# Patient Record
Sex: FEMALE | Race: WHITE | Employment: OTHER | URBAN - METROPOLITAN AREA
[De-identification: names, ages, dates, MRNs, and addresses within clinical notes are randomized per-mention and may not be internally consistent; named-entity substitution may affect disease eponyms.]

---
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
## 2018-02-02 ENCOUNTER — APPOINTMENT (OUTPATIENT)
Dept: GENERAL RADIOLOGY | Age: 73
End: 2018-02-02
Attending: EMERGENCY MEDICINE
Payer: MEDICARE

## 2018-02-02 ENCOUNTER — HOSPITAL ENCOUNTER (EMERGENCY)
Age: 73
Discharge: HOME | End: 2018-02-02
Attending: EMERGENCY MEDICINE
Payer: MEDICARE

## 2018-02-02 VITALS
DIASTOLIC BLOOD PRESSURE: 74 MMHG | HEART RATE: 82 BPM | OXYGEN SATURATION: 98 % | RESPIRATION RATE: 18 BRPM | TEMPERATURE: 97.9 F | WEIGHT: 158.07 LBS | SYSTOLIC BLOOD PRESSURE: 136 MMHG | HEIGHT: 64 IN | BODY MASS INDEX: 26.99 KG/M2

## 2018-02-02 DIAGNOSIS — R53.83 FATIGUE, UNSPECIFIED TYPE: Primary | ICD-10-CM

## 2018-02-02 DIAGNOSIS — R00.2 PALPITATIONS: ICD-10-CM

## 2018-02-02 LAB
ALBUMIN SERPL-MCNC: 4 G/DL (ref 3.5–5)
ALBUMIN/GLOB SERPL: 1.1 {RATIO} (ref 1.1–2.2)
ALP SERPL-CCNC: 98 U/L (ref 45–117)
ALT SERPL-CCNC: 23 U/L (ref 12–78)
ANION GAP SERPL CALC-SCNC: 4 MMOL/L (ref 5–15)
APPEARANCE UR: CLEAR
AST SERPL-CCNC: 19 U/L (ref 15–37)
ATRIAL RATE: 69 BPM
BACTERIA URNS QL MICRO: NEGATIVE /HPF
BASOPHILS # BLD: 0 K/UL (ref 0–0.1)
BASOPHILS NFR BLD: 0 % (ref 0–1)
BILIRUB SERPL-MCNC: 0.4 MG/DL (ref 0.2–1)
BILIRUB UR QL: NEGATIVE
BUN SERPL-MCNC: 15 MG/DL (ref 6–20)
BUN/CREAT SERPL: 16 (ref 12–20)
CALCIUM SERPL-MCNC: 10.3 MG/DL (ref 8.5–10.1)
CALCULATED P AXIS, ECG09: 57 DEGREES
CALCULATED R AXIS, ECG10: -44 DEGREES
CALCULATED T AXIS, ECG11: 40 DEGREES
CHLORIDE SERPL-SCNC: 101 MMOL/L (ref 97–108)
CK MB CFR SERPL CALC: NORMAL % (ref 0–2.5)
CK MB SERPL-MCNC: <1 NG/ML (ref 5–25)
CK SERPL-CCNC: 64 U/L (ref 26–192)
CO2 SERPL-SCNC: 31 MMOL/L (ref 21–32)
COLOR UR: NORMAL
CREAT SERPL-MCNC: 0.96 MG/DL (ref 0.55–1.02)
DIAGNOSIS, 93000: NORMAL
DIFFERENTIAL METHOD BLD: ABNORMAL
EOSINOPHIL # BLD: 0 K/UL (ref 0–0.4)
EOSINOPHIL NFR BLD: 0 % (ref 0–7)
EPITH CASTS URNS QL MICRO: NORMAL /LPF
ERYTHROCYTE [DISTWIDTH] IN BLOOD BY AUTOMATED COUNT: 13.9 % (ref 11.5–14.5)
GLOBULIN SER CALC-MCNC: 3.5 G/DL (ref 2–4)
GLUCOSE SERPL-MCNC: 108 MG/DL (ref 65–100)
GLUCOSE UR STRIP.AUTO-MCNC: NEGATIVE MG/DL
HCT VFR BLD AUTO: 38.1 % (ref 35–47)
HGB BLD-MCNC: 12.7 G/DL (ref 11.5–16)
HGB UR QL STRIP: NEGATIVE
HYALINE CASTS URNS QL MICRO: NORMAL /LPF (ref 0–5)
IMM GRANULOCYTES # BLD: 0 K/UL (ref 0–0.04)
IMM GRANULOCYTES NFR BLD AUTO: 0 % (ref 0–0.5)
KETONES UR QL STRIP.AUTO: NEGATIVE MG/DL
LEUKOCYTE ESTERASE UR QL STRIP.AUTO: NEGATIVE
LYMPHOCYTES # BLD: 3.6 K/UL (ref 0.8–3.5)
LYMPHOCYTES NFR BLD: 38 % (ref 12–49)
MCH RBC QN AUTO: 32.1 PG (ref 26–34)
MCHC RBC AUTO-ENTMCNC: 33.3 G/DL (ref 30–36.5)
MCV RBC AUTO: 96.2 FL (ref 80–99)
MONOCYTES # BLD: 0.9 K/UL (ref 0–1)
MONOCYTES NFR BLD: 9 % (ref 5–13)
NEUTS SEG # BLD: 4.9 K/UL (ref 1.8–8)
NEUTS SEG NFR BLD: 52 % (ref 32–75)
NITRITE UR QL STRIP.AUTO: NEGATIVE
NRBC # BLD: 0 K/UL (ref 0–0.01)
NRBC BLD-RTO: 0 PER 100 WBC
P-R INTERVAL, ECG05: 126 MS
PH UR STRIP: 6.5 [PH] (ref 5–8)
PLATELET # BLD AUTO: 426 K/UL (ref 150–400)
PMV BLD AUTO: 8.8 FL (ref 8.9–12.9)
POTASSIUM SERPL-SCNC: 4.1 MMOL/L (ref 3.5–5.1)
PROT SERPL-MCNC: 7.5 G/DL (ref 6.4–8.2)
PROT UR STRIP-MCNC: NEGATIVE MG/DL
Q-T INTERVAL, ECG07: 372 MS
QRS DURATION, ECG06: 80 MS
QTC CALCULATION (BEZET), ECG08: 398 MS
RBC # BLD AUTO: 3.96 M/UL (ref 3.8–5.2)
RBC #/AREA URNS HPF: NORMAL /HPF (ref 0–5)
SODIUM SERPL-SCNC: 136 MMOL/L (ref 136–145)
SP GR UR REFRACTOMETRY: 1.01 (ref 1–1.03)
TROPONIN I SERPL-MCNC: <0.04 NG/ML
TSH SERPL DL<=0.05 MIU/L-ACNC: 0.72 UIU/ML (ref 0.36–3.74)
UA: UC IF INDICATED,UAUC: NORMAL
UROBILINOGEN UR QL STRIP.AUTO: 0.2 EU/DL (ref 0.2–1)
VENTRICULAR RATE, ECG03: 69 BPM
WBC # BLD AUTO: 9.5 K/UL (ref 3.6–11)
WBC URNS QL MICRO: NORMAL /HPF (ref 0–4)

## 2018-02-02 PROCEDURE — 71046 X-RAY EXAM CHEST 2 VIEWS: CPT

## 2018-02-02 PROCEDURE — 80053 COMPREHEN METABOLIC PANEL: CPT | Performed by: EMERGENCY MEDICINE

## 2018-02-02 PROCEDURE — 85025 COMPLETE CBC W/AUTO DIFF WBC: CPT | Performed by: EMERGENCY MEDICINE

## 2018-02-02 PROCEDURE — 36415 COLL VENOUS BLD VENIPUNCTURE: CPT | Performed by: EMERGENCY MEDICINE

## 2018-02-02 PROCEDURE — 99284 EMERGENCY DEPT VISIT MOD MDM: CPT

## 2018-02-02 PROCEDURE — 84484 ASSAY OF TROPONIN QUANT: CPT | Performed by: EMERGENCY MEDICINE

## 2018-02-02 PROCEDURE — 82550 ASSAY OF CK (CPK): CPT | Performed by: EMERGENCY MEDICINE

## 2018-02-02 PROCEDURE — 81001 URINALYSIS AUTO W/SCOPE: CPT | Performed by: EMERGENCY MEDICINE

## 2018-02-02 PROCEDURE — 93005 ELECTROCARDIOGRAM TRACING: CPT

## 2018-02-02 PROCEDURE — 84443 ASSAY THYROID STIM HORMONE: CPT | Performed by: EMERGENCY MEDICINE

## 2018-02-02 NOTE — DISCHARGE INSTRUCTIONS
Palpitations: Care Instructions  Your Care Instructions    Heart palpitations are the uncomfortable sensation that your heart is beating fast or irregularly. You might feel pounding or fluttering in your chest. It might feel like your heart is skipping a beat. Although palpitations may be caused by a heart problem, they also occur because of stress, fatigue, or use of alcohol, caffeine, or nicotine. Many medicines, including diet pills, antihistamines, decongestants, and some herbal products, can cause heart palpitations. Nearly everyone has palpitations from time to time. Depending on your symptoms, your doctor may need to do more tests to try to find the cause of your palpitations. Follow-up care is a key part of your treatment and safety. Be sure to make and go to all appointments, and call your doctor if you are having problems. It's also a good idea to know your test results and keep a list of the medicines you take. How can you care for yourself at home? · Avoid caffeine, nicotine, and excess alcohol. · Do not take illegal drugs, such as methamphetamines and cocaine. · Do not take weight loss or diet medicines unless you talk with your doctor first.  · Get plenty of sleep. · Do not overeat. · If you have palpitations again, take deep breaths and try to relax. This may slow a racing heart. · If you start to feel lightheaded, lie down to avoid injuries that might result if you pass out and fall down. · Keep a record of your palpitations and bring it to your next doctor's appointment. Write down:  ¨ The date and time. ¨ Your pulse. (If your heart is beating fast, it may be hard to count your pulse.)  ¨ What you were doing when the palpitations started. ¨ How long the palpitations lasted. ¨ Any other symptoms. · If an activity causes palpitations, slow down or stop. Talk to your doctor before you do that activity again. · Take your medicines exactly as prescribed.  Call your doctor if you think you are having a problem with your medicine. When should you call for help? Call 911 anytime you think you may need emergency care. For example, call if:  ? · You passed out (lost consciousness). ? · You have symptoms of a heart attack. These may include:  ¨ Chest pain or pressure, or a strange feeling in the chest.  ¨ Sweating. ¨ Shortness of breath. ¨ Pain, pressure, or a strange feeling in the back, neck, jaw, or upper belly or in one or both shoulders or arms. ¨ Lightheadedness or sudden weakness. ¨ A fast or irregular heartbeat. After you call 911, the  may tell you to chew 1 adult-strength or 2 to 4 low-dose aspirin. Wait for an ambulance. Do not try to drive yourself. ? · You have symptoms of a stroke. These may include:  ¨ Sudden numbness, tingling, weakness, or loss of movement in your face, arm, or leg, especially on only one side of your body. ¨ Sudden vision changes. ¨ Sudden trouble speaking. ¨ Sudden confusion or trouble understanding simple statements. ¨ Sudden problems with walking or balance. ¨ A sudden, severe headache that is different from past headaches. ?Call your doctor now or seek immediate medical care if:  ? · You have heart palpitations and:  ¨ Are dizzy or lightheaded, or you feel like you may faint. ¨ Have new or increased shortness of breath. ? Watch closely for changes in your health, and be sure to contact your doctor if:  ? · You continue to have heart palpitations. Where can you learn more? Go to http://lindsay-lamonte.info/. Enter R508 in the search box to learn more about \"Palpitations: Care Instructions. \"  Current as of: September 21, 2016  Content Version: 11.4  © 7617-2111 [employer]. Care instructions adapted under license by [employer] (which disclaims liability or warranty for this information).  If you have questions about a medical condition or this instruction, always ask your healthcare professional. [address] any warranty or liability for your use of this information. Fatigue: Care Instructions  Your Care Instructions    Fatigue is a feeling of tiredness, exhaustion, or lack of energy. You may feel fatigue because of too much or not enough activity. It can also come from stress, lack of sleep, boredom, and poor diet. Many medical problems, such as viral infections, can cause fatigue. Emotional problems, especially depression, are often the cause of fatigue. Fatigue is most often a symptom of another problem. Treatment for fatigue depends on the cause. For example, if you have fatigue because you have a certain health problem, treating this problem also treats your fatigue. If depression or anxiety is the cause, treatment may help. Follow-up care is a key part of your treatment and safety. Be sure to make and go to all appointments, and call your doctor if you are having problems. It's also a good idea to know your test results and keep a list of the medicines you take. How can you care for yourself at home? · Get regular exercise. But don't overdo it. Go back and forth between rest and exercise. · Get plenty of rest.  · Eat a healthy diet. Do not skip meals, especially breakfast.  · Reduce your use of caffeine, tobacco, and alcohol. Caffeine is most often found in coffee, tea, cola drinks, and chocolate. · Limit medicines that can cause fatigue. This includes tranquilizers and cold and allergy medicines. When should you call for help? Watch closely for changes in your health, and be sure to contact your doctor if:  ? · You have new symptoms such as fever or a rash. ? · Your fatigue gets worse. ? · You have been feeling down, depressed, or hopeless. Or you may have lost interest in things that you usually enjoy. ? · You are not getting better as expected. Where can you learn more? Go to http://lindsay-lamonte.info/.   Enter A706 in the search box to learn more about \"Fatigue: Care Instructions. \"  Current as of: March 20, 2017  Content Version: 11.4  © 0610-6372 [employer]. Care instructions adapted under license by [employer] (which disclaims liability or warranty for this information). If you have questions about a medical condition or this instruction, always ask your healthcare professional. [address] any warranty or liability for your use of this information.

## 2018-02-02 NOTE — ED PROVIDER NOTES
EMERGENCY DEPARTMENT HISTORY AND PHYSICAL EXAM      Date: 2/2/2018  Patient Name: Bree Azul    History of Presenting Illness     Chief Complaint   Patient presents with    Chest Pain     with MID back pain x a few days    Palpitations    Fatigue       History Provided By: Patient    HPI: Bree Azul, 74 y.o. female with PMHx significant for CAD, reflux, A fib, presents ambulatory to the ED with cc of intermittent episodes of palpitations present x 2 days. Pt describes her palpitations as \"flutters\" but denies any pain. She notes that they typically occur in the morning and dissipate upon \"shaking myself really hard\". She reports associated sx of generalized fatigue and dizziness, both sx present x 2 weeks, as well as intermittent episodes of back pain. She states she has been \"feeling lousy\" for the past 2 weeks. She has a hx of A fib but denies ever being prescribed medications. She does have a cardiology appointment next week with Dr Linda Hicks. At baseline she reports irregular bowel movements and abdominal \"aches\" due to a spasmatic colon. She denies any change in her BM's from baseline. She denies sore throat, cough, blood in stool, dysuria, NVD, SOB, CP, leg swelling. Pt denies smoking or alcohol use. PCP: None    There are no other complaints, changes, or physical findings at this time. Current Outpatient Prescriptions   Medication Sig Dispense Refill    SYNTHROID 75 mcg tablet Take 75 mcg by mouth daily.  omeprazole (PRILOSEC) 40 mg capsule Take 40 mg by mouth as needed.  oxaprozin (DAYPRO) 600 mg tablet Take  by mouth daily.  acyclovir (ZOVIRAX) 400 mg tablet Take 400 mg by mouth every four (4) hours (while awake).  fexofenadine-pseudoephedrine (ALLEGRA-D)  mg per tablet Take 1 Tab by mouth as needed.          Past History     Past Medical History:  Past Medical History:   Diagnosis Date    CAD (coronary artery disease)     Hyperlipidemia    Endocrine disease \"Thyroid\"    Gastrointestinal disorder     Acid Reflux       Past Surgical History:  History reviewed. No pertinent surgical history. Family History:  History reviewed. No pertinent family history. Social History:  Social History   Substance Use Topics    Smoking status: Never Smoker    Smokeless tobacco: Never Used    Alcohol use No       Allergies: Allergies   Allergen Reactions    Codeine Rash    Pcn [Penicillins] Rash         Review of Systems   Review of Systems   Constitutional: Positive for fatigue. Negative for fever. HENT: Negative. Eyes: Negative. Respiratory: Negative for shortness of breath and wheezing. Cardiovascular: Positive for palpitations. Negative for chest pain and leg swelling. Gastrointestinal: Negative for blood in stool, constipation, diarrhea, nausea and vomiting. Endocrine: Negative. Genitourinary: Negative for difficulty urinating and dysuria. Musculoskeletal: Positive for back pain. Skin: Negative for rash. Allergic/Immunologic: Negative. Neurological: Positive for dizziness. Negative for weakness and numbness. Hematological: Negative. Psychiatric/Behavioral: Negative. Physical Exam   Physical Exam   Constitutional: She is oriented to person, place, and time. She appears well-developed and well-nourished. No distress. HENT:   Head: Normocephalic and atraumatic. Mouth/Throat: Oropharynx is clear and moist.   Eyes: Conjunctivae and EOM are normal.   Neck: Neck supple. No JVD present. No tracheal deviation present. Cardiovascular: Normal rate, regular rhythm and intact distal pulses. Exam reveals no gallop and no friction rub. No murmur heard. Pulmonary/Chest: Effort normal and breath sounds normal. No stridor. No respiratory distress. She has no wheezes. + Mastectomy scar over right breast   Abdominal: Soft. Bowel sounds are normal. She exhibits no distension and no mass. There is no tenderness. There is no guarding. Musculoskeletal: Normal range of motion. She exhibits no edema or tenderness. No deformity   Neurological: She is alert and oriented to person, place, and time. She has normal strength. No focal deficits   Skin: Skin is warm, dry and intact. No rash noted. Psychiatric: She has a normal mood and affect. Her behavior is normal. Judgment and thought content normal.   Nursing note and vitals reviewed. Diagnostic Study Results     Labs -     Recent Results (from the past 12 hour(s))   EKG, 12 LEAD, INITIAL    Collection Time: 02/02/18  1:28 PM   Result Value Ref Range    Ventricular Rate 69 BPM    Atrial Rate 69 BPM    P-R Interval 126 ms    QRS Duration 80 ms    Q-T Interval 372 ms    QTC Calculation (Bezet) 398 ms    Calculated P Axis 57 degrees    Calculated R Axis -44 degrees    Calculated T Axis 40 degrees    Diagnosis       Normal sinus rhythm  Left anterior fascicular block  When compared with ECG of 03-OCT-2002 15:25,  No significant change was found    Confirmed by Aramis Conley ([id number]) on 2/2/2018 7:66:36 PM     METABOLIC PANEL, COMPREHENSIVE    Collection Time: 02/02/18  2:00 PM   Result Value Ref Range    Sodium 136 136 - 145 mmol/L    Potassium 4.1 3.5 - 5.1 mmol/L    Chloride 101 97 - 108 mmol/L    CO2 31 21 - 32 mmol/L    Anion gap 4 (L) 5 - 15 mmol/L    Glucose 108 (H) 65 - 100 mg/dL    BUN 15 6 - 20 MG/DL    Creatinine 0.96 0.55 - 1.02 MG/DL    BUN/Creatinine ratio 16 12 - 20      GFR est AA >60 >60 ml/min/1.73m2    GFR est non-AA 57 (L) >60 ml/min/1.73m2    Calcium 10.3 (H) 8.5 - 10.1 MG/DL    Bilirubin, total 0.4 0.2 - 1.0 MG/DL    ALT (SGPT) 23 12 - 78 U/L    AST (SGOT) 19 15 - 37 U/L    Alk.  phosphatase 98 45 - 117 U/L    Protein, total 7.5 6.4 - 8.2 g/dL    Albumin 4.0 3.5 - 5.0 g/dL    Globulin 3.5 2.0 - 4.0 g/dL    A-G Ratio 1.1 1.1 - 2.2     CK W/ CKMB & INDEX    Collection Time: 02/02/18  2:00 PM   Result Value Ref Range    CK 64 26 - 192 U/L    CK - MB <1.0 <3.6 NG/ML    CK-MB Index Cannot be calculated 0 - 2.5     CBC WITH AUTOMATED DIFF    Collection Time: 02/02/18  2:00 PM   Result Value Ref Range    WBC 9.5 3.6 - 11.0 K/uL    RBC 3.96 3.80 - 5.20 M/uL    HGB 12.7 11.5 - 16.0 g/dL    HCT 38.1 35.0 - 47.0 %    MCV 96.2 80.0 - 99.0 FL    MCH 32.1 26.0 - 34.0 PG    MCHC 33.3 30.0 - 36.5 g/dL    RDW 13.9 11.5 - 14.5 %    PLATELET 613 (H) 991 - 400 K/uL    MPV 8.8 (L) 8.9 - 12.9 FL    NRBC 0.0 0  WBC    ABSOLUTE NRBC 0.00 0.00 - 0.01 K/uL    NEUTROPHILS 52 32 - 75 %    LYMPHOCYTES 38 12 - 49 %    MONOCYTES 9 5 - 13 %    EOSINOPHILS 0 0 - 7 %    BASOPHILS 0 0 - 1 %    IMMATURE GRANULOCYTES 0 0.0 - 0.5 %    ABS. NEUTROPHILS 4.9 1.8 - 8.0 K/UL    ABS. LYMPHOCYTES 3.6 (H) 0.8 - 3.5 K/UL    ABS. MONOCYTES 0.9 0.0 - 1.0 K/UL    ABS. EOSINOPHILS 0.0 0.0 - 0.4 K/UL    ABS. BASOPHILS 0.0 0.0 - 0.1 K/UL    ABS. IMM.  GRANS. 0.0 0.00 - 0.04 K/UL    DF AUTOMATED     TROPONIN I    Collection Time: 02/02/18  2:00 PM   Result Value Ref Range    Troponin-I, Qt. <0.04 <0.05 ng/mL   TSH 3RD GENERATION    Collection Time: 02/02/18  3:23 PM   Result Value Ref Range    TSH 0.72 0.36 - 3.74 uIU/mL   URINALYSIS W/ REFLEX CULTURE    Collection Time: 02/02/18  3:26 PM   Result Value Ref Range    Color YELLOW/STRAW      Appearance CLEAR CLEAR      Specific gravity 1.011 1.003 - 1.030      pH (UA) 6.5 5.0 - 8.0      Protein NEGATIVE  NEG mg/dL    Glucose NEGATIVE  NEG mg/dL    Ketone NEGATIVE  NEG mg/dL    Bilirubin NEGATIVE  NEG      Blood NEGATIVE  NEG      Urobilinogen 0.2 0.2 - 1.0 EU/dL    Nitrites NEGATIVE  NEG      Leukocyte Esterase NEGATIVE  NEG      WBC 0-4 0 - 4 /hpf    RBC 0-5 0 - 5 /hpf    Epithelial cells FEW FEW /lpf    Bacteria NEGATIVE  NEG /hpf    UA:UC IF INDICATED CULTURE NOT INDICATED BY UA RESULT CNI      Hyaline cast 2-5 0 - 5 /lpf       Radiologic Studies -   XR CHEST PA LAT   Final Result        CT Results  (Last 48 hours)    None        CXR Results  (Last 48 hours) 02/02/18 1419  XR CHEST PA LAT Final result    Impression:  IMPRESSION: No acute abnormality. Narrative:  EXAM:  XR CHEST PA LAT. INDICATION: Palpitations. COMPARISON: 7/19/2011. FINDINGS:    PA and lateral radiographs of the chest were obtained. The patient is on a   cardiac monitor. Lungs: The lungs are clear of mass, nodule, airspace disease or edema. Pleura: There is no pleural effusion or pneumothorax. Mediastinum: The cardiac and mediastinal contours and pulmonary vascularity are   normal.  The aorta is atherosclerotic. Bones and soft tissues: The bones and soft tissues are within normal limits. Medical Decision Making   I am the first provider for this patient. I reviewed the vital signs, available nursing notes, past medical history, past surgical history, family history and social history. Vital Signs-Reviewed the patient's vital signs. Patient Vitals for the past 12 hrs:   Temp Pulse Resp BP SpO2   02/02/18 1336 - 82 18 136/74 98 %   02/02/18 1310 97.9 °F (36.6 °C) 92 12 (!) 181/104 98 %       Pulse Oximetry Analysis - 98% on room air    Cardiac Monitor:   Rate: 84 bpm  Rhythm: Normal Sinus Rhythm      EKG interpretation: (Preliminary) 13:28  Rhythm: normal sinus rhythm; and regular . Rate (approx.): 69; Axis: left axis deviation; NV interval: normal; QRS interval: normal ; ST/T wave: normal; Other findings: normal.  Written by Daiva Gilford, ED Scribe, as dictated by Edna Smith DO. Records Reviewed: Old Medical Records    Provider Notes (Medical Decision Making):   DDx: arrhythmia, acs, anemia, electrolyte abnormality, viral syndrome, pneumonia, dehydration, uti. Will check labs, cardiac enzymes, ekg, CXR, ua, tsh.     ED Course:   Initial assessment performed. The patients presenting problems have been discussed, and they are in agreement with the care plan formulated and outlined with them.   I have encouraged them to ask questions as they arise throughout their visit. Medications - No data to display    Progress Note:  4:04 PM  Updated patient on plan of care. Disposition:  Discharge Note:  4:57  The pt is ready for discharge. The pt's signs, symptoms, diagnosis, and discharge instructions have been discussed and pt has conveyed their understanding. The pt is to follow up as recommended or return to ER should their symptoms worsen. Plan has been discussed and pt is in agreement. This note is prepared by Afshan Briones, acting as a Scribe for Sabine Frazier DO: The scribe's documentation has been prepared under my direction and personally reviewed by me in its entirety. I confirm that the notes above accurately reflects all work, treatment, procedures, and medical decision making performed by me. PLAN:  1. Follow-up Information     Follow up With Details Comments Contact Info    Your PCP Schedule an appointment as soon as possible for a visit      Kamila Reynolds MD Schedule an appointment as soon as possible for a visit  [address] [place] [address]  [address] [place] [address],[address]  [phone]      [facility] EMERGENCY DEPT  As needed, If symptoms worsen [address] [place] [address]  [phone]        Return to ED if worse     Diagnosis     Clinical Impression:   1. Fatigue, unspecified type    2. Palpitations        Attestations: This note is prepared by Afshan Briones, acting as a Scribe for Sabine Frazier DO: The scribe's documentation has been prepared under my direction and personally reviewed by me in its entirety. I confirm that the notes above accurately reflects all work, treatment, procedures, and medical decision making performed by me.

## 2018-02-02 NOTE — ED NOTES
Patient reports to ED with complaints of chest pain that have been ongoing for a few days. Patient states she had the pains before, which were accompanied with palpitations and was dx with A. Fib. Patient states she was never treated with medications for the diagnosis. She sates she began to have the same chest pains and palpitations, as well as, accompanied fatigue, which made it hard for her to get out of bed yesterday. MD Norman Cantu met patient at bedside for evaluation. Patient resting comfortably in stretcher with call bell within reach. Side rails x2. Monitor x3. No further complaints noted at this time.

## 2018-03-22 ENCOUNTER — HOSPITAL ENCOUNTER (OUTPATIENT)
Dept: MRI IMAGING | Age: 73
Discharge: HOME | End: 2018-03-22
Attending: SPECIALIST
Payer: MEDICARE

## 2018-03-22 DIAGNOSIS — M54.16 LUMBAR RADICULITIS: ICD-10-CM

## 2018-03-22 PROCEDURE — 72148 MRI LUMBAR SPINE W/O DYE: CPT

## 2018-05-16 PROBLEM — S82.92XD CLOSED FRACTURE OF LEFT LOWER EXTREMITY WITH ROUTINE HEALING: Status: ACTIVE | Noted: 2018-05-16

## 2018-05-16 PROBLEM — N80.9 ENDOMETRIOSIS: Status: ACTIVE | Noted: 2018-05-16

## 2018-05-16 PROBLEM — E03.8 OTHER SPECIFIED HYPOTHYROIDISM: Status: ACTIVE | Noted: 2018-05-16

## 2018-05-16 PROBLEM — Z86.73 HISTORY OF CVA (CEREBROVASCULAR ACCIDENT): Status: ACTIVE | Noted: 2018-05-16

## 2018-05-16 PROBLEM — M85.89 OSTEOPENIA OF MULTIPLE SITES: Status: ACTIVE | Noted: 2018-05-16

## 2018-05-16 PROBLEM — K21.9 GASTROESOPHAGEAL REFLUX DISEASE WITHOUT ESOPHAGITIS: Status: ACTIVE | Noted: 2018-05-16

## 2018-05-16 PROBLEM — Z85.3 HX: BREAST CANCER: Status: ACTIVE | Noted: 2018-05-16

## 2018-05-17 ENCOUNTER — OFFICE VISIT (OUTPATIENT)
Dept: INTERNAL MEDICINE CLINIC | Age: 73
End: 2018-05-17

## 2018-05-17 VITALS
TEMPERATURE: 97.9 F | DIASTOLIC BLOOD PRESSURE: 68 MMHG | SYSTOLIC BLOOD PRESSURE: 111 MMHG | OXYGEN SATURATION: 99 % | BODY MASS INDEX: 27.83 KG/M2 | HEART RATE: 70 BPM | HEIGHT: 64 IN | WEIGHT: 163 LBS

## 2018-05-17 DIAGNOSIS — M85.80 OSTEOPENIA, UNSPECIFIED LOCATION: ICD-10-CM

## 2018-05-17 DIAGNOSIS — Z85.3 HX: BREAST CANCER: ICD-10-CM

## 2018-05-17 DIAGNOSIS — E78.5 HYPERLIPIDEMIA, UNSPECIFIED HYPERLIPIDEMIA TYPE: Primary | ICD-10-CM

## 2018-05-17 DIAGNOSIS — Z86.73 HISTORY OF CVA (CEREBROVASCULAR ACCIDENT): ICD-10-CM

## 2018-05-17 DIAGNOSIS — M17.10 ARTHRITIS OF KNEE: ICD-10-CM

## 2018-05-17 DIAGNOSIS — E03.9 HYPOTHYROIDISM, UNSPECIFIED TYPE: ICD-10-CM

## 2018-05-17 DIAGNOSIS — M85.89 OSTEOPENIA OF MULTIPLE SITES: ICD-10-CM

## 2018-05-17 RX ORDER — SIMVASTATIN 20 MG/1
20 TABLET, FILM COATED ORAL
Qty: 90 TAB | Refills: 3 | Status: SHIPPED | OUTPATIENT
Start: 2018-05-17 | End: 2018-12-05 | Stop reason: SDUPTHER

## 2018-05-17 RX ORDER — ACYCLOVIR 400 MG/1
400 TABLET ORAL 2 TIMES DAILY
Qty: 180 TAB | Refills: 3 | Status: SHIPPED | OUTPATIENT
Start: 2018-05-17 | End: 2018-08-07 | Stop reason: SDUPTHER

## 2018-05-17 RX ORDER — LEVOTHYROXINE SODIUM 75 UG/1
75 TABLET ORAL DAILY
Qty: 90 TAB | Refills: 3 | Status: SHIPPED | OUTPATIENT
Start: 2018-05-17 | End: 2018-10-23 | Stop reason: SDUPTHER

## 2018-05-17 RX ORDER — SIMVASTATIN 20 MG/1
TABLET, FILM COATED ORAL
COMMUNITY
End: 2018-05-17 | Stop reason: SDUPTHER

## 2018-05-17 NOTE — PROGRESS NOTES
Chief Complaint   Patient presents with   [facility] Establish Care     New patient    Memory Loss     Short term     Back Pain

## 2018-05-17 NOTE — MR AVS SNAPSHOT
[address] [facility] 
[phone] Patient: Fernanda Rodarte MRN:  [id number] Visit Information Date & Time Provider Department Dept. Phone Encounter #  
 5/17/2018 10:00 AM Karmen Velásquez, [address] [place] [address],[address] [phone] [id number] Follow-up Instructions Return in about 6 months (around 11/17/2018) for hld thyroid oa. Upcoming Health Maintenance Date Due DTaP/Tdap/Td series (1 - Tdap) 7/20/1964 BREAST CANCER SCRN MAMMOGRAM 7/20/1993 FOBT Q 1 YEAR AGE 50-75 7/20/1993 ZOSTER VACCINE AGE 60> 5/20/2003 Pneumococcal 65+ Low/Medium Risk (1 of 2 - PCV13) 7/20/2008 GLAUCOMA SCREENING Q2Y 6/27/2014 MEDICARE YEARLY EXAM 3/14/2018 Influenza Age 5 to Adult 8/1/2018 Allergies as of 5/17/2018  Review Complete On: 2/2/2018 By: Tavia Hartman Severity Noted Reaction Type Reactions Latex  05/17/2018    Rash Codeine  11/10/2009    Rash Pcn [Penicillins]  11/10/2009    Rash Current Immunizations  Never Reviewed No immunizations on file. Not reviewed this visit You Were Diagnosed With   
  
 Codes Comments Hyperlipidemia, unspecified hyperlipidemia type    -  Primary ICD-10-CM: E78.5 ICD-9-CM: 272.4 Hypothyroidism, unspecified type     ICD-10-CM: E03.9 ICD-9-CM: 244.9 Osteopenia, unspecified location     ICD-10-CM: M85.80 ICD-9-CM: 733.90 History of CVA (cerebrovascular accident)     ICD-10-CM: Z86.73 
ICD-9-CM: V12.54 Osteopenia of multiple sites     ICD-10-CM: M85.89 ICD-9-CM: 733.90 HX: breast cancer     ICD-10-CM: Z85.3 ICD-9-CM: V10.3 Arthritis of knee     ICD-10-CM: M17.10 ICD-9-CM: 716.96 Vitals BP Pulse Temp Height(growth percentile) Weight(growth percentile) SpO2  
 111/68 (BP 1 Location: Left arm, BP Patient Position: Sitting) 70 97.9 °F (36.6 °C) (Oral) 5' 3.5\" (1.613 m) 163 lb (73.9 kg) 99% BMI OB Status Smoking Status 28.42 kg/m2 Menopause Never Smoker BMI and BSA Data Body Mass Index Body Surface Area  
 28.42 kg/m 2 1.82 m 2 Preferred Pharmacy Pharmacy Name Phone [id number] [address]ise [address] [phone] Your Updated Medication List  
  
   
This list is accurate as of 5/17/18 11:42 AM.  Always use your most recent med list.  
  
  
  
  
 acyclovir 400 mg tablet Commonly known as:  ZOVIRAX Take 1 Tab by mouth two (2) times a day. fexofenadine-pseudoephedrine  mg Tb12 Commonly known as:  ALLEGRA-D Take 1 Tab by mouth as needed. levothyroxine 75 mcg tablet Commonly known as:  SYNTHROID Take 1 Tab by mouth daily. omeprazole 40 mg capsule Commonly known as:  PRILOSEC Take 40 mg by mouth as needed. oxaprozin 600 mg tablet Commonly known as:  [place]ales [place] Take  by mouth daily. simvastatin 20 mg tablet Commonly known as:  ZOCOR Take 1 Tab by mouth nightly. Prescriptions Printed Refills  
 simvastatin (ZOCOR) 20 mg tablet 3 Sig: Take 1 Tab by mouth nightly. Class: Print Route: Oral  
 levothyroxine (SYNTHROID) 75 mcg tablet 3 Sig: Take 1 Tab by mouth daily. Class: Print Route: Oral  
 acyclovir (ZOVIRAX) 400 mg tablet 3 Sig: Take 1 Tab by mouth two (2) times a day. Class: Print Route: Oral  
  
We Performed the Following CBC W/O DIFF [04156 CPT(R)] METABOLIC PANEL, COMPREHENSIVE [82856 CPT(R)] TSH 3RD GENERATION [91876 CPT(R)] Follow-up Instructions Return in about 6 months (around 11/17/2018) for hld thyroid oa. Introducing [facility]! [employer] introduces [employer] patient portal. Now you can access parts of your medical record, email your doctor's office, and request medication refills online.    
 
1. In your internet browser, go to [email or web address] 2. Click on the First Time User? Click Here link in the Sign In box. You will see the New Member Sign Up page. 3. Enter your Jebbit Access Code exactly as it appears below. You will not need to use this code after youve completed the sign-up process. If you do not sign up before the expiration date, you must request a new code. · Jebbit Access Code: [email or web address] Expires: 6/19/2018  2:44 PM 
 
4. Enter the last four digits of your Social Security Number (xxxx) and Date of Birth (mm/dd/yyyy) as indicated and click Submit. You will be taken to the next sign-up page. 5. Create a [employer] ID. This will be your Jebbit login ID and cannot be changed, so think of one that is secure and easy to remember. 6. Create a Jebbit password. You can change your password at any time. 7. Enter your Password Reset Question and Answer. This can be used at a later time if you forget your password. 8. Enter your e-mail address. You will receive e-mail notification when new information is available in [address] 9. Click Sign Up. You can now view and download portions of your medical record. 10. Click the Download Summary menu link to download a portable copy of your medical information. If you have questions, please visit the Frequently Asked Questions section of the Jebbit website. Remember, Jebbit is NOT to be used for urgent needs. For medical emergencies, dial 911. Now available from your iPhone and Android! Please provide this summary of care documentation to your next provider. Your primary care clinician is listed as Shadi ALVARADO. If you have any questions after today's visit, please call [phone].

## 2018-05-17 NOTE — PROGRESS NOTES
HISTORY OF PRESENT ILLNESS  Adama Martinez is a 74 y.o. female. HPI     Here to establish care  Former PCP Dr Sherley Marie in [place]  Moved to [place]  Hx palpitations, dyslipidemia ( Dr Mary Ware., osteopenia( Dr. Dino Fabry  sees gyn MD yearly  Not afib per pt  Sees Dr Pina-cardiologist  In ED a few mos ago for palpitations      Has T 10 compression fx  Getting PT for tilted pelvis  Has severe left knee OA--Dr Debi Gagnon Nose for skin  There are no active problems to display for this patient. Current Outpatient Prescriptions   Medication Sig Dispense Refill    SYNTHROID 75 mcg tablet Take 75 mcg by mouth daily.  fexofenadine-pseudoephedrine (ALLEGRA-D)  mg per tablet Take 1 Tab by mouth as needed.  omeprazole (PRILOSEC) 40 mg capsule Take 40 mg by mouth as needed.  oxaprozin (DAYPRO) 600 mg tablet Take  by mouth daily.  acyclovir (ZOVIRAX) 400 mg tablet Take 400 mg by mouth every four (4) hours (while awake). Allergies   Allergen Reactions    Codeine Rash    Pcn [Penicillins] Rash     Past Medical History:   Diagnosis Date    CAD (coronary artery disease)     Hyperlipidemia    Endocrine disease     \"Thyroid\"    Gastrointestinal disorder     Acid Reflux     No past surgical history on file. No family history on file.   Social History   Substance Use Topics    Smoking status: Never Smoker    Smokeless tobacco: Never Used    Alcohol use No      Lab Results  Component Value Date/Time   WBC 9.5 02/02/2018 02:00 PM   HGB 12.7 02/02/2018 02:00 PM   HCT 38.1 02/02/2018 02:00 PM   PLATELET 284 (H) 62/38/4218 02:00 PM   MCV 96.2 02/02/2018 02:00 PM     Lab Results  Component Value Date/Time   Glucose 108 (H) 02/02/2018 02:00 PM   LDL, calculated 121.4 (H) 07/10/2009 08:37 AM   Creatinine 0.96 02/02/2018 02:00 PM      Lab Results  Component Value Date/Time   Cholesterol, total 209 (H) 07/10/2009 08:37 AM   HDL Cholesterol 70 (H) 07/10/2009 08:37 AM   LDL, calculated 121.4 (H) 07/10/2009 08:37 AM   Triglyceride 88 07/10/2009 08:37 AM   CHOL/HDL Ratio 3.0 07/10/2009 08:37 AM     Lab Results  Component Value Date/Time   GFR est non-AA 57 (L) 02/02/2018 02:00 PM   GFR est AA >60 02/02/2018 02:00 PM   Creatinine 0.96 02/02/2018 02:00 PM   BUN 15 02/02/2018 02:00 PM   Sodium 136 02/02/2018 02:00 PM   Potassium 4.1 02/02/2018 02:00 PM   Chloride 101 02/02/2018 02:00 PM   CO2 31 02/02/2018 02:00 PM     Lab Results  Component Value Date/Time   TSH 0.72 02/02/2018 03:23 PM         Review of Systems   Constitutional: Negative for chills, fever, malaise/fatigue and weight loss. Weight gain several lbs in past 1 year   Eyes: Negative for blurred vision and double vision. Respiratory: Negative for cough and shortness of breath. Cardiovascular: Negative for chest pain and palpitations. Gastrointestinal: Negative for abdominal pain, blood in stool, constipation, diarrhea, melena, nausea and vomiting. Genitourinary: Negative for dysuria, frequency, hematuria and urgency. Musculoskeletal: Positive for joint pain. Negative for back pain, falls and myalgias. Neurological: Negative for dizziness, tremors and headaches. Memory loss-mild       Physical Exam   Constitutional: She appears well-developed and well-nourished. Appears stated age   HENT:   Mouth/Throat: Oropharynx is clear and moist.   Eyes: Pupils are equal, round, and reactive to light. Neck: Normal range of motion. Cardiovascular: Normal rate, regular rhythm and normal heart sounds. Exam reveals no gallop and no friction rub. No murmur heard. Pulmonary/Chest: Effort normal and breath sounds normal. No respiratory distress. She has no wheezes. Abdominal: Soft. Bowel sounds are normal.   Musculoskeletal: She exhibits no edema. Neurological: She is alert. Skin: Skin is warm. Psychiatric: She has a normal mood and affect. Nursing note and vitals reviewed.       ASSESSMENT and PLAN  Diagnoses and all orders for this visit:    1. Hyperlipidemia, unspecified hyperlipidemia type  -     METABOLIC PANEL, COMPREHENSIVE   Managing with diet and exericse  2. Hypothyroidism, unspecified type  -     TSH 3RD GENERATION   euthyroid clinically  3. Osteopenia, unspecified location  -     CBC W/O DIFF   Hx thoracic compression fx--prolia  4. History of CVA (cerebrovascular accident)     5. Osteopenia of multiple sites   prolia per rheum MD  6. HX: breast cancer s/p right mastectomy   Mammogram per gyn MD  7. Arthritis of knee  -     CBC W/O DIFF   Advised lowering use ot nsaid Daypro to one every day prn, try ES tylenol   F/u ortho MD for injection or TKR  Other orders  -     simvastatin (ZOCOR) 20 mg tablet; Take 1 Tab by mouth nightly. -     levothyroxine (SYNTHROID) 75 mcg tablet; Take 1 Tab by mouth daily. -     acyclovir (ZOVIRAX) 400 mg tablet; Take 1 Tab by mouth two (2) times a day. Follow-up Disposition:  Return in about 6 months (around 11/17/2018) for hld thyroid oa.

## 2018-05-21 ENCOUNTER — APPOINTMENT (OUTPATIENT)
Dept: GENERAL RADIOLOGY | Age: 73
End: 2018-05-21
Attending: EMERGENCY MEDICINE
Payer: MEDICARE

## 2018-05-21 ENCOUNTER — APPOINTMENT (OUTPATIENT)
Dept: CT IMAGING | Age: 73
End: 2018-05-21
Attending: EMERGENCY MEDICINE
Payer: MEDICARE

## 2018-05-21 ENCOUNTER — HOSPITAL ENCOUNTER (EMERGENCY)
Age: 73
Discharge: HOME | End: 2018-05-21
Attending: EMERGENCY MEDICINE
Payer: MEDICARE

## 2018-05-21 VITALS
DIASTOLIC BLOOD PRESSURE: 67 MMHG | SYSTOLIC BLOOD PRESSURE: 143 MMHG | BODY MASS INDEX: 28.35 KG/M2 | OXYGEN SATURATION: 97 % | TEMPERATURE: 97.9 F | HEART RATE: 67 BPM | RESPIRATION RATE: 17 BRPM | HEIGHT: 63 IN | WEIGHT: 160 LBS

## 2018-05-21 DIAGNOSIS — R53.83 FATIGUE, UNSPECIFIED TYPE: ICD-10-CM

## 2018-05-21 DIAGNOSIS — R00.2 PALPITATIONS: Primary | ICD-10-CM

## 2018-05-21 LAB
ALBUMIN SERPL-MCNC: 4 G/DL (ref 3.5–5)
ALBUMIN/GLOB SERPL: 1.2 {RATIO} (ref 1.1–2.2)
ALP SERPL-CCNC: 96 U/L (ref 45–117)
ALT SERPL-CCNC: 29 U/L (ref 12–78)
ANION GAP SERPL CALC-SCNC: 7 MMOL/L (ref 5–15)
APPEARANCE UR: CLEAR
AST SERPL-CCNC: 20 U/L (ref 15–37)
BASOPHILS # BLD: 0 K/UL (ref 0–0.1)
BASOPHILS NFR BLD: 1 % (ref 0–1)
BILIRUB SERPL-MCNC: 0.2 MG/DL (ref 0.2–1)
BILIRUB UR QL: NEGATIVE
BUN SERPL-MCNC: 13 MG/DL (ref 6–20)
BUN/CREAT SERPL: 17 (ref 12–20)
CALCIUM SERPL-MCNC: 9.2 MG/DL (ref 8.5–10.1)
CHLORIDE SERPL-SCNC: 100 MMOL/L (ref 97–108)
CK SERPL-CCNC: 91 U/L (ref 26–192)
CO2 SERPL-SCNC: 27 MMOL/L (ref 21–32)
COLOR UR: NORMAL
CREAT SERPL-MCNC: 0.76 MG/DL (ref 0.55–1.02)
DIFFERENTIAL METHOD BLD: ABNORMAL
EOSINOPHIL # BLD: 0.1 K/UL (ref 0–0.4)
EOSINOPHIL NFR BLD: 1 % (ref 0–7)
ERYTHROCYTE [DISTWIDTH] IN BLOOD BY AUTOMATED COUNT: 13.3 % (ref 11.5–14.5)
GLOBULIN SER CALC-MCNC: 3.3 G/DL (ref 2–4)
GLUCOSE SERPL-MCNC: 107 MG/DL (ref 65–100)
GLUCOSE UR STRIP.AUTO-MCNC: NEGATIVE MG/DL
HCT VFR BLD AUTO: 35.4 % (ref 35–47)
HGB BLD-MCNC: 11.7 G/DL (ref 11.5–16)
HGB UR QL STRIP: NEGATIVE
IMM GRANULOCYTES # BLD: 0 K/UL (ref 0–0.04)
IMM GRANULOCYTES NFR BLD AUTO: 0 % (ref 0–0.5)
KETONES UR QL STRIP.AUTO: NEGATIVE MG/DL
LEUKOCYTE ESTERASE UR QL STRIP.AUTO: NEGATIVE
LYMPHOCYTES # BLD: 4.4 K/UL (ref 0.8–3.5)
LYMPHOCYTES NFR BLD: 50 % (ref 12–49)
MAGNESIUM SERPL-MCNC: 1.9 MG/DL (ref 1.6–2.4)
MCH RBC QN AUTO: 32.3 PG (ref 26–34)
MCHC RBC AUTO-ENTMCNC: 33.1 G/DL (ref 30–36.5)
MCV RBC AUTO: 97.8 FL (ref 80–99)
MONOCYTES # BLD: 0.9 K/UL (ref 0–1)
MONOCYTES NFR BLD: 10 % (ref 5–13)
NEUTS SEG # BLD: 3.3 K/UL (ref 1.8–8)
NEUTS SEG NFR BLD: 38 % (ref 32–75)
NITRITE UR QL STRIP.AUTO: NEGATIVE
NRBC # BLD: 0 K/UL (ref 0–0.01)
NRBC BLD-RTO: 0 PER 100 WBC
PH UR STRIP: 7 [PH] (ref 5–8)
PLATELET # BLD AUTO: 309 K/UL (ref 150–400)
PMV BLD AUTO: 8.3 FL (ref 8.9–12.9)
POTASSIUM SERPL-SCNC: 4.4 MMOL/L (ref 3.5–5.1)
PROT SERPL-MCNC: 7.3 G/DL (ref 6.4–8.2)
PROT UR STRIP-MCNC: NEGATIVE MG/DL
RBC # BLD AUTO: 3.62 M/UL (ref 3.8–5.2)
SODIUM SERPL-SCNC: 134 MMOL/L (ref 136–145)
SP GR UR REFRACTOMETRY: 1.01 (ref 1–1.03)
TROPONIN I SERPL-MCNC: <0.04 NG/ML
TSH SERPL DL<=0.05 MIU/L-ACNC: 0.1 UIU/ML (ref 0.36–3.74)
UROBILINOGEN UR QL STRIP.AUTO: 0.2 EU/DL (ref 0.2–1)
WBC # BLD AUTO: 8.7 K/UL (ref 3.6–11)

## 2018-05-21 PROCEDURE — 70450 CT HEAD/BRAIN W/O DYE: CPT

## 2018-05-21 PROCEDURE — 81003 URINALYSIS AUTO W/O SCOPE: CPT

## 2018-05-21 PROCEDURE — 84443 ASSAY THYROID STIM HORMONE: CPT

## 2018-05-21 PROCEDURE — 99285 EMERGENCY DEPT VISIT HI MDM: CPT

## 2018-05-21 PROCEDURE — 82550 ASSAY OF CK (CPK): CPT | Performed by: EMERGENCY MEDICINE

## 2018-05-21 PROCEDURE — 84484 ASSAY OF TROPONIN QUANT: CPT | Performed by: EMERGENCY MEDICINE

## 2018-05-21 PROCEDURE — 93005 ELECTROCARDIOGRAM TRACING: CPT

## 2018-05-21 PROCEDURE — 80053 COMPREHEN METABOLIC PANEL: CPT | Performed by: EMERGENCY MEDICINE

## 2018-05-21 PROCEDURE — 36415 COLL VENOUS BLD VENIPUNCTURE: CPT | Performed by: EMERGENCY MEDICINE

## 2018-05-21 PROCEDURE — 71045 X-RAY EXAM CHEST 1 VIEW: CPT

## 2018-05-21 PROCEDURE — 85025 COMPLETE CBC W/AUTO DIFF WBC: CPT | Performed by: EMERGENCY MEDICINE

## 2018-05-21 PROCEDURE — 83735 ASSAY OF MAGNESIUM: CPT | Performed by: EMERGENCY MEDICINE

## 2018-05-21 NOTE — ED NOTES
Discharge instructions reviewed with patient, copy given by Dr. Bailey Yin. Volunteer requested to assist pt back to her vehicle at [address] [place] [address].

## 2018-05-21 NOTE — ED PROVIDER NOTES
EMERGENCY DEPARTMENT HISTORY AND PHYSICAL EXAM      Date: 5/21/2018  Patient Name: Elvia Erickson    History of Presenting Illness     No chief complaint on file. History Provided By: Patient    HPI: Elvia Erickson, 74 y.o. female with PMHx significant for hypothyroidism, GERD, and sciatica presents via EMS to the ED with cc of palpitations and fatigue. She reports that she woke up this morning with her heart racing which lasted for a few minutes. She says this happens sometimes when she drinks caffeine the night before. Then she felt normal until she went to her physical therapy appointment at [facility]. Before her session started she felt profound fatigue and \"maybe some chest tightness\". She denies abdominal pain, fever, cough, N?V?D, dark stools, changes in bowel habits, burning with urination, chest pain, shortness of breath, vaginal bleeding. Chief Complaint: fatigue  Duration: 1 Hours  Timing:  Progressive  Modifying Factors: none  Associated Symptoms: palpitations, chest tightness    There are no other complaints, changes, or physical findings at this time. PCP: Mario Stockton MD    Current Outpatient Prescriptions   Medication Sig Dispense Refill    simvastatin (ZOCOR) 20 mg tablet Take 1 Tab by mouth nightly. 90 Tab 3    levothyroxine (SYNTHROID) 75 mcg tablet Take 1 Tab by mouth daily. 90 Tab 3    acyclovir (ZOVIRAX) 400 mg tablet Take 1 Tab by mouth two (2) times a day. 180 Tab 3    fexofenadine-pseudoephedrine (ALLEGRA-D)  mg per tablet Take 1 Tab by mouth as needed.  omeprazole (PRILOSEC) 40 mg capsule Take 40 mg by mouth as needed.  oxaprozin (DAYPRO) 600 mg tablet Take  by mouth daily.          Past History     Past Medical History:  Past Medical History:   Diagnosis Date    CAD (coronary artery disease)     Hyperlipidemia    Endocrine disease     \"Thyroid\"    Gastrointestinal disorder     Acid Reflux       Past Surgical History:  No past surgical history on file.    Family History:  Family History   Problem Relation Age of Onset    Cancer Mother      breast cancer    Heart Disease Father     Cancer Brother      colon cancer    Diabetes Brother     Diabetes Brother     Heart Disease Brother        Social History:  Social History   Substance Use Topics    Smoking status: Never Smoker    Smokeless tobacco: Never Used    Alcohol use No      Comment: once  month wine       Allergies: Allergies   Allergen Reactions    Latex Rash    Codeine Rash    Pcn [Penicillins] Rash         Review of Systems   Review of Systems   Constitutional: Positive for fatigue. Negative for appetite change, chills and fever. HENT: Negative for congestion, ear pain, rhinorrhea and sore throat. Eyes: Negative for visual disturbance. Respiratory: Positive for chest tightness. Negative for cough and shortness of breath. Cardiovascular: Positive for palpitations. Negative for chest pain and leg swelling. Gastrointestinal: Negative for abdominal pain, constipation, diarrhea, nausea and vomiting. No melena  No hematochezia   Endocrine: Negative for polyuria. Genitourinary: Negative for dysuria, frequency and hematuria. Musculoskeletal: Positive for back pain. Skin: Negative for rash. Neurological: Negative for dizziness, weakness, light-headedness, numbness and headaches. No tingling   All other systems reviewed and are negative. Physical Exam   Physical Exam   Constitutional: She is oriented to person, place, and time. She appears well-developed and well-nourished. No distress. HENT:   Head: Normocephalic and atraumatic. Mouth/Throat: Oropharynx is clear and moist.   Eyes: Conjunctivae and EOM are normal. Pupils are equal, round, and reactive to light. Right eye exhibits no discharge. Left eye exhibits no discharge. No scleral icterus. Neck: Normal range of motion. Neck supple. No JVD present.    Cardiovascular: Normal rate, regular rhythm, normal heart sounds and intact distal pulses. Exam reveals no gallop and no friction rub. No murmur heard. Pulmonary/Chest: Effort normal and breath sounds normal. No stridor. No respiratory distress. She has no wheezes. She has no rales. Abdominal: Soft. Bowel sounds are normal. She exhibits no distension. There is no tenderness. There is no rebound and no guarding. Musculoskeletal: She exhibits no edema or tenderness. Neurological: She is alert and oriented to person, place, and time. She has normal strength. She displays no tremor. No cranial nerve deficit or sensory deficit. She exhibits normal muscle tone. Coordination and gait normal.   Reflex Scores:       Brachioradialis reflexes are 2+ on the right side and 2+ on the left side. Patellar reflexes are 2+ on the right side and 2+ on the left side. Skin: Skin is warm and dry. No rash noted. She is not diaphoretic. Psychiatric:   Flat affect   Nursing note and vitals reviewed. Diagnostic Study Results     Labs -   No results found for this or any previous visit (from the past 12 hour(s)). Radiologic Studies -   XR CHEST PORT    (Results Pending)     CT Results  (Last 48 hours)    None        CXR Results  (Last 48 hours)    None            Medical Decision Making   I am the first provider for this patient. I reviewed the vital signs, available nursing notes, past medical history, past surgical history, family history and social history. Vital Signs-Reviewed the patient's vital signs. Patient Vitals for the past 12 hrs:   Temp Pulse Resp BP SpO2   05/21/18 1150 97.9 °F (36.6 °C) 75 12 (!) 153/92 100 %       Pulse Oximetry Analysis - 100% on RA    Cardiac Monitor:   Rate: 75 bpm  Rhythm: Normal Sinus Rhythm      EKG interpretation: (Preliminary) 12:00  Rhythm: normal sinus rhythm; and regular . Rate (approx.): 65; Axis: LAD; ST/T wave: no ST elevation, no ST depression;   MO interval 132 ms, QRS 84 ms,  ms, QTc 413 ms.   Written by Shaista Tse ED Scribe, as dictated by Rayray Law MD.    Records Reviewed: Nursing Notes    Provider Notes (Medical Decision Making):   Ddx: anemia, hypothyroidism, dysrhythmia, ACS, heart failure    Patient presents with palopitations which have resolved and then profound fatigue. No known signs or history of bleeding. Patient is on synthroid, will check TSH. Appears non toxic, vitals wnl, physical exam unremarkable. Normal rhythm on cardiac monitor. Will get CBC, BMP, mag, EKG, troponin, chest XR. ED Course:   Initial assessment performed. The patients presenting problems have been discussed, and they are in agreement with the care plan formulated and outlined with them. I have encouraged them to ask questions as they arise throughout their visit. 2:45 PM  Explained test results to patient which were mostly within normal limits. Head CT unremarkable. Patient says she feels better. Patient instructed to follow up with primary care doctor and take medications as prescribed. Also given strict return precautions including chest pain, palpitations, sob, syncope, bloody stools. She verbalized understanding. Disposition:  discharge    PLAN:  1. Discharge Medication List as of 5/21/2018  3:24 PM      CONTINUE these medications which have NOT CHANGED    Details   simvastatin (ZOCOR) 20 mg tablet Take 1 Tab by mouth nightly. , Print, Disp-90 Tab, R-3      levothyroxine (SYNTHROID) 75 mcg tablet Take 1 Tab by mouth daily. , Print, Disp-90 Tab, R-3      acyclovir (ZOVIRAX) 400 mg tablet Take 1 Tab by mouth two (2) times a day., Print, Disp-180 Tab, R-3      fexofenadine-pseudoephedrine (ALLEGRA-D)  mg per tablet Take 1 Tab by mouth as needed., Historical Med      omeprazole (PRILOSEC) 40 mg capsule Take 40 mg by mouth as needed., Historical Med      oxaprozin (DAYPRO) 600 mg tablet Oral, DAILY, Until Discontinued, Historical Med           2.    Follow-up Information     Follow up With Details Comments Contact Info    Kristin Summers MD In 1 week  [address] [facility]  [phone]      [facility] EMERGENCY DEPT In 1 day If symptoms worsen [address] [facility] [address]  [phone]        Return to ED if worse     Diagnosis     Clinical Impression:   1. Palpitations    2. Fatigue, unspecified type        Attestations:  I personally performed a history and physical examination of the patient and discussed the management with the resident. I have reviewed the resident's note and agree with the resident's findings,  including all diagnostic interpretations, treatment and plan of care, except as documented below. I was present during the key portions of separately billed procedures. Kika Merlos. Billie Zapata MD    Alert, well appearing, no distress. CN II-XII intact, strength and light touch intact all extremities. Exam unremarkable. Work up essentially normal. Pt had noted some \"drawing\" sensation to R face intermittently, none present today, normal exam. CT head negative. D/w resident and advised close outpatient f/u, careful return precautions.

## 2018-05-21 NOTE — ED NOTES
Pt presents to ED via ems from outpatient [facility] for feeling like her heart is racing and chest tightness since this am. Pt states she was here about 1 month ago for the same.

## 2018-05-21 NOTE — DISCHARGE INSTRUCTIONS
Fatigue: Care Instructions  Your Care Instructions    Fatigue is a feeling of tiredness, exhaustion, or lack of energy. You may feel fatigue because of too much or not enough activity. It can also come from stress, lack of sleep, boredom, and poor diet. Many medical problems, such as viral infections, can cause fatigue. Emotional problems, especially depression, are often the cause of fatigue. Fatigue is most often a symptom of another problem. Treatment for fatigue depends on the cause. For example, if you have fatigue because you have a certain health problem, treating this problem also treats your fatigue. If depression or anxiety is the cause, treatment may help. Follow-up care is a key part of your treatment and safety. Be sure to make and go to all appointments, and call your doctor if you are having problems. It's also a good idea to know your test results and keep a list of the medicines you take. How can you care for yourself at home? · Get regular exercise. But don't overdo it. Go back and forth between rest and exercise. · Get plenty of rest.  · Eat a healthy diet. Do not skip meals, especially breakfast.  · Reduce your use of caffeine, tobacco, and alcohol. Caffeine is most often found in coffee, tea, cola drinks, and chocolate. · Limit medicines that can cause fatigue. This includes tranquilizers and cold and allergy medicines. When should you call for help? Watch closely for changes in your health, and be sure to contact your doctor if:  ? · You have new symptoms such as fever or a rash. ? · Your fatigue gets worse. ? · You have been feeling down, depressed, or hopeless. Or you may have lost interest in things that you usually enjoy. ? · You are not getting better as expected. Where can you learn more? Go to http://lindsay-lamonte.info/. Enter L290 in the search box to learn more about \"Fatigue: Care Instructions. \"  Current as of: March 20, 2017  Content Version: 11.4  © 4290-8386 [employer]. Care instructions adapted under license by [employer] (which disclaims liability or warranty for this information). If you have questions about a medical condition or this instruction, always ask your healthcare professional. [address] any warranty or liability for your use of this information. Cardiac Arrhythmia: Care Instructions  Your Care Instructions    A cardiac arrhythmia is a change in the normal rhythm of the heart. Your heart may beat too fast or too slow or beat with an irregular or skipping rhythm. A change in the heart's rhythm may feel like a really strong heartbeat or a fluttering in your chest. A severe heart rhythm problem can keep the body from getting the blood it needs. This can result in shortness of breath, lightheadedness, and fainting. You may take medicine to treat your condition. Your doctor may recommend a pacemaker or recommend catheter ablation to destroy small parts of the heart that are causing a rhythm problem. Another possible treatment is an implantable cardioverter-defibrillator (ICD). An ICD is a device that gives the heart a shock to return the heart to a normal rhythm. Follow-up care is a key part of your treatment and safety. Be sure to make and go to all appointments, and call your doctor if you are having problems. It's also a good idea to know your test results and keep a list of the medicines you take. How can you care for yourself at home? ?General care  ? · Be safe with medicines. Take your medicines exactly as prescribed. Call your doctor if you think you are having a problem with your medicine. You will get more details on the specific medicines your doctor prescribes. ? · If you received a pacemaker or an ICD, you will get a fact sheet about it. ? · Wear medical alert jewelry that says you have an abnormal heart rhythm. You can buy this at most drugstores. ? Lifestyle changes  ? · Eat a heart-healthy diet. ? · Stay at a healthy weight. Lose weight if you need to. ? · Avoid nicotine, too much alcohol, and illegal drugs (meth, speed, and cocaine). Also, get enough sleep and do not overeat. ? · Ask your doctor whether you can take over-the-counter medicines (such as decongestants). These can make your heart beat fast.   ? · Talk to your doctor about any limits to activities, such as driving, or tasks where you use power tools or ladders. Activity  ? · Start light exercise if your doctor says you can. Even a small amount will help you get stronger, have more energy, and manage your stress. ? · Get regular exercise. Try for 30 minutes on most days of the week. Ask your doctor what level of exercise is safe for you. If activity is not likely to cause health problems, you probably do not have limits on the type or level of activity that you can do. You may want to walk, swim, bike, or do other activities. ? · When you exercise, watch for signs that your heart is working too hard. You are pushing too hard if you cannot talk while you exercise. If you become short of breath or dizzy or have chest pain, sit down and rest.   ? · Check your pulse daily. Place two fingers on the artery at the palm side of your wrist, in line with your thumb. If your heartbeat seems uneven, talk to your doctor. When should you call for help? Call 911 anytime you think you may need emergency care. For example, call if:  ? · You have symptoms of sudden heart failure. These may include:  ¨ Severe trouble breathing. ¨ A fast or irregular heartbeat. ¨ Coughing up pink, foamy mucus. ¨ You passed out. ? · You have signs of a stroke. These include:  ¨ Sudden numbness, paralysis, or weakness in your face, arm, or leg, especially on only one side of your body. ¨ New problems with walking or balance. ¨ Sudden vision changes. ¨ Drooling or slurred speech.   ¨ New problems speaking or understanding simple statements, or feeling confused. ¨ A sudden, severe headache that is different from past headaches. ?Call your doctor now or seek immediate medical care if:  ? · You have new or changed symptoms of heart failure, such as:  ¨ New or increased shortness of breath. ¨ New or worse swelling in your legs, ankles, or feet. ¨ Sudden weight gain, such as more than 2 to 3 pounds in a day or 5 pounds in a week. (Your doctor may suggest a different range of weight gain.)  ¨ Feeling dizzy or lightheaded or like you may faint. ¨ Feeling so tired or weak that you cannot do your usual activities. ¨ Not sleeping well. Shortness of breath wakes you at night. You need extra pillows to prop yourself up to breathe easier. ? Watch closely for changes in your health, and be sure to contact your doctor if:  ? · You do not get better as expected. Where can you learn more? Go to http://lindsay-lamonte.info/. Enter N767 in the search box to learn more about \"Cardiac Arrhythmia: Care Instructions. \"  Current as of: September 21, 2016  Content Version: 11.4  © 3450-0498 [employer]. Care instructions adapted under license by [employer]now [employer] (which disclaims liability or warranty for this information). If you have questions about a medical condition or this instruction, always ask your healthcare professional. [place] [address] any warranty or liability for your use of this information.

## 2018-05-22 LAB
ATRIAL RATE: 65 BPM
CALCULATED P AXIS, ECG09: 29 DEGREES
CALCULATED R AXIS, ECG10: -31 DEGREES
CALCULATED T AXIS, ECG11: 22 DEGREES
DIAGNOSIS, 93000: NORMAL
P-R INTERVAL, ECG05: 132 MS
Q-T INTERVAL, ECG07: 398 MS
QRS DURATION, ECG06: 84 MS
QTC CALCULATION (BEZET), ECG08: 413 MS
VENTRICULAR RATE, ECG03: 65 BPM

## 2018-06-13 ENCOUNTER — HOSPITAL ENCOUNTER (OUTPATIENT)
Dept: GENERAL RADIOLOGY | Age: 73
Discharge: HOME | End: 2018-06-13
Payer: MEDICARE

## 2018-06-13 DIAGNOSIS — M46.1 SACROILIITIS, NOT ELSEWHERE CLASSIFIED (HCC): ICD-10-CM

## 2018-06-13 DIAGNOSIS — M46.1 SACROILIAC INFLAMMATION (HCC): ICD-10-CM

## 2018-06-13 PROCEDURE — 72072 X-RAY EXAM THORAC SPINE 3VWS: CPT

## 2018-06-13 PROCEDURE — 72100 X-RAY EXAM L-S SPINE 2/3 VWS: CPT

## 2018-08-07 ENCOUNTER — HOSPITAL ENCOUNTER (OUTPATIENT)
Dept: LAB | Age: 73
Discharge: HOME | End: 2018-08-07
Payer: MEDICARE

## 2018-08-07 ENCOUNTER — OFFICE VISIT (OUTPATIENT)
Dept: INTERNAL MEDICINE CLINIC | Age: 73
End: 2018-08-07

## 2018-08-07 VITALS
DIASTOLIC BLOOD PRESSURE: 78 MMHG | HEIGHT: 63 IN | RESPIRATION RATE: 16 BRPM | OXYGEN SATURATION: 100 % | BODY MASS INDEX: 29.59 KG/M2 | HEART RATE: 71 BPM | WEIGHT: 167 LBS | SYSTOLIC BLOOD PRESSURE: 133 MMHG | TEMPERATURE: 97.2 F

## 2018-08-07 DIAGNOSIS — R19.7 DIARRHEA, UNSPECIFIED TYPE: ICD-10-CM

## 2018-08-07 DIAGNOSIS — K21.9 GASTROESOPHAGEAL REFLUX DISEASE WITHOUT ESOPHAGITIS: ICD-10-CM

## 2018-08-07 DIAGNOSIS — R10.11 RUQ ABDOMINAL PAIN: Primary | ICD-10-CM

## 2018-08-07 DIAGNOSIS — E03.8 OTHER SPECIFIED HYPOTHYROIDISM: ICD-10-CM

## 2018-08-07 PROCEDURE — 36415 COLL VENOUS BLD VENIPUNCTURE: CPT

## 2018-08-07 PROCEDURE — 83690 ASSAY OF LIPASE: CPT

## 2018-08-07 PROCEDURE — 85025 COMPLETE CBC W/AUTO DIFF WBC: CPT

## 2018-08-07 PROCEDURE — 80053 COMPREHEN METABOLIC PANEL: CPT

## 2018-08-07 RX ORDER — DIPHENHYDRAMINE HCL 25 MG
25 CAPSULE ORAL 2 TIMES DAILY
COMMUNITY
End: 2019-05-09

## 2018-08-07 RX ORDER — ACYCLOVIR 400 MG/1
400 TABLET ORAL 2 TIMES DAILY
Qty: 180 TAB | Refills: 3 | Status: SHIPPED | OUTPATIENT
Start: 2018-08-07 | End: 2018-12-05 | Stop reason: SDUPTHER

## 2018-08-07 NOTE — PROGRESS NOTES
James Escobar is a 74 y.o. female  Chief Complaint   Patient presents with    Diarrhea     a little dizzy, lots of burping,swollwen in [address] Maintenance Due   Topic Date Due    DTaP/Tdap/Td series (1 - Tdap) 07/20/1964    BREAST CANCER SCRN MAMMOGRAM  07/20/1993    FOBT Q 1 YEAR AGE 50-75  07/20/1993    ZOSTER VACCINE AGE 60>  05/20/2003    Pneumococcal 65+ Low/Medium Risk (1 of 2 - PCV13) 07/20/2008    GLAUCOMA SCREENING Q2Y  06/27/2014    MEDICARE YEARLY EXAM  03/14/2018    Influenza Age 5 to Adult  08/01/2018     Visit Vitals    /78    Pulse 71    Temp 97.2 °F (36.2 °C)    Resp 16    Ht 5' 3\" (1.6 m)    Wt 167 lb (75.8 kg)    SpO2 100%    BMI 29.58 kg/m2     1. Have you been to the ER, urgent care clinic since your last visit? Hospitalized since your last visit?no    2. Have you seen or consulted any other health care providers outside of the [address] [place] [address] since your last visit? Include any pap smears or colon screening.  No    Tejas Garcia LPN

## 2018-08-07 NOTE — PATIENT INSTRUCTIONS
Abdominal Pain: Care Instructions  Your Care Instructions    Abdominal pain has many possible causes. Some aren't serious and get better on their own in a few days. Others need more testing and treatment. If your pain continues or gets worse, you need to be rechecked and may need more tests to find out what is wrong. You may need surgery to correct the problem. Don't ignore new symptoms, such as fever, nausea and vomiting, urination problems, pain that gets worse, and dizziness. These may be signs of a more serious problem. Your doctor may have recommended a follow-up visit in the next 8 to 12 hours. If you are not getting better, you may need more tests or treatment. The doctor has checked you carefully, but problems can develop later. If you notice any problems or new symptoms, get medical treatment right away. Follow-up care is a key part of your treatment and safety. Be sure to make and go to all appointments, and call your doctor if you are having problems. It's also a good idea to know your test results and keep a list of the medicines you take. How can you care for yourself at home? · Rest until you feel better. · To prevent dehydration, drink plenty of fluids, enough so that your urine is light yellow or clear like water. Choose water and other caffeine-free clear liquids until you feel better. If you have kidney, heart, or liver disease and have to limit fluids, talk with your doctor before you increase the amount of fluids you drink. · If your stomach is upset, eat mild foods, such as rice, dry toast or crackers, bananas, and applesauce. Try eating several small meals instead of two or three large ones. · Wait until 48 hours after all symptoms have gone away before you have spicy foods, alcohol, and drinks that contain caffeine. · Do not eat foods that are high in fat. · Avoid anti-inflammatory medicines such as aspirin, ibuprofen (Advil, Motrin), and naproxen (Aleve).  These can cause stomach upset. Talk to your doctor if you take daily aspirin for another health problem. When should you call for help? Call 911 anytime you think you may need emergency care. For example, call if:    · You passed out (lost consciousness).     · You pass maroon or very bloody stools.     · You vomit blood or what looks like coffee grounds.     · You have new, severe belly pain.    Call your doctor now or seek immediate medical care if:    · Your pain gets worse, especially if it becomes focused in one area of your belly.     · You have a new or higher fever.     · Your stools are black and look like tar, or they have streaks of blood.     · You have unexpected vaginal bleeding.     · You have symptoms of a urinary tract infection. These may include:  ¨ Pain when you urinate. ¨ Urinating more often than usual.  ¨ Blood in your urine.     · You are dizzy or lightheaded, or you feel like you may faint.    Watch closely for changes in your health, and be sure to contact your doctor if:    · You are not getting better after 1 day (24 hours). Where can you learn more? Go to http://lindsaySmithfield Caselamonte.info/. Enter I322 in the search box to learn more about \"Abdominal Pain: Care Instructions. \"  Current as of: November 20, 2017  Content Version: 11.7  © 5064-6685 MDSave. Care instructions adapted under license by [employer] (which disclaims liability or warranty for this information). If you have questions about a medical condition or this instruction, always ask your healthcare professional. [place] [address] any warranty or liability for your use of this information. Gastroesophageal Reflux Disease (GERD): Care Instructions  Your Care Instructions    Gastroesophageal reflux disease (GERD) is the backward flow of stomach acid into the esophagus. The esophagus is the tube that leads from your throat to your stomach.  A one-way valve prevents the stomach acid from moving up into this tube. When you have GERD, this valve does not close tightly enough. If you have mild GERD symptoms including heartburn, you may be able to control the problem with antacids or over-the-counter medicine. Changing your diet, losing weight, and making other lifestyle changes can also help reduce symptoms. Follow-up care is a key part of your treatment and safety. Be sure to make and go to all appointments, and call your doctor if you are having problems. It's also a good idea to know your test results and keep a list of the medicines you take. How can you care for yourself at home? · Take your medicines exactly as prescribed. Call your doctor if you think you are having a problem with your medicine. · Your doctor may recommend over-the-counter medicine. For mild or occasional indigestion, antacids, such as Tums, Gaviscon, Mylanta, or Maalox, may help. Your doctor also may recommend over-the-counter acid reducers, such as Pepcid AC, Tagamet HB, Zantac 75, or Prilosec. Read and follow all instructions on the label. If you use these medicines often, talk with your doctor. · Change your eating habits. ¨ It's best to eat several small meals instead of two or three large meals. ¨ After you eat, wait 2 to 3 hours before you lie down. ¨ Chocolate, mint, and alcohol can make GERD worse. ¨ Spicy foods, foods that have a lot of acid (like tomatoes and oranges), and coffee can make GERD symptoms worse in some people. If your symptoms are worse after you eat a certain food, you may want to stop eating that food to see if your symptoms get better. · Do not smoke or chew tobacco. Smoking can make GERD worse. If you need help quitting, talk to your doctor about stop-smoking programs and medicines. These can increase your chances of quitting for good.   · If you have GERD symptoms at night, raise the head of your bed 6 to 8 inches by putting the frame on blocks or placing a foam wedge under the head of your mattress. (Adding extra pillows does not work.)  · Do not wear tight clothing around your middle. · Lose weight if you need to. Losing just 5 to 10 pounds can help. When should you call for help? Call your doctor now or seek immediate medical care if:    · You have new or different belly pain.     · Your stools are black and tarlike or have streaks of blood.    Watch closely for changes in your health, and be sure to contact your doctor if:    · Your symptoms have not improved after 2 days.     · Food seems to catch in your throat or chest.   Where can you learn more? Go to http://lindsay-lamonte.info/. Enter B885 in the search box to learn more about \"Gastroesophageal Reflux Disease (GERD): Care Instructions. \"  Current as of: May 12, 2017  Content Version: 11.7  © 3567-2882 [employer]ou. Care instructions adapted under license by [employer] (which disclaims liability or warranty for this information). If you have questions about a medical condition or this instruction, always ask your healthcare professional. [address] any warranty or liability for your use of this information.

## 2018-08-07 NOTE — PROGRESS NOTES
Jannie Bush is a 74 y.o. female who presents for evaluation of abd pain and explosive diarrhea for past week. No melena or mucus in stools. No vomiting. States bowels start out normal, but then turn explosive. Happened twice yesterday, but none today. Also having increased gerd symptoms, and diffuse abd tenderness, though worse in upper abd. Typically follows with dr Reji Joel, last saw him may 17, 2018 in new pt visit. ROS:  Constitutional: negative for fevers, chills, anorexia and weight loss  Eyes:   negative for visual disturbance and irritation  ENT:   negative for tinnitus,sore throat,nasal congestion,ear pain,hoarseness  Respiratory:  negative for cough, hemoptysis, dyspnea,wheezing  CV:   negative for chest pain, palpitations, lower extremity edema  GI:   negative for nausea, vomiting,melena.  ++diarrhea and abd tenderness  Genitourinary: negative for frequency, dysuria and hematuria  Musculoskel: negative for myalgias, arthralgias, back pain, muscle weakness, joint pain  Neurological:  negative for headaches, dizziness, focal weakness, numbness  Psychiatric:     Negative for depression or anxiety      Past Medical History:   Diagnosis Date    CAD (coronary artery disease)     Hyperlipidemia    Endocrine disease     \"Thyroid\"    Gastrointestinal disorder     Acid Reflux       History reviewed. No pertinent surgical history. Family History   Problem Relation Age of Onset    Cancer Mother      breast cancer    Heart Disease Father     Cancer Brother      colon cancer    Diabetes Brother     Diabetes Brother     Heart Disease Brother        Social History     Social History    Marital status:      Spouse name: N/A    Number of children: N/A    Years of education: N/A     Occupational History    Not on file. Social History Main Topics    Smoking status: Never Smoker    Smokeless tobacco: Never Used    Alcohol use No      Comment: once  month wine    Drug use:  Yes Special: OTC, Prescription    Sexual activity: Yes     Partners: Male     Other Topics Concern    Not on file     Social History Narrative            Visit Vitals    /78    Pulse 71    Temp 97.2 °F (36.2 °C)    Resp 16    Ht 5' 3\" (1.6 m)    Wt 167 lb (75.8 kg)    SpO2 100%    BMI 29.58 kg/m2       Physical Examination:   General - Well appearing female  HEENT - PERRL, TM no erythema/opacification, normal nasal turbinates, no oropharyngeal erythema or exudate, MMM  Neck - supple, no bruits, no thyroidomegaly, no lymphadenopathy  Pulm - clear to auscultation bilaterally  Cardio - RRR, normal S1 S2, no murmur  Abd - soft,  no masses, no HSM.  ++diffusely tender, but no guard or rebound. Tenderness bit worse in upper quads. Extrem - no edema, +2 distal pulses  Neuro-  No focal deficits, CN intact     Assessment/Plan:    1. Upper abd pain--check cbc, cmp, lipase, and abd ultrasound. Don't think she needs to see gi or gen sx at this time. Continue with tylenol or motrin prn. 2.  gerd--continue with prilosec  3. Hx T10 comp fx--has appt with orthodr Marlyn tomorrow  4.   Hypothyroid--on synthroid    rtc prn, follows with dr Cecily Mendoza,

## 2018-08-07 NOTE — MR AVS SNAPSHOT
[address] [facility] 
[phone] Patient: Jade Hood MRN:  [id number] Visit Information Date & Time Provider Department Dept. Phone Encounter #  
 8/7/2018 11:00 AM Tommie Murcia, [address] [id number] Follow-up Instructions Return for regular visit. Your Appointments 11/15/2018  9:45 AM  
ROUTINE CARE with Nitish García MD  
[facility] Appt Note: 6 month follow up for hld thyroid oa [facility] [phone]  
[address] [facility] [address] [facility] Upcoming Health Maintenance Date Due DTaP/Tdap/Td series (1 - Tdap) 7/20/1964 BREAST CANCER SCRN MAMMOGRAM 7/20/1993 FOBT Q 1 YEAR AGE 50-75 7/20/1993 ZOSTER VACCINE AGE 60> 5/20/2003 Pneumococcal 65+ Low/Medium Risk (1 of 2 - PCV13) 7/20/2008 GLAUCOMA SCREENING Q2Y 6/27/2014 MEDICARE YEARLY EXAM 3/14/2018 Influenza Age 5 to Adult 8/1/2018 Allergies as of 8/7/2018  Review Complete On: 8/7/2018 By: Lalit Coppola III, DO Severity Noted Reaction Type Reactions Latex  05/17/2018    Rash Codeine  11/10/2009    Rash Pcn [Penicillins]  11/10/2009    Rash Current Immunizations  Reviewed on 8/7/2018 No immunizations on file. Reviewed by Florian Cole LPN on 5/1/6303 at 57:14 AM  
You Were Diagnosed With   
  
 Codes Comments RUQ abdominal pain    -  Primary ICD-10-CM: R10.11 ICD-9-CM: 789.01 Gastroesophageal reflux disease without esophagitis     ICD-10-CM: K21.9 ICD-9-CM: 530.81 Diarrhea, unspecified type     ICD-10-CM: R19.7 ICD-9-CM: 787.91 Other specified hypothyroidism     ICD-10-CM: E03.8 ICD-9-CM: 244.8 Vitals BP Pulse Temp Resp Height(growth percentile) Weight(growth percentile) 133/78 71 97.2 °F (36.2 °C) 16 5' 3\" (1.6 m) 167 lb (75.8 kg) SpO2 BMI OB Status Smoking Status 100% 29.58 kg/m2 Menopause Never Smoker BMI and BSA Data Body Mass Index Body Surface Area  
 29.58 kg/m 2 1.84 m 2 Preferred Pharmacy Pharmacy Name Phone [facility] [phone] Your Updated Medication List  
  
   
This list is accurate as of 8/7/18 12:25 PM.  Always use your most recent med list.  
  
  
  
  
 acyclovir 400 mg tablet Commonly known as:  ZOVIRAX Take 1 Tab by mouth two (2) times a day. BENADRYL 25 mg capsule Generic drug:  diphenhydrAMINE Take 25 mg by mouth two (2) times a day. fexofenadine-pseudoephedrine  mg Tb12 Commonly known as:  ALLEGRA-D Take 1 Tab by mouth as needed. levothyroxine 75 mcg tablet Commonly known as:  SYNTHROID Take 1 Tab by mouth daily. omeprazole 40 mg capsule Commonly known as:  PRILOSEC Take 40 mg by mouth as needed. oxaprozin 600 mg tablet Commonly known as:  [place] Journey Take  by mouth two (2) times a day. simvastatin 20 mg tablet Commonly known as:  ZOCOR Take 1 Tab by mouth nightly. Prescriptions Sent to Pharmacy Refills  
 acyclovir (ZOVIRAX) 400 mg tablet 3 Sig: Take 1 Tab by mouth two (2) times a day. Class: Normal  
 Pharmacy: [facility] [address] Ph #: [phone] Route: Oral  
  
We Performed the Following CBC WITH AUTOMATED DIFF [73017 CPT(R)] LIPASE [id number] CPT(R)] METABOLIC PANEL, COMPREHENSIVE [94785 CPT(R)] Follow-up Instructions Return for regular visit. To-Do List   
 08/07/2018 Imaging:  US ABD COMP Patient Instructions Abdominal Pain: Care Instructions Your Care Instructions Abdominal pain has many possible causes.  Some aren't serious and get better on their own in a few days. Others need more testing and treatment. If your pain continues or gets worse, you need to be rechecked and may need more tests to find out what is wrong. You may need surgery to correct the problem. Don't ignore new symptoms, such as fever, nausea and vomiting, urination problems, pain that gets worse, and dizziness. These may be signs of a more serious problem. Your doctor may have recommended a follow-up visit in the next 8 to 12 hours. If you are not getting better, you may need more tests or treatment. The doctor has checked you carefully, but problems can develop later. If you notice any problems or new symptoms, get medical treatment right away. Follow-up care is a key part of your treatment and safety. Be sure to make and go to all appointments, and call your doctor if you are having problems. It's also a good idea to know your test results and keep a list of the medicines you take. How can you care for yourself at home? · Rest until you feel better. · To prevent dehydration, drink plenty of fluids, enough so that your urine is light yellow or clear like water. Choose water and other caffeine-free clear liquids until you feel better. If you have kidney, heart, or liver disease and have to limit fluids, talk with your doctor before you increase the amount of fluids you drink. · If your stomach is upset, eat mild foods, such as rice, dry toast or crackers, bananas, and applesauce. Try eating several small meals instead of two or three large ones. · Wait until 48 hours after all symptoms have gone away before you have spicy foods, alcohol, and drinks that contain caffeine. · Do not eat foods that are high in fat. · Avoid anti-inflammatory medicines such as aspirin, ibuprofen (Advil, Motrin), and naproxen (Aleve). These can cause stomach upset. Talk to your doctor if you take daily aspirin for another health problem. When should you call for help? Call 911 anytime you think you may need emergency care. For example, call if: 
  · You passed out (lost consciousness).  
  · You pass maroon or very bloody stools.  
  · You vomit blood or what looks like coffee grounds.  
  · You have new, severe belly pain.  
 Call your doctor now or seek immediate medical care if: 
  · Your pain gets worse, especially if it becomes focused in one area of your belly.  
  · You have a new or higher fever.  
  · Your stools are black and look like tar, or they have streaks of blood.  
  · You have unexpected vaginal bleeding.  
  · You have symptoms of a urinary tract infection. These may include: 
¨ Pain when you urinate. ¨ Urinating more often than usual. 
¨ Blood in your urine.  
  · You are dizzy or lightheaded, or you feel like you may faint.  
 Watch closely for changes in your health, and be sure to contact your doctor if: 
  · You are not getting better after 1 day (24 hours). Where can you learn more? Go to http://lindsay-lamonte.info/. Enter S188 in the search box to learn more about \"Abdominal Pain: Care Instructions. \" Current as of: November 20, 2017 Content Version: 11.7 © 0879-2807 EnGeneIC. Care instructions adapted under license by Regenes[employer] (which disclaims liability or warranty for this information). If you have questions about a medical condition or this instruction, always ask your healthcare professional. [place] [address] any warranty or liability for your use of this information. Gastroesophageal Reflux Disease (GERD): Care Instructions Your Care Instructions Gastroesophageal reflux disease (GERD) is the backward flow of stomach acid into the esophagus. The esophagus is the tube that leads from your throat to your stomach. A one-way valve prevents the stomach acid from moving up into this tube. When you have GERD, this valve does not close tightly enough. If you have mild GERD symptoms including heartburn, you may be able to control the problem with antacids or over-the-counter medicine. Changing your diet, losing weight, and making other lifestyle changes can also help reduce symptoms. Follow-up care is a key part of your treatment and safety. Be sure to make and go to all appointments, and call your doctor if you are having problems. It's also a good idea to know your test results and keep a list of the medicines you take. How can you care for yourself at home? · Take your medicines exactly as prescribed. Call your doctor if you think you are having a problem with your medicine. · Your doctor may recommend over-the-counter medicine. For mild or occasional indigestion, antacids, such as Tums, Gaviscon, Mylanta, or Maalox, may help. Your doctor also may recommend over-the-counter acid reducers, such as Pepcid AC, Tagamet HB, Zantac 75, or Prilosec. Read and follow all instructions on the label. If you use these medicines often, talk with your doctor. · Change your eating habits. ¨ It's best to eat several small meals instead of two or three large meals. ¨ After you eat, wait 2 to 3 hours before you lie down. ¨ Chocolate, mint, and alcohol can make GERD worse. ¨ Spicy foods, foods that have a lot of acid (like tomatoes and oranges), and coffee can make GERD symptoms worse in some people. If your symptoms are worse after you eat a certain food, you may want to stop eating that food to see if your symptoms get better. · Do not smoke or chew tobacco. Smoking can make GERD worse. If you need help quitting, talk to your doctor about stop-smoking programs and medicines. These can increase your chances of quitting for good. · If you have GERD symptoms at night, raise the head of your bed 6 to 8 inches by putting the frame on blocks or placing a foam wedge under the head of your mattress. (Adding extra pillows does not work.) · Do not wear tight clothing around your middle. · Lose weight if you need to. Losing just 5 to 10 pounds can help. When should you call for help? Call your doctor now or seek immediate medical care if: 
  · You have new or different belly pain.  
  · Your stools are black and tarlike or have streaks of blood.  
 Watch closely for changes in your health, and be sure to contact your doctor if: 
  · Your symptoms have not improved after 2 days.  
  · Food seems to catch in your throat or chest.  
Where can you learn more? Go to http://lindsay-lamonte.info/. Enter H978 in the search box to learn more about \"Gastroesophageal Reflux Disease (GERD): Care Instructions. \" Current as of: May 12, 2017 Content Version: 11.7 © 0817-0133 [employer] Wave Technologies. Care instructions adapted under license by [employer] (which disclaims liability or warranty for this information). If you have questions about a medical condition or this instruction, always ask your healthcare professional. [place] [address] any warranty or liability for your use of this information. Introducing [facility]! [facility] introduces [employer] patient portal. Now you can access parts of your medical record, email your doctor's office, and request medication refills online. 1. In your internet browser, go to [email or web address] 2. Click on the First Time User? Click Here link in the Sign In box. You will see the New Member Sign Up page. 3. Enter your [employer] Access Code exactly as it appears below. You will not need to use this code after youve completed the sign-up process. If you do not sign up before the expiration date, you must request a new code. · [employer] Access Code: [email or web address] Expires: 11/5/2018 12:25 PM 
 
4. Enter the last four digits of your Social Security Number (xxxx) and Date of Birth (mm/dd/yyyy) as indicated and click Submit.  You will be taken to the next sign-up page. 5. Create a [employer] ID. This will be your [employer] login ID and cannot be changed, so think of one that is secure and easy to remember. 6. Create a [employer] password. You can change your password at any time. 7. Enter your Password Reset Question and Answer. This can be used at a later time if you forget your password. 8. Enter your e-mail address. You will receive e-mail notification when new information is available in [address]. 9. Click Sign Up. You can now view and download portions of your medical record. 10. Click the Download Summary menu link to download a portable copy of your medical information. If you have questions, please visit the Frequently Asked Questions section of the [employer] website. Remember, [employer] is NOT to be used for urgent needs. For medical emergencies, dial 911. Now available from your iPhone and Android! Please provide this summary of care documentation to your next provider. Your primary care clinician is listed as Gonzalez ALVARADO. If you have any questions after today's visit, please call [phone].

## 2018-08-08 ENCOUNTER — TELEPHONE (OUTPATIENT)
Dept: INTERNAL MEDICINE CLINIC | Age: 73
End: 2018-08-08

## 2018-08-08 LAB
ALBUMIN SERPL-MCNC: 4.6 G/DL (ref 3.5–4.8)
ALBUMIN/GLOB SERPL: 2 {RATIO} (ref 1.2–2.2)
ALP SERPL-CCNC: 62 IU/L (ref 39–117)
ALT SERPL-CCNC: 17 IU/L (ref 0–32)
AST SERPL-CCNC: 26 IU/L (ref 0–40)
BASOPHILS # BLD AUTO: 0 X10E3/UL (ref 0–0.2)
BASOPHILS NFR BLD AUTO: 0 %
BILIRUB SERPL-MCNC: 0.3 MG/DL (ref 0–1.2)
BUN SERPL-MCNC: 13 MG/DL (ref 8–27)
BUN/CREAT SERPL: 14 (ref 12–28)
CALCIUM SERPL-MCNC: 9.7 MG/DL (ref 8.7–10.3)
CHLORIDE SERPL-SCNC: 100 MMOL/L (ref 96–106)
CO2 SERPL-SCNC: 20 MMOL/L (ref 20–29)
CREAT SERPL-MCNC: 0.91 MG/DL (ref 0.57–1)
EOSINOPHIL # BLD AUTO: 0.1 X10E3/UL (ref 0–0.4)
EOSINOPHIL NFR BLD AUTO: 1 %
ERYTHROCYTE [DISTWIDTH] IN BLOOD BY AUTOMATED COUNT: 13.8 % (ref 12.3–15.4)
GLOBULIN SER CALC-MCNC: 2.3 G/DL (ref 1.5–4.5)
GLUCOSE SERPL-MCNC: 90 MG/DL (ref 65–99)
HCT VFR BLD AUTO: 37.1 % (ref 34–46.6)
HGB BLD-MCNC: 12.2 G/DL (ref 11.1–15.9)
IMM GRANULOCYTES # BLD: 0 X10E3/UL (ref 0–0.1)
IMM GRANULOCYTES NFR BLD: 0 %
LIPASE SERPL-CCNC: 30 U/L (ref 14–85)
LYMPHOCYTES # BLD AUTO: 3.9 X10E3/UL (ref 0.7–3.1)
LYMPHOCYTES NFR BLD AUTO: 47 %
MCH RBC QN AUTO: 31.6 PG (ref 26.6–33)
MCHC RBC AUTO-ENTMCNC: 32.9 G/DL (ref 31.5–35.7)
MCV RBC AUTO: 96 FL (ref 79–97)
MONOCYTES # BLD AUTO: 0.7 X10E3/UL (ref 0.1–0.9)
MONOCYTES NFR BLD AUTO: 9 %
NEUTROPHILS # BLD AUTO: 3.5 X10E3/UL (ref 1.4–7)
NEUTROPHILS NFR BLD AUTO: 43 %
PLATELET # BLD AUTO: 346 X10E3/UL (ref 150–379)
POTASSIUM SERPL-SCNC: 4.9 MMOL/L (ref 3.5–5.2)
PROT SERPL-MCNC: 6.9 G/DL (ref 6–8.5)
RBC # BLD AUTO: 3.86 X10E6/UL (ref 3.77–5.28)
SODIUM SERPL-SCNC: 138 MMOL/L (ref 134–144)
WBC # BLD AUTO: 8.3 X10E3/UL (ref 3.4–10.8)

## 2018-08-08 NOTE — PROGRESS NOTES
Labs all look good. Liver tests, kidney tests, and infection numbers all normal, as is lipase (for pancreas). Seems most likely your symptoms due to gi bug. Would still get abd ultrasound. If symptoms worsen, let us know, and would have her see GI, dr Henna Carroll.

## 2018-08-09 ENCOUNTER — HOSPITAL ENCOUNTER (OUTPATIENT)
Dept: ULTRASOUND IMAGING | Age: 73
Discharge: HOME | End: 2018-08-09
Attending: INTERNAL MEDICINE
Payer: MEDICARE

## 2018-08-09 DIAGNOSIS — R10.11 RUQ ABDOMINAL PAIN: ICD-10-CM

## 2018-08-09 PROCEDURE — 76700 US EXAM ABDOM COMPLETE: CPT

## 2018-08-09 NOTE — PROGRESS NOTES
Lab results reviewed with patient. Patient verbalized understanding and does not have any questions at this time. Per patient, had ultrasound done this morning. Informed patient that the results should be available for the doctor to review with 24-48 hours in which at that time patient will be called with the results.

## 2018-08-09 NOTE — TELEPHONE ENCOUNTER
Spoke with patient after 2 patient identifiers being note and advised per Dr. Juve Thorpe all look good.  Liver tests, kidney tests, and infection numbers all normal, as is lipase (for pancreas).  Seems most likely your symptoms due to gi bug.  Would still get abd ultrasound.  If symptoms worsen, let us know, and would have her see GI, dr Mita Hines. Patient expressed understanding and has no further questions at this time.

## 2018-08-14 ENCOUNTER — TELEPHONE (OUTPATIENT)
Dept: INTERNAL MEDICINE CLINIC | Age: 73
End: 2018-08-14

## 2018-08-14 NOTE — TELEPHONE ENCOUNTER
Spoke with patient after 2 patient identifiers being note and advised per Dr. Melinda Hagen abd ultrasound looked fine.  Gallbladder normal. Patient expressed understanding and has no further questions at this time.

## 2018-08-14 NOTE — TELEPHONE ENCOUNTER
Dr. Beryle Levee patient last seen by Dr. Nishant Anne on 8/7/18 states she needs a call back to get results on [employer] patient states was done on Thursday, 8/9/18. Please call to discuss.  Thank you

## 2018-09-06 ENCOUNTER — HOSPITAL ENCOUNTER (OUTPATIENT)
Dept: MRI IMAGING | Age: 73
Discharge: HOME | End: 2018-09-06
Attending: ORTHOPAEDIC SURGERY
Payer: MEDICARE

## 2018-09-06 DIAGNOSIS — S22.000A CLOSED COMPRESSION FRACTURE OF THORACIC VERTEBRA, INITIAL ENCOUNTER (HCC): ICD-10-CM

## 2018-09-06 DIAGNOSIS — M54.6 BILATERAL THORACIC BACK PAIN: ICD-10-CM

## 2018-09-06 PROCEDURE — 72146 MRI CHEST SPINE W/O DYE: CPT

## 2018-09-11 ENCOUNTER — TELEPHONE (OUTPATIENT)
Dept: INTERNAL MEDICINE CLINIC | Age: 73
End: 2018-09-11

## 2018-09-11 NOTE — TELEPHONE ENCOUNTER
Pt requesting a call from Dr. Brooks Bennett or his nurse regarding her Thyroid results coming from Dr. Sheth [place] office if possible?  Best contact [phone]       Message received & copied from [facility]

## 2018-09-12 NOTE — TELEPHONE ENCOUNTER
Spoke with patient after 2 patient identifiers being note and advised that we have not yet received any labs from Dr. Nasima Beatty office. Patient expressed understanding and has no further questions at this time.

## 2018-09-28 ENCOUNTER — TELEPHONE (OUTPATIENT)
Dept: INTERNAL MEDICINE CLINIC | Age: 73
End: 2018-09-28

## 2018-09-28 NOTE — TELEPHONE ENCOUNTER
Pt stated a fax was sent over from Dr. Max Grewal (Cardiologist- [phone]) about a week ago regarding pt's Thyroid. Pt would like to know if the practice has received it and if so, pt would like to know the next steps she needs to take.    Pt's best contact: [phone]              Copy/paste Terrance Ta

## 2018-10-03 ENCOUNTER — TELEPHONE (OUTPATIENT)
Dept: INTERNAL MEDICINE CLINIC | Age: 73
End: 2018-10-03

## 2018-10-03 NOTE — TELEPHONE ENCOUNTER
Spoke with patient after 2 patient identifiers being note and advised per Dr. Darell Lopez that we still had not received any records from Dr. Andres Carnes office, patient reports she will give them a call now. Patient expressed understanding and has no further questions at this time.

## 2018-10-03 NOTE — TELEPHONE ENCOUNTER
Spoke with patient after 2 patient identifiers being note and advised per Dr. Roma Holstein that her tsh level was too high, and decrease her synthroid to 50mg daily. Phoned in levothyroxine 50mcg to [facility] pharmacy on file pharmacy per Dr. Miranda Schmitt orders . Patient expressed understanding and has no further questions at this time.

## 2018-10-03 NOTE — TELEPHONE ENCOUNTER
Pt would like a call back with thyroid results that she has tried to get for the past 3 weeks and has not heard anything back. Pt would like to have the nurse contact her. Callback: [phone]              Copy/paste [facility]

## 2018-10-04 ENCOUNTER — TELEPHONE (OUTPATIENT)
Dept: INTERNAL MEDICINE CLINIC | Age: 73
End: 2018-10-04

## 2018-10-04 NOTE — TELEPHONE ENCOUNTER
Spoke with patient after 2 patient identifiers being note and advised of the side effects of having an overactive. Patient expressed understanding and has no further questions at this time.

## 2018-10-04 NOTE — TELEPHONE ENCOUNTER
Pt is requesting a call back regarding her PSA being extremely high.      Best [phone]         Message received & copied from [facility]

## 2018-10-23 RX ORDER — LEVOTHYROXINE SODIUM 50 UG/1
50 TABLET ORAL DAILY
Qty: 90 TAB | Refills: 3 | Status: SHIPPED | OUTPATIENT
Start: 2018-10-23 | End: 2018-10-29 | Stop reason: SDUPTHER

## 2018-10-23 NOTE — TELEPHONE ENCOUNTER
PCP: Víctor Sweet MD    Last appt: 8/7/2018  Future Appointments   Date Time Provider Tedyd Carrillo   11/15/2018  9:45 AM Víctor Sweet MD [facility]       Requested Prescriptions     Pending Prescriptions Disp Refills    levothyroxine (SYNTHROID) 50 mcg tablet 90 Tab 3     Sig: Take 1 Tab by mouth daily.

## 2018-10-29 RX ORDER — LEVOTHYROXINE SODIUM 50 UG/1
TABLET ORAL
Qty: 30 TAB | Refills: 0 | Status: SHIPPED | OUTPATIENT
Start: 2018-10-29 | End: 2018-11-16

## 2018-11-14 PROBLEM — E66.3 OVERWEIGHT (BMI 25.0-29.9): Status: ACTIVE | Noted: 2018-11-14

## 2018-11-15 ENCOUNTER — HOSPITAL ENCOUNTER (OUTPATIENT)
Dept: LAB | Age: 73
Discharge: HOME | End: 2018-11-15
Payer: MEDICARE

## 2018-11-15 ENCOUNTER — OFFICE VISIT (OUTPATIENT)
Dept: INTERNAL MEDICINE CLINIC | Age: 73
End: 2018-11-15

## 2018-11-15 VITALS
HEIGHT: 63 IN | DIASTOLIC BLOOD PRESSURE: 69 MMHG | OXYGEN SATURATION: 98 % | HEART RATE: 70 BPM | SYSTOLIC BLOOD PRESSURE: 150 MMHG | WEIGHT: 171 LBS | TEMPERATURE: 97.9 F | BODY MASS INDEX: 30.3 KG/M2

## 2018-11-15 DIAGNOSIS — Z00.00 MEDICARE ANNUAL WELLNESS VISIT, SUBSEQUENT: ICD-10-CM

## 2018-11-15 DIAGNOSIS — M25.531 WRIST PAIN, ACUTE, RIGHT: ICD-10-CM

## 2018-11-15 DIAGNOSIS — B35.1 ONYCHOMYCOSIS: ICD-10-CM

## 2018-11-15 DIAGNOSIS — E78.5 DYSLIPIDEMIA: Primary | ICD-10-CM

## 2018-11-15 DIAGNOSIS — E03.9 HYPOTHYROIDISM, UNSPECIFIED TYPE: ICD-10-CM

## 2018-11-15 PROCEDURE — 36415 COLL VENOUS BLD VENIPUNCTURE: CPT

## 2018-11-15 PROCEDURE — 84443 ASSAY THYROID STIM HORMONE: CPT

## 2018-11-15 RX ORDER — UREA 10 %
100 LOTION (ML) TOPICAL DAILY
COMMUNITY

## 2018-11-15 RX ORDER — CETIRIZINE HCL 10 MG
10 TABLET ORAL DAILY
COMMUNITY

## 2018-11-15 RX ORDER — TRAMADOL HYDROCHLORIDE 50 MG/1
50 TABLET ORAL
Qty: 28 TAB | Refills: 0 | Status: SHIPPED | OUTPATIENT
Start: 2018-11-15 | End: 2019-04-02 | Stop reason: SDUPTHER

## 2018-11-15 NOTE — PATIENT INSTRUCTIONS
Medicare Wellness Visit, Female The best way to live healthy is to have a lifestyle where you eat a well-balanced diet, exercise regularly, limit alcohol use, and quit all forms of tobacco/nicotine, if applicable. Regular preventive services are another way to keep healthy. Preventive services (vaccines, screening tests, monitoring & exams) can help personalize your care plan, which helps you manage your own care. Screening tests can find health problems at the earliest stages, when they are easiest to treat. Jan Olivia follows the current, evidence-based guidelines published by the [facility] ([facility]) when recommending preventive services for our patients. Because we follow these guidelines, sometimes recommendations change over time as research supports it. (For example, mammograms used to be recommended annually. Even though Medicare will still pay for an annual mammogram, the newer guidelines recommend a mammogram every two years for women of average risk.) Of course, you and your doctor may decide to screen more often for some diseases, based on your risk and your health status. Preventive services for you include: - Medicare offers their members a free annual wellness visit, which is time for you and your primary care provider to discuss and plan for your preventive service needs. Take advantage of this benefit every year! 
-All adults over the age of 72 should receive the recommended pneumonia vaccines. Current USPSTF guidelines recommend a series of two vaccines for the best pneumonia protection.  
-All adults should have a flu vaccine yearly and a tetanus vaccine every 10 years. All adults age 61 and older should receive a shingles vaccine once in their lifetime.   
-A bone mass density test is recommended when a woman turns 65 to screen for osteoporosis. This test is only recommended one time, as a screening. Some providers will use this same test as a disease monitoring tool if you already have osteoporosis. -All adults age 38-68 who are overweight should have a diabetes screening test once every three years.  
-Other screening tests and preventive services for persons with diabetes include: an eye exam to screen for diabetic retinopathy, a kidney function test, a foot exam, and stricter control over your cholesterol.  
-Cardiovascular screening for adults with routine risk involves an electrocardiogram (ECG) at intervals determined by your doctor.  
-Colorectal cancer screenings should be done for adults age 54-65 with no increased risk factors for colorectal cancer. There are a number of acceptable methods of screening for this type of cancer. Each test has its own benefits and drawbacks. Discuss with your doctor what is most appropriate for you during your annual wellness visit. The different tests include: colonoscopy (considered the best screening method), a fecal occult blood test, a fecal DNA test, and sigmoidoscopy. -Breast cancer screenings are recommended every other year for women of normal risk, age 54-69. 
-Cervical cancer screenings for women over age 72 are only recommended with certain risk factors.  
-All adults born between [facility] should be screened once for Hepatitis C. Here is a list of your current Health Maintenance items (your personalized list of preventive services) with a due date: 
Health Maintenance Due Topic Date Due  
 DTaP/Tdap/Td  (1 - Tdap) 07/20/1964  Shingles Vaccine (1 of 2) 07/20/1993  Pneumococcal Vaccine (1 of 2 - PCV13) 07/20/2008  Glaucoma Screening   06/27/2014 [address] [place] [address] Annual Well Visit  03/14/2018

## 2018-11-15 NOTE — PROGRESS NOTES
Chief Complaint Patient presents with  Annual Wellness Visit  
  yearly  Medication Refill  
  send RX to [facility]  Cholesterol Problem 6 month follow up  Thyroid Problem 6 month follow up  Toe Pain Sore right Great x 2 month  Wrist Pain  
  right wrist, swollen and painful x 2 month

## 2018-11-15 NOTE — LETTER
11/16/2018 7:54 AM 
 
Ms. Andres Anne 
[address]  
[address] [id number] Dear Andres Anne: 
 
Please find your most recent results below. Resulted Orders TSH 3RD GENERATION Result Value Ref Range TSH 3.120 0.450 - 4.500 uIU/mL Narrative Performed at:  [address]  [id number] : Roberto Park MD, Phone:  [phone] RECOMMENDATIONS: 
None. Keep up the good work! Continue with current  diet and medications. Your thyroid level is within normal limits. Continue your current dose to levothyroxine. Please call me if you have any questions: [phone] Sincerely, 
 
 
Laura Fontanez MD

## 2018-11-15 NOTE — PROGRESS NOTES
HISTORY OF PRESENT ILLNESS 
Aspen Barron is a 74 y.o. female. HPI  
6 mos f/u hypothyroid, HLD,  OA and medicare wellness--------------- Recent tsh was low 0.36--levothyroxine lowered from 75 mcg every day to 50 mcg every day No further palpitaions No recent lipid panel Due for pneumovax? ? On prolia for osteopenia--Dr Wilcox Sees Dr Pamela Cisneros for hx palpitations-neg holter and echo Mammogram? 
 
C/o pain in  Left toenail and right wrist pain and swelling Last OV Here to establish care Former PCP Dr Aneudy Ferguson in [place] Moved to [place] Hx palpitations, dyslipidemia ( Dr Pamela Cisneros., osteopenia( Dr. Janie Wu 
sees gyn MD yearly Not afib per pt Sees Dr Pina-cardiologist 
In ED a few mos ago for palpitations 
  
  
Has T 10 compression fx Getting PT for tilted pelvis Has severe left knee OA--Dr Caroline Shea Sees Dr Coral Gann for skin There are no active problems to display for this patient. Patient Active Problem List  
 Diagnosis Date Noted  Other specified hypothyroidism 05/16/2018  Gastroesophageal reflux disease without esophagitis 05/16/2018  History of CVA (cerebrovascular accident) 05/16/2018  Osteopenia of multiple sites 05/16/2018  
 HX: breast cancer 05/16/2018  Closed fracture of left lower extremity with routine healing 05/16/2018  Endometriosis 05/16/2018 Current Outpatient Medications Medication Sig Dispense Refill  levothyroxine (SYNTHROID) 50 mcg tablet TAKE 1 TABLET BY MOUTH EVERY MORNING 30 MINS BEFORE BREAKFAST 30 Tab 0  
 diphenhydrAMINE (BENADRYL) 25 mg capsule Take 25 mg by mouth two (2) times a day.  acyclovir (ZOVIRAX) 400 mg tablet Take 1 Tab by mouth two (2) times a day. 180 Tab 3  
 simvastatin (ZOCOR) 20 mg tablet Take 1 Tab by mouth nightly. 90 Tab 3  
 fexofenadine-pseudoephedrine (ALLEGRA-D)  mg per tablet Take 1 Tab by mouth as needed.  omeprazole (PRILOSEC) 40 mg capsule Take 40 mg by mouth as needed.  oxaprozin (DAYPRO) 600 mg tablet Take  by mouth two (2) times a day. Allergies Allergen Reactions  Latex Rash  Codeine Rash  Pcn [Penicillins] Rash Social History Tobacco Use  Smoking status: Never Smoker  Smokeless tobacco: Never Used Substance Use Topics  Alcohol use: No  
  Comment: once  month wine Lab Results Component Value Date/Time WBC 8.3 08/07/2018 12:42 PM  
 HGB 12.2 08/07/2018 12:42 PM  
 HCT 37.1 08/07/2018 12:42 PM  
 PLATELET 387 62/38/9595 12:42 PM  
 MCV 96 08/07/2018 12:42 PM  
 
Lab Results Component Value Date/Time Glucose 90 08/07/2018 12:42 PM  
 LDL, calculated 121.4 (H) 07/10/2009 08:37 AM  
 Creatinine 0.91 08/07/2018 12:42 PM  
  
Lab Results Component Value Date/Time GFR est non-AA 62 08/07/2018 12:42 PM  
 GFR est AA 71 08/07/2018 12:42 PM  
 Creatinine 0.91 08/07/2018 12:42 PM  
 BUN 13 08/07/2018 12:42 PM  
 Sodium 138 08/07/2018 12:42 PM  
 Potassium 4.9 08/07/2018 12:42 PM  
 Chloride 100 08/07/2018 12:42 PM  
 CO2 20 08/07/2018 12:42 PM  
 Magnesium 1.9 05/21/2018 11:55 AM  
  
ROS Physical Exam  
Constitutional: She appears well-developed and well-nourished. Appears stated age Cardiovascular: Normal rate, regular rhythm and normal heart sounds. Exam reveals no gallop and no friction rub. No murmur heard. Pulmonary/Chest: Effort normal and breath sounds normal. No respiratory distress. She has no wheezes. Abdominal: Soft. Bowel sounds are normal.  
Musculoskeletal: She exhibits no edema. Slight eema and TTP right lateral wrist  
Neurological: She is alert. Skin:  
Onychomycosis left first toenail Psychiatric: She has a normal mood and affect. Nursing note and vitals reviewed. ASSESSMENT and PLAN Diagnoses and all orders for this visit: 1. Essential hypertension 2. Dyslipidemia 3. Hypothyroidism, unspecified type 
-     TSH 3RD GENERATION 4. Onychomycosis -     REFERRAL TO PODIATRY 5. Wrist pain, acute, right 
-     traMADol (ULTRAM) 50 mg tablet; Take 1 Tab by mouth every six (6) hours as needed for Pain. Max Daily Amount: 200 mg. Follow-up Disposition: Not on File This is the Subsequent Medicare Annual Wellness Exam, performed 12 months or more after the Initial AWV or the last Subsequent AWV I have reviewed the patient's medical history in detail and updated the computerized patient record. History Past Medical History:  
Diagnosis Date  CAD (coronary artery disease) Hyperlipidemia  Endocrine disease \"Thyroid\"  Gastrointestinal disorder Acid Reflux No past surgical history on file. Current Outpatient Medications Medication Sig Dispense Refill  cetirizine (ZYRTEC) 10 mg tablet Take  by mouth.  cyanocobalamin (VITAMIN B12) 100 mcg tablet Take 100 mcg by mouth daily.  traMADol (ULTRAM) 50 mg tablet Take 1 Tab by mouth every six (6) hours as needed for Pain. Max Daily Amount: 200 mg. 28 Tab 0  
 levothyroxine (SYNTHROID) 50 mcg tablet TAKE 1 TABLET BY MOUTH EVERY MORNING 30 MINS BEFORE BREAKFAST 30 Tab 0  
 acyclovir (ZOVIRAX) 400 mg tablet Take 1 Tab by mouth two (2) times a day. 180 Tab 3  
 simvastatin (ZOCOR) 20 mg tablet Take 1 Tab by mouth nightly. (Patient taking differently: Take 40 mg by mouth nightly.) 90 Tab 3  
 omeprazole (PRILOSEC) 40 mg capsule Take 40 mg by mouth as needed.  oxaprozin (DAYPRO) 600 mg tablet Take  by mouth two (2) times a day.  diphenhydrAMINE (BENADRYL) 25 mg capsule Take 25 mg by mouth two (2) times a day.  fexofenadine-pseudoephedrine (ALLEGRA-D)  mg per tablet Take 1 Tab by mouth as needed. Allergies Allergen Reactions  Latex Rash  Codeine Rash  Pcn [Penicillins] Rash Family History Problem Relation Age of Onset  Cancer Mother   
     breast cancer  Heart Disease Father  Cancer Brother   
     colon cancer  Diabetes Brother  Diabetes Brother  Heart Disease Brother Social History Tobacco Use  Smoking status: Never Smoker  Smokeless tobacco: Never Used Substance Use Topics  Alcohol use: No  
  Comment: once  month wine Patient Active Problem List  
Diagnosis Code  Other specified hypothyroidism E03.8  Gastroesophageal reflux disease without esophagitis K21.9  History of CVA (cerebrovascular accident) Z80.78  
 Osteopenia of multiple sites M85.89  
 HX: breast cancer Z85.3  Closed fracture of left lower extremity with routine healing S[address]. [address]  Endometriosis N80.9  Overweight (BMI 25.0-29. 9) E66.3 Depression Risk Factor Screening: PHQ over the last two weeks 11/15/2018 Little interest or pleasure in doing things Not at all Feeling down, depressed, irritable, or hopeless Not at all Total Score PHQ 2 0 Alcohol Risk Factor Screening: You do not drink alcohol or very rarely. Functional Ability and Level of Safety:  
Hearing Loss Hearing is good. Activities of Daily Living The home contains: handrails and grab bars Patient does total self care Fall Risk Fall Risk Assessment, last 12 mths 11/15/2018 Able to walk? Yes Fall in past 12 months? No  
Fall with injury? -  
Number of falls in past 12 months - Fall Risk Score -  
 
 
Abuse Screen Patient is not abused Cognitive Screening Evaluation of Cognitive Function: 
Has your family/caregiver stated any concerns about your memory: no 
Normal 
 
Patient Care Team  
Patient Care Team: 
Fanta Faria MD as PCP - General (Internal Medicine) Assessment/Plan Education and counseling provided: 
Are appropriate based on today's review and evaluation End-of-Life planning (with patient's consent)--see acp note Pneumococcal Vaccine--pt will check with pharmacy-had either pcv 13 or pneumovax a few yrs ago and will get the other one 
shingrix-recommended Diagnoses and all orders for this visit: 
 
 1. Elevated BP 
 monitor 2. Dyslipidemia Check fasting lipids next OV 3. Hypothyroidism, unspecified type 
-     TSH 3RD GENERATION On lower dose of levothyroxine 4. Onychomycosis -     REFERRAL TO PODIATRY 5. Wrist pain, acute, right 
-     traMADol (ULTRAM) 50 mg tablet; Take 1 Tab by mouth every six (6) hours as needed for Pain. Max Daily Amount: 200 mg. Ice cruzito , splint prn Health Maintenance Due Topic Date Due  
 DTaP/Tdap/Td series (1 - Tdap) 07/20/1964  Shingrix Vaccine Age 50> (1 of 2) 07/20/1993  Pneumococcal 65+ Low/Medium Risk (1 of 2 - PCV13) 07/20/2008  GLAUCOMA SCREENING Q2Y  06/27/2014  MEDICARE YEARLY EXAM  03/14/2018

## 2018-11-16 LAB — TSH SERPL DL<=0.005 MIU/L-ACNC: 3.12 UIU/ML (ref 0.45–4.5)

## 2018-11-16 RX ORDER — LEVOTHYROXINE SODIUM 50 UG/1
50 TABLET ORAL DAILY
Qty: 90 TAB | Refills: 3 | Status: SHIPPED | OUTPATIENT
Start: 2018-11-16 | End: 2018-11-19 | Stop reason: SDUPTHER

## 2018-11-16 NOTE — TELEPHONE ENCOUNTER
Requested Prescriptions     Pending Prescriptions Disp Refills    levothyroxine (SYNTHROID) 50 mcg tablet 90 Tab 3     Sig: Take 1 Tab by mouth daily. PCP: Emre Nicholson MD    Last appt: 11/15/2018  No future appointments. Requested Prescriptions     Pending Prescriptions Disp Refills    levothyroxine (SYNTHROID) 50 mcg tablet 90 Tab 3     Sig: Take 1 Tab by mouth daily.

## 2018-11-19 RX ORDER — LEVOTHYROXINE SODIUM 50 UG/1
50 TABLET ORAL DAILY
Qty: 90 TAB | Refills: 3 | Status: SHIPPED | OUTPATIENT
Start: 2018-11-19 | End: 2019-05-09

## 2018-11-21 ENCOUNTER — TELEPHONE (OUTPATIENT)
Dept: INTERNAL MEDICINE CLINIC | Age: 73
End: 2018-11-21

## 2018-11-21 NOTE — TELEPHONE ENCOUNTER
Called, spoke to pt. Two pt identifiers confirmed. Pt informed that TSH is WNL and to continue same dose thyroid for now. Pt verbalized understanding of information discussed w/ no further questions at this time.

## 2018-12-05 RX ORDER — MISOPROSTOL 200 UG/1
200 TABLET ORAL 4 TIMES DAILY
Qty: 360 TAB | Refills: 3 | Status: SHIPPED | OUTPATIENT
Start: 2018-12-05 | End: 2019-05-09

## 2018-12-05 RX ORDER — ACYCLOVIR 400 MG/1
400 TABLET ORAL 2 TIMES DAILY
Qty: 180 TAB | Refills: 3 | Status: SHIPPED | OUTPATIENT
Start: 2018-12-05 | End: 2019-05-09

## 2018-12-05 RX ORDER — OXAPROZIN 600 MG/1
600 TABLET, FILM COATED ORAL 2 TIMES DAILY
Qty: 180 TAB | Refills: 3 | Status: SHIPPED | OUTPATIENT
Start: 2018-12-05 | End: 2019-05-14

## 2018-12-05 RX ORDER — SIMVASTATIN 40 MG/1
40 TABLET, FILM COATED ORAL
Qty: 90 TAB | Refills: 3 | Status: SHIPPED | OUTPATIENT
Start: 2018-12-05 | End: 2019-09-23 | Stop reason: SDUPTHER

## 2018-12-05 NOTE — TELEPHONE ENCOUNTER
Patient states she needs a call back in reference to a small amount of all of her medications were to be called in to the local pharmacy & they have never received. Please call to discuss.  Thank you

## 2018-12-05 NOTE — TELEPHONE ENCOUNTER
Called, spoke to pt. Two identifiers confirmed. Pt stated that her pharmacy has not received her requested medication refills yet. Notified pt I would send request to Dr. Lorenzo Carroll. Pt verbalized understanding of information discussed w/ no further questions at this time.

## 2019-01-07 ENCOUNTER — TELEPHONE (OUTPATIENT)
Dept: INTERNAL MEDICINE CLINIC | Age: 74
End: 2019-01-07

## 2019-01-08 ENCOUNTER — OFFICE VISIT (OUTPATIENT)
Dept: INTERNAL MEDICINE CLINIC | Age: 74
End: 2019-01-08

## 2019-01-08 VITALS
HEART RATE: 89 BPM | TEMPERATURE: 97.4 F | WEIGHT: 172 LBS | BODY MASS INDEX: 30.48 KG/M2 | HEIGHT: 63 IN | SYSTOLIC BLOOD PRESSURE: 118 MMHG | DIASTOLIC BLOOD PRESSURE: 71 MMHG | OXYGEN SATURATION: 95 %

## 2019-01-08 DIAGNOSIS — R05.9 COUGH: ICD-10-CM

## 2019-01-08 DIAGNOSIS — J40 BRONCHITIS: Primary | ICD-10-CM

## 2019-01-08 DIAGNOSIS — J40 BRONCHITIS, NOT SPECIFIED AS ACUTE OR CHRONIC: Primary | ICD-10-CM

## 2019-01-08 RX ORDER — GUAIFENESIN AND DEXTROMETHORPHAN HBR 20; 400 MG/1; MG/1
TABLET ORAL
COMMUNITY
End: 2019-05-09

## 2019-01-08 RX ORDER — AZITHROMYCIN 250 MG/1
250 TABLET, FILM COATED ORAL SEE ADMIN INSTRUCTIONS
Qty: 6 TAB | Refills: 0 | Status: SHIPPED | OUTPATIENT
Start: 2019-01-08 | End: 2019-01-10

## 2019-01-08 NOTE — PROGRESS NOTES
HISTORY OF PRESENT ILLNESS 
Dirk Laswon is a 74 y.o. female. [facility] OV Has been sick since 12-31-18 with allergy sxs, runny nose and now cough and weakness No f/c Some loose stools More fatigeued after taking mucinex today Patient Active Problem List  
 Diagnosis Date Noted  Overweight (BMI 25.0-29.9) 11/14/2018  Other specified hypothyroidism 05/16/2018  Gastroesophageal reflux disease without esophagitis 05/16/2018  History of CVA (cerebrovascular accident) 05/16/2018  Osteopenia of multiple sites 05/16/2018  
 HX: breast cancer 05/16/2018  Closed fracture of left lower extremity with routine healing 05/16/2018  Endometriosis 05/16/2018 Current Outpatient Medications Medication Sig Dispense Refill  guaiFENesin-dextromethorphan (MUCUS RELIEF DM)  mg tab tablet Take  by mouth.  simvastatin (ZOCOR) 40 mg tablet Take 1 Tab by mouth nightly. 90 Tab 3  
 oxaprozin (DAYPRO) 600 mg tablet Take 1 Tab by mouth two (2) times a day. 180 Tab 3  
 miSOPROStol (CYTOTEC) 200 mcg tablet Take 1 Tab by mouth four (4) times daily. 360 Tab 3  
 acyclovir (ZOVIRAX) 400 mg tablet Take 1 Tab by mouth two (2) times a day. 180 Tab 3  
 levothyroxine (SYNTHROID) 50 mcg tablet Take 1 Tab by mouth daily. 90 Tab 3  cetirizine (ZYRTEC) 10 mg tablet Take  by mouth.  cyanocobalamin (VITAMIN B12) 100 mcg tablet Take 100 mcg by mouth daily.  traMADol (ULTRAM) 50 mg tablet Take 1 Tab by mouth every six (6) hours as needed for Pain. Max Daily Amount: 200 mg. 28 Tab 0  
 omeprazole (PRILOSEC) 40 mg capsule Take 40 mg by mouth as needed.  diphenhydrAMINE (BENADRYL) 25 mg capsule Take 25 mg by mouth two (2) times a day.  fexofenadine-pseudoephedrine (ALLEGRA-D)  mg per tablet Take 1 Tab by mouth as needed. Allergies Allergen Reactions  Latex Rash  Codeine Rash  Pcn [Penicillins] Rash Lab Results Component Value Date/Time GFR est non-AA 62 08/07/2018 12:42 PM  
 GFR est AA 71 08/07/2018 12:42 PM  
 Creatinine 0.91 08/07/2018 12:42 PM  
 BUN 13 08/07/2018 12:42 PM  
 Sodium 138 08/07/2018 12:42 PM  
 Potassium 4.9 08/07/2018 12:42 PM  
 Chloride 100 08/07/2018 12:42 PM  
 CO2 20 08/07/2018 12:42 PM  
 Magnesium 1.9 05/21/2018 11:55 AM  
  
ROS Physical Exam  
Constitutional: She appears well-developed and well-nourished. Appears stated age Cardiovascular: Normal rate, regular rhythm and normal heart sounds. Exam reveals no gallop and no friction rub. No murmur heard. Pulmonary/Chest: Effort normal and breath sounds normal. No respiratory distress. She has no wheezes. Abdominal: Soft. Bowel sounds are normal.  
Musculoskeletal: She exhibits no edema. Neurological: She is alert. Psychiatric: She has a normal mood and affect. Nursing note and vitals reviewed. ASSESSMENT and PLAN Diagnoses and all orders for this visit: 1. Bronchitis -     XR CHEST PA LAT; Future 
 zpak  
 mucinex To call if not improving 2. Cough -     XR CHEST PA LAT; [place] Other orders 
-     azithromycin (ZITHROMAX) 250 mg tablet; Take 1 Tab by mouth See Admin Instructions for 5 days. Follow-up Disposition: 
Return if symptoms worsen or fail to improve.

## 2019-01-08 NOTE — TELEPHONE ENCOUNTER
Patient of Dr. Reed Boo. Patient called with report of URI symptoms for the past few days, now a deep cough. Reports that she called and the call \"went into oblivion\". No message left. She states she has a dry cough and chest tightness. No fever. No discolored mucous. Advised patient to take the cough medication that she has at home- mucinex nighttime cough. Advised patient that she needs to be seen. Routing message to nurse to call patient on cell # to schedule.

## 2019-01-08 NOTE — PROGRESS NOTES
Chief Complaint Patient presents with  Cough  
  productive (yellow in color) 1/1/19 chest congestion  Hoarse  
  sore throat  Rib Pain  
  sore mostly right side of ribs  Fatigue  
  onset 1/1/19

## 2019-01-10 ENCOUNTER — TELEPHONE (OUTPATIENT)
Dept: INTERNAL MEDICINE CLINIC | Age: 74
End: 2019-01-10

## 2019-01-10 RX ORDER — LEVOFLOXACIN 500 MG/1
500 TABLET, FILM COATED ORAL DAILY
Qty: 10 TAB | Refills: 0 | Status: SHIPPED | OUTPATIENT
Start: 2019-01-10 | End: 2019-05-09

## 2019-01-10 NOTE — TELEPHONE ENCOUNTER
Spoke with patient after 2 patient identifiers being note and advised per Dr. France Castleman pt I escribed a levaquin . Patient expressed understanding and has no further questions at this time.

## 2019-01-10 NOTE — TELEPHONE ENCOUNTER
Pt calling stating that she was in the office 2 days ago and was given a rx but the medication is not working and she is getting worse. She would like something else called in that is stronger. She also stated she does not want to come in the office again she would just like a new medication. She added that she was having bad cough fits last night and attempted to contact the on call doctor but never got a call back. Best contact [phone].        Message received & copied from [facility]

## 2019-02-15 ENCOUNTER — TELEPHONE (OUTPATIENT)
Dept: INTERNAL MEDICINE CLINIC | Age: 74
End: 2019-02-15

## 2019-02-15 DIAGNOSIS — Z86.73 HISTORY OF CVA (CEREBROVASCULAR ACCIDENT): Primary | ICD-10-CM

## 2019-02-15 DIAGNOSIS — E66.3 OVERWEIGHT (BMI 25.0-29.9): ICD-10-CM

## 2019-02-18 ENCOUNTER — APPOINTMENT (OUTPATIENT)
Dept: CT IMAGING | Age: 74
End: 2019-02-18
Attending: EMERGENCY MEDICINE
Payer: MEDICARE

## 2019-02-18 ENCOUNTER — HOSPITAL ENCOUNTER (EMERGENCY)
Age: 74
Discharge: HOME | End: 2019-02-18
Attending: EMERGENCY MEDICINE
Payer: MEDICARE

## 2019-02-18 VITALS
BODY MASS INDEX: 30.38 KG/M2 | DIASTOLIC BLOOD PRESSURE: 67 MMHG | SYSTOLIC BLOOD PRESSURE: 136 MMHG | OXYGEN SATURATION: 100 % | RESPIRATION RATE: 14 BRPM | WEIGHT: 171.52 LBS | TEMPERATURE: 97.8 F | HEART RATE: 82 BPM

## 2019-02-18 DIAGNOSIS — N13.30 HYDRONEPHROSIS, UNSPECIFIED HYDRONEPHROSIS TYPE: ICD-10-CM

## 2019-02-18 DIAGNOSIS — E03.9 HYPOTHYROIDISM, UNSPECIFIED TYPE: Primary | ICD-10-CM

## 2019-02-18 LAB
ALBUMIN SERPL-MCNC: 4 G/DL (ref 3.5–5)
ALBUMIN/GLOB SERPL: 1.1 {RATIO} (ref 1.1–2.2)
ALP SERPL-CCNC: 94 U/L (ref 45–117)
ALT SERPL-CCNC: 25 U/L (ref 12–78)
ANION GAP SERPL CALC-SCNC: 4 MMOL/L (ref 5–15)
APPEARANCE UR: CLEAR
AST SERPL-CCNC: 27 U/L (ref 15–37)
ATRIAL RATE: 69 BPM
BACTERIA URNS QL MICRO: NEGATIVE /HPF
BASOPHILS # BLD: 0 K/UL (ref 0–0.1)
BASOPHILS NFR BLD: 0 % (ref 0–1)
BILIRUB SERPL-MCNC: 0.2 MG/DL (ref 0.2–1)
BILIRUB UR QL: NEGATIVE
BUN SERPL-MCNC: 10 MG/DL (ref 6–20)
BUN/CREAT SERPL: 12 (ref 12–20)
CALCIUM SERPL-MCNC: 8.5 MG/DL (ref 8.5–10.1)
CALCULATED P AXIS, ECG09: 97 DEGREES
CALCULATED R AXIS, ECG10: -43 DEGREES
CALCULATED T AXIS, ECG11: 21 DEGREES
CHLORIDE SERPL-SCNC: 104 MMOL/L (ref 97–108)
CK MB CFR SERPL CALC: NORMAL % (ref 0–2.5)
CK MB SERPL-MCNC: <1 NG/ML (ref 5–25)
CK SERPL-CCNC: 107 U/L (ref 26–192)
CO2 SERPL-SCNC: 28 MMOL/L (ref 21–32)
COLOR UR: NORMAL
CREAT SERPL-MCNC: 0.86 MG/DL (ref 0.55–1.02)
DIAGNOSIS, 93000: NORMAL
DIFFERENTIAL METHOD BLD: ABNORMAL
EOSINOPHIL # BLD: 0.1 K/UL (ref 0–0.4)
EOSINOPHIL NFR BLD: 1 % (ref 0–7)
EPITH CASTS URNS QL MICRO: NORMAL /LPF
ERYTHROCYTE [DISTWIDTH] IN BLOOD BY AUTOMATED COUNT: 15.3 % (ref 11.5–14.5)
GLOBULIN SER CALC-MCNC: 3.6 G/DL (ref 2–4)
GLUCOSE SERPL-MCNC: 95 MG/DL (ref 65–100)
GLUCOSE UR STRIP.AUTO-MCNC: NEGATIVE MG/DL
HCT VFR BLD AUTO: 38 % (ref 35–47)
HGB BLD-MCNC: 12.4 G/DL (ref 11.5–16)
HGB UR QL STRIP: NEGATIVE
HYALINE CASTS URNS QL MICRO: NORMAL /LPF (ref 0–5)
IMM GRANULOCYTES # BLD AUTO: 0 K/UL (ref 0–0.04)
IMM GRANULOCYTES NFR BLD AUTO: 0 % (ref 0–0.5)
KETONES UR QL STRIP.AUTO: NEGATIVE MG/DL
LEUKOCYTE ESTERASE UR QL STRIP.AUTO: NEGATIVE
LYMPHOCYTES # BLD: 3.7 K/UL (ref 0.8–3.5)
LYMPHOCYTES NFR BLD: 37 % (ref 12–49)
MCH RBC QN AUTO: 32.1 PG (ref 26–34)
MCHC RBC AUTO-ENTMCNC: 32.6 G/DL (ref 30–36.5)
MCV RBC AUTO: 98.4 FL (ref 80–99)
MONOCYTES # BLD: 0.7 K/UL (ref 0–1)
MONOCYTES NFR BLD: 7 % (ref 5–13)
NEUTS SEG # BLD: 5.3 K/UL (ref 1.8–8)
NEUTS SEG NFR BLD: 54 % (ref 32–75)
NITRITE UR QL STRIP.AUTO: NEGATIVE
NRBC # BLD: 0 K/UL (ref 0–0.01)
NRBC BLD-RTO: 0 PER 100 WBC
P-R INTERVAL, ECG05: 134 MS
PH UR STRIP: 6.5 [PH] (ref 5–8)
PLATELET # BLD AUTO: 364 K/UL (ref 150–400)
PMV BLD AUTO: 8.6 FL (ref 8.9–12.9)
POTASSIUM SERPL-SCNC: 4.5 MMOL/L (ref 3.5–5.1)
PROT SERPL-MCNC: 7.6 G/DL (ref 6.4–8.2)
PROT UR STRIP-MCNC: NEGATIVE MG/DL
Q-T INTERVAL, ECG07: 382 MS
QRS DURATION, ECG06: 76 MS
QTC CALCULATION (BEZET), ECG08: 409 MS
RBC # BLD AUTO: 3.86 M/UL (ref 3.8–5.2)
RBC #/AREA URNS HPF: NORMAL /HPF (ref 0–5)
SODIUM SERPL-SCNC: 136 MMOL/L (ref 136–145)
SP GR UR REFRACTOMETRY: 1.01 (ref 1–1.03)
TROPONIN I SERPL-MCNC: <0.05 NG/ML
TSH SERPL DL<=0.05 MIU/L-ACNC: 6.75 UIU/ML (ref 0.36–3.74)
UR CULT HOLD, URHOLD: NORMAL
UROBILINOGEN UR QL STRIP.AUTO: 0.2 EU/DL (ref 0.2–1)
VENTRICULAR RATE, ECG03: 69 BPM
WBC # BLD AUTO: 9.8 K/UL (ref 3.6–11)
WBC URNS QL MICRO: NORMAL /HPF (ref 0–4)

## 2019-02-18 PROCEDURE — 99285 EMERGENCY DEPT VISIT HI MDM: CPT

## 2019-02-18 PROCEDURE — 84443 ASSAY THYROID STIM HORMONE: CPT

## 2019-02-18 PROCEDURE — 70450 CT HEAD/BRAIN W/O DYE: CPT

## 2019-02-18 PROCEDURE — 84484 ASSAY OF TROPONIN QUANT: CPT

## 2019-02-18 PROCEDURE — 82550 ASSAY OF CK (CPK): CPT

## 2019-02-18 PROCEDURE — 85025 COMPLETE CBC W/AUTO DIFF WBC: CPT

## 2019-02-18 PROCEDURE — 81001 URINALYSIS AUTO W/SCOPE: CPT

## 2019-02-18 PROCEDURE — 93005 ELECTROCARDIOGRAM TRACING: CPT

## 2019-02-18 PROCEDURE — 74176 CT ABD & PELVIS W/O CONTRAST: CPT

## 2019-02-18 PROCEDURE — 36415 COLL VENOUS BLD VENIPUNCTURE: CPT

## 2019-02-18 PROCEDURE — 80053 COMPREHEN METABOLIC PANEL: CPT

## 2019-02-18 NOTE — DISCHARGE INSTRUCTIONS
Patient Education        Hypothyroidism: Care Instructions  Your Care Instructions    You have hypothyroidism, which means that your body is not making enough thyroid hormone. This hormone helps your body use energy. If your thyroid level is low, you may feel tired, be constipated, have an increase in your blood pressure, or have dry skin or memory problems. You may also get cold easily, even when it is warm. Women with low thyroid levels may have heavy menstrual periods. A blood test to find your thyroid-stimulating hormone (TSH) level is used to check for hypothyroidism. A high TSH level may mean that you have low thyroid. When your body is not making enough thyroid hormone, TSH levels rise in an effort to make the body produce more. The treatment for hypothyroidism is to take thyroid hormone pills. You should start to feel better in 1 to 2 weeks. But it can take several months to see changes in the TSH level. You will need regular visits with your doctor to make sure you have the right dose of medicine. Most people need treatment for the rest of their lives. You will need to see your doctor regularly to have blood tests and to make sure you are doing well. Follow-up care is a key part of your treatment and safety. Be sure to make and go to all appointments, and call your doctor if you are having problems. It's also a good idea to know your test results and keep a list of the medicines you take. How can you care for yourself at home? · Take your thyroid hormone medicine exactly as prescribed. Call your doctor if you think you are having a problem with your medicine. Most people do not have side effects if they take the right amount of medicine regularly. ? Take the medicine 30 minutes before breakfast, and do not take it with calcium, vitamins, or iron. ? Do not take extra doses of your thyroid medicine. It will not help you get better any faster, and it may cause side effects.   ? If you forget to take a dose, do NOT take a double dose of medicine. Take your usual dose the next day. · Tell your doctor about all prescription, herbal, or over-the-counter products you take. · Take care of yourself. Eat a healthy diet, get enough sleep, and get regular exercise. When should you call for help? Call 911 anytime you think you may need emergency care. For example, call if:    · You passed out (lost consciousness).     · You have severe trouble breathing.     · You have a very slow heartbeat (less than 60 beats a minute).     · You have a low body temperature (95°F or below).    Call your doctor now or seek immediate medical care if:    · You feel tired, sluggish, or weak.     · You have trouble remembering things or concentrating.     · You do not begin to feel better 2 weeks after starting your medicine.    Watch closely for changes in your health, and be sure to contact your doctor if you have any problems. Where can you learn more? Go to http://lindsay-lamonte.info/. Enter E148 in the search box to learn more about \"Hypothyroidism: Care Instructions. \"  Current as of: March 14, 2018  Content Version: 11.9  © 1531-4337 [employer]. Care instructions adapted under license by [employer] (which disclaims liability or warranty for this information). If you have questions about a medical condition or this instruction, always ask your healthcare professional. [place] [address] any warranty or liability for your use of this information. Patient Education        Learning About Hydronephrosis  What is hydronephrosis? Hydronephrosis is swelling of the kidneys. It is caused by a buildup of urine. This condition can happen if a tube that drains urine from your kidneys is blocked. The blockage can come from within the urinary tract or from pressure outside of the tract. Pregnancy is an example of an outside (external) cause.   This condition is often caused by a blockage such as a kidney stone, tumor, or blood clot. It also can be caused by a problem in your urinary system that you were born with (congenital problem). What are the symptoms? Some of the common symptoms are:  · Pain in one or both sides. · Stomach pain. · Blood in your urine. Some people have no symptoms. How is it diagnosed? Your doctor will do an ultrasound to look for a blockage in your urinary system. An ultrasound allows your doctor to see a picture of the organs and other structures in your belly (abdomen). You also may need blood and urine tests. How is it treated? Your treatment depends on the cause of the swelling. If it is caused by a blockage, your treatment will depend on the type of blockage you have. If the blockage is caused by a kidney stone, you may wait for the stone to pass. If hydronephrosis happens during pregnancy, it usually clears up on its own. You may need to have urine drained from your bladder or kidneys. A urinary catheter is a small, flexible tube that can be inserted through the urethra and into the bladder, allowing urine to drain. A nephrostomy catheter is a thin tube placed into your kidney to drain urine. Sometimes surgery is needed to clear the blockage. If you have a blockage, you should begin to feel better after the blockage is gone. Many people recover and have no long-term problems. But some may have kidney damage. If hydronephrosis was left untreated for a long time, the damage can be severe. Severe damage will require further treatment. Follow-up care is a key part of your treatment and safety. Be sure to make and go to all appointments, and call your doctor if you are having problems. It's also a good idea to know your test results and keep a list of the medicines you take. Where can you learn more? Go to http://lindsay-lamonte.info/. Enter S386 in the search box to learn more about \"Learning About Hydronephrosis. \"  Current as of: March 14, 2018  Content Version: 11.9  © 6075-0198 [employer]. Care instructions adapted under license by [employer] (which disclaims liability or warranty for this information). If you have questions about a medical condition or this instruction, always ask your healthcare professional. [place] [address] any warranty or liability for your use of this information.

## 2019-02-18 NOTE — ED NOTES
Pt reports she was looking in a cabinet this morning and felt weak \"all over\" and had burning sensation on right side of face, reports she is feeling better, continues with weakness and burning however not as bad, pt states \"I think I might have a urinary tract infection\", reports pain to right flank and back

## 2019-02-18 NOTE — ED PROVIDER NOTES
EMERGENCY DEPARTMENT HISTORY AND PHYSICAL EXAM 
 
 
Date: 2/18/2019 Patient Name: Alicia Castro History of Presenting Illness Chief Complaint Patient presents with  Fatigue Ambulatory via EMS c/o generalized weakness x this am with R side facial burning Pt states she has been \"sleeping alot lately\" History Provided By: Patient HPI: Alicia Castro, 74 y.o. female with PMHx significant for GERD, CAD, presents via EMS to the ED with cc of generalized weakness x today. Pt reports associated back pain, dysuria, and increased urinary frequency. Pt states she was looking in the cabinet today when she felt a real weak feeling sensation all over. She rested in her chair and started to feel better, but called the EMS because she was afraid it would come back. Pt states she believes she may have a UTI. Pt believes she has been sleeping a lot more then usual recently. She endorses compliance with her medications. Pt otherwise denies CP, SOB, or n/v/d. There are no other complaints, changes, or physical findings at this time. PCP: Shelly Mike MD 
 
No current facility-administered medications on file prior to encounter. Current Outpatient Medications on File Prior to Encounter Medication Sig Dispense Refill  levoFLOXacin (LEVAQUIN) 500 mg tablet Take 1 Tab by mouth daily. 10 Tab 0  
 guaiFENesin-dextromethorphan (MUCUS RELIEF DM)  mg tab tablet Take  by mouth.  simvastatin (ZOCOR) 40 mg tablet Take 1 Tab by mouth nightly. 90 Tab 3  
 oxaprozin (DAYPRO) 600 mg tablet Take 1 Tab by mouth two (2) times a day. 180 Tab 3  
 miSOPROStol (CYTOTEC) 200 mcg tablet Take 1 Tab by mouth four (4) times daily. 360 Tab 3  
 acyclovir (ZOVIRAX) 400 mg tablet Take 1 Tab by mouth two (2) times a day. 180 Tab 3  
 levothyroxine (SYNTHROID) 50 mcg tablet Take 1 Tab by mouth daily. 90 Tab 3  cetirizine (ZYRTEC) 10 mg tablet Take  by mouth.  cyanocobalamin (VITAMIN B12) 100 mcg tablet Take 100 mcg by mouth daily.  traMADol (ULTRAM) 50 mg tablet Take 1 Tab by mouth every six (6) hours as needed for Pain. Max Daily Amount: 200 mg. 28 Tab 0  
 diphenhydrAMINE (BENADRYL) 25 mg capsule Take 25 mg by mouth two (2) times a day.  fexofenadine-pseudoephedrine (ALLEGRA-D)  mg per tablet Take 1 Tab by mouth as needed.  omeprazole (PRILOSEC) 40 mg capsule Take 40 mg by mouth as needed. Past History Past Medical History: 
Past Medical History:  
Diagnosis Date  CAD (coronary artery disease) Hyperlipidemia  Endocrine disease \"Thyroid\"  Gastrointestinal disorder Acid Reflux Past Surgical History: 
History reviewed. No pertinent surgical history. Family History: 
Family History Problem Relation Age of Onset  Cancer Mother   
     breast cancer  Heart Disease Father  Cancer Brother   
     colon cancer  Diabetes Brother  Diabetes Brother  Heart Disease Brother Social History: 
Social History Tobacco Use  Smoking status: Never Smoker  Smokeless tobacco: Never Used Substance Use Topics  Alcohol use: No  
  Comment: once  month wine  Drug use: Yes Types: OTC, Prescription Allergies: Allergies Allergen Reactions  Latex Rash  Codeine Rash  Pcn [Penicillins] Rash Review of Systems Review of Systems Constitutional: Negative for activity change, chills and fever. HENT: Negative for congestion and sore throat. Eyes: Negative for pain and redness. Respiratory: Negative for cough, chest tightness and shortness of breath. Cardiovascular: Negative for chest pain and palpitations. Gastrointestinal: Negative for abdominal pain, diarrhea, nausea and vomiting. Genitourinary: Positive for dysuria and frequency. Negative for urgency. Musculoskeletal: Positive for back pain. Negative for neck pain. Skin: Negative for rash. Neurological: Positive for weakness. Negative for syncope, light-headedness and headaches. Psychiatric/Behavioral: Negative for confusion. All other systems reviewed and are negative. Physical Exam  
Physical Exam  
Constitutional: She is oriented to person, place, and time. She appears well-developed and well-nourished. No distress. HENT:  
Head: Normocephalic and atraumatic. Nose: Nose normal.  
Mouth/Throat: Oropharynx is clear and moist. No oropharyngeal exudate. Eyes: Conjunctivae and EOM are normal. Pupils are equal, round, and reactive to light. No scleral icterus. Neck: Normal range of motion. Neck supple. No JVD present. No tracheal deviation present. No thyromegaly present. Cardiovascular: Normal rate, regular rhythm, normal heart sounds and intact distal pulses. Exam reveals no gallop and no friction rub. No murmur heard. Pulmonary/Chest: Effort normal and breath sounds normal. No stridor. No respiratory distress. She has no wheezes. She has no rales. Abdominal: Soft. Bowel sounds are normal. She exhibits no distension. There is no tenderness. There is no rebound and no guarding. Musculoskeletal: Normal range of motion. She exhibits no edema or deformity. Lymphadenopathy:  
  She has no cervical adenopathy. Neurological: She is alert and oriented to person, place, and time. No cranial nerve deficit. She exhibits normal muscle tone. Coordination normal. GCS eye subscore is 4. GCS verbal subscore is 5. GCS motor subscore is 6. EOMI intact, no facial droop or asymmetry, normal/equal sensation in face. Uvula elevates at midline, no tongue deviation. Normal strength with head rotation and shoulder shrug. 5/5 Strength in the bilateral upper and lower extremities, no pronotor drift, normal finger to nose. No truncal ataxia. Normal speech: no dysarthria or aphasia. Skin: Skin is warm and dry. No rash noted. She is not diaphoretic. No erythema. Psychiatric: She has a normal mood and affect. Her speech is normal and behavior is normal.  
Nursing note and vitals reviewed. Diagnostic Study Results Labs - Recent Results (from the past 12 hour(s)) EKG, 12 LEAD, INITIAL Collection Time: 02/18/19  1:24 PM  
Result Value Ref Range Ventricular Rate 69 BPM  
 Atrial Rate 69 BPM  
 P-R Interval 134 ms QRS Duration 76 ms  
 Q-T Interval 382 ms QTC Calculation (Bezet) 409 ms Calculated P Axis 97 degrees Calculated R Axis -43 degrees Calculated T Axis 21 degrees Diagnosis Normal sinus rhythm Left axis deviation Pulmonary disease pattern When compared with ECG of 21-MAY-2018 12:00, No significant change was found Confirmed by Jaida Tyler ([id number]) on 2/18/2019 3:46:44 PM 
  
CBC WITH AUTOMATED DIFF Collection Time: 02/18/19  2:10 PM  
Result Value Ref Range WBC 9.8 3.6 - 11.0 K/uL  
 RBC 3.86 3.80 - 5.20 M/uL  
 HGB 12.4 11.5 - 16.0 g/dL HCT 38.0 35.0 - 47.0 % MCV 98.4 80.0 - 99.0 FL  
 MCH 32.1 26.0 - 34.0 PG  
 MCHC 32.6 30.0 - 36.5 g/dL  
 RDW 15.3 (H) 11.5 - 14.5 % PLATELET 040 235 - 885 K/uL MPV 8.6 (L) 8.9 - 12.9 FL  
 NRBC 0.0 0  WBC ABSOLUTE NRBC 0.00 0.00 - 0.01 K/uL NEUTROPHILS 54 32 - 75 % LYMPHOCYTES 37 12 - 49 % MONOCYTES 7 5 - 13 % EOSINOPHILS 1 0 - 7 % BASOPHILS 0 0 - 1 % IMMATURE GRANULOCYTES 0 0.0 - 0.5 % ABS. NEUTROPHILS 5.3 1.8 - 8.0 K/UL  
 ABS. LYMPHOCYTES 3.7 (H) 0.8 - 3.5 K/UL  
 ABS. MONOCYTES 0.7 0.0 - 1.0 K/UL  
 ABS. EOSINOPHILS 0.1 0.0 - 0.4 K/UL  
 ABS. BASOPHILS 0.0 0.0 - 0.1 K/UL  
 ABS. IMM. GRANS. 0.0 0.00 - 0.04 K/UL  
 DF AUTOMATED METABOLIC PANEL, COMPREHENSIVE Collection Time: 02/18/19  2:10 PM  
Result Value Ref Range Sodium 136 136 - 145 mmol/L Potassium 4.5 3.5 - 5.1 mmol/L Chloride 104 97 - 108 mmol/L  
 CO2 28 21 - 32 mmol/L Anion gap 4 (L) 5 - 15 mmol/L Glucose 95 65 - 100 mg/dL  BUN 10 6 - 20 MG/DL  
 Creatinine 0.86 0.55 - 1.02 MG/DL  
 BUN/Creatinine ratio 12 12 - 20 GFR est AA >60 >60 ml/min/1.73m2 GFR est non-AA >60 >60 ml/min/1.73m2 Calcium 8.5 8.5 - 10.1 MG/DL Bilirubin, total 0.2 0.2 - 1.0 MG/DL  
 ALT (SGPT) 25 12 - 78 U/L  
 AST (SGOT) 27 15 - 37 U/L Alk. phosphatase 94 45 - 117 U/L Protein, total 7.6 6.4 - 8.2 g/dL Albumin 4.0 3.5 - 5.0 g/dL Globulin 3.6 2.0 - 4.0 g/dL A-G Ratio 1.1 1.1 - 2.2 CK W/ CKMB & INDEX Collection Time: 02/18/19  2:10 PM  
Result Value Ref Range  26 - 192 U/L  
 CK - MB <1.0 <3.6 NG/ML  
 CK-MB Index Cannot be calculated 0.0 - 2.5    
TROPONIN I Collection Time: 02/18/19  2:10 PM  
Result Value Ref Range Troponin-I, Qt. <0.05 <0.05 ng/mL TSH 3RD GENERATION Collection Time: 02/18/19  2:10 PM  
Result Value Ref Range TSH 6.75 (H) 0.36 - 3.74 uIU/mL URINALYSIS W/MICROSCOPIC Collection Time: 02/18/19  2:50 PM  
Result Value Ref Range Color YELLOW/STRAW Appearance CLEAR CLEAR Specific gravity 1.008 1.003 - 1.030    
 pH (UA) 6.5 5.0 - 8.0 Protein NEGATIVE  NEG mg/dL Glucose NEGATIVE  NEG mg/dL Ketone NEGATIVE  NEG mg/dL Bilirubin NEGATIVE  NEG Blood NEGATIVE  NEG Urobilinogen 0.2 0.2 - 1.0 EU/dL Nitrites NEGATIVE  NEG Leukocyte Esterase NEGATIVE  NEG    
 WBC 0-4 0 - 4 /hpf  
 RBC 0-5 0 - 5 /hpf Epithelial cells FEW FEW /lpf Bacteria NEGATIVE  NEG /hpf Hyaline cast 0-2 0 - 5 /lpf URINE CULTURE HOLD SAMPLE Collection Time: 02/18/19  2:50 PM  
Result Value Ref Range Urine culture hold URINE ON HOLD IN MICROBIOLOGY DEPT FOR 3 DAYS. IF UNPRESERVED URINE IS SUBMITTED, IT CANNOT BE USED FOR ADDITIONAL TESTING AFTER 24 HRS, RECOLLECTION WILL BE REQUIRED. Radiologic Studies -  
CT ABD PELV WO CONT Final Result IMPRESSION:  
Right hydronephrosis and hydroureter. Stone not identified. Recommend follow-up to resolution to exclude a stricture or malignancy. Recently passed stone may  
also result in this appearance. CT HEAD WO CONT Final Result IMPRESSION:  No significant abnormalities. CT Results  (Last 48 hours) 02/18/19 1650  CT ABD PELV WO CONT Final result Impression:  IMPRESSION:  
Right hydronephrosis and hydroureter. Stone not identified. Recommend follow-up  
to resolution to exclude a stricture or malignancy. Recently passed stone may  
also result in this appearance. Narrative:  EXAM: CT ABD PELV WO CONT INDICATION: Flank pain, stone disease suspected; right flank pain COMPARISON: 8/21/2007 CONTRAST:  None. TECHNIQUE:   
Thin axial images were obtained through the abdomen and pelvis. Coronal and  
sagittal reconstructions were generated. Oral contrast was not administered. CT  
dose reduction was achieved through use of a standardized protocol tailored for  
this examination and automatic exposure control for dose modulation. The absence of intravenous contrast material reduces the sensitivity for  
evaluation of the solid parenchymal organs of the abdomen. FINDINGS:   
LUNG BASES: Clear. INCIDENTALLY IMAGED HEART AND MEDIASTINUM: Unremarkable. LIVER: No mass or biliary dilatation. GALLBLADDER: Unremarkable. SPLEEN: No mass. PANCREAS: No mass or ductal dilatation. ADRENALS: Unremarkable. KIDNEYS/URETERS: Right hydronephrosis and hydroureter to the level of the  
pelvis. STOMACH: Unremarkable. SMALL BOWEL: No dilatation or wall thickening. COLON: No dilatation or wall thickening. APPENDIX: Unremarkable. PERITONEUM: No ascites or pneumoperitoneum. RETROPERITONEUM: No lymphadenopathy or aortic aneurysm. REPRODUCTIVE ORGANS: Uterus and ovaries are surgically absent. URINARY BLADDER: No mass or calculus. BONES: No destructive bone lesion. Disc space narrowing most pronounced at L2-3. ADDITIONAL COMMENTS: Right breast implant 02/18/19 1344  CT HEAD WO CONT Final result Impression:  IMPRESSION:  No significant abnormalities. Narrative:  EXAMINATION:  CT HEAD WO CONT  
   
CLINICAL INFORMATION:  Weakness, generalized swelling, right facial burning,  
lethargy recently COMPARISON:  5/21/2018 TECHNIQUE: Routine axial head CT was performed. IV contrast was not  
administered. Sagittal and coronal reconstructions were generated. CT dose reduction was achieved through use of a standardized protocol tailored  
for this examination and automatic exposure control for dose modulation. FINDINGS:  
No acute infarct, hemorrhage or mass. VENTRICULAR SYSTEM:  Normal for age. BASAL CISTERNS:  Patent. BRAIN PARENCHYMA:  No significant abnormalities. MIDLINE SHIFT:  None. CALVARIUM/ SKULL BASE: Intact. PARANASAL SINUSES AND MASTOID AIR CELLS: Clear. VISUALIZED ORBITS: No significant abnormalities. SELLA: No enlargement. CXR Results  (Last 48 hours) None Medical Decision Making I am the first provider for this patient. I reviewed the vital signs, available nursing notes, past medical history, past surgical history, family history and social history. Vital Signs-Reviewed the patient's vital signs. Patient Vitals for the past 12 hrs: 
 Temp Pulse Resp BP SpO2  
02/18/19 1712    136/67 100 % 02/18/19 1542    149/73 99 % 02/18/19 1319 97.8 °F (36.6 °C) 82 14 128/67 100 % Pulse Oximetry Analysis - 100% on RA Cardiac Monitor:  
Rate: 82 bpm 
Rhythm: Normal Sinus Rhythm ED EKG interpretation: 13:24 Rhythm: normal sinus rhythm; and regular . Rate (approx.): 69; Axis: normal; [place] Interval: normal; QRS interval: normal ; ST/T wave: non-specific changes; This EKG was interpreted by Eather Cranker MD,ED Provider.  
 
Records Reviewed: Nursing Notes, Old Medical Records, Previous electrocardiograms, Ambulance Run Sheet, Previous Radiology Studies and Previous Laboratory Studies Provider Notes (Medical Decision Making): DDx: Hypothyroidism, UTI, CVA, TIA 
 
ED Course:  
Initial assessment performed. The patients presenting problems have been discussed, and they are in agreement with the care plan formulated and outlined with them. I have encouraged them to ask questions as they arise throughout their visit. CONSULT NOTE:  
5:58 PM 
Dede Gallardo MD spoke with Dr. Mellie Bloch, Specialty: Urology Discussed pt's hx, disposition, and available diagnostic and imaging results. Reviewed care plans. Reviewed CT results with right sided hydronephrosis/hydroureter but without stone. Consultant agrees with plans as outlined. He recommends f/u in office. Written by Elmo Santiago, ED Scribe, as dictated by Dede Gallardo MD. Critical Care Time:  
0 Disposition: 
DISCHARGE NOTE: 
5:59 PM 
The patient is ready for discharge. The patients signs, symptoms, diagnosis, and instructions for discharge have been discussed and the pt has conveyed their understanding. The patient is to follow up as recommended with PCP or return to the ER should their symptoms worsen. Plan has been discussed and patient has conveyed their agreement. Suspect fatigue related to hypothyroid; tsh elevated at 6. CT abd pelvis with right sided hydronephrosis and hydroureter but without stone. Pt denies known hx of kidney stones. UA negative; cr normal; d/w urology; dc home with urology and pcp follow up. Dede Gallardo MD 
 
 
 
PLAN: 
1. Discharge Medication List as of 2/18/2019  5:58 PM  
  
 
2. Follow-up Information Follow up With Specialties Details Why Contact Info Yulia Brower MD Urology Call in 1 day  [facility] [address] [place] [address] 
[phone] Return to ED if worse Diagnosis Clinical Impression: 1. Hypothyroidism, unspecified type 2. Hydronephrosis, unspecified hydronephrosis type Attestations: This note is prepared by Florinda Ugarte, acting as Scribe for Marjorie June MD. Marjorie June MD: The scribe's documentation has been prepared under my direction and personally reviewed by me in its entirety. I confirm that the note above accurately reflects all work, treatment, procedures, and medical decision making performed by me.

## 2019-02-19 ENCOUNTER — TELEPHONE (OUTPATIENT)
Dept: INTERNAL MEDICINE CLINIC | Age: 74
End: 2019-02-19

## 2019-02-19 DIAGNOSIS — E03.8 OTHER SPECIFIED HYPOTHYROIDISM: Primary | ICD-10-CM

## 2019-02-19 NOTE — TELEPHONE ENCOUNTER
Patient states she needs a call back to discuss her ED [facility] ER visit yesterday on 2/18/19 that patient states she thought she was having a Mini Stroke & patient states that \"labs were done & she was advised that her Thyroid was all out of Whack. \" Patient states she needs a call back to discuss medication adjustments or is appt required. Please call.  Thank you

## 2019-02-22 NOTE — TELEPHONE ENCOUNTER
#[phone] pt states she has been trying to talk with you since Tuesday and this needs to be resolved today. Please call pt this morning at number given.

## 2019-02-22 NOTE — TELEPHONE ENCOUNTER
Spoke with patient after 2 patient identifiers being note and advised per Dr. Marianne Jeter Please tell pt to adjust the levothyrroxine to 50 mcg every day excpet 75 mcg q M and F and repeat tsh in 6-8 weeks. Patient expressed understanding and has no further questions at this time.

## 2019-02-26 ENCOUNTER — HOSPITAL ENCOUNTER (OUTPATIENT)
Dept: CT IMAGING | Age: 74
Discharge: HOME | End: 2019-02-26
Attending: UROLOGY
Payer: MEDICARE

## 2019-02-26 DIAGNOSIS — N13.30 HYDRONEPHROSIS: ICD-10-CM

## 2019-02-26 PROCEDURE — 74011636320 HC RX REV CODE- 636/320: Performed by: UROLOGY

## 2019-02-26 PROCEDURE — 74178 CT ABD&PLV WO CNTR FLWD CNTR: CPT

## 2019-02-26 RX ORDER — SODIUM CHLORIDE 0.9 % (FLUSH) 0.9 %
10 SYRINGE (ML) INJECTION
Status: COMPLETED | OUTPATIENT
Start: 2019-02-26 | End: 2019-02-26

## 2019-02-26 RX ADMIN — Medication 10 ML: at 14:00

## 2019-02-26 RX ADMIN — IOPAMIDOL 100 ML: 755 INJECTION, SOLUTION INTRAVENOUS at 14:00

## 2019-03-14 ENCOUNTER — HOSPITAL ENCOUNTER (OUTPATIENT)
Dept: MRI IMAGING | Age: 74
Discharge: HOME | End: 2019-03-14
Attending: NEUROLOGICAL SURGERY
Payer: MEDICARE

## 2019-03-14 DIAGNOSIS — M48.062 PSEUDOCLAUDICATION SYNDROME: ICD-10-CM

## 2019-03-14 PROCEDURE — 72148 MRI LUMBAR SPINE W/O DYE: CPT

## 2019-04-02 DIAGNOSIS — M25.531 WRIST PAIN, ACUTE, RIGHT: ICD-10-CM

## 2019-04-02 NOTE — TELEPHONE ENCOUNTER
Pt returning a miss call from the office in regards to refill of medication \"Tramdol\" 50 MG.    Best contact number [phone]       Message received & copied from [facility]

## 2019-04-03 RX ORDER — TRAMADOL HYDROCHLORIDE 50 MG/1
50 TABLET ORAL
Qty: 28 TAB | Refills: 0 | Status: SHIPPED | OUTPATIENT
Start: 2019-04-03 | End: 2019-05-03

## 2019-04-03 NOTE — TELEPHONE ENCOUNTER
Pt calling stating she missed a call yesterday in regards to a rx that she is now completely out of. She would like a call back to the following number [phone].        Message received & copied from [facility]

## 2019-04-04 ENCOUNTER — TELEPHONE (OUTPATIENT)
Dept: INTERNAL MEDICINE CLINIC | Age: 74
End: 2019-04-04

## 2019-04-04 NOTE — TELEPHONE ENCOUNTER
Spoke to Elba at [facility]. Elba just needed to verify the dx for the tramadol prescription-given.

## 2019-04-04 NOTE — TELEPHONE ENCOUNTER
#[phone] Sharynalona Sethi needs more information of pt's pain in order to fill script.   Please call

## 2019-05-09 ENCOUNTER — HOSPITAL ENCOUNTER (OUTPATIENT)
Dept: PREADMISSION TESTING | Age: 74
Discharge: HOME | End: 2019-05-09
Payer: MEDICARE

## 2019-05-09 VITALS
HEART RATE: 64 BPM | HEIGHT: 64 IN | SYSTOLIC BLOOD PRESSURE: 157 MMHG | RESPIRATION RATE: 16 BRPM | BODY MASS INDEX: 29.81 KG/M2 | DIASTOLIC BLOOD PRESSURE: 70 MMHG | TEMPERATURE: 97.4 F | OXYGEN SATURATION: 99 % | WEIGHT: 174.6 LBS

## 2019-05-09 LAB
ABO + RH BLD: NORMAL
ALBUMIN SERPL-MCNC: 4.3 G/DL (ref 3.5–5)
ALBUMIN/GLOB SERPL: 1.5 {RATIO} (ref 1.1–2.2)
ALP SERPL-CCNC: 72 U/L (ref 45–117)
ALT SERPL-CCNC: 29 U/L (ref 12–78)
ANION GAP SERPL CALC-SCNC: 5 MMOL/L (ref 5–15)
APPEARANCE UR: CLEAR
APTT PPP: 26.8 SEC (ref 22.1–32)
AST SERPL-CCNC: 21 U/L (ref 15–37)
BACTERIA URNS QL MICRO: NEGATIVE /HPF
BASOPHILS # BLD: 0 K/UL (ref 0–0.1)
BASOPHILS NFR BLD: 0 % (ref 0–1)
BILIRUB SERPL-MCNC: 0.3 MG/DL (ref 0.2–1)
BILIRUB UR QL: NEGATIVE
BLOOD GROUP ANTIBODIES SERPL: NORMAL
BUN SERPL-MCNC: 10 MG/DL (ref 6–20)
BUN/CREAT SERPL: 15 (ref 12–20)
CALCIUM SERPL-MCNC: 9.4 MG/DL (ref 8.5–10.1)
CHLORIDE SERPL-SCNC: 104 MMOL/L (ref 97–108)
CO2 SERPL-SCNC: 27 MMOL/L (ref 21–32)
COLOR UR: NORMAL
CREAT SERPL-MCNC: 0.67 MG/DL (ref 0.55–1.02)
DIFFERENTIAL METHOD BLD: ABNORMAL
EOSINOPHIL # BLD: 0 K/UL (ref 0–0.4)
EOSINOPHIL NFR BLD: 1 % (ref 0–7)
EPITH CASTS URNS QL MICRO: NORMAL /LPF
ERYTHROCYTE [DISTWIDTH] IN BLOOD BY AUTOMATED COUNT: 14.1 % (ref 11.5–14.5)
GLOBULIN SER CALC-MCNC: 2.9 G/DL (ref 2–4)
GLUCOSE SERPL-MCNC: 88 MG/DL (ref 65–100)
GLUCOSE UR STRIP.AUTO-MCNC: NEGATIVE MG/DL
HCT VFR BLD AUTO: 37.7 % (ref 35–47)
HGB BLD-MCNC: 12.1 G/DL (ref 11.5–16)
HGB UR QL STRIP: NEGATIVE
HYALINE CASTS URNS QL MICRO: NORMAL /LPF (ref 0–5)
IMM GRANULOCYTES # BLD AUTO: 0 K/UL (ref 0–0.04)
IMM GRANULOCYTES NFR BLD AUTO: 0 % (ref 0–0.5)
INR PPP: 1 (ref 0.9–1.1)
KETONES UR QL STRIP.AUTO: NEGATIVE MG/DL
LEUKOCYTE ESTERASE UR QL STRIP.AUTO: NEGATIVE
LYMPHOCYTES # BLD: 3.8 K/UL (ref 0.8–3.5)
LYMPHOCYTES NFR BLD: 46 % (ref 12–49)
MCH RBC QN AUTO: 31.8 PG (ref 26–34)
MCHC RBC AUTO-ENTMCNC: 32.1 G/DL (ref 30–36.5)
MCV RBC AUTO: 99 FL (ref 80–99)
MONOCYTES # BLD: 0.6 K/UL (ref 0–1)
MONOCYTES NFR BLD: 8 % (ref 5–13)
NEUTS SEG # BLD: 3.8 K/UL (ref 1.8–8)
NEUTS SEG NFR BLD: 45 % (ref 32–75)
NITRITE UR QL STRIP.AUTO: NEGATIVE
NRBC # BLD: 0 K/UL (ref 0–0.01)
NRBC BLD-RTO: 0 PER 100 WBC
PH UR STRIP: 6.5 [PH] (ref 5–8)
PLATELET # BLD AUTO: 366 K/UL (ref 150–400)
PMV BLD AUTO: 8.6 FL (ref 8.9–12.9)
POTASSIUM SERPL-SCNC: 4.8 MMOL/L (ref 3.5–5.1)
PROT SERPL-MCNC: 7.2 G/DL (ref 6.4–8.2)
PROT UR STRIP-MCNC: NEGATIVE MG/DL
PROTHROMBIN TIME: 10.1 SEC (ref 9–11.1)
RBC # BLD AUTO: 3.81 M/UL (ref 3.8–5.2)
RBC #/AREA URNS HPF: NORMAL /HPF (ref 0–5)
SODIUM SERPL-SCNC: 136 MMOL/L (ref 136–145)
SP GR UR REFRACTOMETRY: 1.01 (ref 1–1.03)
SPECIMEN EXP DATE BLD: NORMAL
THERAPEUTIC RANGE,PTTT: NORMAL SECS (ref 58–77)
UA: UC IF INDICATED,UAUC: NORMAL
UROBILINOGEN UR QL STRIP.AUTO: 0.2 EU/DL (ref 0.2–1)
WBC # BLD AUTO: 8.3 K/UL (ref 3.6–11)
WBC URNS QL MICRO: NORMAL /HPF (ref 0–4)

## 2019-05-09 PROCEDURE — 80053 COMPREHEN METABOLIC PANEL: CPT

## 2019-05-09 PROCEDURE — 36415 COLL VENOUS BLD VENIPUNCTURE: CPT

## 2019-05-09 PROCEDURE — 85610 PROTHROMBIN TIME: CPT

## 2019-05-09 PROCEDURE — 81001 URINALYSIS AUTO W/SCOPE: CPT

## 2019-05-09 PROCEDURE — 85730 THROMBOPLASTIN TIME PARTIAL: CPT

## 2019-05-09 PROCEDURE — 86900 BLOOD TYPING SEROLOGIC ABO: CPT

## 2019-05-09 PROCEDURE — 83036 HEMOGLOBIN GLYCOSYLATED A1C: CPT

## 2019-05-09 PROCEDURE — 85025 COMPLETE CBC W/AUTO DIFF WBC: CPT

## 2019-05-09 RX ORDER — LEVOTHYROXINE SODIUM 50 UG/1
50 TABLET ORAL
COMMUNITY
End: 2019-09-23 | Stop reason: SDUPTHER

## 2019-05-09 RX ORDER — ACYCLOVIR 400 MG/1
400 TABLET ORAL
COMMUNITY
End: 2019-07-12

## 2019-05-09 RX ORDER — LEVOTHYROXINE SODIUM 75 UG/1
75 TABLET ORAL
COMMUNITY
End: 2021-02-03 | Stop reason: SDUPTHER

## 2019-05-09 RX ORDER — TRAMADOL HYDROCHLORIDE 50 MG/1
50 TABLET ORAL DAILY
COMMUNITY
End: 2019-06-04

## 2019-05-09 RX ORDER — MISOPROSTOL 200 UG/1
200 TABLET ORAL DAILY
COMMUNITY
End: 2021-01-14 | Stop reason: ALTCHOICE

## 2019-05-09 RX ORDER — METFORMIN HYDROCHLORIDE 500 MG/1
500 TABLET ORAL EVERY OTHER DAY
COMMUNITY

## 2019-05-09 NOTE — PERIOP NOTES
[address], [address] [facility]                            MAIN OR                                  [phone]   MAIN PRE OP                          [phone]                                                                                AMBULATORY PRE OP          [phone]  PRE-ADMISSION TESTING    [phone]     Surgery Date:   Thursday 5/16/19         Is surgery arrival time given by surgeon? NO  If NO, [facility] staff will call you between 3 and 7pm the day before your surgery with your arrival time. (If your surgery is on a Monday, we will call you the Friday before.)    Call [phone] after 7pm Monday-Friday if you did not receive your arrival time.     INSTRUCTIONS BEFORE YOUR SURGERY   When You  Arrive   Arrive at the [address] [facility] on the day of your surgery  Have your insurance card, photo ID, and any copayment (if needed)     Food   and   Drink   NO food or drink after midnight the night before surgery    This means NO water, gum, mints, coffee, juice, etc.  No alcohol (beer, wine, liquor) 24 hours before and after surgery     Medications to   TAKE   Morning of Surgery   MEDICATIONS TO TAKE THE MORNING OF SURGERY WITH A SIP OF WATER:    Tramadol if needed, synthroid     Medications  To  STOP      7 days before surgery    Non-Steroidal anti-inflammatory Drugs (NSAID's): for example, Ibuprofen (Advil, Motrin), Naproxen (Aleve), oxaprozin   Aspirin, if taking for pain    Herbal supplements, vitamins, and fish oil   Other:  (Pain medications not listed above, including Tylenol may be taken)   Blood  Thinners         Bathing Clothing  Jewelry  Valuables       If you shower the morning of surgery, please do not apply anything to your skin (lotions, powders, deodorant, or makeup, especially mascara)   Follow all special bath instructions (for total joint replacement, spine and bowel surgeries)   Do not shave or trim anywhere 24 hours before surgery   Wear your hair loose or down; no pony-tails, buns, or metal hair clips   Wear loose, comfortable, clean clothes   Wear glasses instead of contacts   Leave money, valuables, and jewelry, including body piercings, at home     Going Home       or Spending the Night    SAME-DAY SURGERY: You must have a responsible adult drive you home and stay with you 24 hours after surgery   ADMITS: If your doctor is keeping you into the hospital after surgery, leave personal belongings/luggage in your car until you have a hospital room number. Hospital discharge time is 12 noon  Drivers must be here before 12 noon unless you are told differently   Special Instructions 16. Special Instructions:  · Use Chlorhexidine Care Fusion wash and sponges 3 days prior to surgery as instructed. · Incentive spirometer given with instructions to practice at home and bring back to the hospital on the day of surgery. · [facility] will contact you if your Hemoglobin A1C is greater than 7.5. · Ensure/Glucerna  sample, nutritional information, and Ensure/Glucerna coupon given. · Pain pamphlet and Call Don't Fall reminder reviewed with patient. ·  parking is complimentary Monday - Friday 7 am - 5 pm  · Bring PTA Medication list day of surgery with the last doses taken documented   · Do not bring medication bottles the day of surgery         Follow all instructions so your surgery wont be cancelled. Please, be on time. If a situation occurs and you are delayed the day of surgery, call [phone] or          [phone]. If your physical condition changes (like a fever, cold, flu, etc.) call your surgeon. The patient was contacted  in person. Home medication reviewed and verified during PAT appointment. The patient verbalizes understanding of all instructions and does not  need reinforcement.

## 2019-05-10 LAB
BACTERIA SPEC CULT: NORMAL
BACTERIA SPEC CULT: NORMAL
SERVICE CMNT-IMP: NORMAL

## 2019-05-11 LAB
EST. AVERAGE GLUCOSE BLD GHB EST-MCNC: 123 MG/DL
HBA1C MFR BLD: 5.9 % (ref 4.2–6.3)

## 2019-05-13 NOTE — H&P
PAT Pre-Op History & Physical    Patient: Tyrone Diego                  MRN: [id number]          SSN: [id number]            YOB: 1945                  Chief Complaint:   Pre-Op Exam - MINIMALLY INVASIVE LEFT L1-2/2-3 HEMILAMINECTOMY AND FORAMINOTOMY, POSSIBLE MICRODISCECTOMY          HPI:   Patient is a 74 y.o.  female  who presents with history of lower back pain for the last 7 years. The pain started on the right side and went down from her buttocks down her leg. That pain stopped and recently moved over to the left side. She has been told that she needs surgery in 5 different places but she is fixing one now. She notes pain in her groin and her ovaries. Pain ranges from 6-8/10 and is sharp with movement. On a daily basis it is a dull ache. She notes the pain is constant. She denies any numbness or tingling in her legs. If she stands, walks or tries to do housework the pian is worse. She has failed pain management, injections and PT. The patient was evaluated in the surgeon's office and it was determined that the most appropriate plan of care is to proceed with surgical intervention. Patient's PCP Aida Henriquez MD      Past Medical History:   Diagnosis Date    Breast cancer [facility]) 1999    Right    CAD (coronary artery disease)     Hyperlipidemia    Fibromyalgia     Gastrointestinal disorder     Acid Reflux    Hypothyroid     Pre-diabetes     PVC (premature ventricular contraction)     Too much caffeine    Stroke [facility]) 2003    Took vision from right eye      Past Surgical History:   Procedure Laterality Date    HX COLONOSCOPY N/A     HX FRACTURE TX Left 60years old    Hardware implanted    HX MASTECTOMY Right 1999    HX PARTIAL HYSTERECTOMY N/A       Prior to Admission medications    Medication Sig Start Date End Date Taking? Authorizing Provider   levothyroxine (SYNTHROID) 75 mcg tablet Take 75 mcg by mouth every Monday and Friday.    Yes Provider, Historical   denosumab (PROLIA) 60 mg/mL injection 60 mg by SubCUTAneous route every 6 months. Yes Provider, Historical   miSOPROStol (CYTOTEC) 200 mcg tablet Take 200 mcg by mouth two (2) times a day. Yes Provider, Historical   levothyroxine (SYNTHROID) 50 mcg tablet Take 50 mcg by mouth five (5) days a week. Tuesday, Wednesday, Thursday, Saturday, Sunday   Yes Provider, Historical   acyclovir (ZOVIRAX) 400 mg tablet Take 400 mg by mouth two (2) times daily as needed. Yes Provider, Historical   metFORMIN (GLUCOPHAGE) 500 mg tablet Take 500 mg by mouth nightly. Yes Provider, Historical   MAGNESIUM PO Take 1 Tab by mouth daily. Yes Provider, Historical   folic acid/multivit-min/lutein (CENTRUM SILVER PO) Take 1 Tab by mouth daily. Yes Provider, Historical   traMADol (ULTRAM) 50 mg tablet Take 50 mg by mouth daily. Yes Provider, Historical   acetaminophen/diphenhydramine (TYLENOL PM PO) Take 1 Tab by mouth nightly. Yes Provider, Historical   simvastatin (ZOCOR) 40 mg tablet Take 1 Tab by mouth nightly. 12/5/18  Yes Velma Hinds MD   oxaprozin (DAYPRO) 600 mg tablet Take 1 Tab by mouth two (2) times a day. 12/5/18  Yes Velma Hinds MD   cetirizine (ZYRTEC) 10 mg tablet Take 10 mg by mouth daily. Yes Provider, Historical   cyanocobalamin (VITAMIN B12) 100 mcg tablet Take 100 mcg by mouth daily.    Yes Provider, Historical       Allergies   Allergen Reactions    Other Food Anaphylaxis     Steak, cheese, grass, smoke    Codeine Rash and Swelling     Mouth    Other Plant, Animal, Environmental Hives     Rubber    Pcn [Penicillins] Rash and Swelling     Mouth        Social History     Tobacco Use    Smoking status: Never Smoker    Smokeless tobacco: Never Used   Substance Use Topics    Alcohol use: No     Comment: once  month wine      Social History     Substance and Sexual Activity   Drug Use Never     Family History   Problem Relation Age of Onset    Cancer Mother         breast cancer    Heart Disease Father    [facility] Diabetes Brother     Colon Cancer Brother     Diabetes Brother     Heart Disease Brother     Other Sister 55        Gastric bypass into staph infection    No Known Problems Sister        Review of Systems:    Constitutional: Negative for chills and fever  HENT: Negative for congestion and sore throat  Eyes: negative for blurred vision and double vision  Respiratory: Negative for cough, shortness of breath and wheezing  Cardiovascular: Negative for chest pain and palpitations  Gastrointestinal: Negative for abdominal pain, (+) constipation, (+) diarrhea and nausea  Genitourinary: Negative for dysuria and hematuria. Positive for urinary frequency. Musculoskeletal: Positive for lower back pain  Skin: Negative for rash, open wounds  Neurological: Negative for dizziness, tremors and headaches  Psychiatric: Negative for depression. The patient is not nervous/anxious. DVT /PE Risk Assessment      Bedridden for more than 3 days or major surgery within the last 4 weeks NO    Active cancer NO    Calf swelling >3 cm compared to other leg NO    Collateral superficial veins present NO    Entire leg swollen NO    Tenderness along the deep venous system & or pitting edema confined to symptomatic leg NO    Cast applied to lower limb, paralysis NO    Previous DVT NO    Objective:     Visit Vitals  /70 (BP 1 Location: Left arm, BP Patient Position: Sitting)   Pulse 64   Temp 97.4 °F (36.3 °C)   Resp 16   Ht 5' 4\" (1.626 m)   Wt 79.2 kg (174 lb 9.6 oz)   SpO2 99%   BMI 29.97 kg/m²         Body mass index is 29.97 kg/m². Wt Readings from Last 1 Encounters:   05/09/19 79.2 kg (174 lb 9.6 oz)        Physical Exam:     General: Pleasant,  cooperative, no apparent distress, appears stated age. Eyes: Conjunctivae/corneas clear. EOMs intact. Nose: Nares normal.   Mouth/Throat: Lips, mucosa, and tongue normal. Teeth and gums normal.   Lungs: Clear to auscultation bilaterally.    Heart: Regular rate and rhythm, S1, S2 normal. No murmur, click, rub or gallop. Abdomen: Soft, non-tender. Bowel sounds normal. No distention. Musculoskeletal:  Gait antalgic. Extremities:  Extremities normal, atraumatic, no cyanosis or edema. Calves                                 supple, non tender to palpation. Pulses: 2+ and symmetric bilateral upper extremities. Cap. refill <2 seconds   Skin: Skin color, texture, turgor normal. No visible open areas, examined fully clothed   Neurologic: CN II-XII grossly intact. Alert and oriented x3. Labs: See CC. Assessment and Plan     1. LEFT L1-L2/L2-3 LATERAL RECESS STENOSIS, CLAUDICATION, RADICULOPATHY  2. Pre-op general physical exam    Scheduled for MINIMALLY INVASIVE LEFT L1-2/2-3 HEMILAMINECTOMY AND FORAMINOTOMY, POSSIBLE MICRODISCECTOMY    Labs reviewed. MRSA negative. EKG reviewed from previous reading.       Roshan Colmenares NP

## 2019-05-13 NOTE — H&P (VIEW-ONLY)
PAT Pre-Op History & Physical 
 
Patient: Addie Rodriguez                  MRN: [id number]          SSN: [id number]            YOB: 1945 Chief Complaint: 
 Pre-Op Exam - MINIMALLY INVASIVE LEFT L1-2/2-3 HEMILAMINECTOMY AND FORAMINOTOMY, POSSIBLE MICRODISCECTOMY 
 
    HPI:  
Patient is a 74 y.o.  female  who presents with history of lower back pain for the last 7 years. The pain started on the right side and went down from her buttocks down her leg. That pain stopped and recently moved over to the left side. She has been told that she needs surgery in 5 different places but she is fixing one now. She notes pain in her groin and her ovaries. Pain ranges from 6-8/10 and is sharp with movement. On a daily basis it is a dull ache. She notes the pain is constant. She denies any numbness or tingling in her legs. If she stands, walks or tries to do housework the pian is worse. She has failed pain management, injections and PT. The patient was evaluated in the surgeon's office and it was determined that the most appropriate plan of care is to proceed with surgical intervention. Patient's PCP Juvenal Encinas MD 
 
 
Past Medical History:  
Diagnosis Date  Breast cancer ([facility] [address] 1999 Right  CAD (coronary artery disease) Hyperlipidemia  Fibromyalgia  Gastrointestinal disorder Acid Reflux  Hypothyroid  Pre-diabetes  PVC (premature ventricular contraction) Too much caffeine  Stroke [facility]) 2003 Took vision from right eye Past Surgical History:  
Procedure Laterality Date  HX COLONOSCOPY N/A   
 HX FRACTURE TX Left 60years old Hardware implanted Kareem Wilcox 11  HX PARTIAL HYSTERECTOMY N/A Prior to Admission medications Medication Sig Start Date End Date Taking? Authorizing Provider  
levothyroxine (SYNTHROID) 75 mcg tablet Take 75 mcg by mouth every Monday and Friday.    Yes Provider, Historical  
 denosumab (PROLIA) 60 mg/mL injection 60 mg by SubCUTAneous route every 6 months. Yes Provider, Historical  
miSOPROStol (CYTOTEC) 200 mcg tablet Take 200 mcg by mouth two (2) times a day. Yes Provider, Historical  
levothyroxine (SYNTHROID) 50 mcg tablet Take 50 mcg by mouth five (5) days a week. Tuesday, Wednesday, Thursday, Saturday, Sunday   Yes Provider, Historical  
acyclovir (ZOVIRAX) 400 mg tablet Take 400 mg by mouth two (2) times daily as needed. Yes Provider, Historical  
metFORMIN (GLUCOPHAGE) 500 mg tablet Take 500 mg by mouth nightly. Yes Provider, Historical  
MAGNESIUM PO Take 1 Tab by mouth daily. Yes Provider, Historical  
folic acid/multivit-min/lutein (CENTRUM SILVER PO) Take 1 Tab by mouth daily. Yes Provider, Historical  
traMADol (ULTRAM) 50 mg tablet Take 50 mg by mouth daily. Yes Provider, Historical  
acetaminophen/diphenhydramine (TYLENOL PM PO) Take 1 Tab by mouth nightly. Yes Provider, Historical  
simvastatin (ZOCOR) 40 mg tablet Take 1 Tab by mouth nightly. 12/5/18  Yes Kong Shi MD  
oxaprozin (DAYPRO) 600 mg tablet Take 1 Tab by mouth two (2) times a day. 12/5/18  Yes Kong Shi MD  
cetirizine (ZYRTEC) 10 mg tablet Take 10 mg by mouth daily. Yes Provider, Historical  
cyanocobalamin (VITAMIN B12) 100 mcg tablet Take 100 mcg by mouth daily. Yes Provider, Historical  
 
 
Allergies Allergen Reactions  Other Food Anaphylaxis Steak, cheese, grass, smoke  Codeine Rash and Swelling Mouth  Other Plant, Animal, Environmental Hives Rubber  Pcn [Penicillins] Rash and Swelling Mouth Social History Tobacco Use  Smoking status: Never Smoker  Smokeless tobacco: Never Used Substance Use Topics  Alcohol use: No  
  Comment: once  month wine Social History Substance and Sexual Activity Drug Use Never Family History Problem Relation Age of Onset  Cancer Mother   
     breast cancer  Heart Disease Father  Diabetes Brother  Colon Cancer Brother  Diabetes Brother  Heart Disease Brother  Other Sister 55 Gastric bypass into staph infection  No Known Problems Sister Review of Systems: 
 
Constitutional: Negative for chills and fever HENT: Negative for congestion and sore throat Eyes: negative for blurred vision and double vision Respiratory: Negative for cough, shortness of breath and wheezing Cardiovascular: Negative for chest pain and palpitations Gastrointestinal: Negative for abdominal pain, (+) constipation, (+) diarrhea and nausea Genitourinary: Negative for dysuria and hematuria. Positive for urinary frequency. Musculoskeletal: Positive for lower back pain Skin: Negative for rash, open wounds Neurological: Negative for dizziness, tremors and headaches Psychiatric: Negative for depression. The patient is not nervous/anxious. DVT /PE Risk Assessment Bedridden for more than 3 days or major surgery within the last 4 weeks NO Active cancer NO Calf swelling >3 cm compared to other leg NO Collateral superficial veins present NO Entire leg swollen NO Tenderness along the deep venous system & or pitting edema confined to symptomatic leg NO Cast applied to lower limb, paralysis NO Previous DVT NO 
 
Objective:  
 
Visit Vitals /70 (BP 1 Location: Left arm, BP Patient Position: Sitting) Pulse 64 Temp 97.4 °F (36.3 °C) Resp 16 Ht 5' 4\" (1.626 m) Wt 79.2 kg (174 lb 9.6 oz) SpO2 99% BMI 29.97 kg/m² Body mass index is 29.97 kg/m². Wt Readings from Last 1 Encounters:  
05/09/19 79.2 kg (174 lb 9.6 oz) Physical Exam: 
 
 General: Pleasant,  cooperative, no apparent distress, appears stated age. Eyes: Conjunctivae/corneas clear. EOMs intact. Nose: Nares normal. 
 Mouth/Throat: Lips, mucosa, and tongue normal. Teeth and gums normal. 
 Lungs: Clear to auscultation bilaterally. Heart: Regular rate and rhythm, S1, S2 normal. No murmur, click, rub or gallop. Abdomen: Soft, non-tender. Bowel sounds normal. No distention. Musculoskeletal:  Gait antalgic. Extremities:  Extremities normal, atraumatic, no cyanosis or edema. Calves 
                               supple, non tender to palpation. Pulses: 2+ and symmetric bilateral upper extremities. Cap. refill <2 seconds Skin: Skin color, texture, turgor normal. No visible open areas, examined fully clothed Neurologic: CN II-XII grossly intact. Alert and oriented x3. Labs: See CC. Assessment and Plan 1. LEFT L1-L2/L2-3 LATERAL RECESS STENOSIS, CLAUDICATION, RADICULOPATHY 2. Pre-op general physical exam 
 
Scheduled for MINIMALLY INVASIVE LEFT L1-2/2-3 HEMILAMINECTOMY AND FORAMINOTOMY, POSSIBLE MICRODISCECTOMY Labs reviewed. MRSA negative. EKG reviewed from previous reading.  
 
 
Jeanne Harris, TODD

## 2019-05-14 RX ORDER — OXAPROZIN 600 MG/1
600 TABLET, FILM COATED ORAL 2 TIMES DAILY
Qty: 180 TAB | Refills: 3 | Status: SHIPPED | OUTPATIENT
Start: 2019-05-14 | End: 2020-03-19 | Stop reason: SDUPTHER

## 2019-05-14 NOTE — TELEPHONE ENCOUNTER
Requested Prescriptions     Pending Prescriptions Disp Refills    oxaprozin (DAYPRO) 600 mg tablet 180 Tab 3     Sig: Take 1 Tab by mouth two (2) times a day. Indications: Joint Damage causing Pain and Loss of Function     PCP: Lori Boles MD    Last appt: 1/8/2019  No future appointments. Requested Prescriptions     Pending Prescriptions Disp Refills    oxaprozin (DAYPRO) 600 mg tablet 180 Tab 3     Sig: Take 1 Tab by mouth two (2) times a day.  Indications: Joint Damage causing Pain and Loss of Function

## 2019-05-15 ENCOUNTER — ANESTHESIA EVENT (OUTPATIENT)
Dept: SURGERY | Age: 74
End: 2019-05-15
Payer: MEDICARE

## 2019-05-16 ENCOUNTER — APPOINTMENT (OUTPATIENT)
Dept: GENERAL RADIOLOGY | Age: 74
End: 2019-05-16
Attending: NEUROLOGICAL SURGERY
Payer: MEDICARE

## 2019-05-16 ENCOUNTER — HOSPITAL ENCOUNTER (OUTPATIENT)
Age: 74
Setting detail: OUTPATIENT SURGERY
Discharge: HOME | End: 2019-05-16
Attending: NEUROLOGICAL SURGERY | Admitting: NEUROLOGICAL SURGERY
Payer: MEDICARE

## 2019-05-16 ENCOUNTER — ANESTHESIA (OUTPATIENT)
Dept: SURGERY | Age: 74
End: 2019-05-16
Payer: MEDICARE

## 2019-05-16 VITALS
HEART RATE: 75 BPM | WEIGHT: 174 LBS | TEMPERATURE: 97.5 F | SYSTOLIC BLOOD PRESSURE: 113 MMHG | BODY MASS INDEX: 29.71 KG/M2 | RESPIRATION RATE: 12 BRPM | DIASTOLIC BLOOD PRESSURE: 53 MMHG | HEIGHT: 64 IN | OXYGEN SATURATION: 100 %

## 2019-05-16 DIAGNOSIS — M54.16 LUMBAR RADICULOPATHY: Primary | ICD-10-CM

## 2019-05-16 LAB
GLUCOSE BLD STRIP.AUTO-MCNC: 103 MG/DL (ref 65–100)
SERVICE CMNT-IMP: ABNORMAL

## 2019-05-16 PROCEDURE — 77030008684 HC TU ET CUF COVD -B: Performed by: ANESTHESIOLOGY

## 2019-05-16 PROCEDURE — 74011250637 HC RX REV CODE- 250/637: Performed by: NEUROLOGICAL SURGERY

## 2019-05-16 PROCEDURE — 77030013474 HC CRD BPLR DISP ADLR -A: Performed by: NEUROLOGICAL SURGERY

## 2019-05-16 PROCEDURE — 77030039266 HC ADH SKN EXOFIN S2SG -A: Performed by: NEUROLOGICAL SURGERY

## 2019-05-16 PROCEDURE — 77030002933 HC SUT MCRYL J&J -A: Performed by: NEUROLOGICAL SURGERY

## 2019-05-16 PROCEDURE — 77030013079 HC BLNKT BAIR HGGR 3M -A: Performed by: ANESTHESIOLOGY

## 2019-05-16 PROCEDURE — 76210000006 HC OR PH I REC 0.5 TO 1 HR: Performed by: NEUROLOGICAL SURGERY

## 2019-05-16 PROCEDURE — 74011250636 HC RX REV CODE- 250/636: Performed by: ANESTHESIOLOGY

## 2019-05-16 PROCEDURE — 77030003029 HC SUT VCRL J&J -B: Performed by: NEUROLOGICAL SURGERY

## 2019-05-16 PROCEDURE — 74011250636 HC RX REV CODE- 250/636: Performed by: NEUROLOGICAL SURGERY

## 2019-05-16 PROCEDURE — 74011250636 HC RX REV CODE- 250/636

## 2019-05-16 PROCEDURE — 74011000250 HC RX REV CODE- 250

## 2019-05-16 PROCEDURE — 77030029099 HC BN WAX SSPC -A: Performed by: NEUROLOGICAL SURGERY

## 2019-05-16 PROCEDURE — 77030018836 HC SOL IRR NACL ICUM -A: Performed by: NEUROLOGICAL SURGERY

## 2019-05-16 PROCEDURE — 77030026438 HC STYL ET INTUB CARD -A: Performed by: ANESTHESIOLOGY

## 2019-05-16 PROCEDURE — 77030020256 HC SOL INJ NACL 0.9%  500ML: Performed by: NEUROLOGICAL SURGERY

## 2019-05-16 PROCEDURE — 74011000250 HC RX REV CODE- 250: Performed by: NEUROLOGICAL SURGERY

## 2019-05-16 PROCEDURE — 76210000021 HC REC RM PH II 0.5 TO 1 HR: Performed by: NEUROLOGICAL SURGERY

## 2019-05-16 PROCEDURE — 77030037134 HC WRAP COMPR BACK THER SOLM -B

## 2019-05-16 PROCEDURE — 77030014647 HC SEAL FBRN TISSL BAXT -D: Performed by: NEUROLOGICAL SURGERY

## 2019-05-16 PROCEDURE — 76010000162 HC OR TIME 1.5 TO 2 HR INTENSV-TIER 1: Performed by: NEUROLOGICAL SURGERY

## 2019-05-16 PROCEDURE — 77030032490 HC SLV COMPR SCD KNE COVD -B

## 2019-05-16 PROCEDURE — 76060000034 HC ANESTHESIA 1.5 TO 2 HR: Performed by: NEUROLOGICAL SURGERY

## 2019-05-16 PROCEDURE — 74011000272 HC RX REV CODE- 272: Performed by: NEUROLOGICAL SURGERY

## 2019-05-16 PROCEDURE — 82962 GLUCOSE BLOOD TEST: CPT

## 2019-05-16 PROCEDURE — 77030020782 HC GWN BAIR PAWS FLX 3M -B

## 2019-05-16 RX ORDER — TRAMADOL HYDROCHLORIDE 50 MG/1
50 TABLET ORAL
Status: DISCONTINUED | OUTPATIENT
Start: 2019-05-16 | End: 2019-05-16 | Stop reason: HOSPADM

## 2019-05-16 RX ORDER — ALBUTEROL SULFATE 0.83 MG/ML
2.5 SOLUTION RESPIRATORY (INHALATION) AS NEEDED
Status: DISCONTINUED | OUTPATIENT
Start: 2019-05-16 | End: 2019-05-16 | Stop reason: HOSPADM

## 2019-05-16 RX ORDER — METHYLPREDNISOLONE ACETATE 40 MG/ML
INJECTION, SUSPENSION INTRA-ARTICULAR; INTRALESIONAL; INTRAMUSCULAR; SOFT TISSUE AS NEEDED
Status: DISCONTINUED | OUTPATIENT
Start: 2019-05-16 | End: 2019-05-16 | Stop reason: HOSPADM

## 2019-05-16 RX ORDER — FENTANYL CITRATE 50 UG/ML
INJECTION, SOLUTION INTRAMUSCULAR; INTRAVENOUS AS NEEDED
Status: DISCONTINUED | OUTPATIENT
Start: 2019-05-16 | End: 2019-05-16

## 2019-05-16 RX ORDER — ONDANSETRON 2 MG/ML
INJECTION INTRAMUSCULAR; INTRAVENOUS AS NEEDED
Status: DISCONTINUED | OUTPATIENT
Start: 2019-05-16 | End: 2019-05-16 | Stop reason: HOSPADM

## 2019-05-16 RX ORDER — BUPIVACAINE HYDROCHLORIDE AND EPINEPHRINE 5; 5 MG/ML; UG/ML
INJECTION, SOLUTION EPIDURAL; INTRACAUDAL; PERINEURAL AS NEEDED
Status: DISCONTINUED | OUTPATIENT
Start: 2019-05-16 | End: 2019-05-16 | Stop reason: HOSPADM

## 2019-05-16 RX ORDER — SODIUM CHLORIDE, SODIUM LACTATE, POTASSIUM CHLORIDE, CALCIUM CHLORIDE 600; 310; 30; 20 MG/100ML; MG/100ML; MG/100ML; MG/100ML
50 INJECTION, SOLUTION INTRAVENOUS CONTINUOUS
Status: DISCONTINUED | OUTPATIENT
Start: 2019-05-16 | End: 2019-05-16 | Stop reason: HOSPADM

## 2019-05-16 RX ORDER — FAMOTIDINE 10 MG/ML
20 INJECTION INTRAVENOUS ONCE
Status: COMPLETED | OUTPATIENT
Start: 2019-05-16 | End: 2019-05-16

## 2019-05-16 RX ORDER — BUPIVACAINE HYDROCHLORIDE 5 MG/ML
INJECTION, SOLUTION EPIDURAL; INTRACAUDAL AS NEEDED
Status: DISCONTINUED | OUTPATIENT
Start: 2019-05-16 | End: 2019-05-16 | Stop reason: HOSPADM

## 2019-05-16 RX ORDER — SODIUM CHLORIDE, SODIUM LACTATE, POTASSIUM CHLORIDE, CALCIUM CHLORIDE 600; 310; 30; 20 MG/100ML; MG/100ML; MG/100ML; MG/100ML
125 INJECTION, SOLUTION INTRAVENOUS CONTINUOUS
Status: DISCONTINUED | OUTPATIENT
Start: 2019-05-16 | End: 2019-05-16 | Stop reason: HOSPADM

## 2019-05-16 RX ORDER — RANITIDINE 150 MG/1
150 TABLET, FILM COATED ORAL DAILY
COMMUNITY
End: 2020-01-15 | Stop reason: ALTCHOICE

## 2019-05-16 RX ORDER — MIDAZOLAM HYDROCHLORIDE 1 MG/ML
INJECTION, SOLUTION INTRAMUSCULAR; INTRAVENOUS AS NEEDED
Status: DISCONTINUED | OUTPATIENT
Start: 2019-05-16 | End: 2019-05-16 | Stop reason: HOSPADM

## 2019-05-16 RX ORDER — SUCCINYLCHOLINE CHLORIDE 20 MG/ML
INJECTION INTRAMUSCULAR; INTRAVENOUS AS NEEDED
Status: DISCONTINUED | OUTPATIENT
Start: 2019-05-16 | End: 2019-05-16 | Stop reason: HOSPADM

## 2019-05-16 RX ORDER — MORPHINE SULFATE 0.5 MG/ML
INJECTION, SOLUTION EPIDURAL; INTRATHECAL; INTRAVENOUS AS NEEDED
Status: DISCONTINUED | OUTPATIENT
Start: 2019-05-16 | End: 2019-05-16 | Stop reason: HOSPADM

## 2019-05-16 RX ORDER — LIDOCAINE HYDROCHLORIDE 10 MG/ML
0.1 INJECTION, SOLUTION EPIDURAL; INFILTRATION; INTRACAUDAL; PERINEURAL AS NEEDED
Status: DISCONTINUED | OUTPATIENT
Start: 2019-05-16 | End: 2019-05-16 | Stop reason: HOSPADM

## 2019-05-16 RX ORDER — DIPHENHYDRAMINE HYDROCHLORIDE 50 MG/ML
12.5 INJECTION, SOLUTION INTRAMUSCULAR; INTRAVENOUS AS NEEDED
Status: DISCONTINUED | OUTPATIENT
Start: 2019-05-16 | End: 2019-05-16 | Stop reason: HOSPADM

## 2019-05-16 RX ORDER — TRAMADOL HYDROCHLORIDE 50 MG/1
50-100 TABLET ORAL
Qty: 45 TAB | Refills: 0 | Status: SHIPPED | OUTPATIENT
Start: 2019-05-16 | End: 2019-06-04

## 2019-05-16 RX ORDER — TRAMADOL HYDROCHLORIDE 50 MG/1
50-100 TABLET ORAL
Qty: 45 TAB | Refills: 0 | OUTPATIENT
Start: 2019-05-16 | End: 2019-05-16 | Stop reason: SDUPTHER

## 2019-05-16 RX ORDER — ONDANSETRON 2 MG/ML
4 INJECTION INTRAMUSCULAR; INTRAVENOUS AS NEEDED
Status: DISCONTINUED | OUTPATIENT
Start: 2019-05-16 | End: 2019-05-16 | Stop reason: HOSPADM

## 2019-05-16 RX ORDER — DEXAMETHASONE SODIUM PHOSPHATE 4 MG/ML
INJECTION, SOLUTION INTRA-ARTICULAR; INTRALESIONAL; INTRAMUSCULAR; INTRAVENOUS; SOFT TISSUE AS NEEDED
Status: DISCONTINUED | OUTPATIENT
Start: 2019-05-16 | End: 2019-05-16 | Stop reason: HOSPADM

## 2019-05-16 RX ORDER — SODIUM CHLORIDE, SODIUM LACTATE, POTASSIUM CHLORIDE, CALCIUM CHLORIDE 600; 310; 30; 20 MG/100ML; MG/100ML; MG/100ML; MG/100ML
150 INJECTION, SOLUTION INTRAVENOUS CONTINUOUS
Status: DISCONTINUED | OUTPATIENT
Start: 2019-05-16 | End: 2019-05-16 | Stop reason: HOSPADM

## 2019-05-16 RX ORDER — FENTANYL CITRATE 50 UG/ML
INJECTION, SOLUTION INTRAMUSCULAR; INTRAVENOUS AS NEEDED
Status: DISCONTINUED | OUTPATIENT
Start: 2019-05-16 | End: 2019-05-16 | Stop reason: HOSPADM

## 2019-05-16 RX ORDER — HYDROMORPHONE HYDROCHLORIDE 1 MG/ML
0.5 INJECTION, SOLUTION INTRAMUSCULAR; INTRAVENOUS; SUBCUTANEOUS
Status: DISCONTINUED | OUTPATIENT
Start: 2019-05-16 | End: 2019-05-16 | Stop reason: HOSPADM

## 2019-05-16 RX ORDER — PROPOFOL 10 MG/ML
INJECTION, EMULSION INTRAVENOUS AS NEEDED
Status: DISCONTINUED | OUTPATIENT
Start: 2019-05-16 | End: 2019-05-16 | Stop reason: HOSPADM

## 2019-05-16 RX ORDER — LIDOCAINE HYDROCHLORIDE 20 MG/ML
INJECTION, SOLUTION EPIDURAL; INFILTRATION; INTRACAUDAL; PERINEURAL AS NEEDED
Status: DISCONTINUED | OUTPATIENT
Start: 2019-05-16 | End: 2019-05-16 | Stop reason: HOSPADM

## 2019-05-16 RX ORDER — EPHEDRINE SULFATE/0.9% NACL/PF 50 MG/5 ML
SYRINGE (ML) INTRAVENOUS AS NEEDED
Status: DISCONTINUED | OUTPATIENT
Start: 2019-05-16 | End: 2019-05-16 | Stop reason: HOSPADM

## 2019-05-16 RX ORDER — ROCURONIUM BROMIDE 10 MG/ML
INJECTION, SOLUTION INTRAVENOUS AS NEEDED
Status: DISCONTINUED | OUTPATIENT
Start: 2019-05-16 | End: 2019-05-16 | Stop reason: HOSPADM

## 2019-05-16 RX ADMIN — MIDAZOLAM HYDROCHLORIDE 2 MG: 1 INJECTION, SOLUTION INTRAMUSCULAR; INTRAVENOUS at 09:35

## 2019-05-16 RX ADMIN — HYDROMORPHONE HYDROCHLORIDE 0.5 MG: 1 INJECTION, SOLUTION INTRAMUSCULAR; INTRAVENOUS; SUBCUTANEOUS at 11:48

## 2019-05-16 RX ADMIN — FAMOTIDINE 20 MG: 10 INJECTION, SOLUTION INTRAVENOUS at 09:17

## 2019-05-16 RX ADMIN — SODIUM CHLORIDE, POTASSIUM CHLORIDE, SODIUM LACTATE AND CALCIUM CHLORIDE: 600; 310; 30; 20 INJECTION, SOLUTION INTRAVENOUS at 11:28

## 2019-05-16 RX ADMIN — PROPOFOL 20 MG: 10 INJECTION, EMULSION INTRAVENOUS at 10:49

## 2019-05-16 RX ADMIN — ONDANSETRON 4 MG: 2 INJECTION INTRAMUSCULAR; INTRAVENOUS at 11:12

## 2019-05-16 RX ADMIN — PROPOFOL 150 MG: 10 INJECTION, EMULSION INTRAVENOUS at 09:43

## 2019-05-16 RX ADMIN — PROPOFOL 20 MG: 10 INJECTION, EMULSION INTRAVENOUS at 09:45

## 2019-05-16 RX ADMIN — FENTANYL CITRATE 100 MCG: 50 INJECTION, SOLUTION INTRAMUSCULAR; INTRAVENOUS at 10:11

## 2019-05-16 RX ADMIN — SUCCINYLCHOLINE CHLORIDE 100 MG: 20 INJECTION INTRAMUSCULAR; INTRAVENOUS at 09:43

## 2019-05-16 RX ADMIN — VANCOMYCIN HYDROCHLORIDE 1000 MG: 1 INJECTION, POWDER, LYOPHILIZED, FOR SOLUTION INTRAVENOUS at 09:13

## 2019-05-16 RX ADMIN — LIDOCAINE HYDROCHLORIDE 80 MG: 20 INJECTION, SOLUTION EPIDURAL; INFILTRATION; INTRACAUDAL; PERINEURAL at 09:41

## 2019-05-16 RX ADMIN — DEXAMETHASONE SODIUM PHOSPHATE 4 MG: 4 INJECTION, SOLUTION INTRA-ARTICULAR; INTRALESIONAL; INTRAMUSCULAR; INTRAVENOUS; SOFT TISSUE at 09:35

## 2019-05-16 RX ADMIN — Medication 10 MG: at 10:31

## 2019-05-16 RX ADMIN — ROCURONIUM BROMIDE 25 MG: 10 INJECTION, SOLUTION INTRAVENOUS at 09:53

## 2019-05-16 RX ADMIN — Medication 10 MG: at 10:00

## 2019-05-16 RX ADMIN — SODIUM CHLORIDE, POTASSIUM CHLORIDE, SODIUM LACTATE AND CALCIUM CHLORIDE 50 ML/HR: 600; 310; 30; 20 INJECTION, SOLUTION INTRAVENOUS at 09:02

## 2019-05-16 RX ADMIN — Medication 10 MG: at 10:22

## 2019-05-16 RX ADMIN — FENTANYL CITRATE 150 MCG: 50 INJECTION, SOLUTION INTRAMUSCULAR; INTRAVENOUS at 09:42

## 2019-05-16 RX ADMIN — TRAMADOL HYDROCHLORIDE 50 MG: 50 TABLET, FILM COATED ORAL at 12:36

## 2019-05-16 NOTE — INTERVAL H&P NOTE
H&P Update: 
Seamus Han was seen and examined. History and physical has been reviewed. The patient has been examined. There have been no significant clinical changes since the completion of the originally dated History and Physical. 
 
Mild indigestion

## 2019-05-16 NOTE — BRIEF OP NOTE
BRIEF OPERATIVE NOTE Date of Procedure: 5/16/2019 Preoperative Diagnosis: LEFT L1-L2/L2-3 LATERAL RECESS STENOSIS, CLAUDICATION, RADICULOPATHY Postoperative Diagnosis: LEFT L1-L2/L2-3 LATERAL RECESS STENOSIS, CLAUDICATION, RADICULOPATHY Procedure(s): MINIMALLY INVASIVE LEFT L1-2/2-3 HEMILAMINECTOMY AND FORAMINOTOMY, MICRODISCECTOMY Surgeon(s) and Role: David Gilbert MD - Primary Surgical Assistant: none Surgical Staff: 
Circ-1: Darren Zapata RN 
Circ-Intern: Daneil Osler, RN Scrub Tech-1: Mary Jane Alcides Float Staff: [facility] Event Time In Time Out Incision Start [phone] Incision Close 1116 Anesthesia: General  
Estimated Blood Loss: 25 
Specimens: L1-2 disc Findings: severe LRS, disc @ L1-2 Complications: none Implants: * No implants in log *

## 2019-05-16 NOTE — ANESTHESIA POSTPROCEDURE EVALUATION
Procedure(s): MINIMALLY INVASIVE LEFT L1-2/2-3 HEMILAMINECTOMY AND FORAMINOTOMY, MICRODISCECTOMY. general 
 
Anesthesia Post Evaluation Multimodal analgesia: multimodal analgesia used between 6 hours prior to anesthesia start to PACU discharge Patient location during evaluation: PACU Patient participation: complete - patient participated Level of consciousness: awake and alert Airway patency: patent Anesthetic complications: no 
Cardiovascular status: acceptable Respiratory status: acceptable Hydration status: acceptable Vitals Value Taken Time /53 5/16/2019 12:20 PM  
Temp 36.4 °C (97.5 °F) 5/16/2019 12:09 PM  
Pulse 75 5/16/2019 12:23 PM  
Resp 8 5/16/2019 12:23 PM  
SpO2 100 % 5/16/2019 12:23 PM  
Vitals shown include unvalidated device data.

## 2019-05-16 NOTE — ANESTHESIA PREPROCEDURE EVALUATION
Relevant Problems No relevant active problems Anesthetic History No history of anesthetic complications Review of Systems / Medical History Patient summary reviewed, nursing notes reviewed and pertinent labs reviewed Pulmonary Within defined limits Neuro/Psych  
 
 
CVA (80 percent vision loss right eye ) Pertinent negatives: TIA: vision right eye. Cardiovascular Dysrhythmias (pvcs by caffeine) Hyperlipidemia Comments: Not on Beta Blocker GI/Hepatic/Renal 
  
GERD: poorly controlled Endo/Other Hypothyroidism Cancer (breast) Other Findings Comments: Fibromyalgia 
osteopenia Physical Exam 
 
Airway Mallampati: II 
TM Distance: 4 - 6 cm Neck ROM: normal range of motion Mouth opening: Normal 
 
 Cardiovascular Rhythm: regular Rate: normal 
 
 
 
 Dental 
 
Dentition: Full lower dentures, Full upper dentures and Implants Pulmonary Breath sounds clear to auscultation Abdominal 
GI exam deferred Other Findings Anesthetic Plan ASA: 3 Anesthesia type: general 
 
 
 
 
Induction: Intravenous Anesthetic plan and risks discussed with: Patient

## 2019-05-16 NOTE — DISCHARGE INSTRUCTIONS
DISCHARGE SUMMARY from your Nurse    The following personal items collected during your admission are returned to you:   Dental Appliance:    Vision:    Hearing Aid:    Jewelry:    Clothing:    Other Valuables:    Valuables sent to safe:      PATIENT INSTRUCTIONS:    After general anesthesia or intravenous sedation, for 24 hours or while taking prescription Narcotics:  · Limit your activities  · Do not drive and operate hazardous machinery  · Do not make important personal or business decisions  · Do  not drink alcoholic beverages  · If you have not urinated within 8 hours after discharge, please contact your surgeon on call. Report the following to your surgeon:  · Excessive pain, swelling, redness or odor of or around the surgical area  · Temperature over 100.5  · Nausea and vomiting lasting longer than 4 hours or if unable to take medications  · Any signs of decreased circulation or nerve impairment to extremity: change in color, persistent  numbness, tingling, coldness or increase pain  · Any questions    COUGH AND DEEP BREATHE    Breathing deep and coughing are very important exercises to do after surgery. Deep breathing and coughing open the little air tubes and air sacks in your lungs. You take deep breaths every day. You may not even notice - it is just something you do when you sigh or yawn. It is a natural exercise you do to keep these air passages open. After surgery, take deep breaths and cough, on purpose. Coughing and deep breathing help prevent bronchitis and pneumonia after surgery. If you had chest or belly surgery, use a pillow as a \"hug buddy\" and hold it tightly to your chest or belly when you cough. DIRECTIONS:  6. Take 10 to 15 slow deep breaths every hour while awake. 7. Breathe in deeply, and hold it for 2 seconds. 8. Exhale slowly through puckered lips, like blowing up a balloon.   9. After every 4th or 5th deep breath, hug your pillow to your chest or belly and give a hard, deep cough. Yes, it will probably hurt. But doing this exercise is very important part of healing after surgery. Take your pain medicine to help you do this exercise without too much pain. IF YOU HAVE BEEN DIAGNOSED WITH SLEEP APNEA, PLEASE USE YOUR SLEEP APNEA DEVICE OR CPAP MACHINE WHEN YOU INTEND TO NAP AFTER TAKING PAIN MEDICATION. Ankle Pumps    Ankle pumps increase the circulation of oxygenated blood to your lower extremities and decrease your risk for circulation problems such as blood clots. They also stretch the muscles, tendons and ligaments in your foot and ankle, and prevent joint contracture in the ankle and foot, especially after surgeries on the legs. It is important to do ankle pump exercises regularly after surgery because immobility increases your risk for developing a blood clot. Your doctor may also have you take an Aspirin for the next few days as well. If your doctor did not ask you to take an Aspirin, consult with him before starting Aspirin therapy on your own. Slowly point your foot forward, feeling the muscles on the top of your lower leg stretch, and hold this position for 5 seconds. Next, pull your foot back toward you as far as possible, stretching the calf muscles, and hold that position for 5 seconds. Repeat with the other foot. Perform 10 repetitions every hour while awake for both ankles if possible (down and then up with the foot once is one repetition). You should feel gentle stretching of the muscles in your lower leg when doing this exercise. If you feel pain, or your range of motion is limited, don't  Push too hard. Only go the limit your joint and muscles will let you go. If you have increasing pain, progressively worsening leg warmth or swelling, STOP the exercise and call your doctor.      Below is information about the medications your doctor is prescribing after your visit:    Other information in your discharge envelope:  []     PRESCRIPTIONS  []     PHYSICAL THERAPY PRESCRIPTION  []     APPOINTMENT CARDS  []     Regional Anesthesia Pamphlet for block or block with On-Q Catheter from Anesthesia Service  []     Medical device information sheets/pamphlets from their    []     School/work excuse note. []     /parent work excuse note. These are general instructions for a healthy lifestyle:    *  Please give a list of your current medications to your Primary Care Provider. *  Please update this list whenever your medications are discontinued, doses are      changed, or new medications (including over-the-counter products) are added. *  Please carry medication information at all times in case of emergency situations. About Smoking  No smoking / No tobacco products / Avoid exposure to second hand smoke    Surgeon General's Warning:  Quitting smoking now greatly reduces serious risk to your health. Obesity, smoking, and sedentary lifestyle greatly increases your risk for illness and disease. A healthy diet, regular physical exercise & weight monitoring are important for maintaining a healthy lifestyle. Congestive Heart Failure  You may be retaining fluid if you have a history of heart failure or if you experience any of the following symptoms:  Weight gain of 3 pounds or more overnight or 5 pounds in a week, increased swelling in our hands or feet or shortness of breath while lying flat in bed. Please call your doctor as soon as you notice any of these symptoms; do not wait until your next office visit. Recognize signs and symptoms of STROKE:  F - face looks uneven  A - arms unable to move or move even  S - speech slurred or non-existent  T - time-call 911 as soon as signs and symptoms begin-DO NOT go         Back to bed or wait to see if you get better-TIME IS BRAIN. Warning signs of HEART ATTACK  Call 911 if you have these symptoms    · Chest discomfort.   Most heart attacks involve discomfort in the center of the chest that lasts more than a few minutes, or that goes away and comes back. It can feel like uncomfortable pressure, squeezing, fullness, or pain. · Discomfort in other areas of the upper body. Symptoms can include pain or discomfort in one or both        Arms, the back, neck, jaw, or stomach. ·  Shortness of breath with or without chest discomfort. · Other signs may include breaking out in a cold sweat, nausea, or lightheadedness    Don't wait more than five minutes to call 911 - MINUTES MATTER! Fast action can save your life. Calling 911 is almost always the fastest way to get lifesaving treatment. Emergency Medical Services staff can begin treatment when they arrive - up to an hour sooner than if someone gets to the hospital by car. [facility] MEDICATION AND SIDE EFFECT GUIDE    The [employer] MEDICATION AND SIDE EFFECT GUIDE was provided to the PATIENT AND CARE PROVIDER.   Information provided includes instruction about drug purpose and common side effects for the following medications:    · Tramadol

## 2019-05-17 NOTE — OP NOTES
[facility] 77  OPERATIVE REPORT    Name:  Des Mancilla  MR#:  [id number]  :  1945  ACCOUNT #:  [de-identified]  DATE OF SERVICE:  2019      PREOPERATIVE DIAGNOSES:  L1-L2 and L2-L3 lateral recess stenosis, claudication radiculopathy, questionable L1-L2 herniated disk. POSTOPERATIVE DIAGNOSES:  L1-L2 and L2-L3 lateral recess stenosis and L1-L2 herniated disk with claudication radiculopathy. PROCEDURES PERFORMED:  1. Minimally invasive left L1-L2 and L2-L3 hemilaminectomy and foraminotomy with the use of the operative microscope, left L1-L2 microdiskectomy. 2.  Application of epidural narcotic analgesic. OPERATING SURGEON:  Marti Ugarte MD    ASSISTANT:  none    ANESTHESIA:  General.    COMPLICATIONS:  None. SPECIMENS REMOVED:  L1-L2 disk. IMPLANTS:  none    ESTIMATED BLOOD LOSS:  25 mL. FINDINGS:  Severe stenosis and left L1-L2 chronic-appearing disk herniation. INDICATIONS:  The patient is a 57-year-old female with significant issues with left upper thigh and groin pain, that had been going on for greater than three months without improvement. The conservative treatments she started physical therapy, medications, and steroids, all without significant relief. Imaging study shows severe lateral recess stenosis at the left L1-L2 level. This appears to be in combination with an acute on chronic disk herniation within the area. There is more in the way of degenerative lateral recess narrowing at the left L2-L3 level. We have talked with her about decompressive treatment to the area in a minimally invasive fashion to minimize the risk for instability and avoid effusion. She consented after she understood the risks, benefits, and alternatives.   The risks were included but were not limited to bleeding, infection, nerve or spinal injury, need for further surgery including CSF leak, apparent instability requiring fusion, recurrent stenosis, disk herniation or symptoms and lack of or incomplete benefit. Prior to procedure, the patient received prophylactic antibiotics and bilateral lower extremity SCDs placed. Antibiotics will be discontinued within 24 hours, and the SCDs to be continued while she is nonambulatory. DESCRIPTION OF PROCEDURE:  The patient was positively identified in the preoperative holding area and brought to the operating theater. After successful induction of anesthesia, she was placed prone on the operating room table, and all pressure points were adequately padded. Lumbar area was prepped and draped in the usual sterile fashion. Fluoroscopy was brought in to identify the levels, and midline was marked out. We were able to visualize the sacrum encountered up on the lateral view and marked out the L2-L3 interspace. Incision was made just above this off to the left side and measured approximately 2 cm. This was infiltrated with local anesthetic with epinephrine prior to opening, and after opening with a scalpel and carried down to the soft tissue with the electrocautery, the fascia was opened. The dilator was passed down and docked on the lower hemilamina of L2 and dilated up to 18 mm size. An 18 mm x 6 cm port was placed and locked into position. Final fluoroscopic shots confirmed we were on L2-L3, and we were on the left side. Operative microscope was brought in. Soft tissues overlying the lamina were cleared. Laminectomy was generated with the drill on the left side. The ligament was opened with a Kerrison rongeur and a nerve hook. Lateral recess was slightly decompressed and both the traversing and exiting nerve roots were cleared. After this was completed, we moved the port up to the L1-L2 level. The left lamina was drilled and ligament was opened with a nerve hook and Kerrison rongeur.   Then working in the lateral recess, it was clear there was more narrowing in this area, which was carefully cleared, and we were able to get down to the L2 pedicle. After decompressing that area, we worked upward into the foramen. The foramen was quite tight and using multiple instruments including the ball probe and reverse angle curette, we were able to get the foramen reasonably well decompressed. There were some chronic disk fragments underneath the nerve that were carefully decompressed. We incised the annulus and worked underneath and removed multiple degenerative-appearing fragments of disk underneath the nerve. After the diskectomy was completed, the nerve was much more mobile and under less pressure. The ball probe could be passed up the foramen of L1, L2 and L3 on the left side without obvious residual pressure. Hemostasis was obtained. The wounds were irrigated with antibiotic irrigation. A 40 mg Depo-Medrol and 2 mL each of plain Marcaine and Duramorph were placed in the epidural space and divided between two levels. Port was slowly withdrawn, and the bleeders were bipolared on the way out. Fascia was closed with 2-0 Vicryl, subcutaneous tissue with 2-0 Vicryl, and the skin was closed with Dermabond and covered with sterile dressing. The patient was then flipped back on the gurney and extubated and taken to recovery room in stable condition. At the end of procedure, all counts were correct.       Yessy Gutierrez MD PhD      [id number]  D:  05/16/2019 12:04  T:  05/16/2019 21:20  JOB #:  [id number]

## 2019-05-22 ENCOUNTER — APPOINTMENT (OUTPATIENT)
Dept: CT IMAGING | Age: 74
DRG: 640 | End: 2019-05-22
Attending: EMERGENCY MEDICINE
Payer: MEDICARE

## 2019-05-22 ENCOUNTER — HOSPITAL ENCOUNTER (EMERGENCY)
Age: 74
Discharge: HOME | DRG: 640 | End: 2019-05-23
Attending: EMERGENCY MEDICINE
Payer: MEDICARE

## 2019-05-22 DIAGNOSIS — M54.6 ACUTE RIGHT-SIDED THORACIC BACK PAIN: Primary | ICD-10-CM

## 2019-05-22 LAB
ALBUMIN SERPL-MCNC: 3.6 G/DL (ref 3.5–5)
ALBUMIN/GLOB SERPL: 0.8 {RATIO} (ref 1.1–2.2)
ALP SERPL-CCNC: 112 U/L (ref 45–117)
ALT SERPL-CCNC: 54 U/L (ref 12–78)
AMORPH CRY URNS QL MICRO: ABNORMAL
ANION GAP SERPL CALC-SCNC: 8 MMOL/L (ref 5–15)
APPEARANCE UR: CLEAR
AST SERPL-CCNC: 34 U/L (ref 15–37)
BACTERIA URNS QL MICRO: NEGATIVE /HPF
BASOPHILS # BLD: 0 K/UL (ref 0–0.1)
BASOPHILS NFR BLD: 0 % (ref 0–1)
BILIRUB SERPL-MCNC: 0.3 MG/DL (ref 0.2–1)
BILIRUB UR QL: NEGATIVE
BUN SERPL-MCNC: 13 MG/DL (ref 6–20)
BUN/CREAT SERPL: 20 (ref 12–20)
CALCIUM SERPL-MCNC: 9 MG/DL (ref 8.5–10.1)
CHLORIDE SERPL-SCNC: 91 MMOL/L (ref 97–108)
CO2 SERPL-SCNC: 28 MMOL/L (ref 21–32)
COLOR UR: ABNORMAL
CREAT SERPL-MCNC: 0.64 MG/DL (ref 0.55–1.02)
DIFFERENTIAL METHOD BLD: ABNORMAL
EOSINOPHIL # BLD: 0 K/UL (ref 0–0.4)
EOSINOPHIL NFR BLD: 0 % (ref 0–7)
EPITH CASTS URNS QL MICRO: ABNORMAL /LPF
ERYTHROCYTE [DISTWIDTH] IN BLOOD BY AUTOMATED COUNT: 13.5 % (ref 11.5–14.5)
GLOBULIN SER CALC-MCNC: 4.3 G/DL (ref 2–4)
GLUCOSE SERPL-MCNC: 146 MG/DL (ref 65–100)
GLUCOSE UR STRIP.AUTO-MCNC: 100 MG/DL
HCT VFR BLD AUTO: 40.4 % (ref 35–47)
HGB BLD-MCNC: 13.5 G/DL (ref 11.5–16)
HGB UR QL STRIP: NEGATIVE
IMM GRANULOCYTES # BLD AUTO: 0 K/UL (ref 0–0.04)
IMM GRANULOCYTES NFR BLD AUTO: 1 % (ref 0–0.5)
KETONES UR QL STRIP.AUTO: 40 MG/DL
LEUKOCYTE ESTERASE UR QL STRIP.AUTO: NEGATIVE
LYMPHOCYTES # BLD: 1.1 K/UL (ref 0.8–3.5)
LYMPHOCYTES NFR BLD: 13 % (ref 12–49)
MCH RBC QN AUTO: 31.8 PG (ref 26–34)
MCHC RBC AUTO-ENTMCNC: 33.4 G/DL (ref 30–36.5)
MCV RBC AUTO: 95.1 FL (ref 80–99)
MONOCYTES # BLD: 0.6 K/UL (ref 0–1)
MONOCYTES NFR BLD: 7 % (ref 5–13)
NEUTS SEG # BLD: 6.2 K/UL (ref 1.8–8)
NEUTS SEG NFR BLD: 79 % (ref 32–75)
NITRITE UR QL STRIP.AUTO: NEGATIVE
NRBC # BLD: 0 K/UL (ref 0–0.01)
NRBC BLD-RTO: 0 PER 100 WBC
PH UR STRIP: 7.5 [PH] (ref 5–8)
PLATELET # BLD AUTO: 315 K/UL (ref 150–400)
PMV BLD AUTO: 8.4 FL (ref 8.9–12.9)
POTASSIUM SERPL-SCNC: 4 MMOL/L (ref 3.5–5.1)
PROT SERPL-MCNC: 7.9 G/DL (ref 6.4–8.2)
PROT UR STRIP-MCNC: 100 MG/DL
RBC # BLD AUTO: 4.25 M/UL (ref 3.8–5.2)
RBC #/AREA URNS HPF: ABNORMAL /HPF (ref 0–5)
SODIUM SERPL-SCNC: 127 MMOL/L (ref 136–145)
SP GR UR REFRACTOMETRY: 1.01 (ref 1–1.03)
UROBILINOGEN UR QL STRIP.AUTO: 0.2 EU/DL (ref 0.2–1)
WBC # BLD AUTO: 7.9 K/UL (ref 3.6–11)
WBC URNS QL MICRO: ABNORMAL /HPF (ref 0–4)

## 2019-05-22 PROCEDURE — 80053 COMPREHEN METABOLIC PANEL: CPT

## 2019-05-22 PROCEDURE — 96374 THER/PROPH/DIAG INJ IV PUSH: CPT

## 2019-05-22 PROCEDURE — 96375 TX/PRO/DX INJ NEW DRUG ADDON: CPT

## 2019-05-22 PROCEDURE — 74176 CT ABD & PELVIS W/O CONTRAST: CPT

## 2019-05-22 PROCEDURE — 36415 COLL VENOUS BLD VENIPUNCTURE: CPT

## 2019-05-22 PROCEDURE — 85025 COMPLETE CBC W/AUTO DIFF WBC: CPT

## 2019-05-22 PROCEDURE — 74011250636 HC RX REV CODE- 250/636: Performed by: EMERGENCY MEDICINE

## 2019-05-22 PROCEDURE — 81001 URINALYSIS AUTO W/SCOPE: CPT

## 2019-05-22 PROCEDURE — 99284 EMERGENCY DEPT VISIT MOD MDM: CPT

## 2019-05-22 PROCEDURE — 96376 TX/PRO/DX INJ SAME DRUG ADON: CPT

## 2019-05-22 RX ORDER — KETOROLAC TROMETHAMINE 30 MG/ML
15 INJECTION, SOLUTION INTRAMUSCULAR; INTRAVENOUS ONCE
Status: COMPLETED | OUTPATIENT
Start: 2019-05-22 | End: 2019-05-22

## 2019-05-22 RX ORDER — MORPHINE SULFATE 2 MG/ML
4 INJECTION, SOLUTION INTRAMUSCULAR; INTRAVENOUS ONCE
Status: COMPLETED | OUTPATIENT
Start: 2019-05-22 | End: 2019-05-22

## 2019-05-22 RX ORDER — ONDANSETRON 2 MG/ML
8 INJECTION INTRAMUSCULAR; INTRAVENOUS
Status: COMPLETED | OUTPATIENT
Start: 2019-05-22 | End: 2019-05-22

## 2019-05-22 RX ADMIN — MORPHINE SULFATE 4 MG: 2 INJECTION, SOLUTION INTRAMUSCULAR; INTRAVENOUS at 23:13

## 2019-05-22 RX ADMIN — KETOROLAC TROMETHAMINE 15 MG: 30 INJECTION, SOLUTION INTRAMUSCULAR at 23:11

## 2019-05-22 RX ADMIN — MORPHINE SULFATE 4 MG: 2 INJECTION, SOLUTION INTRAMUSCULAR; INTRAVENOUS at 21:52

## 2019-05-22 RX ADMIN — ONDANSETRON 8 MG: 2 INJECTION INTRAMUSCULAR; INTRAVENOUS at 21:53

## 2019-05-23 ENCOUNTER — APPOINTMENT (OUTPATIENT)
Dept: GENERAL RADIOLOGY | Age: 74
DRG: 640 | End: 2019-05-23
Attending: EMERGENCY MEDICINE
Payer: MEDICARE

## 2019-05-23 ENCOUNTER — HOSPITAL ENCOUNTER (INPATIENT)
Age: 74
LOS: 12 days | Discharge: NURSING, REHAB OR LONG-TERM CARE | DRG: 640 | End: 2019-06-04
Attending: EMERGENCY MEDICINE | Admitting: INTERNAL MEDICINE
Payer: MEDICARE

## 2019-05-23 VITALS
HEIGHT: 64 IN | RESPIRATION RATE: 18 BRPM | WEIGHT: 164 LBS | HEART RATE: 85 BPM | SYSTOLIC BLOOD PRESSURE: 148 MMHG | BODY MASS INDEX: 28 KG/M2 | OXYGEN SATURATION: 94 % | TEMPERATURE: 98.2 F | DIASTOLIC BLOOD PRESSURE: 71 MMHG

## 2019-05-23 DIAGNOSIS — E87.1 HYPONATREMIA: Primary | ICD-10-CM

## 2019-05-23 DIAGNOSIS — S82.92XD CLOSED FRACTURE OF LEFT LOWER EXTREMITY WITH ROUTINE HEALING: ICD-10-CM

## 2019-05-23 DIAGNOSIS — R40.0 SOMNOLENCE: ICD-10-CM

## 2019-05-23 DIAGNOSIS — M54.6 ACUTE RIGHT-SIDED THORACIC BACK PAIN: ICD-10-CM

## 2019-05-23 LAB
ALBUMIN SERPL-MCNC: 3 G/DL (ref 3.5–5)
ALBUMIN SERPL-MCNC: 3.3 G/DL (ref 3.5–5)
ALBUMIN/GLOB SERPL: 0.8 {RATIO} (ref 1.1–2.2)
ALBUMIN/GLOB SERPL: 0.8 {RATIO} (ref 1.1–2.2)
ALP SERPL-CCNC: 105 U/L (ref 45–117)
ALP SERPL-CCNC: 95 U/L (ref 45–117)
ALT SERPL-CCNC: 46 U/L (ref 12–78)
ALT SERPL-CCNC: 48 U/L (ref 12–78)
ANION GAP SERPL CALC-SCNC: 7 MMOL/L (ref 5–15)
ANION GAP SERPL CALC-SCNC: 8 MMOL/L (ref 5–15)
APPEARANCE UR: ABNORMAL
AST SERPL-CCNC: 33 U/L (ref 15–37)
AST SERPL-CCNC: 35 U/L (ref 15–37)
ATRIAL RATE: 241 BPM
BACTERIA URNS QL MICRO: ABNORMAL /HPF
BASOPHILS # BLD: 0 K/UL (ref 0–0.1)
BASOPHILS NFR BLD: 0 % (ref 0–1)
BILIRUB SERPL-MCNC: 0.4 MG/DL (ref 0.2–1)
BILIRUB SERPL-MCNC: 0.4 MG/DL (ref 0.2–1)
BILIRUB UR QL: NEGATIVE
BUN SERPL-MCNC: 11 MG/DL (ref 6–20)
BUN SERPL-MCNC: 9 MG/DL (ref 6–20)
BUN/CREAT SERPL: 18 (ref 12–20)
BUN/CREAT SERPL: 22 (ref 12–20)
CALCIUM SERPL-MCNC: 7.8 MG/DL (ref 8.5–10.1)
CALCIUM SERPL-MCNC: 8.4 MG/DL (ref 8.5–10.1)
CALCULATED R AXIS, ECG10: -40 DEGREES
CALCULATED T AXIS, ECG11: 35 DEGREES
CHLORIDE SERPL-SCNC: 79 MMOL/L (ref 97–108)
CHLORIDE SERPL-SCNC: 84 MMOL/L (ref 97–108)
CK SERPL-CCNC: 40 U/L (ref 26–192)
CO2 SERPL-SCNC: 25 MMOL/L (ref 21–32)
CO2 SERPL-SCNC: 27 MMOL/L (ref 21–32)
COLOR UR: ABNORMAL
COMMENT, HOLDF: NORMAL
CREAT SERPL-MCNC: 0.49 MG/DL (ref 0.55–1.02)
CREAT SERPL-MCNC: 0.49 MG/DL (ref 0.55–1.02)
DIAGNOSIS, 93000: NORMAL
DIFFERENTIAL METHOD BLD: ABNORMAL
EOSINOPHIL # BLD: 0.1 K/UL (ref 0–0.4)
EOSINOPHIL NFR BLD: 1 % (ref 0–7)
EPITH CASTS URNS QL MICRO: ABNORMAL /LPF
ERYTHROCYTE [DISTWIDTH] IN BLOOD BY AUTOMATED COUNT: 12.8 % (ref 11.5–14.5)
GLOBULIN SER CALC-MCNC: 3.8 G/DL (ref 2–4)
GLOBULIN SER CALC-MCNC: 4.1 G/DL (ref 2–4)
GLUCOSE BLD STRIP.AUTO-MCNC: 133 MG/DL (ref 65–100)
GLUCOSE BLD STRIP.AUTO-MCNC: 134 MG/DL (ref 65–100)
GLUCOSE SERPL-MCNC: 146 MG/DL (ref 65–100)
GLUCOSE SERPL-MCNC: 146 MG/DL (ref 65–100)
GLUCOSE UR STRIP.AUTO-MCNC: 250 MG/DL
HCT VFR BLD AUTO: 37.7 % (ref 35–47)
HGB BLD-MCNC: 13 G/DL (ref 11.5–16)
HGB UR QL STRIP: ABNORMAL
HYALINE CASTS URNS QL MICRO: ABNORMAL /LPF (ref 0–5)
IMM GRANULOCYTES # BLD AUTO: 0 K/UL (ref 0–0.04)
IMM GRANULOCYTES NFR BLD AUTO: 0 % (ref 0–0.5)
KETONES UR QL STRIP.AUTO: 15 MG/DL
LEUKOCYTE ESTERASE UR QL STRIP.AUTO: NEGATIVE
LYMPHOCYTES # BLD: 1.3 K/UL (ref 0.8–3.5)
LYMPHOCYTES NFR BLD: 13 % (ref 12–49)
MCH RBC QN AUTO: 31.6 PG (ref 26–34)
MCHC RBC AUTO-ENTMCNC: 34.5 G/DL (ref 30–36.5)
MCV RBC AUTO: 91.5 FL (ref 80–99)
MONOCYTES # BLD: 1.1 K/UL (ref 0–1)
MONOCYTES NFR BLD: 11 % (ref 5–13)
NEUTS SEG # BLD: 7.3 K/UL (ref 1.8–8)
NEUTS SEG NFR BLD: 75 % (ref 32–75)
NITRITE UR QL STRIP.AUTO: NEGATIVE
NRBC # BLD: 0 K/UL (ref 0–0.01)
NRBC BLD-RTO: 0 PER 100 WBC
OSMOLALITY UR: 338 MOSM/KG H2O
PH UR STRIP: 7.5 [PH] (ref 5–8)
PLATELET # BLD AUTO: 288 K/UL (ref 150–400)
PMV BLD AUTO: 8.5 FL (ref 8.9–12.9)
POTASSIUM SERPL-SCNC: 3.5 MMOL/L (ref 3.5–5.1)
POTASSIUM SERPL-SCNC: 3.8 MMOL/L (ref 3.5–5.1)
PROT SERPL-MCNC: 6.8 G/DL (ref 6.4–8.2)
PROT SERPL-MCNC: 7.4 G/DL (ref 6.4–8.2)
PROT UR STRIP-MCNC: 30 MG/DL
Q-T INTERVAL, ECG07: 386 MS
QRS DURATION, ECG06: 80 MS
QTC CALCULATION (BEZET), ECG08: 428 MS
RBC # BLD AUTO: 4.12 M/UL (ref 3.8–5.2)
RBC #/AREA URNS HPF: ABNORMAL /HPF (ref 0–5)
SAMPLES BEING HELD,HOLD: NORMAL
SERVICE CMNT-IMP: ABNORMAL
SERVICE CMNT-IMP: ABNORMAL
SODIUM SERPL-SCNC: 114 MMOL/L (ref 136–145)
SODIUM SERPL-SCNC: 116 MMOL/L (ref 136–145)
SODIUM SERPL-SCNC: 117 MMOL/L (ref 136–145)
SODIUM UR-SCNC: 91 MMOL/L
SP GR UR REFRACTOMETRY: 1.01 (ref 1–1.03)
TROPONIN I SERPL-MCNC: <0.05 NG/ML
UA: UC IF INDICATED,UAUC: ABNORMAL
UROBILINOGEN UR QL STRIP.AUTO: 0.2 EU/DL (ref 0.2–1)
VENTRICULAR RATE, ECG03: 74 BPM
WBC # BLD AUTO: 9.8 K/UL (ref 3.6–11)
WBC URNS QL MICRO: ABNORMAL /HPF (ref 0–4)

## 2019-05-23 PROCEDURE — 74011250637 HC RX REV CODE- 250/637: Performed by: INTERNAL MEDICINE

## 2019-05-23 PROCEDURE — 84300 ASSAY OF URINE SODIUM: CPT

## 2019-05-23 PROCEDURE — 80053 COMPREHEN METABOLIC PANEL: CPT

## 2019-05-23 PROCEDURE — 71045 X-RAY EXAM CHEST 1 VIEW: CPT

## 2019-05-23 PROCEDURE — 85025 COMPLETE CBC W/AUTO DIFF WBC: CPT

## 2019-05-23 PROCEDURE — 36415 COLL VENOUS BLD VENIPUNCTURE: CPT

## 2019-05-23 PROCEDURE — 74011250636 HC RX REV CODE- 250/636: Performed by: EMERGENCY MEDICINE

## 2019-05-23 PROCEDURE — 65610000006 HC RM INTENSIVE CARE

## 2019-05-23 PROCEDURE — 84484 ASSAY OF TROPONIN QUANT: CPT

## 2019-05-23 PROCEDURE — 93005 ELECTROCARDIOGRAM TRACING: CPT

## 2019-05-23 PROCEDURE — 77030011943

## 2019-05-23 PROCEDURE — 82550 ASSAY OF CK (CPK): CPT

## 2019-05-23 PROCEDURE — 74011250636 HC RX REV CODE- 250/636: Performed by: INTERNAL MEDICINE

## 2019-05-23 PROCEDURE — 94762 N-INVAS EAR/PLS OXIMTRY CONT: CPT

## 2019-05-23 PROCEDURE — 96361 HYDRATE IV INFUSION ADD-ON: CPT

## 2019-05-23 PROCEDURE — 81001 URINALYSIS AUTO W/SCOPE: CPT

## 2019-05-23 PROCEDURE — 82962 GLUCOSE BLOOD TEST: CPT

## 2019-05-23 PROCEDURE — 83935 ASSAY OF URINE OSMOLALITY: CPT

## 2019-05-23 PROCEDURE — 87077 CULTURE AEROBIC IDENTIFY: CPT

## 2019-05-23 PROCEDURE — 87186 SC STD MICRODIL/AGAR DIL: CPT

## 2019-05-23 PROCEDURE — 84295 ASSAY OF SERUM SODIUM: CPT

## 2019-05-23 PROCEDURE — 77030038269 HC DRN EXT URIN PURWCK BARD -A

## 2019-05-23 PROCEDURE — 96374 THER/PROPH/DIAG INJ IV PUSH: CPT

## 2019-05-23 PROCEDURE — 99284 EMERGENCY DEPT VISIT MOD MDM: CPT

## 2019-05-23 PROCEDURE — 87086 URINE CULTURE/COLONY COUNT: CPT

## 2019-05-23 RX ORDER — GUAIFENESIN 100 MG/5ML
81 LIQUID (ML) ORAL DAILY
Status: DISCONTINUED | OUTPATIENT
Start: 2019-05-24 | End: 2019-06-04 | Stop reason: HOSPADM

## 2019-05-23 RX ORDER — ONDANSETRON 2 MG/ML
4 INJECTION INTRAMUSCULAR; INTRAVENOUS
Status: COMPLETED | OUTPATIENT
Start: 2019-05-23 | End: 2019-05-23

## 2019-05-23 RX ORDER — ACETAMINOPHEN 325 MG/1
650 TABLET ORAL
Status: DISCONTINUED | OUTPATIENT
Start: 2019-05-23 | End: 2019-06-04 | Stop reason: HOSPADM

## 2019-05-23 RX ORDER — 3% SODIUM CHLORIDE 3 G/100ML
400 INJECTION, SOLUTION INTRAVENOUS CONTINUOUS
Status: DISCONTINUED | OUTPATIENT
Start: 2019-05-23 | End: 2019-05-23

## 2019-05-23 RX ORDER — LANOLIN ALCOHOL/MO/W.PET/CERES
250 CREAM (GRAM) TOPICAL DAILY
Status: DISCONTINUED | OUTPATIENT
Start: 2019-05-24 | End: 2019-06-04 | Stop reason: HOSPADM

## 2019-05-23 RX ORDER — NALOXONE HYDROCHLORIDE 0.4 MG/ML
0.4 INJECTION, SOLUTION INTRAMUSCULAR; INTRAVENOUS; SUBCUTANEOUS AS NEEDED
Status: DISCONTINUED | OUTPATIENT
Start: 2019-05-23 | End: 2019-06-04 | Stop reason: HOSPADM

## 2019-05-23 RX ORDER — ENOXAPARIN SODIUM 100 MG/ML
40 INJECTION SUBCUTANEOUS EVERY 24 HOURS
Status: DISCONTINUED | OUTPATIENT
Start: 2019-05-24 | End: 2019-06-04 | Stop reason: HOSPADM

## 2019-05-23 RX ORDER — POTASSIUM CHLORIDE 7.45 MG/ML
10 INJECTION INTRAVENOUS
Status: DISCONTINUED | OUTPATIENT
Start: 2019-05-23 | End: 2019-05-23

## 2019-05-23 RX ORDER — FACIAL-BODY WIPES
10 EACH TOPICAL DAILY PRN
Status: DISCONTINUED | OUTPATIENT
Start: 2019-05-23 | End: 2019-06-04 | Stop reason: HOSPADM

## 2019-05-23 RX ORDER — ONDANSETRON 2 MG/ML
4 INJECTION INTRAMUSCULAR; INTRAVENOUS
Status: DISCONTINUED | OUTPATIENT
Start: 2019-05-23 | End: 2019-06-01

## 2019-05-23 RX ORDER — SODIUM CHLORIDE 9 MG/ML
25 INJECTION, SOLUTION INTRAVENOUS CONTINUOUS
Status: DISCONTINUED | OUTPATIENT
Start: 2019-05-23 | End: 2019-05-23

## 2019-05-23 RX ORDER — ATORVASTATIN CALCIUM 40 MG/1
40 TABLET, FILM COATED ORAL
Status: DISCONTINUED | OUTPATIENT
Start: 2019-05-23 | End: 2019-06-04 | Stop reason: HOSPADM

## 2019-05-23 RX ORDER — MUPIROCIN 20 MG/G
OINTMENT TOPICAL EVERY 12 HOURS
Status: DISPENSED | OUTPATIENT
Start: 2019-05-23 | End: 2019-05-28

## 2019-05-23 RX ORDER — FUROSEMIDE 10 MG/ML
40 INJECTION INTRAMUSCULAR; INTRAVENOUS ONCE
Status: COMPLETED | OUTPATIENT
Start: 2019-05-23 | End: 2019-05-23

## 2019-05-23 RX ORDER — LEVOTHYROXINE SODIUM 75 UG/1
75 TABLET ORAL
Status: DISCONTINUED | OUTPATIENT
Start: 2019-05-24 | End: 2019-06-04 | Stop reason: HOSPADM

## 2019-05-23 RX ORDER — SODIUM CHLORIDE 0.9 % (FLUSH) 0.9 %
5-40 SYRINGE (ML) INJECTION EVERY 8 HOURS
Status: DISCONTINUED | OUTPATIENT
Start: 2019-05-23 | End: 2019-06-04 | Stop reason: HOSPADM

## 2019-05-23 RX ORDER — POTASSIUM CHLORIDE AND SODIUM CHLORIDE 900; 300 MG/100ML; MG/100ML
INJECTION, SOLUTION INTRAVENOUS CONTINUOUS
Status: DISCONTINUED | OUTPATIENT
Start: 2019-05-23 | End: 2019-05-23

## 2019-05-23 RX ORDER — SODIUM CHLORIDE 0.9 % (FLUSH) 0.9 %
5-40 SYRINGE (ML) INJECTION AS NEEDED
Status: DISCONTINUED | OUTPATIENT
Start: 2019-05-23 | End: 2019-06-04 | Stop reason: HOSPADM

## 2019-05-23 RX ORDER — DIAZEPAM 5 MG/1
5 TABLET ORAL
Qty: 15 TAB | Refills: 0 | Status: ON HOLD | OUTPATIENT
Start: 2019-05-23 | End: 2019-06-04 | Stop reason: SDUPTHER

## 2019-05-23 RX ORDER — LEVOTHYROXINE SODIUM 50 UG/1
50 TABLET ORAL
Status: DISCONTINUED | OUTPATIENT
Start: 2019-05-25 | End: 2019-06-04 | Stop reason: HOSPADM

## 2019-05-23 RX ADMIN — SODIUM CHLORIDE 400 ML/HR: 3 INJECTION, SOLUTION INTRAVENOUS at 20:17

## 2019-05-23 RX ADMIN — Medication 10 ML: at 21:52

## 2019-05-23 RX ADMIN — SODIUM CHLORIDE 1000 ML: 900 INJECTION, SOLUTION INTRAVENOUS at 12:55

## 2019-05-23 RX ADMIN — ONDANSETRON 4 MG: 2 INJECTION INTRAMUSCULAR; INTRAVENOUS at 13:41

## 2019-05-23 RX ADMIN — FUROSEMIDE 40 MG: 10 INJECTION, SOLUTION INTRAMUSCULAR; INTRAVENOUS at 23:00

## 2019-05-23 RX ADMIN — Medication 20 ML: at 18:00

## 2019-05-23 RX ADMIN — POTASSIUM CHLORIDE AND SODIUM CHLORIDE: 900; 300 INJECTION, SOLUTION INTRAVENOUS at 19:15

## 2019-05-23 RX ADMIN — SODIUM CHLORIDE 400 ML/HR: 3 INJECTION, SOLUTION INTRAVENOUS at 18:52

## 2019-05-23 RX ADMIN — ATORVASTATIN CALCIUM 40 MG: 40 TABLET, FILM COATED ORAL at 21:52

## 2019-05-23 RX ADMIN — MUPIROCIN: 20 OINTMENT TOPICAL at 21:52

## 2019-05-23 NOTE — PROGRESS NOTES
1845: Received This patient as a Rapid Response. Pt. Opens eyes to voice. VSS. Pt. Bathed. Will give 3%Sodium Chloride. Spoke to Pharmacist, Lissa Jim. Can give this medication in a PIV. Also Can give this bolus dose over 10 mins. Primary Nurse Jhoan Jones and Kayleen Pruitt RN performed a dual skin assessment on this patient. Pt. Has an old incision to her lower back. Bruising to her arms and hands. 1900: Report given to Esteban Gonzalez RN.

## 2019-05-23 NOTE — CONSULTS
Pt seen. Consult dictated- [id number]    A:  Acute on chronic severe hyponatremia: etiology appears to be combination from back pain causing excessive ADH effects + presumed poor solute intake (reflected by low BUN) + recent NSAIDs use (got a dose of Toradol on 5/22)    Hypothyroidism- last TSH was elevated in Feb 2019  DM-2  AMS  Recent back surgery    Pt has acute severe drop in Na. It was lower at 127 yesterday when she visited ER. She is symptomatic with confusion. No seizures. Unable to provide details. Urine Na/Osm not send from ER. Rpt Na is up by 2 points. But she is still quite symptomatic. Need to correct it slowly but raise couple points to improve her symptoms. Needs serial Na    Plan:  She is on IV NS at 100 ml/hr- might have to d/c it if Na is trending down or add Lasix along with saline; will decide based on f/u Na  Will give IV 3% saline, 100 ml, over 10 mins. Could not order in [facility]. Spoke to [employer]. Will order it. Can repeat another dose if necessary  Serial Na every 4 hrs x 2 at present starting 6 PM. Further freq of Na based on rate of correction of Na  Urine Na/Osm- stat ordered. D/W RN. Use straight cath if necessary to get sample.  Most important test in part of evaluation of hyponatremia  TSH with next labs  AM cortisol- was recently on Prednisone  PRN Anti emetics  Pain control as necessary  Avoid thiazides, SSRI, SNRI, NSAIDs etc    D/W pt, Dr. Denita Jefferson, Dr. Armando Ochoa in ED and intpt pharmacy    Arlene Mitchell MD  [address] [employer]

## 2019-05-23 NOTE — ED TRIAGE NOTES
Patient arrives with complaints of lower back pain. Patient had Surgery last week for a herniated disk. Patient has tramadol at home and is not feeling relief.

## 2019-05-23 NOTE — ROUTINE PROCESS
RAPID RESPONSE TEAM 
Responded to overhead page. Dr. Erika Ortizoked already present upon arrival.  
She appears postictal, her airway is patent, respirations RRR, pulses present. Suction and BVM available. Immediately transferred to CCU.  
3% Saline arrived just prior to transit. Transported with patient. Julio Zuñiga called report.

## 2019-05-23 NOTE — PROGRESS NOTES
Hospitalist    Alert, no further seizures, talking  Still only oriented x 1  Completed 100 cc bolus of 3% saline  D/W Dr Cindy Rich, bolus another 100 cc and recheck Na  D/W pharmacy    David Sotomayor MD

## 2019-05-23 NOTE — ED NOTES
TRANSFER - OUT REPORT:    Verbal report given to Jennifer(name) on Yong Rogers  being transferred to Step Down(unit) for routine progression of care       Report consisted of patients Situation, Background, Assessment and   Recommendations(SBAR). Information from the following report(s) SBAR, Kardex, ED Summary and MAR was reviewed with the receiving nurse. Lines:   Peripheral IV 05/23/19 Left Antecubital (Active)       Peripheral IV 05/23/19 Left Wrist (Active)        Opportunity for questions and clarification was provided.       Patient transported with:   Monitor  Registered Nurse

## 2019-05-23 NOTE — DISCHARGE INSTRUCTIONS
Patient Education        Learning About Relief for Back Pain  What is back tension and strain? Back strain happens when you overstretch, or pull, a muscle in your back. You may hurt your back in an accident or when you exercise or lift something. Most back pain will get better with rest and time. You can take care of yourself at home to help your back heal.  What can you do first to relieve back pain? When you first feel back pain, try these steps:  · Walk. Take a short walk (10 to 20 minutes) on a level surface (no slopes, hills, or stairs) every 2 to 3 hours. Walk only distances you can manage without pain, especially leg pain. · Relax. Find a comfortable position for rest. Some people are comfortable on the floor or a medium-firm bed with a small pillow under their head and another under their knees. Some people prefer to lie on their side with a pillow between their knees. Don't stay in one position for too long. · Try heat or ice. Try using a heating pad on a low or medium setting, or take a warm shower, for 15 to 20 minutes every 2 to 3 hours. Or you can buy single-use heat wraps that last up to 8 hours. You can also try an ice pack for 10 to 15 minutes every 2 to 3 hours. You can use an ice pack or a bag of frozen vegetables wrapped in a thin towel. There is not strong evidence that either heat or ice will help, but you can try them to see if they help. You may also want to try switching between heat and cold. · Take pain medicine exactly as directed. ¨ If the doctor gave you a prescription medicine for pain, take it as prescribed. ¨ If you are not taking a prescription pain medicine, ask your doctor if you can take an over-the-counter medicine. What else can you do? · Stretch and exercise. Exercises that increase flexibility may relieve your pain and make it easier for your muscles to keep your spine in a good, neutral position. And don't forget to keep walking. · Do self-massage.  You can use self-massage to unwind after work or school or to energize yourself in the morning. You can easily massage your feet, hands, or neck. Self-massage works best if you are in comfortable clothes and are sitting or lying in a comfortable position. Use oil or lotion to massage bare skin. · Reduce stress. Back pain can lead to a vicious [place]: Distress about the pain tenses the muscles in your back, which in turn causes more pain. Learn how to relax your mind and your muscles to lower your stress. Where can you learn more? Go to http://lindsayTravel [place].info/. Enter E277 in the search box to learn more about \"Learning About Relief for Back Pain. \"  Current as of: March 21, 2017  Content Version: 11.5  © 3317-4077 [employer]. Care instructions adapted under license by [employer] (which disclaims liability or warranty for this information). If you have questions about a medical condition or this instruction, always ask your healthcare professional. [place] [address] any warranty or liability for your use of this information. Patient Education        Oral Rehydration: Care Instructions  Your Care Instructions    Dehydration occurs when your body loses too much water. This can happen if you do not drink enough fluids or lose a lot of fluid due to diarrhea, vomiting, or sweating. Being dehydrated can cause health problems and can even be life-threatening. To replace lost fluids, you need to drink liquid that contains special chemicals called electrolytes. Electrolytes keep your body working well. Plain water does not have electrolytes. You also need to rest to prevent more fluid loss. Replacing water and electrolytes (oral rehydration) completely takes about 36 hours. But you should feel better within a few hours. Follow-up care is a key part of your treatment and safety.  Be sure to make and go to all appointments, and call your doctor if you are having problems. It's also a good idea to know your test results and keep a list of the medicines you take. How can you care for yourself at home? · Take frequent sips of a drink such as Gatorade, Powerade, or other rehydration drinks that your doctor suggests. These replace both fluid and important chemicals (electrolytes) you need for balance in your blood. · Drink 2 quarts of cool liquid over 2 to 4 hours. You should have at least 10 glasses of liquid a day to replace lost fluid. If you have kidney, heart, or liver disease and have to limit fluids, talk with your doctor before you increase the amount of fluids you drink. · Make your own drink. Measure everything carefully. The drink may not work well or may even be harmful if the amounts are off. ? 1 quart water  ? ½ teaspoon salt  ? 6 teaspoons sugar  · Do not drink liquid with caffeine, such as coffee and damián. · Do not drink any alcohol. It can make you dehydrated. · Drink plenty of fluids, enough so that your urine is light yellow or clear like water. If you have kidney, heart, or liver disease and have to limit fluids, talk with your doctor before you increase the amount of fluids you drink. When should you call for help? Call 911 anytime you think you may need emergency care. For example, call if:    · You have signs of severe dehydration, such as:  ? You are confused or unable to stay awake.  ? You passed out (lost consciousness).    Call your doctor now or seek immediate medical care if:    · You still have signs of dehydration. You have sunken eyes and a dry mouth, and you pass only a little dark urine.     · You are dizzy or lightheaded, or you feel like you may faint.     · You are not able to keep down fluids.    Watch closely for changes in your health, and be sure to contact your doctor if:    · You do not get better as expected. Where can you learn more? Go to http://lindsay-lamonte.info/.   Enter I040 in the search box to learn more about \"Oral Rehydration: Care Instructions. \"  Current as of: September 23, 2018  Content Version: 11.9  © 5499-2712 [employer]. Care instructions adapted under license by [employer] (which disclaims liability or warranty for this information). If you have questions about a medical condition or this instruction, always ask your healthcare professional. [address] any warranty or liability for your use of this information.

## 2019-05-23 NOTE — PROGRESS NOTES
Hospitalist    Pt with tonic clonic seizure I walked by room  Now terminated  Awaiting 3% NS, spoke with pharmacy  D/w Dr Elke Lopez, starting 3% saline now  D/W son Denis Zia about seizure and ICU transfer    Ryan Wright MD

## 2019-05-23 NOTE — PROGRESS NOTES
Pharmacy Clarification of Prior to Admission Medication Regimen-Follow Up Needed    The patient was unable to participate in interview regarding clarification of the prior to admission medication regimen due to AMS. Pharmacy attempted to clarify the prior to admission medication regimen for the patient, but was unsuccessful after multiple attempts. The following resources were used to facilitate this attempt:  MHT attempted to interview patient but the patient con not recall what pharmacy she uses, the medications she takes other than her thyroid pill or the last time she took them. Patient's sister and son were present but did not know anything. Per RX Query she has had some prescriptions filled at [facility]. MHT called [facility] [phone] and spoke with [facility], Intern who was able to verify the last filled medications. Per patient's outpatient pharmacy she has filled Levothyroxine 50 mcg, Metformin 500 mg, Medrol 4 mg, Simvastatin 40 mg, and Tramadol 50 mg this year at their pharmacy. The medication history will need to be re-evaluated at a later time during admission when patient is willing/able to participate or if more information is provided.     Thank you,  Bushra Franco  Medication History Pharmacy Technician

## 2019-05-23 NOTE — H&P
Hospitalist Admission Note    NAME:  Manju Murguia   :   1945   MRN:   [id number]     Date of admit: 2019    PCP: Clive Hernandez MD    Assessment/Plan:     Hyponatremia Na 046 POA  Metabolic encephalopathy POA due to hyponatremia  Na dropped 136() to 127() to 114 today  Baseline MS normal, ED yesterday noted no MS changes, son says baseline with it  Now alert and following commands, oriented x 1  1 L NS in ED  Suspect increased ADH with recent surgery, ? 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 Given the patient's current clinical presentation, I have a high level of concern for decompensation if discharged from the emergency department. My assessment of this patient's clinical condition and my plan of care is as noted above. DVT prophylaxis with lovenox    Code status: full code  NOK: Son    History     CHIEF COMPLAINT: brought in by sister confused this morning    HISTORY OF PRESENT ILLNESS:    47-year-old white female    Known coronary disease, stroke with vision loss in the right eye, diabetes on metformin    Recent lumbar laminectomy 2019 at [address] taking Daypro and tramadol    No prior history of hyponatremia, sodium is normally high 130s to 140s    Last sodium level on 2019 was 136    Seen in the emergency room [facility] 2019 with right mid to low back pain.   Work-up included a CT scan which showed no evidence of intra-abdominal pathology or nephrolithiasis  Urinalysis was negative for infection  BUN and creatinine were 13 and 0.64  Sodium was 127  Patient apparently had normal mental status    This morning patient's sister found the patient   she had a clear change in her mental status   was confused and having some trouble speaking   awake however  No reported seizure activity  She was brought back into the emergency room  Son in room says she is clearly not at her baseline  Normally she is very \"with it\"    Sodium came back in 114  Patient noted some nausea vomiting overnight  Also reported some vertigo  Received normal saline 1 L, sodium came up to 116  We were called to admit the patient  No reported diarrhea    Past Medical History:   Diagnosis Date    Breast cancer ([facility] [address] 1999    Right    CAD (coronary artery disease)     Hyperlipidemia    Fibromyalgia     Gastrointestinal disorder     Acid Reflux    Hypothyroid     Pre-diabetes     PVC (premature ventricular contraction)     Too much caffeine    [facility]    Took vision from right eye        Past Surgical History:   Procedure Laterality Date    HX COLONOSCOPY N/A     HX FRACTURE TX Left 60years old    Hardware implanted    HX MASTECTOMY Right 1999    HX ORTHOPAEDIC      L knee has pins and plates after a fall    HX PARTIAL HYSTERECTOMY N/A        Social History     Tobacco Use    Smoking status: Never Smoker    Smokeless tobacco: Never Used   Substance Use Topics    Alcohol use: No     Comment: once  month wine        Family History   Problem Relation Age of Onset    Cancer Mother         breast cancer    Heart Disease Father     Diabetes Brother     Colon Cancer Brother     Diabetes Brother     Heart Disease Brother     Other Sister 55        Gastric bypass into staph infection    No Known Problems Sister         Allergies   Allergen Reactions    Other Food Anaphylaxis     Steak, cheese, grass, smoke    Codeine Rash and Swelling     Mouth    Other Plant, Animal, Environmental Hives     Rubber    Pcn [Penicillins] Rash and Swelling     Mouth        Prior to Admission medications    Medication Sig Start Date End Date Taking? Authorizing Provider   diazePAM (VALIUM) 5 mg tablet Take 1 Tab by mouth every eight (8) hours as needed (spasm). Max Daily Amount: 15 mg. 5/23/19   Tram Malone MD   raNITIdine (ZANTAC) 150 mg tablet Take 150 mg by mouth daily. Provider, Historical   traMADol (ULTRAM) 50 mg tablet Take 1-2 Tabs by mouth every six (6) hours as needed for Pain for up to 7 days. Max Daily Amount: 400 mg. 5/16/19 5/23/19  Laurel Cook MD   oxaprozin (DAYPRO) 600 mg tablet Take 1 Tab by mouth two (2) times a day. Indications: Joint Damage causing Pain and Loss of Function 5/14/19   Marta Mayers MD   levothyroxine (SYNTHROID) 75 mcg tablet Take 75 mcg by mouth every Monday and Friday. Provider, Historical   denosumab (PROLIA) 60 mg/mL injection 60 mg by SubCUTAneous route every 6 months. Provider, Historical   miSOPROStol (CYTOTEC) 200 mcg tablet Take 200 mcg by mouth two (2) times a day. Provider, Historical   levothyroxine (SYNTHROID) 50 mcg tablet Take 50 mcg by mouth five (5) days a week. Tuesday, Wednesday, Thursday, Saturday, Sunday     Provider, Historical   acyclovir (ZOVIRAX) 400 mg tablet Take 400 mg by mouth two (2) times daily as needed. Provider, Historical   metFORMIN (GLUCOPHAGE) 500 mg tablet Take 500 mg by mouth nightly. Provider, Historical   MAGNESIUM PO Take 1 Tab by mouth daily. Provider, Historical   folic acid/multivit-min/lutein (CENTRUM SILVER PO) Take 1 Tab by mouth daily. Provider, Historical   traMADol (ULTRAM) 50 mg tablet Take 50 mg by mouth daily. Provider, Historical   acetaminophen/diphenhydramine (TYLENOL PM PO) Take 1 Tab by mouth nightly. Provider, Historical   simvastatin (ZOCOR) 40 mg tablet Take 1 Tab by mouth nightly.  12/5/18   Marta Mayers MD   cetirizine (ZYRTEC) 10 mg tablet Take 10 mg by mouth daily. Provider, Historical   cyanocobalamin (VITAMIN B12) 100 mcg tablet Take 100 mcg by mouth daily.     Provider, Historical       Review of symptoms:     POSITIVE= Bold  Negative = not bold  General:  fever, chills, sweats, generalized weakness  Eyes:    blurred vision, eye pain, double vision  ENT:    Coryza, sore throat, trouble swallowing  Respiratory:   cough, sputum, SOB  Cardiology:   chest pain, orthopnea, PND, edema  Gastrointestinal:  abdominal pain , N/V, diarrhea, constipation, melena or BRBPR  Genitourinary:  Urgency, dysuria, hematuria  Muskuloskeletal :  Joint redness, swelling or acute joint pain, myalgias  Hematology:  easy bruising, nose or gum bleeding  Dermatological: rash, ulceration  Endocrine:   Polyuria or polydipsia, heat or hold intolerance  Neurological:  Headache, focal motor or sensory changes     AMS     Speech difficulties, memory loss  Psychological: depression, agitation      Objective:   VITALS:    Patient Vitals for the past 24 hrs:   Temp Pulse Resp BP SpO2   19 1500  76 17 160/74 94 %   19 1400  90  156/72 95 %   19 1205 98 °F (36.7 °C) 74 20 156/72 93 %     Temp (24hrs), Av °F (36.7 °C), Min:97.7 °F (36.5 °C), Max:98.2 °F (36.8 °C)      O2 Device: Room air    Wt Readings from Last 12 Encounters:   19 74.3 kg (163 lb 12.8 oz)   19 74.4 kg (164 lb)   19 78.9 kg (174 lb)   19 79.2 kg (174 lb 9.6 oz)   19 77.8 kg (171 lb 8.3 oz)   19 78 kg (172 lb)   11/15/18 77.6 kg (171 lb)   18 75.8 kg (167 lb)   18 72.6 kg (160 lb)   18 73.9 kg (163 lb)   18 71.7 kg (158 lb 1.1 oz)   11 69.9 kg (154 lb)         PHYSICAL EXAM:   General:    Slow but talking, cooperative in no distress     HEENT: Normocephalic, atraumatic    PERRL, Sclera no icterus    Nasal mucosa without masses or discharge  Hearing intact to voice    Oropharynx without erythema or exudate Pink MM  Neck:  No meningismus, trachea midline, no carotid bruits     Thyroid not enlarged, no nodules or tenderness  Lungs:   Clear to auscultation bilaterally. No wheezing or rales    No accessory muscle use or retractions. Heart:   Regular rate and rhythm,  no murmur or gallop. No LE edema  Abdomen:   Soft, non-tender. Not distended. Bowel sounds normal.     No masses, No Hepatosplenomegaly, No Rebound or guarding  Lymph nodes: No cervical or inguinal BEBE  Musculoskeletal:  No Joint swelling, erythema, warmth.  No Cyanosis or clubbing  Skin:      No rashes     Not Jaundiced   No nodules or thickening  Neurologic: Alert, slow, knew birthday, not where she was or the current year    Could not recognize son in room     follows commands     Speech very halting, some trouble finding the words    Cranial nerves 2 to 12 intact    Symmetric motor strength bilaterally, no pronator drift       LAB DATA REVIEWED:    Recent Results (from the past 12 hour(s))   EKG, 12 LEAD, INITIAL    Collection Time: 05/23/19 12:05 PM   Result Value Ref Range    Ventricular Rate 74 BPM    Atrial Rate 241 BPM    QRS Duration 80 ms    Q-T Interval 386 ms    QTC Calculation (Bezet) 428 ms    Calculated R Axis -40 degrees    Calculated T Axis 35 degrees    Diagnosis       Sinus rhythm  Left axis deviation  When compared with ECG of 18-FEB-2019 13:24,  No significant change was found  Confirmed by Daija Rosalind, P.V. ([id number]) on 5/23/2019 1:19:51 PM     CBC WITH AUTOMATED DIFF    Collection Time: 05/23/19 12:56 PM   Result Value Ref Range    WBC 9.8 3.6 - 11.0 K/uL    RBC 4.12 3.80 - 5.20 M/uL    HGB 13.0 11.5 - 16.0 g/dL    HCT 37.7 35.0 - 47.0 %    MCV 91.5 80.0 - 99.0 FL    MCH 31.6 26.0 - 34.0 PG    MCHC 34.5 30.0 - 36.5 g/dL    RDW 12.8 11.5 - 14.5 %    PLATELET 976 218 - 015 K/uL    MPV 8.5 (L) 8.9 - 12.9 FL    NRBC 0.0 0  WBC    ABSOLUTE NRBC 0.00 0.00 - 0.01 K/uL    NEUTROPHILS 75 32 - 75 %    LYMPHOCYTES 13 12 - 49 %    MONOCYTES 11 5 - 13 % EOSINOPHILS 1 0 - 7 %    BASOPHILS 0 0 - 1 %    IMMATURE GRANULOCYTES 0 0.0 - 0.5 %    ABS. NEUTROPHILS 7.3 1.8 - 8.0 K/UL    ABS. LYMPHOCYTES 1.3 0.8 - 3.5 K/UL    ABS. MONOCYTES 1.1 (H) 0.0 - 1.0 K/UL    ABS. EOSINOPHILS 0.1 0.0 - 0.4 K/UL    ABS. BASOPHILS 0.0 0.0 - 0.1 K/UL    ABS. IMM. GRANS. 0.0 0.00 - 0.04 K/UL    DF AUTOMATED     METABOLIC PANEL, COMPREHENSIVE    Collection Time: 05/23/19 12:56 PM   Result Value Ref Range    Sodium 114 (LL) 136 - 145 mmol/L    Potassium 3.8 3.5 - 5.1 mmol/L    Chloride 79 (L) 97 - 108 mmol/L    CO2 27 21 - 32 mmol/L    Anion gap 8 5 - 15 mmol/L    Glucose 146 (H) 65 - 100 mg/dL    BUN 11 6 - 20 MG/DL    Creatinine 0.49 (L) 0.55 - 1.02 MG/DL    BUN/Creatinine ratio 22 (H) 12 - 20      GFR est AA >60 >60 ml/min/1.73m2    GFR est non-AA >60 >60 ml/min/1.73m2    Calcium 8.4 (L) 8.5 - 10.1 MG/DL    Bilirubin, total 0.4 0.2 - 1.0 MG/DL    ALT (SGPT) 48 12 - 78 U/L    AST (SGOT) 35 15 - 37 U/L    Alk. phosphatase 105 45 - 117 U/L    Protein, total 7.4 6.4 - 8.2 g/dL    Albumin 3.3 (L) 3.5 - 5.0 g/dL    Globulin 4.1 (H) 2.0 - 4.0 g/dL    A-G Ratio 0.8 (L) 1.1 - 2.2     CK W/ REFLX CKMB    Collection Time: 05/23/19 12:56 PM   Result Value Ref Range    CK 40 26 - 192 U/L   TROPONIN I    Collection Time: 05/23/19 12:56 PM   Result Value Ref Range    Troponin-I, Qt. <0.05 <0.05 ng/mL   SAMPLES BEING HELD    Collection Time: 05/23/19 12:56 PM   Result Value Ref Range    SAMPLES BEING HELD 1BLUE,1RED     COMMENT        Add-on orders for these samples will be processed based on acceptable specimen integrity and analyte stability, which may vary by analyte.    URINALYSIS W/ REFLEX CULTURE    Collection Time: 05/23/19  2:28 PM   Result Value Ref Range    Color YELLOW/STRAW      Appearance CLOUDY (A) CLEAR      Specific gravity 1.010 1.003 - 1.030      pH (UA) 7.5 5.0 - 8.0      Protein 30 (A) NEG mg/dL    Glucose 250 (A) NEG mg/dL    Ketone 15 (A) NEG mg/dL    Bilirubin NEGATIVE  NEG Blood TRACE (A) NEG      Urobilinogen 0.2 0.2 - 1.0 EU/dL    Nitrites NEGATIVE  NEG      Leukocyte Esterase NEGATIVE  NEG      WBC 0-4 0 - 4 /hpf    RBC 5-10 0 - 5 /hpf    Epithelial cells FEW FEW /lpf    Bacteria 2+ (A) NEG /hpf    UA:UC IF INDICATED URINE CULTURE ORDERED (A) CNI      Hyaline cast 0-2 0 - 5 /lpf   METABOLIC PANEL, COMPREHENSIVE    Collection Time: 05/23/19  2:41 PM   Result Value Ref Range    Sodium 116 (LL) 136 - 145 mmol/L    Potassium 3.5 3.5 - 5.1 mmol/L    Chloride 84 (L) 97 - 108 mmol/L    CO2 25 21 - 32 mmol/L    Anion gap 7 5 - 15 mmol/L    Glucose 146 (H) 65 - 100 mg/dL    BUN 9 6 - 20 MG/DL    Creatinine 0.49 (L) 0.55 - 1.02 MG/DL    BUN/Creatinine ratio 18 12 - 20      GFR est AA >60 >60 ml/min/1.73m2    GFR est non-AA >60 >60 ml/min/1.73m2    Calcium 7.8 (L) 8.5 - 10.1 MG/DL    Bilirubin, total 0.4 0.2 - 1.0 MG/DL    ALT (SGPT) 46 12 - 78 U/L    AST (SGOT) 33 15 - 37 U/L    Alk. phosphatase 95 45 - 117 U/L    Protein, total 6.8 6.4 - 8.2 g/dL    Albumin 3.0 (L) 3.5 - 5.0 g/dL    Globulin 3.8 2.0 - 4.0 g/dL    A-G Ratio 0.8 (L) 1.1 - 2.2         EKG as read by me shows NSR rate 74, LAD, no LVH  normal intervals    CXR read by radiology and reviewed by myself results:  No ASD, right lower lobe atelectasis    CT scan abdomen/pelvis read by by radiology results  No urinary tract stones are seen. There is no hydroureteronephrosis. The kidneys  are normal in size. There is no perirenal fluid or ascites. Liver shows no apparent significant finding without contrast. Pancreas, adrenal  glands, spleen and aorta show no significant enlargement. No inflammation is  seen. There is no pneumoperitoneum or significant adenopathy. The bladder is not distended. The distal ureters are not dilated. There is no  apparent pelvic mass. Uterus is absent. The appendix is normal. Bowels are not  dilated. There is a small fat-containing umbilical hernia. IMPRESSION  No Acute Disease.       I saw the patient personally, took a history and did a complete physical exam at the bedside. I performed complex decision making in coming up with a diagnostic and treatment plan for the patient. I reviewed the patient's past medical records, current laboratory and radiology results, and actual Xray films/EKG. I have also discussed this case with the involved ED physician.     Care Plan discussed with:    Patient, Son, Dr Juan Chung, ED Doc    Risk of deterioration:  High    Total Time Coordinating Admission:  65   minutes    Total Critical Care Time:         Osbaldo Jaramillo MD

## 2019-05-23 NOTE — ED PROVIDER NOTES
EMERGENCY DEPARTMENT HISTORY AND PHYSICAL EXAM      Date: 5/23/2019  Patient Name: Berhane Yeung    History of Presenting Illness     Chief Complaint   Patient presents with    Vomiting     arrived via ems with nausea and vomiting seen last night for same, denies shortness of breath , chest pain. relates vomiting to pain meds       History Provided By: Patient    HPI: Berhane Yeung, 74 y.o. female with PMHx significant for GERD, stroke, presents to the ED with cc of moderate vertigo, weakness, and lethargy over the last 24 to 48 hours. Patient was seen last night with significant back pain similar complaints and had lab work and CAT scan of abdomen pelvis that was unremarkable. Patient was given medications and she felt much better at time of discharge although she was still weak. Throughout the night patient has become more dizzy and vertiginous and had worsening nausea and vomiting. Her back pain and chest pain is actually improved significantly. Given the degree of her vomiting and dizziness she is presented to the ER for reevaluation. She denies any blurry vision or double vision, speech abnormalities, focal neurologic complaints. No ringing in the ears or any hearing deficits. No new medications and no recent illnesses. No other associated symptoms and no other exacerbating or ameliorating factors. There are no other complaints, changes, or physical findings at this time.     PCP: Lawyer Sen MD    Current Facility-Administered Medications on File Prior to Encounter   Medication Dose Route Frequency Provider Last Rate Last Dose    [DISCONTINUED] sodium chloride 0.9 % bolus infusion 1,000 mL  1,000 mL IntraVENous ONCE Ludwig Malone MD        [COMPLETED] morphine injection 4 mg  4 mg IntraVENous ONCE Ludwig Malone MD   4 mg at 05/22/19 2152    [COMPLETED] ondansetron (ZOFRAN) injection 8 mg  8 mg IntraVENous NOW Ludwig Malone MD   8 mg at 05/22/19 2153    [COMPLETED] morphine injection 4 mg  4 mg IntraVENous ONCE Neel Malone MD   4 mg at 05/22/19 2313    [COMPLETED] ketorolac (TORADOL) injection 15 mg  15 mg IntraVENous ONCE Neel Malone MD   15 mg at 05/22/19 2311     Current Outpatient Medications on File Prior to Encounter   Medication Sig Dispense Refill    diazePAM (VALIUM) 5 mg tablet Take 1 Tab by mouth every eight (8) hours as needed (spasm). Max Daily Amount: 15 mg. 15 Tab 0    raNITIdine (ZANTAC) 150 mg tablet Take 150 mg by mouth daily.  traMADol (ULTRAM) 50 mg tablet Take 1-2 Tabs by mouth every six (6) hours as needed for Pain for up to 7 days. Max Daily Amount: 400 mg. 45 Tab 0    oxaprozin (DAYPRO) 600 mg tablet Take 1 Tab by mouth two (2) times a day. Indications: Joint Damage causing Pain and Loss of Function 180 Tab 3    levothyroxine (SYNTHROID) 75 mcg tablet Take 75 mcg by mouth every Monday and Friday.  denosumab (PROLIA) 60 mg/mL injection 60 mg by SubCUTAneous route every 6 months.  miSOPROStol (CYTOTEC) 200 mcg tablet Take 200 mcg by mouth two (2) times a day.  levothyroxine (SYNTHROID) 50 mcg tablet Take 50 mcg by mouth five (5) days a week. Tuesday, Wednesday, Thursday, Saturday, Sunday       acyclovir (ZOVIRAX) 400 mg tablet Take 400 mg by mouth two (2) times daily as needed.  metFORMIN (GLUCOPHAGE) 500 mg tablet Take 500 mg by mouth nightly.  MAGNESIUM PO Take 1 Tab by mouth daily.  folic acid/multivit-min/lutein (CENTRUM SILVER PO) Take 1 Tab by mouth daily.  traMADol (ULTRAM) 50 mg tablet Take 50 mg by mouth daily.  acetaminophen/diphenhydramine (TYLENOL PM PO) Take 1 Tab by mouth nightly.  simvastatin (ZOCOR) 40 mg tablet Take 1 Tab by mouth nightly. 90 Tab 3    cetirizine (ZYRTEC) 10 mg tablet Take 10 mg by mouth daily.  cyanocobalamin (VITAMIN B12) 100 mcg tablet Take 100 mcg by mouth daily.          Past History     Past Medical History:  Past Medical History: Diagnosis Date    Breast cancer [facility]) 1999    Right    CAD (coronary artery disease)     Hyperlipidemia    Fibromyalgia     Gastrointestinal disorder     Acid Reflux    Hypothyroid     Pre-diabetes     PVC (premature ventricular contraction)     Too much caffeine    Stroke [facility]) 2003    Took vision from right eye       Past Surgical History:  Past Surgical History:   Procedure Laterality Date    HX COLONOSCOPY N/A     HX FRACTURE TX Left 60years old    Hardware implanted    HX MASTECTOMY Right 1999    HX ORTHOPAEDIC      L knee has pins and plates after a fall    HX PARTIAL HYSTERECTOMY N/A        Family History:  Family History   Problem Relation Age of Onset    Cancer Mother         breast cancer    Heart Disease Father     Diabetes Brother     Colon Cancer Brother     Diabetes Brother     Heart Disease Brother     Other Sister 55        Gastric bypass into staph infection    No Known Problems Sister        Social History:  Social History     Tobacco Use    Smoking status: Never Smoker    Smokeless tobacco: Never Used   Substance Use Topics    Alcohol use: No     Comment: once  month wine    Drug use: Never       Allergies: Allergies   Allergen Reactions    Other Food Anaphylaxis     Steak, cheese, grass, smoke    Codeine Rash and Swelling     Mouth    Other Plant, Animal, Environmental Hives     Rubber    Pcn [Penicillins] Rash and Swelling     Mouth         Review of Systems   Review of Systems   Constitutional: Positive for fatigue. Negative for chills, diaphoresis and fever. HENT: Negative for ear pain and sore throat. Eyes: Negative for pain and redness. Respiratory: Negative for cough and shortness of breath. Cardiovascular: Negative for chest pain and leg swelling. Gastrointestinal: Positive for nausea. Negative for abdominal pain, diarrhea and vomiting. Endocrine: Negative for cold intolerance and heat intolerance.    Genitourinary: Negative for flank pain and hematuria. Musculoskeletal: Negative for back pain and neck stiffness. Skin: Negative for rash and wound. Neurological: Positive for dizziness and weakness. Negative for syncope and headaches. All other systems reviewed and are negative. Physical Exam   Physical Exam   Constitutional: She is oriented to person, place, and time. She appears well-developed and well-nourished. Patient is an elderly female appears in moderate distress. She is sitting in the bed with her eyes closed and appears slightly lethargic. Patient is alert and oriented x3. HENT:   Head: Normocephalic and atraumatic. Mouth/Throat: Oropharynx is clear and moist. No oropharyngeal exudate. Eyes: Pupils are equal, round, and reactive to light. Conjunctivae and EOM are normal.   Neck: Normal range of motion. Cardiovascular: Normal rate and regular rhythm. No murmur heard. Pulmonary/Chest: Effort normal and breath sounds normal. No respiratory distress. She has no wheezes. Abdominal: Soft. Bowel sounds are normal. She exhibits no distension. There is no tenderness. Musculoskeletal: Normal range of motion. She exhibits no edema or deformity. Neurological: She is alert and oriented to person, place, and time. Coordination normal.   Patient alert and oriented x3. Cranial 2 cranial nerves II through XII are intact. Patient has no facial droop. Patient has fatigable bilateral horizontal nystagmus in both directions. No vertical nystagmus symptoms of vertigo or sniffily worse with any movement of the head to the left or right. No pronator drift and no lower extremity weakness. Patient has symmetric sensory function. Skin: Skin is warm and dry. No rash noted. Psychiatric: She has a normal mood and affect. Her behavior is normal.   Nursing note and vitals reviewed.       Diagnostic Study Results     Labs -     Recent Results (from the past 24 hour(s))   CBC WITH AUTOMATED DIFF    Collection Time: 05/22/19  9:54 PM Result Value Ref Range    WBC 7.9 3.6 - 11.0 K/uL    RBC 4.25 3.80 - 5.20 M/uL    HGB 13.5 11.5 - 16.0 g/dL    HCT 40.4 35.0 - 47.0 %    MCV 95.1 80.0 - 99.0 FL    MCH 31.8 26.0 - 34.0 PG    MCHC 33.4 30.0 - 36.5 g/dL    RDW 13.5 11.5 - 14.5 %    PLATELET 428 401 - 332 K/uL    MPV 8.4 (L) 8.9 - 12.9 FL    NRBC 0.0 0  WBC    ABSOLUTE NRBC 0.00 0.00 - 0.01 K/uL    NEUTROPHILS 79 (H) 32 - 75 %    LYMPHOCYTES 13 12 - 49 %    MONOCYTES 7 5 - 13 %    EOSINOPHILS 0 0 - 7 %    BASOPHILS 0 0 - 1 %    IMMATURE GRANULOCYTES 1 (H) 0.0 - 0.5 %    ABS. NEUTROPHILS 6.2 1.8 - 8.0 K/UL    ABS. LYMPHOCYTES 1.1 0.8 - 3.5 K/UL    ABS. MONOCYTES 0.6 0.0 - 1.0 K/UL    ABS. EOSINOPHILS 0.0 0.0 - 0.4 K/UL    ABS. BASOPHILS 0.0 0.0 - 0.1 K/UL    ABS. IMM. GRANS. 0.0 0.00 - 0.04 K/UL    DF AUTOMATED     METABOLIC PANEL, COMPREHENSIVE    Collection Time: 05/22/19  9:54 PM   Result Value Ref Range    Sodium 127 (L) 136 - 145 mmol/L    Potassium 4.0 3.5 - 5.1 mmol/L    Chloride 91 (L) 97 - 108 mmol/L    CO2 28 21 - 32 mmol/L    Anion gap 8 5 - 15 mmol/L    Glucose 146 (H) 65 - 100 mg/dL    BUN 13 6 - 20 MG/DL    Creatinine 0.64 0.55 - 1.02 MG/DL    BUN/Creatinine ratio 20 12 - 20      GFR est AA >60 >60 ml/min/1.73m2    GFR est non-AA >60 >60 ml/min/1.73m2    Calcium 9.0 8.5 - 10.1 MG/DL    Bilirubin, total 0.3 0.2 - 1.0 MG/DL    ALT (SGPT) 54 12 - 78 U/L    AST (SGOT) 34 15 - 37 U/L    Alk.  phosphatase 112 45 - 117 U/L    Protein, total 7.9 6.4 - 8.2 g/dL    Albumin 3.6 3.5 - 5.0 g/dL    Globulin 4.3 (H) 2.0 - 4.0 g/dL    A-G Ratio 0.8 (L) 1.1 - 2.2     URINALYSIS W/ RFLX MICROSCOPIC    Collection Time: 05/22/19 11:04 PM   Result Value Ref Range    Color YELLOW/STRAW      Appearance CLEAR CLEAR      Specific gravity 1.015 1.003 - 1.030      pH (UA) 7.5 5.0 - 8.0      Protein 100 (A) NEG mg/dL    Glucose 100 (A) NEG mg/dL    Ketone 40 (A) NEG mg/dL    Bilirubin NEGATIVE  NEG      Blood NEGATIVE  NEG      Urobilinogen 0.2 0.2 - 1.0 EU/dL    Nitrites NEGATIVE  NEG      Leukocyte Esterase NEGATIVE  NEG      WBC 0-4 0 - 4 /hpf    RBC 10-20 0 - 5 /hpf    Epithelial cells FEW FEW /lpf    Bacteria NEGATIVE  NEG /hpf    Amorphous Crystals 2+ (A) NEG   EKG, 12 LEAD, INITIAL    Collection Time: 05/23/19 12:05 PM   Result Value Ref Range    Ventricular Rate 74 BPM    Atrial Rate 241 BPM    QRS Duration 80 ms    Q-T Interval 386 ms    QTC Calculation (Bezet) 428 ms    Calculated R Axis -40 degrees    Calculated T Axis 35 degrees    Diagnosis       Sinus rhythm  Left axis deviation  When compared with ECG of 18-FEB-2019 13:24,  No significant change was found  Confirmed by Tate Lemos PMANAV ([id number]) on 5/23/2019 1:19:51 PM     CBC WITH AUTOMATED DIFF    Collection Time: 05/23/19 12:56 PM   Result Value Ref Range    WBC 9.8 3.6 - 11.0 K/uL    RBC 4.12 3.80 - 5.20 M/uL    HGB 13.0 11.5 - 16.0 g/dL    HCT 37.7 35.0 - 47.0 %    MCV 91.5 80.0 - 99.0 FL    MCH 31.6 26.0 - 34.0 PG    MCHC 34.5 30.0 - 36.5 g/dL    RDW 12.8 11.5 - 14.5 %    PLATELET 294 633 - 775 K/uL    MPV 8.5 (L) 8.9 - 12.9 FL    NRBC 0.0 0  WBC    ABSOLUTE NRBC 0.00 0.00 - 0.01 K/uL    NEUTROPHILS 75 32 - 75 %    LYMPHOCYTES 13 12 - 49 %    MONOCYTES 11 5 - 13 %    EOSINOPHILS 1 0 - 7 %    BASOPHILS 0 0 - 1 %    IMMATURE GRANULOCYTES 0 0.0 - 0.5 %    ABS. NEUTROPHILS 7.3 1.8 - 8.0 K/UL    ABS. LYMPHOCYTES 1.3 0.8 - 3.5 K/UL    ABS. MONOCYTES 1.1 (H) 0.0 - 1.0 K/UL    ABS. EOSINOPHILS 0.1 0.0 - 0.4 K/UL    ABS. BASOPHILS 0.0 0.0 - 0.1 K/UL    ABS. IMM.  GRANS. 0.0 0.00 - 0.04 K/UL    DF AUTOMATED     METABOLIC PANEL, COMPREHENSIVE    Collection Time: 05/23/19 12:56 PM   Result Value Ref Range    Sodium 114 (LL) 136 - 145 mmol/L    Potassium 3.8 3.5 - 5.1 mmol/L    Chloride 79 (L) 97 - 108 mmol/L    CO2 27 21 - 32 mmol/L    Anion gap 8 5 - 15 mmol/L    Glucose 146 (H) 65 - 100 mg/dL    BUN 11 6 - 20 MG/DL    Creatinine 0.49 (L) 0.55 - 1.02 MG/DL    BUN/Creatinine ratio 22 (H) 12 - 20      GFR est AA >60 >60 ml/min/1.73m2    GFR est non-AA >60 >60 ml/min/1.73m2    Calcium 8.4 (L) 8.5 - 10.1 MG/DL    Bilirubin, total 0.4 0.2 - 1.0 MG/DL    ALT (SGPT) 48 12 - 78 U/L    AST (SGOT) 35 15 - 37 U/L    Alk. phosphatase 105 45 - 117 U/L    Protein, total 7.4 6.4 - 8.2 g/dL    Albumin 3.3 (L) 3.5 - 5.0 g/dL    Globulin 4.1 (H) 2.0 - 4.0 g/dL    A-G Ratio 0.8 (L) 1.1 - 2.2     CK W/ REFLX CKMB    Collection Time: 05/23/19 12:56 PM   Result Value Ref Range    CK 40 26 - 192 U/L   TROPONIN I    Collection Time: 05/23/19 12:56 PM   Result Value Ref Range    Troponin-I, Qt. <0.05 <0.05 ng/mL   SAMPLES BEING HELD    Collection Time: 05/23/19 12:56 PM   Result Value Ref Range    SAMPLES BEING HELD 1BLUE,1RED     COMMENT        Add-on orders for these samples will be processed based on acceptable specimen integrity and analyte stability, which may vary by analyte.    URINALYSIS W/ REFLEX CULTURE    Collection Time: 05/23/19  2:28 PM   Result Value Ref Range    Color YELLOW/STRAW      Appearance CLOUDY (A) CLEAR      Specific gravity 1.010 1.003 - 1.030      pH (UA) 7.5 5.0 - 8.0      Protein 30 (A) NEG mg/dL    Glucose 250 (A) NEG mg/dL    Ketone 15 (A) NEG mg/dL    Bilirubin NEGATIVE  NEG      Blood TRACE (A) NEG      Urobilinogen 0.2 0.2 - 1.0 EU/dL    Nitrites NEGATIVE  NEG      Leukocyte Esterase NEGATIVE  NEG      WBC 0-4 0 - 4 /hpf    RBC 5-10 0 - 5 /hpf    Epithelial cells FEW FEW /lpf    Bacteria 2+ (A) NEG /hpf    UA:UC IF INDICATED URINE CULTURE ORDERED (A) CNI      Hyaline cast 0-2 0 - 5 /lpf   METABOLIC PANEL, COMPREHENSIVE    Collection Time: 05/23/19  2:41 PM   Result Value Ref Range    Sodium 116 (LL) 136 - 145 mmol/L    Potassium 3.5 3.5 - 5.1 mmol/L    Chloride 84 (L) 97 - 108 mmol/L    CO2 25 21 - 32 mmol/L    Anion gap 7 5 - 15 mmol/L    Glucose 146 (H) 65 - 100 mg/dL    BUN 9 6 - 20 MG/DL    Creatinine 0.49 (L) 0.55 - 1.02 MG/DL    BUN/Creatinine ratio 18 12 - 20      GFR est AA >60 >60 ml/min/1.73m2 GFR est non-AA >60 >60 ml/min/1.73m2    Calcium 7.8 (L) 8.5 - 10.1 MG/DL    Bilirubin, total 0.4 0.2 - 1.0 MG/DL    ALT (SGPT) 46 12 - 78 U/L    AST (SGOT) 33 15 - 37 U/L    Alk. phosphatase 95 45 - 117 U/L    Protein, total 6.8 6.4 - 8.2 g/dL    Albumin 3.0 (L) 3.5 - 5.0 g/dL    Globulin 3.8 2.0 - 4.0 g/dL    A-G Ratio 0.8 (L) 1.1 - 2.2         Radiologic Studies -   XR CHEST PORT   Final Result   Minimal right basilar subsegmental atelectasis        CT Results  (Last 48 hours)               05/22/19 2159  CT ABD PELV WO CONT Final result    Impression:  No Acute Disease. Narrative:  INDICATION: rt flank pain x20 hours        EXAM: CT Abdomen and Pelvis without IV contrast. No oral contrast.   CT dose reduction was achieved through use of a standardized protocol tailored   for this examination and automatic exposure control for dose modulation. FINDINGS:    No urinary tract stones are seen. There is no hydroureteronephrosis. The kidneys   are normal in size. There is no perirenal fluid or ascites. Liver shows no apparent significant finding without contrast. Pancreas, adrenal   glands, spleen and aorta show no significant enlargement. No inflammation is   seen. There is no pneumoperitoneum or significant adenopathy. The bladder is not distended. The distal ureters are not dilated. There is no   apparent pelvic mass. Uterus is absent. The appendix is normal. Bowels are not   dilated. There is a small fat-containing umbilical hernia. CXR Results  (Last 48 hours)               05/23/19 1340  XR CHEST PORT Final result    Impression:  Minimal right basilar subsegmental atelectasis       Narrative:  EXAM: XR CHEST PORT       INDICATION: Acute mental status change       COMPARISON: 1/8/2019       FINDINGS: A portable AP radiograph of the chest was obtained at 1321 hours. The   patient is on a cardiac monitor.  The lungs are hyperinflated but clear with   exception of a linear opacity at the right base. The cardiac and mediastinal   contours and pulmonary vascularity are remarkable for mild tortuosity of the   descending aorta. The bones and soft tissues are grossly within normal limits. Medical Decision Making   I am the first provider for this patient. I reviewed the vital signs, available nursing notes, past medical history, past surgical history, family history and social history. Vital Signs-Reviewed the patient's vital signs. Patient Vitals for the past 24 hrs:   Temp Pulse Resp BP SpO2   05/23/19 1500  76 17 160/74 94 %   05/23/19 1400  90  156/72 95 %   05/23/19 1205 98 °F (36.7 °C) 74 20 156/72 93 %       Pulse Oximetry Analysis -97 % on room air    Cardiac Monitor:   Rate: 74 bpm  Rhythm: Normal Sinus Rhythm        Records Reviewed: Nursing Notes and Old Medical Records    Differential Diagnosis:    Patient presents with isolated vertigo. Most likely peripheral vertigo rather than Central vertigo. DDx: BPPV, acute labyrinthitis, vestibular neuritis, Meniere's dx, otitis media, acoustic neuroma, medication toxicity (aminoglycosides, loop diuretics, aspirin). Unlikely central cause of vertigo such as posterior mass or stroke as there are no associated red flag symptoms including diplopia, dysmetria, dysarthria, ataxia, unilateral numbness or weakness. Provider Notes (Medical Decision Making):   Patient found to have profound hyponatremia as a cause of her altered mental status. This time started on saline supplementation. Discussed with Dr. Dom Reilly from nephrology who feels that hypertonic saline is not indicated at this time we should continue to monitor closely. Patient is improved after 1 L normal saline. This time I will admit to the hospitalist for further management and work-up. ED Course:     Initial assessment performed.  The patients presenting problems have been discussed, and they are in agreement with the care plan formulated and outlined with them. I have encouraged them to ask questions as they arise throughout their visit. Critical Care Time:     None    Disposition:  Admit Note:  4:07 PM  Pt is being admitted by hospitalist. The results of their tests and reason(s) for their admission have been discussed with pt and/or available family. They convey agreement and understanding for the need to be admitted and for admission diagnosis. PLAN:  1. Current Discharge Medication List        2. Follow-up Information    None       Return to ED if worse     Diagnosis     Clinical Impression:   1. Hyponatremia    2.  Somnolence

## 2019-05-23 NOTE — ED PROVIDER NOTES
EMERGENCY DEPARTMENT HISTORY AND PHYSICAL EXAM          Date: 5/22/2019  Patient Name: Ted Kamara    History of Presenting Illness     Chief Complaint   Patient presents with    Back Pain       History Provided By: Patient    HPI: Ted Kamara is a 74 y.o. female, pmhx High cholesterol with recent L1-2 left hemilaminectomy, who presents via ems to the ED c/o rt back pain. Patient states she has had some mild-moderate elft sided low back pain from her surgery on the 17th of May. Last night she was awakened from sleep with severe pain in the right back. She states it is a constant severe pain which was not relieved with tramadol and prednisone. She notes she feels a little nauseated but denies any fevers, chills, vomiting, abdominal pain, hematuria, dysuria; she does admit to decreased urination. Patient specifically denies any recent fevers, chills, nausea, vomiting, diarrhea, abd pain, CP, SOB, urinary sxs, changes in BM, or headache. PCP: Yrn French MD    Allergies: pcn and codeine  Social Hx: -tobacco, -EtOH, -Illicit Drugs    There are no other complaints, changes, or physical findings at this time. Current Outpatient Medications   Medication Sig Dispense Refill    diazePAM (VALIUM) 5 mg tablet Take 1 Tab by mouth every eight (8) hours as needed (spasm). Max Daily Amount: 15 mg. 15 Tab 0    raNITIdine (ZANTAC) 150 mg tablet Take 150 mg by mouth daily.  traMADol (ULTRAM) 50 mg tablet Take 1-2 Tabs by mouth every six (6) hours as needed for Pain for up to 7 days. Max Daily Amount: 400 mg. 45 Tab 0    oxaprozin (DAYPRO) 600 mg tablet Take 1 Tab by mouth two (2) times a day. Indications: Joint Damage causing Pain and Loss of Function 180 Tab 3    levothyroxine (SYNTHROID) 75 mcg tablet Take 75 mcg by mouth every Monday and Friday.  denosumab (PROLIA) 60 mg/mL injection 60 mg by SubCUTAneous route every 6 months.       miSOPROStol (CYTOTEC) 200 mcg tablet Take 200 mcg by mouth two (2) times a day.  levothyroxine (SYNTHROID) 50 mcg tablet Take 50 mcg by mouth five (5) days a week. Tuesday, Wednesday, Thursday, Saturday, Sunday       acyclovir (ZOVIRAX) 400 mg tablet Take 400 mg by mouth two (2) times daily as needed.  metFORMIN (GLUCOPHAGE) 500 mg tablet Take 500 mg by mouth nightly.  MAGNESIUM PO Take 1 Tab by mouth daily.  folic acid/multivit-min/lutein (CENTRUM SILVER PO) Take 1 Tab by mouth daily.  traMADol (ULTRAM) 50 mg tablet Take 50 mg by mouth daily.  acetaminophen/diphenhydramine (TYLENOL PM PO) Take 1 Tab by mouth nightly.  simvastatin (ZOCOR) 40 mg tablet Take 1 Tab by mouth nightly. 90 Tab 3    cetirizine (ZYRTEC) 10 mg tablet Take 10 mg by mouth daily.  cyanocobalamin (VITAMIN B12) 100 mcg tablet Take 100 mcg by mouth daily.          Past History     Past Medical History:  Past Medical History:   Diagnosis Date    Breast cancer ([facility] [address] 1999    Right    CAD (coronary artery disease)     Hyperlipidemia    Fibromyalgia     Gastrointestinal disorder     Acid Reflux    Hypothyroid     Pre-diabetes     PVC (premature ventricular contraction)     Too much caffeine    Stroke [facility]) 2003    Took vision from right eye       Past Surgical History:  Past Surgical History:   Procedure Laterality Date    HX COLONOSCOPY N/A     HX FRACTURE TX Left 60years old    Hardware implanted    HX MASTECTOMY Right 1999    HX ORTHOPAEDIC      L knee has pins and plates after a fall    HX PARTIAL HYSTERECTOMY N/A        Family History:  Family History   Problem Relation Age of Onset    Cancer Mother         breast cancer    Heart Disease Father     Diabetes Brother     Colon Cancer Brother     Diabetes Brother     Heart Disease Brother     Other Sister 55        Gastric bypass into staph infection    No Known Problems Sister        Social History:  Social History     Tobacco Use    Smoking status: Never Smoker    Smokeless tobacco: Never Used Substance Use Topics    Alcohol use: No     Comment: once  month wine    Drug use: Never       Allergies: Allergies   Allergen Reactions    Other Food Anaphylaxis     Steak, cheese, grass, smoke    Codeine Rash and Swelling     Mouth    Other Plant, Animal, Environmental Hives     Rubber    Pcn [Penicillins] Rash and Swelling     Mouth         Review of Systems   Review of Systems   Constitutional: Negative for activity change, appetite change, chills, fever and unexpected weight change. HENT: Negative for congestion. Eyes: Negative for pain and visual disturbance. Respiratory: Negative for cough and shortness of breath. Cardiovascular: Negative for chest pain. Gastrointestinal: Positive for diarrhea. Negative for abdominal pain, nausea and vomiting. Genitourinary: Positive for decreased urine volume. Negative for difficulty urinating, dysuria, frequency and hematuria. Musculoskeletal: Positive for back pain. Skin: Negative for rash. Neurological: Negative for headaches. Physical Exam   Physical Exam   Constitutional: She is oriented to person, place, and time. She appears well-developed and well-nourished. Elderly female in moderate distress due to pain   HENT:   Head: Normocephalic and atraumatic. Mouth/Throat: Oropharynx is clear and moist.   Eyes: Pupils are equal, round, and reactive to light. Conjunctivae and EOM are normal. Right eye exhibits no discharge. Left eye exhibits no discharge. Neck: Normal range of motion. Neck supple. Cardiovascular: Normal rate, regular rhythm and normal heart sounds. No murmur heard. Pulmonary/Chest: Effort normal and breath sounds normal. No respiratory distress. She has no wheezes. She has no rales. Abdominal: Soft. Bowel sounds are normal. She exhibits no distension and no mass. There is no tenderness. There is no rebound and no guarding. Musculoskeletal: Normal range of motion. She exhibits no edema, tenderness or deformity. Pt holding her right flank without obvious tenderness on exam; there is no evidence of central spinal tenderness. Neurological: She is alert and oriented to person, place, and time. No cranial nerve deficit. She exhibits normal muscle tone. Skin: Skin is warm and dry. No rash noted. She is not diaphoretic. Nursing note and vitals reviewed. Diagnostic Study Results     Labs -     Recent Results (from the past 12 hour(s))   CBC WITH AUTOMATED DIFF    Collection Time: 05/22/19  9:54 PM   Result Value Ref Range    WBC 7.9 3.6 - 11.0 K/uL    RBC 4.25 3.80 - 5.20 M/uL    HGB 13.5 11.5 - 16.0 g/dL    HCT 40.4 35.0 - 47.0 %    MCV 95.1 80.0 - 99.0 FL    MCH 31.8 26.0 - 34.0 PG    MCHC 33.4 30.0 - 36.5 g/dL    RDW 13.5 11.5 - 14.5 %    PLATELET 040 205 - 005 K/uL    MPV 8.4 (L) 8.9 - 12.9 FL    NRBC 0.0 0  WBC    ABSOLUTE NRBC 0.00 0.00 - 0.01 K/uL    NEUTROPHILS 79 (H) 32 - 75 %    LYMPHOCYTES 13 12 - 49 %    MONOCYTES 7 5 - 13 %    EOSINOPHILS 0 0 - 7 %    BASOPHILS 0 0 - 1 %    IMMATURE GRANULOCYTES 1 (H) 0.0 - 0.5 %    ABS. NEUTROPHILS 6.2 1.8 - 8.0 K/UL    ABS. LYMPHOCYTES 1.1 0.8 - 3.5 K/UL    ABS. MONOCYTES 0.6 0.0 - 1.0 K/UL    ABS. EOSINOPHILS 0.0 0.0 - 0.4 K/UL    ABS. BASOPHILS 0.0 0.0 - 0.1 K/UL    ABS. IMM. GRANS. 0.0 0.00 - 0.04 K/UL    DF AUTOMATED     METABOLIC PANEL, COMPREHENSIVE    Collection Time: 05/22/19  9:54 PM   Result Value Ref Range    Sodium 127 (L) 136 - 145 mmol/L    Potassium 4.0 3.5 - 5.1 mmol/L    Chloride 91 (L) 97 - 108 mmol/L    CO2 28 21 - 32 mmol/L    Anion gap 8 5 - 15 mmol/L    Glucose 146 (H) 65 - 100 mg/dL    BUN 13 6 - 20 MG/DL    Creatinine 0.64 0.55 - 1.02 MG/DL    BUN/Creatinine ratio 20 12 - 20      GFR est AA >60 >60 ml/min/1.73m2    GFR est non-AA >60 >60 ml/min/1.73m2    Calcium 9.0 8.5 - 10.1 MG/DL    Bilirubin, total 0.3 0.2 - 1.0 MG/DL    ALT (SGPT) 54 12 - 78 U/L    AST (SGOT) 34 15 - 37 U/L    Alk.  phosphatase 112 45 - 117 U/L    Protein, total 7.9 6.4 - 8.2 g/dL    Albumin 3.6 3.5 - 5.0 g/dL    Globulin 4.3 (H) 2.0 - 4.0 g/dL    A-G Ratio 0.8 (L) 1.1 - 2.2     URINALYSIS W/ RFLX MICROSCOPIC    Collection Time: 05/22/19 11:04 PM   Result Value Ref Range    Color YELLOW/STRAW      Appearance CLEAR CLEAR      Specific gravity 1.015 1.003 - 1.030      pH (UA) 7.5 5.0 - 8.0      Protein 100 (A) NEG mg/dL    Glucose 100 (A) NEG mg/dL    Ketone 40 (A) NEG mg/dL    Bilirubin NEGATIVE  NEG      Blood NEGATIVE  NEG      Urobilinogen 0.2 0.2 - 1.0 EU/dL    Nitrites NEGATIVE  NEG      Leukocyte Esterase NEGATIVE  NEG      WBC 0-4 0 - 4 /hpf    RBC 10-20 0 - 5 /hpf    Epithelial cells FEW FEW /lpf    Bacteria NEGATIVE  NEG /hpf    Amorphous Crystals 2+ (A) NEG       Radiologic Studies -   CT ABD PELV WO CONT   Final Result   No Acute Disease. CT Results  (Last 48 hours)               05/22/19 2159  CT ABD PELV WO CONT Final result    Impression:  No Acute Disease. Narrative:  INDICATION: rt flank pain x20 hours        EXAM: CT Abdomen and Pelvis without IV contrast. No oral contrast.   CT dose reduction was achieved through use of a standardized protocol tailored   for this examination and automatic exposure control for dose modulation. FINDINGS:    No urinary tract stones are seen. There is no hydroureteronephrosis. The kidneys   are normal in size. There is no perirenal fluid or ascites. Liver shows no apparent significant finding without contrast. Pancreas, adrenal   glands, spleen and aorta show no significant enlargement. No inflammation is   seen. There is no pneumoperitoneum or significant adenopathy. The bladder is not distended. The distal ureters are not dilated. There is no   apparent pelvic mass. Uterus is absent. The appendix is normal. Bowels are not   dilated. There is a small fat-containing umbilical hernia.                CXR Results  (Last 48 hours)    None            Medical Decision Making   I am the first provider for this patient. I reviewed the vital signs, available nursing notes, past medical history, past surgical history, family history and social history. Vital Signs-Reviewed the patient's vital signs. Patient Vitals for the past 12 hrs:   Temp Pulse Resp BP SpO2   05/23/19 0001 98.2 °F (36.8 °C) 85 18 148/71 94 %   05/22/19 2114 97.7 °F (36.5 °C) 88 18 161/64 98 %       Pulse Oximetry Analysis - 98% on RA    Cardiac Monitor:   Rate: 88bpm  Rhythm: Normal Sinus Rhythm      Records Reviewed: Nursing Notes, Old Medical Records, Ambulance Run Sheet, Previous Radiology Studies and Previous Laboratory Studies    Provider Notes (Medical Decision Making):   MDM: Elderly female post-surgical without evidence of central spinal.surgical site complications. Site of pain appears jossue-nephric, but pt also still has her GB. Sx management initiated with monitoring of SBP and evaluation r/o renal colic, biliary colic. ED Course:   Initial assessment performed. The patients presenting problems have been discussed, and they are in agreement with the care plan formulated and outlined with them. I have encouraged them to ask questions as they arise throughout their visit. PROGRESS NOTE:  2200 p.m. Pt states she feels better and is relaxing comfortably. She notes her pain is probably a 4 out of 10 currently. Labs have resulted awaiting CT scan results. 2300 pm  Patient now complaining of increased pain. Repeat doses to be given prior to discharge. CT without evidence of renal colic or biliary colic as well as any colon inflammation. Will repeat medications with anti-amatory and recommend outpatient muscle relaxer with continued home medications. Of note patient's BUN and creatinine appears increased from baseline we will initiate IV fluid infusion prior to discharge.     Discharge note:  12:15 AM  Pt re-evaluated and noted to be feeling better; patient continues to wait for IV fluids to be initiated by nursing but does not want to stay any longer, ready for discharge. We discussed home oral hydration with increased oral fluids over the next few days. Updated pt and family on all final lab and CT findings. Will follow up as instructed with her primary care physician. All questions have been answered, pt voiced understanding and agreement with plan. Specific return precautions provided as well as instructions to return to the ED should sx worsen at any time. Vital signs stable for discharge. Critical Care Time:   0      Diagnosis     Clinical Impression:   1. Acute right-sided thoracic back pain        PLAN:  1. Discharge Medication List as of 5/23/2019 12:02 AM      START taking these medications    Details   diazePAM (VALIUM) 5 mg tablet Take 1 Tab by mouth every eight (8) hours as needed (spasm). Max Daily Amount: 15 mg., Print, Disp-15 Tab, R-0         CONTINUE these medications which have NOT CHANGED    Details   raNITIdine (ZANTAC) 150 mg tablet Take 150 mg by mouth daily. , Historical Med      !! traMADol (ULTRAM) 50 mg tablet Take 1-2 Tabs by mouth every six (6) hours as needed for Pain for up to 7 days. Max Daily Amount: 400 mg., Print, Disp-45 Tab, R-0      oxaprozin (DAYPRO) 600 mg tablet Take 1 Tab by mouth two (2) times a day. Indications: Joint Damage causing Pain and Loss of Function, Normal, Disp-180 Tab, R-3      !! levothyroxine (SYNTHROID) 75 mcg tablet Take 75 mcg by mouth every Monday and Friday., Historical Med      denosumab (PROLIA) 60 mg/mL injection 60 mg by SubCUTAneous route every 6 months., Historical Med      miSOPROStol (CYTOTEC) 200 mcg tablet Take 200 mcg by mouth two (2) times a day., Historical Med      !! levothyroxine (SYNTHROID) 50 mcg tablet Take 50 mcg by mouth five (5) days a week.  Tuesday, Wednesday, Thursday, Saturday, Sunday , Historical Med      acyclovir (ZOVIRAX) 400 mg tablet Take 400 mg by mouth two (2) times daily as needed., Historical Med      metFORMIN (GLUCOPHAGE) 500 mg tablet Take 500 mg by mouth nightly., Historical Med      MAGNESIUM PO Take 1 Tab by mouth daily. , Historical Med      folic acid/multivit-min/lutein (CENTRUM SILVER PO) Take 1 Tab by mouth daily. , Historical Med      !! traMADol (ULTRAM) 50 mg tablet Take 50 mg by mouth daily. , Historical Med      acetaminophen/diphenhydramine (TYLENOL PM PO) Take 1 Tab by mouth nightly., Historical Med      simvastatin (ZOCOR) 40 mg tablet Take 1 Tab by mouth nightly., Normal, Disp-90 Tab, R-3      cetirizine (ZYRTEC) 10 mg tablet Take 10 mg by mouth daily. , Historical Med      cyanocobalamin (VITAMIN B12) 100 mcg tablet Take 100 mcg by mouth daily. , Historical Med       !! - Potential duplicate medications found. Please discuss with provider. 2.   Follow-up Information     Follow up With Specialties Details Why Contact Info    Nara Gordillo MD Internal Medicine Schedule an appointment as soon as possible for a visit for recheck this week [address] [place] [address] 83.  [phone]      [facility] EMERGENCY DEPT Emergency Medicine  If symptoms worsen [address] [place], [place] [id number] [facility]  [phone]        Return to ED if worse     Disposition:  Home      Please note, this dictation was completed with Navdy, the [employer] voice recognition software. Quite often unanticipated grammatical, syntax, homophones, and other interpretive errors are inadvertently transcribed by the computer software. Please disregard these errors. Please excuse any errors that have escaped final proof reading.

## 2019-05-23 NOTE — PROGRESS NOTES
TRANSFER - IN REPORT:    Verbal report received from Sophia Jean RN(name) on Wyatt Jacob  being received from ED(unit) for routine progression of care      Report consisted of patients Situation, Background, Assessment and   Recommendations(SBAR). Information from the following report(s) SBAR, ED Summary, Intake/Output, MAR and Recent Results was reviewed with the receiving nurse. Opportunity for questions and clarification was provided. Assessment will be completed upon patients arrival to unit and care assumed. [facility] and requested him to evaluate pt since there are no orders to treat pt. NM made aware. Nursing supervisor Erica San RN made aware. 5 - Pt arrived to PCU, alert & answering questions with delayed responses. Pt only able to state her name, but states she cannot remember where she is, situation, or year. Provided incontinence care & applied purewick. Primary Nurse Krysten Garland RN and Sary De Santiago RN performed a dual skin assessment on this patient No impairment noted  Henrik score is 14.   1640 - Dr. Franki Shen at bedside, discussing plan of care. Waiting for orders. 1742 - Pt voided 20 ml urine; unable to collect STAT urine specimen and pt unable to void any more. Received order to straight cath for urine specimen. 1750 - Verified with [facility] Pharmacist that pt may have 3% saline infusion administered via PIV. Pharmacist stated that they are working on the medication. Will administer as soon as received from pharmacy. Dr. Harvinder Zuniga orders to monitor sodium every 4 hours once infusion starts and to obtain labs with the first sodium check. Bed alarm intact & call bell within reach. Pt requests for lights to be off. Door remains open. 1806 - Dr. Franki Shen called a Rapid Response; pt actively seizing and unable to respond. MD transferring pt to CCU and placing orders. Called pharmacy regarding saline. 1815 - Calling report to CCU regarding transfer.    1820 - TRANSFER - OUT REPORT:    Verbal report given to Estee Oliva RN(name) on Zander Tinoco  being transferred to CCU(unit) for urgent transfer       Report consisted of patients Situation, Background, Assessment and   Recommendations(SBAR). Information from the following report(s) SBAR, Kardex, Intake/Output, MAR, Recent Results and Cardiac Rhythm NSR was reviewed with the receiving nurse. Lines:   Peripheral IV 05/23/19 Left Antecubital (Active)   Site Assessment Clean, dry, & intact 5/23/2019  4:40 PM   Phlebitis Assessment 0 5/23/2019  4:40 PM   Infiltration Assessment 0 5/23/2019  4:40 PM   Dressing Status Clean, dry, & intact 5/23/2019  4:40 PM   Dressing Type Tape;Transparent 5/23/2019  4:40 PM   Hub Color/Line Status Green 5/23/2019  4:40 PM       Peripheral IV 05/23/19 Left Wrist (Active)   Site Assessment Clean, dry, & intact 5/23/2019  4:40 PM   Phlebitis Assessment 0 5/23/2019  4:40 PM   Infiltration Assessment 0 5/23/2019  4:40 PM   Dressing Status Clean, dry, & intact 5/23/2019  4:40 PM   Dressing Type Transparent;Tape 5/23/2019  4:40 PM   Hub Color/Line Status Green 5/23/2019  4:40 PM        Opportunity for questions and clarification was provided.       Patient transported with:   Monitor  Registered Nurse  Tech

## 2019-05-24 ENCOUNTER — APPOINTMENT (OUTPATIENT)
Dept: CT IMAGING | Age: 74
DRG: 640 | End: 2019-05-24
Attending: HOSPITALIST
Payer: MEDICARE

## 2019-05-24 LAB
ANION GAP SERPL CALC-SCNC: 8 MMOL/L (ref 5–15)
ANION GAP SERPL CALC-SCNC: 9 MMOL/L (ref 5–15)
BASOPHILS # BLD: 0.1 K/UL (ref 0–0.1)
BASOPHILS NFR BLD: 1 % (ref 0–1)
BUN SERPL-MCNC: 8 MG/DL (ref 6–20)
BUN SERPL-MCNC: 8 MG/DL (ref 6–20)
BUN/CREAT SERPL: 13 (ref 12–20)
BUN/CREAT SERPL: 17 (ref 12–20)
CALCIUM SERPL-MCNC: 8 MG/DL (ref 8.5–10.1)
CALCIUM SERPL-MCNC: 8.1 MG/DL (ref 8.5–10.1)
CHLORIDE SERPL-SCNC: 84 MMOL/L (ref 97–108)
CHLORIDE SERPL-SCNC: 87 MMOL/L (ref 97–108)
CO2 SERPL-SCNC: 25 MMOL/L (ref 21–32)
CO2 SERPL-SCNC: 26 MMOL/L (ref 21–32)
CORTIS AM PEAK SERPL-MCNC: 33.4 UG/DL (ref 4.3–22.45)
CREAT SERPL-MCNC: 0.47 MG/DL (ref 0.55–1.02)
CREAT SERPL-MCNC: 0.61 MG/DL (ref 0.55–1.02)
DIFFERENTIAL METHOD BLD: ABNORMAL
EOSINOPHIL # BLD: 0.1 K/UL (ref 0–0.4)
EOSINOPHIL NFR BLD: 1 % (ref 0–7)
ERYTHROCYTE [DISTWIDTH] IN BLOOD BY AUTOMATED COUNT: 12.8 % (ref 11.5–14.5)
GLUCOSE SERPL-MCNC: 118 MG/DL (ref 65–100)
GLUCOSE SERPL-MCNC: 120 MG/DL (ref 65–100)
HCT VFR BLD AUTO: 39.4 % (ref 35–47)
HGB BLD-MCNC: 13.6 G/DL (ref 11.5–16)
IMM GRANULOCYTES # BLD AUTO: 0 K/UL (ref 0–0.04)
IMM GRANULOCYTES NFR BLD AUTO: 0 % (ref 0–0.5)
LYMPHOCYTES # BLD: 1.9 K/UL (ref 0.8–3.5)
LYMPHOCYTES NFR BLD: 14 % (ref 12–49)
MCH RBC QN AUTO: 31.6 PG (ref 26–34)
MCHC RBC AUTO-ENTMCNC: 34.5 G/DL (ref 30–36.5)
MCV RBC AUTO: 91.4 FL (ref 80–99)
MONOCYTES # BLD: 1.1 K/UL (ref 0–1)
MONOCYTES NFR BLD: 8 % (ref 5–13)
NEUTS BAND NFR BLD MANUAL: 1 %
NEUTS SEG # BLD: 10.4 K/UL (ref 1.8–8)
NEUTS SEG NFR BLD: 75 % (ref 32–75)
NRBC # BLD: 0 K/UL (ref 0–0.01)
NRBC BLD-RTO: 0 PER 100 WBC
PLATELET # BLD AUTO: 272 K/UL (ref 150–400)
PMV BLD AUTO: 8.4 FL (ref 8.9–12.9)
POTASSIUM SERPL-SCNC: 3.6 MMOL/L (ref 3.5–5.1)
POTASSIUM SERPL-SCNC: 4 MMOL/L (ref 3.5–5.1)
RBC # BLD AUTO: 4.31 M/UL (ref 3.8–5.2)
RBC MORPH BLD: ABNORMAL
SODIUM SERPL-SCNC: 118 MMOL/L (ref 136–145)
SODIUM SERPL-SCNC: 121 MMOL/L (ref 136–145)
SODIUM SERPL-SCNC: 121 MMOL/L (ref 136–145)
T4 FREE SERPL-MCNC: 1.3 NG/DL (ref 0.8–1.5)
TSH SERPL DL<=0.05 MIU/L-ACNC: 0.18 UIU/ML (ref 0.36–3.74)
URATE SERPL-MCNC: 3.6 MG/DL (ref 2.6–6)
WBC # BLD AUTO: 13.6 K/UL (ref 3.6–11)

## 2019-05-24 PROCEDURE — 80048 BASIC METABOLIC PNL TOTAL CA: CPT

## 2019-05-24 PROCEDURE — 84295 ASSAY OF SERUM SODIUM: CPT

## 2019-05-24 PROCEDURE — 74011250636 HC RX REV CODE- 250/636: Performed by: INTERNAL MEDICINE

## 2019-05-24 PROCEDURE — 65610000006 HC RM INTENSIVE CARE

## 2019-05-24 PROCEDURE — 77010033678 HC OXYGEN DAILY

## 2019-05-24 PROCEDURE — 97165 OT EVAL LOW COMPLEX 30 MIN: CPT

## 2019-05-24 PROCEDURE — 82533 TOTAL CORTISOL: CPT

## 2019-05-24 PROCEDURE — 74011250637 HC RX REV CODE- 250/637: Performed by: INTERNAL MEDICINE

## 2019-05-24 PROCEDURE — 84480 ASSAY TRIIODOTHYRONINE (T3): CPT

## 2019-05-24 PROCEDURE — 84439 ASSAY OF FREE THYROXINE: CPT

## 2019-05-24 PROCEDURE — 84443 ASSAY THYROID STIM HORMONE: CPT

## 2019-05-24 PROCEDURE — 97530 THERAPEUTIC ACTIVITIES: CPT

## 2019-05-24 PROCEDURE — 36415 COLL VENOUS BLD VENIPUNCTURE: CPT

## 2019-05-24 PROCEDURE — 85025 COMPLETE CBC W/AUTO DIFF WBC: CPT

## 2019-05-24 PROCEDURE — 74011250636 HC RX REV CODE- 250/636: Performed by: HOSPITALIST

## 2019-05-24 PROCEDURE — 70450 CT HEAD/BRAIN W/O DYE: CPT

## 2019-05-24 PROCEDURE — 84550 ASSAY OF BLOOD/URIC ACID: CPT

## 2019-05-24 RX ORDER — SODIUM CHLORIDE 9 MG/ML
250 INJECTION, SOLUTION INTRAVENOUS AS NEEDED
Status: DISCONTINUED | OUTPATIENT
Start: 2019-05-24 | End: 2019-06-04 | Stop reason: HOSPADM

## 2019-05-24 RX ORDER — TOLVAPTAN 15 MG/1
15 TABLET ORAL ONCE
Status: COMPLETED | OUTPATIENT
Start: 2019-05-24 | End: 2019-05-24

## 2019-05-24 RX ORDER — HEPARIN 100 UNIT/ML
300 SYRINGE INTRAVENOUS AS NEEDED
Status: CANCELLED | OUTPATIENT
Start: 2019-05-24

## 2019-05-24 RX ORDER — LEVOFLOXACIN 5 MG/ML
750 INJECTION, SOLUTION INTRAVENOUS EVERY 24 HOURS
Status: DISCONTINUED | OUTPATIENT
Start: 2019-05-24 | End: 2019-05-27

## 2019-05-24 RX ORDER — POTASSIUM CHLORIDE AND SODIUM CHLORIDE 900; 300 MG/100ML; MG/100ML
INJECTION, SOLUTION INTRAVENOUS CONTINUOUS
Status: DISCONTINUED | OUTPATIENT
Start: 2019-05-24 | End: 2019-05-24

## 2019-05-24 RX ORDER — BACITRACIN 500 UNIT/G
1 PACKET (EA) TOPICAL AS NEEDED
Status: CANCELLED | OUTPATIENT
Start: 2019-05-24

## 2019-05-24 RX ADMIN — Medication 10 ML: at 06:02

## 2019-05-24 RX ADMIN — ACETAMINOPHEN 650 MG: 325 TABLET ORAL at 21:18

## 2019-05-24 RX ADMIN — MUPIROCIN: 20 OINTMENT TOPICAL at 09:53

## 2019-05-24 RX ADMIN — ASPIRIN 81 MG 81 MG: 81 TABLET ORAL at 09:52

## 2019-05-24 RX ADMIN — ENOXAPARIN SODIUM 40 MG: 40 INJECTION SUBCUTANEOUS at 09:53

## 2019-05-24 RX ADMIN — Medication 10 ML: at 15:30

## 2019-05-24 RX ADMIN — CYANOCOBALAMIN TAB 500 MCG 250 MCG: 500 TAB at 09:52

## 2019-05-24 RX ADMIN — Medication 10 ML: at 21:19

## 2019-05-24 RX ADMIN — TOLVAPTAN 15 MG: 15 TABLET ORAL at 15:29

## 2019-05-24 RX ADMIN — POTASSIUM CHLORIDE AND SODIUM CHLORIDE: 900; 300 INJECTION, SOLUTION INTRAVENOUS at 09:54

## 2019-05-24 RX ADMIN — LEVOTHYROXINE SODIUM 75 MCG: 75 TABLET ORAL at 05:56

## 2019-05-24 RX ADMIN — LEVOFLOXACIN 750 MG: 5 INJECTION, SOLUTION INTRAVENOUS at 18:21

## 2019-05-24 RX ADMIN — MUPIROCIN: 20 OINTMENT TOPICAL at 21:19

## 2019-05-24 RX ADMIN — ATORVASTATIN CALCIUM 40 MG: 40 TABLET, FILM COATED ORAL at 21:18

## 2019-05-24 NOTE — PROGRESS NOTES
Problem: Self Care Deficits Care Plan (Adult)  Goal: *Acute Goals and Plan of Care (Insert Text)  Description  Occupational Therapy Goals  Initiated 5/24/2019  1. Patient will perform self-feeding with Setup/Supervision within 7 day(s). 2.  Patient will perform grooming tasks seated EOB with Setup Assist within 7 day(s). 3.  Patient will perform upper body dressing seated EOB with modified independence within 7 day(s). 3.  Patient will perform lower body dressing seated EOB/standing PRN with Supervision within 7 day(s). 4.  Patient will perform toilet transfers with Supervision using least restrictive device within 7 day(s). 5.  Patient will perform all aspects of toileting with Supervision within 7 day(s). Outcome: Progressing Towards Goal  OCCUPATIONAL THERAPY EVALUATION  Patient: Fahad Medel (68 y.o. female)  Date: 5/24/2019  Primary Diagnosis: Hyponatremia [E87.1]        Precautions:     ASSESSMENT :  Based on the objective data described below, the patient presents with hypotension, generalized weakness, confusion/disorientation, decreased functional activity tolerance, and decreased functional mobility s/p episode of acute severe hyponatremia this admission. Pt recently underwent lumbar spine laminectomy on 5/16/19 d/t herniated disc. Pt's sister called EMS d/t Pts c/o R lower back p!, AMS and vertigo PTA. 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 Pt reports independence with ADL/IADL tasks, says she is still driving, enjoys gardening and has a son who lives close by in [place] whom she is close to. Pt was A&Ox3 on arrival (person, place, and time). She verbalized, \"I really don't know\" when asked why she is here. She wears glasses yet did not have them present this session. UE ROM/strength are generally decreased yet functional. Demonstrated L lateral lean x2 seated EOB, as well as delayed processing and decreased attention and command following.  Pt is currently performing ADLs at a Setup to Moderate Assist level, below her reported independent baseline and would benefit from short term rehab to maximize her return to her high PLOF. Anticipate she will progress in acute skilled therapies at this facility to address above listed deficits. Vitals:   Temp Pulse Resp BP SpO2   05/24/19 1054  Supine, post activity -- 82 23 127/56 95 %   05/24/19 1051  Standing -- 100 25 (!) 85/51 96 %   05/24/19 1050 -- (!) 110 20 -- 95 %   05/24/19 1049  Standing -- (!) 111 21 116/53 96 %   05/24/19 1045 -- (!) 106 20 -- 96 %   05/24/19 1044  Sitting -- -- -- 130/77 95 %   05/24/19 1036  Supine -- -- -- 139/71 94 %        Patient will benefit from skilled intervention to address the above impairments. Patient?s rehabilitation potential is considered to be Good  Factors which may influence rehabilitation potential include:   ? None noted  ? Mental ability/status  ? Medical condition  ? Home/family situation and support systems  ? Safety awareness  ? Pain tolerance/management  ? Other:      PLAN :  Recommendations and Planned Interventions:  ?               Self Care Training                  ? Therapeutic Activities  ? Functional Mobility Training    ? Cognitive Retraining  ? Therapeutic Exercises           ? Endurance Activities  ? Balance Training                   ? Neuromuscular Re-Education  ? Visual/Perceptual Training     ? Home Safety Training  ? Patient Education                 ? Family Training/Education  ? Other (comment):    Frequency/Duration: Patient will be followed by occupational therapy 4 times a week to address goals. Discharge Recommendations:  To Be Determined: Rehab, pending progress in acute skilled OT  Further Equipment Recommendations for Discharge: Defer to rehab SUBJECTIVE:   Patient stated ? I'm usually out in my garden\"    OBJECTIVE DATA SUMMARY:   HISTORY:   Past Medical History:   Diagnosis Date    Breast cancer ([facility] [address] 1999    Right    CAD (coronary artery disease)     Hyperlipidemia    Fibromyalgia     Gastrointestinal disorder     Acid Reflux    Hypothyroid     Pre-diabetes     PVC (premature ventricular contraction)     Too much caffeine    Stroke [facility]) 2003    Took vision from right eye     Past Surgical History:   Procedure Laterality Date    HX COLONOSCOPY N/A     HX FRACTURE TX Left 60years old    Hardware implanted    HX MASTECTOMY Right 1999    HX ORTHOPAEDIC      L knee has pins and plates after a fall    HX PARTIAL HYSTERECTOMY N/A        Prior Level of Function/Environment/Context: Lives alone in 2 story home with 3 DHARMESH through the garage, which is typically how Pt enters. Reports [place] with ADL/IADL tasks at baseline w/o use of AD. Stands to shower yet does have grab bars if needed. Cooks and cleans. Enjoys gardening. Drives. Son lives in [address] [facility] and she indicated they have a close relationship. Medical chart indicates Hx of falls, yet Pt denied this session. Denies home O2 use. Home Situation  Home Environment: Private residence  # Steps to Enter: 3  Rails to Enter: Yes  Hand Rails : Bilateral  One/Two Story Residence: Two story  # of Interior Steps: 12  Interior Rails: Left  Living Alone: Yes  Support Systems: Friends \ neighbors  Patient Expects to be Discharged to[de-identified] Private residence  Current DME Used/Available at Home: Grab bars  Tub or Shower Type: Shower    Hand dominance: Right    EXAMINATION OF PERFORMANCE DEFICITS:  Cognitive/Behavioral Status:  Neurologic State: Alert  Orientation Level: Oriented to person;Oriented to place; Disoriented to time;Disoriented to situation(said it was 11:20 at 10:40, suspect d/t R eye blindness)  Cognition: Follows commands;Decreased attention/concentration  Perception: Cues to maintain midline in sitting(L lateral lean x2 sitting EOB)  Perseveration: No perseveration noted  Safety/Judgement: Decreased awareness of environment; Fall prevention    Skin: Mild bruising observed on RUE near elbow    Edema: Moderate edema observed dorsal aspect of L hand    Hearing: Auditory  Auditory Impairment: None    Vision/Perceptual:    Acuity: Able to read high contrast poster on wall without difficulty  Corrective Lenses: Reading glasses    Range of Motion:  AROM: Generally decreased, functional          Strength:  Strength: Generally decreased, functional                Coordination:  Coordination: Generally decreased, functional  Fine Motor Skills-Upper: Left Intact; Right Intact    Gross Motor Skills-Upper: Left Intact; Right Intact    Tone & Sensation:  Tone: Normal  Sensation: Intact        Balance:  Sitting: Intact; Without support(in prop sitting)  Standing: Impaired  Standing - Static: Fair;Constant support(hand held assist x2)  Standing - Dynamic : Not tested(d/t hypotension)    Functional Mobility and Transfers for ADLs:  Bed Mobility:  Rolling: Stand-by assistance(HOB elevated at 30 degrees)  Supine to Sit: Minimum assistance;Assist x1  Sit to Supine: Minimum assistance;Assist x2  Scooting: Contact guard assistance    Transfers:  Sit to Stand: Minimum assistance;Assist x2  Stand to Sit: Minimum assistance;Assist x2  Bed to Chair: Minimum assistance; Moderate assistance;Assist x2(inferred, not tested)  Bathroom Mobility: Minimum assistance; Moderate assistance(x2; inferred, not tested)  Toilet Transfer : Minimum assistance; Moderate assistance;Assist x2(x2; inferred, not tested)  Shower Transfer: Minimum assistance; Moderate assistance;Assist x2(x2; inferred, not tested)    ADL Assessment:  Feeding: Setup    Oral Facial Hygiene/Grooming: Contact guard assistance(with Setup, sitting EOB)    Bathing: Minimum assistance; Moderate assistance    Upper Body Dressing: Minimum assistance    Lower Body Dressing:  Moderate assistance    ADL Intervention and task modifications:  Lower Body Dressing Assistance  Socks: Maximum assistance    Cognitive Retraining  Safety/Judgement: Decreased awareness of environment; Fall prevention    Functional Measure:  Barthel Index:    Bathin  Bladder: 0(olmedo)  Bowels: 5  Groomin(can perform supine in bed; CGA at EOB)  Dressin  Feedin(NPO at this time)  Mobility: 0  Stairs: 0  Toilet Use: 5  Transfer (Bed to Chair and Back): 5  Total: 25/100        Percentage of impairment   0%   1-19%   20-39%   40-59%   60-79%   80-99%   100%   Barthel Score 0-100 100 99-80 79-60 59-40 20-39 1-19   0     The Barthel ADL Index: Guidelines  1. The index should be used as a record of what a patient does, not as a record of what a patient could do. 2. The main aim is to establish degree of independence from any help, physical or verbal, however minor and for whatever reason. 3. The need for supervision renders the patient not independent. 4. A patient's performance should be established using the best available evidence. Asking the patient, friends/relatives and nurses are the usual sources, but direct observation and common sense are also important. However direct testing is not needed. 5. Usually the patient's performance over the preceding 24-48 hours is important, but occasionally longer periods will be relevant. 6. Middle categories imply that the patient supplies over 50 per cent of the effort. 7. Use of aids to be independent is allowed. Travis Gotti., Barthel, D.W. (0113). Functional evaluation: the Barthel Index. [address] [facility] (14)2. [facility] manuel MARGOTH Velásquez, Randy Amaya.Psychiatric., [place], [address] [place] [address] Ave (). Measuring the change indisability after inpatient rehabilitation; comparison of the responsiveness of the Barthel Index and Functional [place] Measure. Journal of Neurology, Neurosurgery, and Psychiatry, 66(4), 331-966.   SHEY Landon, JANNETTE Serrano, & Tonya Alston M.A. (2004.) Assessment of post-stroke quality of life in cost-effectiveness studies: The usefulness of the Barthel Index and the EuroQoL-5D. Quality of Life Research, 15, 215-24        Occupational Therapy Evaluation Charge Determination   History Examination Decision-Making   LOW Complexity : Brief history review  LOW Complexity : 1-3 performance deficits relating to physical, cognitive , or psychosocial skils that result in activity limitations and / or participation restrictions  LOW Complexity : No comorbidities that affect functional and no verbal or physical assistance needed to complete eval tasks       Based on the above components, the patient evaluation is determined to be of the following complexity level: LOW      Activity Tolerance:   Limited d/t hypotension. See chart above. Please refer to the flowsheet for vital signs taken during this treatment. After treatment:   ? Patient left in no apparent distress sitting up in chair  ? Patient left in no apparent distress in bed  ? Call bell left within reach  ? Nursing notified  ? Caregiver present  ? Bed alarm activated    COMMUNICATION/EDUCATION:   The patient?s plan of care was discussed with: Registered Nurse and Patient . ? Home safety education was provided and the patient/caregiver indicated understanding. ? Patient/family have participated as able in goal setting and plan of care. ? Patient/family agree to work toward stated goals and plan of care. ? Patient understands intent and goals of therapy, but is neutral about his/her participation. ? Patient is unable to participate in goal setting and plan of care.     Thank you for this referral.  [facility], OTR/L  Time Calculation: 24 mins

## 2019-05-24 NOTE — INTERDISCIPLINARY ROUNDS
Critical care interdisciplinary rounds held on 05/24/2019. Following members present, Pharmacy, Diabetes Treatment, Case Management, Respiratory Therapy, Clinical Lead and Nutrition. Led by DEQUAN Palomares RN and Dr. Judy Vo. Plan of care discussed. See clinical pathway for plan of care and interventions and desired outcomes.

## 2019-05-24 NOTE — PROGRESS NOTES
havent had f/u Na  Pt had 2 rounds of 3% saline bolus  Off IV NS  Had seziure episode earlier  Last Na at 2:41 pm    Will order stat BMP    Devora Dial MD  [facility]

## 2019-05-24 NOTE — PROGRESS NOTES
Hospitalist    Na 117 after 2 boluses 3% NS  Results d/w Dr Cassidy Li  Recommended IV lasix  We discussed further 3% saline, he recommended a bolus with any seizure activity, otherwise hold it  NS will call Dr Fatmata Gregory with the [facility] MD

## 2019-05-24 NOTE — PROGRESS NOTES
2250. Patient oob at door, naked. .was returned to bed and bed alarm was placed  2330 pt inc for large amt of urine post IVP lasix (purewick in place) order obtained for olmedo  0200 labs sent, patient still altered

## 2019-05-24 NOTE — PROGRESS NOTES
F/u Na is 121>>121. Unchanged. K is nl  Uric acid is low    Plan:   Will give a dose of Tolvaptan 15 mg PO x 1  D/C IV NS  No fluid restriction for 24 hrs while on Tolvaptan  BMP every 6 hrs x 3 starting 5 PM    Kj Shin MD  [facility]

## 2019-05-24 NOTE — PROGRESS NOTES
Patient was on tramadol prior to admission after surgery on 5/16. Recommend using an alternative analgesic if needed. Tramadol can exacerbate SIADH causing hyponatremia.     Thanks,    Javad Craig, PHARMD

## 2019-05-24 NOTE — PROGRESS NOTES
1900 - 2000 Bedside and Verbal shift change report given to Amanda Salazar (oncoming nurse) by Nurse (offgoing nurse). Report included the following information SBAR, Kardex, Procedure Summary, Intake/Output, MAR, Recent Results and Cardiac Rhythm NS.     2000 - 2200 Shift assessment complete, alert and oriented X 4 . Follows commands, track and focus with eyes. VSS, will continue to monitor VS and Na level. 2200 - 0000 C/O of right side ache, prn tylenol given. 0000 - 0200 Reassessment complete, no changes noted from previous assessment. See flow sheet for details. Bathe with gown and linen etc .... Changed. Sodium level monitoring continue, lab specimen sent. 0200 - 0400 Repositioned for comfort, no complaints. 0400 - 0600 Reassessment complete, no changes noted from previous assessment. See summary for details. PRN tylenol given as requested, see MAR/ flow sheet for details. 0715 Bedside and Verbal shift change report given to Vicki (oncoming nurse) by Amanda Salazar (offgoing nurse). Report included the following information SBAR, Kardex, ED Summary, Procedure Summary, Intake/Output, MAR, Recent Results and Cardiac Rhythm NS.

## 2019-05-24 NOTE — PROGRESS NOTES
Bedside shift change report given to Jeanna Webster RN (oncoming nurse) by Consuelo Bowser RN (offgoing nurse). Report included the following information SBAR, Intake/Output, MAR, Accordion, Recent Results and Med Rec Status. 0830: Patient's O2 dropping to 87-88 while sleeping. Instructed patient to put NC back on at 2L. 3552: PICC team called for PICC line placement for hypertonic solution. 0930: Spoke with Dr. Amira Miles who instructed to hold 3% NaCl and do not place PICC line unless the Na drops again. Instructed to restart NS with 40 mEqK until noon labs. 1050: Patient working with OT and became orthostatic. OT returned patient back to bed.    1200: Patient assessment completed and documented in flowsheets. 1320: Dr. Amira Miles contacted regarding Na result of 121.    1445: Patient complaining of nausea and vomitting and began to dry heave. Patient given Zofran and will recheck her status shortly. 1500: Patient reports \"feeling better\" and wants to rest.    1525: Spoke with Dr. Patsy Ferraro regarding patient's diet. Will put in clear liquid diet and see how patient will tolerate. 1600: Patient assessed and charted in flowsheets. 1730: Patient being taken downstairs for CT. 1800: Patient returned from CT, wanted to go back to bed. Patient currently in bed and tolerating clear liquid dinner. 1900: Bedside shift change report given to Michelle Hand (oncoming nurse) by Jeanna Webster RN (offgoing nurse). Report included the following information SBAR, Intake/Output, MAR, Accordion, Recent Results and Med Rec Status.

## 2019-05-24 NOTE — PROGRESS NOTES
Critical Result Notification    Received and verbally read back critical Na 117. Primary nurse, Warden Solano, notified. Flowsheet documented.

## 2019-05-24 NOTE — PROGRESS NOTES
Orders received, chart reviewed. Attempted to see pt for PT evaluation however upon arrival, pt w/ c/o increased nausea, actively dry heaving/vomiting. RN notified. Therapy evaluation aborted however will continue to follow.      Linda Xiong, PT, DPT

## 2019-05-24 NOTE — PROGRESS NOTES
Orders received, chart reviewed and patient evaluated by Occupational Therapy. Patient is functioning below her reported independent baseline and would benefit from rehab at discharge, pending progress in acute skilled OT. Pt became orthostatic with standing attempt at bedside, see vitals below.  Full evaluation to follow.     Vitals:   Temp Pulse Resp BP SpO2   05/24/19 1054  Supine, post activity  82 23 127/56 95 %   05/24/19 1051  Standing  100 25 (!) 85/51 96 %   05/24/19 1050  (!) 110 20  95 %   05/24/19 1049  Standing  (!) 111 21 116/53 96 %   05/24/19 1045  (!) 106 20  96 %   05/24/19 1044  Sitting    130/77 95 %   05/24/19 1036  Supine    139/71 94 %     Thank you,    [facility], OTR/L

## 2019-05-24 NOTE — CONSULTS
[address] [facility]    Name:  Rosio Arreaga  MR#:  [id number]  :  1945  ACCOUNT #:  [de-identified]  DATE OF SERVICE:  2019      RENAL CONSULT    REASON FOR CONSULTATION:  Requested by Dr. Rachna Ryan for evaluation and management of severe hyponatremia. The patient was seen and examined. History obtained mainly from chart review. The patient unable to provide any meaningful history. HISTORY OF PRESENT ILLNESS:  The patient is a 66-year-old  female with past medical history significant for CAD, history of stroke with loss of vision in the right eye, type 2 diabetes, who recently had a lumbar laminectomy on 2019 at [facility]. Postop, she was taking Daypro and tramadol. She was seen in the ED at [address] [facility] yesterday when she came with right mid to low back pain. Workup included a CT scan, which did not show any acute intraabdominal pathology or nephrolithiasis. UA was negative for infection. Sodium was 127. It looks like she received a dose of Toradol. She was subsequently discharged. She apparently had normal mental status yesterday. This morning, the patient's family found that the patient was confused and having some trouble speaking. No reported seizure activity. She was brought to emergency room. Her sodium was found to be 114. She has not had any sodium problems before. It has been usually normal.  Per review of ER notes, she had some nausea and vomiting overnight. Reported some vertigo. She received a liter of saline and her sodium was up to 116. She is not on any thiazide diuretics or SSRI. Her vitals do not show any hypotension. In fact, her blood pressure is slightly elevated. The patient is currently resting in the bed comfortably. She is alert, awake, but confused. She is disoriented. Unable to provide any details. No myoclonus or seizure activity noted.     REVIEW OF SYSTEMS:  As noted above, remainder is deferred. Past Medical History:   Diagnosis Date    Breast cancer [facility]) 1999    Right    CAD (coronary artery disease)     Hyperlipidemia    Fibromyalgia     Gastrointestinal disorder     Acid Reflux    Hypothyroid     Pre-diabetes     PVC (premature ventricular contraction)     Too much caffeine    Stroke [facility]) 2003    Took vision from right eye     Past Surgical History:   Procedure Laterality Date    HX COLONOSCOPY N/A     HX FRACTURE TX Left 60years old    Hardware implanted    HX MASTECTOMY Right 1999    HX ORTHOPAEDIC      L knee has pins and plates after a fall    HX PARTIAL HYSTERECTOMY N/A      Allergies   Allergen Reactions    Other Food Anaphylaxis     Steak, cheese, grass, smoke    Tramadol Other (comments)     Hyponatremia     Codeine Rash and Swelling     Mouth    Other Plant, Animal, Environmental Hives     Rubber    Pcn [Penicillins] Rash and Swelling     Mouth     Medication Documentation Review Audit     Reviewed by Darcy Salinas (Registered Nurse) on 05/24/19 at 1048    Medication Sig Documenting Provider Last Dose Status Taking?   acetaminophen/diphenhydramine (TYLENOL PM PO) Take 1 Tab by mouth nightly. Provider, Historical Unknown Unknown time Active No   acyclovir (ZOVIRAX) 400 mg tablet Take 400 mg by mouth two (2) times daily as needed. Provider, Historical Unknown Unknown time Active No   cetirizine (ZYRTEC) 10 mg tablet Take 10 mg by mouth daily. Provider, Historical Unknown Unknown time Active No   cyanocobalamin (VITAMIN B12) 100 mcg tablet Take 100 mcg by mouth daily. Provider, Historical Unknown Unknown time Active No   denosumab (PROLIA) 60 mg/mL injection 60 mg by SubCUTAneous route every 6 months. Provider, Historical Unknown Unknown time Active No   diazePAM (VALIUM) 5 mg tablet Take 1 Tab by mouth every eight (8) hours as needed (spasm). Max Daily Amount: 15 mg.  Akua Youngblood MD Unknown Unknown time Active No   folic acid/multivit-min/lutein (CENTRUM SILVER PO) Take 1 Tab by mouth daily. Provider, Historical Unknown Unknown time Active No   levothyroxine (SYNTHROID) 50 mcg tablet Take 50 mcg by mouth five (5) days a week. Tuesday, Wednesday, Thursday, Saturday, Sunday  Provider, Historical Unknown Unknown time Active No   levothyroxine (SYNTHROID) 75 mcg tablet Take 75 mcg by mouth every Monday and Friday. Provider, Historical Unknown Unknown time Active No   MAGNESIUM PO Take 1 Tab by mouth daily. Provider, Historical Unknown Unknown time Active No   metFORMIN (GLUCOPHAGE) 500 mg tablet Take 500 mg by mouth nightly. Provider, Historical Unknown Unknown time Active No   miSOPROStol (CYTOTEC) 200 mcg tablet Take 200 mcg by mouth two (2) times a day. Provider, Historical Unknown Unknown time Active No   oxaprozin (DAYPRO) 600 mg tablet Take 1 Tab by mouth two (2) times a day. Indications: Joint Damage causing Pain and Loss of Function Ed Andrea MD Unknown Unknown time Active No   raNITIdine (ZANTAC) 150 mg tablet Take 150 mg by mouth daily. Provider, Historical Unknown Unknown time Active No   simvastatin (ZOCOR) 40 mg tablet Take 1 Tab by mouth nightly. Ed Andrea MD Unknown Unknown time Active No   traMADol (ULTRAM) 50 mg tablet Take 50 mg by mouth daily. Provider, Historical Unknown Unknown time Active No              Social History     Tobacco Use    Smoking status: Never Smoker    Smokeless tobacco: Never Used   Substance Use Topics    Alcohol use: No     Comment: once  month wine    Drug use: Never     Family History   Problem Relation Age of Onset    Cancer Mother         breast cancer    Heart Disease Father     Diabetes Brother     Colon Cancer Brother     Diabetes Brother     Heart Disease Brother     Other Sister 55        Gastric bypass into staph infection    No Known Problems Sister          PHYSICAL EXAMINATION:  GENERAL:  Elderly female, appears stated age, in no acute distress.   VITAL SIGNS:  She is afebrile, pulse 94, /80, respiration of 16, saturating 95% on room air. Weight is 74.3 kg. HEENT:  Head is atraumatic, normocephalic. Pupils are equal, round, reactive to light. No scleral icterus. Mucous membrane appears moist.  NECK:  No JVD. No cervical lymphadenopathy. LUNGS:  Clear to auscultation. HEART:  Regular rate and rhythm. Normal S1 and S2. No murmur. No rub. ABDOMEN:  Soft, nontender, active bowel sounds. EXTREMITIES:  No clubbing, cyanosis, or edema. SKIN:  Warm and dry. No rash. CNS:  She is alert, awake. She is confused. She is disoriented. Unable to tell me day, date, time, or place. She keeps repeating \"I am upset. \"  No myoclonic jerks or twitching noted. LABORATORY DATA:  Most recent sodium is 116, this was drawn around 2:41 p.m. Admitting sodium was 114. Previous sodiums have been normal other than 127 from yesterday. All other electrolytes are stable. LFTs are pretty much normal except low albumin of 3.0. Troponin I is normal.  CBC is essentially normal.  Urinalysis shows mild hematuria, wbc of only 0-4, glucose of 250, protein of 30, specific gravity of 1.010. Her TSH has been borderline high at 6.75 back in 02/2019. Repeat one has been ordered. IMAGING STUDIES:  CT of the abdomen and pelvis from yesterday was essentially negative. CT of the head was done in 02/2019, which was negative. Chest x-ray from today showed minimal right basilar subsegmental atelectasis. ASSESSMENT:  1. Hyponatremia. 2.  Altered mental status. 3.  Hypothyroidism. 4.  Type 2 diabetes. 5.  History of stroke. 6.  Recent lumbar laminectomy. The patient seems to have acute severe hyponatremia. She is symptomatic, mostly with confusion and disorientation. Etiology appears to be a combination of excessive ADH effects from recent surgery causing pain, recent NSAIDs use and presumed poor solute intake compounded by an episode of nausea/vomiting.   She has low normal BUN, which is reflective of poor solute intake. Urine sodium and osmolality have not been sent, which is very important in the evaluation and management of hyponatremia. Her sodium has slightly improved, but she will need serial sodium levels to monitor the rate of correction. We will need to avoid rapid rate of correction. Would benefit from bolus of 3% saline, 100 mL, which can also be repeated one to two more times if necessary. RECOMMENDATIONS:  1. Continue IV normal saline for now, which is at 100 mL/hour, but we may have to reduce the rate and/or add Lasix to the saline based on followup sodium levels and urine studies. 2.  Check urine sodium and osmolality. I have ordered STAT and asked the nurse to even do straight cath to obtain urine sample if necessary. 3.  We will give  mL, 3% saline push over 10 minutes. Repeat one to two more times if necessary. I could not place the order because of high alert in [facility]. Spoke to inpatient pharmacy, who will order it for me. 4.  We will do serial sodium levels every four hours starting at 6 p.m. x2.  Further frequency of sodium based on the rate of correction. 5.  Also check TSH with next lab draw. 6.  Check a.m. cortisol as the patient was recently on prednisone. 8.  Avoid thiazide diuretics, SSRI, SNRI and NSAIDs. 9.  Pain control as needed. 10.  When she is able to take p.o., encourage increased solute intake. 11.  We will also place her on formal fluid restriction, when she is able to take adequate oral intake. 12.  P.r.n. antiemetics for control of nausea/vomiting. Thanks for renal consult. We will follow the patient with you.       MD MEY [id number]  D:  05/23/2019 17:51  T:  05/23/2019 19:24  JOB #:  [id number]

## 2019-05-24 NOTE — PROGRESS NOTES
Hospitalist Progress Note              Fritz New MD.                                                             Cell: [phone]                               NAME:  Zander Tinoco  :  1945  MRN:  [id number]  Date of Service:  2019    Summary: 74 y.o.  female with past medical history of CAD, CVA, vision loss right eye, diabetes mellitus who presented on 2019 with altered mental status/confusion. Assessment/Plan:  Severe hyponatremia ( Euvolemic): POA, requiring 3% saline. suspect due to SIADH  F/u on Serum osmolality. Urine osmolality of 338, serum sodium 91  Serial sodium improving. Do not increase more than 8-12 meq / 24 hrs period in order to prevent CPM. Avoid tramadol, SSRI, fluid restriction  Nephrology on board. Metabolic encephalopathy; Secondary to above. Continue supportive care    GNR UTI: Start Levaquin. Allergic to PCN  F/u with speciation and sen report    Abnormal TSH: Check free t4 and T3. DM type 2: Diabetic diet, ISS as per protocol    Seizure: may be related to hyponatremia  Check head CT scan  Seizure precuations       Code status: full  DVT prophylaxsis: Lovenox  Dispo:tbd         Interval History/Subjective:  F/u for altered mental status    She is sleeping. As per RN: patient still confused. Review of Systems:  Pertinent items are noted in HPI. Objective:     VITALS:   Last 24hrs VS reviewed since prior progress note. Most recent are:  Visit Vitals  /66   Pulse 97   Temp 98.5 °F (36.9 °C)   Resp 19   Ht 5' 4\" (1.626 m)   Wt 74.3 kg (163 lb 12.8 oz)   SpO2 96%   BMI 28.12 kg/m²       Intake/Output Summary (Last 24 hours) at 2019 1658  Last data filed at 2019 1400  Gross per 24 hour   Intake 391.67 ml   Output 3245 ml   Net -2853.33 ml        PHYSICAL EXAM:  General: No acute distress, cooperative, pleasant   EENT: EOMI. Anicteric sclerae.  Oral mucous moist, oropharynx benign  Resp: CTA bilaterally. No wheezing/rhonchi/rales. No accessory muscle use  CV: Regular rhythm, normal rate, no murmurs, gallops, rubs  GI: Soft, non distended, non tender. normoactive bowel sounds, no hepatosplenomegaly Extremities: No edema, warm, 2+ pulses throughout  Neurologic: Moves all extremities. Sleeping. Psych: Good insight. Not anxious nor agitated. Skin: Good Turgor, no rashes or ulcers    Lab Data Personally Reviewed: (see below)     Medications list Personally Reviewed:  x YES  NO     _______________________________________________________________________  Care Plan discussed with:  Patient/Family and Nurse    Total NON critical care TIME:  30 minutes    Rad Kearns MD     Procedures: see electronic medical records for all procedures/Xrays and details which were not copied into this note but were reviewed prior to creation of Plan. LABS:  Recent Labs     05/24/19  0150 05/23/19  1256   WBC 13.6* 9.8   HGB 13.6 13.0   HCT 39.4 37.7    288     Recent Labs     05/24/19  1228 05/24/19  0811 05/24/19  0150  05/23/19  1441   * 121* 118*   < > 116*   K 4.0  --  3.6  --  3.5   CL 87*  --  84*  --  84*   CO2 25  --  26  --  25   BUN 8  --  8  --  9   CREA 0.47*  --  0.61  --  0.49*   *  --  120*  --  146*   CA 8.0*  --  8.1*  --  7.8*   URICA  --   --  3.6  --   --     < > = values in this interval not displayed. Recent Labs     05/23/19  1441 05/23/19  1256 05/22/19  2154   SGOT 33 35 34   ALT 46 48 54   AP 95 105 112   TBILI 0.4 0.4 0.3   TP 6.8 7.4 7.9   ALB 3.0* 3.3* 3.6   GLOB 3.8 4.1* 4.3*     No results for input(s): INR, PTP, APTT in the last 72 hours. No lab exists for component: INREXT   No results for input(s): FE, TIBC, PSAT, FERR in the last 72 hours. No results found for: FOL, RBCF   No results for input(s): PH, PCO2, PO2 in the last 72 hours.   Recent Labs     05/23/19  1256   TROIQ <0.05     Lab Results   Component Value Date/Time Cholesterol, total 209 (H) 07/10/2009 08:37 AM    HDL Cholesterol 70 (H) 07/10/2009 08:37 AM    LDL, calculated 121.4 (H) 07/10/2009 08:37 AM    Triglyceride 88 07/10/2009 08:37 AM    CHOL/HDL Ratio 3.0 07/10/2009 08:37 AM     Lab Results   Component Value Date/Time    Glucose (POC) 133 (H) 05/23/2019 06:36 PM    Glucose (POC) 134 (H) 05/23/2019 06:06 PM    Glucose (POC) 103 (H) 05/16/2019 08:47 AM     Lab Results   Component Value Date/Time    Color YELLOW/STRAW 05/23/2019 02:28 PM    Appearance CLOUDY (A) 05/23/2019 02:28 PM    Specific gravity 1.010 05/23/2019 02:28 PM    pH (UA) 7.5 05/23/2019 02:28 PM    Protein 30 (A) 05/23/2019 02:28 PM    Glucose 250 (A) 05/23/2019 02:28 PM    Ketone 15 (A) 05/23/2019 02:28 PM    Bilirubin NEGATIVE  05/23/2019 02:28 PM    Urobilinogen 0.2 05/23/2019 02:28 PM    Nitrites NEGATIVE  05/23/2019 02:28 PM    Leukocyte Esterase NEGATIVE  05/23/2019 02:28 PM    Epithelial cells FEW 05/23/2019 02:28 PM    Bacteria 2+ (A) 05/23/2019 02:28 PM    WBC 0-4 05/23/2019 02:28 PM    RBC 5-10 05/23/2019 02:28 PM

## 2019-05-24 NOTE — PROGRESS NOTES
[facility]  Pulmonary, Critical Care, and Sleep Medicine    Name: Marlee Castelan MRN: [id number]   : 1945 Hospital: [facility]   Date: 2019        Critical Care Initial Patient Consult    IMPRESSION:   · Seizures  · Severe Hyponatremia  · Recent lumbar laminectomy on 19 (at [facility])  was taking Daypro and Ultram.   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 l  · CAD  · Critically ill with severe hyponatremia and seizures. Needs treatment for this. Serial lab draws. High risk of decompensation. 28 minCC, EOP. · Discussed with pts son and nurse at bedside. RECOMMENDATIONS:   · Na per Renal  · Follow for additional Seizures  · Will ask picc team to assess. Needs frequent blood draws and 3% NS.  · Pain control  · Will need PT and OT when appropriate. Subjective/History: This patient has been seen and evaluated at the request of Dr. Jos eRamon Gonsalez  For above. Patient is a 74 y.o. female who presented for above. Had recent back surgery. Presented to ER for back pain was treated and then came back in for AMS. Was noted to have severe hyponatremia and seizures. Noted to have vertigo. Had some nausea and vomiting as well. Na initially was 114. Also had increased BPs.      ROS was otherwise negative this am.     Past Medical History:   Diagnosis Date    Breast cancer ([facility] [address]     Right    CAD (coronary artery disease)     Hyperlipidemia    Fibromyalgia     Gastrointestinal disorder     Acid Reflux    Hypothyroid     Pre-diabetes     PVC (premature ventricular contraction)     Too much caffeine    Stroke [facility])     Took vision from right eye      Past Surgical History:   Procedure Laterality Date    HX COLONOSCOPY N/A     HX FRACTURE TX Left 60years old    Hardware implanted    HX MASTECTOMY Right     HX ORTHOPAEDIC      L knee has pins and plates after a fall    HX PARTIAL HYSTERECTOMY N/A       Prior to Admission medications    Medication Sig Start Date End Date Taking? Authorizing Provider   diazePAM (VALIUM) 5 mg tablet Take 1 Tab by mouth every eight (8) hours as needed (spasm). Max Daily Amount: 15 mg. 5/23/19   Giuliana Malone MD   raNITIdine (ZANTAC) 150 mg tablet Take 150 mg by mouth daily. Provider, Historical   traMADol (ULTRAM) 50 mg tablet Take 1-2 Tabs by mouth every six (6) hours as needed for Pain for up to 7 days. Max Daily Amount: 400 mg. 5/16/19 5/23/19  Percy Ricardo MD   oxaprozin (DAYPRO) 600 mg tablet Take 1 Tab by mouth two (2) times a day. Indications: Joint Damage causing Pain and Loss of Function 5/14/19   Meir Benavides MD   levothyroxine (SYNTHROID) 75 mcg tablet Take 75 mcg by mouth every Monday and Friday. Provider, Historical   denosumab (PROLIA) 60 mg/mL injection 60 mg by SubCUTAneous route every 6 months. Provider, Historical   miSOPROStol (CYTOTEC) 200 mcg tablet Take 200 mcg by mouth two (2) times a day. Provider, Historical   levothyroxine (SYNTHROID) 50 mcg tablet Take 50 mcg by mouth five (5) days a week. Tuesday, Wednesday, Thursday, Saturday, Sunday     Provider, Historical   acyclovir (ZOVIRAX) 400 mg tablet Take 400 mg by mouth two (2) times daily as needed. Provider, Historical   metFORMIN (GLUCOPHAGE) 500 mg tablet Take 500 mg by mouth nightly. Provider, Historical   MAGNESIUM PO Take 1 Tab by mouth daily. Provider, Historical   folic acid/multivit-min/lutein (CENTRUM SILVER PO) Take 1 Tab by mouth daily. Provider, Historical   traMADol (ULTRAM) 50 mg tablet Take 50 mg by mouth daily. Provider, Historical   acetaminophen/diphenhydramine (TYLENOL PM PO) Take 1 Tab by mouth nightly. Provider, Historical   simvastatin (ZOCOR) 40 mg tablet Take 1 Tab by mouth nightly. 12/5/18   Meir Benavides MD   cetirizine (ZYRTEC) 10 mg tablet Take 10 mg by mouth daily. Provider, Historical   cyanocobalamin (VITAMIN B12) 100 mcg tablet Take 100 mcg by mouth daily.     Provider, Historical     Current Facility-Administered Medications   Medication Dose Route Frequency    sodium chloride (NS) flush 5-40 mL  5-40 mL IntraVENous Q8H    enoxaparin (LOVENOX) injection 40 mg  40 mg SubCUTAneous Q24H    cyanocobalamin (VITAMIN B12) tablet 250 mcg  250 mcg Oral DAILY    [START ON 2019] levothyroxine (SYNTHROID) tablet 50 mcg  50 mcg Oral Once per day on Sun Tue Wed Thu Sat    levothyroxine (SYNTHROID) tablet 75 mcg  75 mcg Oral Once per day on     atorvastatin (LIPITOR) tablet 40 mg  40 mg Oral QHS    aspirin chewable tablet 81 mg  81 mg Oral DAILY    3% NaCl- call physician if serum sodium increases by more than 8-12 mmol/L in 24 hours  1 Each Other DAILY    mupirocin (BACTROBAN) 2 % ointment   Both Nostrils Q12H     Allergies   Allergen Reactions    Other Food Anaphylaxis     Steak, cheese, grass, smoke    Codeine Rash and Swelling     Mouth    Other Plant, Animal, Environmental Hives     Rubber    Pcn [Penicillins] Rash and Swelling     Mouth      Social History     Tobacco Use    Smoking status: Never Smoker    Smokeless tobacco: Never Used   Substance Use Topics    Alcohol use: No     Comment: once  month wine      Family History   Problem Relation Age of Onset    Cancer Mother         breast cancer    Heart Disease Father     Diabetes Brother     Colon Cancer Brother     Diabetes Brother     Heart Disease Brother     Other Sister 55        Gastric bypass into staph infection    No Known Problems Sister         Review of Systems:  A comprehensive review of systems was negative.     Objective:   Vital Signs:    Visit Vitals  /73 (BP 1 Location: Right arm, BP Patient Position: At rest)   Pulse 97   Temp 97.9 °F (36.6 °C)   Resp 21   Ht 5' 4\" (1.626 m)   Wt 74.3 kg (163 lb 12.8 oz)   SpO2 94%   BMI 28.12 kg/m²       O2 Device: Room air   O2 Flow Rate (L/min): 2 l/min   Temp (24hrs), Av.4 °F (36.9 °C), Min:97.9 °F (36.6 °C), Max:99.3 °F (37.4 °C) Intake/Output:   Last shift:      05/24 0701 - 05/24 1900  In: -   Out: 250 [Urine:250]  Last 3 shifts: 05/22 1901 - 05/24 0700  In: 1286.7 [I.V.:1286.7]  Out: 2725 [Urine:2725]    Intake/Output Summary (Last 24 hours) at 5/24/2019 0501  Last data filed at 5/24/2019 0800  Gross per 24 hour   Intake 1286.67 ml   Output 2975 ml   Net -1688.33 ml     Hemodynamics:   PAP:   CO:     Wedge:   CI:     CVP:    SVR:       PVR:       Ventilator Settings:  Mode Rate Tidal Volume Pressure FiO2 PEEP                    Peak airway pressure:      Minute ventilation:        Physical Exam:    General:  Alert, cooperative, no distress, appears stated age. Head:  Normocephalic, without obvious abnormality, atraumatic. Eyes:  Conjunctivae/corneas clear. PERRL, EOMs intact. Nose: Nares normal. Septum midline. Mucosa normal. No drainage or sinus tenderness. Throat: Lips, mucosa, and tongue normal. Teeth and gums normal.   Neck: Supple, symmetrical, trachea midline, no adenopathy, thyroid: no enlargment/tenderness/nodules, no carotid bruit and no JVD. Back:   Symmetric, no curvature. ROM normal.   Lungs:   Clear to auscultation bilaterally. Chest wall:  No tenderness or deformity. Heart:  Regular rate and rhythm, S1, S2 normal, no murmur, click, rub or gallop. Abdomen:   Soft, non-tender. Bowel sounds normal. No masses,  No organomegaly. Extremities: Extremities normal, atraumatic, no cyanosis or edema. Pulses: 2+ and symmetric all extremities.    Skin: Skin color, texture, turgor normal. No rashes or lesions   Lymph nodes: Cervical, supraclavicular, and axillary nodes normal.   Neurologic: Grossly nonfocal       Data:     Recent Results (from the past 24 hour(s))   EKG, 12 LEAD, INITIAL    Collection Time: 05/23/19 12:05 PM   Result Value Ref Range    Ventricular Rate 74 BPM    Atrial Rate 241 BPM    QRS Duration 80 ms    Q-T Interval 386 ms    QTC Calculation (Bezet) 428 ms    Calculated R Axis -40 degrees Calculated T Axis 35 degrees    Diagnosis       Sinus rhythm  Left axis deviation  When compared with ECG of 18-FEB-2019 13:24,  No significant change was found  Confirmed by Beba Cole, P.VDennis ([id number]) on 5/23/2019 1:19:51 PM     CBC WITH AUTOMATED DIFF    Collection Time: 05/23/19 12:56 PM   Result Value Ref Range    WBC 9.8 3.6 - 11.0 K/uL    RBC 4.12 3.80 - 5.20 M/uL    HGB 13.0 11.5 - 16.0 g/dL    HCT 37.7 35.0 - 47.0 %    MCV 91.5 80.0 - 99.0 FL    MCH 31.6 26.0 - 34.0 PG    MCHC 34.5 30.0 - 36.5 g/dL    RDW 12.8 11.5 - 14.5 %    PLATELET 690 104 - 622 K/uL    MPV 8.5 (L) 8.9 - 12.9 FL    NRBC 0.0 0  WBC    ABSOLUTE NRBC 0.00 0.00 - 0.01 K/uL    NEUTROPHILS 75 32 - 75 %    LYMPHOCYTES 13 12 - 49 %    MONOCYTES 11 5 - 13 %    EOSINOPHILS 1 0 - 7 %    BASOPHILS 0 0 - 1 %    IMMATURE GRANULOCYTES 0 0.0 - 0.5 %    ABS. NEUTROPHILS 7.3 1.8 - 8.0 K/UL    ABS. LYMPHOCYTES 1.3 0.8 - 3.5 K/UL    ABS. MONOCYTES 1.1 (H) 0.0 - 1.0 K/UL    ABS. EOSINOPHILS 0.1 0.0 - 0.4 K/UL    ABS. BASOPHILS 0.0 0.0 - 0.1 K/UL    ABS. IMM. GRANS. 0.0 0.00 - 0.04 K/UL    DF AUTOMATED     METABOLIC PANEL, COMPREHENSIVE    Collection Time: 05/23/19 12:56 PM   Result Value Ref Range    Sodium 114 (LL) 136 - 145 mmol/L    Potassium 3.8 3.5 - 5.1 mmol/L    Chloride 79 (L) 97 - 108 mmol/L    CO2 27 21 - 32 mmol/L    Anion gap 8 5 - 15 mmol/L    Glucose 146 (H) 65 - 100 mg/dL    BUN 11 6 - 20 MG/DL    Creatinine 0.49 (L) 0.55 - 1.02 MG/DL    BUN/Creatinine ratio 22 (H) 12 - 20      GFR est AA >60 >60 ml/min/1.73m2    GFR est non-AA >60 >60 ml/min/1.73m2    Calcium 8.4 (L) 8.5 - 10.1 MG/DL    Bilirubin, total 0.4 0.2 - 1.0 MG/DL    ALT (SGPT) 48 12 - 78 U/L    AST (SGOT) 35 15 - 37 U/L    Alk.  phosphatase 105 45 - 117 U/L    Protein, total 7.4 6.4 - 8.2 g/dL    Albumin 3.3 (L) 3.5 - 5.0 g/dL    Globulin 4.1 (H) 2.0 - 4.0 g/dL    A-G Ratio 0.8 (L) 1.1 - 2.2     CK W/ REFLX CKMB    Collection Time: 05/23/19 12:56 PM   Result Value Ref Range    CK 40 26 - 192 U/L   TROPONIN I    Collection Time: 05/23/19 12:56 PM   Result Value Ref Range    Troponin-I, Qt. <0.05 <0.05 ng/mL   SAMPLES BEING HELD    Collection Time: 05/23/19 12:56 PM   Result Value Ref Range    SAMPLES BEING HELD 1BLUE,1RED     COMMENT        Add-on orders for these samples will be processed based on acceptable specimen integrity and analyte stability, which may vary by analyte. URINALYSIS W/ REFLEX CULTURE    Collection Time: 05/23/19  2:28 PM   Result Value Ref Range    Color YELLOW/STRAW      Appearance CLOUDY (A) CLEAR      Specific gravity 1.010 1.003 - 1.030      pH (UA) 7.5 5.0 - 8.0      Protein 30 (A) NEG mg/dL    Glucose 250 (A) NEG mg/dL    Ketone 15 (A) NEG mg/dL    Bilirubin NEGATIVE  NEG      Blood TRACE (A) NEG      Urobilinogen 0.2 0.2 - 1.0 EU/dL    Nitrites NEGATIVE  NEG      Leukocyte Esterase NEGATIVE  NEG      WBC 0-4 0 - 4 /hpf    RBC 5-10 0 - 5 /hpf    Epithelial cells FEW FEW /lpf    Bacteria 2+ (A) NEG /hpf    UA:UC IF INDICATED URINE CULTURE ORDERED (A) CNI      Hyaline cast 0-2 0 - 5 /lpf   METABOLIC PANEL, COMPREHENSIVE    Collection Time: 05/23/19  2:41 PM   Result Value Ref Range    Sodium 116 (LL) 136 - 145 mmol/L    Potassium 3.5 3.5 - 5.1 mmol/L    Chloride 84 (L) 97 - 108 mmol/L    CO2 25 21 - 32 mmol/L    Anion gap 7 5 - 15 mmol/L    Glucose 146 (H) 65 - 100 mg/dL    BUN 9 6 - 20 MG/DL    Creatinine 0.49 (L) 0.55 - 1.02 MG/DL    BUN/Creatinine ratio 18 12 - 20      GFR est AA >60 >60 ml/min/1.73m2    GFR est non-AA >60 >60 ml/min/1.73m2    Calcium 7.8 (L) 8.5 - 10.1 MG/DL    Bilirubin, total 0.4 0.2 - 1.0 MG/DL    ALT (SGPT) 46 12 - 78 U/L    AST (SGOT) 33 15 - 37 U/L    Alk.  phosphatase 95 45 - 117 U/L    Protein, total 6.8 6.4 - 8.2 g/dL    Albumin 3.0 (L) 3.5 - 5.0 g/dL    Globulin 3.8 2.0 - 4.0 g/dL    A-G Ratio 0.8 (L) 1.1 - 2.2     SODIUM, UR, RANDOM    Collection Time: 05/23/19  5:37 PM   Result Value Ref Range    Sodium,urine random 91 MMOL/L OSMOLALITY, UR    Collection Time: 05/23/19  5:37 PM   Result Value Ref Range    Osmolality,urine 338 MOSM/kg H2O   GLUCOSE, POC    Collection Time: 05/23/19  6:06 PM   Result Value Ref Range    Glucose (POC) 134 (H) 65 - 100 mg/dL    Performed by NICOLE BAUER (PCT) CON    GLUCOSE, POC    Collection Time: 05/23/19  6:36 PM   Result Value Ref Range    Glucose (POC) 133 (H) 65 - 100 mg/dL    Performed by [facility]    SODIUM    Collection Time: 05/23/19  9:49 PM   Result Value Ref Range    Sodium 117 (LL) 136 - 145 mmol/L   CBC WITH AUTOMATED DIFF    Collection Time: 05/24/19  1:50 AM   Result Value Ref Range    WBC 13.6 (H) 3.6 - 11.0 K/uL    RBC 4.31 3.80 - 5.20 M/uL    HGB 13.6 11.5 - 16.0 g/dL    HCT 39.4 35.0 - 47.0 %    MCV 91.4 80.0 - 99.0 FL    MCH 31.6 26.0 - 34.0 PG    MCHC 34.5 30.0 - 36.5 g/dL    RDW 12.8 11.5 - 14.5 %    PLATELET 089 826 - 457 K/uL    MPV 8.4 (L) 8.9 - 12.9 FL    NRBC 0.0 0  WBC    ABSOLUTE NRBC 0.00 0.00 - 0.01 K/uL    NEUTROPHILS 75 32 - 75 %    BAND NEUTROPHILS 1 %    LYMPHOCYTES 14 12 - 49 %    MONOCYTES 8 5 - 13 %    EOSINOPHILS 1 0 - 7 %    BASOPHILS 1 0 - 1 %    IMMATURE GRANULOCYTES 0 0.0 - 0.5 %    ABS. NEUTROPHILS 10.4 (H) 1.8 - 8.0 K/UL    ABS. LYMPHOCYTES 1.9 0.8 - 3.5 K/UL    ABS. MONOCYTES 1.1 (H) 0.0 - 1.0 K/UL    ABS. EOSINOPHILS 0.1 0.0 - 0.4 K/UL    ABS. BASOPHILS 0.1 0.0 - 0.1 K/UL    ABS. IMM.  GRANS. 0.0 0.00 - 0.04 K/UL    DF SMEAR SCANNED      RBC COMMENTS NORMOCYTIC, NORMOCHROMIC     METABOLIC PANEL, BASIC    Collection Time: 05/24/19  1:50 AM   Result Value Ref Range    Sodium 118 (LL) 136 - 145 mmol/L    Potassium 3.6 3.5 - 5.1 mmol/L    Chloride 84 (L) 97 - 108 mmol/L    CO2 26 21 - 32 mmol/L    Anion gap 8 5 - 15 mmol/L    Glucose 120 (H) 65 - 100 mg/dL    BUN 8 6 - 20 MG/DL    Creatinine 0.61 0.55 - 1.02 MG/DL    BUN/Creatinine ratio 13 12 - 20      GFR est AA >60 >60 ml/min/1.73m2    GFR est non-AA >60 >60 ml/min/1.73m2    Calcium 8.1 (L) 8.5 - 10.1 MG/DL   CORTISOL, AM    Collection Time: 05/24/19  1:50 AM   Result Value Ref Range    Cortisol, a.m. 33.4 (H) 4.30 - 22.45 ug/dL   TSH 3RD GENERATION    Collection Time: 05/24/19  1:50 AM   Result Value Ref Range    TSH 0.18 (L) 0.36 - 3.74 uIU/mL   URIC ACID    Collection Time: 05/24/19  1:50 AM   Result Value Ref Range    Uric acid 3.6 2.6 - 6.0 MG/DL             Telemetry:normal sinus rhythm    Imaging:  I have personally reviewed the patients radiographs and have reviewed the reports:  5-23-19: CXR minimal right basilar atelectasis.          Total critical care time exclusive of procedures: 35 minutes  Yesenia Workman MD

## 2019-05-24 NOTE — PROGRESS NOTES
Pharmacy Automatic Renal Dosing Protocol - Antimicrobials    Indication for Antimicrobials: UTI     Current Regimen of Each Antimicrobial:  Levaquin 750mg IV q24h (Start Date ; Day # 1)    Previous Antimicrobial Therapy:    Significant Cultures:   : ucx - > 100K gram neg rods (pending)    Radiology / Imaging results: (X-ray, CT scan or MRI):     Paralysis, amputations, malnutrition: none noted    Labs:  Recent Labs     19  1228 19  0150 19  1441 19  1256 19  2154   CREA 0.47* 0.61 0.49* 0.49* 0.64   BUN 8 8 9 11 13   WBC  --  13.6*  --  9.8 7.9     Temp (24hrs), Av.5 °F (36.9 °C), Min:97.9 °F (36.6 °C), Max:99.3 °F (37.4 °C)    Creatinine Clearance (mL/min) or Dialysis: 60    Impression/Plan:   Unclear if this is complicated UTI. Patient is in the ICU with AMS but also has hyponatremia  Will adjust per protocol to Levaquin 750mg IV q24h due to CrCl  Antimicrobial stop date 5 days     Pharmacy will follow daily and adjust medications as appropriate for renal function and/or serum levels. Thank you,  Anya Gonzalez, PHARMD    Recommended duration of therapy  http://[facility]/Pharmacy/Clinical%20Companion/Duration%20of%20ABX%20therapy. docx    Renal Dosing  http://[facility]/[place]/[facility]/Pharmacy/Clinical%20Companion/Renal%[id number]. pdf

## 2019-05-25 LAB
ANION GAP SERPL CALC-SCNC: 10 MMOL/L (ref 5–15)
ANION GAP SERPL CALC-SCNC: 7 MMOL/L (ref 5–15)
ANION GAP SERPL CALC-SCNC: 9 MMOL/L (ref 5–15)
BACTERIA SPEC CULT: ABNORMAL
BACTERIA SPEC CULT: ABNORMAL
BASOPHILS # BLD: 0 K/UL (ref 0–0.1)
BASOPHILS NFR BLD: 0 % (ref 0–1)
BUN SERPL-MCNC: 10 MG/DL (ref 6–20)
BUN SERPL-MCNC: 10 MG/DL (ref 6–20)
BUN SERPL-MCNC: 9 MG/DL (ref 6–20)
BUN/CREAT SERPL: 12 (ref 12–20)
CALCIUM SERPL-MCNC: 8.5 MG/DL (ref 8.5–10.1)
CALCIUM SERPL-MCNC: 8.6 MG/DL (ref 8.5–10.1)
CALCIUM SERPL-MCNC: 8.8 MG/DL (ref 8.5–10.1)
CC UR VC: ABNORMAL
CHLORIDE SERPL-SCNC: 96 MMOL/L (ref 97–108)
CHLORIDE SERPL-SCNC: 97 MMOL/L (ref 97–108)
CHLORIDE SERPL-SCNC: 99 MMOL/L (ref 97–108)
CO2 SERPL-SCNC: 23 MMOL/L (ref 21–32)
CO2 SERPL-SCNC: 27 MMOL/L (ref 21–32)
CO2 SERPL-SCNC: 28 MMOL/L (ref 21–32)
CREAT SERPL-MCNC: 0.74 MG/DL (ref 0.55–1.02)
CREAT SERPL-MCNC: 0.82 MG/DL (ref 0.55–1.02)
CREAT SERPL-MCNC: 0.85 MG/DL (ref 0.55–1.02)
DIFFERENTIAL METHOD BLD: ABNORMAL
EOSINOPHIL # BLD: 0 K/UL (ref 0–0.4)
EOSINOPHIL NFR BLD: 0 % (ref 0–7)
ERYTHROCYTE [DISTWIDTH] IN BLOOD BY AUTOMATED COUNT: 13 % (ref 11.5–14.5)
GLUCOSE BLD STRIP.AUTO-MCNC: 100 MG/DL (ref 65–100)
GLUCOSE BLD STRIP.AUTO-MCNC: 111 MG/DL (ref 65–100)
GLUCOSE BLD STRIP.AUTO-MCNC: 132 MG/DL (ref 65–100)
GLUCOSE SERPL-MCNC: 106 MG/DL (ref 65–100)
GLUCOSE SERPL-MCNC: 112 MG/DL (ref 65–100)
GLUCOSE SERPL-MCNC: 141 MG/DL (ref 65–100)
HCT VFR BLD AUTO: 40.7 % (ref 35–47)
HGB BLD-MCNC: 14.4 G/DL (ref 11.5–16)
IMM GRANULOCYTES # BLD AUTO: 0 K/UL (ref 0–0.04)
IMM GRANULOCYTES NFR BLD AUTO: 0 % (ref 0–0.5)
LYMPHOCYTES # BLD: 3.9 K/UL (ref 0.8–3.5)
LYMPHOCYTES NFR BLD: 33 % (ref 12–49)
MAGNESIUM SERPL-MCNC: 2.5 MG/DL (ref 1.6–2.4)
MCH RBC QN AUTO: 32.1 PG (ref 26–34)
MCHC RBC AUTO-ENTMCNC: 35.4 G/DL (ref 30–36.5)
MCV RBC AUTO: 90.6 FL (ref 80–99)
MONOCYTES # BLD: 0.9 K/UL (ref 0–1)
MONOCYTES NFR BLD: 8 % (ref 5–13)
NEUTS BAND NFR BLD MANUAL: 4 %
NEUTS SEG # BLD: 7 K/UL (ref 1.8–8)
NEUTS SEG NFR BLD: 55 % (ref 32–75)
NRBC # BLD: 0 K/UL (ref 0–0.01)
NRBC BLD-RTO: 0 PER 100 WBC
PHOSPHATE SERPL-MCNC: 2.5 MG/DL (ref 2.6–4.7)
PLATELET # BLD AUTO: 307 K/UL (ref 150–400)
PMV BLD AUTO: 8.2 FL (ref 8.9–12.9)
POTASSIUM SERPL-SCNC: 3.7 MMOL/L (ref 3.5–5.1)
POTASSIUM SERPL-SCNC: 3.8 MMOL/L (ref 3.5–5.1)
POTASSIUM SERPL-SCNC: 3.9 MMOL/L (ref 3.5–5.1)
RBC # BLD AUTO: 4.49 M/UL (ref 3.8–5.2)
RBC MORPH BLD: ABNORMAL
SERVICE CMNT-IMP: ABNORMAL
SERVICE CMNT-IMP: NORMAL
SODIUM SERPL-SCNC: 130 MMOL/L (ref 136–145)
SODIUM SERPL-SCNC: 133 MMOL/L (ref 136–145)
SODIUM SERPL-SCNC: 133 MMOL/L (ref 136–145)
WBC # BLD AUTO: 11.8 K/UL (ref 3.6–11)
WBC MORPH BLD: ABNORMAL

## 2019-05-25 PROCEDURE — 65660000000 HC RM CCU STEPDOWN

## 2019-05-25 PROCEDURE — 82962 GLUCOSE BLOOD TEST: CPT

## 2019-05-25 PROCEDURE — 85025 COMPLETE CBC W/AUTO DIFF WBC: CPT

## 2019-05-25 PROCEDURE — 80048 BASIC METABOLIC PNL TOTAL CA: CPT

## 2019-05-25 PROCEDURE — 36415 COLL VENOUS BLD VENIPUNCTURE: CPT

## 2019-05-25 PROCEDURE — 77010033678 HC OXYGEN DAILY

## 2019-05-25 PROCEDURE — 97116 GAIT TRAINING THERAPY: CPT

## 2019-05-25 PROCEDURE — 74011250636 HC RX REV CODE- 250/636: Performed by: HOSPITALIST

## 2019-05-25 PROCEDURE — 74011250636 HC RX REV CODE- 250/636: Performed by: INTERNAL MEDICINE

## 2019-05-25 PROCEDURE — 74011250637 HC RX REV CODE- 250/637: Performed by: INTERNAL MEDICINE

## 2019-05-25 PROCEDURE — 97161 PT EVAL LOW COMPLEX 20 MIN: CPT

## 2019-05-25 PROCEDURE — 84100 ASSAY OF PHOSPHORUS: CPT

## 2019-05-25 PROCEDURE — 83735 ASSAY OF MAGNESIUM: CPT

## 2019-05-25 RX ORDER — INSULIN LISPRO 100 [IU]/ML
INJECTION, SOLUTION INTRAVENOUS; SUBCUTANEOUS
Status: DISCONTINUED | OUTPATIENT
Start: 2019-05-25 | End: 2019-06-04 | Stop reason: HOSPADM

## 2019-05-25 RX ORDER — DEXTROSE MONOHYDRATE 50 MG/ML
50 INJECTION, SOLUTION INTRAVENOUS CONTINUOUS
Status: DISCONTINUED | OUTPATIENT
Start: 2019-05-25 | End: 2019-05-26

## 2019-05-25 RX ORDER — MAGNESIUM SULFATE 100 %
4 CRYSTALS MISCELLANEOUS AS NEEDED
Status: DISCONTINUED | OUTPATIENT
Start: 2019-05-25 | End: 2019-06-04 | Stop reason: HOSPADM

## 2019-05-25 RX ADMIN — MUPIROCIN: 20 OINTMENT TOPICAL at 08:52

## 2019-05-25 RX ADMIN — LEVOFLOXACIN 750 MG: 5 INJECTION, SOLUTION INTRAVENOUS at 18:01

## 2019-05-25 RX ADMIN — Medication 10 ML: at 14:00

## 2019-05-25 RX ADMIN — Medication 10 ML: at 21:06

## 2019-05-25 RX ADMIN — MUPIROCIN: 20 OINTMENT TOPICAL at 20:49

## 2019-05-25 RX ADMIN — ACETAMINOPHEN 650 MG: 325 TABLET ORAL at 09:08

## 2019-05-25 RX ADMIN — ASPIRIN 81 MG 81 MG: 81 TABLET ORAL at 08:51

## 2019-05-25 RX ADMIN — CYANOCOBALAMIN TAB 500 MCG 250 MCG: 500 TAB at 08:51

## 2019-05-25 RX ADMIN — ENOXAPARIN SODIUM 40 MG: 40 INJECTION SUBCUTANEOUS at 08:52

## 2019-05-25 RX ADMIN — LEVOTHYROXINE SODIUM 50 MCG: 50 TABLET ORAL at 05:46

## 2019-05-25 RX ADMIN — ACETAMINOPHEN 650 MG: 325 TABLET ORAL at 04:35

## 2019-05-25 RX ADMIN — ACETAMINOPHEN 650 MG: 325 TABLET ORAL at 14:56

## 2019-05-25 RX ADMIN — Medication 10 ML: at 05:47

## 2019-05-25 RX ADMIN — ATORVASTATIN CALCIUM 40 MG: 40 TABLET, FILM COATED ORAL at 21:06

## 2019-05-25 RX ADMIN — DEXTROSE MONOHYDRATE 50 ML/HR: 50 INJECTION, SOLUTION INTRAVENOUS at 11:20

## 2019-05-25 RX ADMIN — ACETAMINOPHEN 650 MG: 325 TABLET ORAL at 20:50

## 2019-05-25 NOTE — PROGRESS NOTES
0715 Bedside shift change report given to Alta Fair (oncoming nurse) by Mark Murray (offgoing nurse). Report included the following information SBAR, Kardex, ED Summary, Procedure Summary, Intake/Output, MAR, Recent Results and Cardiac Rhythm NSR.     0740 PT at bedside to evaluate. Orthostatic BPs taken (see flowsheet and PT note), pt did not tolerate sitting without dizziness and pt remained in bed     0830 Informed by pharmacy of 9 point Na increase in 12 hours. Per protocol BMP ordered STAT to check if Na still increasing. Results pending. Transfer orders to Tele placed at this time per Dr Christine Ortega. 1000 Dr Josiane Montgomery at bedside to evaluate. Informed her of transfer orders. She gave verbal order to transfer to [facility]. Order changed.

## 2019-05-25 NOTE — PROGRESS NOTES
Problem: Mobility Impaired (Adult and Pediatric)  Goal: *Acute Goals and Plan of Care (Insert Text)  Description  Physical Therapy Goals  Initiated 5/25/2019  1. Patient will move from supine to sit and sit to supine , scoot up and down and roll side to side in bed with independence within 7 day(s). 2.  Patient will transfer from bed to chair and chair to bed with independence using the least restrictive device within 7 day(s). 3.  Patient will perform sit to stand with independence within 7 day(s). 4.  Patient will ambulate with independence for 300 feet with the least restrictive device within 7 day(s). 5.  Patient will ascend/descend 3 stairs with 1 handrail(s) with independence within 7 day(s). Outcome: Progressing Towards Goal   PHYSICAL THERAPY EVALUATION  Patient: Yong Rogers (6 y.o. female)  Date: 5/25/2019  Primary Diagnosis: Hyponatremia [E87.1]        Precautions:    blind in R eye    ASSESSMENT :  Based on the objective data described below, the patient presents with orthostatic hypotension, intermittent confusion, increased weakness, impaired standing balance, decreased endurance/activity tolerance, and overall impaired functional mobility. Pt received supine in bed, A&O x2, agreeable to participation with therapy. Upon assuming seated position EOB, BP decreased to 80s/50s however BP stabilized following seated exercise/activity. Pt sit>>stand w/ minAx2, exhibiting posterior LOB/posterior lean, requiring Laura to facilitate forward weight shift. Pt c/o worsening dizziness in static stance, unable to tolerate standing position long enough to assess standing BP. Pt sidestepped along EOB w/ HHAx2 and minAx2 in order to reach higher position in bed. Again, strong posterior lean noted with increased trunk sway and mild buckling of BLEs. Gait unsteady overall over brief ambulation trial. BP stabilized once pt returned to supine position in bed.  Pt is a falls risk at this time and should continue to have x1 person assist during all OOB mobility/ambulation. Pending hospital course and further progress with therapy, discharge recommendations TBD however pt likely to be appropriate to return home. Patient will benefit from skilled intervention to address the above impairments. Patient?s rehabilitation potential is considered to be Good  Factors which may influence rehabilitation potential include:   ? None noted  ? Mental ability/status  ? Medical condition  ? Home/family situation and support systems  ? Safety awareness  ? Pain tolerance/management  ? Other:      PLAN :  Recommendations and Planned Interventions:  ?           Bed Mobility Training             ? Neuromuscular Re-Education  ? Transfer Training                   ? Orthotic/Prosthetic Training  ? Gait Training                         ? Modalities  ? Therapeutic Exercises           ? Edema Management/Control  ? Therapeutic Activities            ? Patient and Family Training/Education  ? Other (comment): stair climbing    Frequency/Duration: Patient will be followed by physical therapy  4 times a week to address goals. Discharge Recommendations: TBD, anticipate none pending hospital course/progress with therapy   Further Equipment Recommendations for Discharge: None      SUBJECTIVE:   Patient stated ? Today is the 15th?  No? How many days have I lost??    OBJECTIVE DATA SUMMARY:   HISTORY:    Past Medical History:   Diagnosis Date    Breast cancer ([facility] [address] 1999    Right    CAD (coronary artery disease)     Hyperlipidemia    Fibromyalgia     Gastrointestinal disorder     Acid Reflux    Hypothyroid     Pre-diabetes     PVC (premature ventricular contraction)     Too much caffeine    Stroke [facility]) 2003    Took vision from right eye     Past Surgical History:   Procedure Laterality Date    HX COLONOSCOPY N/A     HX FRACTURE TX Left 55 years old    Hardware implanted    HX MASTECTOMY Right 1999    HX ORTHOPAEDIC      L knee has pins and plates after a fall    HX PARTIAL HYSTERECTOMY N/A      Prior Level of Function/Home Situation: Pt lives at home alone. Reports independence w/ ambulation and ADLs. Denies history of falls. Recent back surgery (L1-3 hemilaminectomy, foraminotomy, microdiscectomy) at [facility] (5/16/19). Active working in her yard and doing all cooking, cleaning, etc.   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: Private residence  # Steps to Enter: 3  Rails to Enter: Yes  Hand Rails : Bilateral  One/Two Story Residence: Two story, live on 1st floor  # of Interior Steps: 12  Interior Rails: Left  Living Alone: Yes  Support Systems: Family member(s)  Patient Expects to be Discharged to[de-identified] Private residence  Current DME Used/Available at Home: Grab bars  Tub or Shower Type: Shower    EXAMINATION/PRESENTATION/DECISION MAKING:   Critical Behavior:  Neurologic State: Alert, Eyes open spontaneously  Orientation Level: Oriented X4  Cognition: Follows commands  Safety/Judgement: Decreased awareness of environment, Fall prevention  Hearing:   Auditory  Auditory Impairment: None  Skin:  intact  Edema: none noted  Range Of Motion:  AROM: Within functional limits                       Strength:    Strength: Generally decreased, functional                    Tone & Sensation:   Tone: Normal              Sensation: Intact               Coordination:  Coordination: Generally decreased, functional  Vision:      Functional Mobility:  Bed Mobility:  Rolling: Supervision  Supine to Sit: Supervision  Sit to Supine: Supervision  Scooting: Supervision  Transfers:  Sit to Stand: Minimum assistance;Assist x2  Stand to Sit: Minimum assistance;Assist x2                       Balance:   Sitting: Intact  Standing: Impaired  Standing - Static: Fair  Standing - Dynamic : Fair  Ambulation/Gait Training:                                                        Functional Measure:  Barthel Index:    Bathin  Bladder: 0  Bowels: 5  Groomin  Dressin  Feeding: 10  Mobility: 0  Stairs: 0  Toilet Use: 5  Transfer (Bed to Chair and Back): 5  Total: 35/100       Percentage of impairment   0%   1-19%   20-39%   40-59%   60-79%   80-99%   100%   Barthel Score 0-100 100 99-80 79-60 59-40 20-39 1-19   0     The Barthel ADL Index: Guidelines  1. The index should be used as a record of what a patient does, not as a record of what a patient could do. 2. The main aim is to establish degree of independence from any help, physical or verbal, however minor and for whatever reason. 3. The need for supervision renders the patient not independent. 4. A patient's performance should be established using the best available evidence. Asking the patient, friends/relatives and nurses are the usual sources, but direct observation and common sense are also important. However direct testing is not needed. 5. Usually the patient's performance over the preceding 24-48 hours is important, but occasionally longer periods will be relevant. 6. Middle categories imply that the patient supplies over 50 per cent of the effort. 7. Use of aids to be independent is allowed. Silvina Ramos., Barthel, D.W. (9090). Functional evaluation: the Barthel Index. [address] [facility] (14)2. [facility] MARGOTH Velásquez, [facility]., [place].[address] (). Measuring the change indisability after inpatient rehabilitation; comparison of the responsiveness of the Barthel Index and Functional Jackson Measure. Journal of Neurology, Neurosurgery, and Psychiatry, 66(4), 624-308. Rashad Dukes, N.J.A, Eliezer Salazar  WDennisJ.M, & Rd Koenig MDennisA. (2004.) Assessment of post-stroke quality of life in cost-effectiveness studies: The usefulness of the Barthel Index and the EuroQoL-5D.  [facility], 13, 008-37 Physical Therapy Evaluation Charge Determination   History Examination Presentation Decision-Making   MEDIUM  Complexity : 1-2 comorbidities / personal factors will impact the outcome/ POC  MEDIUM Complexity : 3 Standardized tests and measures addressing body structure, function, activity limitation and / or participation in recreation  MEDIUM Complexity : Evolving with changing characteristics  MEDIUM Complexity : FOTO score of 26-74      Based on the above components, the patient evaluation is determined to be of the following complexity level: MEDIUM    Pain:  Pain Scale 1: Numeric (0 - 10)  Pain Intensity 1: 5  Pain Location 1: Flank  Pain Orientation 1: Right  Pain Description 1: Aching;Cramping  Pain Intervention(s) 1: Medication (see MAR)  Activity Tolerance:   Orthostatic hypotension with associated dizziness/lightheadedness  Please refer to the flowsheet for vital signs taken during this treatment. After treatment:   ?         Patient left in no apparent distress sitting up in chair  ? Patient left in no apparent distress in bed  ? Call bell left within reach  ? Nursing notified  ? Caregiver present  ? Bed alarm activated    COMMUNICATION/EDUCATION:   The patient?s plan of care was discussed with: Registered Nurse. ?         Fall prevention education was provided and the patient/caregiver indicated understanding. ? Patient/family have participated as able in goal setting and plan of care. ?         Patient/family agree to work toward stated goals and plan of care. ?         Patient understands intent and goals of therapy, but is neutral about his/her participation. ? Patient is unable to participate in goal setting and plan of care.     Thank you for this referral.  Valerie Jarrell, PT, DPT   Time Calculation: 15 mins

## 2019-05-25 NOTE — PROGRESS NOTES
1900 - 2000 Bedside and Verbal shift change report given to Hiram Rene (oncoming nurse) by Noelle Peterson (offgoing nurse). Report included the following information SBAR, Kardex, ED Summary, Procedure Summary, Intake/Output, MAR, Recent Results and Cardiac Rhythm NS.     2000 - 2200 Shift assessment complete, alert and oriented X 4, visitor at bedside. Questions answered, reassurance given. C/O R side pain, prn tylenol given. VSS, will continue to monitor. 2200 - 0000 Encouraged to turn self in bed. Provider at bedside updated on last sodium level. 0000 - 0200 Reassessment complete, no changes noted from previous assessment. See flow sheet for details. 0200 - 0400 Bathed and linen with gown changed. Cifuentes catheter pulled @ 0230 replaced by external female catheter ( purewick ).    0400 - 0600 Resting in bed, blood specimen withdrawn . Ice pack was given to help with r flank pain. 0600 - 0700 Resting quietly in bed.    0705 Bedside and Verbal shift change report given to Vicki (oncoming nurse) by Hiram Rene (offgoing nurse). Report included the following information SBAR, Procedure Summary, Intake/Output, MAR, Recent Results and Cardiac Rhythm NS.

## 2019-05-25 NOTE — PROGRESS NOTES
[facility]  Pulmonary, Critical Care, and Sleep Medicine    Name: Aman Li MRN: [id number]   : 1945 Hospital: [address]   Date: 2019        Critical Care    IMPRESSION:   · Seizures  · Severe Hyponatremia  · Recent lumbar laminectomy on 19 (at [facility])  was taking Daypro and Ultram.   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 l  · CAD  · Critically ill with severe hyponatremia and seizures. Needs treatment for this. Serial lab draws. High risk of decompensation. 28 minCC, EOP. · Discussed with pts son and nurse at bedside. RECOMMENDATIONS:   · Na per Renal, increased from 121 to 130 in 12hrs  · Neuro status stable  · Off 3% NS.  · Pain control  · Will need PT and OT? ?  · Advance diet  · No need for ccu monitoring anymore  · Will transfer to tele      Subjective/History:     Awake, alert, no distress    ROS was otherwise negative this am.       Current Facility-Administered Medications   Medication Dose Route Frequency    insulin lispro (HUMALOG) injection   SubCUTAneous AC&HS    Tolvaptan - notify provider if Sodium increases more than 8-12 in 24 hours  1 Each Other Q6H    levoFLOXacin (LEVAQUIN) 750 mg in D5W IVPB  750 mg IntraVENous Q24H    sodium chloride (NS) flush 5-40 mL  5-40 mL IntraVENous Q8H    enoxaparin (LOVENOX) injection 40 mg  40 mg SubCUTAneous Q24H    cyanocobalamin (VITAMIN B12) tablet 250 mcg  250 mcg Oral DAILY    levothyroxine (SYNTHROID) tablet 50 mcg  50 mcg Oral Once per day on Sun Tue Wed Thu Sat    levothyroxine (SYNTHROID) tablet 75 mcg  75 mcg Oral Once per day on     atorvastatin (LIPITOR) tablet 40 mg  40 mg Oral QHS    aspirin chewable tablet 81 mg  81 mg Oral DAILY    mupirocin (BACTROBAN) 2 % ointment   Both Nostrils Q12H          Review of Systems:  A comprehensive review of systems was negative.     Objective:   Vital Signs:    Visit Vitals  /60 (BP 1 Location: Left arm, BP Patient Position: Post activity;Supine)   Pulse 97   Temp 97.6 °F (36.4 °C)   Resp 18   Ht 5' 4\" (1.626 m)   Wt 74.3 kg (163 lb 12.8 oz)   SpO2 95%   BMI 28.12 kg/m²       O2 Device: Nasal cannula   O2 Flow Rate (L/min): 2 l/min   Temp (24hrs), Av.2 °F (36.8 °C), Min:97.6 °F (36.4 °C), Max:98.6 °F (37 °C)       Intake/Output:   Last shift:      No intake/output data recorded. Last 3 shifts:  1901 -  0700  In: 1495.7 [P.O.:354; I.V.:1141.7]  Out: 5620 [Urine:5620]    Intake/Output Summary (Last 24 hours) at 2019 0916  Last data filed at 2019 0530  Gross per 24 hour   Intake 1209 ml   Output 3020 ml   Net -1811 ml     Hemodynamics:   PAP:   CO:     Wedge:   CI:     CVP:    SVR:       PVR:       Ventilator Settings:  Mode Rate Tidal Volume Pressure FiO2 PEEP                    Peak airway pressure:      Minute ventilation:        Physical Exam:    General:  Alert, cooperative, no distress, appears stated age. Head:  Normocephalic, without obvious abnormality, atraumatic. Eyes:  Conjunctivae/corneas clear. PERRL, EOMs intact. Nose: Nares normal. Septum midline. Mucosa normal. No drainage or sinus tenderness. Throat: Lips, mucosa, and tongue normal. Teeth and gums normal.   Neck: Supple, symmetrical, trachea midline, no adenopathy, thyroid: no enlargment/tenderness/nodules, no carotid bruit and no JVD. Back:   Symmetric, no curvature. ROM normal.   Lungs:   Clear to auscultation bilaterally. Chest wall:  No tenderness or deformity. Heart:  Regular rate and rhythm, S1, S2 normal, no murmur, click, rub or gallop. Abdomen:   Soft, non-tender. Bowel sounds normal. No masses,  No organomegaly. Extremities: Extremities normal, atraumatic, no cyanosis or edema. Pulses: 2+ and symmetric all extremities.    Skin: Skin color, texture, turgor normal. No rashes or lesions   Lymph nodes: Cervical, supraclavicular, and axillary nodes normal.   Neurologic: Grossly nonfocal       Data:     Recent Results (from the past 24 hour(s))   METABOLIC PANEL, BASIC    Collection Time: 05/24/19 12:28 PM   Result Value Ref Range    Sodium 121 (L) 136 - 145 mmol/L    Potassium 4.0 3.5 - 5.1 mmol/L    Chloride 87 (L) 97 - 108 mmol/L    CO2 25 21 - 32 mmol/L    Anion gap 9 5 - 15 mmol/L    Glucose 118 (H) 65 - 100 mg/dL    BUN 8 6 - 20 MG/DL    Creatinine 0.47 (L) 0.55 - 1.02 MG/DL    BUN/Creatinine ratio 17 12 - 20      GFR est AA >60 >60 ml/min/1.73m2    GFR est non-AA >60 >60 ml/min/1.73m2    Calcium 8.0 (L) 8.5 - 10.1 MG/DL   T4, FREE    Collection Time: 05/24/19  6:37 PM   Result Value Ref Range    T4, Free 1.3 0.8 - 1.5 NG/DL   MAGNESIUM    Collection Time: 05/25/19  1:10 AM   Result Value Ref Range    Magnesium 2.5 (H) 1.6 - 2.4 mg/dL   PHOSPHORUS    Collection Time: 05/25/19  1:10 AM   Result Value Ref Range    Phosphorus 2.5 (L) 2.6 - 4.7 MG/DL   CBC WITH AUTOMATED DIFF    Collection Time: 05/25/19  1:10 AM   Result Value Ref Range    WBC 11.8 (H) 3.6 - 11.0 K/uL    RBC 4.49 3.80 - 5.20 M/uL    HGB 14.4 11.5 - 16.0 g/dL    HCT 40.7 35.0 - 47.0 %    MCV 90.6 80.0 - 99.0 FL    MCH 32.1 26.0 - 34.0 PG    MCHC 35.4 30.0 - 36.5 g/dL    RDW 13.0 11.5 - 14.5 %    PLATELET 047 826 - 910 K/uL    MPV 8.2 (L) 8.9 - 12.9 FL    NRBC 0.0 0  WBC    ABSOLUTE NRBC 0.00 0.00 - 0.01 K/uL    NEUTROPHILS 55 32 - 75 %    BAND NEUTROPHILS 4 %    LYMPHOCYTES 33 12 - 49 %    MONOCYTES 8 5 - 13 %    EOSINOPHILS 0 0 - 7 %    BASOPHILS 0 0 - 1 %    IMMATURE GRANULOCYTES 0 0.0 - 0.5 %    ABS. NEUTROPHILS 7.0 1.8 - 8.0 K/UL    ABS. LYMPHOCYTES 3.9 (H) 0.8 - 3.5 K/UL    ABS. MONOCYTES 0.9 0.0 - 1.0 K/UL    ABS. EOSINOPHILS 0.0 0.0 - 0.4 K/UL    ABS. BASOPHILS 0.0 0.0 - 0.1 K/UL    ABS. IMM.  GRANS. 0.0 0.00 - 0.04 K/UL    DF MANUAL      RBC COMMENTS NORMOCYTIC, NORMOCHROMIC      WBC COMMENTS ATYPICAL LYMPHOCYTES PRESENT     METABOLIC PANEL, BASIC    Collection Time: 05/25/19  1:10 AM   Result Value Ref Range    Sodium 130 (L) 136 - 145 mmol/L    Potassium 3.9 3.5 - 5.1 mmol/L    Chloride 97 97 - 108 mmol/L    CO2 23 21 - 32 mmol/L    Anion gap 10 5 - 15 mmol/L    Glucose 106 (H) 65 - 100 mg/dL    BUN 9 6 - 20 MG/DL    Creatinine 0.74 0.55 - 1.02 MG/DL    BUN/Creatinine ratio 12 12 - 20      GFR est AA >60 >60 ml/min/1.73m2    GFR est non-AA >60 >60 ml/min/1.73m2    Calcium 8.8 8.5 - 10.1 MG/DL             Telemetry:normal sinus rhythm    Imaging:  I have personally reviewed the patients radiographs and have reviewed the reports:            Total critical care time exclusive of procedures:  minutes  Chris Styles MD

## 2019-05-25 NOTE — PROGRESS NOTES
Name: Sy Rojo   MRN: [id number]  : 1945      Assessment:  Acute on chronic severe hyponatremia: etiology appears to be combination from back pain causing excessive ADH effects + presumed poor solute intake (reflected by low BUN) + recent NSAIDs use (got a dose of Toradol on ) and was also on Daypro     Hypothyroidism- last TSH was elevated in 2019  DM-2  AMS  Recent back surgery/Lumbar laminectomy         Plan/Recommendations:  Sodium up from 118 to 133 in 24 hours with tolvaptan  Holding tolvaptan today  D5W at 50 cc/hr  q8 labs      Subjective:  More alert awake. Mentation better. Still confused though  No seizures. One episode    ROS:   As above. Rest deferred    Exam:  Visit Vitals  /58   Pulse 82   Temp 97.8 °F (36.6 °C)   Resp 17   Ht 5' 4\" (1.626 m)   Wt 74.3 kg (163 lb 12.8 oz)   SpO2 97%   BMI 28.12 kg/m²       Elderly thin built in NAD  No icterus, mmm  No JVD  Clear  RRR, no murmur  No edema  Alert awake. Improved mentation.  Not fully oriented  No twitching or myoclonus    Current Facility-Administered Medications   Medication Dose Route Frequency Last Dose    insulin lispro (HUMALOG) injection   SubCUTAneous AC&HS      glucose chewable tablet 16 g  4 Tab Oral PRN      glucagon (GLUCAGEN) injection 1 mg  1 mg IntraMUSCular PRN      dextrose 5% infusion  50 mL/hr IntraVENous CONTINUOUS      0.9% sodium chloride infusion 250 mL  250 mL IntraVENous PRN      Tolvaptan - notify provider if Sodium increases more than 8-12 in 24 hours  1 Each Other Q6H 1 Each at 19 0000    levoFLOXacin (LEVAQUIN) 750 mg in D5W IVPB  750 mg IntraVENous Q24H Stopped at 19 0700    sodium chloride (NS) flush 5-40 mL  5-40 mL IntraVENous Q8H 10 mL at 19 0547    sodium chloride (NS) flush 5-40 mL  5-40 mL IntraVENous PRN      acetaminophen (TYLENOL) tablet 650 mg  650 mg Oral Q6H  mg at 05/25/19 0908    naloxone (NARCAN) injection 0.4 mg  0.4 mg IntraVENous PRN      ondansetron (ZOFRAN) injection 4 mg  4 mg IntraVENous Q4H PRN      bisacodyl (DULCOLAX) suppository 10 mg  10 mg Rectal DAILY PRN      enoxaparin (LOVENOX) injection 40 mg  40 mg SubCUTAneous Q24H 40 mg at 05/25/19 0852    cyanocobalamin (VITAMIN B12) tablet 250 mcg  250 mcg Oral DAILY 250 mcg at 05/25/19 0851    levothyroxine (SYNTHROID) tablet 50 mcg  50 mcg Oral Once per day on Sun Tue Wed Thu Sat 50 mcg at 05/25/19 0546    levothyroxine (SYNTHROID) tablet 75 mcg  75 mcg Oral Once per day on Mon Fri 75 mcg at 05/24/19 0556    atorvastatin (LIPITOR) tablet 40 mg  40 mg Oral QHS 40 mg at 05/24/19 2118    aspirin chewable tablet 81 mg  81 mg Oral DAILY 81 mg at 05/25/19 0851    mupirocin (BACTROBAN) 2 % ointment   Both Nostrils Q12H         Labs/Data:    Lab Results   Component Value Date/Time    WBC 11.8 (H) 05/25/2019 01:10 AM    HGB 14.4 05/25/2019 01:10 AM    HCT 40.7 05/25/2019 01:10 AM    PLATELET 211 71/82/0696 01:10 AM    MCV 90.6 05/25/2019 01:10 AM       Lab Results   Component Value Date/Time    Sodium 133 (L) 05/25/2019 08:46 AM    Potassium PENDING 05/25/2019 08:46 AM    Chloride 99 05/25/2019 08:46 AM    CO2 27 05/25/2019 08:46 AM    Anion gap 7 05/25/2019 08:46 AM    Glucose 141 (H) 05/25/2019 08:46 AM    BUN 10 05/25/2019 08:46 AM    Creatinine 0.82 05/25/2019 08:46 AM    BUN/Creatinine ratio 12 05/25/2019 08:46 AM    GFR est AA >60 05/25/2019 08:46 AM    GFR est non-AA >60 05/25/2019 08:46 AM    Calcium 8.6 05/25/2019 08:46 AM       Wt Readings from Last 3 Encounters:   05/23/19 74.3 kg (163 lb 12.8 oz)   05/22/19 74.4 kg (164 lb)   05/16/19 78.9 kg (174 lb)         Intake/Output Summary (Last 24 hours) at 5/25/2019 1055  Last data filed at 5/25/2019 0530  Gross per 24 hour   Intake 1204 ml   Output 2945 ml   Net -1741 ml       Patient seen and examined. Chart reviewed. Labs, data and other pertinent notes reviewed in last 24 hrs.     PMH/SH/FH reviewed and unchanged compared to H&P    Discussed with pt/RN/ . Ted Paz MD

## 2019-05-25 NOTE — PROGRESS NOTES
Hospitalist Progress Note              Justin Puente MD.                                                             Cell: [phone]                               NAME:  Holly Castle  :  1945  MRN:  [id number]  Date of Service:  2019    Summary: 74 y.o.  female with past medical history of CAD, CVA, vision loss right eye, diabetes mellitus who presented on 2019 with altered mental status/confusion. Assessment/Plan:  Severe hyponatremia with Acute Encephalopathy and Seizure POA  Manage in ICU  S/p 3% upon admission  Urine osmolality 338, urine sodium 91 consistent with SIADH in settings of recent surgery  Sodium improving but hard to interpret trend as checked 1228 yesterday and 110 earlier today  Repeat STAT and Every 6 hours for now  Nephrology is on the case  Advance diet  CT head negative  Cortisol not low   TFTs as below    GNR UTI: Start Levaquin. 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 NAD, AAAx3 today.  Overnight events d/w RN    Chief Complaints: f/u Seizure, encephalopathy, hyponatremia    Review of Systems:  Symptom Y/N Comments   Symptom Y/N Comments   Fever/Chills n     Chest Pain n      Poor Appetite       Edema        Cough n     Abdominal Pain n      Sputum       Joint Pain        SOB/NAVA n     Pruritis/Rash        Nausea/vomit n     Tolerating PT/OT        Diarrhea       Tolerating Diet y      Constipation       Other           Could NOT obtain due to:            Objective: VITALS:   Last 24hrs VS reviewed since prior progress note. Most recent are:  Visit Vitals  /60 (BP 1 Location: Left arm, BP Patient Position: Post activity;Supine)   Pulse 97   Temp 97.6 °F (36.4 °C)   Resp 18   Ht 5' 4\" (1.626 m)   Wt 74.3 kg (163 lb 12.8 oz)   SpO2 95%   BMI 28.12 kg/m²       Intake/Output Summary (Last 24 hours) at 5/25/2019 0854  Last data filed at 5/25/2019 0530  Gross per 24 hour   Intake 1209 ml   Output 3045 ml   Net -1836 ml        PHYSICAL EXAM:  General: No acute distress, cooperative, pleasant   EENT: EOMI. Anicteric sclerae. Oral mucous moist, oropharynx benign  Resp: CTA bilaterally. No wheezing/rhonchi/rales. No accessory muscle use  CV: Regular rhythm, normal rate, no murmurs, gallops, rubs  GI: Soft, non distended, non tender. normoactive bowel sounds, no hepatosplenomegaly Extremities: No edema, warm, 2+ pulses throughout  Neurologic: Moves all extremities. Psych: Good insight. Not anxious nor agitated. Skin: Good Turgor, no rashes or ulcers    Lab Data Personally Reviewed: (see below)     Medications list Personally Reviewed:  x YES  NO     _______________________________________________________________________  Care Plan discussed with:  Patientand Nurse    Total NON critical care TIME:  30 minutes    Kim Curtis MD     Procedures: see electronic medical records for all procedures/Xrays and details which were not copied into this note but were reviewed prior to creation of Plan.       LABS:  Recent Labs     05/25/19  0110 05/24/19  0150   WBC 11.8* 13.6*   HGB 14.4 13.6   HCT 40.7 39.4    272     Recent Labs     05/25/19  0110 05/24/19  1228 05/24/19  0811 05/24/19  0150   * 121* 121* 118*   K 3.9 4.0  --  3.6   CL 97 87*  --  84*   CO2 23 25  --  26   BUN 9 8  --  8   CREA 0.74 0.47*  --  0.61   * 118*  --  120*   CA 8.8 8.0*  --  8.1*   MG 2.5*  --   --   --    PHOS 2.5*  --   --   --    URICA  --   --   --  3.6     Recent Labs 05/23/19  1441 05/23/19  1256 05/22/19  2154   SGOT 33 35 34   ALT 46 48 54   AP 95 105 112   TBILI 0.4 0.4 0.3   TP 6.8 7.4 7.9   ALB 3.0* 3.3* 3.6   GLOB 3.8 4.1* 4.3*     No results for input(s): INR, PTP, APTT in the last 72 hours. No lab exists for component: INREXT, INREXT   No results for input(s): FE, TIBC, PSAT, FERR in the last 72 hours. No results found for: FOL, RBCF   No results for input(s): PH, PCO2, PO2 in the last 72 hours.   Recent Labs     05/23/19  1256   TROIQ <0.05     Lab Results   Component Value Date/Time    Cholesterol, total 209 (H) 07/10/2009 08:37 AM    HDL Cholesterol 70 (H) 07/10/2009 08:37 AM    LDL, calculated 121.4 (H) 07/10/2009 08:37 AM    Triglyceride 88 07/10/2009 08:37 AM    CHOL/HDL Ratio 3.0 07/10/2009 08:37 AM     Lab Results   Component Value Date/Time    Glucose (POC) 133 (H) 05/23/2019 06:36 PM    Glucose (POC) 134 (H) 05/23/2019 06:06 PM    Glucose (POC) 103 (H) 05/16/2019 08:47 AM     Lab Results   Component Value Date/Time    Color YELLOW/STRAW 05/23/2019 02:28 PM    Appearance CLOUDY (A) 05/23/2019 02:28 PM    Specific gravity 1.010 05/23/2019 02:28 PM    pH (UA) 7.5 05/23/2019 02:28 PM    Protein 30 (A) 05/23/2019 02:28 PM    Glucose 250 (A) 05/23/2019 02:28 PM    Ketone 15 (A) 05/23/2019 02:28 PM    Bilirubin NEGATIVE  05/23/2019 02:28 PM    Urobilinogen 0.2 05/23/2019 02:28 PM    Nitrites NEGATIVE  05/23/2019 02:28 PM    Leukocyte Esterase NEGATIVE  05/23/2019 02:28 PM    Epithelial cells FEW 05/23/2019 02:28 PM    Bacteria 2+ (A) 05/23/2019 02:28 PM    WBC 0-4 05/23/2019 02:28 PM    RBC 5-10 05/23/2019 02:28 PM

## 2019-05-26 LAB
ANION GAP SERPL CALC-SCNC: 5 MMOL/L (ref 5–15)
BUN SERPL-MCNC: 7 MG/DL (ref 6–20)
BUN/CREAT SERPL: 9 (ref 12–20)
CALCIUM SERPL-MCNC: 8.3 MG/DL (ref 8.5–10.1)
CHLORIDE SERPL-SCNC: 99 MMOL/L (ref 97–108)
CO2 SERPL-SCNC: 24 MMOL/L (ref 21–32)
CREAT SERPL-MCNC: 0.76 MG/DL (ref 0.55–1.02)
GLUCOSE BLD STRIP.AUTO-MCNC: 104 MG/DL (ref 65–100)
GLUCOSE BLD STRIP.AUTO-MCNC: 114 MG/DL (ref 65–100)
GLUCOSE BLD STRIP.AUTO-MCNC: 96 MG/DL (ref 65–100)
GLUCOSE BLD STRIP.AUTO-MCNC: 98 MG/DL (ref 65–100)
GLUCOSE SERPL-MCNC: 130 MG/DL (ref 65–100)
PHOSPHATE SERPL-MCNC: 2.4 MG/DL (ref 2.6–4.7)
POTASSIUM SERPL-SCNC: 4.1 MMOL/L (ref 3.5–5.1)
SERVICE CMNT-IMP: ABNORMAL
SERVICE CMNT-IMP: ABNORMAL
SERVICE CMNT-IMP: NORMAL
SERVICE CMNT-IMP: NORMAL
SODIUM SERPL-SCNC: 128 MMOL/L (ref 136–145)
T3 SERPL-MCNC: 54 NG/DL (ref 71–180)

## 2019-05-26 PROCEDURE — 74011250636 HC RX REV CODE- 250/636: Performed by: HOSPITALIST

## 2019-05-26 PROCEDURE — 77010033678 HC OXYGEN DAILY

## 2019-05-26 PROCEDURE — 74011250637 HC RX REV CODE- 250/637: Performed by: INTERNAL MEDICINE

## 2019-05-26 PROCEDURE — 36415 COLL VENOUS BLD VENIPUNCTURE: CPT

## 2019-05-26 PROCEDURE — 74011250636 HC RX REV CODE- 250/636: Performed by: INTERNAL MEDICINE

## 2019-05-26 PROCEDURE — 80048 BASIC METABOLIC PNL TOTAL CA: CPT

## 2019-05-26 PROCEDURE — 84100 ASSAY OF PHOSPHORUS: CPT

## 2019-05-26 PROCEDURE — 65270000029 HC RM PRIVATE

## 2019-05-26 PROCEDURE — 82962 GLUCOSE BLOOD TEST: CPT

## 2019-05-26 RX ORDER — CETIRIZINE HCL 10 MG
10 TABLET ORAL
Status: DISCONTINUED | OUTPATIENT
Start: 2019-05-26 | End: 2019-06-04 | Stop reason: HOSPADM

## 2019-05-26 RX ORDER — IBUPROFEN 400 MG/1
800 TABLET ORAL
Status: DISCONTINUED | OUTPATIENT
Start: 2019-05-26 | End: 2019-05-27

## 2019-05-26 RX ADMIN — MUPIROCIN: 20 OINTMENT TOPICAL at 22:06

## 2019-05-26 RX ADMIN — ACETAMINOPHEN 650 MG: 325 TABLET ORAL at 06:17

## 2019-05-26 RX ADMIN — Medication 10 ML: at 06:18

## 2019-05-26 RX ADMIN — Medication 10 ML: at 13:03

## 2019-05-26 RX ADMIN — IBUPROFEN 800 MG: 400 TABLET ORAL at 09:30

## 2019-05-26 RX ADMIN — LEVOTHYROXINE SODIUM 50 MCG: 50 TABLET ORAL at 06:20

## 2019-05-26 RX ADMIN — CYANOCOBALAMIN TAB 500 MCG 250 MCG: 500 TAB at 09:30

## 2019-05-26 RX ADMIN — ASPIRIN 81 MG 81 MG: 81 TABLET ORAL at 09:30

## 2019-05-26 RX ADMIN — IBUPROFEN 800 MG: 400 TABLET ORAL at 17:05

## 2019-05-26 RX ADMIN — ACETAMINOPHEN 650 MG: 325 TABLET ORAL at 11:39

## 2019-05-26 RX ADMIN — ATORVASTATIN CALCIUM 40 MG: 40 TABLET, FILM COATED ORAL at 22:05

## 2019-05-26 RX ADMIN — ENOXAPARIN SODIUM 40 MG: 40 INJECTION SUBCUTANEOUS at 09:30

## 2019-05-26 RX ADMIN — Medication 10 ML: at 22:05

## 2019-05-26 RX ADMIN — LEVOFLOXACIN 750 MG: 5 INJECTION, SOLUTION INTRAVENOUS at 17:05

## 2019-05-26 RX ADMIN — ACETAMINOPHEN 650 MG: 325 TABLET ORAL at 19:49

## 2019-05-26 NOTE — PROGRESS NOTES
Hospitalist Progress Note              Ricky Givens MD.                                                             Cell: [phone]                               NAME:  Taylor Duff  :  1945  MRN:  [id number]  Date of Service:  2019    Summary: 74 y.o.  female with past medical history of CAD, CVA, vision loss right eye, diabetes mellitus who presented on 2019 with altered mental status/confusion. Assessment/Plan:  Severe hyponatremia with Acute Encephalopathy and Seizure POA  Manage in ICU  S/p 3% upon admission  Urine osmolality 338, urine sodium 91 consistent with SIADH in settings of recent surgery  Sodium was improving, went upto 133, now 128  Repeat STAT and Every 6 hours for now  Appreciate nephrology assistance  CT head negative  Cortisol not low   TFTs as below    GNR UTI: Start Levaquin. 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 NAD, AAAx3 today.  Overnight events d/w RN    Chief Complaints: f/u Seizure, encephalopathy, hyponatremia    Review of Systems:  Symptom Y/N Comments   Symptom Y/N Comments   Fever/Chills n     Chest Pain n      Poor Appetite       Edema        Cough n     Abdominal Pain n      Sputum       Joint Pain        SOB/NAVA n     Pruritis/Rash        Nausea/vomit n     Tolerating PT/OT        Diarrhea       Tolerating Diet y      Constipation       Other           Could NOT obtain due to:            Objective:     VITALS:   Last 24hrs VS reviewed since prior progress note. Most recent are:  Visit Vitals  /84 (BP 1 Location: Left arm, BP Patient Position: At rest)   Pulse 73   Temp 98.7 °F (37.1 °C)   Resp 18   Ht 5' 4\" (1.626 m)   Wt 74.3 kg (163 lb 12.8 oz)   SpO2 97%   BMI 28.12 kg/m²       Intake/Output Summary (Last 24 hours) at 5/26/2019 1031  Last data filed at 5/26/2019 9862  Gross per 24 hour   Intake 1217.5 ml   Output 1625 ml   Net -407.5 ml        PHYSICAL EXAM:  General: No acute distress, cooperative, pleasant   EENT: EOMI. Anicteric sclerae. Oral mucous moist, oropharynx benign  Resp: CTA bilaterally. No wheezing/rhonchi/rales. No accessory muscle use  CV: Regular rhythm, normal rate, no murmurs, gallops, rubs  GI: Soft, non distended, non tender. normoactive bowel sounds, no hepatosplenomegaly Extremities: No edema, warm, 2+ pulses throughout  Neurologic: Moves all extremities. Psych: Good insight. Not anxious nor agitated. Skin: Good Turgor, no rashes or ulcers    Lab Data Personally Reviewed: (see below)     Medications list Personally Reviewed:  x YES  NO     _______________________________________________________________________  Care Plan discussed with:  Patientand Nurse    Total NON critical care TIME:  30 minutes    Magui Wilkinson MD     Procedures: see electronic medical records for all procedures/Xrays and details which were not copied into this note but were reviewed prior to creation of Plan.       LABS:  Recent Labs     05/25/19  0110 05/24/19  0150   WBC 11.8* 13.6*   HGB 14.4 13.6   HCT 40.7 39.4    272     Recent Labs     05/26/19  0415 05/25/19  1655 05/25/19  0846 05/25/19  0110  05/24/19  0150   * 133* 133* 130*   < > 118*   K 4.1 3.7 3.8 3.9   < > 3.6   CL 99 96* 99 97   < > 84*   CO2 24 28 27 23   < > 26   BUN 7 10 10 9   < > 8   CREA 0.76 0.85 0.82 0.74   < > 0.61   * 112* 141* 106*   < > 120*   CA 8.3* 8.5 8.6 8.8   < > 8.1*   MG  --   --   --  2.5*  --   --    PHOS 2.4*  --   --  2.5*  --   --    URICA  --   -- --   --   --  3.6    < > = values in this interval not displayed. Recent Labs     05/23/19  1441 05/23/19  1256   SGOT 33 35   ALT 46 48   AP 95 105   TBILI 0.4 0.4   TP 6.8 7.4   ALB 3.0* 3.3*   GLOB 3.8 4.1*     No results for input(s): INR, PTP, APTT in the last 72 hours. No lab exists for component: INREXT, INREXT   No results for input(s): FE, TIBC, PSAT, FERR in the last 72 hours. No results found for: FOL, RBCF   No results for input(s): PH, PCO2, PO2 in the last 72 hours.   Recent Labs     05/23/19  1256   TROIQ <0.05     Lab Results   Component Value Date/Time    Cholesterol, total 209 (H) 07/10/2009 08:37 AM    HDL Cholesterol 70 (H) 07/10/2009 08:37 AM    LDL, calculated 121.4 (H) 07/10/2009 08:37 AM    Triglyceride 88 07/10/2009 08:37 AM    CHOL/HDL Ratio 3.0 07/10/2009 08:37 AM     Lab Results   Component Value Date/Time    Glucose (POC) 114 (H) 05/26/2019 07:54 AM    Glucose (POC) 100 05/25/2019 09:05 PM    Glucose (POC) 111 (H) 05/25/2019 04:54 PM    Glucose (POC) 132 (H) 05/25/2019 12:41 PM    Glucose (POC) 133 (H) 05/23/2019 06:36 PM     Lab Results   Component Value Date/Time    Color YELLOW/STRAW 05/23/2019 02:28 PM    Appearance CLOUDY (A) 05/23/2019 02:28 PM    Specific gravity 1.010 05/23/2019 02:28 PM    pH (UA) 7.5 05/23/2019 02:28 PM    Protein 30 (A) 05/23/2019 02:28 PM    Glucose 250 (A) 05/23/2019 02:28 PM    Ketone 15 (A) 05/23/2019 02:28 PM    Bilirubin NEGATIVE  05/23/2019 02:28 PM    Urobilinogen 0.2 05/23/2019 02:28 PM    Nitrites NEGATIVE  05/23/2019 02:28 PM    Leukocyte Esterase NEGATIVE  05/23/2019 02:28 PM    Epithelial cells FEW 05/23/2019 02:28 PM    Bacteria 2+ (A) 05/23/2019 02:28 PM    WBC 0-4 05/23/2019 02:28 PM    RBC 5-10 05/23/2019 02:28 PM

## 2019-05-26 NOTE — PROGRESS NOTES
[facility]  Pulmonary, Critical Care, and Sleep Medicine    Name: Kevon Boyd MRN: [id number]   : 1945 Hospital: [facility]   Date: 2019        Critical Care    IMPRESSION:   · Seizures  · Severe Hyponatremia  · Recent lumbar laminectomy on 19 (at [facility])  was taking Daypro and Ultram.   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 l  · CAD  · Critically ill with severe hyponatremia and seizures. Needs treatment for this. Serial lab draws. High risk of decompensation. 28 minCC, EOP. · Discussed with pts son and nurse at bedside. RECOMMENDATIONS:   · Na per Renal  · Neuro status stable  · Off 3% NS.  · Pain control  · Will need PT and OT? ?  · Advance diet  · No need for ccu monitoring anymore  · Will transfer to medical bed      Subjective/History:     Awake, alert, no distress    ROS was otherwise negative this am.       Current Facility-Administered Medications   Medication Dose Route Frequency    insulin lispro (HUMALOG) injection   SubCUTAneous AC&HS    levoFLOXacin (LEVAQUIN) 750 mg in D5W IVPB  750 mg IntraVENous Q24H    sodium chloride (NS) flush 5-40 mL  5-40 mL IntraVENous Q8H    enoxaparin (LOVENOX) injection 40 mg  40 mg SubCUTAneous Q24H    cyanocobalamin (VITAMIN B12) tablet 250 mcg  250 mcg Oral DAILY    levothyroxine (SYNTHROID) tablet 50 mcg  50 mcg Oral Once per day on Sun Tue Wed Thu Sat    levothyroxine (SYNTHROID) tablet 75 mcg  75 mcg Oral Once per day on     atorvastatin (LIPITOR) tablet 40 mg  40 mg Oral QHS    aspirin chewable tablet 81 mg  81 mg Oral DAILY    mupirocin (BACTROBAN) 2 % ointment   Both Nostrils Q12H          Review of Systems:  A comprehensive review of systems was negative.     Objective:   Vital Signs:    Visit Vitals  /84   Pulse 73   Temp 98.5 °F (36.9 °C)   Resp 18   Ht 5' 4\" (1.626 m)   Wt 74.3 kg (163 lb 12.8 oz)   SpO2 97%   BMI 28.12 kg/m²       O2 Device: Room air   O2 Flow Rate (L/min): 2 l/min   Temp (24hrs), Av.2 °F (36.8 °C), Min:97.8 °F (36.6 °C), Max:98.7 °F (37.1 °C)       Intake/Output:   Last shift:      No intake/output data recorded. Last 3 shifts: 1901 -  0700  In: 1571.5 [P.O.:594; I.V.:977.5]  Out: 3595 [Urine:3595]    Intake/Output Summary (Last 24 hours) at 2019 0855  Last data filed at 2019 8650  Gross per 24 hour   Intake 1217.5 ml   Output 1625 ml   Net -407.5 ml     Hemodynamics:   PAP:   CO:     Wedge:   CI:     CVP:    SVR:       PVR:       Ventilator Settings:  Mode Rate Tidal Volume Pressure FiO2 PEEP                    Peak airway pressure:      Minute ventilation:        Physical Exam:    General:  Alert, cooperative, no distress, appears stated age. Head:  Normocephalic, without obvious abnormality, atraumatic. Eyes:  Conjunctivae/corneas clear. PERRL, EOMs intact. Nose: Nares normal. Septum midline. Mucosa normal. No drainage or sinus tenderness. Throat: Lips, mucosa, and tongue normal. Teeth and gums normal.   Neck: Supple, symmetrical, trachea midline, no adenopathy, thyroid: no enlargment/tenderness/nodules, no carotid bruit and no JVD. Back:   Symmetric, no curvature. ROM normal.   Lungs:   Clear to auscultation bilaterally. Chest wall:  No tenderness or deformity. Heart:  Regular rate and rhythm, S1, S2 normal, no murmur, click, rub or gallop. Abdomen:   Soft, non-tender. Bowel sounds normal. No masses,  No organomegaly. Extremities: Extremities normal, atraumatic, no cyanosis or edema. Pulses: 2+ and symmetric all extremities.    Skin: Skin color, texture, turgor normal. No rashes or lesions   Lymph nodes: Cervical, supraclavicular, and axillary nodes normal.   Neurologic: Grossly nonfocal       Data:     Recent Results (from the past 24 hour(s))   GLUCOSE, POC    Collection Time: 19 12:41 PM   Result Value Ref Range    Glucose (POC) 132 (H) 65 - 100 mg/dL    Performed by Mamadou Matthew (traveler) GLUCOSE, POC    Collection Time: 05/25/19  4:54 PM   Result Value Ref Range    Glucose (POC) 111 (H) 65 - 100 mg/dL    Performed by Kathie Cintron (traveler)    METABOLIC PANEL, BASIC    Collection Time: 05/25/19  4:55 PM   Result Value Ref Range    Sodium 133 (L) 136 - 145 mmol/L    Potassium 3.7 3.5 - 5.1 mmol/L    Chloride 96 (L) 97 - 108 mmol/L    CO2 28 21 - 32 mmol/L    Anion gap 9 5 - 15 mmol/L    Glucose 112 (H) 65 - 100 mg/dL    BUN 10 6 - 20 MG/DL    Creatinine 0.85 0.55 - 1.02 MG/DL    BUN/Creatinine ratio 12 12 - 20      GFR est AA >60 >60 ml/min/1.73m2    GFR est non-AA >60 >60 ml/min/1.73m2    Calcium 8.5 8.5 - 10.1 MG/DL   GLUCOSE, POC    Collection Time: 05/25/19  9:05 PM   Result Value Ref Range    Glucose (POC) 100 65 - 100 mg/dL    Performed by  [address], BASIC    Collection Time: 05/26/19  4:15 AM   Result Value Ref Range    Sodium 128 (L) 136 - 145 mmol/L    Potassium 4.1 3.5 - 5.1 mmol/L    Chloride 99 97 - 108 mmol/L    CO2 24 21 - 32 mmol/L    Anion gap 5 5 - 15 mmol/L    Glucose 130 (H) 65 - 100 mg/dL    BUN 7 6 - 20 MG/DL    Creatinine 0.76 0.55 - 1.02 MG/DL    BUN/Creatinine ratio 9 (L) 12 - 20      GFR est AA >60 >60 ml/min/1.73m2    GFR est non-AA >60 >60 ml/min/1.73m2    Calcium 8.3 (L) 8.5 - 10.1 MG/DL   PHOSPHORUS    Collection Time: 05/26/19  4:15 AM   Result Value Ref Range    Phosphorus 2.4 (L) 2.6 - 4.7 MG/DL   GLUCOSE, POC    Collection Time: 05/26/19  7:54 AM   Result Value Ref Range    Glucose (POC) 114 (H) 65 - 100 mg/dL    Performed by Kathie Cintron (traveler)              Telemetry:normal sinus rhythm    Imaging:  I have personally reviewed the patients radiographs and have reviewed the reports:            Total critical care time exclusive of procedures:  minutes  Jayson Almazan MD

## 2019-05-26 NOTE — PROGRESS NOTES
0700 Bedside shift change report given to Teddy Azevedo (oncoming nurse) by Kwabena Boggs (offgoing nurse). Report included the following information SBAR, Kardex, Procedure Summary, Intake/Output, MAR, Recent Results and Cardiac Rhythm NSR.    0730 Shift assessment completed, see flowsheet for details. Pt resting quietly in bed, no c/o pain at this time. Pt is Stepdown level of care, flowsheet will reflect accordingly.

## 2019-05-26 NOTE — PROGRESS NOTES
Name: Gregg Anne   MRN: [id number]  : 1945      Assessment:  Acute on chronic severe hyponatremia: etiology appears to be combination from back pain causing excessive ADH effects + presumed poor solute intake (reflected by low BUN) + recent NSAIDs use (got a dose of Toradol on ) and was also on Daypro     Hypothyroidism- last TSH was elevated in 2019  DM-2  AMS  Recent back surgery/Lumbar laminectomy         Plan/Recommendations:    Sodium 118 to 133 to 128; s/p tolvaptan on  with a bit too brisk a correction on   Stop D5W  Restart fluid restriction  q8 labs      Subjective:    Alert. No seizures. C/o right lateral rib pain. I spoke with her ICU nurse. ROS:   No sob. No n/v.     Exam:  Visit Vitals  /58   Pulse 63   Temp 98.5 °F (36.9 °C)   Resp 17   Ht 5' 4\" (1.626 m)   Wt 74.3 kg (163 lb 12.8 oz)   SpO2 98%   BMI 28.12 kg/m²       Elderly thin built in NAD  No icterus, mmm  No JVD  Clear  RRR, no murmur  No edema  Alert awake. Improved mentation.  Not fully oriented  No twitching or myoclonus    Current Facility-Administered Medications   Medication Dose Route Frequency Last Dose    insulin lispro (HUMALOG) injection   SubCUTAneous AC&HS Stopped at 19 1130    glucose chewable tablet 16 g  4 Tab Oral PRN      glucagon (GLUCAGEN) injection 1 mg  1 mg IntraMUSCular PRN      dextrose 5% infusion  50 mL/hr IntraVENous CONTINUOUS 50 mL/hr at 19 1120    0.9% sodium chloride infusion 250 mL  250 mL IntraVENous PRN      levoFLOXacin (LEVAQUIN) 750 mg in D5W IVPB  750 mg IntraVENous Q24H 750 mg at 19 1801    sodium chloride (NS) flush 5-40 mL  5-40 mL IntraVENous Q8H 10 mL at 19 0618    sodium chloride (NS) flush 5-40 mL  5-40 mL IntraVENous PRN      acetaminophen (TYLENOL) tablet 650 mg  650 mg Oral Q6H  mg at 05/26/19 0617    naloxone [facility]) injection 0.4 mg  0.4 mg IntraVENous PRN      ondansetron (ZOFRAN) injection 4 mg  4 mg IntraVENous Q4H PRN      bisacodyl (DULCOLAX) suppository 10 mg  10 mg Rectal DAILY PRN      enoxaparin (LOVENOX) injection 40 mg  40 mg SubCUTAneous Q24H 40 mg at 05/25/19 0852    cyanocobalamin (VITAMIN B12) tablet 250 mcg  250 mcg Oral DAILY 250 mcg at 05/25/19 0851    levothyroxine (SYNTHROID) tablet 50 mcg  50 mcg Oral Once per day on Sun Tue Wed Thu Sat 50 mcg at 05/26/19 0620    levothyroxine (SYNTHROID) tablet 75 mcg  75 mcg Oral Once per day on Mon Fri 75 mcg at 05/24/19 0556    atorvastatin (LIPITOR) tablet 40 mg  40 mg Oral QHS 40 mg at 05/25/19 2106    aspirin chewable tablet 81 mg  81 mg Oral DAILY 81 mg at 05/25/19 0851    mupirocin (BACTROBAN) 2 % ointment   Both Nostrils Q12H         Labs/Data:    Lab Results   Component Value Date/Time    WBC 11.8 (H) 05/25/2019 01:10 AM    HGB 14.4 05/25/2019 01:10 AM    HCT 40.7 05/25/2019 01:10 AM    PLATELET 956 36/04/0336 01:10 AM    MCV 90.6 05/25/2019 01:10 AM       Lab Results   Component Value Date/Time    Sodium 128 (L) 05/26/2019 04:15 AM    Potassium 4.1 05/26/2019 04:15 AM    Chloride 99 05/26/2019 04:15 AM    CO2 24 05/26/2019 04:15 AM    Anion gap 5 05/26/2019 04:15 AM    Glucose 130 (H) 05/26/2019 04:15 AM    BUN 7 05/26/2019 04:15 AM    Creatinine 0.76 05/26/2019 04:15 AM    BUN/Creatinine ratio 9 (L) 05/26/2019 04:15 AM    GFR est AA >60 05/26/2019 04:15 AM    GFR est non-AA >60 05/26/2019 04:15 AM    Calcium 8.3 (L) 05/26/2019 04:15 AM       Wt Readings from Last 3 Encounters:   05/23/19 74.3 kg (163 lb 12.8 oz)   05/22/19 74.4 kg (164 lb)   05/16/19 78.9 kg (174 lb)         Intake/Output Summary (Last 24 hours) at 5/26/2019 0634  Last data filed at 5/26/2019 0400  Gross per 24 hour   Intake 1073.33 ml   Output 1820 ml   Net -746.67 ml       Patient seen and examined. Chart reviewed.  Labs, data and other pertinent notes reviewed in last 24 hrs.     PMH/SH/FH reviewed and unchanged compared to H&P    Discussed with pt/RN/Dr Dennis Rincon MD

## 2019-05-27 ENCOUNTER — APPOINTMENT (OUTPATIENT)
Dept: ULTRASOUND IMAGING | Age: 74
DRG: 640 | End: 2019-05-27
Attending: INTERNAL MEDICINE
Payer: MEDICARE

## 2019-05-27 ENCOUNTER — APPOINTMENT (OUTPATIENT)
Dept: CT IMAGING | Age: 74
DRG: 640 | End: 2019-05-27
Attending: INTERNAL MEDICINE
Payer: MEDICARE

## 2019-05-27 LAB
ANION GAP SERPL CALC-SCNC: 7 MMOL/L (ref 5–15)
BUN SERPL-MCNC: 8 MG/DL (ref 6–20)
BUN/CREAT SERPL: 10 (ref 12–20)
CALCIUM SERPL-MCNC: 8.9 MG/DL (ref 8.5–10.1)
CHLORIDE SERPL-SCNC: 96 MMOL/L (ref 97–108)
CO2 SERPL-SCNC: 28 MMOL/L (ref 21–32)
CREAT SERPL-MCNC: 0.8 MG/DL (ref 0.55–1.02)
GLUCOSE BLD STRIP.AUTO-MCNC: 113 MG/DL (ref 65–100)
GLUCOSE BLD STRIP.AUTO-MCNC: 122 MG/DL (ref 65–100)
GLUCOSE BLD STRIP.AUTO-MCNC: 132 MG/DL (ref 65–100)
GLUCOSE BLD STRIP.AUTO-MCNC: 88 MG/DL (ref 65–100)
GLUCOSE SERPL-MCNC: 111 MG/DL (ref 65–100)
POTASSIUM SERPL-SCNC: 3.9 MMOL/L (ref 3.5–5.1)
SERVICE CMNT-IMP: ABNORMAL
SERVICE CMNT-IMP: NORMAL
SODIUM SERPL-SCNC: 131 MMOL/L (ref 136–145)

## 2019-05-27 PROCEDURE — 74011636320 HC RX REV CODE- 636/320: Performed by: INTERNAL MEDICINE

## 2019-05-27 PROCEDURE — 82962 GLUCOSE BLOOD TEST: CPT

## 2019-05-27 PROCEDURE — 80048 BASIC METABOLIC PNL TOTAL CA: CPT

## 2019-05-27 PROCEDURE — 74011250637 HC RX REV CODE- 250/637: Performed by: INTERNAL MEDICINE

## 2019-05-27 PROCEDURE — 36415 COLL VENOUS BLD VENIPUNCTURE: CPT

## 2019-05-27 PROCEDURE — 74177 CT ABD & PELVIS W/CONTRAST: CPT

## 2019-05-27 PROCEDURE — 74011250636 HC RX REV CODE- 250/636: Performed by: INTERNAL MEDICINE

## 2019-05-27 PROCEDURE — 65270000015 HC RM PRIVATE ONCOLOGY

## 2019-05-27 PROCEDURE — 76705 ECHO EXAM OF ABDOMEN: CPT

## 2019-05-27 RX ORDER — MORPHINE SULFATE 2 MG/ML
1-2 INJECTION, SOLUTION INTRAMUSCULAR; INTRAVENOUS
Status: DISCONTINUED | OUTPATIENT
Start: 2019-05-27 | End: 2019-05-28

## 2019-05-27 RX ORDER — SODIUM CHLORIDE 0.9 % (FLUSH) 0.9 %
10 SYRINGE (ML) INJECTION
Status: COMPLETED | OUTPATIENT
Start: 2019-05-27 | End: 2019-05-27

## 2019-05-27 RX ORDER — FENTANYL CITRATE 50 UG/ML
12.5 INJECTION, SOLUTION INTRAMUSCULAR; INTRAVENOUS ONCE
Status: COMPLETED | OUTPATIENT
Start: 2019-05-27 | End: 2019-05-27

## 2019-05-27 RX ORDER — LANOLIN ALCOHOL/MO/W.PET/CERES
6 CREAM (GRAM) TOPICAL
Status: DISCONTINUED | OUTPATIENT
Start: 2019-05-27 | End: 2019-06-04 | Stop reason: HOSPADM

## 2019-05-27 RX ORDER — RANITIDINE 15 MG/ML
150 SYRUP ORAL
Status: DISCONTINUED | OUTPATIENT
Start: 2019-05-27 | End: 2019-06-04 | Stop reason: HOSPADM

## 2019-05-27 RX ORDER — LEVOFLOXACIN 750 MG/1
750 TABLET ORAL EVERY 24 HOURS
Status: DISCONTINUED | OUTPATIENT
Start: 2019-05-27 | End: 2019-05-31

## 2019-05-27 RX ADMIN — ACETAMINOPHEN 650 MG: 325 TABLET ORAL at 05:01

## 2019-05-27 RX ADMIN — Medication 10 ML: at 15:21

## 2019-05-27 RX ADMIN — IBUPROFEN 800 MG: 400 TABLET ORAL at 00:53

## 2019-05-27 RX ADMIN — Medication 10 ML: at 05:02

## 2019-05-27 RX ADMIN — ACETAMINOPHEN 650 MG: 325 TABLET ORAL at 10:55

## 2019-05-27 RX ADMIN — CETIRIZINE HYDROCHLORIDE 10 MG: 10 TABLET, FILM COATED ORAL at 08:39

## 2019-05-27 RX ADMIN — FENTANYL CITRATE 12.5 MCG: 50 INJECTION, SOLUTION INTRAMUSCULAR; INTRAVENOUS at 13:25

## 2019-05-27 RX ADMIN — CYANOCOBALAMIN TAB 500 MCG 250 MCG: 500 TAB at 08:37

## 2019-05-27 RX ADMIN — MORPHINE SULFATE 2 MG: 2 INJECTION, SOLUTION INTRAMUSCULAR; INTRAVENOUS at 16:56

## 2019-05-27 RX ADMIN — LEVOTHYROXINE SODIUM 75 MCG: 75 TABLET ORAL at 05:01

## 2019-05-27 RX ADMIN — MELATONIN TAB 3 MG 6 MG: 3 TAB at 23:44

## 2019-05-27 RX ADMIN — ATORVASTATIN CALCIUM 40 MG: 40 TABLET, FILM COATED ORAL at 22:34

## 2019-05-27 RX ADMIN — ENOXAPARIN SODIUM 40 MG: 40 INJECTION SUBCUTANEOUS at 08:37

## 2019-05-27 RX ADMIN — Medication 10 ML: at 13:08

## 2019-05-27 RX ADMIN — RANITIDINE 150 MG: 15 SYRUP ORAL at 02:53

## 2019-05-27 RX ADMIN — Medication 10 ML: at 22:34

## 2019-05-27 RX ADMIN — IOPAMIDOL 100 ML: 755 INJECTION, SOLUTION INTRAVENOUS at 15:21

## 2019-05-27 RX ADMIN — ACETAMINOPHEN 650 MG: 325 TABLET ORAL at 20:21

## 2019-05-27 RX ADMIN — ASPIRIN 81 MG 81 MG: 81 TABLET ORAL at 08:37

## 2019-05-27 RX ADMIN — LEVOFLOXACIN 750 MG: 750 TABLET, FILM COATED ORAL at 18:34

## 2019-05-27 NOTE — PROGRESS NOTES
21:30 - Bedside and Verbal shift change report given to RADHA Serrano (oncoming nurse) by Lu Cartwright RN (offgoing nurse). Report included the following information SBAR, Kardex, MAR and Recent Results. 22:00 - Patient assisted up to BR x 1. VSS. Awaiting transfer to medical bed, patient is not on telemetry. Declines prn Zyrtec at this time. Alert and oriented x4.      00:00 - TRANSFER - OUT REPORT:    Verbal report given to Cristal Cheung RN(name) on [facility]  being transferred to Oncology(unit) for routine progression of care       Report consisted of patients Situation, Background, Assessment and   Recommendations(SBAR). Information from the following report(s) SBAR, Kardex, [facility] and Recent Results was reviewed with the receiving nurse. Lines:   Peripheral IV 05/23/19 Left Antecubital (Active)   Site Assessment Clean, dry, & intact 5/26/2019  7:10 PM   Phlebitis Assessment 0 5/26/2019  7:10 PM   Infiltration Assessment 0 5/26/2019  7:10 PM   Dressing Status Clean, dry, & intact 5/26/2019  7:10 PM   Dressing Type Tape;Transparent 5/26/2019  7:10 PM   Hub Color/Line Status Pink;Patent 5/26/2019  7:10 PM   Action Taken Open ports on tubing capped 5/26/2019  7:10 PM   Alcohol Cap Used Yes 5/26/2019  7:10 PM       Peripheral IV 05/24/19 Left; Outer Forearm (Active)   Site Assessment Clean, dry, & intact 5/26/2019  7:10 PM   Phlebitis Assessment 0 5/26/2019  7:10 PM   Infiltration Assessment 0 5/26/2019  7:10 PM   Dressing Status Clean, dry, & intact 5/26/2019  7:10 PM   Dressing Type Tape;Transparent 5/26/2019  7:10 PM   Hub Color/Line Status Blue;Patent 5/26/2019  7:10 PM   Action Taken Open ports on tubing capped 5/26/2019  7:10 PM   Alcohol Cap Used Yes 5/26/2019  7:10 PM        Opportunity for questions and clarification was provided.       Patient transported with:   Patient-specific medications from Pharmacy  Registered Nurse

## 2019-05-27 NOTE — ROUTINE PROCESS
Bedside and Verbal shift change report given to Barrett Dorsey RN (oncoming nurse) by Margot Weber RN (offgoing nurse). Report included the following information SBAR.

## 2019-05-27 NOTE — PROGRESS NOTES
Bedside shift change report given to Debo Sethi RN (oncoming nurse) by Jose Beltre RN (offgoing nurse). Report included the following information SBAR, Kardex, Procedure Summary, Intake/Output, MAR, Accordion, Recent Results, Med Rec Status, Cardiac Rhythm NSR, Alarm Parameters , Pre Procedure Checklist, Procedure Verification and Quality Measures.

## 2019-05-27 NOTE — PROGRESS NOTES
Name: Manju Murguia   MRN: [id number]  : 1945      Assessment:  Acute on chronic severe hyponatremia: etiology appears to be combination from back pain causing excessive ADH effects + presumed poor solute intake (reflected by low BUN) + recent NSAIDs use (got a dose of Toradol on ) and was also on Daypro     Hypothyroidism- last TSH was elevated in 2019  DM-2  AMS  Recent back surgery/Lumbar laminectomy         Plan/Recommendations:    Sodium nini 118; now 128 to 131; s/p tolvaptan x one on    Continue fluid restriction  Daily labs  I will sign off, but please call if questions or changes. Subjective:    Alert. No seizures. Tired  Mild nausea. C/o right lateral rib pain. Visitors in the room. ROS:   No sob. No n/v.     Exam:  Visit Vitals  /84   Pulse 91   Temp 98 °F (36.7 °C)   Resp 17   Ht 5' 4\" (1.626 m)   Wt 74.3 kg (163 lb 12.8 oz)   SpO2 97%   BMI 28.12 kg/m²       Elderly thin built in NAD  No icterus, mmm  No JVD  Clear  RRR, no murmur  No edema  Alert awake. Improved mentation.  Not fully oriented  No twitching or myoclonus    Current Facility-Administered Medications   Medication Dose Route Frequency Last Dose    raNITIdine (ZANTAC) 15 mg/mL syrup 150 mg  150 mg Oral BID  mg at 19 0253    ibuprofen (MOTRIN) tablet 800 mg  800 mg Oral Q6H  mg at 19 0053    cetirizine (ZYRTEC) tablet 10 mg  10 mg Oral DAILY PRN      insulin lispro (HUMALOG) injection   SubCUTAneous AC&HS Stopped at 19 1130    glucose chewable tablet 16 g  4 Tab Oral PRN      glucagon (GLUCAGEN) injection 1 mg  1 mg IntraMUSCular PRN      0.9% sodium chloride infusion 250 mL  250 mL IntraVENous PRN      levoFLOXacin (LEVAQUIN) 750 mg in D5W IVPB  750 mg IntraVENous Q24H 750 mg at 19 1705    sodium chloride (NS) flush 5-40 mL  5-40 mL IntraVENous Q8H 10 mL at 05/27/19 0502    sodium chloride (NS) flush 5-40 mL  5-40 mL IntraVENous PRN      acetaminophen (TYLENOL) tablet 650 mg  650 mg Oral Q6H  mg at 05/27/19 0501    naloxone (NARCAN) injection 0.4 mg  0.4 mg IntraVENous PRN      ondansetron (ZOFRAN) injection 4 mg  4 mg IntraVENous Q4H PRN      bisacodyl (DULCOLAX) suppository 10 mg  10 mg Rectal DAILY PRN      enoxaparin (LOVENOX) injection 40 mg  40 mg SubCUTAneous Q24H 40 mg at 05/26/19 0930    cyanocobalamin (VITAMIN B12) tablet 250 mcg  250 mcg Oral DAILY 250 mcg at 05/26/19 0930    levothyroxine (SYNTHROID) tablet 50 mcg  50 mcg Oral Once per day on Sun Tue Wed Thu Sat 50 mcg at 05/26/19 0620    levothyroxine (SYNTHROID) tablet 75 mcg  75 mcg Oral Once per day on Mon Fri 75 mcg at 05/27/19 0501    atorvastatin (LIPITOR) tablet 40 mg  40 mg Oral QHS 40 mg at 05/26/19 2205    aspirin chewable tablet 81 mg  81 mg Oral DAILY 81 mg at 05/26/19 0930    mupirocin (BACTROBAN) 2 % ointment   Both Nostrils Q12H         Labs/Data:    Lab Results   Component Value Date/Time    WBC 11.8 (H) 05/25/2019 01:10 AM    HGB 14.4 05/25/2019 01:10 AM    HCT 40.7 05/25/2019 01:10 AM    PLATELET 333 73/07/9723 01:10 AM    MCV 90.6 05/25/2019 01:10 AM       Lab Results   Component Value Date/Time    Sodium 131 (L) 05/27/2019 02:45 AM    Potassium 3.9 05/27/2019 02:45 AM    Chloride 96 (L) 05/27/2019 02:45 AM    CO2 28 05/27/2019 02:45 AM    Anion gap 7 05/27/2019 02:45 AM    Glucose 111 (H) 05/27/2019 02:45 AM    BUN 8 05/27/2019 02:45 AM    Creatinine 0.80 05/27/2019 02:45 AM    BUN/Creatinine ratio 10 (L) 05/27/2019 02:45 AM    GFR est AA >60 05/27/2019 02:45 AM    GFR est non-AA >60 05/27/2019 02:45 AM    Calcium 8.9 05/27/2019 02:45 AM       Wt Readings from Last 3 Encounters:   05/23/19 74.3 kg (163 lb 12.8 oz)   05/22/19 74.4 kg (164 lb)   05/16/19 78.9 kg (174 lb)         Intake/Output Summary (Last 24 hours) at 5/27/2019 2914  Last data filed at 5/26/2019 2021  Gross per 24 hour   Intake 644.17 ml   Output 600 ml   Net 44.17 ml       Patient seen and examined. Chart reviewed. Labs, data and other pertinent notes reviewed in last 24 hrs.     PMH/SH/FH reviewed and unchanged compared to H&P    Discussed with pt/RN/Dr Dennis Calle MD

## 2019-05-27 NOTE — PROGRESS NOTES
Oncology End of Shift Note      Bedside shift change report given to RADHA Gandara (incoming nurse) by Anais Pinedo (outgoing nurse) on [facility]. Report included the following information SBAR, Kardex, Intake/Output, MAR and Recent Results. Shift Summary: CCU transfer. Up to RR x1 assist. Paged MD for zantac for heartburn, gave tylenol x1 for abdominal/back pain. Wants melatonin to sleep tonight. Sodium 131 today.       Issues for Physician to Address:  Melatonin to sleep tonight              Shift Events  See above      Anais Pinedo

## 2019-05-27 NOTE — CONSULTS
Urology Consult    Subjective:     Date of Consultation:  May 27, 2019    Referring Physician: Rox Tristan    Reason for Consultation:  Right flank pain    History of Present Illness:   Patient is a 74 y.o.  female who is being seen for right flank pain. She was admitted to the hospital for Hyponatremia [E87.1]. Pain has been progressive. Started in her back now radiates to her RUQ. Const for several days. Denies fever. Spinal surgery. CT scan showing mild hydronephrosis and air in the bladder. Renal US showing now hydro.       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 vision from right eye      Past Surgical History:   Procedure Laterality Date    HX COLONOSCOPY N/A     HX FRACTURE TX Left 60years old    Hardware implanted    HX MASTECTOMY Right 1999    HX ORTHOPAEDIC      L knee has pins and plates after a fall    HX PARTIAL HYSTERECTOMY N/A       Family History   Problem Relation Age of Onset    Cancer Mother         breast cancer    Heart Disease Father     Diabetes Brother     Colon Cancer Brother     Diabetes Brother     Heart Disease Brother     Other Sister 55        Gastric bypass into staph infection    No Known Problems Sister       Social History     Tobacco Use    Smoking status: Never Smoker    Smokeless tobacco: Never Used   Substance Use Topics    Alcohol use: No     Comment: once  month wine     Allergies   Allergen Reactions    Other Food Anaphylaxis     Steak, cheese, grass, smoke    Tramadol Other (comments)     Hyponatremia     Codeine Rash and Swelling     Mouth    Other Plant, Animal, Environmental Hives     Rubber    Pcn [Penicillins] Rash and Swelling     Mouth      Prior to Admission medications    Medication Sig Start Date End Date Taking? Authorizing Provider   diazePAM (VALIUM) 5 mg tablet Take 1 Tab by mouth every eight (8) hours as needed (spasm). Max Daily Amount: 15 mg. 5/23/19   Tianna Malone MD   raNITIdine (ZANTAC) 150 mg tablet Take 150 mg by mouth daily. Provider, Historical   oxaprozin (DAYPRO) 600 mg tablet Take 1 Tab by mouth two (2) times a day. Indications: Joint Damage causing Pain and Loss of Function 5/14/19   Charmaine Winter MD   levothyroxine (SYNTHROID) 75 mcg tablet Take 75 mcg by mouth every Monday and Friday. Provider, Historical   denosumab (PROLIA) 60 mg/mL injection 60 mg by SubCUTAneous route every 6 months. Provider, Historical   miSOPROStol (CYTOTEC) 200 mcg tablet Take 200 mcg by mouth two (2) times a day. Provider, Historical   levothyroxine (SYNTHROID) 50 mcg tablet Take 50 mcg by mouth five (5) days a week. Tuesday, Wednesday, Thursday, Saturday, Sunday     Provider, Historical   acyclovir (ZOVIRAX) 400 mg tablet Take 400 mg by mouth two (2) times daily as needed. Provider, Historical   metFORMIN (GLUCOPHAGE) 500 mg tablet Take 500 mg by mouth nightly. Provider, Historical   MAGNESIUM PO Take 1 Tab by mouth daily. Provider, Historical   folic acid/multivit-min/lutein (CENTRUM SILVER PO) Take 1 Tab by mouth daily. Provider, Historical   traMADol (ULTRAM) 50 mg tablet Take 50 mg by mouth daily. Provider, Historical   acetaminophen/diphenhydramine (TYLENOL PM PO) Take 1 Tab by mouth nightly. Provider, Historical   simvastatin (ZOCOR) 40 mg tablet Take 1 Tab by mouth nightly. 12/5/18   Charmaine Winter MD   cetirizine (ZYRTEC) 10 mg tablet Take 10 mg by mouth daily. Provider, Historical   cyanocobalamin (VITAMIN B12) 100 mcg tablet Take 100 mcg by mouth daily. Provider, Historical         Review of Systems:  A comprehensive review of systems was negative except for that written in the HPI.     Objective:     Patient Vitals for the past 8 hrs:   BP Temp Pulse Resp SpO2   19 1559 185/72 98.1 °F (36.7 °C) 71 16 100 %     Temp (24hrs), Av °F (36.7 °C), Min:97.6 °F (36.4 °C), Max:98.4 °F (36.9 °C)      Intake and Output:    0701 -  1900  In: 1000 [P.O.:550; I.V.:450]  Out: 600 [Urine:600]    Physical Exam:            General:    alert, cooperative, no distress, appears stated age                     Skin:  Normal.                HEENT:  Normal        Throat/Neck:  normal   Lymph nodes:                   Lungs:  nml effort      Cardiovascular:  no edema             Abdomen[de-identified]  soft, mild tender             Genitalia:  defer exam          Extremities:  negative       Assessment:     Active Problems:    Hyponatremia (2019)          Patient is a 74 y.o.  female who is being seen for right flank pain. She was admitted to the hospital for Hyponatremia [E87.1]. Pain has been progressive. Started in her back now radiates to her RUQ. Const for several days. Denies fever. Spinal surgery. CT scan showing mild hydronephrosis and air in the bladder. Renal US showing now hydro. Plan:     - After discussing with radiology the hydronephrosis appears largely unchanged. - Air in the bladder most likely from recent catheterization as the patient does not endorse pneumaturia. - Outpatient follow in 1-2 weeks at [facility]

## 2019-05-27 NOTE — PROGRESS NOTES
Medicare pt has received, reviewed, and signed 2nd IM letter informing them of their right to appeal the discharge. Signed copy has been placed on pt bedside chart.                       Shavonne Sanz  [phone]

## 2019-05-27 NOTE — PROGRESS NOTES
Hospitalist Progress Note              Marissa Medeiros MD.                                                             Cell: [phone]                               NAME:  Ted Kamara  :  1945  MRN:  [id number]  Date of Service:  2019    Summary: 74 y.o.  female with past medical history of CAD, CVA, vision loss right eye, diabetes mellitus who presented on 2019 with altered mental status/confusion. Assessment/Plan:  Right Side Back Pain  RUQ abdominal pain  Check CT A/P to rule out hydro/pyelo/nephorolithiasis  Treating UTI as below  Check Hepatobiliary system US to rule out gall bladder etiology    Severe hyponatremia with Acute Encephalopathy and Seizure POA  Manage in ICU  S/p 3% upon admission  Urine osmolality 338, urine sodium 91 consistent with SIADH in settings of recent surgery  Sodium was improving, went upto 133, now 128  Repeat STAT and Every 6 hours for now  Appreciate nephrology assistance  CT head negative  Cortisol not low   TFTs as below    GNR UTI: Start Levaquin. 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 Complaining of Right side back and RUQ abdominal pain.  Overnight events d/w RN    Chief Complaints: f/u Seizure, encephalopathy, hyponatremia    Review of Systems:  Symptom Y/N Comments   Symptom Y/N Comments   Fever/Chills n     Chest Pain n      Poor Appetite       Edema        Cough n     Abdominal Pain n      Sputum       Joint Pain        SOB/NAVA n     Pruritis/Rash     Nausea/vomit n     Tolerating PT/OT        Diarrhea       Tolerating Diet y      Constipation       Other           Could NOT obtain due to:            Objective:     VITALS:   Last 24hrs VS reviewed since prior progress note. Most recent are:  Visit Vitals  /63 (BP 1 Location: Left arm, BP Patient Position: At rest)   Pulse 71   Temp 97.8 °F (36.6 °C)   Resp 16   Ht 5' 4\" (1.626 m)   Wt 74.3 kg (163 lb 12.8 oz)   SpO2 99%   BMI 28.12 kg/m²       Intake/Output Summary (Last 24 hours) at 5/27/2019 1151  Last data filed at 5/26/2019 2021  Gross per 24 hour   Intake 600 ml   Output 200 ml   Net 400 ml        PHYSICAL EXAM:  General: No acute distress, cooperative, pleasant   EENT: EOMI. Anicteric sclerae. Oral mucous moist, oropharynx benign  Resp: CTA bilaterally. No wheezing/rhonchi/rales. No accessory muscle use  CV: Regular rhythm, normal rate, no murmurs, gallops, rubs  GI: Soft, non distended, non tender. normoactive bowel sounds, no hepatosplenomegaly Extremities: No edema, warm, 2+ pulses throughout  Neurologic: Moves all extremities. Psych: Good insight. Not anxious nor agitated. Skin: Good Turgor, no rashes or ulcers    Lab Data Personally Reviewed: (see below)     Medications list Personally Reviewed:  x YES  NO     _______________________________________________________________________  Care Plan discussed with:  Patientand Nurse    Total NON critical care TIME:  30 minutes    Kim Curtis MD     Procedures: see electronic medical records for all procedures/Xrays and details which were not copied into this note but were reviewed prior to creation of Plan.       LABS:  Recent Labs     05/25/19  0110   WBC 11.8*   HGB 14.4   HCT 40.7        Recent Labs     05/27/19  0245 05/26/19  0415 05/25/19  1655  05/25/19  0110   * 128* 133*   < > 130*   K 3.9 4.1 3.7   < > 3.9   CL 96* 99 96*   < > 97   CO2 28 24 28   < > 23   BUN 8 7 10   < > 9   CREA 0.80 0.76 0.85   < > 0.74   * 130* 112*   < > 106*   CA 8.9 8.3* 8.5   < > 8.8   MG  --   --   --   --  2.5*   PHOS  --  2.4*  --   --  2.5*    < > = values in this interval not displayed. No results for input(s): SGOT, GPT, ALT, AP, TBIL, TBILI, TP, ALB, GLOB, GGT, AML, LPSE in the last 72 hours. No lab exists for component: AMYP, HLPSE  No results for input(s): INR, PTP, APTT in the last 72 hours. No lab exists for component: INREXT, INREXT   No results for input(s): FE, TIBC, PSAT, FERR in the last 72 hours. No results found for: FOL, RBCF   No results for input(s): PH, PCO2, PO2 in the last 72 hours. No results for input(s): CPK, CKNDX, TROIQ in the last 72 hours.     No lab exists for component: CPKMB  Lab Results   Component Value Date/Time    Cholesterol, total 209 (H) 07/10/2009 08:37 AM    HDL Cholesterol 70 (H) 07/10/2009 08:37 AM    LDL, calculated 121.4 (H) 07/10/2009 08:37 AM    Triglyceride 88 07/10/2009 08:37 AM    CHOL/HDL Ratio 3.0 07/10/2009 08:37 AM     Lab Results   Component Value Date/Time    Glucose (POC) 122 (H) 05/27/2019 11:18 AM    Glucose (POC) 113 (H) 05/27/2019 07:21 AM    Glucose (POC) 104 (H) 05/26/2019 10:01 PM    Glucose (POC) 98 05/26/2019 05:04 PM    Glucose (POC) 96 05/26/2019 01:01 PM     Lab Results   Component Value Date/Time    Color YELLOW/STRAW 05/23/2019 02:28 PM    Appearance CLOUDY (A) 05/23/2019 02:28 PM    Specific gravity 1.010 05/23/2019 02:28 PM    pH (UA) 7.5 05/23/2019 02:28 PM    Protein 30 (A) 05/23/2019 02:28 PM    Glucose 250 (A) 05/23/2019 02:28 PM    Ketone 15 (A) 05/23/2019 02:28 PM    Bilirubin NEGATIVE  05/23/2019 02:28 PM    Urobilinogen 0.2 05/23/2019 02:28 PM    Nitrites NEGATIVE  05/23/2019 02:28 PM    Leukocyte Esterase NEGATIVE  05/23/2019 02:28 PM    Epithelial cells FEW 05/23/2019 02:28 PM    Bacteria 2+ (A) 05/23/2019 02:28 PM    WBC 0-4 05/23/2019 02:28 PM    RBC 5-10 05/23/2019 02:28 PM

## 2019-05-27 NOTE — PROGRESS NOTES
Received shift change report at bedside from [address], RN, pt. A&O x 4 w/ periods of confusion and short term memory loss.

## 2019-05-27 NOTE — PROGRESS NOTES
Received shift change report from CCU off going nurse. Pt. A&O x4 with period of confusion. Stable vital signs.

## 2019-05-27 NOTE — PROGRESS NOTES
3:30 PM Report received from [facility].    4:25 PM Notified Dr. Jaimie Carmen of patient's c/o R flank pain 9/10 and request for urology consult based on ABD CT and ultrasound findings. Orders received to consult urology and MD to place orders for IV morphine. See MAR. MD also aware of patient's /72. No new orders received for BP.    4:30 PM Urology consult called. Awaiting return callback from Dr. Joey Benedict. 4:50 PM PRN 2 mg IV morphine given for patient's c/o pain. See MAR. Will f/u and continue to monitor. 5:45 PM Spoke with Dr. Joey Benedict regarding order for consult. MD to follow up with radiologist and will see patient later this evening or in AM. MD states patient does not need to be NPO.    6:25 PM Notified Dr. Jaimie Carmen patient states morphine decreased pain level from 9/10 - 3/10 but med does not seem to last long. MD also notified of patient's c/o burning and irritation with IV Levaquin. Orders received to change Levaquin to PO. See MAR.

## 2019-05-27 NOTE — PROGRESS NOTES
Problem: Falls - Risk of  Goal: *Absence of Falls  Description  Document Jt Oliva Fall Risk and appropriate interventions in the flowsheet. Outcome: Progressing Towards Goal     Problem: Patient Education: Go to Patient Education Activity  Goal: Patient/Family Education  Outcome: Progressing Towards Goal     Problem: Pressure Injury - Risk of  Goal: *Prevention of pressure injury  Description  Document Henrik Scale and appropriate interventions in the flowsheet.   Outcome: Progressing Towards Goal     Problem: Patient Education: Go to Patient Education Activity  Goal: Patient/Family Education  Outcome: Progressing Towards Goal     Problem: Patient Education: Go to Patient Education Activity  Goal: Patient/Family Education  Outcome: Progressing Towards Goal     Problem: Patient Education: Go to Patient Education Activity  Goal: Patient/Family Education  Outcome: Progressing Towards Goal

## 2019-05-27 NOTE — PROGRESS NOTES
Medicare pt has received, reviewed, and signed 2nd IM letter informing them of their right to appeal the discharge. Signed copy has been placed on pt bedside chart.                 Riya Sanz  [phone]

## 2019-05-28 ENCOUNTER — TELEPHONE (OUTPATIENT)
Dept: INTERNAL MEDICINE CLINIC | Age: 74
End: 2019-05-28

## 2019-05-28 LAB
ANION GAP SERPL CALC-SCNC: 8 MMOL/L (ref 5–15)
BUN SERPL-MCNC: 8 MG/DL (ref 6–20)
BUN/CREAT SERPL: 13 (ref 12–20)
CALCIUM SERPL-MCNC: 8.5 MG/DL (ref 8.5–10.1)
CHLORIDE SERPL-SCNC: 96 MMOL/L (ref 97–108)
CO2 SERPL-SCNC: 25 MMOL/L (ref 21–32)
CREAT SERPL-MCNC: 0.62 MG/DL (ref 0.55–1.02)
GLUCOSE BLD STRIP.AUTO-MCNC: 113 MG/DL (ref 65–100)
GLUCOSE BLD STRIP.AUTO-MCNC: 129 MG/DL (ref 65–100)
GLUCOSE BLD STRIP.AUTO-MCNC: 257 MG/DL (ref 65–100)
GLUCOSE BLD STRIP.AUTO-MCNC: 81 MG/DL (ref 65–100)
GLUCOSE SERPL-MCNC: 125 MG/DL (ref 65–100)
LIPASE SERPL-CCNC: 85 U/L (ref 73–393)
POTASSIUM SERPL-SCNC: 3.7 MMOL/L (ref 3.5–5.1)
SERVICE CMNT-IMP: ABNORMAL
SERVICE CMNT-IMP: NORMAL
SODIUM SERPL-SCNC: 129 MMOL/L (ref 136–145)

## 2019-05-28 PROCEDURE — 97535 SELF CARE MNGMENT TRAINING: CPT | Performed by: OCCUPATIONAL THERAPIST

## 2019-05-28 PROCEDURE — 94760 N-INVAS EAR/PLS OXIMETRY 1: CPT

## 2019-05-28 PROCEDURE — 74011250637 HC RX REV CODE- 250/637: Performed by: INTERNAL MEDICINE

## 2019-05-28 PROCEDURE — 74011250636 HC RX REV CODE- 250/636: Performed by: INTERNAL MEDICINE

## 2019-05-28 PROCEDURE — 74011636637 HC RX REV CODE- 636/637: Performed by: INTERNAL MEDICINE

## 2019-05-28 PROCEDURE — 74011250637 HC RX REV CODE- 250/637: Performed by: HOSPITALIST

## 2019-05-28 PROCEDURE — 36415 COLL VENOUS BLD VENIPUNCTURE: CPT

## 2019-05-28 PROCEDURE — 80048 BASIC METABOLIC PNL TOTAL CA: CPT

## 2019-05-28 PROCEDURE — 65270000015 HC RM PRIVATE ONCOLOGY

## 2019-05-28 PROCEDURE — 97116 GAIT TRAINING THERAPY: CPT

## 2019-05-28 PROCEDURE — 83690 ASSAY OF LIPASE: CPT

## 2019-05-28 PROCEDURE — 82962 GLUCOSE BLOOD TEST: CPT

## 2019-05-28 PROCEDURE — 97530 THERAPEUTIC ACTIVITIES: CPT

## 2019-05-28 RX ORDER — POLYETHYLENE GLYCOL 3350 17 G/17G
17 POWDER, FOR SOLUTION ORAL DAILY
Status: DISCONTINUED | OUTPATIENT
Start: 2019-05-28 | End: 2019-06-04 | Stop reason: HOSPADM

## 2019-05-28 RX ORDER — CYCLOBENZAPRINE HCL 10 MG
5 TABLET ORAL
Status: DISCONTINUED | OUTPATIENT
Start: 2019-05-28 | End: 2019-05-30

## 2019-05-28 RX ORDER — HYDROCODONE BITARTRATE AND ACETAMINOPHEN 5; 325 MG/1; MG/1
1 TABLET ORAL
Status: DISCONTINUED | OUTPATIENT
Start: 2019-05-28 | End: 2019-06-04 | Stop reason: HOSPADM

## 2019-05-28 RX ORDER — DIAZEPAM 5 MG/1
5 TABLET ORAL
Status: DISCONTINUED | OUTPATIENT
Start: 2019-05-28 | End: 2019-06-04 | Stop reason: HOSPADM

## 2019-05-28 RX ORDER — AMOXICILLIN 250 MG
2 CAPSULE ORAL DAILY
Status: DISCONTINUED | OUTPATIENT
Start: 2019-05-28 | End: 2019-06-01

## 2019-05-28 RX ADMIN — Medication 10 ML: at 16:28

## 2019-05-28 RX ADMIN — HYDROCODONE BITARTRATE AND ACETAMINOPHEN 1 TABLET: 5; 325 TABLET ORAL at 23:51

## 2019-05-28 RX ADMIN — CYANOCOBALAMIN TAB 500 MCG 250 MCG: 500 TAB at 09:20

## 2019-05-28 RX ADMIN — MORPHINE SULFATE 1 MG: 2 INJECTION, SOLUTION INTRAMUSCULAR; INTRAVENOUS at 01:02

## 2019-05-28 RX ADMIN — ENOXAPARIN SODIUM 40 MG: 40 INJECTION SUBCUTANEOUS at 09:21

## 2019-05-28 RX ADMIN — MENTHOL, METHYL SALICYLATE: 10; 15 CREAM TOPICAL at 10:00

## 2019-05-28 RX ADMIN — Medication 10 ML: at 21:59

## 2019-05-28 RX ADMIN — SENNOSIDES AND DOCUSATE SODIUM 2 TABLET: 8.6; 5 TABLET ORAL at 09:20

## 2019-05-28 RX ADMIN — LEVOTHYROXINE SODIUM 50 MCG: 50 TABLET ORAL at 12:02

## 2019-05-28 RX ADMIN — DIAZEPAM 5 MG: 5 TABLET ORAL at 21:59

## 2019-05-28 RX ADMIN — ASPIRIN 81 MG 81 MG: 81 TABLET ORAL at 09:20

## 2019-05-28 RX ADMIN — LEVOFLOXACIN 750 MG: 750 TABLET, FILM COATED ORAL at 19:48

## 2019-05-28 RX ADMIN — MORPHINE SULFATE 1 MG: 2 INJECTION, SOLUTION INTRAMUSCULAR; INTRAVENOUS at 04:16

## 2019-05-28 RX ADMIN — MELATONIN TAB 3 MG 6 MG: 3 TAB at 21:59

## 2019-05-28 RX ADMIN — HYDROCODONE BITARTRATE AND ACETAMINOPHEN 1 TABLET: 5; 325 TABLET ORAL at 16:28

## 2019-05-28 RX ADMIN — HYDROCODONE BITARTRATE AND ACETAMINOPHEN 1 TABLET: 5; 325 TABLET ORAL at 19:53

## 2019-05-28 RX ADMIN — POLYETHYLENE GLYCOL 3350 17 G: 17 POWDER, FOR SOLUTION ORAL at 09:18

## 2019-05-28 RX ADMIN — INSULIN LISPRO 5 UNITS: 100 INJECTION, SOLUTION INTRAVENOUS; SUBCUTANEOUS at 09:21

## 2019-05-28 RX ADMIN — HYDROCODONE BITARTRATE AND ACETAMINOPHEN 1 TABLET: 5; 325 TABLET ORAL at 09:21

## 2019-05-28 RX ADMIN — MENTHOL, METHYL SALICYLATE: 10; 15 CREAM TOPICAL at 16:00

## 2019-05-28 RX ADMIN — ATORVASTATIN CALCIUM 40 MG: 40 TABLET, FILM COATED ORAL at 21:59

## 2019-05-28 NOTE — PROGRESS NOTES
Hospitalist Progress Note              Alexa Berger MD.                                                             Cell: [phone]                               NAME:  Manju Murguia  :  1945  MRN:  [id number]  Date of Service:  2019    Summary: 74 y.o.  female with past medical history of CAD, CVA, vision loss right eye, diabetes mellitus who presented on 2019 with altered mental status/confusion. Assessment/Plan:  Right Side Back Pain  RUQ abdominal pain  Suspect neuro-muscular  CT A/P with mild hyro but urology saw the patient on consult but doesn't believe to be the cause  US A/P negative for gall bladder etiology  Switch IV morphine to lortab (can't use ultram as presented with seizure)  PRN Flexeril   Bengay    Severe hyponatremia with Acute Encephalopathy and Seizure POA  Na trending down again  Initially Manage in ICU, S/p 3% upon admission  Now managed on medical floor  Urine osmolality 338, urine sodium 91 consistent with SIADH in settings of recent surgery  Sodium was improving, went upto 133, now 128  Repeat STAT and Every 6 hours for now  Appreciate nephrology assistance  CT head negative  Cortisol not low   TFTs as below    GNR UTI: Start Levaquin. 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 Continue to have Right side back and RUQ abdominal pain.  Overnight events d/w RN    Chief Complaints: f/u Seizure, encephalopathy, hyponatremia    Review of Systems:  Symptom Y/N Comments   Symptom Y/N Comments   Fever/Chills n     Chest Pain n      Poor Appetite       Edema        Cough n     Abdominal Pain n      Sputum       Joint Pain        SOB/NAVA n     Pruritis/Rash        Nausea/vomit n     Tolerating PT/OT        Diarrhea       Tolerating Diet y      Constipation       Other           Could NOT obtain due to:            Objective:     VITALS:   Last 24hrs VS reviewed since prior progress note. Most recent are:  Visit Vitals  /58 (BP 1 Location: Left arm, BP Patient Position: Supine)   Pulse 85   Temp 97.9 °F (36.6 °C)   Resp 16   Ht 5' 4\" (1.626 m)   Wt 74.3 kg (163 lb 12.8 oz)   SpO2 100%   BMI 28.12 kg/m²       Intake/Output Summary (Last 24 hours) at 5/28/2019 0853  Last data filed at 5/28/2019 0803  Gross per 24 hour   Intake 739 ml   Output    Net 739 ml        PHYSICAL EXAM:  General: No acute distress, cooperative, pleasant   EENT: EOMI. Anicteric sclerae. Oral mucous moist, oropharynx benign  Resp: CTA bilaterally. No wheezing/rhonchi/rales. No accessory muscle use  CV: Regular rhythm, normal rate, no murmurs, gallops, rubs  GI: Soft, non distended, non tender. normoactive bowel sounds, no hepatosplenomegaly Extremities: No edema, warm, 2+ pulses throughout  Neurologic: Moves all extremities. Psych: Good insight. Not anxious nor agitated. Skin: Good Turgor, no rashes or ulcers    Lab Data Personally Reviewed: (see below)     Medications list Personally Reviewed:  x YES  NO     _______________________________________________________________________  Care Plan discussed with:  Patientand Nurse    Total NON critical care TIME:  20 minutes    Donald Temple MD     Procedures: see electronic medical records for all procedures/Xrays and details which were not copied into this note but were reviewed prior to creation of Plan. LABS:  No results for input(s): WBC, HGB, HCT, PLT, HGBEXT, HCTEXT, PLTEXT, HGBEXT, HCTEXT, PLTEXT in the last 72 hours.   Recent Labs     05/28/19  0112 05/27/19  0245 05/26/19  0415   * 131* 128*   K 3.7 3.9 4.1   CL 96* 96* 99   CO2 25 28 24   BUN 8 8 7   CREA 0.62 0.80 0.76   * 111* 130*   CA 8.5 8.9 8.3*   PHOS  --   --  2.4*     Recent Labs     05/28/19  0111   LPSE 85     No results for input(s): INR, PTP, APTT in the last 72 hours. No lab exists for component: INREXT, INREXT   No results for input(s): FE, TIBC, PSAT, FERR in the last 72 hours. No results found for: FOL, RBCF   No results for input(s): PH, PCO2, PO2 in the last 72 hours. No results for input(s): CPK, CKNDX, TROIQ in the last 72 hours.     No lab exists for component: CPKMB  Lab Results   Component Value Date/Time    Cholesterol, total 209 (H) 07/10/2009 08:37 AM    HDL Cholesterol 70 (H) 07/10/2009 08:37 AM    LDL, calculated 121.4 (H) 07/10/2009 08:37 AM    Triglyceride 88 07/10/2009 08:37 AM    CHOL/HDL Ratio 3.0 07/10/2009 08:37 AM     Lab Results   Component Value Date/Time    Glucose (POC) 257 (H) 05/28/2019 07:59 AM    Glucose (POC) 132 (H) 05/27/2019 08:44 PM    Glucose (POC) 88 05/27/2019 04:03 PM    Glucose (POC) 122 (H) 05/27/2019 11:18 AM    Glucose (POC) 113 (H) 05/27/2019 07:21 AM     Lab Results   Component Value Date/Time    Color YELLOW/STRAW 05/23/2019 02:28 PM    Appearance CLOUDY (A) 05/23/2019 02:28 PM    Specific gravity 1.010 05/23/2019 02:28 PM    pH (UA) 7.5 05/23/2019 02:28 PM    Protein 30 (A) 05/23/2019 02:28 PM    Glucose 250 (A) 05/23/2019 02:28 PM    Ketone 15 (A) 05/23/2019 02:28 PM    Bilirubin NEGATIVE  05/23/2019 02:28 PM    Urobilinogen 0.2 05/23/2019 02:28 PM    Nitrites NEGATIVE  05/23/2019 02:28 PM    Leukocyte Esterase NEGATIVE  05/23/2019 02:28 PM    Epithelial cells FEW 05/23/2019 02:28 PM    Bacteria 2+ (A) 05/23/2019 02:28 PM    WBC 0-4 05/23/2019 02:28 PM    RBC 5-10 05/23/2019 02:28 PM

## 2019-05-28 NOTE — PROGRESS NOTES
Problem: Mobility Impaired (Adult and Pediatric)  Goal: *Acute Goals and Plan of Care (Insert Text)  Description  Physical Therapy Goals  Initiated 5/25/2019  1. Patient will move from supine to sit and sit to supine , scoot up and down and roll side to side in bed with independence within 7 day(s). 2.  Patient will transfer from bed to chair and chair to bed with independence using the least restrictive device within 7 day(s). 3.  Patient will perform sit to stand with independence within 7 day(s). 4.  Patient will ambulate with independence for 300 feet with the least restrictive device within 7 day(s). 5.  Patient will ascend/descend 3 stairs with 1 handrail(s) with independence within 7 day(s). Outcome: Progressing Towards Goal   PHYSICAL THERAPY TREATMENT  Patient: Cleveland Hernodn (83 y.o. female)  Date: 5/28/2019  Diagnosis: Hyponatremia [E87.1] <principal problem not specified>      Precautions: Fall  Chart, physical therapy assessment, plan of care and goals were reviewed. ASSESSMENT: Patient was lying in bed when PT arrived. Agreed to therapy and was cleared by nursing. Currently demonstrates continued generalized weakness, decreased standing balance with increased fall risk, decreased activity tolerance and impaired mobility skills. Vitals were monitored in all positions and dropped in standing for BP, but patient denied lightheadedness. Supine to sit transfers was near independent. Sit to stand and bed to chair transfers needed cg assistance. Gait needed cg assistance with RW for 80 feet with distance limited by rapid fatigue. Gait was slow, wide with decreased step lengths and intermittent path deviations that were worse with turning. She was left in bedside chair with all needs met when the session ended.     Vitals:    05/28/19 1004 05/28/19 1007 05/28/19 1011 05/28/19 1020   BP: 148/66 148/83 127/63 130/51   BP 1 Location: Left arm Left arm Left arm Left arm   BP Patient Position: Supine Sitting Standing Sitting;Post activity  Comment: post gait trng   Pulse: 71 (!) 103 (!) 111 78   Resp:       Temp:       SpO2: 100% 100% 100% 99%   Weight:       Height:         Recommend Rehab upon discharge to regain independence prior to returning home at this time. Progression toward goals:  ?    Improving appropriately and progressing toward goals  X    Improving slowly and progressing toward goals  ? Not making progress toward goals and plan of care will be adjusted     PLAN:  Patient continues to benefit from skilled intervention to address the above impairments. Continue treatment per established plan of care. Discharge Recommendations:  Rehab  Further Equipment Recommendations for Discharge:  TBD by rehab     SUBJECTIVE:   Patient stated I don't feel strong enough to go home yet.     OBJECTIVE DATA SUMMARY:   Critical Behavior:  Neurologic State: Alert  Orientation Level: Oriented X4  Cognition: Appropriate decision making, Appropriate for age attention/concentration, Appropriate safety awareness, Follows commands  Safety/Judgement: Awareness of environment, Good awareness of safety precautions, Fall prevention  Functional Mobility Training:  Bed Mobility:  Rolling: Supervision  Supine to Sit: Supervision     Scooting: Independent        Transfers:  Sit to Stand: Contact guard assistance  Stand to Sit: Stand-by assistance        Bed to Chair: Contact guard assistance; Adaptive equipment(RW)                    Balance:  Sitting: Intact  Standing: Impaired  Standing - Static: Fair;Occasional  Standing - Dynamic : Poor; Unsupported  Ambulation/Gait Training:  Distance (ft): 80 Feet (ft)  Assistive Device: Walker, rolling;Gait belt  Ambulation - Level of Assistance: Contact guard assistance     Gait Description (WDL): Exceptions to WDL  Gait Abnormalities: Decreased step clearance; Path deviations; Step to gait        Base of Support: Widened     Speed/Paola: Slow  Step Length: Right shortened;Left shortened                 Pain:  Pain Scale 1: Numeric (0 - 10)  Pain Intensity 1: 0  Pain Location 1: Abdomen  Pain Orientation 1: Right  Pain Description 1: Aching  Pain Intervention(s) 1: Medication (see MAR)  Activity Tolerance:   Limited. Please refer to the flowsheet for vital signs taken during this treatment. After treatment:   X    Patient left in no apparent distress sitting up in chair  ? Patient left in no apparent distress in bed  X    Call bell left within reach  X    Nursing notified  ? Caregiver present  ?     Bed alarm activated    COMMUNICATION/COLLABORATION:   The patients plan of care was discussed with: Occupational Therapist, Registered Nurse and     Patricia Will, PT   Time Calculation: 27 mins

## 2019-05-28 NOTE — PROGRESS NOTES
Oncology End of Shift Note      Bedside shift change report given to Jasson Kyle RN (incoming nurse) by Na Willson (outgoing nurse) on [facility]. Report included the following information SBAR, Kardex and MAR. Shift Summary: Morphine was discontinued and norco is given q3 prn. MD has ordered strict I & o with an intake of 1200 ml/day. Jarbidge Cid is ordered for patient as well as miralax, colace and flexeril. Patient had visitors today. Issues for Physician to Address:  Psych consult     Patient on Cardiac Monitoring?     [] Yes  [x] No    Rhythm:                Na Willson

## 2019-05-28 NOTE — PROGRESS NOTES
Problem: Self Care Deficits Care Plan (Adult)  Goal: *Acute Goals and Plan of Care (Insert Text)  Description  Occupational Therapy Goals  Initiated 5/24/2019  1. Patient will perform self-feeding with Setup/Supervision within 7 day(s). 2.  Patient will perform grooming tasks seated EOB with Setup Assist within 7 day(s). 3.  Patient will perform upper body dressing seated EOB with modified independence within 7 day(s). 3.  Patient will perform lower body dressing seated EOB/standing PRN with Supervision within 7 day(s). 4.  Patient will perform toilet transfers with Supervision using least restrictive device within 7 day(s). 5.  Patient will perform all aspects of toileting with Supervision within 7 day(s). Outcome: Progressing Towards Goal   OCCUPATIONAL THERAPY TREATMENT  Patient: Taylor Duff (82 y.o. female)  Date: 5/28/2019  Diagnosis: Hyponatremia [E87.1] <principal problem not specified>       Precautions: Fall  Chart, occupational therapy assessment, plan of care, and goals were reviewed. ASSESSMENT:  Pt is progressing towards goals. Pt's BP was variable and pt was mildly symptomatic as BP did drop approx 20 systolically but was still into the 943A systollically. Pt complained of 6 to 7/10 back pain, and was medicated prior to treatment session reporting that the mobility helped her back pain. Pt tolerated ambulation to the bathroom, and ambulation in the santoyo, and seated exercises. She is functioning at approx moderate assistance to supervision levels for self care and is generally CGA for using the RW during adls. Pt is functioning well below her independent in adls and IADLs baseline and would greatly benefit from an intensive rehab program at discharge. Recommend inpatient rehab at discharge. Progression toward goals:  ?       Improving appropriately and progressing toward goals  ? Improving slowly and progressing toward goals  ?        Not making progress toward goals and plan of care will be adjusted     PLAN:  Patient continues to benefit from skilled intervention to address the above impairments. Continue treatment per established plan of care. Discharge Recommendations:  Inpatient Rehab  Further Equipment Recommendations for Discharge:  tbd--(pt uses none at home)      SUBJECTIVE:   Patient stated ? I have'nt been up for days. .? OBJECTIVE DATA SUMMARY:   Cognitive/Behavioral Status:  Neurologic State: Alert  Orientation Level: Oriented X4  Cognition: Appropriate decision making; Appropriate for age attention/concentration; Appropriate safety awareness; Follows commands  Perception: Appears intact  Perseveration: No perseveration noted  Safety/Judgement: Awareness of environment;Good awareness of safety precautions; Fall prevention    Functional Mobility and Transfers for ADLs:  Bed Mobility:  Rolling: Supervision  Supine to Sit: Supervision  Scooting: Independent    Transfers:  Sit to Stand: Contact guard assistance  Functional Transfers  Bathroom Mobility: Supervision/set up  Toilet Transfer : Supervision  Adaptive Equipment: Grab bars; Walker (comment)  Bed to Chair: Contact guard assistance; Adaptive equipment(RW)    Balance:  Sitting: Intact  Standing: Impaired  Standing - Static: Fair;Occasional  Standing - Dynamic : Poor; Unsupported    ADL Intervention:  Feeding  Feeding Assistance: Independent    Grooming  Washing Hands: Supervision  Brushing/Combing Hair: Set-up (seated)   Cues: Verbal cues provided     ADL mobility is performed at a slow and cautious pace using the RW. Lower Body Dressing Assistance  Socks: Independent  Leg Crossed Method Used: Yes  Position Performed: Seated edge of bed  Cues: Verbal cues provided    Toileting  Toileting Assistance: Independent  Bladder Hygiene: Independent  Clothing Management: Independent(gown)  Adaptive Equipment: Grab bars; Walker    Cognitive Retraining  Safety/Judgement: Awareness of environment;Good awareness of safety precautions; Fall prevention             Therapeutic Exercises:   Encouraged participating in self care activities, sitting up for meals and calling nursing for assist to the bathroom for toileting and grooming. Pt verbalized understanding. Educated in benefits of performing seated exercises while in the chair. Educated on shoulder rolls, scap. Adduction, B shoulder flexion, using water pitcher for resistance performing biceps curls. BLE exercises encouraged as well  Pain:  Pain Scale 1: Numeric (0 - 10)  Pain Intensity 1: 6 to 7  Pain Location 1: back  Educated in log roll technique for supine to sit EOB    Pain Intervention(s) 1: Medication (see MAR) pt was medicated by nursing prior to tx session. Pt reports that she wants icy hot for her pain. Activity Tolerance:   Fluctuations in BP systolically, however, all generally within normal limits, staying above 581 systolically. Pt mildly symptomatic at times. Please refer to the flowsheet for vital signs taken during this treatment. After treatment:   ? Patient left in no apparent distress sitting up in chair  ? Patient left in no apparent distress in bed  ? Call bell left within reach  ? Nursing notified  ? Caregiver present  ?  Bed alarm activated    COMMUNICATION/COLLABORATION:   The patient?s plan of care was discussed with: Physical Therapist and Registered Nurse    Clarita Patel OTR/L  Time Calculation: 36 mins

## 2019-05-28 NOTE — TELEPHONE ENCOUNTER
Called and spoke with Lennie Vázquez. Notified Lennie Mitchell does not round in the hospital.   Lennie Vázquez verbalized understanding of information discussed w/ no further questions at this time.

## 2019-05-28 NOTE — PROGRESS NOTES
Problem: Falls - Risk of  Goal: *Absence of Falls  Description  Document Gayle Shrestha Fall Risk and appropriate interventions in the flowsheet.   Outcome: Progressing Towards Goal

## 2019-05-28 NOTE — PROGRESS NOTES
Reason for Admission:   Hyponatremia                   RRAT Score:    11                 Plan for utilizing home health:      No prior HH or SNF in the past.  Pt is independent w/ADL's. No DME or O2. Current Advanced Directive/Advance Care Plan: FULL code. Jeri Corral (son) is the patient's POA [phone]. Likelihood of Readmission:  Low                         Transition of Care Plan:  Pt last seen by PCP 1/8/2019. Will need a post hospital follow up discharge. Pt is agreeable to SNF if this is the recommendation, at time of discharge. FOC to be offered to patient. Family to transport at time of discharge. Pt lives alone in a ranch style home and enters her home through the garage. There are three steps to enter the home. SW to continue to assist.    Care Management Interventions  PCP Verified by CM: Yes(1/8/2019)  Mode of Transport at Discharge:  Other (see comment)(Family)  Transition of Care Consult (CM Consult): Discharge Planning  Discharge Durable Medical Equipment: (No DME or O2.  )  Current Support Network: Own Home, Lives Alone  Confirm Follow Up Transport: Family  Plan discussed with Pt/Family/Caregiver: Yes  Discharge Location  Discharge Placement: (TBD)      JARROD Calderon  [phone] WDL

## 2019-05-28 NOTE — TELEPHONE ENCOUNTER
Tahir Castillo, neighbor states pt has been in [facility] since 5-23-19 and states Dr. Ewelina Sauceda hasn't been in to see her. Does Dr. Ewelina Sauceda go into see her while she is in there? No one can tell her what is going on Tahir Castillo states unless they are while she is there alone and she can't remember. Pt is on pain medication she states. Can Dr. Ewelina Sauceda please look into this? Thanks.   Room #9787 (was in CCU until 5-27-19)

## 2019-05-28 NOTE — PROGRESS NOTES
Oncology End of Shift Note      Bedside shift change report given to [facility], RN (incoming nurse) by Deyanira Patrick (outgoing nurse) on [facility]. Report included the following information SBAR, Kardex, Intake/Output and MAR. Shift Summary: Pt remained stable during shift. Pt orthostatic at beginning of shift. Likely due to morphine and poor intake for day. PRN tylenol x1.  1mg morphine PRN x2. Order for melatonin obtained and given to pt. Labs drawn. Na now 129. May need to be on fluid restriction? Issues for Physician to Address:  Na 129 today. Fluid restriction? Patient on Cardiac Monitoring?     [] Yes  [x] No    Rhythm:          Deyanira Patrick

## 2019-05-29 LAB
ANION GAP SERPL CALC-SCNC: 8 MMOL/L (ref 5–15)
BASOPHILS # BLD: 0.1 K/UL (ref 0–0.1)
BASOPHILS NFR BLD: 0 % (ref 0–1)
BUN SERPL-MCNC: 9 MG/DL (ref 6–20)
BUN/CREAT SERPL: 14 (ref 12–20)
CALCIUM SERPL-MCNC: 8.6 MG/DL (ref 8.5–10.1)
CHLORIDE SERPL-SCNC: 95 MMOL/L (ref 97–108)
CO2 SERPL-SCNC: 25 MMOL/L (ref 21–32)
CREAT SERPL-MCNC: 0.64 MG/DL (ref 0.55–1.02)
DIFFERENTIAL METHOD BLD: ABNORMAL
EOSINOPHIL # BLD: 0.3 K/UL (ref 0–0.4)
EOSINOPHIL NFR BLD: 2 % (ref 0–7)
ERYTHROCYTE [DISTWIDTH] IN BLOOD BY AUTOMATED COUNT: 13.6 % (ref 11.5–14.5)
GLUCOSE BLD STRIP.AUTO-MCNC: 101 MG/DL (ref 65–100)
GLUCOSE BLD STRIP.AUTO-MCNC: 107 MG/DL (ref 65–100)
GLUCOSE BLD STRIP.AUTO-MCNC: 108 MG/DL (ref 65–100)
GLUCOSE BLD STRIP.AUTO-MCNC: 110 MG/DL (ref 65–100)
GLUCOSE SERPL-MCNC: 122 MG/DL (ref 65–100)
HCT VFR BLD AUTO: 37.5 % (ref 35–47)
HGB BLD-MCNC: 12.7 G/DL (ref 11.5–16)
IMM GRANULOCYTES # BLD AUTO: 0.2 K/UL (ref 0–0.04)
IMM GRANULOCYTES NFR BLD AUTO: 1 % (ref 0–0.5)
LYMPHOCYTES # BLD: 5.9 K/UL (ref 0.8–3.5)
LYMPHOCYTES NFR BLD: 43 % (ref 12–49)
MCH RBC QN AUTO: 31.9 PG (ref 26–34)
MCHC RBC AUTO-ENTMCNC: 33.9 G/DL (ref 30–36.5)
MCV RBC AUTO: 94.2 FL (ref 80–99)
MONOCYTES # BLD: 1 K/UL (ref 0–1)
MONOCYTES NFR BLD: 8 % (ref 5–13)
NEUTS SEG # BLD: 6.2 K/UL (ref 1.8–8)
NEUTS SEG NFR BLD: 46 % (ref 32–75)
NRBC # BLD: 0 K/UL (ref 0–0.01)
NRBC BLD-RTO: 0 PER 100 WBC
PLATELET # BLD AUTO: 404 K/UL (ref 150–400)
PMV BLD AUTO: 7.8 FL (ref 8.9–12.9)
POTASSIUM SERPL-SCNC: 3.9 MMOL/L (ref 3.5–5.1)
RBC # BLD AUTO: 3.98 M/UL (ref 3.8–5.2)
SERVICE CMNT-IMP: ABNORMAL
SODIUM SERPL-SCNC: 128 MMOL/L (ref 136–145)
WBC # BLD AUTO: 13.5 K/UL (ref 3.6–11)

## 2019-05-29 PROCEDURE — 97535 SELF CARE MNGMENT TRAINING: CPT | Performed by: OCCUPATIONAL THERAPIST

## 2019-05-29 PROCEDURE — 74011250637 HC RX REV CODE- 250/637: Performed by: INTERNAL MEDICINE

## 2019-05-29 PROCEDURE — 94760 N-INVAS EAR/PLS OXIMETRY 1: CPT

## 2019-05-29 PROCEDURE — 65270000015 HC RM PRIVATE ONCOLOGY

## 2019-05-29 PROCEDURE — 82962 GLUCOSE BLOOD TEST: CPT

## 2019-05-29 PROCEDURE — 97116 GAIT TRAINING THERAPY: CPT

## 2019-05-29 PROCEDURE — 74011250636 HC RX REV CODE- 250/636: Performed by: INTERNAL MEDICINE

## 2019-05-29 PROCEDURE — 36415 COLL VENOUS BLD VENIPUNCTURE: CPT

## 2019-05-29 PROCEDURE — 80048 BASIC METABOLIC PNL TOTAL CA: CPT

## 2019-05-29 PROCEDURE — 97110 THERAPEUTIC EXERCISES: CPT | Performed by: OCCUPATIONAL THERAPIST

## 2019-05-29 PROCEDURE — 74011250637 HC RX REV CODE- 250/637: Performed by: HOSPITALIST

## 2019-05-29 PROCEDURE — 85025 COMPLETE CBC W/AUTO DIFF WBC: CPT

## 2019-05-29 RX ORDER — FUROSEMIDE 20 MG/1
20 TABLET ORAL DAILY
Status: DISCONTINUED | OUTPATIENT
Start: 2019-05-29 | End: 2019-06-04 | Stop reason: HOSPADM

## 2019-05-29 RX ADMIN — LEVOFLOXACIN 750 MG: 750 TABLET, FILM COATED ORAL at 19:29

## 2019-05-29 RX ADMIN — Medication 10 ML: at 13:56

## 2019-05-29 RX ADMIN — POLYETHYLENE GLYCOL 3350 17 G: 17 POWDER, FOR SOLUTION ORAL at 08:54

## 2019-05-29 RX ADMIN — FUROSEMIDE 20 MG: 20 TABLET ORAL at 13:56

## 2019-05-29 RX ADMIN — ENOXAPARIN SODIUM 40 MG: 40 INJECTION SUBCUTANEOUS at 08:56

## 2019-05-29 RX ADMIN — ASPIRIN 81 MG 81 MG: 81 TABLET ORAL at 08:55

## 2019-05-29 RX ADMIN — HYDROCODONE BITARTRATE AND ACETAMINOPHEN 1 TABLET: 5; 325 TABLET ORAL at 15:03

## 2019-05-29 RX ADMIN — ATORVASTATIN CALCIUM 40 MG: 40 TABLET, FILM COATED ORAL at 21:38

## 2019-05-29 RX ADMIN — MELATONIN TAB 3 MG 6 MG: 3 TAB at 21:38

## 2019-05-29 RX ADMIN — HYDROCODONE BITARTRATE AND ACETAMINOPHEN 1 TABLET: 5; 325 TABLET ORAL at 03:32

## 2019-05-29 RX ADMIN — DIAZEPAM 5 MG: 5 TABLET ORAL at 23:37

## 2019-05-29 RX ADMIN — SENNOSIDES AND DOCUSATE SODIUM 2 TABLET: 8.6; 5 TABLET ORAL at 08:55

## 2019-05-29 RX ADMIN — CYANOCOBALAMIN TAB 500 MCG 250 MCG: 500 TAB at 08:54

## 2019-05-29 RX ADMIN — LEVOTHYROXINE SODIUM 50 MCG: 50 TABLET ORAL at 05:31

## 2019-05-29 RX ADMIN — HYDROCODONE BITARTRATE AND ACETAMINOPHEN 1 TABLET: 5; 325 TABLET ORAL at 23:31

## 2019-05-29 RX ADMIN — HYDROCODONE BITARTRATE AND ACETAMINOPHEN 1 TABLET: 5; 325 TABLET ORAL at 08:54

## 2019-05-29 RX ADMIN — Medication 10 ML: at 05:31

## 2019-05-29 RX ADMIN — HYDROCODONE BITARTRATE AND ACETAMINOPHEN 1 TABLET: 5; 325 TABLET ORAL at 19:29

## 2019-05-29 RX ADMIN — Medication 10 ML: at 21:38

## 2019-05-29 RX ADMIN — MENTHOL, METHYL SALICYLATE: 10; 15 CREAM TOPICAL at 09:00

## 2019-05-29 NOTE — PROGRESS NOTES
Problem: Mobility Impaired (Adult and Pediatric)  Goal: *Acute Goals and Plan of Care (Insert Text)  Description  Physical Therapy Goals  Initiated 5/25/2019  1. Patient will move from supine to sit and sit to supine , scoot up and down and roll side to side in bed with independence within 7 day(s). 2.  Patient will transfer from bed to chair and chair to bed with independence using the least restrictive device within 7 day(s). 3.  Patient will perform sit to stand with independence within 7 day(s). 4.  Patient will ambulate with independence for 300 feet with the least restrictive device within 7 day(s). 5.  Patient will ascend/descend 3 stairs with 1 handrail(s) with independence within 7 day(s). Outcome: Progressing Towards Goal   PHYSICAL THERAPY TREATMENT  Patient: Ted Kamara (48 y.o. female)  Date: 5/29/2019  Diagnosis: Hyponatremia [E87.1] <principal problem not specified>       Precautions: Fall  Chart, physical therapy assessment, plan of care and goals were reviewed. ASSESSMENT:  Patient was lying in bed when PT arrived. Agreed to therapy and cleared by her nurse. Currently demonstrates improved bed mobility with independent skill rolling and modified independent supine to sit and sit to supine using rails. Sit to stand improved to sba. Gait progressed to 120 feet with RW and cg assistance. Gait pattern improved with speed, step lengths and posture. Stair training provided with cg assistance to up up/down bottom step with L rail 3 times to simulate steps into her own home. She returned to bed to rest with all needs met when the session ended. Recommend SNF level rehab upon discharge as patient lives alone with no support. Progression toward goals:  ?    Improving appropriately and progressing toward goals  ? Improving slowly and progressing toward goals  ?     Not making progress toward goals and plan of care will be adjusted     PLAN:  Patient continues to benefit from skilled intervention to address the above impairments. Continue treatment per established plan of care. Discharge Recommendations:  Miguel Espinoza  Further Equipment Recommendations for Discharge:  TBD by rehab          OBJECTIVE DATA SUMMARY:   Critical Behavior:  Neurologic State: Alert  Orientation Level: Oriented X4  Cognition: Appropriate decision making, Appropriate for age attention/concentration, Appropriate safety awareness, Follows commands  Safety/Judgement: Awareness of environment, Fall prevention, Good awareness of safety precautions  Functional Mobility Training:  Bed Mobility:  Rolling: Independent  Supine to Sit: Modified independent  Sit to Supine: Modified independent  Scooting: Independent        Transfers:  Sit to Stand: Stand-by assistance  Stand to Sit: Modified independent        Bed to Chair: Stand-by assistance                    Balance:  Sitting: Intact  Standing: Impaired  Standing - Static: Good;Occasional  Standing - Dynamic : Fair;Constant support  Ambulation/Gait Training:  Distance (ft): 125 Feet (ft)  Assistive Device: Walker, rolling;Gait belt  Ambulation - Level of Assistance: Contact guard assistance        Gait Abnormalities: Path deviations        Base of Support: Widened     Speed/Paola: Pace decreased (<100 feet/min)  Step Length: Right shortened;Left shortened                    Stairs:  Number of Stairs Trained: 3  Stairs - Level of Assistance: Contact guard assistance   Rail Use: Left     Activity Tolerance:   Fair. Please refer to the flowsheet for vital signs taken during this treatment. After treatment:   ?    Patient left in no apparent distress sitting up in chair  ? Patient left in no apparent distress in bed  ? Call bell left within reach  ? Nursing notified  ? Caregiver present  ?     Bed alarm activated    COMMUNICATION/COLLABORATION:   The patient?s plan of care was discussed with: Registered Nurse and [address] [place] [address] Lex Mitchell, PT   Time Calculation: 14 mins

## 2019-05-29 NOTE — PROGRESS NOTES
Name: Taylor Duff   MRN: [id number]  : 1945      Assessment:  Acute on chronic hyponatremia: etiology appears to be combination from back pain causing excessive ADH effects + presumed poor solute intake (reflected by low BUN)    Hypothyroidism- last TSH was elevated in 2019  DM-2  AMS  Recent back surgery/Lumbar laminectomy         Plan/Recommendations:    Sodium with gentle trend down since Monday (131 to 129 to 128); s/p tolvaptan x one on  (worked, but is not a good option for outpatient management). Continue fluid restriction; given high BP will avoid salt tabs; will try low dose lasix  Daily labs         Subjective:    Alert. Tired  No nausea. Still C/o right lateral rib pain. Visitors in the room. ROS:   No sob. No n/v.     Exam:  Visit Vitals  /77 (BP 1 Location: Left arm, BP Patient Position: At rest)   Pulse 73   Temp 97.8 °F (36.6 °C)   Resp 16   Ht 5' 4\" (1.626 m)   Wt 75.2 kg (165 lb 12.8 oz)   SpO2 98%   BMI 28.46 kg/m²       Elderly thin built in NAD  No icterus, mmm  No JVD  Clear  RRR, no murmur  No edema  Alert awake. Improved mentation.  Not fully oriented  No twitching or myoclonus    Current Facility-Administered Medications   Medication Dose Route Frequency Last Dose    HYDROcodone-acetaminophen (NORCO) 5-325 mg per tablet 1 Tab  1 Tab Oral Q4H PRN 1 Tab at 19 0854    polyethylene glycol (MIRALAX) packet 17 g  17 g Oral DAILY 17 g at 19 0854    senna-docusate (PERICOLACE) 8.6-50 mg per tablet 2 Tab  2 Tab Oral DAILY 2 Tab at 19 0855    cyclobenzaprine (FLEXERIL) tablet 5 mg  5 mg Oral TID PRN      methyl salicylate-menthol (BENGAY) 15-10 % cream   Topical TID      diazePAM (VALIUM) tablet 5 mg  5 mg Oral Q8H PRN 5 mg at 19 7800    raNITIdine (ZANTAC) 15 mg/mL syrup 150 mg  150 mg Oral BID PRN 150 mg at 05/27/19 0253    levoFLOXacin (LEVAQUIN) tablet 750 mg  750 mg Oral Q24H 750 mg at 05/28/19 1948    melatonin tablet 6 mg  6 mg Oral QHS PRN 6 mg at 05/28/19 2159    cetirizine (ZYRTEC) tablet 10 mg  10 mg Oral DAILY PRN 10 mg at 05/27/19 0839    insulin lispro (HUMALOG) injection   SubCUTAneous AC&HS Stopped at 05/28/19 1130    glucose chewable tablet 16 g  4 Tab Oral PRN      glucagon (GLUCAGEN) injection 1 mg  1 mg IntraMUSCular PRN      0.9% sodium chloride infusion 250 mL  250 mL IntraVENous PRN      sodium chloride (NS) flush 5-40 mL  5-40 mL IntraVENous Q8H 10 mL at 05/29/19 0531    sodium chloride (NS) flush 5-40 mL  5-40 mL IntraVENous PRN      acetaminophen (TYLENOL) tablet 650 mg  650 mg Oral Q6H  mg at 05/27/19 2021    naloxone (NARCAN) injection 0.4 mg  0.4 mg IntraVENous PRN      ondansetron (ZOFRAN) injection 4 mg  4 mg IntraVENous Q4H PRN      bisacodyl (DULCOLAX) suppository 10 mg  10 mg Rectal DAILY PRN      enoxaparin (LOVENOX) injection 40 mg  40 mg SubCUTAneous Q24H 40 mg at 05/29/19 0856    cyanocobalamin (VITAMIN B12) tablet 250 mcg  250 mcg Oral DAILY 250 mcg at 05/29/19 0854    levothyroxine (SYNTHROID) tablet 50 mcg  50 mcg Oral Once per day on Sun Tue Wed Thu Sat 50 mcg at 05/29/19 0531    levothyroxine (SYNTHROID) tablet 75 mcg  75 mcg Oral Once per day on Mon Fri 75 mcg at 05/27/19 0501    atorvastatin (LIPITOR) tablet 40 mg  40 mg Oral QHS 40 mg at 05/28/19 2159    aspirin chewable tablet 81 mg  81 mg Oral DAILY 81 mg at 05/29/19 0855       Labs/Data:    Lab Results   Component Value Date/Time    WBC 13.5 (H) 05/29/2019 03:20 AM    HGB 12.7 05/29/2019 03:20 AM    HCT 37.5 05/29/2019 03:20 AM    PLATELET 388 (H) 59/02/3024 03:20 AM    MCV 94.2 05/29/2019 03:20 AM       Lab Results   Component Value Date/Time    Sodium 128 (L) 05/29/2019 03:20 AM    Potassium 3.9 05/29/2019 03:20 AM    Chloride 95 (L) 05/29/2019 03:20 AM    CO2 25 05/29/2019 03:20 AM Anion gap 8 05/29/2019 03:20 AM    Glucose 122 (H) 05/29/2019 03:20 AM    BUN 9 05/29/2019 03:20 AM    Creatinine 0.64 05/29/2019 03:20 AM    BUN/Creatinine ratio 14 05/29/2019 03:20 AM    GFR est AA >60 05/29/2019 03:20 AM    GFR est non-AA >60 05/29/2019 03:20 AM    Calcium 8.6 05/29/2019 03:20 AM       Wt Readings from Last 3 Encounters:   05/28/19 75.2 kg (165 lb 12.8 oz)   05/22/19 74.4 kg (164 lb)   05/16/19 78.9 kg (174 lb)         Intake/Output Summary (Last 24 hours) at 5/29/2019 1231  Last data filed at 5/29/2019 0830  Gross per 24 hour   Intake 818 ml   Output 1125 ml   Net -307 ml       Patient seen and examined. Chart reviewed. Labs, data and other pertinent notes reviewed in last 24 hrs.     PMH/SH/FH reviewed and unchanged compared to H&P    Discussed with pt/RN/Dr Dennis Santos MD

## 2019-05-29 NOTE — PROGRESS NOTES
Oncology End of Shift Note      Bedside shift change report given to Pauline Apgar, RN (incoming nurse) by [facility]cdonough (outgoing [facility] Report included the following information SBAR, Kardex and MAR. Shift Summary: Patient received pain medication twice during shift. Patient has a nephrology consult. . Lasix was started. No acute changes with patient. Patient seems to have confusion and memory loss as she asks RN same questions that were asked to MD.      Issues for Physician to Address:  None. Patient on Cardiac Monitoring?     [] Yes  [x] No    Rhythm:        Simran Mcdonough

## 2019-05-29 NOTE — PROGRESS NOTES
Problem: Self Care Deficits Care Plan (Adult)  Goal: *Acute Goals and Plan of Care (Insert Text)  Description  Occupational Therapy Goals  Initiated 5/24/2019  1. Patient will perform self-feeding with Setup/Supervision within 7 day(s). 2.  Patient will perform grooming tasks seated EOB with Setup Assist within 7 day(s). 3.  Patient will perform upper body dressing seated EOB with modified independence within 7 day(s). 3.  Patient will perform lower body dressing seated EOB/standing PRN with Supervision within 7 day(s). 4.  Patient will perform toilet transfers with Supervision using least restrictive device within 7 day(s). 5.  Patient will perform all aspects of toileting with Supervision within 7 day(s). Outcome: Progressing Towards Goal   OCCUPATIONAL THERAPY TREATMENT  Patient: Seamus Han (84 y.o. female)  Date: 5/29/2019  Diagnosis: Hyponatremia [E87.1] <principal problem not specified>       Precautions: Fall  Chart, occupational therapy assessment, plan of care, and goals were reviewed. ASSESSMENT:    Pt was agreeable to treatment. Performed standing adls using the RW with generally Supervision simulating IADLs; no LOB with reaching at various height and placing objects from high to low shelves. Pt performed standing exercises to increase strength and endurance for adls. Pt reported moderate to maximal fatigue post exercises. Continue to recommend rehab at discharge. *Would a dietary/nutrition consult be appropriate. ?  Pt has no appetite  and has difficulty eating and understands her need for nutrition. Progression toward goals:  ?       Improving appropriately and progressing toward goals  ? Improving slowly and progressing toward goals  ? Not making progress toward goals and plan of care will be adjusted     PLAN:  Patient continues to benefit from skilled intervention to address the above impairments. Continue treatment per established plan of care.   Discharge Recommendations:  Rehab  Further Equipment Recommendations for Discharge:  tbd      SUBJECTIVE:   Patient stated Aaron Beltran you give me something to stimulate my appetite . ... I just don't feel like eating, but I know I have to. .. ...     OBJECTIVE DATA SUMMARY:   Cognitive/Behavioral Status:  Neurologic State: Alert  Orientation Level: Oriented to person;Oriented to place  Cognition: (nurse reports memory loss)  Perception: Appears intact  Perseveration: No perseveration noted  Safety/Judgement: (complains of moderate to maximal fatigue post tx session)    Functional Mobility and Transfers for ADLs:  Bed Mobility:  Rolling: Independent  Supine to Sit: Modified independent  Sit to Supine: Modified independent  Scooting: Independent    Transfers:  Sit to Stand: Stand-by assistance     Bed to Chair: Stand-by assistance    Balance:  Sitting: Intact  Standing: Impaired  Standing - Static: Good;Occasional  Standing - Dynamic : Fair(performed standing exercises to increase balance and strengt)    ADL Intervention:  Feeding  Feeding Assistance: Independent(reports poor appetite  wants an appetite stimulant)     Nursing reports pt with impaired memory as she asks the same questions over and over again (RE: appetite stimulation). Can address memory next session  Standing dressing:  don long shirt with supervision-RW was not used for support. Pt performed standing adl--moving objects on closet shelving, reaching off base of support and bending to various height to simulate home activities. Toileting  Toileting Assistance: (declined ambulation to bathroom)    Cognitive Retraining  Safety/Judgement: (complains of moderate to maximal fatigue post tx session)               Therapeutic Exercises: Pt reported fatigue - mod/max. At end of tx session. EXERCISE   Sets   Reps   Active Active Assist   Passive   Comments   Standing at chair:    ?           ?           ?              Small knee bends 1 10 ? ?           ?           Jacquie Real avail for support, but encour. Not to use   BUEs full AROM in deep breathing  1 3 ?           ?           ? Active LE-\"squeeze knees\" while performing UE/breathing   punches 2 15 seconds ? ?           ?           jabs   Standing marching  1 15 seconds ? ?           ?                 ?           ?           ?                 ?           ?           ?                 ?           ?           ?                 ?           ?           ?                 ?           ?           ?                 ?           ?           ?                 Pain:   No complaints of pain. Activity Tolerance:   Fair--reported , mod/max. Fatigue post tx session. After treatment:   ? Patient left in no apparent distress sitting up in chair  ? Patient left in no apparent distress in bed  ? Call bell left within reach  ? Nursing notified  ? Caregiver present  ?  Bed alarm activated    COMMUNICATION/COLLABORATION:   The patients plan of care was discussed with: Registered Nurse    Ayah Garcia OTR/L  Time Calculation: 23 mins

## 2019-05-29 NOTE — INTERDISCIPLINARY ROUNDS
Oncology Interdisciplinary rounds were held today to discuss patient plan of care and outcomes. The following members were present: Nursing, Physician, Case Management, Pharmacy, and PT/OT Actual Length of Stay: 6 DRG GLOS: 5 Expected Length of Stay: 5d 0h 
 
 
 
               Plan            Discharge PT/OT Discharge home

## 2019-05-29 NOTE — PROGRESS NOTES
Oncology End of Shift Note      Bedside shift change report given to [facility], RN (incoming nurse) by Claudetta Hart (outgoing nurse) on Ricco Albarran. Report included the following information SBAR, Kardex, Intake/Output and MAR. Shift Summary: Pt remained stable during shift. PRN Norco x3. Got order for PRN Diazepam.  Na 128 this am.  Continued fluid restriction. Count restarts at 0700. Pt having lots of questions regarding her condition and cause and outcomes. Issues for Physician to Address:  Na 128 this am.       Patient on Cardiac Monitoring?     [] Yes  [x] No    Rhythm:          Claudetta Hart

## 2019-05-29 NOTE — PROGRESS NOTES
Pt requesting anxiety medication. Pt states she asked day shift nurse. Check MAR and no anxiety medication ordered. This nurse asked pt if she takes aniexty medication at home. Per pt she has a PRN medication but does not remember the name or the dose. Per Home med list in chart, pt has Diazepam q8h PRN max daily 15mg for home. Messaged on-call physician and asked for order. 2100: Order received for Diazepam 5mg q8h PRN. 2200: Administer dose to pt with bedtime meds. Educated pt on order written and side effects.

## 2019-05-29 NOTE — PROGRESS NOTES
Problem: Falls - Risk of  Goal: *Absence of Falls  Description  Document Mel Latin Fall Risk and appropriate interventions in the flowsheet.   Outcome: Progressing Towards Goal  Note:   Fall Risk Interventions:  Mobility Interventions: Communicate number of staff needed for ambulation/transfer, Patient to call before getting OOB    Mentation Interventions: Adequate sleep, hydration, pain control, Door open when patient unattended, Room close to nurse's station    Medication Interventions: Patient to call before getting OOB, Teach patient to arise slowly    Elimination Interventions: Patient to call for help with toileting needs, Call light in reach, Toileting schedule/hourly rounds

## 2019-05-29 NOTE — PROGRESS NOTES
Hospitalist Progress Note              Alexa Berger MD.                                                             Cell: [phone]                               NAME:  Manju Murguia  :  1945  MRN:  [id number]  Date of Service:  2019    Summary: 74 y.o.  female with past medical history of CAD, CVA, vision loss right eye, diabetes mellitus who presented on 2019 with altered mental status/confusion. 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 Continue to have Right side back and RUQ abdominal pain.  Overnight events d/w RN    Chief Complaints: f/u Seizure, encephalopathy, hyponatremia    Review of Systems:  Symptom Y/N Comments   Symptom Y/N Comments   Fever/Chills n     Chest Pain n      Poor Appetite       Edema        Cough n     Abdominal Pain n      Sputum       Joint Pain        SOB/NAVA n     Pruritis/Rash        Nausea/vomit n     Tolerating PT/OT        Diarrhea       Tolerating Diet y      Constipation       Other           Could NOT obtain due to:            Objective:     VITALS:   Last 24hrs VS reviewed since prior progress note. Most recent are:  Visit Vitals  /77 (BP 1 Location: Left arm, BP Patient Position: At rest)   Pulse 73   Temp 97.8 °F (36.6 °C)   Resp 16   Ht 5' 4\" (1.626 m)   Wt 75.2 kg (165 lb 12.8 oz)   SpO2 98%   BMI 28.46 kg/m²       Intake/Output Summary (Last 24 hours) at 5/29/2019 1236  Last data filed at 5/29/2019 0830  Gross per 24 hour   Intake 818 ml   Output 1125 ml   Net -307 ml        PHYSICAL EXAM:  General: No acute distress, cooperative, pleasant   EENT: EOMI. Anicteric sclerae. Oral mucous moist, oropharynx benign  Resp: CTA bilaterally. No wheezing/rhonchi/rales. No accessory muscle use  CV: Regular rhythm, normal rate, no murmurs, gallops, rubs  GI: Soft, non distended, non tender. normoactive bowel sounds, no hepatosplenomegaly Extremities: No edema, warm, 2+ pulses throughout  Neurologic: Moves all extremities. Psych: Good insight. Not anxious nor agitated. Skin: Good Turgor, no rashes or ulcers    Lab Data Personally Reviewed: (see below)     Medications list Personally Reviewed:  x YES  NO     _______________________________________________________________________  Care Plan discussed with:  Patientand Nurse    Total NON critical care TIME:  20 minutes    Efren Contreras MD     Procedures: see electronic medical records for all procedures/Xrays and details which were not copied into this note but were reviewed prior to creation of Plan.       LABS:  Recent Labs     05/29/19  0320   WBC 13.5*   HGB 12.7   HCT 37.5   * Recent Labs     05/29/19  0320 05/28/19  0111 05/27/19  0245   * 129* 131*   K 3.9 3.7 3.9   CL 95* 96* 96*   CO2 25 25 28   BUN 9 8 8   CREA 0.64 0.62 0.80   * 125* 111*   CA 8.6 8.5 8.9     Recent Labs     05/28/19  0111   LPSE 85     No results for input(s): INR, PTP, APTT in the last 72 hours. No lab exists for component: INREXT, INREXT   No results for input(s): FE, TIBC, PSAT, FERR in the last 72 hours. No results found for: FOL, RBCF   No results for input(s): PH, PCO2, PO2 in the last 72 hours. No results for input(s): CPK, CKNDX, TROIQ in the last 72 hours.     No lab exists for component: CPKMB  Lab Results   Component Value Date/Time    Cholesterol, total 209 (H) 07/10/2009 08:37 AM    HDL Cholesterol 70 (H) 07/10/2009 08:37 AM    LDL, calculated 121.4 (H) 07/10/2009 08:37 AM    Triglyceride 88 07/10/2009 08:37 AM    CHOL/HDL Ratio 3.0 07/10/2009 08:37 AM     Lab Results   Component Value Date/Time    Glucose (POC) 110 (H) 05/29/2019 11:32 AM    Glucose (POC) 101 (H) 05/29/2019 07:45 AM    Glucose (POC) 129 (H) 05/28/2019 08:13 PM    Glucose (POC) 113 (H) 05/28/2019 04:04 PM    Glucose (POC) 81 05/28/2019 11:57 AM     Lab Results   Component Value Date/Time    Color YELLOW/STRAW 05/23/2019 02:28 PM    Appearance CLOUDY (A) 05/23/2019 02:28 PM    Specific gravity 1.010 05/23/2019 02:28 PM    pH (UA) 7.5 05/23/2019 02:28 PM    Protein 30 (A) 05/23/2019 02:28 PM    Glucose 250 (A) 05/23/2019 02:28 PM    Ketone 15 (A) 05/23/2019 02:28 PM    Bilirubin NEGATIVE  05/23/2019 02:28 PM    Urobilinogen 0.2 05/23/2019 02:28 PM    Nitrites NEGATIVE  05/23/2019 02:28 PM    Leukocyte Esterase NEGATIVE  05/23/2019 02:28 PM    Epithelial cells FEW 05/23/2019 02:28 PM    Bacteria 2+ (A) 05/23/2019 02:28 PM    WBC 0-4 05/23/2019 02:28 PM    RBC 5-10 05/23/2019 02:28 PM

## 2019-05-30 ENCOUNTER — APPOINTMENT (OUTPATIENT)
Dept: MRI IMAGING | Age: 74
DRG: 640 | End: 2019-05-30
Attending: INTERNAL MEDICINE
Payer: MEDICARE

## 2019-05-30 LAB
ANION GAP SERPL CALC-SCNC: 8 MMOL/L (ref 5–15)
BUN SERPL-MCNC: 12 MG/DL (ref 6–20)
BUN/CREAT SERPL: 17 (ref 12–20)
CALCIUM SERPL-MCNC: 8.5 MG/DL (ref 8.5–10.1)
CHLORIDE SERPL-SCNC: 94 MMOL/L (ref 97–108)
CO2 SERPL-SCNC: 26 MMOL/L (ref 21–32)
CREAT SERPL-MCNC: 0.69 MG/DL (ref 0.55–1.02)
GLUCOSE BLD STRIP.AUTO-MCNC: 100 MG/DL (ref 65–100)
GLUCOSE BLD STRIP.AUTO-MCNC: 101 MG/DL (ref 65–100)
GLUCOSE BLD STRIP.AUTO-MCNC: 97 MG/DL (ref 65–100)
GLUCOSE BLD STRIP.AUTO-MCNC: 98 MG/DL (ref 65–100)
GLUCOSE SERPL-MCNC: 118 MG/DL (ref 65–100)
POTASSIUM SERPL-SCNC: 4.1 MMOL/L (ref 3.5–5.1)
SERVICE CMNT-IMP: ABNORMAL
SERVICE CMNT-IMP: NORMAL
SODIUM SERPL-SCNC: 128 MMOL/L (ref 136–145)

## 2019-05-30 PROCEDURE — 74011250636 HC RX REV CODE- 250/636: Performed by: INTERNAL MEDICINE

## 2019-05-30 PROCEDURE — 80048 BASIC METABOLIC PNL TOTAL CA: CPT

## 2019-05-30 PROCEDURE — 65270000015 HC RM PRIVATE ONCOLOGY

## 2019-05-30 PROCEDURE — 36415 COLL VENOUS BLD VENIPUNCTURE: CPT

## 2019-05-30 PROCEDURE — 72157 MRI CHEST SPINE W/O & W/DYE: CPT

## 2019-05-30 PROCEDURE — 74011250636 HC RX REV CODE- 250/636: Performed by: HOSPITALIST

## 2019-05-30 PROCEDURE — 82962 GLUCOSE BLOOD TEST: CPT

## 2019-05-30 PROCEDURE — A9575 INJ GADOTERATE MEGLUMI 0.1ML: HCPCS | Performed by: INTERNAL MEDICINE

## 2019-05-30 PROCEDURE — 74011250637 HC RX REV CODE- 250/637: Performed by: INTERNAL MEDICINE

## 2019-05-30 PROCEDURE — 74011250637 HC RX REV CODE- 250/637: Performed by: HOSPITALIST

## 2019-05-30 PROCEDURE — 72158 MRI LUMBAR SPINE W/O & W/DYE: CPT

## 2019-05-30 PROCEDURE — 94760 N-INVAS EAR/PLS OXIMETRY 1: CPT

## 2019-05-30 RX ORDER — GADOTERATE MEGLUMINE 376.9 MG/ML
15 INJECTION INTRAVENOUS
Status: COMPLETED | OUTPATIENT
Start: 2019-05-30 | End: 2019-05-30

## 2019-05-30 RX ORDER — MORPHINE SULFATE 2 MG/ML
2 INJECTION, SOLUTION INTRAMUSCULAR; INTRAVENOUS ONCE
Status: COMPLETED | OUTPATIENT
Start: 2019-05-30 | End: 2019-05-30

## 2019-05-30 RX ORDER — CYCLOBENZAPRINE HCL 10 MG
5 TABLET ORAL 2 TIMES DAILY
Status: DISCONTINUED | OUTPATIENT
Start: 2019-05-30 | End: 2019-06-04 | Stop reason: HOSPADM

## 2019-05-30 RX ADMIN — Medication 10 ML: at 04:58

## 2019-05-30 RX ADMIN — MENTHOL, METHYL SALICYLATE: 10; 15 CREAM TOPICAL at 22:08

## 2019-05-30 RX ADMIN — Medication 10 ML: at 13:52

## 2019-05-30 RX ADMIN — GADOTERATE MEGLUMINE 15 ML: 376.9 INJECTION INTRAVENOUS at 21:22

## 2019-05-30 RX ADMIN — Medication 10 ML: at 22:08

## 2019-05-30 RX ADMIN — POLYETHYLENE GLYCOL 3350 17 G: 17 POWDER, FOR SOLUTION ORAL at 09:16

## 2019-05-30 RX ADMIN — ASPIRIN 81 MG 81 MG: 81 TABLET ORAL at 09:13

## 2019-05-30 RX ADMIN — ENOXAPARIN SODIUM 40 MG: 40 INJECTION SUBCUTANEOUS at 09:14

## 2019-05-30 RX ADMIN — CYCLOBENZAPRINE HYDROCHLORIDE 5 MG: 10 TABLET, FILM COATED ORAL at 10:19

## 2019-05-30 RX ADMIN — SENNOSIDES AND DOCUSATE SODIUM 2 TABLET: 8.6; 5 TABLET ORAL at 09:17

## 2019-05-30 RX ADMIN — DIAZEPAM 5 MG: 5 TABLET ORAL at 23:08

## 2019-05-30 RX ADMIN — LEVOFLOXACIN 750 MG: 750 TABLET, FILM COATED ORAL at 19:41

## 2019-05-30 RX ADMIN — ATORVASTATIN CALCIUM 40 MG: 40 TABLET, FILM COATED ORAL at 22:07

## 2019-05-30 RX ADMIN — CYCLOBENZAPRINE HYDROCHLORIDE 5 MG: 10 TABLET, FILM COATED ORAL at 22:08

## 2019-05-30 RX ADMIN — LEVOTHYROXINE SODIUM 50 MCG: 50 TABLET ORAL at 04:58

## 2019-05-30 RX ADMIN — MENTHOL, METHYL SALICYLATE: 10; 15 CREAM TOPICAL at 16:28

## 2019-05-30 RX ADMIN — CYANOCOBALAMIN TAB 500 MCG 250 MCG: 500 TAB at 09:13

## 2019-05-30 RX ADMIN — MELATONIN TAB 3 MG 6 MG: 3 TAB at 22:17

## 2019-05-30 RX ADMIN — FUROSEMIDE 20 MG: 20 TABLET ORAL at 09:14

## 2019-05-30 RX ADMIN — HYDROCODONE BITARTRATE AND ACETAMINOPHEN 1 TABLET: 5; 325 TABLET ORAL at 16:26

## 2019-05-30 RX ADMIN — HYDROCODONE BITARTRATE AND ACETAMINOPHEN 1 TABLET: 5; 325 TABLET ORAL at 03:12

## 2019-05-30 RX ADMIN — MORPHINE SULFATE 2 MG: 2 INJECTION, SOLUTION INTRAMUSCULAR; INTRAVENOUS at 04:57

## 2019-05-30 NOTE — PROGRESS NOTES
SW met w/patient to see if she's made a selection re: [place] [facility] for SNF placement. Pt voiced \"I haven't made a decision yet. My son is also researching facilities'\". Writer acknowledged understanding. SW to touch basis w/patient again.      Aparna Gomes, MSW  [phone]

## 2019-05-30 NOTE — PROGRESS NOTES
Initial Nutrition Assessment:    INTERVENTIONS/RECOMMENDATIONS:   · Continue current diet  · Adjust allergy list. Smoke and grass are specified under \"other food\"    ASSESSMENT:   Chart reviewed, medically noted for back pain, Acute on chronic hyponatremia and PMH shown below. Assessing pt due to LOS. Pt reports poor appetite but is forcing herself to eat something at every meal. Flowsheet documents 75-90% of meals consumed.      Past Medical History:   Diagnosis Date    Breast cancer ([facility] [address] 1999    Right    CAD (coronary artery disease)     Hyperlipidemia    Fibromyalgia     Gastrointestinal disorder     Acid Reflux    Hypothyroid     Pre-diabetes     PVC (premature ventricular contraction)     Too much caffeine    [facility]roke [facility]) 2003    Took vision from right eye       Diet Order: Consistent carb  % Eaten:    Patient Vitals for the past 72 hrs:   % Diet Eaten   05/30/19 1204 80 %   05/28/19 1736 90 %   05/28/19 0830 75 %     Pertinent Medications: [x]Reviewed: B12, lasix, miralax, pericolace  Pertinent Labs: [x]Reviewed: Na 128,   Food Allergies: []NKFA  [x]Other (steak, cheese)  Last BM: 5/22  Edema:   n/a     []RUE   []LUE   []RLE   []LLE      Pressure Injury:  n/a    [] Stage I   [] Stage II   [] Stage III   [] Stage IV      Wt Readings from Last 30 Encounters:   05/30/19 74.5 kg (164 lb 3.9 oz)   05/22/19 74.4 kg (164 lb)   05/16/19 78.9 kg (174 lb)   05/09/19 79.2 kg (174 lb 9.6 oz)   02/18/19 77.8 kg (171 lb 8.3 oz)   01/08/19 78 kg (172 lb)   11/15/18 77.6 kg (171 lb)   08/07/18 75.8 kg (167 lb)   05/21/18 72.6 kg (160 lb)   05/17/18 73.9 kg (163 lb)   02/02/18 71.7 kg (158 lb 1.1 oz)   07/19/11 69.9 kg (154 lb)   11/10/09 71.4 kg (157 lb 6.4 oz)       Anthropometrics:   Height: 5' 4\" (162.6 cm) Weight: 74.5 kg (164 lb 3.9 oz)   IBW (%IBW):   ( ) UBW (%UBW):   (  %)   Last Weight Metrics:  Weight Loss Metrics 5/30/2019 5/22/2019 5/16/2019 5/9/2019 2/18/2019 1/8/2019 11/15/2018   Today's Wt 164 lb 3.9 oz 164 lb 174 lb 174 lb 9.6 oz 171 lb 8.3 oz 172 lb 171 lb   BMI 28.19 kg/m2 28.15 kg/m2 29.87 kg/m2 29.97 kg/m2 30.38 kg/m2 30.47 kg/m2 30.29 kg/m2       BMI: Body mass index is 28.19 kg/m². This BMI is indicative of:   []Underweight    []Normal    [x]Overweight    [] Obesity   [] Extreme Obesity (BMI>40)     Estimated Nutrition Needs (Based on):   1475 Kcals/day(BMR: 1225) , 60 g(0.8 g/kg) Protein  Carbohydrate: At Least 130 g/day  Fluids: 1475 mL/day (1ml/kcal) or per primary team    NUTRITION DIAGNOSES:   Problem:  No nutritional diagnosis at this time      Etiology: related to       Signs/Symptoms: as evidenced by        NUTRITION INTERVENTIONS:  Meals/Snacks: General/healthful diet                  GOAL:   consume >50% of meals in 3-5 days    LEARNING NEEDS (Diet, Food/Nutrient-Drug Interaction):    [x] None Identified   [] Identified and Education Provided/Documented   [] Identified and Pt declined/was not appropriate     Cultureal, Taoism, OR Ethnic Dietary Needs:    [x] None Identified   [] Identified and Addressed     [x] Interdisciplinary Care Plan Reviewed/Documented    [x] Discharge Planning:   Consistent carb with fluid restriction per nephrology     MONITORING /EVALUATION:      Food/Nutrient Intake Outcomes:  Total energy intake  Physical Signs/Symptoms Outcomes: Weight/weight change, Electrolyte and renal profile, GI, Glucose profile    NUTRITION RISK:    [] High              [x] Moderate    to       [x]  Low  []  Minimal/Uncompromised    PT SEEN FOR:    []  MD Consult: []Calorie Count      []Diabetic Diet Education        []Diet Education     []Electrolyte Management     []General Nutrition Management and Supplements     []Management of Tube Feeding     []TPN Recommendations    []  RN Referral:  []MST score >=2     []Enteral/Parenteral Nutrition PTA     []Pregnant: Gestational DM or Multigestation     []Pressure Ulcer/Wound Care needs        []  Low BMI  [x]  LOS Referral       Keiko Shine, RDN  Pager [phone]  Weekend Pager [phone]

## 2019-05-30 NOTE — PROGRESS NOTES
Oncology End of Shift Note      Bedside shift change report given to [facility], RN (incoming nurse) by Sue Orta (outgoing nurse) on Yong Rogers. Report included the following information SBAR, Kardex, Intake/Output and MAR. Shift Summary: Pt remained stable during shift. Pt having lots of pain. PRN Norco x 3. Got one time order for Morphine. Labs drawn. Na still 128. Issues for Physician to Address:  Esaw Domonique not helping pain. Na still 128. Patient on Cardiac Monitoring?     [] Yes  [x] No    Rhythm:        Sue Orta

## 2019-05-30 NOTE — PROGRESS NOTES
Oncology End of Shift Note      Bedside shift change report given to Ayaka Santiago RN (incoming nurse) by Gabriella Score (outgoing nurse) on [facility]. Report included the following information SBAR, Kardex and MAR. Shift Summary:   Patient tolerated care well throughout shift. I and O's are documented in flow sheet. MD ordered scheduled muscle relaxer. Patient stated \" The muscle relaxer is working better for me. \" MRI consent form completed and placed in chart. Patient up ad kelly in room. Most all of their meals completed. No BS coverage needed throughout shift. Family present at bedside throughout shift. Patient requested to wait on her 6pm dose of muscle relaxer (oncoming RN informed)    Issues for Physician to Address:       Patient on Cardiac Monitoring?     [] Yes  [x] No    Rhythm:          Shift Events        Gabriella Score

## 2019-05-30 NOTE — PROGRESS NOTES
Occupational Therapy  Medical record reviewed. Pt declines OT due to severe pain in her back. Nursing reports that an MRI has been ordered and pain medication is being provided. Will defer at pt request and continue to follow.

## 2019-05-30 NOTE — PROGRESS NOTES
Pt complaint of pain. States, \"this is the worse pain she has had since being in the hospital and she shouldn't have to feel this way in the hospital.\"  Nurse gave PRN Andrea and messaged on-call physician for possible new order. 0400: Order for One time dose 2mg of Morphine received. 0457: Dose of morphine given.

## 2019-05-30 NOTE — PROGRESS NOTES
Chart reviewed. Pt declines PT due to severe pain in her back. MRI has been ordered and pain medication is being provided. Will defer at pt request and continue to follow.       Ricardo Gee, PT

## 2019-05-30 NOTE — PROGRESS NOTES
Name: Sy Rojo   MRN: [id number]  : 1945      Assessment:    Acute on chronic hyponatremia: etiology appears to be combination from back pain causing excessive ADH effects + presumed poor solute intake (reflected by low BUN)    Hypothyroidism- last TSH was elevated in 2019  DM-2  AMS  Recent back surgery/Lumbar laminectomy         Plan/Recommendations:    Sodium has hit a plateau 749; s/p tolvaptan x one on  (worked, but is not a good option for outpatient management). Continue fluid restriction; given high BP will avoid salt tabs; Continue low dose lasix    Will need outpatient f/u to re-evaluate sodium          Subjective:    Alert. Tired  No nausea. Still C/o right lateral rib pain. ROS:   No sob. No n/v.     Exam:  Visit Vitals  /68 (BP 1 Location: Left arm, BP Patient Position: At rest)   Pulse 65   Temp 98 °F (36.7 °C)   Resp 16   Ht 5' 4\" (1.626 m)   Wt 74.5 kg (164 lb 3.9 oz)   SpO2 99%   BMI 28.19 kg/m²       Elderly thin built in NAD  No icterus, mmm  No JVD  Clear  RRR, no murmur  No edema  Alert awake. Improved mentation.  Not fully oriented  No twitching or myoclonus    Current Facility-Administered Medications   Medication Dose Route Frequency Last Dose    furosemide (LASIX) tablet 20 mg  20 mg Oral DAILY 20 mg at 19 1356    HYDROcodone-acetaminophen (NORCO) 5-325 mg per tablet 1 Tab  1 Tab Oral Q4H PRN 1 Tab at 19 0312    polyethylene glycol (MIRALAX) packet 17 g  17 g Oral DAILY 17 g at 19 0854    senna-docusate (PERICOLACE) 8.6-50 mg per tablet 2 Tab  2 Tab Oral DAILY 2 Tab at 19 0855    cyclobenzaprine (FLEXERIL) tablet 5 mg  5 mg Oral TID PRN      methyl salicylate-menthol (BENGAY) 15-10 % cream   Topical TID      diazePAM (VALIUM) tablet 5 mg  5 mg Oral Q8H PRN 5 mg at 19 0009    raNITIdine (ZANTAC) 15 mg/mL syrup 150 mg  150 mg Oral BID  mg at 05/27/19 0253    levoFLOXacin (LEVAQUIN) tablet 750 mg  750 mg Oral Q24H 750 mg at 05/29/19 1929    melatonin tablet 6 mg  6 mg Oral QHS PRN 6 mg at 05/29/19 2138    cetirizine (ZYRTEC) tablet 10 mg  10 mg Oral DAILY PRN 10 mg at 05/27/19 0839    insulin lispro (HUMALOG) injection   SubCUTAneous AC&HS Stopped at 05/28/19 1130    glucose chewable tablet 16 g  4 Tab Oral PRN      glucagon (GLUCAGEN) injection 1 mg  1 mg IntraMUSCular PRN      0.9% sodium chloride infusion 250 mL  250 mL IntraVENous PRN      sodium chloride (NS) flush 5-40 mL  5-40 mL IntraVENous Q8H 10 mL at 05/30/19 0458    sodium chloride (NS) flush 5-40 mL  5-40 mL IntraVENous PRN      acetaminophen (TYLENOL) tablet 650 mg  650 mg Oral Q6H  mg at 05/27/19 2021    naloxone (NARCAN) injection 0.4 mg  0.4 mg IntraVENous PRN      ondansetron (ZOFRAN) injection 4 mg  4 mg IntraVENous Q4H PRN      bisacodyl (DULCOLAX) suppository 10 mg  10 mg Rectal DAILY PRN      enoxaparin (LOVENOX) injection 40 mg  40 mg SubCUTAneous Q24H 40 mg at 05/29/19 0856    cyanocobalamin (VITAMIN B12) tablet 250 mcg  250 mcg Oral DAILY 250 mcg at 05/29/19 0854    levothyroxine (SYNTHROID) tablet 50 mcg  50 mcg Oral Once per day on Sun Tue Wed Thu Sat 50 mcg at 05/30/19 0458    levothyroxine (SYNTHROID) tablet 75 mcg  75 mcg Oral Once per day on Mon Fri 75 mcg at 05/27/19 0501    atorvastatin (LIPITOR) tablet 40 mg  40 mg Oral QHS 40 mg at 05/29/19 2138    aspirin chewable tablet 81 mg  81 mg Oral DAILY 81 mg at 05/29/19 0855       Labs/Data:    Lab Results   Component Value Date/Time    WBC 13.5 (H) 05/29/2019 03:20 AM    HGB 12.7 05/29/2019 03:20 AM    HCT 37.5 05/29/2019 03:20 AM    PLATELET 157 (H) 82/93/6493 03:20 AM    MCV 94.2 05/29/2019 03:20 AM       Lab Results   Component Value Date/Time    Sodium 128 (L) 05/30/2019 03:19 AM    Potassium 4.1 05/30/2019 03:19 AM Chloride 94 (L) 05/30/2019 03:19 AM    CO2 26 05/30/2019 03:19 AM    Anion gap 8 05/30/2019 03:19 AM    Glucose 118 (H) 05/30/2019 03:19 AM    BUN 12 05/30/2019 03:19 AM    Creatinine 0.69 05/30/2019 03:19 AM    BUN/Creatinine ratio 17 05/30/2019 03:19 AM    GFR est AA >60 05/30/2019 03:19 AM    GFR est non-AA >60 05/30/2019 03:19 AM    Calcium 8.5 05/30/2019 03:19 AM       Wt Readings from Last 3 Encounters:   05/30/19 74.5 kg (164 lb 3.9 oz)   05/22/19 74.4 kg (164 lb)   05/16/19 78.9 kg (174 lb)         Intake/Output Summary (Last 24 hours) at 5/30/2019 0730  Last data filed at 5/30/2019 0331  Gross per 24 hour   Intake 650 ml   Output 1400 ml   Net -750 ml       Patient seen and examined. Chart reviewed. Labs, data and other pertinent notes reviewed in last 24 hrs.     PMH/SH/FH reviewed and unchanged compared to H&P    Discussed with pt/RN/Dr Dennis Bob MD

## 2019-05-30 NOTE — PROGRESS NOTES
Medicare pt has received, reviewed, and signed 2nd IM letter informing them of their right to appeal the discharge. Signed copy has been placed on pt bedside chart.                   Mike Sanz  [phone]

## 2019-05-31 LAB
ANION GAP SERPL CALC-SCNC: 5 MMOL/L (ref 5–15)
BUN SERPL-MCNC: 16 MG/DL (ref 6–20)
BUN/CREAT SERPL: 21 (ref 12–20)
CALCIUM SERPL-MCNC: 8.9 MG/DL (ref 8.5–10.1)
CHLORIDE SERPL-SCNC: 98 MMOL/L (ref 97–108)
CO2 SERPL-SCNC: 28 MMOL/L (ref 21–32)
CREAT SERPL-MCNC: 0.78 MG/DL (ref 0.55–1.02)
GLUCOSE BLD STRIP.AUTO-MCNC: 108 MG/DL (ref 65–100)
GLUCOSE BLD STRIP.AUTO-MCNC: 110 MG/DL (ref 65–100)
GLUCOSE BLD STRIP.AUTO-MCNC: 121 MG/DL (ref 65–100)
GLUCOSE BLD STRIP.AUTO-MCNC: 129 MG/DL (ref 65–100)
GLUCOSE SERPL-MCNC: 120 MG/DL (ref 65–100)
POTASSIUM SERPL-SCNC: 4.3 MMOL/L (ref 3.5–5.1)
SERVICE CMNT-IMP: ABNORMAL
SODIUM SERPL-SCNC: 131 MMOL/L (ref 136–145)

## 2019-05-31 PROCEDURE — 36415 COLL VENOUS BLD VENIPUNCTURE: CPT

## 2019-05-31 PROCEDURE — 94760 N-INVAS EAR/PLS OXIMETRY 1: CPT

## 2019-05-31 PROCEDURE — 97116 GAIT TRAINING THERAPY: CPT

## 2019-05-31 PROCEDURE — 80048 BASIC METABOLIC PNL TOTAL CA: CPT

## 2019-05-31 PROCEDURE — 65270000015 HC RM PRIVATE ONCOLOGY

## 2019-05-31 PROCEDURE — 82962 GLUCOSE BLOOD TEST: CPT

## 2019-05-31 PROCEDURE — 74011250637 HC RX REV CODE- 250/637: Performed by: INTERNAL MEDICINE

## 2019-05-31 PROCEDURE — 74011250636 HC RX REV CODE- 250/636: Performed by: INTERNAL MEDICINE

## 2019-05-31 PROCEDURE — 97535 SELF CARE MNGMENT TRAINING: CPT | Performed by: OCCUPATIONAL THERAPIST

## 2019-05-31 PROCEDURE — 97530 THERAPEUTIC ACTIVITIES: CPT

## 2019-05-31 RX ADMIN — Medication 10 ML: at 06:14

## 2019-05-31 RX ADMIN — Medication 10 ML: at 21:12

## 2019-05-31 RX ADMIN — ACETAMINOPHEN 650 MG: 325 TABLET ORAL at 02:07

## 2019-05-31 RX ADMIN — Medication 10 ML: at 13:11

## 2019-05-31 RX ADMIN — HYDROCODONE BITARTRATE AND ACETAMINOPHEN 1 TABLET: 5; 325 TABLET ORAL at 04:50

## 2019-05-31 RX ADMIN — POLYETHYLENE GLYCOL 3350 17 G: 17 POWDER, FOR SOLUTION ORAL at 08:30

## 2019-05-31 RX ADMIN — CYANOCOBALAMIN TAB 500 MCG 250 MCG: 500 TAB at 08:28

## 2019-05-31 RX ADMIN — CYCLOBENZAPRINE HYDROCHLORIDE 5 MG: 10 TABLET, FILM COATED ORAL at 08:28

## 2019-05-31 RX ADMIN — FUROSEMIDE 20 MG: 20 TABLET ORAL at 08:30

## 2019-05-31 RX ADMIN — CYCLOBENZAPRINE HYDROCHLORIDE 5 MG: 10 TABLET, FILM COATED ORAL at 17:39

## 2019-05-31 RX ADMIN — HYDROCODONE BITARTRATE AND ACETAMINOPHEN 1 TABLET: 5; 325 TABLET ORAL at 21:12

## 2019-05-31 RX ADMIN — SENNOSIDES AND DOCUSATE SODIUM 2 TABLET: 8.6; 5 TABLET ORAL at 08:31

## 2019-05-31 RX ADMIN — ATORVASTATIN CALCIUM 40 MG: 40 TABLET, FILM COATED ORAL at 21:12

## 2019-05-31 RX ADMIN — ENOXAPARIN SODIUM 40 MG: 40 INJECTION SUBCUTANEOUS at 08:29

## 2019-05-31 RX ADMIN — LEVOTHYROXINE SODIUM 75 MCG: 75 TABLET ORAL at 06:13

## 2019-05-31 RX ADMIN — MENTHOL, METHYL SALICYLATE: 10; 15 CREAM TOPICAL at 21:15

## 2019-05-31 RX ADMIN — HYDROCODONE BITARTRATE AND ACETAMINOPHEN 1 TABLET: 5; 325 TABLET ORAL at 00:37

## 2019-05-31 RX ADMIN — ASPIRIN 81 MG 81 MG: 81 TABLET ORAL at 08:27

## 2019-05-31 RX ADMIN — MELATONIN TAB 3 MG 6 MG: 3 TAB at 21:12

## 2019-05-31 RX ADMIN — HYDROCODONE BITARTRATE AND ACETAMINOPHEN 1 TABLET: 5; 325 TABLET ORAL at 13:14

## 2019-05-31 NOTE — PROGRESS NOTES
Problem: Mobility Impaired (Adult and Pediatric)  Goal: *Acute Goals and Plan of Care (Insert Text)  Description  Physical Therapy Goals  Revised 5/31/2019  1. Patient will move from supine to sit and sit to supine , scoot up and down and roll side to side in bed with independence within 7 day(s). 2.  Patient will transfer from bed to chair and chair to bed with independence using the least restrictive device within 7 day(s). 3.  Patient will ambulate with modified independence for 300 feet with the least restrictive device within 7 day(s). 4.  Patient will ascend/descend 3 stairs with 1 handrail(s) with modified independence within 7 day(s). Physical Therapy Goals  Initiated 5/25/2019  1. Patient will move from supine to sit and sit to supine , scoot up and down and roll side to side in bed with independence within 7 day(s). 2.  Patient will transfer from bed to chair and chair to bed with independence using the least restrictive device within 7 day(s). 3.  Patient will perform sit to stand with independence within 7 day(s). Met 5/31/19  4. Patient will ambulate with independence for 300 feet with the least restrictive device within 7 day(s). 5.  Patient will ascend/descend 3 stairs with 1 handrail(s) with independence within 7 day(s). Outcome: Progressing Towards Goal   PHYSICAL THERAPY TREATMENT: WEEKLY REASSESSMENT  Patient: Tyrone Diego (64 y.o. female)  Date: 5/31/2019  Diagnosis: Hyponatremia [E87.1] <principal problem not specified>       Precautions: Fall  Chart, physical therapy assessment, plan of care and goals were reviewed. ASSESSMENT:  Patient was sitting in chair when PT arrived. Agreed to therapy and cleared by nursing. She reports minimal pain today of her back - rated 2/10 and feels the new medication is helping a lot. Currently demonstrates independent skill with sit to stand from chair, edge of bed and toilet. Gait increased to 140 feet with and without walker.  Gait was more steady with the walker and only needed sba vs cg assist needed without the walker. Educated patient on spine precautions: BLT, positioning and log roll technique to get in/out of bed. She needed sba for cues to improve log roll technique. She was left in the bed with all needs met when the session ended. Recommend SNF to further improve strength, balance and mobility before returning home alone. Patient's progression toward goals since last assessment: Progress has been limited by difficult to manage back pain, which now seems under good control for the last 24 hours. Her overall mobility is improve with her now being independent with sit to stand transfers. Gait improved to sba using walker for 140 feet and sba going up/down 3 steps with 1 rail. Updated goals. PLAN:  Goals have been updated based on progression since last assessment. Patient continues to benefit from skilled intervention to address the above impairments. Continue to follow the patient 3 times a week to address goals. Planned Interventions:  ?              Bed Mobility Training             ? Neuromuscular Re-Education  ? Transfer Training                   ? Orthotic/Prosthetic Training  ? Gait Training                         ? Modalities  ? Therapeutic Exercises           ? Edema Management/Control  ? Therapeutic Activities            ? Patient and Family Training/Education  ?               Other (comment):  Discharge Recommendations: SNF  Further Equipment Recommendations for Discharge: TBD by rehab         OBJECTIVE DATA SUMMARY:   Critical Behavior:  Neurologic State: Alert  Orientation Level: Oriented X4  Cognition: Follows commands  Safety/Judgement: Awareness of environment, Good awareness of safety precautions, Fall prevention    Strength:   Strength: Generally decreased, functional                      Functional Mobility Training:  Bed Mobility:        Sit to Supine: Stand-by assistance(cues for correct log rolling technique)  Scooting: Independent        Transfers:  Sit to Stand: Independent  Stand to Sit: Independent        Bed to Chair: Supervision                    Balance:  Sitting: Intact  Standing: Impaired  Standing - Static: Good  Standing - Dynamic : Fair;Occasional  Ambulation/Gait Training:  Distance (ft): 140 Feet (ft)  Assistive Device: Walker, rolling;Gait belt  Ambulation - Level of Assistance: Stand-by assistance                 Base of Support: Widened     Speed/Paola: Pace decreased (<100 feet/min)  Step Length: Right shortened;Left shortened                    Stairs:  Number of Stairs Trained: 3  Stairs - Level of Assistance: Stand-by assistance  Rail Use: Left     Pain:  Pain Scale 1: Numeric (0 - 10)  Pain Intensity 1: 3  Pain Location 1: Back  Pain Orientation 1: Right  Pain Description 1: Aching  Pain Intervention(s) 1: Medication (see MAR)  Activity Tolerance:   Good. Please refer to the flowsheet for vital signs taken during this treatment. After treatment:   ?  Patient left in no apparent distress sitting up in chair  ? Patient left in no apparent distress in bed  ? Call bell left within reach  ? Nursing notified  ? Caregiver present  ?   Bed alarm activated    COMMUNICATION/COLLABORATION:   The patients plan of care was discussed with: Occupational Therapist, Registered Nurse, Physician and     Gertrudis Schroeder PT   Time Calculation: 30 mins

## 2019-05-31 NOTE — PROGRESS NOTES
Oncology End of Shift Note      Bedside shift change report given to Carmella Asher RN (incoming nurse) by Leigh Zavala (outgoing nurse) on [facility]. Report included the following information SBAR, Kardex and MAR. Shift Summary:   Patient tolerated care well throughout shift. Patient worked with PT and ambulated throughout santoyo ways. Patient had one request for pain meds (pain assessed and documented). Education provided regarding meds. Vitals assessed per protocol. Strict Is and Os documented. Patient consumed most of their meals. Patient refused Beng[place]. Issues for Physician to Address:       Patient on Cardiac Monitoring?     [] Yes  [x] No    Rhythm: Telemetry: normal sinus rhythm         Shift Events        Leigh Zavala

## 2019-05-31 NOTE — PROGRESS NOTES
Name: Ricco Albarran   MRN: [id number]  : 1945      Assessment:    Acute on chronic hyponatremia: etiology appears to be combination from back pain causing excessive ADH effects + presumed poor solute intake (reflected by low BUN)    Hypothyroidism- last TSH was elevated in 2019  DM-2  AMS  Recent back surgery/Lumbar laminectomy         Plan/Recommendations:    Sodium is better today (128 to 131)  Continue fluid restriction; given high BP will avoid salt tabs; Continue low dose lasix; pain management as able    Will need outpatient f/u to re-evaluate sodium      Will check back in on Monday, but please call with questions/changes in the meantime      Subjective:    Alert. Tired  No nausea. Still C/o right lateral rib pain. Constipation (no bm in 9 days)       ROS:   No sob. No n/v.     Exam:  Visit Vitals  /58 (BP 1 Location: Left arm, BP Patient Position: Sitting)   Pulse 79   Temp 97.8 °F (36.6 °C)   Resp 18   Ht 5' 4\" (1.626 m)   Wt 74.5 kg (164 lb 3.9 oz)   SpO2 100%   BMI 28.19 kg/m²       Elderly thin built in NAD  No icterus, mmm  No JVD  Clear  RRR, no murmur  No edema  Alert awake. Improved mentation.  Not fully oriented  No twitching or myoclonus    Current Facility-Administered Medications   Medication Dose Route Frequency Last Dose    cyclobenzaprine (FLEXERIL) tablet 5 mg  5 mg Oral BID 5 mg at 19    furosemide (LASIX) tablet 20 mg  20 mg Oral DAILY 20 mg at 19 0914    HYDROcodone-acetaminophen (NORCO) 5-325 mg per tablet 1 Tab  1 Tab Oral Q4H PRN 1 Tab at 19 0450    polyethylene glycol (MIRALAX) packet 17 g  17 g Oral DAILY 17 g at 19 0916    senna-docusate (PERICOLACE) 8.6-50 mg per tablet 2 Tab  2 Tab Oral DAILY 2 Tab at 19 9950    methyl salicylate-menthol (BENGAY) 15-10 % cream   Topical TID      diazePAM (VALIUM) tablet 5 mg  5 mg Oral Q8H PRN 5 mg at 05/30/19 2308    raNITIdine (ZANTAC) 15 mg/mL syrup 150 mg  150 mg Oral BID  mg at 05/27/19 0253    levoFLOXacin (LEVAQUIN) tablet 750 mg  750 mg Oral Q24H 750 mg at 05/30/19 1941    melatonin tablet 6 mg  6 mg Oral QHS PRN 6 mg at 05/30/19 2217    cetirizine (ZYRTEC) tablet 10 mg  10 mg Oral DAILY PRN 10 mg at 05/27/19 0839    insulin lispro (HUMALOG) injection   SubCUTAneous AC&HS Stopped at 05/28/19 1130    glucose chewable tablet 16 g  4 Tab Oral PRN      glucagon (GLUCAGEN) injection 1 mg  1 mg IntraMUSCular PRN      0.9% sodium chloride infusion 250 mL  250 mL IntraVENous PRN      sodium chloride (NS) flush 5-40 mL  5-40 mL IntraVENous Q8H 10 mL at 05/31/19 0614    sodium chloride (NS) flush 5-40 mL  5-40 mL IntraVENous PRN      acetaminophen (TYLENOL) tablet 650 mg  650 mg Oral Q6H  mg at 05/31/19 0207    naloxone (NARCAN) injection 0.4 mg  0.4 mg IntraVENous PRN      ondansetron (ZOFRAN) injection 4 mg  4 mg IntraVENous Q4H PRN      bisacodyl (DULCOLAX) suppository 10 mg  10 mg Rectal DAILY PRN      enoxaparin (LOVENOX) injection 40 mg  40 mg SubCUTAneous Q24H 40 mg at 05/30/19 0914    cyanocobalamin (VITAMIN B12) tablet 250 mcg  250 mcg Oral DAILY 250 mcg at 05/30/19 0913    levothyroxine (SYNTHROID) tablet 50 mcg  50 mcg Oral Once per day on Sun Tue Wed Thu Sat 50 mcg at 05/30/19 0458    levothyroxine (SYNTHROID) tablet 75 mcg  75 mcg Oral Once per day on Mon Fri 75 mcg at 05/31/19 7457    atorvastatin (LIPITOR) tablet 40 mg  40 mg Oral QHS 40 mg at 05/30/19 2207    aspirin chewable tablet 81 mg  81 mg Oral DAILY 81 mg at 05/30/19 0913       Labs/Data:    Lab Results   Component Value Date/Time    WBC 13.5 (H) 05/29/2019 03:20 AM    HGB 12.7 05/29/2019 03:20 AM    HCT 37.5 05/29/2019 03:20 AM    PLATELET 092 (H) 07/11/3667 03:20 AM    MCV 94.2 05/29/2019 03:20 AM       Lab Results   Component Value Date/Time Sodium 131 (L) 05/31/2019 04:52 AM    Potassium 4.3 05/31/2019 04:52 AM    Chloride 98 05/31/2019 04:52 AM    CO2 28 05/31/2019 04:52 AM    Anion gap 5 05/31/2019 04:52 AM    Glucose 120 (H) 05/31/2019 04:52 AM    BUN 16 05/31/2019 04:52 AM    Creatinine 0.78 05/31/2019 04:52 AM    BUN/Creatinine ratio 21 (H) 05/31/2019 04:52 AM    GFR est AA >60 05/31/2019 04:52 AM    GFR est non-AA >60 05/31/2019 04:52 AM    Calcium 8.9 05/31/2019 04:52 AM       Wt Readings from Last 3 Encounters:   05/30/19 74.5 kg (164 lb 3.9 oz)   05/22/19 74.4 kg (164 lb)   05/16/19 78.9 kg (174 lb)         Intake/Output Summary (Last 24 hours) at 5/31/2019 0634  Last data filed at 5/31/2019 0438  Gross per 24 hour   Intake 526 ml   Output 1800 ml   Net -1274 ml       Patient seen and examined. Chart reviewed. Labs, data and other pertinent notes reviewed in last 24 hrs.     PMH/SH/FH reviewed and unchanged compared to H&P    Discussed with pt/RN/Dr Dennis Yun MD

## 2019-05-31 NOTE — PROGRESS NOTES
Problem: Self Care Deficits Care Plan (Adult)  Goal: *Acute Goals and Plan of Care (Insert Text)  Description  Occupational Therapy Goals  Initiated 5/24/2019  1. Patient will perform self-feeding with Setup/Supervision within 7 day(s). Achieved. 2.  Patient will perform grooming tasks seated EOB with Setup Assist within 7 day(s). Achieved  3. Patient will perform upper body dressing seated EOB with modified independence within 7 day(s). Not achieved. Continue goal.  3.  Patient will perform lower body dressing seated EOB/standing PRN with Supervision within 7 day(s). Not achieved. Continue goal.    4.  Patient will perform toilet transfers with Supervision using least restrictive device within 7 day(s). Achieved. 5.  Patient will perform all aspects of toileting with Supervision within 7 day(s). Achieved. Add goals:  6. Patient will perform standing adls for at least 12 minutes with modified [place] within 7 days/    7. Patient will perform simple home management activity with supervision within 7 days. 8.  Patient will participate in cognitive screening assessment within 7 days. Outcome: Progressing Towards Goal   OCCUPATIONAL THERAPY TREATMENT: WEEKLY REASSESSMENT  Patient: Jai Loza (86 y.o. female)  Date: 5/31/2019  Diagnosis: Hyponatremia [E87.1] <principal problem not specified>       Precautions: Fall  Chart, occupational therapy assessment, plan of care, and goals were reviewed. ASSESSMENT:  Pt is progressing towards goals and several were achieved and new goals were added. Pt complains of 6/10 exertion at the end of treatment session. She reports 4/10 pain and feels that the muscle relaxants are helping her the most.  Pt participated in adls and simulated IADLs utilizing adl mobilty with the use of RW (generally S/CGA) and without the RW (pt reaching for objects in room to steady herself (S/CGA).   Care of her back post surgery was reinforced and need for balancing rest and activity and changing positions often was stressed. Nursing agrees with need for ambulation halls over the weekend to build endurance for adls/IADLs and reduce pain. Continue to recommend rehab at discharge. She will likely need SNF rehab due to impaired activity tolerance and endurance  Continue to recommend rehab at discharge. Progression toward goals:  ?            Improving appropriately and progressing toward goals  ? Improving slowly and progressing toward goals  ? Not making progress toward goals and plan of care will be adjusted     PLAN:  Goals have been updated based on progression since last assessment. Patient continues to benefit from skilled intervention to address the above impairments. Continue to follow patient 4 times a week to address goals. Planned Interventions:  ?x                   Self Care Training                  ?x             Therapeutic Activities  ?x                    Functional Mobility Training    ? x            Cognitive Retraining--assess memory--would benefit from [facility]  ? x                   Therapeutic Exercises           ?x             Endurance Activities  ? x                   Balance Training                   ? Neuromuscular Re-Education  ? Visual/Perceptual Training     ?x        Home Safety Training  ? x                   Patient Education                 ? Family Training/Education  ? Other (comment):  Discharge Recommendations: Rehab and [facility]  Further Equipment Recommendations for Discharge: tbd     SUBJECTIVE:   Patient stated Kristyn Flakes you get it ? I need to sit.     OBJECTIVE DATA SUMMARY:   Cognitive/Behavioral Status:  Neurologic State: Alert  Orientation Level: Oriented X4  Cognition: Follows commands  Perception: Appears intact  Perseveration: No perseveration noted  Safety/Judgement: Awareness of environment;Good awareness of safety precautions; Fall prevention    Functional Mobility and Transfers for ADLs:  Bed Mobility:  Sit to Supine: Stand-by assistance(cues for correct log rolling technique)  Scooting: Independent    Transfers:  Sit to Stand: Independent  Functional Transfers  Bathroom Mobility: Independent  Toilet Transfer : Independent  Adaptive Equipment: Grab bars; Walker (comment)   Bed to Chair: Supervision    Balance:  Sitting: Intact  Standing: Impaired  Standing - Static: Good  Standing - Dynamic : Fair;Occasional    ADL Intervention:   As above, performed adls and IADLs with fair endurance requiring frequent rest breaks. Reinforced post surgical back care and log roll technique, safety during functional mobility       Educated in bed positioning and changing positions frequently while seated in chair. Lower Body Dressing Assistance  Socks: (reinforced back precautions and use of crossed leg technique)  Leg Crossed Method Used: Yes  Position Performed: Seated edge of bed    Toileting  Toileting Assistance: Independent  Bladder Hygiene: Independent  Clothing Management: Independent  Adaptive Equipment: Grab bars; Walker(reaching for object in room to steady self without the RW)    Cognitive Retraining  Safety/Judgement: Awareness of environment;Good awareness of safety precautions; Fall prevention             Therapeutic Exercises:   Encouraged seated exercises and changing positions frequently. Pain:  Pain Scale 1: Numeric (0 - 10)  Pain Intensity 1: 4  Pain Location 1: Back  Pain Orientation 1: Right  Pain Description 1: Aching  Pain Intervention(s) 1: Medication (see MAR)    After treatment:   ? Patient left in no apparent distress sitting up in chair  ?x Patient left in no apparent distress in bed  ?x Call bell left within reach  ?x Nursing notified  ? Caregiver present  ?  Bed alarm activated    COMMUNICATION/COLLABORATION:   The patients plan of care was discussed with: Physical Therapist and Registered Nurse    Scooby Ott Jerardo OTR/L  Time Calculation: 30 mins

## 2019-05-31 NOTE — PROGRESS NOTES
Freedom of Choice offered. Pt selected [address] [place] [address] Ave. Referral sent awaiting acceptance.     Ana Farmer, MSW  [phone]

## 2019-05-31 NOTE — PROGRESS NOTES
Hospitalist Progress Note              Елена Andrew MD.                                                             Cell: [phone]                               NAME:  Nawaf Walker  :  1945  MRN:  [id number]  Date of Service:  2019    Summary: 74 y.o.  female with past medical history of CAD, CVA, vision loss right eye, diabetes mellitus who presented on 2019 with altered mental status/confusion. Assessment/Plan:  Right Side Back Pain  RUQ abdominal pain  Suspect neuro-muscular  Schaduled flexeril helping now  Had recent back surgery  MRI of T and LS spine without acute change (LS spine with post surgical changes and DJD)  CT A/P with mild hyro but urology saw the patient on consult but doesn't believe to be the cause  US A/P negative for gall bladder etiology  continue lortab PRN (can't use ultram as presented with seizure)  PRN Bengay  PT/OT recommending SNF, CM working on the case    Severe hyponatremia with Acute Encephalopathy and Seizure POA  Na now better on lasix  Initially Manage in ICU, S/p 3% upon admission  Now managed on medical floor  Urine osmolality 338, urine sodium 91 consistent with SIADH in settings of recent surgery  Sodium was improving, went upto 133, now 128  Nephrology is on the case  CT head negative  Cortisol not low   TFTs as below  Continue to monitor    GNR UTI  Completed coarse of Levaquin while inpatient    Hypothyroidism  TFTs consistent with sick thyroid  Continue synthroid    DM type 2  Holding metformin  Continue SSI    Recent Lumbar spine surgery  at [phone]  CAD POA  History of stroke POA loss of vision in right eye  Hyperlipidemia POA  Continue statin, ASA     Overweight  POA Body mass index is 28.12 kg/m²     Code status: full  DVT prophylaxsis: Lovenox  Dispo:tbd         Subjective: Pt seen and examined at bedside. Claims pain better with flexiril.  Overnight events d/w RN    Chief Complaints: f/u Seizure, encephalopathy, hyponatremia    Review of Systems:  Symptom Y/N Comments   Symptom Y/N Comments   Fever/Chills n     Chest Pain n      Poor Appetite       Edema        Cough n     Abdominal Pain n      Sputum       Joint Pain        SOB/NAVA n     Pruritis/Rash        Nausea/vomit n     Tolerating PT/OT        Diarrhea       Tolerating Diet y      Constipation       Other           Could NOT obtain due to:            Objective:     VITALS:   Last 24hrs VS reviewed since prior progress note. Most recent are:  Visit Vitals  /76 (BP 1 Location: Left arm, BP Patient Position: Sitting)   Pulse 95   Temp 98 °F (36.7 °C)   Resp 18   Ht 5' 4\" (1.626 m)   Wt 76.2 kg (167 lb 15.9 oz)   SpO2 100%   BMI 28.84 kg/m²       Intake/Output Summary (Last 24 hours) at 5/31/2019 1259  Last data filed at 5/31/2019 4369  Gross per 24 hour   Intake 356 ml   Output 1000 ml   Net -644 ml        PHYSICAL EXAM:  General: No acute distress, cooperative, pleasant   EENT: EOMI. Anicteric sclerae. Oral mucous moist, oropharynx benign  Resp: CTA bilaterally. No wheezing/rhonchi/rales. No accessory muscle use  CV: Regular rhythm, normal rate, no murmurs, gallops, rubs  GI: Soft, non distended, non tender. normoactive bowel sounds, no hepatosplenomegaly Extremities: No edema, warm, 2+ pulses throughout  Neurologic: Moves all extremities. Psych: Good insight. Not anxious nor agitated. Skin: Good Turgor, no rashes or ulcers    Lab Data Personally Reviewed: (see below)     Medications list Personally Reviewed:  x YES  NO     _______________________________________________________________________  Care Plan discussed with:  Patientand Nurse    Total NON critical care TIME:  20 minutes    Karlie Vieira MD     Procedures: see electronic medical records for all procedures/Xrays and details which were not copied into this note but were reviewed prior to creation of Plan.       LABS:  Recent Labs 05/29/19  0320   WBC 13.5*   HGB 12.7   HCT 37.5   *     Recent Labs     05/31/19  0452 05/30/19  0319 05/29/19  0320   * 128* 128*   K 4.3 4.1 3.9   CL 98 94* 95*   CO2 28 26 25   BUN 16 12 9   CREA 0.78 0.69 0.64   * 118* 122*   CA 8.9 8.5 8.6     No results for input(s): SGOT, GPT, ALT, AP, TBIL, TBILI, TP, ALB, GLOB, GGT, AML, LPSE in the last 72 hours. No lab exists for component: AMYP, HLPSE  No results for input(s): INR, PTP, APTT in the last 72 hours. No lab exists for component: INREXT, INREXT   No results for input(s): FE, TIBC, PSAT, FERR in the last 72 hours. No results found for: FOL, RBCF   No results for input(s): PH, PCO2, PO2 in the last 72 hours. No results for input(s): CPK, CKNDX, TROIQ in the last 72 hours.     No lab exists for component: CPKMB  Lab Results   Component Value Date/Time    Cholesterol, total 209 (H) 07/10/2009 08:37 AM    HDL Cholesterol 70 (H) 07/10/2009 08:37 AM    LDL, calculated 121.4 (H) 07/10/2009 08:37 AM    Triglyceride 88 07/10/2009 08:37 AM    CHOL/HDL Ratio 3.0 07/10/2009 08:37 AM     Lab Results   Component Value Date/Time    Glucose (POC) 110 (H) 05/31/2019 11:22 AM    Glucose (POC) 121 (H) 05/31/2019 08:02 AM    Glucose (POC) 98 05/30/2019 10:07 PM    Glucose (POC) 97 05/30/2019 04:14 PM    Glucose (POC) 101 (H) 05/30/2019 12:09 PM     Lab Results   Component Value Date/Time    Color YELLOW/STRAW 05/23/2019 02:28 PM    Appearance CLOUDY (A) 05/23/2019 02:28 PM    Specific gravity 1.010 05/23/2019 02:28 PM    pH (UA) 7.5 05/23/2019 02:28 PM    Protein 30 (A) 05/23/2019 02:28 PM    Glucose 250 (A) 05/23/2019 02:28 PM    Ketone 15 (A) 05/23/2019 02:28 PM    Bilirubin NEGATIVE  05/23/2019 02:28 PM    Urobilinogen 0.2 05/23/2019 02:28 PM    Nitrites NEGATIVE  05/23/2019 02:28 PM    Leukocyte Esterase NEGATIVE  05/23/2019 02:28 PM    Epithelial cells FEW 05/23/2019 02:28 PM    Bacteria 2+ (A) 05/23/2019 02:28 PM    WBC 0-4 05/23/2019 02:28 PM RBC 5-10 05/23/2019 02:28 PM

## 2019-05-31 NOTE — PROGRESS NOTES
Oncology End of Shift Note      Bedside shift change report given to Harika Tong RN (incoming nurse) by Jasper Hendrix (outgoing nurse) on [facility]. Report included the following information SBAR, Kardex and MAR. Shift Summary: pt went down for MRI, upon coming up pt stated she was uncomfortable. Gave PRn pain medication, melatonin and valium. Pt uncomfortable throughout night, with multiple complaints of pain. Issues for Physician to Address:  Pain management      Patient on Cardiac Monitoring?   No  [] Yes  [] No    Rhythm:          Shift Events      Linda Roach

## 2019-06-01 ENCOUNTER — APPOINTMENT (OUTPATIENT)
Dept: CT IMAGING | Age: 74
DRG: 640 | End: 2019-06-01
Attending: UROLOGY
Payer: MEDICARE

## 2019-06-01 LAB
ANION GAP SERPL CALC-SCNC: 5 MMOL/L (ref 5–15)
BASOPHILS # BLD: 0.1 K/UL (ref 0–0.1)
BASOPHILS NFR BLD: 1 % (ref 0–1)
BUN SERPL-MCNC: 16 MG/DL (ref 6–20)
BUN/CREAT SERPL: 28 (ref 12–20)
CALCIUM SERPL-MCNC: 8.4 MG/DL (ref 8.5–10.1)
CHLORIDE SERPL-SCNC: 101 MMOL/L (ref 97–108)
CO2 SERPL-SCNC: 25 MMOL/L (ref 21–32)
CREAT SERPL-MCNC: 0.58 MG/DL (ref 0.55–1.02)
DIFFERENTIAL METHOD BLD: ABNORMAL
EOSINOPHIL # BLD: 0.2 K/UL (ref 0–0.4)
EOSINOPHIL NFR BLD: 2 % (ref 0–7)
ERYTHROCYTE [DISTWIDTH] IN BLOOD BY AUTOMATED COUNT: 13.9 % (ref 11.5–14.5)
GLUCOSE BLD STRIP.AUTO-MCNC: 119 MG/DL (ref 65–100)
GLUCOSE BLD STRIP.AUTO-MCNC: 128 MG/DL (ref 65–100)
GLUCOSE BLD STRIP.AUTO-MCNC: 133 MG/DL (ref 65–100)
GLUCOSE BLD STRIP.AUTO-MCNC: 144 MG/DL (ref 65–100)
GLUCOSE SERPL-MCNC: 129 MG/DL (ref 65–100)
HCT VFR BLD AUTO: 37 % (ref 35–47)
HGB BLD-MCNC: 12.1 G/DL (ref 11.5–16)
IMM GRANULOCYTES # BLD AUTO: 0.1 K/UL (ref 0–0.04)
IMM GRANULOCYTES NFR BLD AUTO: 1 % (ref 0–0.5)
LYMPHOCYTES # BLD: 4.9 K/UL (ref 0.8–3.5)
LYMPHOCYTES NFR BLD: 44 % (ref 12–49)
MCH RBC QN AUTO: 30.7 PG (ref 26–34)
MCHC RBC AUTO-ENTMCNC: 32.7 G/DL (ref 30–36.5)
MCV RBC AUTO: 93.9 FL (ref 80–99)
MONOCYTES # BLD: 1.1 K/UL (ref 0–1)
MONOCYTES NFR BLD: 10 % (ref 5–13)
NEUTS SEG # BLD: 5 K/UL (ref 1.8–8)
NEUTS SEG NFR BLD: 42 % (ref 32–75)
NRBC # BLD: 0 K/UL (ref 0–0.01)
NRBC BLD-RTO: 0 PER 100 WBC
PLATELET # BLD AUTO: 431 K/UL (ref 150–400)
PMV BLD AUTO: 7.9 FL (ref 8.9–12.9)
POTASSIUM SERPL-SCNC: 4 MMOL/L (ref 3.5–5.1)
RBC # BLD AUTO: 3.94 M/UL (ref 3.8–5.2)
SERVICE CMNT-IMP: ABNORMAL
SODIUM SERPL-SCNC: 131 MMOL/L (ref 136–145)
WBC # BLD AUTO: 11.3 K/UL (ref 3.6–11)

## 2019-06-01 PROCEDURE — 74011250637 HC RX REV CODE- 250/637: Performed by: HOSPITALIST

## 2019-06-01 PROCEDURE — 36415 COLL VENOUS BLD VENIPUNCTURE: CPT

## 2019-06-01 PROCEDURE — 74011250637 HC RX REV CODE- 250/637: Performed by: INTERNAL MEDICINE

## 2019-06-01 PROCEDURE — 74011250636 HC RX REV CODE- 250/636: Performed by: INTERNAL MEDICINE

## 2019-06-01 PROCEDURE — 65270000015 HC RM PRIVATE ONCOLOGY

## 2019-06-01 PROCEDURE — 94760 N-INVAS EAR/PLS OXIMETRY 1: CPT

## 2019-06-01 PROCEDURE — 74176 CT ABD & PELVIS W/O CONTRAST: CPT

## 2019-06-01 PROCEDURE — 80048 BASIC METABOLIC PNL TOTAL CA: CPT

## 2019-06-01 PROCEDURE — 82962 GLUCOSE BLOOD TEST: CPT

## 2019-06-01 PROCEDURE — 85025 COMPLETE CBC W/AUTO DIFF WBC: CPT

## 2019-06-01 RX ORDER — ONDANSETRON 2 MG/ML
2 INJECTION INTRAMUSCULAR; INTRAVENOUS
Status: DISCONTINUED | OUTPATIENT
Start: 2019-06-01 | End: 2019-06-04 | Stop reason: HOSPADM

## 2019-06-01 RX ORDER — AMOXICILLIN 250 MG
2 CAPSULE ORAL 2 TIMES DAILY
Status: DISCONTINUED | OUTPATIENT
Start: 2019-06-01 | End: 2019-06-04 | Stop reason: HOSPADM

## 2019-06-01 RX ORDER — MAGNESIUM CITRATE
148 SOLUTION, ORAL ORAL
Status: COMPLETED | OUTPATIENT
Start: 2019-06-01 | End: 2019-06-01

## 2019-06-01 RX ORDER — HYDROCORTISONE 25 MG/G
CREAM TOPICAL
Status: DISCONTINUED | OUTPATIENT
Start: 2019-06-01 | End: 2019-06-04 | Stop reason: HOSPADM

## 2019-06-01 RX ADMIN — CYANOCOBALAMIN TAB 500 MCG 250 MCG: 500 TAB at 08:11

## 2019-06-01 RX ADMIN — MAGNESIUM CITRATE 148 ML: 1.75 LIQUID ORAL at 14:45

## 2019-06-01 RX ADMIN — ATORVASTATIN CALCIUM 40 MG: 40 TABLET, FILM COATED ORAL at 22:57

## 2019-06-01 RX ADMIN — ACETAMINOPHEN 650 MG: 325 TABLET ORAL at 16:59

## 2019-06-01 RX ADMIN — BISACODYL 10 MG: 10 SUPPOSITORY RECTAL at 10:41

## 2019-06-01 RX ADMIN — HYDROCODONE BITARTRATE AND ACETAMINOPHEN 1 TABLET: 5; 325 TABLET ORAL at 08:11

## 2019-06-01 RX ADMIN — LEVOTHYROXINE SODIUM 50 MCG: 50 TABLET ORAL at 05:56

## 2019-06-01 RX ADMIN — HYDROCORTISONE 2.5%: 25 CREAM TOPICAL at 18:57

## 2019-06-01 RX ADMIN — MELATONIN TAB 3 MG 6 MG: 3 TAB at 22:57

## 2019-06-01 RX ADMIN — SENNOSIDES AND DOCUSATE SODIUM 2 TABLET: 8.6; 5 TABLET ORAL at 10:41

## 2019-06-01 RX ADMIN — MENTHOL, METHYL SALICYLATE: 10; 15 CREAM TOPICAL at 16:00

## 2019-06-01 RX ADMIN — ASPIRIN 81 MG 81 MG: 81 TABLET ORAL at 08:11

## 2019-06-01 RX ADMIN — ACETAMINOPHEN 650 MG: 325 TABLET ORAL at 22:57

## 2019-06-01 RX ADMIN — FUROSEMIDE 20 MG: 20 TABLET ORAL at 10:41

## 2019-06-01 RX ADMIN — MENTHOL, METHYL SALICYLATE: 10; 15 CREAM TOPICAL at 08:15

## 2019-06-01 RX ADMIN — ACETAMINOPHEN 650 MG: 325 TABLET ORAL at 10:40

## 2019-06-01 RX ADMIN — Medication 10 ML: at 22:57

## 2019-06-01 RX ADMIN — CYCLOBENZAPRINE HYDROCHLORIDE 5 MG: 10 TABLET, FILM COATED ORAL at 18:57

## 2019-06-01 RX ADMIN — DIAZEPAM 5 MG: 5 TABLET ORAL at 13:42

## 2019-06-01 RX ADMIN — SENNOSIDES AND DOCUSATE SODIUM 2 TABLET: 8.6; 5 TABLET ORAL at 18:57

## 2019-06-01 RX ADMIN — HYDROCODONE BITARTRATE AND ACETAMINOPHEN 1 TABLET: 5; 325 TABLET ORAL at 01:30

## 2019-06-01 RX ADMIN — ENOXAPARIN SODIUM 40 MG: 40 INJECTION SUBCUTANEOUS at 08:11

## 2019-06-01 RX ADMIN — CYCLOBENZAPRINE HYDROCHLORIDE 5 MG: 10 TABLET, FILM COATED ORAL at 08:11

## 2019-06-01 RX ADMIN — Medication 10 ML: at 05:56

## 2019-06-01 NOTE — PROGRESS NOTES
CM contacted [facility] to make sure Pt is able to transfer to SNF today. [facility] reported being able to accept Pt today. Pt has met 3 night Medicare stay. Number for report #[phone]. Elicia Simon LCSW  Ext # [phone]     Pt will not D/C today due to medical stability. Pt able to D/C to [facility] tomorrow if stable.     Elicia Simon LCSW  Ext # [phone]

## 2019-06-01 NOTE — PROGRESS NOTES
Oncology End of Shift Note      Bedside shift change report given to Jennifer Price RN (incoming nurse) by Corinna Nassar (outgoing nurse) on [facility]. Report included the following information SBAR, Kardex and MAR. Shift Summary: pain still uncontrolled.        Issues for Physician to Address:  Pain management          Linda Roach

## 2019-06-01 NOTE — PROGRESS NOTES
Pt was admitted on 5/23/19 at 15:45. Pt has reached the 3 midnight requirement for SNF services and is eligible for services.      Aguilar Thompson, MSW, LSW  Supervisee in Social Work  [facility]  [phone]

## 2019-06-01 NOTE — PROGRESS NOTES
Asked to see patient again for continued right flank pain. Abdominal ultrasound 5/27/19 shows no hydronephrosis. Previous CT showed some air in bladder likely secondary to recent catheterization. She has tenderness to light palpation of right flank area which is more consistent with musculoskeletal source of pain. Given recent Klebsiella positive urine culture, would repeat UA with culture. Impression: 1. Right flank pain    2.  Recent Klebsiella oxytoca UTI    Plan:  -repeat CT (non contrast) abd/pelvis  -repeat UA with culture (midstream specimen)

## 2019-06-01 NOTE — PROGRESS NOTES
Progress Note      Pt Name  Zander Tinoco   Date of Birth 1945   Medical Record Number  [id number]      Age  74 y.o. PCP Amy Gonzalez MD   Admit date:  5/23/2019    Room Number  1121/01  @ [facility]   Date of Service  6/1/2019     Admission Diagnoses:  Seizure      Assessment and plan:     RIGHT sided Back Pain  RUQ abdominal pain  Emphysematous changes in bladder wall   Right hydronephrosis - pt reports seeing Dr. Mirta Sharma earlier this year and being advised to repeat CT in two months. Recent non-invasive spine surgery done by Dr. Aftab Babb at [facility]     Pt went through recent back surgery  MRI of T and LS spine without acute change (LS spine with post surgical changes and DJD)  CT A/P with mild hydro but urology saw the patient on consult but doesn't believe to be the cause  US A/P negative for gall bladder etiology    · Will ask urologist to evaluate again. · Continue lortab PRN (can't use ultram as presented with seizure)  · PRN Bengay  · PT/OT recommending SNF      Seizure due to   Severe hyponatremia leading to   Acute Encephalopathy -now resolved   Na+ improved - pt is now receiving Lasix  Initially managed in ICU, s/p 3%NS upon admission  Urine osmolality 338, 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      Severe constipation  I have escalated the per-colace dose, but upon review, I have changed my mind. We will give her one half dose of MagCitrate PO tonight and wait for the response.        Hypothyroidism  TFTs consistent with sick thyroid  Continue synthroid at current dose      DM type 2  Continue withholding metformin while in house Continue SSI  Diabetic diet      Recent Lumbar spine surgery 5/16 at [facility]     CAD   History of CVA leading to   Loss of vision in right eye -continue ASA   Hyperlipidemia POA continue statin        Body mass index is 28.84 kg/m². CODE STATUS   Full     Functional Status  Pt was independent with ADLs     Surrogate decision maker:  Pt's son      Prophylaxis   Lovenox   Discharge Plan:  SNF/LTC,      Misc   Benefit:  [place]: [place] MED[place] / Plan: [place] MEDICARE PART A & B / Product Type: Medicare /    Isolation :  There are currently no Active Isolations   ADT status:  INPATIENT      Query   None noted today    Prognosis   Fair    Social issues  Date  Comment     06/0191  345pm - I spoke with Mariza Verdugo, pt's son over telephone and informed him about above plans in simple language and answered all questions.                    Subjective Data     \"My stomach is no better - it hurts at all times \"  Review of Systems - History obtained from the patient  General ROS: negative for - chills, fatigue or fever  Psychological ROS: negative  Respiratory ROS: no cough, shortness of breath, or wheezing  Cardiovascular ROS: no chest pain or dyspnea on exertion  Gastrointestinal ROS: positive for - abdominal pain, constipation now going on for about ten days !!   negative for - blood in stools or diarrhea  Genito-Urinary ROS: positive for - dysuria  Musculoskeletal ROS: positive for - muscle pain  Neurological ROS: no TIA or stroke symptoms    Objective Data       Comments  Pleasant elderly lady lying in bed in no distress      Patient Vitals for the past 24 hrs:   BP   06/01/19 0728 141/85   06/01/19 0001 121/77   05/31/19 2006 138/73   05/31/19 1720 120/72   05/31/19 1531 105/56   05/31/19 1511 96/46   05/31/19 1313 117/71      Patient Vitals for the past 24 hrs:   Pulse   06/01/19 0728 78   06/01/19 0001 91   05/31/19 2006 99   05/31/19 1531 82   05/31/19 1511 82      Patient Vitals for the past 24 hrs:   Resp   06/01/19 0728 18   06/01/19 0001 17   05/31/19 2006 16   05/31/19 1531 16   05/31/19 1511 18      Patient Vitals for the past 24 hrs:   Temp   06/01/19 0728 97.7 °F (36.5 °C)   06/01/19 0001 97.9 °F (36.6 °C)   05/31/19 2006 97.5 °F (36.4 °C)   05/31/19 1531 98.5 °F (36.9 °C)   05/31/19 1511 98.6 °F (37 °C)        SpO2 Readings from Last 6 Encounters:   06/01/19 98%   05/23/19 94%   05/16/19 100%   05/09/19 99%   02/18/19 100%   01/08/19 95%       O2 Flow Rate (L/min): 2 l/min  O2 Device: Room air Body mass index is 28.84 kg/m². -  Wt Readings from Last 10 Encounters:   05/31/19 76.2 kg (167 lb 15.9 oz)   05/22/19 74.4 kg (164 lb)   05/16/19 78.9 kg (174 lb)   05/09/19 79.2 kg (174 lb 9.6 oz)   02/18/19 77.8 kg (171 lb 8.3 oz)   01/08/19 78 kg (172 lb)   11/15/18 77.6 kg (171 lb)   08/07/18 75.8 kg (167 lb)   05/21/18 72.6 kg (160 lb)   05/17/18 73.9 kg (163 lb)        Physical Exam:             General:  Alert, cooperative,   well noursished,   well developed,   appears stated age    Ears/Eyes:  Hearing intact  Sclera anicteric. Pupils equal   Mouth/Throat:  Mucous membranes normal pink and moist  Oral pharynx clear    Neck:     Lungs:  Trachea midline  Chest excursion symmetrical   Auscultation B/L Symmetrical with   Vesicular breath sounds     No Crepitations noted   Percussion note resonant on mid Clavicular line; no sign of pneumothorax        CVS:  Regular rate and rhythm   no  murmur,   No click, rub or gallop  S1 normal   S2 normal      Abdomen:  Obese  Soft,   B/L CVA tenderness RT>LT   Mild suprapubic tenderness   Bowel sounds normal  No distension   Percussion note tympanitic   Extremities:    No cyanosis, jaundice  No edema noted   No sign of DVT/cord like lesion on palpation  No sign of acute trauma   Age appropriate OA changes noted.     Skin:    Skin color, texture, turgor normal. no acute rash or lesions    Lymph nodes:     Musculoskeletal Muscle bulk B/L symmetrical   Neuro Cranial nerves are intact, motor movement b/l symmetrical,   Sensory evaluation b/l symmetrical    Psych:  Alert and oriented,   normal mood & affect          Medications reviewed     Current Facility-Administered Medications   Medication Dose Route Frequency    ondansetron (ZOFRAN) injection 2 mg  2 mg IntraVENous Q4H PRN    cyclobenzaprine (FLEXERIL) tablet 5 mg  5 mg Oral BID    furosemide (LASIX) tablet 20 mg  20 mg Oral DAILY    HYDROcodone-acetaminophen (NORCO) 5-325 mg per tablet 1 Tab  1 Tab Oral Q4H PRN    polyethylene glycol (MIRALAX) packet 17 g  17 g Oral DAILY    senna-docusate (PERICOLACE) 8.6-50 mg per tablet 2 Tab  2 Tab Oral DAILY    methyl salicylate-menthol (BENGAY) 15-10 % cream   Topical TID    diazePAM (VALIUM) tablet 5 mg  5 mg Oral Q8H PRN    raNITIdine (ZANTAC) 15 mg/mL syrup 150 mg  150 mg Oral BID PRN    melatonin tablet 6 mg  6 mg Oral QHS PRN    cetirizine (ZYRTEC) tablet 10 mg  10 mg Oral DAILY PRN    insulin lispro (HUMALOG) injection   SubCUTAneous AC&HS    glucose chewable tablet 16 g  4 Tab Oral PRN    glucagon (GLUCAGEN) injection 1 mg  1 mg IntraMUSCular PRN    0.9% sodium chloride infusion 250 mL  250 mL IntraVENous PRN    sodium chloride (NS) flush 5-40 mL  5-40 mL IntraVENous Q8H    sodium chloride (NS) flush 5-40 mL  5-40 mL IntraVENous PRN    acetaminophen (TYLENOL) tablet 650 mg  650 mg Oral Q6H PRN    naloxone (NARCAN) injection 0.4 mg  0.4 mg IntraVENous PRN    bisacodyl (DULCOLAX) suppository 10 mg  10 mg Rectal DAILY PRN    enoxaparin (LOVENOX) injection 40 mg  40 mg SubCUTAneous Q24H    cyanocobalamin (VITAMIN B12) tablet 250 mcg  250 mcg Oral DAILY    levothyroxine (SYNTHROID) tablet 50 mcg  50 mcg Oral Once per day on Sun Tue Wed Thu Sat    levothyroxine (SYNTHROID) tablet 75 mcg  75 mcg Oral Once per day on Mon Fri    atorvastatin (LIPITOR) tablet 40 mg  40 mg Oral QHS    aspirin chewable tablet 81 mg  81 mg Oral DAILY       Relevant other informations:      Other medical conditions listed in [facility] hospital problem list section; all of these and other pertinent data were taken into consideration when treatment plan is developed and customized to this patient's unique overall circumstances and needs. We have reviewed available old medical records within the constraints of this admission process. Data Review:   Recent Days:  All Micro Results     Procedure Component Value Units Date/Time    CULTURE, URINE [[id number]]  (Abnormal)  (Susceptibility) Collected:  05/23/19 1428    Order Status:  Completed Specimen:  Urine Updated:  05/25/19 1130     Special Requests: --        NO SPECIAL REQUESTS  Reflexed from [id number]       [place] Count --        >100,000  COLONIES/mL       Culture result: KLEBSIELLA OXYTOCA         CITROBACTER YOUNGAE             Recent Labs     06/01/19  0543   WBC 11.3*   HGB 12.1   HCT 37.0   *     Recent Labs     06/01/19  0543 05/31/19  0452 05/30/19  0319   * 131* 128*   K 4.0 4.3 4.1    98 94*   CO2 25 28 26   * 120* 118*   BUN 16 16 12   CREA 0.58 0.78 0.69   CA 8.4* 8.9 8.5      Lab Results   Component Value Date/Time    TSH 0.18 (L) 05/24/2019 01:50 AM            Care Plan discussed with:Patient/Family and Nurse   Other medical conditions are listed in the active hospital problem list section; these and other pertinent data were taken into consideration when the treatment plan was developed and customized to this patient's unique overall circumstances and needs. High complexity decision making was performed for this patient who is at high risk for decompensation with multiple organ involvement. Today total floor/unit time was 60 minutes while caring for this patient and greater than 50% of that time was spent with patient (and/or family) coordinating patients clinical issues; this includes time spent during multidisciplinary rounds.         Tomy Diaz MD MPH FACP    6/1/2019

## 2019-06-02 LAB
APPEARANCE UR: CLEAR
BACTERIA URNS QL MICRO: NEGATIVE /HPF
BILIRUB UR QL CFM: NEGATIVE
COLOR UR: ABNORMAL
EPITH CASTS URNS QL MICRO: ABNORMAL /LPF
GLUCOSE BLD STRIP.AUTO-MCNC: 113 MG/DL (ref 65–100)
GLUCOSE BLD STRIP.AUTO-MCNC: 114 MG/DL (ref 65–100)
GLUCOSE BLD STRIP.AUTO-MCNC: 123 MG/DL (ref 65–100)
GLUCOSE BLD STRIP.AUTO-MCNC: 142 MG/DL (ref 65–100)
GLUCOSE UR STRIP.AUTO-MCNC: NEGATIVE MG/DL
HGB UR QL STRIP: NEGATIVE
HYALINE CASTS URNS QL MICRO: ABNORMAL /LPF (ref 0–5)
KETONES UR QL STRIP.AUTO: NEGATIVE MG/DL
LEUKOCYTE ESTERASE UR QL STRIP.AUTO: NEGATIVE
MUCOUS THREADS URNS QL MICRO: ABNORMAL /LPF
NITRITE UR QL STRIP.AUTO: NEGATIVE
PH UR STRIP: 5.5 [PH] (ref 5–8)
PROT UR STRIP-MCNC: NEGATIVE MG/DL
RBC #/AREA URNS HPF: ABNORMAL /HPF (ref 0–5)
SERVICE CMNT-IMP: ABNORMAL
SP GR UR REFRACTOMETRY: 1.02 (ref 1–1.03)
UA: UC IF INDICATED,UAUC: ABNORMAL
UROBILINOGEN UR QL STRIP.AUTO: 0.2 EU/DL (ref 0.2–1)
WBC URNS QL MICRO: ABNORMAL /HPF (ref 0–4)

## 2019-06-02 PROCEDURE — 74011250637 HC RX REV CODE- 250/637: Performed by: INTERNAL MEDICINE

## 2019-06-02 PROCEDURE — 65270000015 HC RM PRIVATE ONCOLOGY

## 2019-06-02 PROCEDURE — 74011636637 HC RX REV CODE- 636/637: Performed by: INTERNAL MEDICINE

## 2019-06-02 PROCEDURE — 82962 GLUCOSE BLOOD TEST: CPT

## 2019-06-02 PROCEDURE — 81001 URINALYSIS AUTO W/SCOPE: CPT

## 2019-06-02 PROCEDURE — 74011250636 HC RX REV CODE- 250/636: Performed by: INTERNAL MEDICINE

## 2019-06-02 PROCEDURE — 74011250637 HC RX REV CODE- 250/637: Performed by: HOSPITALIST

## 2019-06-02 RX ORDER — MAGNESIUM CITRATE
296 SOLUTION, ORAL ORAL
Status: COMPLETED | OUTPATIENT
Start: 2019-06-02 | End: 2019-06-02

## 2019-06-02 RX ADMIN — DIAZEPAM 5 MG: 5 TABLET ORAL at 20:40

## 2019-06-02 RX ADMIN — INSULIN LISPRO 2 UNITS: 100 INJECTION, SOLUTION INTRAVENOUS; SUBCUTANEOUS at 11:46

## 2019-06-02 RX ADMIN — SENNOSIDES AND DOCUSATE SODIUM 2 TABLET: 8.6; 5 TABLET ORAL at 08:47

## 2019-06-02 RX ADMIN — ACETAMINOPHEN 650 MG: 325 TABLET ORAL at 19:24

## 2019-06-02 RX ADMIN — SENNOSIDES AND DOCUSATE SODIUM 2 TABLET: 8.6; 5 TABLET ORAL at 17:59

## 2019-06-02 RX ADMIN — MAGNESIUM CITRATE 296 ML: 1.75 LIQUID ORAL at 11:46

## 2019-06-02 RX ADMIN — MELATONIN TAB 3 MG 6 MG: 3 TAB at 22:44

## 2019-06-02 RX ADMIN — ASPIRIN 81 MG 81 MG: 81 TABLET ORAL at 08:47

## 2019-06-02 RX ADMIN — ENOXAPARIN SODIUM 40 MG: 40 INJECTION SUBCUTANEOUS at 08:47

## 2019-06-02 RX ADMIN — HYDROCODONE BITARTRATE AND ACETAMINOPHEN 1 TABLET: 5; 325 TABLET ORAL at 23:30

## 2019-06-02 RX ADMIN — Medication 10 ML: at 17:58

## 2019-06-02 RX ADMIN — LEVOTHYROXINE SODIUM 50 MCG: 50 TABLET ORAL at 05:02

## 2019-06-02 RX ADMIN — ACETAMINOPHEN 650 MG: 325 TABLET ORAL at 05:02

## 2019-06-02 RX ADMIN — CYANOCOBALAMIN TAB 500 MCG 250 MCG: 500 TAB at 08:47

## 2019-06-02 RX ADMIN — POLYETHYLENE GLYCOL 3350 17 G: 17 POWDER, FOR SOLUTION ORAL at 08:45

## 2019-06-02 RX ADMIN — Medication 10 ML: at 05:04

## 2019-06-02 RX ADMIN — DIAZEPAM 5 MG: 5 TABLET ORAL at 03:42

## 2019-06-02 RX ADMIN — HYDROCODONE BITARTRATE AND ACETAMINOPHEN 1 TABLET: 5; 325 TABLET ORAL at 08:47

## 2019-06-02 RX ADMIN — Medication 10 ML: at 22:45

## 2019-06-02 RX ADMIN — CYCLOBENZAPRINE HYDROCHLORIDE 5 MG: 10 TABLET, FILM COATED ORAL at 17:59

## 2019-06-02 RX ADMIN — CYCLOBENZAPRINE HYDROCHLORIDE 5 MG: 10 TABLET, FILM COATED ORAL at 08:47

## 2019-06-02 RX ADMIN — ATORVASTATIN CALCIUM 40 MG: 40 TABLET, FILM COATED ORAL at 22:44

## 2019-06-02 RX ADMIN — FUROSEMIDE 20 MG: 20 TABLET ORAL at 08:47

## 2019-06-02 NOTE — PROGRESS NOTES
Problem: Falls - Risk of  Goal: *Absence of Falls  Description  Document Saul Sloan Fall Risk and appropriate interventions in the flowsheet.   Outcome: Progressing Towards Goal

## 2019-06-02 NOTE — PROGRESS NOTES
Pt had small BM. First BM since being admitted to hospital per pt. Pt got midstream urine catch. Urine sample sent to lab.

## 2019-06-02 NOTE — PROGRESS NOTES
Oncology End of Shift Note      Bedside shift change report given to [facility], RN (incoming nurse) by Lien Fraire (outgoing nurse) on [place]. Report included the following information SBAR, Kardex, Intake/Output and MAR. Shift Summary: Pt remained stable during shift. PRN Tylenol x2. Valium x1. Pt had small BM. CT of abd and pelvis completed at beginning of shift. UA and culture sent. No am labs ordered. Issues for Physician to Address:       Patient on Cardiac Monitoring?     [] Yes  [x] No    Rhythm:          Lien Fraire

## 2019-06-02 NOTE — PROGRESS NOTES
Progress Note      Pt Name  Ricco Albarran   Date of Birth 1945   Medical Record Number  [id number]      Age  74 y.o. PCP Alka Narayanan MD   Admit date:  5/23/2019    Room Number  1121/01  @ [facility]   Date of Service  6/2/2019     Admission Diagnoses:  Seizure      Assessment and plan:     RIGHT sided Back Pain  RUQ abdominal pain  Emphysematous changes in bladder wall   Right hydronephrosis - pt reports seeing Dr. Sienna Millan earlier this year and being advised to repeat CT in two months. Recent non-invasive spine surgery done by Dr. Ellie Boswell at [facility]     Pt went through recent back surgery  MRI of T and LS spine without acute change (LS spine with post surgical changes and DJD)  CT A/P with mild hydro but urology saw the patient on consult but doesn't believe to be the cause  US A/P negative for gall bladder etiology    · Appreciate help and guidance from Dr Lisbet Sung, Urologist   · Urine Cx repeated this morning results pending    · Continue lortab PRN (can't use ultram as presented with seizure)  · PRN Bengay  · PT/OT recommending SNF      Seizure due to   Severe hyponatremia leading to   Acute Encephalopathy -now resolved   Na+ improved - pt is now receiving Lasix  Initially managed in ICU, s/p 3%NS upon admission  Urine osmolality 338, 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      Severe constipation  I have escalated the per-colace dose, but upon review, I have changed my mind.    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 CODE STATUS   Full     Functional Status  Pt was independent with ADLs     Surrogate decision maker:  Pt's son      Prophylaxis   Lovenox   Discharge Plan:  SNF/LTC,     Possibly 06/04/19 once above issues are stabilized. Misc   Benefit:  [place] / Plan: [place] MED[place]ARE PART A & B / Product Type: Medicare /    Isolation :  There are currently no Active Isolations   ADT status:  INPATIENT      Query   None noted today    Prognosis   Fair    Social issues  Date  Comment     06/0191  345pm - I spoke with Marlaine Bloch, pt's son over telephone and informed him about above plans in simple language and answered all questions. 06/02/19  800 am spoke with Marlaine Bloch - he was concerned about someone telling the pt that she was going to be discharged today. I assured him that, that is not going to happen today, until pt is stabilized, issues resolved. Subjective Data     \"a little better.   \"  Review of Systems - History obtained from the patient  General ROS: negative for - chills, fatigue or fever  Psychological ROS: negative  Respiratory ROS: no cough, shortness of breath, or wheezing  Cardiovascular ROS: no chest pain or dyspnea on exertion  Gastrointestinal ROS: positive for - abdominal pain, constipation now going on for about ten days !!   negative for - blood in stools or diarrhea  Genito-Urinary ROS: positive for - dysuria  Musculoskeletal ROS: positive for - muscle pain  Neurological ROS: no TIA or stroke symptoms    Objective Data       Comments  Pleasant elderly lady lying in bed in no distress      Patient Vitals for the past 24 hrs:   BP   06/02/19 0809 129/65   06/02/19 0722 159/74   06/01/19 2323 138/65   06/01/19 1847 112/52 06/01/19 1456 101/47      Patient Vitals for the past 24 hrs:   Pulse   06/02/19 0809 98   06/02/19 0722 81   06/01/19 2323 90   06/01/19 1847 (!) 114   06/01/19 1456 (!) 102      Patient Vitals for the past 24 hrs:   Resp   06/02/19 0809 18   06/02/19 0722 18   06/01/19 2323 16   06/01/19 1847 18   06/01/19 1456 16      Patient Vitals for the past 24 hrs:   Temp   06/02/19 0809 97.9 °F (36.6 °C)   06/02/19 0722 98.8 °F (37.1 °C)   06/01/19 2323 97.8 °F (36.6 °C)   06/01/19 1847 97.6 °F (36.4 °C)   06/01/19 1456 97.5 °F (36.4 °C)        SpO2 Readings from Last 6 Encounters:   06/02/19 96%   05/23/19 94%   05/16/19 100%   05/09/19 99%   02/18/19 100%   01/08/19 95%       O2 Flow Rate (L/min): 2 l/min  O2 Device: Room air Body mass index is 29.21 kg/m². -  Wt Readings from Last 10 Encounters:   06/01/19 77.2 kg (170 lb 3.1 oz)   05/22/19 74.4 kg (164 lb)   05/16/19 78.9 kg (174 lb)   05/09/19 79.2 kg (174 lb 9.6 oz)   02/18/19 77.8 kg (171 lb 8.3 oz)   01/08/19 78 kg (172 lb)   11/15/18 77.6 kg (171 lb)   08/07/18 75.8 kg (167 lb)   05/21/18 72.6 kg (160 lb)   05/17/18 73.9 kg (163 lb)        Physical Exam:             General:  Alert, cooperative,   well noursished,   well developed,   appears stated age    Ears/Eyes:  Hearing intact  Sclera anicteric.    Pupils equal   Mouth/Throat:  Mucous membranes normal pink and moist  Oral pharynx clear    Neck:     Lungs:  Trachea midline  Chest excursion symmetrical   Auscultation B/L Symmetrical with   Vesicular breath sounds     No Crepitations noted   Percussion note resonant on mid Clavicular line; no sign of pneumothorax        CVS:  Regular rate and rhythm   no  murmur,   No click, rub or gallop  S1 normal   S2 normal      Abdomen:  Obese  Soft,   B/L CVA tenderness RT>LT   Mild suprapubic tenderness   Bowel sounds normal  No distension   Percussion note tympanitic   Extremities:    No cyanosis, jaundice  No edema noted   No sign of DVT/cord like lesion on palpation  No sign of acute trauma   Age appropriate OA changes noted.     Skin:    Skin color, texture, turgor normal. no acute rash or lesions    Lymph nodes:     Musculoskeletal Muscle bulk B/L symmetrical   Neuro Cranial nerves are intact,   motor movement b/l symmetrical,   Sensory evaluation b/l symmetrical    Psych:  Alert and oriented,   normal mood & affect          Medications reviewed     Current Facility-Administered Medications   Medication Dose Route Frequency    magnesium citrate solution 296 mL  296 mL Oral NOW    ondansetron (ZOFRAN) injection 2 mg  2 mg IntraVENous Q4H PRN    senna-docusate (PERICOLACE) 8.6-50 mg per tablet 2 Tab  2 Tab Oral BID    hydrocortisone (ANUSOL-HC) 2.5 % rectal cream   PeriANAL QID PRN    cyclobenzaprine (FLEXERIL) tablet 5 mg  5 mg Oral BID    furosemide (LASIX) tablet 20 mg  20 mg Oral DAILY    HYDROcodone-acetaminophen (NORCO) 5-325 mg per tablet 1 Tab  1 Tab Oral Q4H PRN    polyethylene glycol (MIRALAX) packet 17 g  17 g Oral DAILY    methyl salicylate-menthol (BENGAY) 15-10 % cream   Topical TID    diazePAM (VALIUM) tablet 5 mg  5 mg Oral Q8H PRN    raNITIdine (ZANTAC) 15 mg/mL syrup 150 mg  150 mg Oral BID PRN    melatonin tablet 6 mg  6 mg Oral QHS PRN    cetirizine (ZYRTEC) tablet 10 mg  10 mg Oral DAILY PRN    insulin lispro (HUMALOG) injection   SubCUTAneous AC&HS    glucose chewable tablet 16 g  4 Tab Oral PRN    glucagon (GLUCAGEN) injection 1 mg  1 mg IntraMUSCular PRN    0.9% sodium chloride infusion 250 mL  250 mL IntraVENous PRN    sodium chloride (NS) flush 5-40 mL  5-40 mL IntraVENous Q8H    sodium chloride (NS) flush 5-40 mL  5-40 mL IntraVENous PRN    acetaminophen (TYLENOL) tablet 650 mg  650 mg Oral Q6H PRN    naloxone (NARCAN) injection 0.4 mg  0.4 mg IntraVENous PRN    bisacodyl (DULCOLAX) suppository 10 mg  10 mg Rectal DAILY PRN    enoxaparin (LOVENOX) injection 40 mg  40 mg SubCUTAneous Q24H    cyanocobalamin (VITAMIN B12) tablet 250 mcg  250 mcg Oral DAILY    levothyroxine (SYNTHROID) tablet 50 mcg  50 mcg Oral Once per day on Sun Tue Wed Thu Sat    levothyroxine (SYNTHROID) tablet 75 mcg  75 mcg Oral Once per day on Mon Fri    atorvastatin (LIPITOR) tablet 40 mg  40 mg Oral QHS    aspirin chewable tablet 81 mg  81 mg Oral DAILY       Relevant other informations: Other medical conditions listed in [facility] problem list section; all of these and other pertinent data were taken into consideration when treatment plan is developed and customized to this patient's unique overall circumstances and needs. We have reviewed available old medical records within the constraints of this admission process. Data Review:   Recent Days:  All Micro Results     Procedure Component Value Units Date/Time    CULTURE, URINE [[id number]]  (Abnormal)  (Susceptibility) Collected:  05/23/19 1428    Order Status:  Completed Specimen:  Urine Updated:  05/25/19 1133     Special Requests: --        NO SPECIAL REQUESTS  Reflexed from [id number]       [place] Count --        >100,000  COLONIES/mL       Culture result: KLEBSIELLA OXYTOCA         CITROBACTER YOUNGAE             Recent Labs     06/01/19  0543   WBC 11.3*   HGB 12.1   HCT 37.0   *     Recent Labs     06/01/19  0543 05/31/19  0452   * 131*   K 4.0 4.3    98   CO2 25 28   * 120*   BUN 16 16   CREA 0.58 0.78   CA 8.4* 8.9      Lab Results   Component Value Date/Time    TSH 0.18 (L) 05/24/2019 01:50 AM            Care Plan discussed with:Patient/Family and Nurse   Other medical conditions are listed in the active hospital problem list section; these and other pertinent data were taken into consideration when the treatment plan was developed and customized to this patient's unique overall circumstances and needs. High complexity decision making was performed for this patient who is at high risk for decompensation with multiple organ involvement.    Today total floor/unit time was 60 minutes while caring for this patient and greater than 50% of that time was spent with patient (and/or family) coordinating patients clinical issues; this includes time spent during multidisciplinary rounds.         Trina Kang MD MPH FACP    6/2/2019

## 2019-06-02 NOTE — PROGRESS NOTES
Oncology End of Shift Note      Bedside shift change report given to Jose Calhoun RN (incoming nurse) by Nan Koo (outgoing nurse) on [facility]. Report included the following information SBAR, Kardex and MAR. Shift Summary: RN administered pain medication once. Patient did not need more pain medication. Patient drank magnesium citrate and has not had a bowel movement. Patient needed insulin coverage once. Issues for Physician to Address:       Patient on Cardiac Monitoring?     [] Yes  [] No    Rhythm:          Nan Koo

## 2019-06-02 NOTE — PROGRESS NOTES
No change in right back discomfort. VSS/AF  Exam unchanged  CT reviewed. No hydronephrosis. Some air in bladder noted likely from catheter during back surgery.  Mild T10 superior endplate fracture? - not sure about that causing any back pain  Repeat UA negative  Impression: right flank pain, do not believe discomfort is from  source at this time  Plan:  -would manage pain with po pain meds  -outpatient follow up with Dr. Beryl Gee as scheduled

## 2019-06-03 ENCOUNTER — APPOINTMENT (OUTPATIENT)
Dept: GENERAL RADIOLOGY | Age: 74
DRG: 640 | End: 2019-06-03
Attending: INTERNAL MEDICINE
Payer: MEDICARE

## 2019-06-03 LAB
GLUCOSE BLD STRIP.AUTO-MCNC: 104 MG/DL (ref 65–100)
GLUCOSE BLD STRIP.AUTO-MCNC: 107 MG/DL (ref 65–100)
GLUCOSE BLD STRIP.AUTO-MCNC: 98 MG/DL (ref 65–100)
GLUCOSE BLD STRIP.AUTO-MCNC: 99 MG/DL (ref 65–100)
SERVICE CMNT-IMP: ABNORMAL
SERVICE CMNT-IMP: ABNORMAL
SERVICE CMNT-IMP: NORMAL
SERVICE CMNT-IMP: NORMAL

## 2019-06-03 PROCEDURE — 74018 RADEX ABDOMEN 1 VIEW: CPT

## 2019-06-03 PROCEDURE — 74011250637 HC RX REV CODE- 250/637: Performed by: INTERNAL MEDICINE

## 2019-06-03 PROCEDURE — 82962 GLUCOSE BLOOD TEST: CPT

## 2019-06-03 PROCEDURE — 74011250636 HC RX REV CODE- 250/636: Performed by: INTERNAL MEDICINE

## 2019-06-03 PROCEDURE — 65270000015 HC RM PRIVATE ONCOLOGY

## 2019-06-03 PROCEDURE — 97116 GAIT TRAINING THERAPY: CPT

## 2019-06-03 PROCEDURE — 94760 N-INVAS EAR/PLS OXIMETRY 1: CPT

## 2019-06-03 PROCEDURE — 74011250637 HC RX REV CODE- 250/637: Performed by: HOSPITALIST

## 2019-06-03 RX ORDER — GABAPENTIN 100 MG/1
100 CAPSULE ORAL 3 TIMES DAILY
Status: DISCONTINUED | OUTPATIENT
Start: 2019-06-03 | End: 2019-06-04 | Stop reason: HOSPADM

## 2019-06-03 RX ADMIN — Medication 10 ML: at 17:11

## 2019-06-03 RX ADMIN — ACETAMINOPHEN 650 MG: 325 TABLET ORAL at 02:15

## 2019-06-03 RX ADMIN — SENNOSIDES AND DOCUSATE SODIUM 2 TABLET: 8.6; 5 TABLET ORAL at 17:11

## 2019-06-03 RX ADMIN — ENOXAPARIN SODIUM 40 MG: 40 INJECTION SUBCUTANEOUS at 08:01

## 2019-06-03 RX ADMIN — ASPIRIN 81 MG 81 MG: 81 TABLET ORAL at 08:01

## 2019-06-03 RX ADMIN — FUROSEMIDE 20 MG: 20 TABLET ORAL at 08:01

## 2019-06-03 RX ADMIN — MENTHOL, METHYL SALICYLATE: 10; 15 CREAM TOPICAL at 02:15

## 2019-06-03 RX ADMIN — ACETAMINOPHEN 650 MG: 325 TABLET ORAL at 17:13

## 2019-06-03 RX ADMIN — GABAPENTIN 100 MG: 100 CAPSULE ORAL at 21:49

## 2019-06-03 RX ADMIN — CYCLOBENZAPRINE HYDROCHLORIDE 5 MG: 10 TABLET, FILM COATED ORAL at 17:10

## 2019-06-03 RX ADMIN — SENNOSIDES AND DOCUSATE SODIUM 2 TABLET: 8.6; 5 TABLET ORAL at 08:01

## 2019-06-03 RX ADMIN — Medication 10 ML: at 21:48

## 2019-06-03 RX ADMIN — Medication 10 ML: at 06:11

## 2019-06-03 RX ADMIN — GABAPENTIN 100 MG: 100 CAPSULE ORAL at 17:11

## 2019-06-03 RX ADMIN — DIAZEPAM 5 MG: 5 TABLET ORAL at 06:11

## 2019-06-03 RX ADMIN — ATORVASTATIN CALCIUM 40 MG: 40 TABLET, FILM COATED ORAL at 21:49

## 2019-06-03 RX ADMIN — CYCLOBENZAPRINE HYDROCHLORIDE 5 MG: 10 TABLET, FILM COATED ORAL at 08:01

## 2019-06-03 RX ADMIN — POLYETHYLENE GLYCOL 3350 17 G: 17 POWDER, FOR SOLUTION ORAL at 08:00

## 2019-06-03 RX ADMIN — MELATONIN TAB 3 MG 6 MG: 3 TAB at 21:49

## 2019-06-03 RX ADMIN — LEVOTHYROXINE SODIUM 75 MCG: 75 TABLET ORAL at 06:11

## 2019-06-03 RX ADMIN — CYANOCOBALAMIN TAB 500 MCG 250 MCG: 500 TAB at 08:01

## 2019-06-03 NOTE — PROGRESS NOTES
Last  clinic note    Shama Cox is a 74year old female who presents today for \"stones\". Ms Marylen Nam presents today referred in consultation by ER. She was seen at the ED 02/18/19 with back pain, dysuria and frequency. Noncontrast CT ABD PELV WO CONT that I personally visualize dated 02/18/19 revealed  right hydronephrosis and hydroureter. Stone not identified. Kidney function normal, creatinine . 86. Denies history of stones. Denies history of  surgery. Denies gross hematuria or dysuria. History of recurrent UTIs. UA is clear. Denies history of tobacco abuse. Dr Geeta Tong is her PCP. PAST MEDICAL HISTORY:    Allergies: * LATEX (Severe)  * RUBBER (Severe)  CODEINE (Severe)  PENICILLIN (PENICILLIN V POTASSIUM) (Severe)  DENIES: Shellfish, X-Ray Dye, Iodine. Medications: B-12 100 MCG ORAL TABLET (CYANOCOBALAMIN) 1 daily  ACYCLOVIR 400 MG ORAL TABLET (ACYCLOVIR) 1 twice a  day  TRAMADOL HCL 50 MG ORAL TABLET (TRAMADOL HCL) as needed  ZYRTEC ALLERGY 10 MG ORAL CAPSULE (CETIRIZINE HCL) 1 daily  SIMVASTATIN 40 MG ORAL TABLET (SIMVASTATIN) 1 daily  OXAPROZIN 600 MG ORAL TABLET (OXAPROZIN) 1 twice  a  day  MISOPROSTOL 200 MCG ORAL TABLET (MISOPROSTOL) 1 4 times a day  LEVOTHYROXINE SODIUM 50 MCG ORAL TABLET (LEVOTHYROXINE SODIUM) TAKE 1 TABLET BY MOUTH EVERY MORNING 30 MINS BEFORE BREAKFAST    Problems: Lower back pain (ICD-724.2) (NRJ51-U98.5)  Hydronephrosis, right (ICD-591) (DCF16-S97.30)    Illnesses: Bowel Problems, Kidney Problems, and Cancer. DENIES: Heart Disease, Pacemaker/Defibrillator, Lung Disease, Diabetes, High Blood Pressure, Stroke/Seizure, Bleeding Problems, HIV, or Hepatitis. Surgeries: Joint Replacement Surgery, Hysterectomy, and Ovaries Removed (Both). Family History: Kidney Stones and Breast cancer. DENIES: Kidney cancer, Kidney disease, Uterine cancer, Cervical cancer, Ovarian cancer. Social History: Retired. . Smoking status: never smoker.  Drinks alcohol 2 to 4 times a month. System Review: Admits to: Dry Eyes, Dry Mouth, Constipation, Involuntary Urine Loss, Lower Extremity Weakness, Dry Skin, Difficulty Walking, Easy Bleeding, and Rash. DENIES: Unexplained Weight Loss, Leg Swelling, Shortness of Breath, Psychiatric Problems, Impaired Sex Drive. EXAMINATION: Vitals: Pulse: 72 BP: 119/65 Weight: 168 lbs Height: 5' 4\" BMI: 28.94 kg/m^2  Appearance: well-developed NAD Neck: supple Respiratory Effort: breathing easily Abdomen/Flank: bilateral peralumbar tenderness Liver/Spleen: no organomegaly Hernia: no ventral hernia Skin Inspection: warm and dry Orientation: oriented to person; time and place Mood/Affect: normal     DIAGNOSTIC STUDIES:  CT ABD PELV WO CONT 02/18/19  Final Result  IMPRESSION:  Right hydronephrosis and hydroureter. Stone not  identified. Recommend follow-up  to resolution to exclude a stricture or malignancy. Recently passed stone may  also result in this appearance. URINALYSIS from Voided specimen  Urine Dip: pH: 6.0, Bld: Neg, LE: Neg, Nit: Neg, Prot: Neg, Ket: Neg, Gluc: Neg  Urine Micro: WBC: 0, RBC: 1-2, Bacteria: 0    IMPRESSION:    1. HYDRONEPHROSIS - RIGHT (VKG90-G04.30) - New: Obtain CT IVP.      2. LOWER BACK PAIN (YAY70-Q68.8) - New        cc: Flynn Hamman, MD  Transcribed by Speech to Text Technology  Today's Services  E&M Service, Urinalysis Microscopic    Upcoming Orders  Return Office Visit - with Paradise Kitchen MD soon after 02/21/2019 soon after CT IVP  CT IVP A/P (+/-IV), UA

## 2019-06-03 NOTE — PROGRESS NOTES
Oncology End of Shift Note      Bedside shift change report given to Juany Guzman RN (incoming nurse) by Jossue Glez (outgoing nurse) on [facility]. Report included the following information SBAR, Kardex and MAR. Shift Summary: MD started patient on gabapentin. PT worked with patient. Patient needed pain medication one time. Patient did not have a significant bowel movement. Issues for Physician to Address:  None. Patient on Cardiac Monitoring?     [] Yes  [x] No    Rhythm:              Jossue Glez

## 2019-06-03 NOTE — PROGRESS NOTES
Pt complains of low back and generalized abdominal pain.      CT reviewed- air in bladder from recent olmedo    Follow up in office

## 2019-06-03 NOTE — PROGRESS NOTES
Problem: Mobility Impaired (Adult and Pediatric)  Goal: *Acute Goals and Plan of Care (Insert Text)  Description  Physical Therapy Goals  Revised 5/31/2019  1. Patient will move from supine to sit and sit to supine , scoot up and down and roll side to side in bed with independence within 7 day(s). Met 6/3/19  2. Patient will transfer from bed to chair and chair to bed with independence using the least restrictive device within 7 day(s). Met 6/3/19  3. Patient will ambulate with modified independence for 300 feet with the least restrictive device within 7 day(s). 4.  Patient will ascend/descend 3 stairs with 1 handrail(s) with modified independence within 7 day(s). Physical Therapy Goals  Initiated 5/25/2019  1. Patient will move from supine to sit and sit to supine , scoot up and down and roll side to side in bed with independence within 7 day(s). 2.  Patient will transfer from bed to chair and chair to bed with independence using the least restrictive device within 7 day(s). 3.  Patient will perform sit to stand with independence within 7 day(s). Met 5/31/19  4. Patient will ambulate with independence for 300 feet with the least restrictive device within 7 day(s). 5.  Patient will ascend/descend 3 stairs with 1 handrail(s) with independence within 7 day(s). Outcome: Progressing Towards Goal   PHYSICAL THERAPY TREATMENT  Patient: Fahad Medel (23 y.o. female)  Date: 6/3/2019  Diagnosis: Hyponatremia [E87.1] <principal problem not specified>       Precautions: Fall  Chart, physical therapy assessment, plan of care and goals were reviewed. ASSESSMENT:  Patient was sitting in chair when PT arrived. Agreed to therapy and cleared by her nurse. Excellent progress today toward all PT goals. Now independent for sit to stand and bed to chair transfers. Gait progressed to cg/min assistance for 450 feet with handhold assistance without the walker.   She was returned to her bedside chair resting comfortably with all needs met when the session ended. Recommend HH PT/OT upon discharge at this time. She will also need a rolling walker for home use. Progression toward goals:  ?    Improving appropriately and progressing toward goals  ? Improving slowly and progressing toward goals  ? Not making progress toward goals and plan of care will be adjusted     PLAN:  Patient continues to benefit from skilled intervention to address the above impairments. Continue treatment per established plan of care. Discharge Recommendations:  Home Health PT/OT  Further Equipment Recommendations for Discharge:  rolling walker     SUBJECTIVE:   Patient stated ? I've been getting up to the chair and using the bathroom on my own without any trouble? OBJECTIVE DATA SUMMARY:   Critical Behavior:  Neurologic State: Alert  Orientation Level: Oriented X4  Cognition: Follows commands, Appropriate decision making, Appropriate for age attention/concentration, Appropriate safety awareness  Safety/Judgement: Awareness of environment, Good awareness of safety precautions  Functional Mobility Training:  Transfers:  Sit to Stand: Independent  Stand to Sit: Independent        Bed to Chair: Independent                    Balance:  Sitting: Intact  Standing: Impaired  Standing - Static: Good  Standing - Dynamic : Fair;Occasional  Ambulation/Gait Training:  Distance (ft): 450 Feet (ft)  Assistive Device: Gait belt  Ambulation - Level of Assistance: Contact guard assistance;Minimal assistance;Assist x1(handhold assist)        Gait Abnormalities: Path deviations        Base of Support: Widened     Speed/Paola: Pace decreased (<100 feet/min)  Step Length: Right shortened;Left shortened                    Pain: no complaints of pain during PT session                    Activity Tolerance:   Good. Please refer to the flowsheet for vital signs taken during this treatment.   After treatment:   ?    Patient left in no apparent distress sitting up in chair  ? Patient left in no apparent distress in bed  ? Call bell left within reach  ? Nursing notified  ? Caregiver present  ?     Bed alarm activated    COMMUNICATION/COLLABORATION:   The patient?s plan of care was discussed with: Occupational Therapist, Registered Nurse and     Burgess Syed, PT   Time Calculation: 15 mins

## 2019-06-03 NOTE — PROGRESS NOTES
Progress Note      Pt Name  Manju Murguia   Date of Birth 1945   Medical Record Number  [id number]      Age  74 y.o. PCP Clive Hernandez MD   Admit date:  5/23/2019    Room Number  1121/01  @ [facility]   Date of Service  6/3/2019     Admission Diagnoses:  Seizure      Assessment and plan:     RIGHT sided Back Pain chronic  Has  Had  For  Year. Pain  Now  With  Radicular type  Radiation  MRI  Negative   NO  Rash  Onset of this  Pain  Was  Post op May 17th. Duration of pain too long  For  Zoster. Add Neurontin 100 mg  Tid. Suspect thsi is  Acute on chronic pain and  She  Should  F/u with  Dr Areli Solano abdominal pain  Emphysematous changes in bladder wall   Right hydronephrosis - pt reports seeing Dr. Carla Meier earlier this year and being advised to repeat CT in two months.    Recent non-invasive spine surgery done by Dr. Sharonda Jno at [facility]     Pt went through recent back surgery  MRI of T and LS spine without acute change (LS spine with post surgical changes and DJD)  CT A/P with mild hydro but urology saw the patient on consult but doesn't believe to be the cause  US A/P negative for gall bladder etiology    · Appreciate help and guidance from Dr Sofie Nevarez, Urologist   · Urine Cx repeated this morning results pending    · Continue lortab PRN (can't use ultram as presented with seizure)  · PRN Bengay  · PT/OT recommending SNF      Seizure due to   Severe hyponatremia leading to   Acute Encephalopathy -now resolved   Na+ improved - pt is now receiving Lasix repeat Na in am   Initially managed in ICU, s/p 3%NS upon admission  Urine osmolality 338, 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 Due to  Cifuentes  Will see in  Office  As  OP     Severe constipation  I have escalated the per-colace dose, but upon review, I have changed my mind. 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 CODE STATUS   Full     Functional Status  Pt was independent with ADLs     Surrogate decision maker:  Pt's son      Prophylaxis   Lovenox   Discharge Plan:  SNF/LTC,     Possibly 06/04/19 once above issues are stabilized. Misc   Benefit:  [place]or: [place] MED[place] / Plan: [place] MEDICARE PART A & B / Product Type: Medicare /    Isolation :  There are currently no Active Isolations   ADT status:  INPATIENT      Query   None noted today    Prognosis   Fair    Social issues  Date  Comment     06/0191  345pm - I spoke with Denis Lizarraga, pt's son over telephone and informed him about above plans in simple language and answered all questions. 06/02/19  800 am spoke with Denis Lizarraga - he was concerned about someone telling the pt that she was going to be discharged today. I assured him that, that is not going to happen today, until pt is stabilized, issues resolved. Subjective Data     \"a little better. \" Still with  Pain  Right  Side  Has  Had  For  Years  But  Sharper and  Radiation  Not the  Same  Onset  In  May  Post op  Almost  3 weeks   No  Rash. Does not  Want to  Go home  Until  Source  Found.      Review of Systems - History obtained from the patient  General ROS: negative for - chills, fatigue or fever  Psychological ROS: negative  Respiratory ROS: no cough, shortness of breath, or wheezing  Cardiovascular ROS: no chest pain or dyspnea on exertion  Gastrointestinal ROS: positive for - abdominal pain, constipation now going on for about ten days !!   negative for - blood in stools or diarrhea  Genito-Urinary ROS: positive for - dysuria  Musculoskeletal ROS: positive for - muscle pain  Neurological ROS: no TIA or stroke symptoms    Objective Data       Comments  Pleasant elderly lady lying in bed in no distress      Patient Vitals for the past 24 hrs:   BP   06/03/19 0737 108/65   06/02/19 2251 126/73   06/02/19 1921 107/67      Patient Vitals for the past 24 hrs:   Pulse   06/03/19 0737 100   06/02/19 2251 97   06/02/19 1921 96      Patient Vitals for the past 24 hrs:   Resp   06/03/19 0737 16   06/02/19 2251 16   06/02/19 1921 16      Patient Vitals for the past 24 hrs:   Temp   06/03/19 0737 98.3 °F (36.8 °C)   06/02/19 2251 97.6 °F (36.4 °C)   06/02/19 1921 98.1 °F (36.7 °C)   06/02/19 1645 98.7 °F (37.1 °C)        SpO2 Readings from Last 6 Encounters:   06/03/19 98%   05/23/19 94%   05/16/19 100%   05/09/19 99%   02/18/19 100%   01/08/19 95%       O2 Flow Rate (L/min): 2 l/min  O2 Device: Room air Body mass index is 28.41 kg/m². -  Wt Readings from Last 10 Encounters:   06/03/19 75.1 kg (165 lb 8 oz)   05/22/19 74.4 kg (164 lb)   05/16/19 78.9 kg (174 lb)   05/09/19 79.2 kg (174 lb 9.6 oz)   02/18/19 77.8 kg (171 lb 8.3 oz)   01/08/19 78 kg (172 lb)   11/15/18 77.6 kg (171 lb)   08/07/18 75.8 kg (167 lb)   05/21/18 72.6 kg (160 lb)   05/17/18 73.9 kg (163 lb)        Physical Exam:             General:  Alert, cooperative,   well noursished,   well developed,   appears stated age    Ears/Eyes:  Hearing intact  Sclera anicteric.    Pupils equal   Mouth/Throat:  Mucous membranes normal pink and moist  Oral pharynx clear    Neck:     Lungs:  Trachea midline  Chest excursion symmetrical   Auscultation B/L Symmetrical with   Vesicular breath sounds     No Crepitations noted   Percussion note resonant on mid Clavicular line; no sign of pneumothorax        CVS:  Regular rate and rhythm   no  murmur,   No click, rub or gallop  S1 normal   S2 normal      Abdomen:  Obese  Soft,   B/L CVA tenderness RT>LT   Mild suprapubic tenderness   Bowel sounds normal  No distension   Percussion note tympanitic   Extremities:    No cyanosis, jaundice  No edema noted   No sign of DVT/cord like lesion on palpation  No sign of acute trauma   Age appropriate OA changes noted.     Skin:    Skin color, texture, turgor normal. no acute rash or lesions    Lymph nodes:     Musculoskeletal Muscle bulk B/L symmetrical   Neuro Cranial nerves are intact,   motor movement b/l symmetrical,   Sensory evaluation b/l symmetrical   Pain subjective  T7 dermatome  Right     Psych:  Alert and oriented,   normal mood & affect          Medications reviewed     Current Facility-Administered Medications   Medication Dose Route Frequency    ondansetron (ZOFRAN) injection 2 mg  2 mg IntraVENous Q4H PRN    senna-docusate (PERICOLACE) 8.6-50 mg per tablet 2 Tab  2 Tab Oral BID    hydrocortisone (ANUSOL-HC) 2.5 % rectal cream   PeriANAL QID PRN    cyclobenzaprine (FLEXERIL) tablet 5 mg  5 mg Oral BID    furosemide (LASIX) tablet 20 mg  20 mg Oral DAILY    HYDROcodone-acetaminophen (NORCO) 5-325 mg per tablet 1 Tab  1 Tab Oral Q4H PRN    polyethylene glycol (MIRALAX) packet 17 g  17 g Oral DAILY    methyl salicylate-menthol (BENGAY) 15-10 % cream   Topical TID    diazePAM (VALIUM) tablet 5 mg  5 mg Oral Q8H PRN    raNITIdine (ZANTAC) 15 mg/mL syrup 150 mg  150 mg Oral BID PRN    melatonin tablet 6 mg  6 mg Oral QHS PRN    cetirizine (ZYRTEC) tablet 10 mg  10 mg Oral DAILY PRN    insulin lispro (HUMALOG) injection   SubCUTAneous AC&HS    glucose chewable tablet 16 g  4 Tab Oral PRN    glucagon (GLUCAGEN) injection 1 mg  1 mg IntraMUSCular PRN    0.9% sodium chloride infusion 250 mL  250 mL IntraVENous PRN    sodium chloride (NS) flush 5-40 mL  5-40 mL IntraVENous Q8H    sodium chloride (NS) flush 5-40 mL  5-40 mL IntraVENous PRN    acetaminophen (TYLENOL) tablet 650 mg  650 mg Oral Q6H PRN    naloxone (NARCAN) injection 0.4 mg  0.4 mg IntraVENous PRN    bisacodyl (DULCOLAX) suppository 10 mg  10 mg Rectal DAILY PRN    enoxaparin (LOVENOX) injection 40 mg  40 mg SubCUTAneous Q24H    cyanocobalamin (VITAMIN B12) tablet 250 mcg  250 mcg Oral DAILY    levothyroxine (SYNTHROID) tablet 50 mcg  50 mcg Oral Once per day on Sun Tue Wed Thu Sat    levothyroxine (SYNTHROID) tablet 75 mcg  75 mcg Oral Once per day on Mon Fri    atorvastatin (LIPITOR) tablet 40 mg  40 mg Oral QHS    aspirin chewable tablet 81 mg  81 mg Oral DAILY       Relevant other informations: Other medical conditions listed in [facility] problem list section; all of these and other pertinent data were taken into consideration when treatment plan is developed and customized to this patient's unique overall circumstances and needs. We have reviewed available old medical records within the constraints of this admission process.         Data Review:   Recent Days:  All Micro Results     Procedure Component Value Units Date/Time    CULTURE, URINE [[id number]]  (Abnormal)  (Susceptibility) Collected:  05/23/19 1428    Order Status:  Completed Specimen:  Urine Updated:  05/25/19 1138     Special Requests: --        NO SPECIAL REQUESTS  Reflexed from [id number]       [place] Count --        >100,000  COLONIES/mL       Culture result: KLEBSIELLA OXYTOCA         CITROBACTER YOUNGAE             Recent Labs     06/01/19  0543   WBC 11.3*   HGB 12.1   HCT 37.0   *     Recent Labs     06/01/19  0543   *   K 4.0      CO2 25   *   BUN 16   CREA 0.58   CA 8.4*      Lab Results   Component Value Date/Time    TSH 0.18 (L) 05/24/2019 01:50 AM            Care Plan discussed with:Patient/Family and Nurse   Other medical conditions are listed in the active hospital problem list section; these and other pertinent data were taken into consideration when the treatment plan was developed and customized to this patient's unique overall circumstances and needs. High complexity decision making was performed for this patient who is at high risk for decompensation with multiple organ involvement. Today total floor/unit time was 60 minutes while caring for this patient and greater than 50% of that time was spent with patient (and/or family) coordinating patients clinical issues; this includes time spent during multidisciplinary rounds.         Elle Lloyd MD MPH FACP    6/3/2019

## 2019-06-03 NOTE — PROGRESS NOTES
Problem: Falls - Risk of  Goal: *Absence of Falls  Description  Document Teo President Fall Risk and appropriate interventions in the flowsheet.   Outcome: Progressing Towards Goal     Problem: Patient Education: Go to Patient Education Activity  Goal: Patient/Family Education  Outcome: Progressing Towards Goal

## 2019-06-03 NOTE — PROGRESS NOTES
Oncology End of Shift Note      Bedside shift change report given to [facility], RN (incoming nurse) by Sima Ni (outgoing nurse) on [address]. Report included the following information SBAR, Kardex, Intake/Output and MAR. Shift Summary: Pt remained stable during shift. Tylenol and Valium x2. Norco x1. No BM during shift. No labs ordered. Pt to have abd x-ray today. Issues for Physician to Address:  Pain still present     Patient on Cardiac Monitoring?     [] Yes  [x] No    Rhythm:          Sima Ni

## 2019-06-03 NOTE — PROGRESS NOTES
Medicare pt has received, reviewed, and signed 2nd IM letter informing them of their right to appeal the discharge. Signed copy has been placed on pt bedside chart.                     Shavonne Sanz  [phone]

## 2019-06-04 VITALS
HEART RATE: 79 BPM | RESPIRATION RATE: 18 BRPM | TEMPERATURE: 97.5 F | HEIGHT: 64 IN | BODY MASS INDEX: 28.42 KG/M2 | OXYGEN SATURATION: 100 % | SYSTOLIC BLOOD PRESSURE: 117 MMHG | DIASTOLIC BLOOD PRESSURE: 47 MMHG | WEIGHT: 166.45 LBS

## 2019-06-04 PROBLEM — E87.1 HYPONATREMIA: Status: RESOLVED | Noted: 2019-05-23 | Resolved: 2019-06-04

## 2019-06-04 LAB
ANION GAP SERPL CALC-SCNC: 6 MMOL/L (ref 5–15)
BUN SERPL-MCNC: 14 MG/DL (ref 6–20)
BUN/CREAT SERPL: 20 (ref 12–20)
CALCIUM SERPL-MCNC: 8.3 MG/DL (ref 8.5–10.1)
CHLORIDE SERPL-SCNC: 100 MMOL/L (ref 97–108)
CO2 SERPL-SCNC: 29 MMOL/L (ref 21–32)
CREAT SERPL-MCNC: 0.71 MG/DL (ref 0.55–1.02)
GLUCOSE BLD STRIP.AUTO-MCNC: 27 MG/DL (ref 65–100)
GLUCOSE BLD STRIP.AUTO-MCNC: 31 MG/DL (ref 65–100)
GLUCOSE BLD STRIP.AUTO-MCNC: 73 MG/DL (ref 65–100)
GLUCOSE BLD STRIP.AUTO-MCNC: 80 MG/DL (ref 65–100)
GLUCOSE BLD STRIP.AUTO-MCNC: 98 MG/DL (ref 65–100)
GLUCOSE SERPL-MCNC: 96 MG/DL (ref 65–100)
POTASSIUM SERPL-SCNC: 4.1 MMOL/L (ref 3.5–5.1)
SERVICE CMNT-IMP: ABNORMAL
SERVICE CMNT-IMP: ABNORMAL
SERVICE CMNT-IMP: NORMAL
SODIUM SERPL-SCNC: 135 MMOL/L (ref 136–145)

## 2019-06-04 PROCEDURE — 74011250637 HC RX REV CODE- 250/637: Performed by: INTERNAL MEDICINE

## 2019-06-04 PROCEDURE — 82962 GLUCOSE BLOOD TEST: CPT

## 2019-06-04 PROCEDURE — 80048 BASIC METABOLIC PNL TOTAL CA: CPT

## 2019-06-04 PROCEDURE — 36415 COLL VENOUS BLD VENIPUNCTURE: CPT

## 2019-06-04 PROCEDURE — 74011250636 HC RX REV CODE- 250/636: Performed by: INTERNAL MEDICINE

## 2019-06-04 RX ORDER — FACIAL-BODY WIPES
10 EACH TOPICAL DAILY
Qty: 28 EACH | Refills: 0 | Status: SHIPPED | OUTPATIENT
Start: 2019-06-04 | End: 2022-07-06 | Stop reason: ALTCHOICE

## 2019-06-04 RX ORDER — GUAIFENESIN 100 MG/5ML
81 LIQUID (ML) ORAL DAILY
Qty: 30 TAB | Refills: 1 | Status: SHIPPED | OUTPATIENT
Start: 2019-06-04

## 2019-06-04 RX ORDER — MAGNESIUM CITRATE
296 SOLUTION, ORAL ORAL
Qty: 1 BOTTLE | Refills: 0 | Status: SHIPPED | OUTPATIENT
Start: 2019-06-04 | End: 2019-06-04

## 2019-06-04 RX ORDER — LANOLIN ALCOHOL/MO/W.PET/CERES
6 CREAM (GRAM) TOPICAL
Qty: 20 TAB | Refills: 0 | Status: SHIPPED | OUTPATIENT
Start: 2019-06-04 | End: 2020-01-28

## 2019-06-04 RX ORDER — CYCLOBENZAPRINE HCL 10 MG
10 TABLET ORAL 2 TIMES DAILY
Qty: 30 TAB | Refills: 0 | Status: SHIPPED | OUTPATIENT
Start: 2019-06-04 | End: 2020-01-28

## 2019-06-04 RX ORDER — FUROSEMIDE 20 MG/1
20 TABLET ORAL DAILY
Qty: 30 TAB | Refills: 0 | Status: SHIPPED | OUTPATIENT
Start: 2019-06-04 | End: 2020-01-28

## 2019-06-04 RX ORDER — DIAZEPAM 5 MG/1
5 TABLET ORAL
Qty: 10 TAB | Refills: 0 | Status: SHIPPED | OUTPATIENT
Start: 2019-06-04 | End: 2019-07-12

## 2019-06-04 RX ORDER — HYDROCODONE BITARTRATE AND ACETAMINOPHEN 5; 325 MG/1; MG/1
1 TABLET ORAL
Qty: 20 TAB | Refills: 0 | Status: SHIPPED | OUTPATIENT
Start: 2019-06-04 | End: 2019-06-07

## 2019-06-04 RX ORDER — GABAPENTIN 100 MG/1
200 CAPSULE ORAL
Qty: 30 CAP | Refills: 0 | Status: SHIPPED | OUTPATIENT
Start: 2019-06-04 | End: 2019-07-12 | Stop reason: SINTOL

## 2019-06-04 RX ADMIN — ENOXAPARIN SODIUM 40 MG: 40 INJECTION SUBCUTANEOUS at 09:04

## 2019-06-04 RX ADMIN — HYDROCODONE BITARTRATE AND ACETAMINOPHEN 1 TABLET: 5; 325 TABLET ORAL at 01:29

## 2019-06-04 RX ADMIN — MENTHOL, METHYL SALICYLATE: 10; 15 CREAM TOPICAL at 01:27

## 2019-06-04 RX ADMIN — HYDROCODONE BITARTRATE AND ACETAMINOPHEN 1 TABLET: 5; 325 TABLET ORAL at 05:37

## 2019-06-04 RX ADMIN — CYCLOBENZAPRINE HYDROCHLORIDE 5 MG: 10 TABLET, FILM COATED ORAL at 08:56

## 2019-06-04 RX ADMIN — SENNOSIDES AND DOCUSATE SODIUM 2 TABLET: 8.6; 5 TABLET ORAL at 08:50

## 2019-06-04 RX ADMIN — GABAPENTIN 100 MG: 100 CAPSULE ORAL at 08:53

## 2019-06-04 RX ADMIN — POLYETHYLENE GLYCOL 3350 17 G: 17 POWDER, FOR SOLUTION ORAL at 08:49

## 2019-06-04 RX ADMIN — CYANOCOBALAMIN TAB 500 MCG 250 MCG: 500 TAB at 08:57

## 2019-06-04 RX ADMIN — HYDROCODONE BITARTRATE AND ACETAMINOPHEN 1 TABLET: 5; 325 TABLET ORAL at 11:13

## 2019-06-04 RX ADMIN — ASPIRIN 81 MG 81 MG: 81 TABLET ORAL at 08:53

## 2019-06-04 RX ADMIN — Medication 10 ML: at 05:37

## 2019-06-04 RX ADMIN — LEVOTHYROXINE SODIUM 50 MCG: 50 TABLET ORAL at 05:37

## 2019-06-04 RX ADMIN — FUROSEMIDE 20 MG: 20 TABLET ORAL at 08:57

## 2019-06-04 NOTE — PROGRESS NOTES
Patient educated on discharge instructions and medications and follow up appointments. Given opportunity for questions and clarifications. PIVs removed prior to discharge. All belongings sent with patient. Patient discharged to [facility] with her .

## 2019-06-04 NOTE — PROGRESS NOTES
Problem: Falls - Risk of  Goal: *Absence of Falls  Description  Document Jc Reas Fall Risk and appropriate interventions in the flowsheet. Outcome: Resolved/Met     Problem: Patient Education: Go to Patient Education Activity  Goal: Patient/Family Education  Outcome: Resolved/Met     Problem: Pressure Injury - Risk of  Goal: *Prevention of pressure injury  Description  Document Henrik Scale and appropriate interventions in the flowsheet.   Outcome: Resolved/Met     Problem: Patient Education: Go to Patient Education Activity  Goal: Patient/Family Education  Outcome: Resolved/Met     Problem: Patient Education: Go to Patient Education Activity  Goal: Patient/Family Education  Outcome: Resolved/Met     Problem: Patient Education: Go to Patient Education Activity  Goal: Patient/Family Education  Outcome: Resolved/Met

## 2019-06-04 NOTE — DISCHARGE SUMMARY
Hospitalist Discharge Summary     Patient ID:  Zander Tinoco  [id number]  74 y.o.  1945 5/23/2019    PCP on record: Amy Gonzalez MD    Admit date: 5/23/2019  Discharge date and time: 6/4/2019    DISCHARGE DIAGNOSIS:    RIGHT sided Back Pain- chronic  Emphysematous changes in bladder  Right hydronephrosis -resolved  Seizure  Severe hyponatremia  Acute Encephalopathy -Metabolic  UTI  Emphysematous changes of Bladder   Severe constipation  Hypothyroidism  DM type 2   Recent Lumbar spine surgery 5/16    CAD   History of CVA l  Hyperlipidemia POA     CONSULTATIONS:  IP CONSULT TO NEPHROLOGY  IP CONSULT TO UROLOGY  IP CONSULT TO NEPHROLOGY  IP CONSULT TO UROLOGY    Excerpted HPI from H&P of Chalino Arreaga MD:  58-year-old white female     Known coronary disease, stroke with vision loss in the right eye, diabetes on metformin     Recent lumbar laminectomy 5/16/[address] at [address] taking Daypro and tramadol     No prior history of hyponatremia, sodium is normally high 130s to 140s     Last sodium level on 5/9/2019 was 136     Seen in the emergency room [facility] 5/22/2019 with right mid to low back pain.   Work-up included a CT scan which showed no evidence of intra-abdominal pathology or nephrolithiasis  Urinalysis was negative for infection  BUN and creatinine were 13 and 0.64  Sodium was 127  Patient apparently had normal mental status     This morning patient's sister found the patient   she had a clear change in her mental status   was confused and having some trouble speaking   awake however  No reported seizure activity  She was brought back into the emergency room  Son in room says she is clearly not at her baseline  Normally she is very \"with it\"     Sodium came back in 114  Patient noted some nausea vomiting overnight  Also reported some vertigo  Received normal saline 1 L, sodium came up to 116  We were called to admit the patient  No reported diarrhea      ______________________________________________________________________  DISCHARGE SUMMARY/HOSPITAL COURSE:  for full details see H&P, daily progress notes, labs, consult notes. Last A &P by Hospitalist:    RIGHT sided Back Pain chronic  Has  Had  For  Year. Pain  Now  With  Radicular type  Radiation  MRI  Negative   NO  Rash  Onset of this  Pain  Was  Post op May 17th. Duration of pain too long  For  Zoster. Add Neurontin 100 mg  Tid. Reported  That Neurontin was makig her drowsy, will give only at night. Suspect thsi is  Acute on chronic pain and  She  Should  F/u with  Dr Nadeen Mart abdominal pain  Emphysematous changes in bladder- Not in bladder wall. -likely 2/2 recent olmedo. Reviewed CT my self.         Right hydronephrosis - pt reports seeing Dr. Kathryn Samaniego earlier this year and being advised to repeat CT in two months.   -US negative for COLISEUM [facility]  Recent non-invasive spine surgery done by Dr. Cassandra Krueger at [facility]      Pt went through recent back surgery  MRI of T and LS spine without acute change (LS spine with post surgical changes and DJD)  CT A/P with mild hydro but urology saw the patient on consult but doesn't believe to be the cause  US A/P negative for gall bladder etiology     · Appreciate help and guidance from Dr Jair Medina, Urologist   · Urine Cx repeated this admission, negative  · Continue lortab PRN (can't use ultram as presented with seizure)  · PRN Bengay     Seizure due to   Severe hyponatremia leading to   Acute Encephalopathy -now resolved   Na+ improved - pt is now receiving Lasix repeat Na iimproved  Initially managed in ICU, s/p 3%NS upon admission  Urine osmolality 338, urine sodium 91 consistent with SIADH in settings of recent surgery  Nephrologist was follwing  CT head negative  Cortisol appropriate   TFTs - sick thyroid pattern NL FreeT4, low TSH      UTI due to Klebsiella and Citrobacter species - pan sensitive   Completed course of Levaquin during this episode of hospital care      Emphysematous changes of Bladder   . Due to  Cifuentes  Will see in  Office  As  OP     Severe constipation  Mag citrate as per pt. 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 Pertinent Findings:  Gen:    Not in distress  Chest: Clear lungs  CVS:   Regular rhythm. No edema  Abd:  Soft, not distended, not tender. Had right lower back pain, seems muscular. Neuro:  Alert, oreitnedx3.  _______________________________________________________________________  DISCHARGE MEDICATIONS:   Current Discharge Medication List      START taking these medications    Details   aspirin 81 mg chewable tablet Take 1 Tab by mouth daily. Qty: 30 Tab, Refills: 1      bisacodyl (DULCOLAX) 10 mg suppository Insert 10 mg into rectum daily. Qty: 28 Each, Refills: 0      cyclobenzaprine (FLEXERIL) 10 mg tablet Take 1 Tab by mouth two (2) times a day. Qty: 30 Tab, Refills: 0      furosemide (LASIX) 20 mg tablet Take 1 Tab by mouth daily. Qty: 30 Tab, Refills: 0      gabapentin (NEURONTIN) 100 mg capsule Take 2 Caps by mouth nightly. Qty: 30 Cap, Refills: 0      HYDROcodone-acetaminophen (NORCO) 5-325 mg per tablet Take 1 Tab by mouth every four (4) hours as needed for Pain for up to 3 days. Max Daily Amount: 6 Tabs. Qty: 20 Tab, Refills: 0    Associated Diagnoses: Acute right-sided thoracic back pain; Closed fracture of left lower extremity with routine healing      melatonin 3 mg tablet Take 2 Tabs by mouth nightly.   Qty: 20 Tab, Refills: 0      magnesium citrate solution Take 296 mL by mouth now for 1 dose. Qty: 1 Bottle, Refills: 0         CONTINUE these medications which have CHANGED    Details   diazePAM (VALIUM) 5 mg tablet Take 1 Tab by mouth every eight (8) hours as needed for Anxiety (spasm). Max Daily Amount: 15 mg.  Qty: 10 Tab, Refills: 0    Associated Diagnoses: Acute right-sided thoracic back pain         CONTINUE these medications which have NOT CHANGED    Details   raNITIdine (ZANTAC) 150 mg tablet Take 150 mg by mouth daily. oxaprozin (DAYPRO) 600 mg tablet Take 1 Tab by mouth two (2) times a day. Indications: Joint Damage causing Pain and Loss of Function  Qty: 180 Tab, Refills: 3      !! levothyroxine (SYNTHROID) 75 mcg tablet Take 75 mcg by mouth every Monday and Friday. denosumab (PROLIA) 60 mg/mL injection 60 mg by SubCUTAneous route every 6 months. miSOPROStol (CYTOTEC) 200 mcg tablet Take 200 mcg by mouth two (2) times a day. !! levothyroxine (SYNTHROID) 50 mcg tablet Take 50 mcg by mouth five (5) days a week. Tuesday, Wednesday, Thursday, Saturday, Sunday       acyclovir (ZOVIRAX) 400 mg tablet Take 400 mg by mouth two (2) times daily as needed. metFORMIN (GLUCOPHAGE) 500 mg tablet Take 500 mg by mouth nightly. MAGNESIUM PO Take 1 Tab by mouth daily. folic acid/multivit-min/lutein (CENTRUM SILVER PO) Take 1 Tab by mouth daily. simvastatin (ZOCOR) 40 mg tablet Take 1 Tab by mouth nightly. Qty: 90 Tab, Refills: 3      cetirizine (ZYRTEC) 10 mg tablet Take 10 mg by mouth daily. cyanocobalamin (VITAMIN B12) 100 mcg tablet Take 100 mcg by mouth daily. !! - Potential duplicate medications found. Please discuss with provider.       STOP taking these medications       traMADol (ULTRAM) 50 mg tablet Comments:   Reason for Stopping:         traMADol (ULTRAM) 50 mg tablet Comments:   Reason for Stopping:         acetaminophen/diphenhydramine (TYLENOL PM PO) Comments:   Reason for Stopping: Patient Follow Up Instructions:    Activity: Activity as tolerated  Diet: Cardiac Diet  Wound Care: None needed    Follow-up Information     Follow up With Specialties Details Why Contact Info    [address] [facility] [address] [place] [address]      Tanisha Ortega MD Internal Medicine   [address]  [facility]  [phone]      Nanette Cohen MD Urology In 2 weeks  [facility] [address] [facility]  [phone]          ________________________________________________________________    Risk of deterioration: Low    Condition at Discharge:  Stable  __________________________________________________________________    Disposition  SNF/LTC    ____________________________________________________________________    Code Status: Full Code  ___________________________________________________________________      Total time in minutes spent coordinating this discharge (includes going over instructions, follow-up, prescriptions, and preparing report for sign off to her PCP) :  30 minutes    Signed:  Chan Negrete MD

## 2019-06-04 NOTE — PROGRESS NOTES
08:41 am, Outbound call to [facility]. Spoke to [facility] (Admissions Coordinator). Informed [facility], pt is discharging this morning. Pt is going to room 114. RN to call report to [phone]. Pt has transportation to [facility].     Jared Samaniego, MSW  [phone]

## 2019-06-04 NOTE — DISCHARGE INSTRUCTIONS
HOSPITALIST DISCHARGE INSTRUCTIONS    NAME: Sy Rojo   :  1945   MRN:  [id number]     Date/Time:  2019 8:41 AM    ADMIT DATE: 2019   DISCHARGE DATE: 2019     Attending Physician: Benji Chapin MD    DISCHARGE DIAGNOSIS:  RIGHT sided Back Pain- chronic  Emphysematous changes in bladder  Right hydronephrosis -resolved  Seizure  Severe hyponatremia  Acute Encephalopathy -Metabolic  UTI  Emphysematous changes of Bladder   Severe constipation  Hypothyroidism  DM type 2   Recent Lumbar spine surgery     CAD   History of CVA l  Hyperlipidemia POA          Medications: Per above medication reconciliation. Pain Management: per above medications    Recommended diet: Cardiac Diet    Recommended activity: Activity as tolerated    Wound care: None    Indwelling devices:  None    Supplemental Oxygen: None    Required Lab work: Weekly BMP    Glucose management:  None    Code status: Full        Outside physician follow up: Follow-up Information     Follow up With Specialties Details Why Contact Info    [address] [place] [address] [address]      Nara Gordillo MD Internal Medicine   [address]  [facility] [address]  [facility]  [phone]      Slava Herrera MD Urology In 2 weeks  [address]  [place] [address] [phone]                 Skilled nursing facility/ SNF MD responsible for above on discharge. Information obtained by :  I understand that if any problems occur once I am at home I am to contact my physician. I understand and acknowledge receipt of the instructions indicated above.                                                                                                                                            Physician's or R.N.'s Signature                                                                  Date/Time Patient or Repres

## 2019-06-05 ENCOUNTER — PATIENT OUTREACH (OUTPATIENT)
Dept: INTERNAL MEDICINE CLINIC | Age: 74
End: 2019-06-05

## 2019-06-05 NOTE — PROGRESS NOTES
Transition of Care Coordination/Hospital to Post Acute Facility:     Date/Time:  2019 11:36 AM    Patient was admitted to [facility] on 19 for treatment of hyponatremia. Patient was discharged 19 to [address] [place] [address],[address] for continuation of care. 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 Clark Hendrix on   TFTs - sick thyroid pattern NL FreeT4, low TSH   D/c to [facility],[address] - no ACP on file   LOS 1-2 weeks at [facility]     Method of communication with care team :chart routing     Nurse Navigator(NN) spoke with Jagruti Kong to provide introduction to self and explanation of the Nurse Navigator Role. Verified name and  as patient identifiers. Discussed and reviewed  anticipated length of stay, diet restrictions, follow up appointments, medication reconciliation, pending labs at time of discharge, recommendations for future lab/imaging   LOS expected to be 1-2 weeks as of this time. ACP:   Does the patient have a current ACP (including DDNR):  no  Does the post acute facility have a copy of the patients ACP:  N/A    Medication(s):   New Medications at Discharge:  aspirin 81 mg chewable tablet Take 1 Tab by mouth daily. Qty: 30 Tab, Refills: 1       bisacodyl (DULCOLAX) 10 mg suppository Insert 10 mg into rectum daily. Qty: 28 Each, Refills: 0       cyclobenzaprine (FLEXERIL) 10 mg tablet Take 1 Tab by mouth two (2) times a day. Qty: 30 Tab, Refills: 0       furosemide (LASIX) 20 mg tablet Take 1 Tab by mouth daily. Qty: 30 Tab, Refills: 0       gabapentin (NEURONTIN) 100 mg capsule Take 2 Caps by mouth nightly.   Qty: 30 Cap, Refills: 0       HYDROcodone-acetaminophen (NORCO) 5-325 mg per tablet Take 1 Tab by mouth every four (4) hours as needed for Pain for up to 3 days. Max Daily Amount: 6 Tabs. Qty: 20 Tab, Refills: 0     Associated Diagnoses: Acute right-sided thoracic back pain; Closed fracture of left lower extremity with routine healing       melatonin 3 mg tablet Take 2 Tabs by mouth nightly. Qty: 20 Tab, Refills: 0       magnesium citrate solution Take 296 mL by mouth now for 1 dose. Qty: 1 Bottle, Refills: 0           Changed Medications at Discharge:   diazePAM (VALIUM) 5 mg tablet Take 1 Tab by mouth every eight (8) hours as needed for Anxiety (spasm). Max Daily Amount: 15 mg.  Qty: 10 Tab, Refills: 0     Associated Diagnoses: Acute right-sided thoracic back pain     Discontinued Medications at Discharge:     traMADol (ULTRAM) 50 mg tablet Comments:   Reason for Stopping:            traMADol (ULTRAM) 50 mg tablet Comments:   Reason for Stopping:            acetaminophen/diphenhydramine (TYLENOL PM PO) Comments:   Reason for Stopping:                   PCP/Specialist follow up:   Future Appointments   Date Time Provider Teddy Lorena   6/26/2019 11:30 AM MD Stephanie Marks 87        Opportunity to ask questions was provided. Contact information was provided for future reference or further questions. Will continue to monitor.

## 2019-06-06 ENCOUNTER — PATIENT OUTREACH (OUTPATIENT)
Dept: CASE MANAGEMENT | Age: 74
End: 2019-06-06

## 2019-06-06 ENCOUNTER — TELEPHONE (OUTPATIENT)
Dept: INTERNAL MEDICINE CLINIC | Age: 74
End: 2019-06-06

## 2019-06-06 NOTE — TELEPHONE ENCOUNTER
Called and spoke with pt's son, Georgiana Jean ([facility]). Georgiana Jean stated pt is currently in [address] [facility],[address] for the next couple weeks. Georgiana Jean requesting and appointment with Dr. Bob Cowan to discuss pt's care. Notified Georgiana Jaen, Dr. Bob Cowan will be out of the office for next couple weeks. Per Georgiana Jean, pt does not want to see another doctor. Notified Georgiana Jean to call the office when pt has a discharge date to schedule a JUSTINA. Georgiana Jean verbalized understanding of information discussed w/ no further questions at this time.

## 2019-06-06 NOTE — PROGRESS NOTES
Community Care Team documentation for patient in [facility]  Initial Follow Up       Patient was discharged to [facility]. Information included in this progress note has been provided to SNF. Hospital Admission and Diagnosis:      PCP : Artis Camacho MD  Nurse Navigator in PCP office: Jesenia Bautista  Note routed to Nurse Navigator team.    SNF Attending:  Boston Pink MD    Spoke with SNF team. Ensured patient arrived to SNF safely with admission packet in order. Provided needed hospital follow up appointments:  Recent non-invasive spine surgery done by Dr. Jose Cote at [facility] Per DC summary, Suspect Acute on chronic pain and pt should  F/u with  Dr Jose Cote. PT and OT providing skilled therapy. Your Path meeting on 6/7/19 to discuss DC plan, HH preference, LOS 1-2 wks. Currently ambulating 200 ft CGA no device, 4 steps SBA, transfers SBA to Sup, ADLs SBA. PCP FU scheduled for 6/26/2019 11:30 AM.    [facility] Team will follow up weekly with [facility] until discharge. Medications were not reconciled and general patient assessment was not completed during this [facility] outreach.

## 2019-06-06 NOTE — TELEPHONE ENCOUNTER
#[phone] Rudy Montelongo states pt is now in [facility] and he would like to get her an appt with Dr. Ricardo Amezcua so everyone is one the same page with her health. Pt is still having pain issues and he was not happy with [facility] so he wants to see Dr. Ricardo Amezcua with pt as soon as possible. I did inform that doctor was out of the office next week. Please call Rudy Montelongo today if possible.

## 2019-06-14 ENCOUNTER — PATIENT OUTREACH (OUTPATIENT)
Dept: INTERNAL MEDICINE CLINIC | Age: 74
End: 2019-06-14

## 2019-06-19 NOTE — PROGRESS NOTES
06/19/19  Spoke to SNF team. Patient currently has d/c date of 6/26. Plan is to return home with [address] [place] [address] TBD. DME needs TBD potentially a walker. NN to f/u 6 days.  AR

## 2019-06-20 ENCOUNTER — PATIENT OUTREACH (OUTPATIENT)
Dept: CASE MANAGEMENT | Age: 74
End: 2019-06-20

## 2019-06-20 NOTE — PROGRESS NOTES
Community Care Team Documentation for Patient in [facility]  Subsequent Follow up     Patient remains at Critical access hospital ([facility]). See previous [facility] Team notes. PCP : Basilio Chan MD  Nurse Navigator in PCP office: Funmilayo Nunes with SNF team.  Plan to discharge 6/26/19. PT/OT continue. Currently ambulating 220ft supervision with no device, 9 steps, transfers mod I, ADLs supervision. Plan to discharge home alone with family support. HH TBD. Medications were not reconciled and general patient assessment was not completed during this skilled nursing facility outreach.

## 2019-06-20 NOTE — Clinical Note
Hello! Plan for Ms Tariq Torres to DC 6/26. I dont see any appointment available in the next 3 weeks. Are you able to schedule a JUSTINA any sooner? Thank you!  Ellis Mcglil

## 2019-06-26 ENCOUNTER — HOME HEALTH ADMISSION (OUTPATIENT)
Dept: HOME HEALTH SERVICES | Facility: HOME HEALTH | Age: 74
End: 2019-06-26

## 2019-06-27 ENCOUNTER — HOME HEALTH ADMISSION (OUTPATIENT)
Dept: HOME HEALTH SERVICES | Facility: HOME HEALTH | Age: 74
End: 2019-06-27

## 2019-06-27 ENCOUNTER — PATIENT OUTREACH (OUTPATIENT)
Dept: CASE MANAGEMENT | Age: 74
End: 2019-06-27

## 2019-06-27 NOTE — PROGRESS NOTES
Community Care Team Documentation for Patient in [facility]  Discharge Note    Patient has been discharged from [facility] ([facility]). See previous [facility] Team notes. PCP : Raul Harrison MD  Nurse Navigator in PCP office: Milly Grossman  Note routed to Nurse Navigator team.    Spoke with SNF team.  Confirmed patient discharged 6/27. Currently ambulating 300 mod I no device, Mod I transfers and ADLs. NN scheduled patient with Dr. Fela Xiong on 7/1/19 at [phone]. Community Care Team will sign off at this time. Medications were not reconciled and general patient assessment was not completed during this skilled nursing facility outreach.

## 2019-06-28 ENCOUNTER — PATIENT OUTREACH (OUTPATIENT)
Dept: INTERNAL MEDICINE CLINIC | Age: 74
End: 2019-06-28

## 2019-06-28 ENCOUNTER — HOME CARE VISIT (OUTPATIENT)
Dept: HOME HEALTH SERVICES | Facility: HOME HEALTH | Age: 74
End: 2019-06-28

## 2019-06-28 ENCOUNTER — HOME CARE VISIT (OUTPATIENT)
Dept: SCHEDULING | Facility: HOME HEALTH | Age: 74
End: 2019-06-28

## 2019-07-01 NOTE — PROGRESS NOTES
Hospital Discharge Follow-Up      Date/Time:  2019 1:58 PM    Patient was admitted to [facility] on 19 for treatment of hyponatremia. Patient was discharged 19 to [address] [place] [address] for continuation of care. Patient was discharged 19 from [address] [place] [address] and returned home. Top Challenges reviewed with the provider   D/c from [address] [place] [address] on    Refused home health stating she would like to have outpatient therapy  - stated she will talk to Dr. Russ Naik at [facility] on 7/15  Patient was prescribed gabapentin and & valium - she states she does not want to take those          Method of communication with provider :chart routing      Nurse Navigator (NN) contacted the patient by telephone to perform post hospital discharge assessment. Verified name and  with patient as identifiers. Provided introduction to self, and explanation of the Nurse Navigator role. Reviewed discharge instructions and red flags with patient who verbalized understanding. Patient given an opportunity to ask questions and does not have any further questions or concerns at this time. The patient agrees to contact the PCP office for questions related to their healthcare. NN provided contact information for future reference. Disease Specific:   N/A      Patient was ordered Home Health at discharge from [address] [place] [address]. On 19 intake nurse saw patient. Patient is not homebound and wishes to pursue outpatient therapy.    [facility]: [facility]  Date of initial visit: 19    Durable Medical Equipment ordered/company: none  Durable Medical Equipment received: n/a    Barriers to care? lack of knowledge about disease, utilization of services    Advance Care Planning:   Does patient have an Advance Directive:  patient declined education     Medication(s):   New Medications at Discharge: patient reports [address] [place] [address] prescribed her valium and gabapentin and she does not want to take it  Changed Medications at Discharge: n/a  Discontinued Medications at Discharge: n/a    Medication reconciliation was performed with patient, who verbalizes understanding of administration of home medications. There were no barriers to obtaining medications identified at this time. Referral to Pharm D needed: no     Current Outpatient Medications   Medication Sig    aspirin 81 mg chewable tablet Take 1 Tab by mouth daily.  bisacodyl (DULCOLAX) 10 mg suppository Insert 10 mg into rectum daily.  cyclobenzaprine (FLEXERIL) 10 mg tablet Take 1 Tab by mouth two (2) times a day.  melatonin 3 mg tablet Take 2 Tabs by mouth nightly.  raNITIdine (ZANTAC) 150 mg tablet Take 150 mg by mouth daily.  oxaprozin (DAYPRO) 600 mg tablet Take 1 Tab by mouth two (2) times a day. Indications: Joint Damage causing Pain and Loss of Function    levothyroxine (SYNTHROID) 75 mcg tablet Take 75 mcg by mouth every Monday and Friday.  denosumab (PROLIA) 60 mg/mL injection 60 mg by SubCUTAneous route every 6 months.  miSOPROStol (CYTOTEC) 200 mcg tablet Take 200 mcg by mouth two (2) times a day.  levothyroxine (SYNTHROID) 50 mcg tablet Take 50 mcg by mouth five (5) days a week. Tuesday, Wednesday, Thursday, Saturday, Sunday     acyclovir (ZOVIRAX) 400 mg tablet Take 400 mg by mouth two (2) times daily as needed.  metFORMIN (GLUCOPHAGE) 500 mg tablet Take 500 mg by mouth nightly.  MAGNESIUM PO Take 1 Tab by mouth daily.  folic acid/multivit-min/lutein (CENTRUM SILVER PO) Take 1 Tab by mouth daily.  simvastatin (ZOCOR) 40 mg tablet Take 1 Tab by mouth nightly.  cetirizine (ZYRTEC) 10 mg tablet Take 10 mg by mouth daily.  cyanocobalamin (VITAMIN B12) 100 mcg tablet Take 100 mcg by mouth daily.  diazePAM (VALIUM) 5 mg tablet Take 1 Tab by mouth every eight (8) hours as needed for Anxiety (spasm). Max Daily Amount: 15 mg.    furosemide (LASIX) 20 mg tablet Take 1 Tab by mouth daily.     gabapentin (NEURONTIN) 100 mg capsule Take 2 Caps by mouth nightly. No current facility-administered medications for this visit. There are no discontinued medications. [facility] follow up appointment(s):   Future Appointments   Date Time Provider Teddy Carrillo   7/15/2019 10:45 AM Marija Jung MD [address]    (patient had appt scheduled with Dr. Nani Mortimer for this morning (7/1) and canceled as she stated she was feeling tired, rescheduled for 7/15 per patient request)  Non-[facility] follow up appointment(s): n/a  [facility]:  offered and patient declined - information provided as resource     07/01/19  Spoke to patient today. Reviewed red flag s/s with patient, nausea, vomiting, inability to pass urine/stool, SOB, mental status change, chest pain, fever. Patient canceled appt with Dr. Nani Mortimer today as she was tired this AM - rescheduled for 7/15 at patients request. Patient states she will talk to Dr. Nani Mortimer about outpatient therapy at appointment. Patient stated she does not want to take the Valium or Gabapentin prescribed. Declined [facility] f/u - information provided as resource. Patient reports she does not check glucose levels at home and she \"is not a diabetic. \" Discussed importance of diabetic diet. Reviewed feminine hygiene such as washing hands, wiping front to back, avoiding caffeine and carbonated beverages, emptying the bladder completely and monitoring for red flag s/s such as pain when urinating, foul odor, change in color or clarity of urine. Patient reports no questions for NN at this time. NN to f/u 1 week.  AR

## 2019-07-10 ENCOUNTER — PATIENT OUTREACH (OUTPATIENT)
Dept: INTERNAL MEDICINE CLINIC | Age: 74
End: 2019-07-10

## 2019-07-11 NOTE — PROGRESS NOTES
Goals      Understands red flags post discharge. 7/10/19  Spoke to patient. Patient reports that she is not taking Gabapentin, Lyrica or Valium. She reported that she was starting to feel a burning sensation when voiding and felt like she was getting a UTI. Patient has appt scheduled with Dr. Paco Vu on 7/15. NN to speak with Justina Silva, , to see if we can get patient in for sooner appt. Also provided patient number for [facility]. Instructed patient to increase PO water intake and to avoid caffeine and carbonated beverages, reminded patient of proper feminine hygiene. Patient verbalized understanding.     07/11/19  Scheduled patient for 7/12/19 with Dr. Susan Truong patient and advised her of this appt. NN to f/u 1 week.  AR

## 2019-07-12 ENCOUNTER — OFFICE VISIT (OUTPATIENT)
Dept: INTERNAL MEDICINE CLINIC | Age: 74
End: 2019-07-12

## 2019-07-12 ENCOUNTER — HOSPITAL ENCOUNTER (OUTPATIENT)
Dept: LAB | Age: 74
Discharge: HOME | End: 2019-07-12
Payer: MEDICARE

## 2019-07-12 VITALS
HEIGHT: 64 IN | SYSTOLIC BLOOD PRESSURE: 117 MMHG | WEIGHT: 167 LBS | HEART RATE: 80 BPM | BODY MASS INDEX: 28.51 KG/M2 | DIASTOLIC BLOOD PRESSURE: 76 MMHG | OXYGEN SATURATION: 97 % | RESPIRATION RATE: 16 BRPM | TEMPERATURE: 98.2 F

## 2019-07-12 DIAGNOSIS — R00.2 PALPITATIONS: ICD-10-CM

## 2019-07-12 DIAGNOSIS — E03.8 OTHER SPECIFIED HYPOTHYROIDISM: ICD-10-CM

## 2019-07-12 DIAGNOSIS — E87.1 HYPONATREMIA: ICD-10-CM

## 2019-07-12 DIAGNOSIS — R42 DIZZY: ICD-10-CM

## 2019-07-12 DIAGNOSIS — R35.0 URINE FREQUENCY: Primary | ICD-10-CM

## 2019-07-12 LAB
BILIRUB UR QL STRIP: NEGATIVE
GLUCOSE POC: 90 MG/DL
GLUCOSE UR-MCNC: NEGATIVE MG/DL
KETONES P FAST UR STRIP-MCNC: NEGATIVE MG/DL
PH UR STRIP: 7 [PH] (ref 4.6–8)
PROT UR QL STRIP: NEGATIVE
SP GR UR STRIP: 1.01 (ref 1–1.03)
UA UROBILINOGEN AMB POC: NORMAL (ref 0.2–1)
URINALYSIS CLARITY POC: CLEAR
URINALYSIS COLOR POC: YELLOW
URINE BLOOD POC: NEGATIVE
URINE LEUKOCYTES POC: NEGATIVE
URINE NITRITES POC: NEGATIVE

## 2019-07-12 PROCEDURE — 36415 COLL VENOUS BLD VENIPUNCTURE: CPT

## 2019-07-12 PROCEDURE — 80053 COMPREHEN METABOLIC PANEL: CPT

## 2019-07-12 PROCEDURE — 84439 ASSAY OF FREE THYROXINE: CPT

## 2019-07-12 PROCEDURE — 85025 COMPLETE CBC W/AUTO DIFF WBC: CPT

## 2019-07-12 PROCEDURE — 84443 ASSAY THYROID STIM HORMONE: CPT

## 2019-07-12 NOTE — Clinical Note
Dr. Kemal Silva this patient is scheduled to see you this week  on Friday. Can you see her before Friday. Thanks,Dr. Shade Cooper

## 2019-07-12 NOTE — PROGRESS NOTES
SUBJECTIVE:   Ms. Justine Arteaga is a 74 y.o. female who is here for a hospital follow up. Pt's PCP is Dr. Justus Morley. Pt c/o urinary frequency. She denies dysuria. She reports some loose stool which she attributes to taking metformin (dx by Dr. Rachana Ambrose) for borderline elevated blood glucose. She did not take metformin this morning. She does not have a glucose monitor at home. Pt c/o feeling dizzy (unsteady on feet) and lightheaded. Pt is not established with a neurologist. She suspects her sx are related to low blood sugar or low blood pressure. BP today is 117/76. She reports she often has orthostatic hypotension. Labs from May and June showed pt was hyponatremic. Pt has been adding salt to her food. Pt has an appointment scheduled with cardiologists (Dr. Adan Mondragon) next Friday. She reports hx of palpitations (years) that often present after drinking caffeine. She denies palpitations or CP in the office today. Recent imaging was reviewed. Head CT in 5/2019 was normal. Abdominal CT in 5/2019 showed \"Mild right hydronephrosis and hydroureter (new), bladder emphysema (new), and incidental coronary artery disease. \"    At this time, she is otherwise doing well and has brought no other complaints to my attention today. For a list of the medical issues addressed today, see the assessment and plan below. 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     All: is allergic to other food; tramadol; beef containing products; cheese; codeine; other plant, animal, environmental; and pcn [penicillins]. MEDS:   Current Outpatient Medications   Medication Sig    aspirin 81 mg chewable tablet Take 1 Tab by mouth daily.  raNITIdine (ZANTAC) 150 mg tablet Take 150 mg by mouth daily.  oxaprozin (DAYPRO) 600 mg tablet Take 1 Tab by mouth two (2) times a day. Indications: Joint Damage causing Pain and Loss of Function    levothyroxine (SYNTHROID) 75 mcg tablet Take 75 mcg by mouth every Monday and Friday.  denosumab (PROLIA) 60 mg/mL injection 60 mg by SubCUTAneous route every 6 months.  miSOPROStol (CYTOTEC) 200 mcg tablet Take 200 mcg by mouth two (2) times a day.  levothyroxine (SYNTHROID) 50 mcg tablet Take 50 mcg by mouth five (5) days a week. Tuesday, Wednesday, Thursday, Saturday, Sunday     metFORMIN (GLUCOPHAGE) 500 mg tablet Take 500 mg by mouth nightly.  MAGNESIUM PO Take 1 Tab by mouth daily.  folic acid/multivit-min/lutein (CENTRUM SILVER PO) Take 1 Tab by mouth daily.  simvastatin (ZOCOR) 40 mg tablet Take 1 Tab by mouth nightly.  cetirizine (ZYRTEC) 10 mg tablet Take 10 mg by mouth daily.  cyanocobalamin (VITAMIN B12) 100 mcg tablet Take 100 mcg by mouth daily.  bisacodyl (DULCOLAX) 10 mg suppository Insert 10 mg into rectum daily.  cyclobenzaprine (FLEXERIL) 10 mg tablet Take 1 Tab by mouth two (2) times a day.  furosemide (LASIX) 20 mg tablet Take 1 Tab by mouth daily.  melatonin 3 mg tablet Take 2 Tabs by mouth nightly. No current facility-administered medications for this visit. FH: family history includes Cancer in her mother; Colon Cancer in her brother; Diabetes in her brother and brother; Heart Disease in her brother and father; No Known Problems in her sister; Other (age of onset: 55) in her sister. SH:  reports that she has never smoked. She has never used smokeless tobacco. She reports that she does not drink alcohol or use drugs.      Review of Systems - History obtained from the patient  General ROS: no fever, chills, fatigue, body aches  Psychological ROS: no change in anxiety, depression, SI/HI  Ophthalmic ROS: no blurred vision, myopia, double vision  ENT ROS: no dysphagia, otalgia, otorrhea, rhinorrhea, post nasal drip  Respiratory ROS: no cough, shortness of breath, or wheezing  Cardiovascular ROS: no chest pain or dyspnea on exertion  Gastrointestinal ROS: no abdominal pain or black or bloody stools +loose stool  Genito-Urinary ROS: no urgency, incontinence, dysuria, hematouria +frequency   Musculoskeletal ROS: no arthralagia, myalgia  Neurological ROS: no headaches, tremors, seizures +dizziness, lightheadedness,  Dermatological ROS: no rash or lesions    OBJECTIVE:   Vitals:   Visit Vitals  /76 (BP 1 Location: Left arm, BP Patient Position: Sitting)   Pulse 80   Temp 98.2 °F (36.8 °C) (Oral)   Resp 16   Ht 5' 4\" (1.626 m)   Wt 167 lb (75.8 kg)   SpO2 97%   BMI 28.67 kg/m²      Gen: Pleasant 74 y.o.  female in NAD. HEENT: PERRLA. EOMI. OP moist and pink. TMs normal. Normal auditory exam.  Neck: Supple. No LAD. No Bruit. HEART: No M/G/R. Irregular rhythm   LUNGS: CTAB No W/R. ABDOMEN: S, NT, ND, BS+. EXTREMITIES: Warm. No C/C/E.    MUSCULOSKELETAL: Normal ROM, muscle strength 5/5 all groups. NEURO: Alert and oriented x 3. Cranial nerves grossly intact. No focal sensory or motor deficits noted. Negative Romberg's. SKIN: Warm. Dry. No rashes or other lesions noted. ASSESSMENT/ PLAN: Diagnoses and all orders for this visit:    1. Urine frequency  -     AMB POC URINALYSIS DIP STICK MANUAL W/O MICRO  -     CBC WITH AUTOMATED DIFF    2. Hyponatremia  -     METABOLIC PANEL, COMPREHENSIVE    3. Dizzy  -     AMB POC GLUCOSE, QUANTITATIVE, BLOOD    4. Other specified hypothyroidism  -     T4, FREE  -     TSH 3RD GENERATION    5. Palpitations  -     AMB POC EKG ROUTINE W/ 12 LEADS, INTER & REP        ICD-10-CM ICD-9-CM    1.  Urine frequency R35.0 788.41 AMB POC URINALYSIS DIP STICK MANUAL W/O MICRO      CBC WITH AUTOMATED DIFF   2. Hyponatremia V01.2 180.8 METABOLIC PANEL, COMPREHENSIVE      CANCELED: METABOLIC PANEL, BASIC   3. Dizzy R42 780.4 AMB POC GLUCOSE, QUANTITATIVE, BLOOD   4. Other specified hypothyroidism E03.8 244.8 T4, FREE      TSH 3RD GENERATION   5. Palpitations R00.2 785.1 AMB POC EKG ROUTINE W/ 12 LEADS, INTER & REP     1. Urine frequency   POC UA was normal. I ordered a CBC. 2. Hyponatremia   Recheck CMP. Symptoms of low sodium were reviewed. 3. Dizzy  POC glucose was 90.     4. Hypothyroidism   Continue current dose of levothyroxine. Recheck T4 and TSH. 5. Palpitations   Due to irregular rhythm noted on exam today, I ordered an EKG for further assessment that showed normal sinus rhythm with rate variattion, poor R wave progression, and non-specific T wave changes. Follow-up and Dispositions            I have reviewed the patient's medications and risks/side effects/benefits were discussed. Diagnosis(-es) explained to patient and questions answered. Literature provided where appropriate.      Written by Naresh Lombardo, as dictated by Jonathan Jacob MD.

## 2019-07-12 NOTE — PATIENT INSTRUCTIONS
Office Policies    Phone calls/patient messages:            Please allow up to 24 hours for someone in the office to contact you about your call or message. Be mindful your provider may be out of the office or your message may require further review. We encourage you to use [employer] for your messages as this is a faster, more efficient way to communicate with our office                         Medication Refills:            Prescription medications require 48-72 business hours to process. We encourage you to use [employer] for your refills. For controlled medications: Please allow 72 business hours to process. Certain medications may require you to  a written prescription at our office. NO narcotic/controlled medications will be prescribed after 4pm Monday through Friday or on weekends              Form/Paperwork Completion:            Please note a $25 fee may incur for all paperwork for completed by our providers. We ask that you allow 7-10 business days. Pre-payment is due prior to picking up/faxing the completed form. You may also download your forms to [employer] to have your doctor print off.

## 2019-07-13 ENCOUNTER — TELEPHONE (OUTPATIENT)
Dept: INTERNAL MEDICINE CLINIC | Age: 74
End: 2019-07-13

## 2019-07-13 LAB
ALBUMIN SERPL-MCNC: 4.8 G/DL (ref 3.5–4.8)
ALBUMIN/GLOB SERPL: 1.9 {RATIO} (ref 1.2–2.2)
ALP SERPL-CCNC: 84 IU/L (ref 39–117)
ALT SERPL-CCNC: 18 IU/L (ref 0–32)
AST SERPL-CCNC: 22 IU/L (ref 0–40)
BASOPHILS # BLD AUTO: 0 X10E3/UL (ref 0–0.2)
BASOPHILS NFR BLD AUTO: 0 %
BILIRUB SERPL-MCNC: 0.2 MG/DL (ref 0–1.2)
BUN SERPL-MCNC: 10 MG/DL (ref 8–27)
BUN/CREAT SERPL: 14 (ref 12–28)
CALCIUM SERPL-MCNC: 9.8 MG/DL (ref 8.7–10.3)
CHLORIDE SERPL-SCNC: 101 MMOL/L (ref 96–106)
CO2 SERPL-SCNC: 23 MMOL/L (ref 20–29)
CREAT SERPL-MCNC: 0.71 MG/DL (ref 0.57–1)
EOSINOPHIL # BLD AUTO: 0.1 X10E3/UL (ref 0–0.4)
EOSINOPHIL NFR BLD AUTO: 1 %
ERYTHROCYTE [DISTWIDTH] IN BLOOD BY AUTOMATED COUNT: 13.3 % (ref 12.3–15.4)
GLOBULIN SER CALC-MCNC: 2.5 G/DL (ref 1.5–4.5)
GLUCOSE SERPL-MCNC: 109 MG/DL (ref 65–99)
HCT VFR BLD AUTO: 38.2 % (ref 34–46.6)
HGB BLD-MCNC: 12.3 G/DL (ref 11.1–15.9)
IMM GRANULOCYTES # BLD AUTO: 0 X10E3/UL (ref 0–0.1)
IMM GRANULOCYTES NFR BLD AUTO: 0 %
LYMPHOCYTES # BLD AUTO: 4.2 X10E3/UL (ref 0.7–3.1)
LYMPHOCYTES NFR BLD AUTO: 46 %
MCH RBC QN AUTO: 31.1 PG (ref 26.6–33)
MCHC RBC AUTO-ENTMCNC: 32.2 G/DL (ref 31.5–35.7)
MCV RBC AUTO: 97 FL (ref 79–97)
MONOCYTES # BLD AUTO: 0.7 X10E3/UL (ref 0.1–0.9)
MONOCYTES NFR BLD AUTO: 8 %
NEUTROPHILS # BLD AUTO: 4 X10E3/UL (ref 1.4–7)
NEUTROPHILS NFR BLD AUTO: 45 %
PLATELET # BLD AUTO: 449 X10E3/UL (ref 150–450)
POTASSIUM SERPL-SCNC: 5.1 MMOL/L (ref 3.5–5.2)
PROT SERPL-MCNC: 7.3 G/DL (ref 6–8.5)
RBC # BLD AUTO: 3.96 X10E6/UL (ref 3.77–5.28)
SODIUM SERPL-SCNC: 140 MMOL/L (ref 134–144)
T4 FREE SERPL-MCNC: 1.38 NG/DL (ref 0.82–1.77)
TSH SERPL DL<=0.005 MIU/L-ACNC: 2.34 UIU/ML (ref 0.45–4.5)
WBC # BLD AUTO: 9 X10E3/UL (ref 3.4–10.8)

## 2019-07-13 NOTE — TELEPHONE ENCOUNTER
On call message:  Patient called to get her lab results. I informed the patient that her sodium was in the normal range. She reported continued dizziness and palpitations overnight. I advised her to go to the ER for further evaluation which should include a chest xray. She is scheduled to see her cardiologist Dr. Jany Clarke [phone] on 7/19/19.

## 2019-07-15 ENCOUNTER — PATIENT OUTREACH (OUTPATIENT)
Dept: INTERNAL MEDICINE CLINIC | Age: 74
End: 2019-07-15

## 2019-07-15 NOTE — PROGRESS NOTES
Patient has graduated from the Transitions of Care Coordination  program on 07/15/19. Patient's symptoms are stable at this time. Patient/family has the ability to self-manage. Care management goals have been completed at this time. No further nurse navigator follow up scheduled. Goals Addressed                 This Visit's Progress     COMPLETED: Understands red flags post discharge. 7/10/19  Spoke to patient. Patient reports that she is not taking Gabapentin, Lyrica or Valium. She reported that she was starting to feel a burning sensation when voiding and felt like she was getting a UTI. Patient has appt scheduled with Dr. Izaiah Jim on 7/15. NN to speak with Issac Tinoco, , to see if we can get patient in for sooner appt. Also provided patient number for Nomi. Instructed patient to increase PO water intake and to avoid caffeine and carbonated beverages, reminded patient of proper feminine hygiene. Patient verbalized understanding.     07/11/19  Scheduled patient for 7/12/19 with Dr. Soto Gallardo patient and advised her of this appt. NN to f/u 1 week. AR    07/15/19  Spoke to patient. Patient reports that her questions were answered from her visit on Friday. Patient attended f/u with Dr. Jeronimo Davis on 7/12. Patient reports she is feeling better today. Patient reports no further question/concerns for NN at this time. AR            Pt has nurse navigator's contact information for any further questions, concerns, or needs. Patients upcoming visits:  No future appointments.

## 2019-07-22 NOTE — PROGRESS NOTES
CBC-Normal RBC with a slight elevation in absolute lymphocytes ( may be elevated with a viral illness).  Please schedule her f/u with her PCP Dr. Justus Morley.  CMP-Normal electrolyte levels except for an elevation in the glucose level, normal renal and liver function  Thyroid function -normal

## 2019-07-25 ENCOUNTER — TELEPHONE (OUTPATIENT)
Dept: INTERNAL MEDICINE CLINIC | Age: 74
End: 2019-07-25

## 2019-07-25 NOTE — TELEPHONE ENCOUNTER
----- Message from Zoë Nayak sent at 7/24/2019  4:58 PM EDT -----  Regarding: Dr Elma Fernandez  Patient return call    Caller's first and last name and relationship (if not the patient):      Best contact number(s):[phone]      Whose call is being returned: Pt is not quite sure of the name of the caller      Details to clarify the request:Pt is returning call not sure what it is regarding       Zoë Nayak

## 2019-07-25 NOTE — TELEPHONE ENCOUNTER
----- Message from Zhane Sanchez sent at 7/24/2019  4:58 PM EDT -----  Regarding: Dr Naheed Rodriguez  Patient return call    Caller's first and last name and relationship (if not the patient):      Best contact number(s):[phone]      Whose call is being returned: Pt is not quite sure of the name of the caller      Details to clarify the request:Pt is returning call not sure what it is regarding       Zhane Sanchez

## 2019-08-09 ENCOUNTER — HOSPITAL ENCOUNTER (OUTPATIENT)
Dept: NUCLEAR MEDICINE | Age: 74
Discharge: HOME | End: 2019-08-09
Attending: UROLOGY
Payer: MEDICARE

## 2019-08-09 DIAGNOSIS — N13.30 HYDRONEPHROSIS, RIGHT: ICD-10-CM

## 2019-08-09 PROCEDURE — 74011250636 HC RX REV CODE- 250/636: Performed by: UROLOGY

## 2019-08-09 PROCEDURE — 78708 K FLOW/FUNCT IMAGE W/DRUG: CPT

## 2019-08-09 RX ORDER — FUROSEMIDE 10 MG/ML
40 INJECTION INTRAMUSCULAR; INTRAVENOUS ONCE
Status: COMPLETED | OUTPATIENT
Start: 2019-08-09 | End: 2019-08-09

## 2019-08-09 RX ADMIN — FUROSEMIDE 20 MG: 10 INJECTION, SOLUTION INTRAMUSCULAR; INTRAVENOUS at 10:33

## 2019-09-23 RX ORDER — SIMVASTATIN 40 MG/1
TABLET, FILM COATED ORAL
Qty: 90 TAB | Refills: 3 | Status: SHIPPED | OUTPATIENT
Start: 2019-09-23 | End: 2020-11-24

## 2019-09-23 RX ORDER — LEVOTHYROXINE SODIUM 50 UG/1
TABLET ORAL
Qty: 90 TAB | Refills: 1 | Status: SHIPPED | OUTPATIENT
Start: 2019-09-23 | End: 2020-02-05

## 2020-01-14 PROBLEM — E78.2 MIXED HYPERLIPIDEMIA: Status: ACTIVE | Noted: 2020-01-14

## 2020-01-14 NOTE — PROGRESS NOTES
HISTORY OF PRESENT ILLNESS  Aman Li is a 66 y.o. female. HPI     Here for preop left TKR surgery  Had normal NST 8/2019 for syncope and palpitations  Hx hld, tia/amaurosis, hypothyroid, osteopenia, hx breast cancer, left knee OA-severe  Wearing heart monitor x 2 weeks for palpitations, in mornings last weeks for minutes  No f/c infections  PAT on 2/4/20  Able to slowly walk up hills and 1 flight of stairs    Will be seeing Dr Yesenia Hermosillo for preop too  Colonoscopy scheduled next month in about 2-3 weeks       Last OV  Here to establish care  Former PCP Dr Jonathan Watters in [place]  Moved to [facility]  Hx palpitations, dyslipidemia ( Dr Yesenia Hermosillo., osteopenia( Dr. Urbina Drain  sees gyn MD yearly  Not afib per pt  Sees Dr Pina-cardiologist  In ED a few mos ago for palpitations        Has T 10 compression fx  Getting PT for tilted pelvis  Has severe left knee OA--Dr Amanda Samuel for skin  There are no active problems to display for this patient.     Patient Active Problem List    Diagnosis Date Noted    Overweight (BMI 25.0-29.9) 11/14/2018    Other specified hypothyroidism 05/16/2018    Gastroesophageal reflux disease without esophagitis 05/16/2018    History of CVA (cerebrovascular accident) 05/16/2018    Osteopenia of multiple sites 05/16/2018    HX: breast cancer 05/16/2018    Closed fracture of left lower extremity with routine healing 05/16/2018    Endometriosis 05/16/2018     Current Outpatient Medications   Medication Sig Dispense Refill    simvastatin (ZOCOR) 40 mg tablet TAKE 1 TABLET BY MOUTH NIGHTLY 90 Tab 3    levothyroxine (SYNTHROID) 50 mcg tablet TAKE 1 TABLET BY MOUTH ONCE DAILY 90 Tab 1    aspirin 81 mg chewable tablet Take 1 Tab by mouth daily. 30 Tab 1    bisacodyl (DULCOLAX) 10 mg suppository Insert 10 mg into rectum daily. 28 Each 0    cyclobenzaprine (FLEXERIL) 10 mg tablet Take 1 Tab by mouth two (2) times a day.  30 Tab 0    furosemide (LASIX) 20 mg tablet Take 1 Tab by mouth daily. 30 Tab 0    melatonin 3 mg tablet Take 2 Tabs by mouth nightly. 20 Tab 0    raNITIdine (ZANTAC) 150 mg tablet Take 150 mg by mouth daily.  oxaprozin (DAYPRO) 600 mg tablet Take 1 Tab by mouth two (2) times a day. Indications: Joint Damage causing Pain and Loss of Function 180 Tab 3    levothyroxine (SYNTHROID) 75 mcg tablet Take 75 mcg by mouth every Monday and Friday.  denosumab (PROLIA) 60 mg/mL injection 60 mg by SubCUTAneous route every 6 months.  miSOPROStol (CYTOTEC) 200 mcg tablet Take 200 mcg by mouth two (2) times a day.  metFORMIN (GLUCOPHAGE) 500 mg tablet Take 500 mg by mouth nightly.  MAGNESIUM PO Take 1 Tab by mouth daily.  folic acid/multivit-min/lutein (CENTRUM SILVER PO) Take 1 Tab by mouth daily.  cetirizine (ZYRTEC) 10 mg tablet Take 10 mg by mouth daily.  cyanocobalamin (VITAMIN B12) 100 mcg tablet Take 100 mcg by mouth daily.        Allergies   Allergen Reactions    Other Food Anaphylaxis     Steak, cheese, grass, smoke    Tramadol Other (comments)     Hyponatremia, seizure    Beef Containing Products Other (comments)     Itching and can cause mouth to swell     Cheese Itching and Swelling    Codeine Rash and Swelling     Mouth    Other Plant, Animal, Environmental Hives     Rubber    Pcn [Penicillins] Rash and Swelling     Mouth      Lab Results   Component Value Date/Time    Hemoglobin A1c 5.9 05/09/2019 02:00 PM    Glucose 109 (H) 07/12/2019 12:24 PM    Glucose (POC) 80 06/04/2019 01:12 PM    Glucose POC 90 07/12/2019 11:40 AM    LDL, calculated 121.4 (H) 07/10/2009 08:37 AM    Creatinine 0.71 07/12/2019 12:24 PM      Lab Results   Component Value Date/Time    Cholesterol, total 209 (H) 07/10/2009 08:37 AM    HDL Cholesterol 70 (H) 07/10/2009 08:37 AM    LDL, calculated 121.4 (H) 07/10/2009 08:37 AM    Triglyceride 88 07/10/2009 08:37 AM    CHOL/HDL Ratio 3.0 07/10/2009 08:37 AM     Lab Results   Component Value Date/Time    GFR est non-AA 84 07/12/2019 12:24 PM    GFR est AA 96 07/12/2019 12:24 PM    Creatinine 0.71 07/12/2019 12:24 PM    BUN 10 07/12/2019 12:24 PM    Sodium 140 07/12/2019 12:24 PM    Potassium 5.1 07/12/2019 12:24 PM    Chloride 101 07/12/2019 12:24 PM    CO2 23 07/12/2019 12:24 PM    Magnesium 2.5 (H) 05/25/2019 01:10 AM    Phosphorus 2.4 (L) 05/26/2019 04:15 AM        Review of Systems   Constitutional: Negative for chills, fever, malaise/fatigue and weight loss. Eyes: Negative for blurred vision and double vision. Respiratory: Negative for cough and shortness of breath. Cardiovascular: Negative for chest pain and palpitations. Gastrointestinal: Negative for abdominal pain, blood in stool, constipation, diarrhea, melena, nausea and vomiting. Genitourinary: Negative for dysuria, frequency, hematuria and urgency. Musculoskeletal: Positive for joint pain. Negative for back pain, falls and myalgias. Neurological: Negative for dizziness, tremors and headaches. Physical Exam  Vitals signs and nursing note reviewed. Constitutional:       Appearance: She is well-developed. Comments: Appears stated age   HENT:      Head: Normocephalic. Right Ear: Tympanic membrane normal.      Left Ear: Tympanic membrane normal.      Nose: Nose normal.      Mouth/Throat:      Mouth: Mucous membranes are moist.      Pharynx: Oropharynx is clear. Eyes:      Pupils: Pupils are equal, round, and reactive to light. Neck:      Musculoskeletal: Normal range of motion. Cardiovascular:      Rate and Rhythm: Normal rate and regular rhythm. Pulses: Normal pulses. Heart sounds: Normal heart sounds. No murmur. No friction rub. No gallop. Pulmonary:      Effort: Pulmonary effort is normal. No respiratory distress. Breath sounds: Normal breath sounds. No wheezing. Abdominal:      General: Bowel sounds are normal.      Palpations: Abdomen is soft. Skin:     General: Skin is warm.    Neurological: General: No focal deficit present. Mental Status: She is alert. Psychiatric:         Mood and Affect: Mood normal.         Behavior: Behavior normal.         Thought Content: Thought content normal.         Judgment: Judgment normal.         ASSESSMENT and PLAN  Diagnoses and all orders for this visit:    1. Palpitations  -     METABOLIC PANEL, BASIC  -     CBC W/O DIFF  -     URINALYSIS W/ RFLX MICROSCOPIC   F/u Dr Idania Mills prior to surgery and for preop clearance  2. Preop examination   accpeptable candidate and risk for left TKR pending preop labs and tests     3. Arthritis of left knee   Surgery as scheduled  4. Hx hyponatremia   F/u BMP  5. Preventive   PCV 13 today    Follow-up and Dispositions    · Return in about 6 months (around 7/15/2020) for ALCIDES tam.

## 2020-01-15 ENCOUNTER — OFFICE VISIT (OUTPATIENT)
Dept: INTERNAL MEDICINE CLINIC | Age: 75
End: 2020-01-15

## 2020-01-15 VITALS
WEIGHT: 177.2 LBS | OXYGEN SATURATION: 100 % | TEMPERATURE: 97.9 F | RESPIRATION RATE: 16 BRPM | SYSTOLIC BLOOD PRESSURE: 137 MMHG | HEIGHT: 64 IN | BODY MASS INDEX: 30.25 KG/M2 | HEART RATE: 73 BPM | DIASTOLIC BLOOD PRESSURE: 79 MMHG

## 2020-01-15 DIAGNOSIS — Z01.818 PREOP EXAMINATION: ICD-10-CM

## 2020-01-15 DIAGNOSIS — R00.2 PALPITATIONS: Primary | ICD-10-CM

## 2020-01-15 DIAGNOSIS — M17.12 ARTHRITIS OF LEFT KNEE: ICD-10-CM

## 2020-01-15 DIAGNOSIS — Z23 ENCOUNTER FOR IMMUNIZATION: ICD-10-CM

## 2020-01-15 RX ORDER — PREGABALIN 25 MG/1
CAPSULE ORAL
COMMUNITY
Start: 2019-10-25 | End: 2020-01-28

## 2020-01-15 NOTE — PATIENT INSTRUCTIONS
Office Policies Phone calls/patient messages: Please allow up to 24 hours for someone in the office to contact you about your call or message. Be mindful your provider may be out of the office or your message may require further review. We encourage you to use [employer] for your messages as this is a faster, more efficient way to communicate with our office Medication Refills: 
         
Prescription medications require 48-72 business hours to process. We encourage you to use [employer] for your refills. For controlled medications: Please allow 72 business hours to process. Certain medications may require you to  a written prescription at our office. NO narcotic/controlled medications will be prescribed after 4pm Monday through Friday or on weekends Form/Paperwork Completion: 
         
Please note a $25 fee may incur for all paperwork for completed by our providers. We ask that you allow 7-10 business days. Pre-payment is due prior to picking up/faxing the completed form. You may also download your forms to [employer] to have your doctor print off.

## 2020-01-16 LAB
APPEARANCE UR: CLEAR
BACTERIA #/AREA URNS HPF: NORMAL /[HPF]
BILIRUB UR QL STRIP: NEGATIVE
BUN SERPL-MCNC: 11 MG/DL (ref 8–27)
BUN/CREAT SERPL: 15 (ref 12–28)
CALCIUM SERPL-MCNC: 9.6 MG/DL (ref 8.7–10.3)
CASTS URNS QL MICRO: NORMAL /LPF
CHLORIDE SERPL-SCNC: 103 MMOL/L (ref 96–106)
CO2 SERPL-SCNC: 21 MMOL/L (ref 20–29)
COLOR UR: YELLOW
CREAT SERPL-MCNC: 0.74 MG/DL (ref 0.57–1)
EPI CELLS #/AREA URNS HPF: NORMAL /HPF (ref 0–10)
ERYTHROCYTE [DISTWIDTH] IN BLOOD BY AUTOMATED COUNT: 12.9 % (ref 11.7–15.4)
GLUCOSE SERPL-MCNC: 91 MG/DL (ref 65–99)
GLUCOSE UR QL: NEGATIVE
HCT VFR BLD AUTO: 37.9 % (ref 34–46.6)
HGB BLD-MCNC: 12.5 G/DL (ref 11.1–15.9)
HGB UR QL STRIP: NEGATIVE
KETONES UR QL STRIP: NEGATIVE
LEUKOCYTE ESTERASE UR QL STRIP: ABNORMAL
MCH RBC QN AUTO: 32.1 PG (ref 26.6–33)
MCHC RBC AUTO-ENTMCNC: 33 G/DL (ref 31.5–35.7)
MCV RBC AUTO: 97 FL (ref 79–97)
MICRO URNS: ABNORMAL
MUCOUS THREADS URNS QL MICRO: PRESENT
NITRITE UR QL STRIP: NEGATIVE
PH UR STRIP: 7.5 [PH] (ref 5–7.5)
PLATELET # BLD AUTO: 418 X10E3/UL (ref 150–450)
POTASSIUM SERPL-SCNC: 5 MMOL/L (ref 3.5–5.2)
PROT UR QL STRIP: NEGATIVE
RBC # BLD AUTO: 3.89 X10E6/UL (ref 3.77–5.28)
RBC #/AREA URNS HPF: NORMAL /HPF (ref 0–2)
SODIUM SERPL-SCNC: 150 MMOL/L (ref 134–144)
SP GR UR: 1.01 (ref 1–1.03)
UROBILINOGEN UR STRIP-MCNC: 0.2 MG/DL (ref 0.2–1)
WBC # BLD AUTO: 6.3 X10E3/UL (ref 3.4–10.8)
WBC #/AREA URNS HPF: NORMAL /HPF (ref 0–5)

## 2020-01-16 NOTE — PROGRESS NOTES
Tell pt normal glucose kidney cbc and UA  Her sodium level is slightly high ( hyponatremia in the past).  She should hydrate with h20 extra 1-2 glasses per day and get repeat preop labs with ortho MD .

## 2020-01-23 ENCOUNTER — TELEPHONE (OUTPATIENT)
Dept: INTERNAL MEDICINE CLINIC | Age: 75
End: 2020-01-23

## 2020-01-23 NOTE — TELEPHONE ENCOUNTER
Pt states Dr. Sugey Pelayo is waiting on the pre op to be faxed to them. Their fax #[phone]  Can this be done today please. Thanks.

## 2020-01-28 ENCOUNTER — TELEPHONE (OUTPATIENT)
Dept: INTERNAL MEDICINE CLINIC | Age: 75
End: 2020-01-28

## 2020-01-28 NOTE — TELEPHONE ENCOUNTER
----- Message from [employer] sent at 1/28/2020  3:26 PM EST -----  Regarding: /Telephone  General Message/Vendor Calls    Caller's first and last name:      Reason for call:  Discuss sodium with     Callback required yes/no and why:  Yes, to see if she can still have her colonoscopy.     Best contact number(s):  [phone]    Details to clarify the request:      Sima Colmenares      Copy/paste envera

## 2020-01-29 DIAGNOSIS — E87.0 HYPERNATREMIA: Primary | ICD-10-CM

## 2020-01-29 NOTE — TELEPHONE ENCOUNTER
Called, spoke to pt. Two identifiers confirmed. Pt stated she is scheduled to have a colonoscopy on the 3rd and ortho surgery on the 13th. Pt's most recent labs showed an elevated sodium count. Pt will have labs repeated prior to ortho surgery but pt would like to know if she is ok to have colonoscopy. Per pt, GI doctor referred pt back to Dr. Celina Membreno for his recommendations. Notified pt I would send message to Dr. Celina Membreno. Pt verbalized understanding of information discussed w/ no further questions at this time.

## 2020-01-29 NOTE — TELEPHONE ENCOUNTER
Bob Luevano MD  You 10 minutes ago (11:36 AM)     Tell pt to hyrate and get repeat this week at our office, if soidum is lower then should be ok    Routing comment      Pt notified.

## 2020-01-30 ENCOUNTER — TELEPHONE (OUTPATIENT)
Dept: INTERNAL MEDICINE CLINIC | Age: 75
End: 2020-01-30

## 2020-01-30 NOTE — TELEPHONE ENCOUNTER
Patient states she's calling to advise that she will not be in today to get labs done for her colonoscopy as she has cancelled that procedure at this time. Patient reports knee surgery this upcoming Monday. Please call if any questions.  Thank you

## 2020-02-11 ENCOUNTER — TELEPHONE (OUTPATIENT)
Dept: INTERNAL MEDICINE CLINIC | Age: 75
End: 2020-02-11

## 2020-02-11 NOTE — TELEPHONE ENCOUNTER
#[phone] Che[facility] with [facility] most recent o/n faxed to them for pt.     Fax #[phone]   Attn: Avis Vieira

## 2020-03-18 NOTE — TELEPHONE ENCOUNTER
----- Message from Emilee Portillo sent at 3/18/2020  4:44 PM EDT -----  Regarding: Dr. Patrica Chavis (if not patient):      Relationship of caller (if not patient):      Best contact number(s): [phone]       Name of medication and dosage if known:  Oxaprozin      Is patient out of this medication (yes/no): yes       Pharmacy name:[facility]     Pharmacy listed in chart? (yes/no):yes   Pharmacy phone number:      Details to clarify the request:      Delaney Carvalho

## 2020-03-19 RX ORDER — OXAPROZIN 600 MG/1
600 TABLET, FILM COATED ORAL 2 TIMES DAILY
Qty: 180 TAB | Refills: 3 | Status: SHIPPED | OUTPATIENT
Start: 2020-03-19 | End: 2020-03-23

## 2020-03-19 NOTE — TELEPHONE ENCOUNTER
Patient states she needs [facility] [address] [facility] [place] indicated Asap so [facility] can  for her. Please call to advise when done.  Thank you

## 2020-03-23 ENCOUNTER — TELEPHONE (OUTPATIENT)
Dept: INTERNAL MEDICINE CLINIC | Age: 75
End: 2020-03-23

## 2020-03-23 RX ORDER — DICLOFENAC SODIUM 50 MG/1
50 TABLET, DELAYED RELEASE ORAL
Qty: 180 TAB | Refills: 0 | Status: SHIPPED | OUTPATIENT
Start: 2020-03-23 | End: 2020-08-12

## 2020-03-23 NOTE — TELEPHONE ENCOUNTER
Kemar Sevilla [facility] [employer] Office [place]   Phone Number: [phone]             Caller (if not patient): Pt   Relationship of caller (if not patient): n/a   Best contact number(s): Quin Josesito Deeon [phone]   Name of medication and dosage if known:\" Kristie Dandy is too expensive and she would like to get a generic medication.    Is patient out of this medication (yes/no): y   Pharmacy name: [facility] listed in chart? (yes/no): y   Pharmacy phone number: n/a   Date of last visit: 1/15/20   Details to clarify the request: n/a     Copy/Paste  Sowmya Stoll

## 2020-03-24 NOTE — TELEPHONE ENCOUNTER
Clive Hernandez MD  You 16 hours ago (4:30 PM)     I seint in diclofenac, please ask pt to use nsauidsonly bid if needed d/t Gi ulcer risk and can be hard on the kidneys. Routing comment      Pt notified.

## 2020-06-30 DIAGNOSIS — J32.9 SINUSITIS, UNSPECIFIED CHRONICITY, UNSPECIFIED LOCATION: Primary | ICD-10-CM

## 2020-06-30 RX ORDER — AZITHROMYCIN 250 MG/1
250 TABLET, FILM COATED ORAL SEE ADMIN INSTRUCTIONS
Qty: 6 TAB | Refills: 0 | Status: SHIPPED | OUTPATIENT
Start: 2020-06-30 | End: 2020-07-05

## 2020-06-30 RX ORDER — POLYMYXIN B SULFATE AND TRIMETHOPRIM 1; 10000 MG/ML; [USP'U]/ML
1 SOLUTION OPHTHALMIC EVERY 4 HOURS
Qty: 10 ML | Refills: 0 | Status: SHIPPED | OUTPATIENT
Start: 2020-06-30 | End: 2020-07-11

## 2020-06-30 RX ORDER — AZITHROMYCIN 250 MG/1
250 TABLET, FILM COATED ORAL SEE ADMIN INSTRUCTIONS
Qty: 6 TAB | Refills: 0 | Status: SHIPPED | OUTPATIENT
Start: 2020-06-30 | End: 2020-06-30 | Stop reason: CLARIF

## 2020-06-30 RX ORDER — POLYMYXIN B SULFATE AND TRIMETHOPRIM 1; 10000 MG/ML; [USP'U]/ML
1 SOLUTION OPHTHALMIC EVERY 6 HOURS
Qty: 10 ML | Refills: 0 | Status: SHIPPED | OUTPATIENT
Start: 2020-06-30 | End: 2020-06-30 | Stop reason: CLARIF

## 2020-07-06 ENCOUNTER — TELEPHONE (OUTPATIENT)
Dept: INTERNAL MEDICINE CLINIC | Age: 75
End: 2020-07-06

## 2020-07-06 NOTE — TELEPHONE ENCOUNTER
----- Message from [employer] sent at 7/6/2020  2:57 PM EDT -----  Regarding: Darrick Hahn MD  Appointment not available    Caller's first and last name and relationship to patient (if not the patient):      Best contact number: [phone]      Preferred date and time: mid-morning/mid-afternoon      Scheduled appointment date and time: N/A      Reason for appointment: possible \"inner-ear infection\"      Details to clarify the request: pt prefers to schedule in-office       [employer]

## 2020-07-07 NOTE — TELEPHONE ENCOUNTER
Clark Smith MD  You Just now (1:05 PM)     2 pm Monday next week please    Routing comment      Appointment scheduled for 7/13 @ 2 with Dr. Moran Friend. Pt verbalized understanding of information discussed w/ no further questions at this time.

## 2020-07-07 NOTE — TELEPHONE ENCOUNTER
Called, spoke to pt. Two identifiers confirmed. Pt c/o left ear pain and dizziness. Pt stated the pain is significantly worse when laying on her left side. Recommended pt see an ENT md or uc. Pt declined stating she would rather wait to see Dr. Brenda Alberto in the office. Notified pt a message will be sent to Dr. Brenda Alberto. Pt verbalized understanding of information discussed w/ no further questions at this time.

## 2020-07-11 RX ORDER — POLYMYXIN B SULFATE AND TRIMETHOPRIM 1; 10000 MG/ML; [USP'U]/ML
SOLUTION OPHTHALMIC
Qty: 10 ML | Refills: 0 | Status: SHIPPED | OUTPATIENT
Start: 2020-07-11 | End: 2021-01-14 | Stop reason: ALTCHOICE

## 2020-07-12 NOTE — PROGRESS NOTES
HISTORY OF PRESENT ILLNESS  Mike Darling is a 66 y.o. female. HPI   C/o left ear pain and dizziness, worse when laying on left side x 3 weeks  left neck below is the ear  No hearing loss change  She reports increased dizziness usually when standing  Also c/o some chest heaviness that occurs at rest and some associated left arm tingling sensation for a few weeks  No neck pain  Had neg NST last year when evaluated for palpitations and syncope  Had left TKR since last OV  She reports some increased belching            Last OV     Here for preop left TKR surgery  Had normal NST 8/2019 for syncope and palpitations  Hx hld, tia/amaurosis, hypothyroid, osteopenia, hx breast cancer, left knee OA-severe  Wearing heart monitor x 2 weeks for palpitations, in mornings last weeks for minutes  No f/c infections  PAT on 2/4/20  Able to slowly walk up hills and 1 flight of stairs     Will be seeing Dr Aliza Quick for preop too  Colonoscopy scheduled next month in about 2-3 weeks    Patient Active Problem List    Diagnosis Date Noted    Mixed hyperlipidemia 01/14/2020    Overweight (BMI 25.0-29.9) 11/14/2018    Other specified hypothyroidism 05/16/2018    Gastroesophageal reflux disease without esophagitis 05/16/2018    History of CVA (cerebrovascular accident) 05/16/2018    Osteopenia of multiple sites 05/16/2018    HX: breast cancer 05/16/2018    Closed fracture of left lower extremity with routine healing 05/16/2018    Endometriosis 05/16/2018     Current Outpatient Medications   Medication Sig Dispense Refill    trimethoprim-polymyxin b (POLYTRIM) ophthalmic solution INSTILL 1 DROP INTO EACH EYE EVERY 6 HOURS FOR 7 DAYS 10 mL 0    levothyroxine (SYNTHROID) 50 mcg tablet TAKE 1 TABLET BY MOUTH ONCE DAILY 90 Tab 3    senna-docusate (PERICOLACE) 8.6-50 mg per tablet Take 1 Tab by mouth daily.  acetaminophen/diphenhydramine (TYLENOL PM PO) Take  by mouth.       pregabalin (LYRICA) 25 mg capsule Take 25 mg by mouth two (2) times a day.  simvastatin (ZOCOR) 40 mg tablet TAKE 1 TABLET BY MOUTH NIGHTLY 90 Tab 3    aspirin 81 mg chewable tablet Take 1 Tab by mouth daily. 30 Tab 1    bisacodyl (DULCOLAX) 10 mg suppository Insert 10 mg into rectum daily. 28 Each 0    levothyroxine (SYNTHROID) 75 mcg tablet Take 75 mcg by mouth every Monday and Friday.  denosumab (PROLIA) 60 mg/mL injection 60 mg by SubCUTAneous route every 6 months.  miSOPROStol (CYTOTEC) 200 mcg tablet Take 200 mcg by mouth daily.  metFORMIN (GLUCOPHAGE) 500 mg tablet Take 500 mg by mouth daily (with breakfast).  MAGNESIUM PO Take 1 Tab by mouth daily.  folic acid/multivit-min/lutein (CENTRUM SILVER PO) Take 1 Tab by mouth daily.  cetirizine (ZYRTEC) 10 mg tablet Take 10 mg by mouth daily.  cyanocobalamin (VITAMIN B12) 100 mcg tablet Take 100 mcg by mouth daily.  diclofenac EC (VOLTAREN) 50 mg EC tablet Take 1 Tab by mouth two (2) times daily as needed for Pain. 180 Tab 0     Allergies   Allergen Reactions    Other Food Anaphylaxis     Steak, cheese, grass, smoke    Tramadol Other (comments)     Hyponatremia, seizure    Beef Containing Products Other (comments)     Itching and can cause mouth to swell     Cheese Itching and Swelling    Gabapentin Other (comments)     As of 1/28/2020, pt requests not to have this. It \"completely wipes me out. \"    Codeine Rash and Swelling     Mouth    Other Plant, Animal, Environmental Hives     Rubber    Pcn [Penicillins] Rash and Swelling     Mouth      Lab Results   Component Value Date/Time    WBC 6.3 01/15/2020 10:59 AM    HGB 12.5 01/15/2020 10:59 AM    HCT 37.9 01/15/2020 10:59 AM    PLATELET 305 02/14/5001 10:59 AM    MCV 97 01/15/2020 10:59 AM     Lab Results   Component Value Date/Time    Hemoglobin A1c 5.9 05/09/2019 02:00 PM    Glucose 91 01/15/2020 10:59 AM    Glucose (POC) 80 06/04/2019 01:12 PM    Glucose POC 90 07/12/2019 11:40 AM    LDL, calculated 121.4 (H) 07/10/2009 08:37 AM    Creatinine 0.74 01/15/2020 10:59 AM      Lab Results   Component Value Date/Time    Cholesterol, total 209 (H) 07/10/2009 08:37 AM    HDL Cholesterol 70 (H) 07/10/2009 08:37 AM    LDL, calculated 121.4 (H) 07/10/2009 08:37 AM    Triglyceride 88 07/10/2009 08:37 AM    CHOL/HDL Ratio 3.0 07/10/2009 08:37 AM     Lab Results   Component Value Date/Time    GFR est non-AA 79 01/15/2020 10:59 AM    GFR est AA 91 01/15/2020 10:59 AM    Creatinine 0.74 01/15/2020 10:59 AM    BUN 11 01/15/2020 10:59 AM    Sodium 150 (H) 01/15/2020 10:59 AM    Potassium 5.0 01/15/2020 10:59 AM    Chloride 103 01/15/2020 10:59 AM    CO2 21 01/15/2020 10:59 AM    Magnesium 2.5 (H) 05/25/2019 01:10 AM    Phosphorus 2.4 (L) 05/26/2019 04:15 AM        ROS    Physical Exam  Constitutional:       Appearance: Normal appearance. HENT:      Ears:      Comments: Left TM LR intact but unable to fully visualize TM d/t wax  Cardiovascular:      Rate and Rhythm: Normal rate and regular rhythm. Pulmonary:      Effort: Pulmonary effort is normal.   Neurological:      Mental Status: She is alert. ASSESSMENT and PLAN  Diagnoses and all orders for this visit:    1. Left ear pain   Does not appear to have OM    otc nsaids   Heat cruzito for left neck pain  2. Chest pain, unspecified type  -     AMB POC EKG ROUTINE W/ 12 LEADS, INTER & REP  -     REFERRAL TO CARDIOLOGY   Atypical try otc PPI but f/u cardiologist    3. Dizziness   Orthostatic symptomr   Hydrate   F/u labs ordered by endocrine MD  4.  Left arm pain  -     AMB POC EKG ROUTINE W/ 12 LEADS, INTER & REP  -     XR SPINE CERV 4 OR 5 V; Future-? radicular

## 2020-07-13 ENCOUNTER — OFFICE VISIT (OUTPATIENT)
Dept: INTERNAL MEDICINE CLINIC | Age: 75
End: 2020-07-13

## 2020-07-13 VITALS
HEIGHT: 64 IN | DIASTOLIC BLOOD PRESSURE: 74 MMHG | TEMPERATURE: 96.4 F | SYSTOLIC BLOOD PRESSURE: 116 MMHG | RESPIRATION RATE: 16 BRPM | WEIGHT: 176 LBS | OXYGEN SATURATION: 96 % | BODY MASS INDEX: 30.05 KG/M2 | HEART RATE: 78 BPM

## 2020-07-13 DIAGNOSIS — R07.9 CHEST PAIN, UNSPECIFIED TYPE: ICD-10-CM

## 2020-07-13 DIAGNOSIS — M79.602 LEFT ARM PAIN: ICD-10-CM

## 2020-07-13 DIAGNOSIS — R42 DIZZINESS: ICD-10-CM

## 2020-07-13 DIAGNOSIS — H92.02 LEFT EAR PAIN: Primary | ICD-10-CM

## 2020-07-13 NOTE — PATIENT INSTRUCTIONS
Office Policies Phone calls/patient messages: Please allow up to 24 hours for someone in the office to contact you about your call or message. Be mindful your provider may be out of the office or your message may require further review. We encourage you to use [employer] for your messages as this is a faster, more efficient way to communicate with our office Medication Refills: 
         
Prescription medications require 48-72 business hours to process. We encourage you to use [employer] for your refills. For controlled medications: Please allow 72 business hours to process. Certain medications may require you to  a written prescription at our office. NO narcotic/controlled medications will be prescribed after 4pm Monday through Friday or on weekends Form/Paperwork Completion: 
         
Please note a $25 fee may incur for all paperwork for completed by our providers. We ask that you allow 7-10 business days. Pre-payment is due prior to picking up/faxing the completed form. You may also download your forms to [employer] to have your doctor print off. Chief Complaint Patient presents with  Dizziness  
  x 2 months  Ear Pain  
  left ear  Arm Pain Left arm and sometimes numbness hand  Nausea  
  x 2 months  Fatigue  
  x 2 months  Irregular Heart Beat

## 2020-08-12 ENCOUNTER — TELEPHONE (OUTPATIENT)
Dept: INTERNAL MEDICINE CLINIC | Age: 75
End: 2020-08-12

## 2020-08-12 RX ORDER — OXAPROZIN 600 MG/1
600 TABLET, FILM COATED ORAL
Qty: 30 TAB | Refills: 3 | Status: SHIPPED | OUTPATIENT
Start: 2020-08-12 | End: 2020-11-15

## 2020-08-12 NOTE — TELEPHONE ENCOUNTER
Called, spoke to pt. Two identifiers confirmed. Pt stated she does not want a refill on diclofenac stating \"diclofenac does not help with inflammation\"  Pt stated she has taken daypro in the past which does treat inflammation. Pt requesting a refill on daypro or if Dr. Mark Buchanan has any other recommendations. Pt verbalized understanding of information discussed w/ no further questions at this time.

## 2020-08-12 NOTE — TELEPHONE ENCOUNTER
Patient states she needs a call back to get an Arthritis Medication with Anti-inflammatory prescribed. Please call to discuss.  Thank you

## 2020-08-13 NOTE — TELEPHONE ENCOUNTER
Called, spoke to pt  Received two pt identifiers  Pt informed Dr. Patrica Nevarez [facility] Pt states [facility] was a one time thing. Deleted pharmacy per pts request.  Pt states [facility] on [address] is requesting rx from [place]. Pt verbalizes understanding of the instructions and has no further questions at this time.

## 2020-10-13 ENCOUNTER — TRANSCRIBE ORDER (OUTPATIENT)
Dept: SCHEDULING | Age: 75
End: 2020-10-13

## 2020-10-13 DIAGNOSIS — M54.16 LUMBAR BACK PAIN WITH RADICULOPATHY AFFECTING RIGHT LOWER EXTREMITY: Primary | ICD-10-CM

## 2020-10-26 ENCOUNTER — HOSPITAL ENCOUNTER (OUTPATIENT)
Dept: MRI IMAGING | Age: 75
Discharge: HOME | End: 2020-10-26
Attending: NEUROLOGICAL SURGERY
Payer: MEDICARE

## 2020-10-26 DIAGNOSIS — M54.16 LUMBAR BACK PAIN WITH RADICULOPATHY AFFECTING RIGHT LOWER EXTREMITY: ICD-10-CM

## 2020-10-26 PROCEDURE — 74011250636 HC RX REV CODE- 250/636: Performed by: NEUROLOGICAL SURGERY

## 2020-10-26 PROCEDURE — 72158 MRI LUMBAR SPINE W/O & W/DYE: CPT

## 2020-10-26 PROCEDURE — A9575 INJ GADOTERATE MEGLUMI 0.1ML: HCPCS | Performed by: NEUROLOGICAL SURGERY

## 2020-10-26 RX ORDER — GADOTERATE MEGLUMINE 376.9 MG/ML
15 INJECTION INTRAVENOUS
Status: COMPLETED | OUTPATIENT
Start: 2020-10-26 | End: 2020-10-26

## 2020-10-26 RX ADMIN — GADOTERATE MEGLUMINE 15 ML: 376.9 INJECTION INTRAVENOUS at 16:15

## 2020-10-28 ENCOUNTER — TELEPHONE (OUTPATIENT)
Dept: INTERNAL MEDICINE CLINIC | Age: 75
End: 2020-10-28

## 2020-10-28 RX ORDER — AZITHROMYCIN 250 MG/1
250 TABLET, FILM COATED ORAL SEE ADMIN INSTRUCTIONS
Qty: 6 TAB | Refills: 0 | Status: SHIPPED | OUTPATIENT
Start: 2020-10-28 | End: 2020-11-02

## 2020-10-28 RX ORDER — AZITHROMYCIN 250 MG/1
250 TABLET, FILM COATED ORAL SEE ADMIN INSTRUCTIONS
Qty: 6 TAB | Refills: 0 | Status: SHIPPED | OUTPATIENT
Start: 2020-10-28 | End: 2020-10-28 | Stop reason: SDUPTHER

## 2020-10-28 NOTE — TELEPHONE ENCOUNTER
----- Message from [place] sent at 10/28/2020 11:19 AM EDT -----  Regarding: Dr. Tomas Villa  General Message/Vendor Calls    Caller's first and last name: Asha Proctor      Reason for call: Pt had Dr. Denis Tello check L ear at last visit. Ear pain has not improved. [facility] JOI HERNANDEZ. Callback required yes/no and why: yes      Best contact number(s):   Mobile: [phone]     Message copied/pasted from [facility]

## 2020-10-28 NOTE — TELEPHONE ENCOUNTER
Yonas Armstrong MD  You 14 minutes ago (3:11 PM)      I sent in a zpak    Message text      Pt notified. RX resent to corrected pharmacy. Pt verbalized understanding of information discussed w/ no further questions at this time.

## 2020-11-15 RX ORDER — OXAPROZIN 600 MG/1
TABLET, FILM COATED ORAL
Qty: 30 TAB | Refills: 0 | Status: SHIPPED | OUTPATIENT
Start: 2020-11-15 | End: 2020-12-14

## 2020-11-24 RX ORDER — LEVOTHYROXINE SODIUM 50 UG/1
TABLET ORAL
Qty: 90 TAB | Refills: 0 | Status: SHIPPED | OUTPATIENT
Start: 2020-11-24 | End: 2021-02-08 | Stop reason: SDUPTHER

## 2020-11-24 RX ORDER — SIMVASTATIN 40 MG/1
TABLET, FILM COATED ORAL
Qty: 90 TAB | Refills: 0 | Status: SHIPPED | OUTPATIENT
Start: 2020-11-24 | End: 2021-02-08 | Stop reason: SDUPTHER

## 2020-12-14 RX ORDER — OXAPROZIN 600 MG/1
TABLET, FILM COATED ORAL
Qty: 90 TAB | Refills: 0 | Status: SHIPPED | OUTPATIENT
Start: 2020-12-14 | End: 2021-02-02

## 2020-12-14 RX ORDER — OXAPROZIN 600 MG/1
TABLET, FILM COATED ORAL
Qty: 30 TAB | Refills: 0 | Status: SHIPPED | OUTPATIENT
Start: 2020-12-14 | End: 2020-12-14 | Stop reason: SDUPTHER

## 2020-12-14 NOTE — TELEPHONE ENCOUNTER
----- Message from April Bull sent at 12/14/2020  1:13 PM EST -----  Regarding: Dr. To Duarte Message/Vendor Calls    Caller's first and last name: PT      Reason for call: Pt requested refill for \"ozatrozin 600 mg\" from [facility]. She requested 90 day supply and got 30 days. Would like to speak to nurse. Callback required yes/no and why: yes      Best contact number(s): [phone]      Details to clarify the request: Pt has not picked up medication yet.       Message from [facility]

## 2020-12-14 NOTE — TELEPHONE ENCOUNTER
----- Message from [place] sent at 12/14/2020 11:00 AM EST -----  Regarding: Dr. Lomax Backer (if not patient): N/A  Relationship of caller (if not patient): N/A  Best contact number(s): [phone]  Name of medication and dosage if known: \"Oxaprozin 600mg\"  Is patient out of this medication (yes/no): no  [facility] [place] [address]  Pharmacy listed in chart? (yes/no): no  Pharmacy phone number: [phone]  Date of last visit: 7/13/2020  Details to clarify the request: Will like 90 day supply, Please take [facility] rodgers off of records    Message from [facility]

## 2021-01-04 ENCOUNTER — TELEPHONE (OUTPATIENT)
Dept: INTERNAL MEDICINE CLINIC | Age: 76
End: 2021-01-04

## 2021-01-04 DIAGNOSIS — E03.9 HYPOTHYROIDISM, UNSPECIFIED TYPE: ICD-10-CM

## 2021-01-04 DIAGNOSIS — R73.09 ELEVATED GLUCOSE: ICD-10-CM

## 2021-01-04 DIAGNOSIS — E78.00 PURE HYPERCHOLESTEROLEMIA: Primary | ICD-10-CM

## 2021-01-04 NOTE — TELEPHONE ENCOUNTER
MD Lorenzo Lentz LPN   Caller: Unspecified (Today, 10:47 AM)             Labs ordered      Spoke with patient. Two pt identifiers confirmed. Patient advised that her labs have been ordered. Patient states that she would like to have the orders faxed to [facility] at [place] [id number]. Labs faxed and confirmation received. Pt verbalized understanding of information discussed w/ no further questions at this time.

## 2021-01-04 NOTE — TELEPHONE ENCOUNTER
#[phone]  Pt states she would like to get her labs done prior to her visit scheduled on 1-14-21. Please call to let pt know if Dr. Isaak Calero wants her to do prior to appt or wait.    I let her know if doctor does want those done ahead we can always fax lab order to [employer].

## 2021-01-07 ENCOUNTER — HOSPITAL ENCOUNTER (OUTPATIENT)
Dept: LAB | Age: 76
Discharge: HOME | End: 2021-01-07
Payer: MEDICARE

## 2021-01-07 PROCEDURE — 85027 COMPLETE CBC AUTOMATED: CPT

## 2021-01-07 PROCEDURE — 84443 ASSAY THYROID STIM HORMONE: CPT

## 2021-01-07 PROCEDURE — 80061 LIPID PANEL: CPT

## 2021-01-07 PROCEDURE — 80053 COMPREHEN METABOLIC PANEL: CPT

## 2021-01-07 PROCEDURE — 83036 HEMOGLOBIN GLYCOSYLATED A1C: CPT

## 2021-01-07 PROCEDURE — 36415 COLL VENOUS BLD VENIPUNCTURE: CPT

## 2021-01-08 LAB
ALBUMIN SERPL-MCNC: 4.5 G/DL (ref 3.7–4.7)
ALBUMIN/GLOB SERPL: 1.7 {RATIO} (ref 1.2–2.2)
ALP SERPL-CCNC: 103 IU/L (ref 39–117)
ALT SERPL-CCNC: 19 IU/L (ref 0–32)
AST SERPL-CCNC: 22 IU/L (ref 0–40)
BILIRUB SERPL-MCNC: 0.3 MG/DL (ref 0–1.2)
BUN SERPL-MCNC: 17 MG/DL (ref 8–27)
BUN/CREAT SERPL: 23 (ref 12–28)
CALCIUM SERPL-MCNC: 10 MG/DL (ref 8.7–10.3)
CHLORIDE SERPL-SCNC: 100 MMOL/L (ref 96–106)
CHOLEST SERPL-MCNC: 223 MG/DL (ref 100–199)
CO2 SERPL-SCNC: 24 MMOL/L (ref 20–29)
CREAT SERPL-MCNC: 0.74 MG/DL (ref 0.57–1)
ERYTHROCYTE [DISTWIDTH] IN BLOOD BY AUTOMATED COUNT: 14.4 % (ref 11.7–15.4)
EST. AVERAGE GLUCOSE BLD GHB EST-MCNC: 117 MG/DL
GLOBULIN SER CALC-MCNC: 2.7 G/DL (ref 1.5–4.5)
GLUCOSE SERPL-MCNC: 94 MG/DL (ref 65–99)
HBA1C MFR BLD: 5.7 % (ref 4.8–5.6)
HCT VFR BLD AUTO: 37.9 % (ref 34–46.6)
HDLC SERPL-MCNC: 70 MG/DL
HGB BLD-MCNC: 12.1 G/DL (ref 11.1–15.9)
LDLC SERPL CALC-MCNC: 126 MG/DL (ref 0–99)
MCH RBC QN AUTO: 31.8 PG (ref 26.6–33)
MCHC RBC AUTO-ENTMCNC: 31.9 G/DL (ref 31.5–35.7)
MCV RBC AUTO: 100 FL (ref 79–97)
PLATELET # BLD AUTO: 406 X10E3/UL (ref 150–450)
POTASSIUM SERPL-SCNC: 4.8 MMOL/L (ref 3.5–5.2)
PROT SERPL-MCNC: 7.2 G/DL (ref 6–8.5)
RBC # BLD AUTO: 3.81 X10E6/UL (ref 3.77–5.28)
SODIUM SERPL-SCNC: 139 MMOL/L (ref 134–144)
TRIGL SERPL-MCNC: 157 MG/DL (ref 0–149)
TSH SERPL DL<=0.005 MIU/L-ACNC: 4.44 UIU/ML (ref 0.45–4.5)
VLDLC SERPL CALC-MCNC: 27 MG/DL (ref 5–40)
WBC # BLD AUTO: 9.3 X10E3/UL (ref 3.4–10.8)

## 2021-01-14 ENCOUNTER — VIRTUAL VISIT (OUTPATIENT)
Dept: INTERNAL MEDICINE CLINIC | Age: 76
End: 2021-01-14
Payer: MEDICARE

## 2021-01-14 DIAGNOSIS — M81.0 OSTEOPOROSIS, UNSPECIFIED OSTEOPOROSIS TYPE, UNSPECIFIED PATHOLOGICAL FRACTURE PRESENCE: ICD-10-CM

## 2021-01-14 DIAGNOSIS — R73.03 PREDIABETES: ICD-10-CM

## 2021-01-14 DIAGNOSIS — J01.90 ACUTE SINUSITIS, RECURRENCE NOT SPECIFIED, UNSPECIFIED LOCATION: Primary | ICD-10-CM

## 2021-01-14 DIAGNOSIS — Z80.0 FH: COLON CANCER: ICD-10-CM

## 2021-01-14 DIAGNOSIS — Z00.00 MEDICARE ANNUAL WELLNESS VISIT, SUBSEQUENT: ICD-10-CM

## 2021-01-14 DIAGNOSIS — E78.00 PURE HYPERCHOLESTEROLEMIA: ICD-10-CM

## 2021-01-14 PROCEDURE — G0439 PPPS, SUBSEQ VISIT: HCPCS | Performed by: INTERNAL MEDICINE

## 2021-01-14 PROCEDURE — 99213 OFFICE O/P EST LOW 20 MIN: CPT | Performed by: INTERNAL MEDICINE

## 2021-01-14 PROCEDURE — 1101F PT FALLS ASSESS-DOCD LE1/YR: CPT | Performed by: INTERNAL MEDICINE

## 2021-01-14 PROCEDURE — G8536 NO DOC ELDER MAL SCRN: HCPCS | Performed by: INTERNAL MEDICINE

## 2021-01-14 PROCEDURE — G8510 SCR DEP NEG, NO PLAN REQD: HCPCS | Performed by: INTERNAL MEDICINE

## 2021-01-14 PROCEDURE — G8417 CALC BMI ABV UP PARAM F/U: HCPCS | Performed by: INTERNAL MEDICINE

## 2021-01-14 PROCEDURE — G8427 DOCREV CUR MEDS BY ELIG CLIN: HCPCS | Performed by: INTERNAL MEDICINE

## 2021-01-14 RX ORDER — AZITHROMYCIN 250 MG/1
250 TABLET, FILM COATED ORAL SEE ADMIN INSTRUCTIONS
Qty: 6 TAB | Refills: 0 | Status: SHIPPED | OUTPATIENT
Start: 2021-01-14 | End: 2021-01-19

## 2021-01-14 NOTE — PATIENT INSTRUCTIONS
This is an established visit conducted via telemedicine. The patient has been instructed that this meets HIPAA criteria and acknowledges and agrees to this method of visitation. David Gan LPN 
01/77/17 
1:80 PM 
Medicare Wellness Visit, Female The best way to live healthy is to have a lifestyle where you eat a well-balanced diet, exercise regularly, limit alcohol use, and quit all forms of tobacco/nicotine, if applicable. Regular preventive services are another way to keep healthy. Preventive services (vaccines, screening tests, monitoring & exams) can help personalize your care plan, which helps you manage your own care. Screening tests can find health problems at the earliest stages, when they are easiest to treat. [facility] follows the current, evidence-based guidelines published by the [facility] Roly Cueto ([facility]) when recommending preventive services for our patients. Because we follow these guidelines, sometimes recommendations change over time as research supports it. (For example, mammograms used to be recommended annually. Even though Medicare will still pay for an annual mammogram, the newer guidelines recommend a mammogram every two years for women of average risk). Of course, you and your doctor may decide to screen more often for some diseases, based on your risk and your co-morbidities (chronic disease you are already diagnosed with). Preventive services for you include: - Medicare offers their members a free annual wellness visit, which is time for you and your primary care provider to discuss and plan for your preventive service needs. Take advantage of this benefit every year! 
-All adults over the age of 72 should receive the recommended pneumonia vaccines. Current USPSTF guidelines recommend a series of two vaccines for the best pneumonia protection. -All adults should have a flu vaccine yearly and a tetanus vaccine every 10 years.  
-All adults age 48 and older should receive the shingles vaccines (series of two vaccines). -All adults age 38-68 who are overweight should have a diabetes screening test once every three years.  
-All adults born between [birth year] and 1965 should be screened once for Hepatitis C. 
-Other screening tests and preventive services for persons with diabetes include: an eye exam to screen for diabetic retinopathy, a kidney function test, a foot exam, and stricter control over your cholesterol.  
-Cardiovascular screening for adults with routine risk involves an electrocardiogram (ECG) at intervals determined by your doctor.  
-Colorectal cancer screenings should be done for adults age 54-65 with no increased risk factors for colorectal cancer. There are a number of acceptable methods of screening for this type of cancer. Each test has its own benefits and drawbacks. Discuss with your doctor what is most appropriate for you during your annual wellness visit. The different tests include: colonoscopy (considered the best screening method), a fecal occult blood test, a fecal DNA test, and sigmoidoscopy. 
 
-A bone mass density test is recommended when a woman turns 65 to screen for osteoporosis. This test is only recommended one time, as a screening. Some providers will use this same test as a disease monitoring tool if you already have osteoporosis. -Breast cancer screenings are recommended every other year for women of normal risk, age 54-69. 
-Cervical cancer screenings for women over age 72 are only recommended with certain risk factors. Here is a list of your current Health Maintenance items (your personalized list of preventive services) with a due date: 
Health Maintenance Due Topic Date Due  
 DTaP/Tdap/Td  (1 - Tdap) 07/20/1964  Shingles Vaccine (1 of 2) 07/20/1993  Glaucoma Screening   06/27/2014

## 2021-01-14 NOTE — PROGRESS NOTES
HISTORY OF PRESENT ILLNESS  Alanna Spangler is a 66 y.o. female. HPI   Alanna Spangler is a 66 y.o. female being evaluated by a Virtual Visit (video visit) encounter to address concerns as mentioned above. A caregiver was present when appropriate. Due to this being a TeleHealth encounter (During BXENC-99 public health emergency), evaluation of the following organ systems was limited: Vitals/Constitutional/EENT/Resp/CV/GI//MS/Neuro/Skin/Heme-Lymph-Imm. Pursuant to the emergency declaration under the [facility], [address] [place] [address] authority and the [employer] Act, this Virtual Visit was conducted with patient's (and/or legal guardian's) consent, to reduce the risk of exposure to COVID-19 and provide necessary medical care. Services were provided through a video synchronous discussion virtually to substitute for in-person encounter. --Na Mann MD on 1/14/2021 at 3:30 PM    An electronic signature was used to authenticate this note.   Hx HLD overweight, hx retinal vein occlusion , hx right breast cancer s/p mastectomy  osteopenia on prolia, prediabetes, hypothyroid and medicare wellnss  Recent labs ldl 126 a1c 5.7 Na 139    Has increaese sinus draiage and cough-requests abx-no f/c    Last OV    Patient Active Problem List    Diagnosis Date Noted    Mixed hyperlipidemia 01/14/2020    Overweight (BMI 25.0-29.9) 11/14/2018    Other specified hypothyroidism 05/16/2018    Gastroesophageal reflux disease without esophagitis 05/16/2018    History of CVA (cerebrovascular accident) 05/16/2018    Osteopenia of multiple sites 05/16/2018    HX: breast cancer 05/16/2018    Closed fracture of left lower extremity with routine healing 05/16/2018    Endometriosis 05/16/2018     Current Outpatient Medications   Medication Sig Dispense Refill    azithromycin (ZITHROMAX) 250 mg tablet Take 1 Tab by mouth See Admin Instructions for 5 days. 6 Tab 0    oxaprozin (DAYPRO) 600 mg tablet TAKE 1 TABLET BY MOUTH ONCE DAILY AS NEEDED 90 Tab 0    levothyroxine (SYNTHROID) 50 mcg tablet Take 1 tablet by mouth once daily 90 Tab 0    simvastatin (ZOCOR) 40 mg tablet Take 1 tablet by mouth nightly 90 Tab 0    senna-docusate (PERICOLACE) 8.6-50 mg per tablet Take 1 Tab by mouth daily.  acetaminophen/diphenhydramine (TYLENOL PM PO) Take  by mouth.  pregabalin (LYRICA) 25 mg capsule Take 25 mg by mouth two (2) times a day.  aspirin 81 mg chewable tablet Take 1 Tab by mouth daily. 30 Tab 1    bisacodyl (DULCOLAX) 10 mg suppository Insert 10 mg into rectum daily. 28 Each 0    levothyroxine (SYNTHROID) 75 mcg tablet Take 75 mcg by mouth every Monday and Friday.  denosumab (PROLIA) 60 mg/mL injection 60 mg by SubCUTAneous route every 6 months.  metFORMIN (GLUCOPHAGE) 500 mg tablet Take 500 mg by mouth daily (with breakfast).  MAGNESIUM PO Take 1 Tab by mouth daily.  folic acid/multivit-min/lutein (CENTRUM SILVER PO) Take 1 Tab by mouth daily.  cetirizine (ZYRTEC) 10 mg tablet Take 10 mg by mouth daily.  cyanocobalamin (VITAMIN B12) 100 mcg tablet Take 100 mcg by mouth daily. Allergies   Allergen Reactions    Other Food Anaphylaxis     Steak, cheese, grass, smoke    Tramadol Other (comments)     Hyponatremia, seizure    Beef Containing Products Other (comments)     Itching and can cause mouth to swell     Cheese Itching and Swelling    Gabapentin Other (comments)     As of 1/28/2020, pt requests not to have this. It \"completely wipes me out. \"    Codeine Rash and Swelling     Mouth    Other Plant, Animal, Environmental Hives     Rubber    Pcn [Penicillins] Rash and Swelling     Mouth     Social History     Tobacco Use    Smoking status: Never Smoker    Smokeless tobacco: Never Used   Substance Use Topics    Alcohol use: Not Currently      Lab Results   Component Value Date/Time    WBC 9.3 01/07/2021 10:37 AM    HGB 12.1 01/07/2021 10:37 AM    HCT 37.9 01/07/2021 10:37 AM    PLATELET 866 97/57/5634 10:37 AM     (H) 01/07/2021 10:37 AM     Lab Results   Component Value Date/Time    Hemoglobin A1c 5.7 (H) 01/07/2021 10:37 AM    Hemoglobin A1c 5.9 05/09/2019 02:00 PM    Glucose 94 01/07/2021 10:37 AM    Glucose (POC) 80 06/04/2019 01:12 PM    Glucose POC 90 07/12/2019 11:40 AM    LDL, calculated 126 (H) 01/07/2021 10:37 AM    LDL, calculated 121.4 (H) 07/10/2009 08:37 AM    Creatinine 0.74 01/07/2021 10:37 AM      Lab Results   Component Value Date/Time    Cholesterol, total 223 (H) 01/07/2021 10:37 AM    HDL Cholesterol 70 01/07/2021 10:37 AM    LDL, calculated 126 (H) 01/07/2021 10:37 AM    LDL, calculated 121.4 (H) 07/10/2009 08:37 AM    Triglyceride 157 (H) 01/07/2021 10:37 AM    CHOL/HDL Ratio 3.0 07/10/2009 08:37 AM     Lab Results   Component Value Date/Time    GFR est non-AA 78 01/07/2021 10:37 AM    GFR est AA 90 01/07/2021 10:37 AM    Creatinine 0.74 01/07/2021 10:37 AM    BUN 17 01/07/2021 10:37 AM    Sodium 139 01/07/2021 10:37 AM    Potassium 4.8 01/07/2021 10:37 AM    Chloride 100 01/07/2021 10:37 AM    CO2 24 01/07/2021 10:37 AM    Magnesium 2.5 (H) 05/25/2019 01:10 AM    Phosphorus 2.4 (L) 05/26/2019 04:15 AM        ROS    Physical Exam    ASSESSMENT and PLAN  Diagnoses and all orders for this visit:    1. Acute sinusitis, recurrence not specified, unspecified location    2. Pure hypercholesterolemia    3. Prediabetes    4. Osteoporosis, unspecified osteoporosis type, unspecified pathological fracture presence    5. FH: colon cancer    Other orders  -     azithromycin (ZITHROMAX) 250 mg tablet; Take 1 Tab by mouth See Admin Instructions for 5 days. Follow-up and Dispositions    · Return in about 6 months (around 7/14/2021) for fu HLD prediabees low sodium osteopenia x30 minutes.         This is the Subsequent Medicare Annual Wellness Exam, performed 12 months or more after the Initial AWV or the last Subsequent AWV    I have reviewed the patient's medical history in detail and updated the computerized patient record. Depression Risk Factor Screening:     3 most recent PHQ Screens 1/14/2021   Little interest or pleasure in doing things Not at all   Feeling down, depressed, irritable, or hopeless Not at all   Total Score PHQ 2 0       Alcohol Risk Screen    Do you average more than 1 drink per night or more than 7 drinks a week:  No    On any one occasion in the past three months have you have had more than 3 drinks containing alcohol:  No        Functional Ability and Level of Safety:    Hearing: Hearing is good. Activities of Daily Living: The home contains: handrails and grab bars  Patient does total self care      Ambulation: with mild difficulty     Fall Risk:  Fall Risk Assessment, last 12 mths 1/14/2021   Able to walk? Yes   Fall in past 12 months? 0   Do you feel unsteady? 1   Are you worried about falling 0   Is TUG test greater than 12 seconds? 0   Is the gait abnormal? 1   Number of falls in past 12 months 0   Fall with injury? -      Abuse Screen:  Patient is not abused       Cognitive Screening    Has your family/caregiver stated any concerns about your memory: no    Cognitive Screening: Normal - serial 3    Assessment/Plan   Education and counseling provided:  Are appropriate based on today's review and evaluation  End-of-Life planning (with patient's consent)  Screening Mammography  shingrix recommended  Refer for colonoscopy-- colon cancer    Diagnoses and all orders for this visit:    1. Acute sinusitis, recurrence not specified, unspecified location   zpak  2. Pure hypercholesterolemia    LDL is close to goal on statin  3. Prediabetes   stable  4. Osteoporosis, unspecified osteoporosis type, unspecified pathological fracture presence   On prolia  5. Hx Hyponatremia   Normal sodium level  Other orders  -     azithromycin (ZITHROMAX) 250 mg tablet;  Take 1 Tab by mouth See Admin Instructions for 5 days. Health Maintenance Due     Health Maintenance Due   Topic Date Due    DTaP/Tdap/Td series (1 - Tdap) 07/20/1964    Shingrix Vaccine Age 50> (1 of 2) 07/20/1993    GLAUCOMA SCREENING Q2Y  06/27/2014    Medicare Yearly Exam  11/16/2019       Patient Care Team   Patient Care Team:  Sotero Costello MD as PCP - General (Internal Medicine)  Sotero Costello MD as PCP - [facility] Empaneled Provider    History     Patient Active Problem List   Diagnosis Code    Other specified hypothyroidism E03.8    Gastroesophageal reflux disease without esophagitis K21.9    History of CVA (cerebrovascular accident) Z80.78    Osteopenia of multiple sites M85.89    HX: breast cancer Z85.3    Closed fracture of left lower extremity with routine healing S82. 92XD    Endometriosis N80.9    Overweight (BMI 25.0-29. 9) E66.3    Mixed hyperlipidemia E78.2     Past Medical History:   Diagnosis Date    Breast cancer ([address] 1999    Right    Diabetes ([address]     As of 1/28/2020, pt states \"borderline\"    Fibromyalgia     GERD (gastroesophageal reflux disease)     Hiatal hernia     Hyponatremia 05/2019    As of 1/28/2020 pt had a seizure as a result to this.  Hypothyroid     Ill-defined condition     As of 1/28/2020, pt states her cardiologist says her HR drops at night but nothing to be concerned with.      Pre-diabetes     PUD (peptic ulcer disease)     PVC (premature ventricular contraction)     Too much caffeine    Seizures ([address] 05/2019    Stroke ([address] 2003    Took vision from right eye      Past Surgical History:   Procedure Laterality Date    HX BACK SURGERY  05/16/2019    [facility]    HX COLONOSCOPY N/A     HX ENDOSCOPY      HX MASTECTOMY Right 1999    HX ORTHOPAEDIC  2000    L knee has pins and plates after a fall (fracture)    HX PARTIAL HYSTERECTOMY N/A      Current Outpatient Medications   Medication Sig Dispense Refill    azithromycin (ZITHROMAX) 250 mg tablet Take 1 Tab by mouth See Admin Instructions for 5 days. 6 Tab 0    oxaprozin (DAYPRO) 600 mg tablet TAKE 1 TABLET BY MOUTH ONCE DAILY AS NEEDED 90 Tab 0    levothyroxine (SYNTHROID) 50 mcg tablet Take 1 tablet by mouth once daily 90 Tab 0    simvastatin (ZOCOR) 40 mg tablet Take 1 tablet by mouth nightly 90 Tab 0    senna-docusate (PERICOLACE) 8.6-50 mg per tablet Take 1 Tab by mouth daily.  acetaminophen/diphenhydramine (TYLENOL PM PO) Take  by mouth.  pregabalin (LYRICA) 25 mg capsule Take 25 mg by mouth two (2) times a day.  aspirin 81 mg chewable tablet Take 1 Tab by mouth daily. 30 Tab 1    bisacodyl (DULCOLAX) 10 mg suppository Insert 10 mg into rectum daily. 28 Each 0    levothyroxine (SYNTHROID) 75 mcg tablet Take 75 mcg by mouth every Monday and Friday.  denosumab (PROLIA) 60 mg/mL injection 60 mg by SubCUTAneous route every 6 months.  metFORMIN (GLUCOPHAGE) 500 mg tablet Take 500 mg by mouth daily (with breakfast).  MAGNESIUM PO Take 1 Tab by mouth daily.  folic acid/multivit-min/lutein (CENTRUM SILVER PO) Take 1 Tab by mouth daily.  cetirizine (ZYRTEC) 10 mg tablet Take 10 mg by mouth daily.  cyanocobalamin (VITAMIN B12) 100 mcg tablet Take 100 mcg by mouth daily. Allergies   Allergen Reactions    Other Food Anaphylaxis     Steak, cheese, grass, smoke    Tramadol Other (comments)     Hyponatremia, seizure    Beef Containing Products Other (comments)     Itching and can cause mouth to swell     Cheese Itching and Swelling    Gabapentin Other (comments)     As of 1/28/2020, pt requests not to have this. It \"completely wipes me out. \"    Codeine Rash and Swelling     Mouth    Other Plant, Animal, Environmental Hives     Rubber    Pcn [Penicillins] Rash and Swelling     Mouth       Family History   Problem Relation Age of Onset    Cancer Mother         breast cancer    Heart Disease Father     Diabetes Brother     Colon Cancer Brother     Diabetes Brother  Heart Disease Brother     Other Sister 55        Gastric bypass into staph infection    No Known Problems Sister      Social History     Tobacco Use    Smoking status: Never Smoker    Smokeless tobacco: Never Used   Substance Use Topics    Alcohol use: Not Currently       Monie Mensah, who was evaluated through a synchronous (real-time) audio only encounter, and/or her healthcare decision maker, is aware that it is a billable service, with coverage as determined by her insurance carrier. She provided verbal consent to proceed: Yes, and patient identification was verified. It was conducted pursuant to the emergency declaration under the [address] [place] [address], [address] [place] [address] authority and the [employer] Act. A caregiver was present when appropriate. Ability to conduct physical exam was limited. I was at home. The patient was at home.     Sofie Avila MD

## 2021-01-14 NOTE — PROGRESS NOTES
Naheed Lares is a 66 y.o. female being evaluated by a Virtual Visit (video visit) encounter to address concerns as mentioned above. A caregiver was present when appropriate. Due to this being a TeleHealth encounter (During WUBUM-05 public health emergency), evaluation of the following organ systems was limited: Vitals/Constitutional/EENT/Resp/CV/GI//MS/Neuro/Skin/Heme-Lymph-Imm. Pursuant to the emergency declaration under the [address] [place] [address] and the [employer] and [employer] Act, this Virtual Visit was conducted with patient's (and/or legal guardian's) consent, to reduce the risk of exposure to COVID-19 and provide necessary medical care. Services were provided through a video synchronous discussion virtually to substitute for in-person encounter. --Grisel Arce MD on 1/14/2021 at 3:30 PM    An electronic signature was used to authenticate this note.

## 2021-01-15 ENCOUNTER — TELEPHONE (OUTPATIENT)
Dept: INTERNAL MEDICINE CLINIC | Age: 76
End: 2021-01-15

## 2021-01-15 DIAGNOSIS — R41.3 MEMORY LOSS: ICD-10-CM

## 2021-01-15 DIAGNOSIS — Z86.73 HISTORY OF CVA (CEREBROVASCULAR ACCIDENT): Primary | ICD-10-CM

## 2021-01-15 NOTE — TELEPHONE ENCOUNTER
ee, Azucena Toussaint MD  You 55 minutes ago (1:31 PM)     Please refer her to neurology at [facility] fo rwu and evaluation    Message text      Notified pt. Referral mailed to pt's home address per request.   Pt verbalized understanding of information discussed w/ no further questions at this time.

## 2021-01-15 NOTE — TELEPHONE ENCOUNTER
----- Message from Fisher Farhad sent at 1/15/2021  1:03 PM EST -----  Regarding: Dr. Melissa Patel: [phone]  General Message/Vendor Calls    Caller's first and last name: n/a      Reason for call: PT would like prescription for something to help with memory. Callback required yes/no and why: Yes/discuss medication options      Best contact number(s): [phone]      Details to clarify the request: PT says at the time of her VV yesterday, something was offered to her but her grandson was sitting next to her and she didn't want to say yes in front of him, out of embarrassment - but she does believe she may need something.       Message from [facility]

## 2021-01-18 ENCOUNTER — TELEPHONE (OUTPATIENT)
Dept: INTERNAL MEDICINE CLINIC | Age: 76
End: 2021-01-18

## 2021-01-18 RX ORDER — DOXYCYCLINE 100 MG/1
100 TABLET ORAL 2 TIMES DAILY
Qty: 20 TAB | Refills: 0 | Status: SHIPPED | OUTPATIENT
Start: 2021-01-18 | End: 2022-01-20

## 2021-01-18 NOTE — TELEPHONE ENCOUNTER
----- Message from Mary Hamm sent at 1/18/2021  3:42 PM EST -----  Regarding: /telephone  Contact: [phone]  General Message/Vendor Calls    Caller's first and last name:N/A      Reason for call: She states that the abx that was given to her last Thursday is not working and upsetting her stomach. She would like something else called in.        Callback required yes/no and why: Yes      Best contact number(s):[phone]        Message from [facility]

## 2021-02-02 RX ORDER — OXAPROZIN 600 MG/1
TABLET, FILM COATED ORAL
Qty: 90 TAB | Refills: 0 | Status: SHIPPED | OUTPATIENT
Start: 2021-02-02 | End: 2021-08-09

## 2021-02-03 RX ORDER — LEVOTHYROXINE SODIUM 75 UG/1
75 TABLET ORAL
Qty: 90 TAB | Refills: 3 | Status: SHIPPED | OUTPATIENT
Start: 2021-02-05 | End: 2021-02-08 | Stop reason: CLARIF

## 2021-02-03 NOTE — TELEPHONE ENCOUNTER
----- Message from Kelley Will sent at 2/3/2021  3:43 PM EST -----  Regarding: Dr. Bianca Jane (if not patient): Pt      Relationship of caller (if not patient): N/A      Best contact number(s): [phone]      Name of medication and dosage if known: Levothyroxine for thyroid      Is patient out of this medication (yes/no): yes      Pharmacy name: [address] listed in chart? (yes/no): no  Pharmacy phone number: [phone]      Details to clarify the request: Trying to switch pharmacies but Jose Roberto Friend is stating she needs appt to refill this medicine. Had one 2 weeks ago.       Message from [facility]

## 2021-02-05 NOTE — TELEPHONE ENCOUNTER
Spoke with Daniel with [facility] pharmacy. States that the patients levothyroxine was sent in for 75mcg on Monday and Friday. Patient states that she thought that she was suppose to be taking it daily. Advised Nguyen that I will check with Dr. Sera Aviles because according to patients chart she is also suppose to be taking 50mcg daily. Advised Daniel that I will call back once I receive clarification for the doctor. Daniel verbalized understanding of information discussed w/ no further questions at this time.

## 2021-02-05 NOTE — TELEPHONE ENCOUNTER
Nguyen[facility] needs [facility] to get Clarification on prescription. Please call.  Thank you

## 2021-02-08 RX ORDER — LEVOTHYROXINE SODIUM 50 UG/1
50 TABLET ORAL
Qty: 105 TAB | Refills: 3 | Status: SHIPPED | OUTPATIENT
Start: 2021-02-08 | End: 2022-02-22 | Stop reason: SDUPTHER

## 2021-02-08 RX ORDER — SIMVASTATIN 40 MG/1
TABLET, FILM COATED ORAL
Qty: 90 TAB | Refills: 3 | Status: SHIPPED | OUTPATIENT
Start: 2021-02-08 | End: 2022-03-06

## 2021-02-08 RX ORDER — EPINEPHRINE 0.3 MG/.3ML
0.3 INJECTION SUBCUTANEOUS
Qty: 1 SYRINGE | Refills: 3 | Status: SHIPPED | OUTPATIENT
Start: 2021-02-08 | End: 2021-02-08

## 2021-02-17 ENCOUNTER — TELEPHONE (OUTPATIENT)
Dept: INTERNAL MEDICINE CLINIC | Age: 76
End: 2021-02-17

## 2021-02-17 NOTE — TELEPHONE ENCOUNTER
Patient was complaining of leg pain. Patient was offered a VV with Dr. Fernando Marmolejo but she decline. Patient has an appt. With another doctor. Patient would like for Dr. Fernando Marmolejo to see her another day but she is requesting something for pain.

## 2021-02-17 NOTE — TELEPHONE ENCOUNTER
----- Message from Lavinia Lares sent at 2/17/2021  9:44 AM EST -----  Regarding: Dr. Pelletier Running 1/Escalated Issue      Caller's first and last name and relationship (if not the patient): pt      Best contact number(s): [phone]      What are the symptoms: bilateral leg pain rated at an 8      Transfer successful - yes/no (include outcome): no/advised to send high priority message      Transfer declined - yes/no (include reason): no      Was caller advised to seek appropriate level of care - yes/no: yes      Message from [facility]

## 2021-03-11 ENCOUNTER — TELEPHONE (OUTPATIENT)
Dept: INTERNAL MEDICINE CLINIC | Age: 76
End: 2021-03-11

## 2021-03-11 NOTE — TELEPHONE ENCOUNTER
Called to speak with patient about her concerns. Patient was unable to talk at the time she was in the ED.

## 2021-03-11 NOTE — TELEPHONE ENCOUNTER
Patient called in stating she took prednisone that was prescribed to her on 3/9/21 from ED and she is experiencing dizziness and shaking and she did state these are some of the side effects of medication the medication. She would like to know if she should continue the medication or discuss another medication.

## 2021-03-11 NOTE — TELEPHONE ENCOUNTER
Patient calling again to follow up on request.    Pt would like a call back regarding the medication reaction.

## 2021-03-17 ENCOUNTER — TELEPHONE (OUTPATIENT)
Dept: INTERNAL MEDICINE CLINIC | Age: 76
End: 2021-03-17

## 2021-03-17 NOTE — TELEPHONE ENCOUNTER
----- Message from Lucila Arthur sent at 3/17/2021 11:28 AM EDT -----  Regarding: dr. Linda Rueda Message/Vendor Calls    Caller's first and last name: Sharon Wu    Reason for call: Has some personal questions didn't want to discuss      Callback required yes/no and why: yes,      Best contact number(s): [phone]      Message from [facility]

## 2021-03-17 NOTE — TELEPHONE ENCOUNTER
Spoke with patient. Two pt identifiers confirmed. Patient states that she is calling to ask about medical marijuana. Patient advised that we do not prescribe medical marijuana. Pt verbalized understanding of information discussed w/ no further questions at this time.

## 2021-03-26 RX ORDER — DICLOFENAC SODIUM 50 MG/1
50 TABLET, DELAYED RELEASE ORAL
Qty: 60 TAB | Refills: 3 | Status: SHIPPED | OUTPATIENT
Start: 2021-03-26 | End: 2022-01-20

## 2021-04-25 NOTE — TELEPHONE ENCOUNTER
#[phone] pt states she wants a lab order put in for thyroid and chol check. Please call when this has been put in. Thanks.
25-Apr-2021 10:11

## 2021-06-15 RX ORDER — CIPROFLOXACIN 250 MG/1
250 TABLET, FILM COATED ORAL 2 TIMES DAILY
Qty: 10 TABLET | Refills: 0 | Status: SHIPPED | OUTPATIENT
Start: 2021-06-15 | End: 2021-06-20

## 2021-07-01 ENCOUNTER — TRANSCRIBE ORDER (OUTPATIENT)
Dept: REGISTRATION | Age: 76
End: 2021-07-01

## 2021-07-01 ENCOUNTER — HOSPITAL ENCOUNTER (OUTPATIENT)
Dept: GENERAL RADIOLOGY | Age: 76
Discharge: HOME | End: 2021-07-01
Payer: MEDICARE

## 2021-07-01 DIAGNOSIS — Z98.1 S/P LUMBAR FUSION: ICD-10-CM

## 2021-07-01 DIAGNOSIS — Z98.1 S/P LUMBAR FUSION: Primary | ICD-10-CM

## 2021-07-01 PROCEDURE — 72100 X-RAY EXAM L-S SPINE 2/3 VWS: CPT

## 2021-07-14 ENCOUNTER — TRANSCRIBE ORDER (OUTPATIENT)
Dept: SCHEDULING | Age: 76
End: 2021-07-14

## 2021-07-14 DIAGNOSIS — Z98.1 S/P LUMBAR SPINAL FUSION: Primary | ICD-10-CM

## 2021-07-29 ENCOUNTER — HOSPITAL ENCOUNTER (OUTPATIENT)
Dept: MRI IMAGING | Age: 76
Discharge: HOME | End: 2021-07-29
Attending: NEUROLOGICAL SURGERY
Payer: MEDICARE

## 2021-07-29 DIAGNOSIS — Z98.1 S/P LUMBAR SPINAL FUSION: ICD-10-CM

## 2021-07-29 PROCEDURE — 72158 MRI LUMBAR SPINE W/O & W/DYE: CPT

## 2021-07-29 PROCEDURE — A9576 INJ PROHANCE MULTIPACK: HCPCS | Performed by: NEUROLOGICAL SURGERY

## 2021-07-29 PROCEDURE — 74011636320 HC RX REV CODE- 636/320: Performed by: NEUROLOGICAL SURGERY

## 2021-07-29 RX ADMIN — GADOTERIDOL 15 ML: 279.3 INJECTION, SOLUTION INTRAVENOUS at 15:36

## 2021-08-09 RX ORDER — OXAPROZIN 600 MG/1
TABLET, FILM COATED ORAL
Qty: 90 TABLET | Refills: 0 | Status: SHIPPED | OUTPATIENT
Start: 2021-08-09

## 2021-09-01 ENCOUNTER — TELEPHONE (OUTPATIENT)
Dept: INTERNAL MEDICINE CLINIC | Age: 76
End: 2021-09-01

## 2021-09-01 NOTE — TELEPHONE ENCOUNTER
----- Message from Victor M Lang sent at 9/1/2021 10:35 AM EDT -----  Regarding: Dr. Cheryl Hyatt: [phone]  General Message/Vendor Calls    Caller's first and last name: N/A      Reason for call: Would like to provide correct fax number for [facility], Fax: [phone] (Needs Rx for insert for bra for right breast)        Callback required yes/no and why: no/unless additional information is needed       Best contact number(s): [phone]      Details to clarify the request: N/A      Victor M Lang

## 2021-09-01 NOTE — TELEPHONE ENCOUNTER
----- Message from Collins Cruz sent at 9/1/2021 10:28 AM EDT -----  Regarding: Dr. Bahman Mckenzie: [phone]  General Message/Vendor Calls    Caller's first and last name: Pt.       Reason for call: Needs Rx for insert for bra for right breast, needs sent to [address] [place] [address], Fax: [phone]. Callback required yes/no and why: Yes, confirm Rx sent.        Best contact number(s): [phone]

## 2021-09-02 ENCOUNTER — OFFICE VISIT (OUTPATIENT)
Dept: INTERNAL MEDICINE CLINIC | Age: 76
End: 2021-09-02
Payer: MEDICARE

## 2021-09-02 VITALS
HEART RATE: 68 BPM | RESPIRATION RATE: 16 BRPM | DIASTOLIC BLOOD PRESSURE: 84 MMHG | WEIGHT: 178.2 LBS | TEMPERATURE: 97.9 F | HEIGHT: 64 IN | BODY MASS INDEX: 30.42 KG/M2 | SYSTOLIC BLOOD PRESSURE: 136 MMHG | OXYGEN SATURATION: 99 %

## 2021-09-02 DIAGNOSIS — Z90.11 HISTORY OF RIGHT MASTECTOMY: Primary | ICD-10-CM

## 2021-09-02 DIAGNOSIS — E87.1 HYPONATREMIA: ICD-10-CM

## 2021-09-02 DIAGNOSIS — R30.0 DYSURIA: ICD-10-CM

## 2021-09-02 DIAGNOSIS — E78.00 PURE HYPERCHOLESTEROLEMIA: Primary | ICD-10-CM

## 2021-09-02 DIAGNOSIS — E03.9 ACQUIRED HYPOTHYROIDISM: ICD-10-CM

## 2021-09-02 DIAGNOSIS — Z11.59 ENCOUNTER FOR HEPATITIS C SCREENING TEST FOR LOW RISK PATIENT: ICD-10-CM

## 2021-09-02 DIAGNOSIS — M54.50 CHRONIC LOW BACK PAIN, UNSPECIFIED BACK PAIN LATERALITY, UNSPECIFIED WHETHER SCIATICA PRESENT: ICD-10-CM

## 2021-09-02 DIAGNOSIS — G89.29 CHRONIC LOW BACK PAIN, UNSPECIFIED BACK PAIN LATERALITY, UNSPECIFIED WHETHER SCIATICA PRESENT: ICD-10-CM

## 2021-09-02 DIAGNOSIS — R73.03 PREDIABETES: ICD-10-CM

## 2021-09-02 PROCEDURE — G8510 SCR DEP NEG, NO PLAN REQD: HCPCS | Performed by: INTERNAL MEDICINE

## 2021-09-02 PROCEDURE — 99214 OFFICE O/P EST MOD 30 MIN: CPT | Performed by: INTERNAL MEDICINE

## 2021-09-02 PROCEDURE — 1090F PRES/ABSN URINE INCON ASSESS: CPT | Performed by: INTERNAL MEDICINE

## 2021-09-02 PROCEDURE — G8427 DOCREV CUR MEDS BY ELIG CLIN: HCPCS | Performed by: INTERNAL MEDICINE

## 2021-09-02 PROCEDURE — G0463 HOSPITAL OUTPT CLINIC VISIT: HCPCS | Performed by: INTERNAL MEDICINE

## 2021-09-02 PROCEDURE — 1101F PT FALLS ASSESS-DOCD LE1/YR: CPT | Performed by: INTERNAL MEDICINE

## 2021-09-02 PROCEDURE — G8399 PT W/DXA RESULTS DOCUMENT: HCPCS | Performed by: INTERNAL MEDICINE

## 2021-09-02 PROCEDURE — G8417 CALC BMI ABV UP PARAM F/U: HCPCS | Performed by: INTERNAL MEDICINE

## 2021-09-02 PROCEDURE — G8536 NO DOC ELDER MAL SCRN: HCPCS | Performed by: INTERNAL MEDICINE

## 2021-09-02 RX ORDER — TALC
250 POWDER (GRAM) TOPICAL DAILY
COMMUNITY

## 2021-09-02 RX ORDER — LEVOTHYROXINE SODIUM 50 UG/1
50 TABLET ORAL
Qty: 105 TABLET | Refills: 3 | Status: SHIPPED | OUTPATIENT
Start: 2021-09-02 | End: 2021-10-25

## 2021-09-02 RX ORDER — LIDOCAINE 50 MG/G
1 PATCH TOPICAL EVERY 24 HOURS
Qty: 30 EACH | Refills: 5 | Status: SHIPPED | OUTPATIENT
Start: 2021-09-02

## 2021-09-02 RX ORDER — CELECOXIB 200 MG/1
200 CAPSULE ORAL
Qty: 30 CAPSULE | Refills: 5 | Status: SHIPPED | OUTPATIENT
Start: 2021-09-02 | End: 2021-12-01

## 2021-09-02 NOTE — PROGRESS NOTES
HISTORY OF PRESENT ILLNESS  Rekha Haddad is a 64 y.o. female. HPI     Hx HLD overweight, hx retinal vein occlusion , hx right breast cancer s/p mastectomy  osteoporosis on prolia, prediabetes, hypothyroid   Last a1c 5.7   Sees Dr Patric Sanders for osteoporosis-on prolia    Had lower back surgery in May this year---Dr Krystyna Gonzalez for right sciatica--lumbar fusion  gettng PT for back surgery and left knee surgery last year    Yearly mammogram at [facility]    Had colonoscopy ths year-Dr Kim----repeat 5 yr due to [facility] Colon cancer. Stomach ulcer too       On es tylenol 6/d for knee and back pain-asking about taking NSAID with low risk for bleeding due to hx ulcer  Uses salon pas-requests lidoderm    C/o slight dysuria symptoms  Last OV      Recent labs ldl 126 a1c 5.7 Na 139     Has increaese sinus drainage and cough-requests abx-no f/c             Patient Active Problem List    Diagnosis Date Noted    Mixed hyperlipidemia 01/14/2020    Overweight (BMI 25.0-29.9) 11/14/2018    Other specified hypothyroidism 05/16/2018    Gastroesophageal reflux disease without esophagitis 05/16/2018    History of CVA (cerebrovascular accident) 05/16/2018    Osteopenia of multiple sites 05/16/2018    HX: breast cancer 05/16/2018    Closed fracture of left lower extremity with routine healing 05/16/2018    Endometriosis 05/16/2018     Current Outpatient Medications   Medication Sig Dispense Refill    magnesium oxide 250 mg magnesium tablet Take 250 mg by mouth daily.  celecoxib (CELEBREX) 200 mg capsule Take 1 Capsule by mouth daily as needed for Pain for up to 90 days. 30 Capsule 5    lidocaine (LIDODERM) 5 % 1 Patch by TransDERmal route every twenty-four (24) hours. Apply patch to the affected area for 12 hours a day and remove for 12 hours a day. 30 Each 5    levothyroxine (SYNTHROID) 50 mcg tablet Take 1 Tablet by mouth Daily (before breakfast).  Take one every day except 1 1/2 tabs q Friday and Sunday 105 Tablet 3    oxaprozin (DAYPRO) 600 mg tablet TAKE 1 TABLET BY MOUTH EVERY DAY AS NEEDED 90 Tablet 0    omeprazole (PRILOSEC) 40 mg capsule Take 40 mg by mouth daily.  levothyroxine (SYNTHROID) 50 mcg tablet Take 1 Tab by mouth Daily (before breakfast). Take 50 mcg every day except 75 mcg q Monday and Friday 105 Tab 3    simvastatin (ZOCOR) 40 mg tablet Take 1 tablet by mouth nightly 90 Tab 3    acetaminophen/diphenhydramine (TYLENOL PM PO) Take  by mouth.  pregabalin (LYRICA) 25 mg capsule Take 25 mg by mouth two (2) times a day.  aspirin 81 mg chewable tablet Take 1 Tab by mouth daily. 30 Tab 1    bisacodyl (DULCOLAX) 10 mg suppository Insert 10 mg into rectum daily. 28 Each 0    denosumab (PROLIA) 60 mg/mL injection 60 mg by SubCUTAneous route every 6 months.  metFORMIN (GLUCOPHAGE) 500 mg tablet Take 500 mg by mouth daily (with breakfast).  folic acid/multivit-min/lutein (CENTRUM SILVER PO) Take 1 Tab by mouth daily.  cetirizine (ZYRTEC) 10 mg tablet Take 10 mg by mouth daily.  cyanocobalamin (VITAMIN B12) 100 mcg tablet Take 100 mcg by mouth daily.  diclofenac EC (VOLTAREN) 50 mg EC tablet Take 1 Tab by mouth two (2) times daily as needed for Pain. (Patient not taking: Reported on 6/9/2021) 60 Tab 3    doxycycline (ADOXA) 100 mg tablet Take 1 Tab by mouth two (2) times a day. (Patient not taking: Reported on 6/9/2021) 20 Tab 0    senna-docusate (PERICOLACE) 8.6-50 mg per tablet Take 1 Tab by mouth daily. (Patient not taking: Reported on 6/9/2021)       Allergies   Allergen Reactions    Other Food Anaphylaxis     Steak, cheese, grass, smoke    Tramadol Other (comments)     Hyponatremia, seizure    Beef Containing Products Other (comments)     Itching and can cause mouth to swell     Cheese Itching and Swelling    Gabapentin Other (comments)     As of 1/28/2020, pt requests not to have this. It \"completely wipes me out. \"    Codeine Rash and Swelling     Mouth  Other Plant, Animal, Environmental Hives     Rubber    Pcn [Penicillins] Rash and Swelling     Mouth      Lab Results   Component Value Date/Time    WBC 9.3 01/07/2021 10:37 AM    HGB 12.1 01/07/2021 10:37 AM    HCT 37.9 01/07/2021 10:37 AM    PLATELET 605 30/30/3530 10:37 AM     (H) 01/07/2021 10:37 AM     Lab Results   Component Value Date/Time    Hemoglobin A1c 5.7 (H) 01/07/2021 10:37 AM    Hemoglobin A1c 5.9 05/09/2019 02:00 PM    Glucose 94 01/07/2021 10:37 AM    Glucose (POC) 80 06/04/2019 01:12 PM    Glucose POC 90 07/12/2019 11:40 AM    LDL, calculated 126 (H) 01/07/2021 10:37 AM    LDL, calculated 121.4 (H) 07/10/2009 08:37 AM    Creatinine 0.74 01/07/2021 10:37 AM      Lab Results   Component Value Date/Time    Cholesterol, total 223 (H) 01/07/2021 10:37 AM    HDL Cholesterol 70 01/07/2021 10:37 AM    LDL, calculated 126 (H) 01/07/2021 10:37 AM    LDL, calculated 121.4 (H) 07/10/2009 08:37 AM    Triglyceride 157 (H) 01/07/2021 10:37 AM    CHOL/HDL Ratio 3.0 07/10/2009 08:37 AM     Lab Results   Component Value Date/Time    GFR est non-AA 78 01/07/2021 10:37 AM    GFR est AA 90 01/07/2021 10:37 AM    Creatinine 0.74 01/07/2021 10:37 AM    BUN 17 01/07/2021 10:37 AM    Sodium 139 01/07/2021 10:37 AM    Potassium 4.8 01/07/2021 10:37 AM    Chloride 100 01/07/2021 10:37 AM    CO2 24 01/07/2021 10:37 AM    Magnesium 2.5 (H) 05/25/2019 01:10 AM    Phosphorus 2.4 (L) 05/26/2019 04:15 AM        ROS    Physical Exam  Vitals and nursing note reviewed. Constitutional:       Appearance: Normal appearance. She is well-developed. She is obese. Comments: Appears stated age   Cardiovascular:      Rate and Rhythm: Normal rate and regular rhythm. Heart sounds: Normal heart sounds. No murmur heard. No friction rub. No gallop. Pulmonary:      Effort: Pulmonary effort is normal. No respiratory distress. Breath sounds: Normal breath sounds. No wheezing.    Abdominal:      General: Bowel sounds are normal.      Palpations: Abdomen is soft. Neurological:      Mental Status: She is alert. ASSESSMENT and PLAN  Diagnoses and all orders for this visit:    1. Pure hypercholesterolemia  -     METABOLIC PANEL, COMPREHENSIVE; Future  -     LIPID PANEL; Future    2. Hyponatremia  -     METABOLIC PANEL, COMPREHENSIVE; Future    3. Acquired hypothyroidism  -     TSH 3RD GENERATION; Future    4. Prediabetes  -     HEMOGLOBIN A1C WITH EAG; Future    5. Chronic low back pain, unspecified back pain laterality, unspecified whether sciatica present  -     celecoxib (CELEBREX) 200 mg capsule; Take 1 Capsule by mouth daily as needed for Pain for up to 90 days. -     lidocaine (LIDODERM) 5 %; 1 Patch by TransDERmal route every twenty-four (24) hours. Apply patch to the affected area for 12 hours a day and remove for 12 hours a day. 6. Encounter for hepatitis C screening test for low risk patient  -     HEPATITIS C AB; Future    7. Dysuria  -     URINALYSIS W/ REFLEX CULTURE; Future-will treat if positive    8. Hx breast cancer s/p right mastectomy   rx bra inserts to be faxed    9. Osteoporosis   Fu Dr Sushil Waller  Other orders  -     levothyroxine (SYNTHROID) 50 mcg tablet; Take 1 Tablet by mouth Daily (before breakfast). Take one every day except 1 1/2 tabs q Friday and Sunday      Follow-up and Dispositions    · Return in about 6 months (around 3/2/2022) for medicare welllnesss x30 min.

## 2021-09-02 NOTE — PROGRESS NOTES
Chief Complaint   Patient presents with    Follow-up    New Order     Electric (Power) Chair    Request For New Medication     Lidocaine Patches - Back Pain and Left Leg Pain      Visit Vitals  /84 (BP 1 Location: Left arm, BP Patient Position: Sitting, BP Cuff Size: Adult)   Pulse 68   Temp 97.9 °F (36.6 °C) (Temporal)   Resp 16   Ht 5' 4\" (1.626 m)   Wt 178 lb 3.2 oz (80.8 kg)   SpO2 99%   BMI 30.59 kg/m²     1. Have you been to the ER, urgent care clinic since your last visit? Hospitalized since your last visit? No    2. Have you seen or consulted any other health care providers outside of the [address] [place] [address] since your last visit? Include any pap smears or colon screening.  No

## 2021-09-10 ENCOUNTER — APPOINTMENT (OUTPATIENT)
Dept: INTERNAL MEDICINE CLINIC | Age: 76
End: 2021-09-10

## 2021-09-11 LAB
ALBUMIN SERPL-MCNC: 4.2 G/DL (ref 3.5–5)
ALBUMIN/GLOB SERPL: 1.1 {RATIO} (ref 1.1–2.2)
ALP SERPL-CCNC: 115 U/L (ref 45–117)
ALT SERPL-CCNC: 26 U/L (ref 12–78)
ANION GAP SERPL CALC-SCNC: 5 MMOL/L (ref 5–15)
APPEARANCE UR: CLEAR
AST SERPL-CCNC: 21 U/L (ref 15–37)
BACTERIA URNS QL MICRO: NEGATIVE /HPF
BILIRUB SERPL-MCNC: 0.4 MG/DL (ref 0.2–1)
BILIRUB UR QL: NEGATIVE
BUN SERPL-MCNC: 15 MG/DL (ref 6–20)
BUN/CREAT SERPL: 20 (ref 12–20)
CALCIUM SERPL-MCNC: 9.5 MG/DL (ref 8.5–10.1)
CHLORIDE SERPL-SCNC: 104 MMOL/L (ref 97–108)
CHOLEST SERPL-MCNC: 218 MG/DL
CO2 SERPL-SCNC: 29 MMOL/L (ref 21–32)
COLOR UR: NORMAL
CREAT SERPL-MCNC: 0.76 MG/DL (ref 0.55–1.02)
EPITH CASTS URNS QL MICRO: NORMAL /LPF
EST. AVERAGE GLUCOSE BLD GHB EST-MCNC: 126 MG/DL
GLOBULIN SER CALC-MCNC: 3.8 G/DL (ref 2–4)
GLUCOSE SERPL-MCNC: 69 MG/DL (ref 65–100)
GLUCOSE UR STRIP.AUTO-MCNC: NEGATIVE MG/DL
HBA1C MFR BLD: 6 % (ref 4–5.6)
HCV AB SERPL QL IA: NONREACTIVE
HDLC SERPL-MCNC: 79 MG/DL
HDLC SERPL: 2.8 {RATIO} (ref 0–5)
HGB UR QL STRIP: NEGATIVE
HYALINE CASTS URNS QL MICRO: NORMAL /LPF (ref 0–5)
KETONES UR QL STRIP.AUTO: NEGATIVE MG/DL
LDLC SERPL CALC-MCNC: 114.4 MG/DL (ref 0–100)
LEUKOCYTE ESTERASE UR QL STRIP.AUTO: NEGATIVE
NITRITE UR QL STRIP.AUTO: NEGATIVE
PH UR STRIP: 7 [PH] (ref 5–8)
POTASSIUM SERPL-SCNC: 4.8 MMOL/L (ref 3.5–5.1)
PROT SERPL-MCNC: 8 G/DL (ref 6.4–8.2)
PROT UR STRIP-MCNC: NEGATIVE MG/DL
RBC #/AREA URNS HPF: NORMAL /HPF (ref 0–5)
SODIUM SERPL-SCNC: 138 MMOL/L (ref 136–145)
SP GR UR REFRACTOMETRY: 1.01 (ref 1–1.03)
TRIGL SERPL-MCNC: 123 MG/DL (ref ?–150)
TSH SERPL DL<=0.05 MIU/L-ACNC: 1.29 UIU/ML (ref 0.36–3.74)
UA: UC IF INDICATED,UAUC: NORMAL
UROBILINOGEN UR QL STRIP.AUTO: 0.2 EU/DL (ref 0.2–1)
VLDLC SERPL CALC-MCNC: 24.6 MG/DL
WBC URNS QL MICRO: NORMAL /HPF (ref 0–4)

## 2021-09-11 NOTE — PROGRESS NOTES
Tell pt normal UA, thyroid fasting glucose, kidney,liver lytes  Hep C is negative  A1c is slihghly elevated--prediabetes. Cholesterol levels are slightly above goal on simvastatin---add zetia 10 mg every day if pt willing 90 plus 3 refills.  izall lower cholesterol absorption from her diet and should work well with simvastatin to get LDL <100

## 2021-09-13 RX ORDER — EZETIMIBE 10 MG/1
10 TABLET ORAL DAILY
Qty: 90 TABLET | Refills: 3 | Status: SHIPPED | OUTPATIENT
Start: 2021-09-13 | End: 2022-01-20

## 2021-09-13 NOTE — TELEPHONE ENCOUNTER
Future Appointments:  No future appointments. Last Appointment With Me:  9/2/2021     Requested Prescriptions     Pending Prescriptions Disp Refills    ezetimibe (ZETIA) 10 mg tablet 90 Tablet 3     Sig: Take 1 Tablet by mouth daily.

## 2021-09-13 NOTE — PROGRESS NOTES
Spoke with patient using 2 identifiers. Patient was informed of recent lab finding  and 's recommendations. Patient is willing to start Zeita 10 mg  and work on diet.

## 2021-09-17 ENCOUNTER — TELEPHONE (OUTPATIENT)
Dept: INTERNAL MEDICINE CLINIC | Age: 76
End: 2021-09-17

## 2021-09-17 NOTE — TELEPHONE ENCOUNTER
----- Message from [facility] sent at 9/17/2021  8:52 AM EDT -----  Regarding: Dr. Bentley Caller telephone  General Message/Vendor Calls    Caller's first and last name: Alba Lindsey      Reason for call: Requesting blood work be faxed over to Dr. Trish Duque to get medication refill      Callback required yes/no and why: Y.  To get results for urine test.      Best contact number(s): [phone]      Details to clarify the request: Dr. Trish Duque  fax [phone]

## 2021-09-17 NOTE — TELEPHONE ENCOUNTER
Recent lab results faxed over to [address] at this time. No further actions required at this time.

## 2021-09-17 NOTE — TELEPHONE ENCOUNTER
[address]  Endocrinology states she needs to get copy of patient's Lab Results from 9/1/2. Please call if any questions.  Thank you      Fax#  is [phone]   ATTN: Wicho Galeana

## 2021-09-22 ENCOUNTER — OFFICE VISIT (OUTPATIENT)
Dept: URGENT CARE | Age: 76
End: 2021-09-22
Payer: MEDICARE

## 2021-09-22 ENCOUNTER — TELEPHONE (OUTPATIENT)
Dept: INTERNAL MEDICINE CLINIC | Age: 76
End: 2021-09-22

## 2021-09-22 VITALS — HEART RATE: 95 BPM | TEMPERATURE: 98.2 F | OXYGEN SATURATION: 95 % | RESPIRATION RATE: 20 BRPM

## 2021-09-22 DIAGNOSIS — Z20.822 SUSPECTED COVID-19 VIRUS INFECTION: Primary | ICD-10-CM

## 2021-09-22 LAB — SARS-COV-2 POC: NEGATIVE

## 2021-09-22 PROCEDURE — G8399 PT W/DXA RESULTS DOCUMENT: HCPCS | Performed by: FAMILY MEDICINE

## 2021-09-22 PROCEDURE — 87426 SARSCOV CORONAVIRUS AG IA: CPT | Performed by: FAMILY MEDICINE

## 2021-09-22 PROCEDURE — 1090F PRES/ABSN URINE INCON ASSESS: CPT | Performed by: FAMILY MEDICINE

## 2021-09-22 PROCEDURE — G8536 NO DOC ELDER MAL SCRN: HCPCS | Performed by: FAMILY MEDICINE

## 2021-09-22 PROCEDURE — 1101F PT FALLS ASSESS-DOCD LE1/YR: CPT | Performed by: FAMILY MEDICINE

## 2021-09-22 PROCEDURE — G8432 DEP SCR NOT DOC, RNG: HCPCS | Performed by: FAMILY MEDICINE

## 2021-09-22 PROCEDURE — G8427 DOCREV CUR MEDS BY ELIG CLIN: HCPCS | Performed by: FAMILY MEDICINE

## 2021-09-22 PROCEDURE — G8417 CALC BMI ABV UP PARAM F/U: HCPCS | Performed by: FAMILY MEDICINE

## 2021-09-22 PROCEDURE — 99203 OFFICE O/P NEW LOW 30 MIN: CPT | Performed by: FAMILY MEDICINE

## 2021-09-22 NOTE — PROGRESS NOTES
This patient was seen at [address] [place] [address] Urgent Care while in their vehicle due to COVID-19 pandemic with PPE and focused examination in order to decrease community viral transmission. The patient/guardian gave verbal consent to treat. H/o exposure to covid and no known exposure     The history is provided by the patient. Cough  The history is provided by the patient. This is a new problem. The current episode started more than 2 days ago. The problem occurs constantly. There has been no fever. Associated symptoms include chills, headaches, rhinorrhea, sore throat and myalgias. Pertinent negatives include no nausea. She has tried nothing for the symptoms. She is not a smoker. Past Medical History:   Diagnosis Date    Breast cancer ([address] 1999    Right    Diabetes ([facility]     As of 1/28/2020, pt states \"borderline\"    Fibromyalgia     GERD (gastroesophageal reflux disease)     Hiatal hernia     Hyponatremia 05/2019    As of 1/28/2020 pt had a seizure as a result to this.  Hypothyroid     Ill-defined condition     As of 1/28/2020, pt states her cardiologist says her HR drops at night but nothing to be concerned with.      Pre-diabetes     PUD (peptic ulcer disease)     PVC (premature ventricular contraction)     Too much caffeine    Seizures ([address] 05/2019    Stroke ([facility] 2003    Took vision from right eye        Past Surgical History:   Procedure Laterality Date    HX BACK SURGERY  05/16/2019    [facility]    HX COLONOSCOPY N/A     HX ENDOSCOPY      HX MASTECTOMY Right 1999    HX ORTHOPAEDIC  2000    L knee has pins and plates after a fall (fracture)    HX PARTIAL HYSTERECTOMY N/A          Family History   Problem Relation Age of Onset    Cancer Mother         breast cancer    Heart Disease Father     Diabetes Brother     Colon Cancer Brother     Diabetes Brother     Heart Disease Brother     Other Sister 55        Gastric bypass into staph infection    No Known Problems Sister         Social History     Socioeconomic History    Marital status:      Spouse name: Not on file    Number of children: Not on file    Years of education: Not on file    Highest education level: Not on file   Occupational History    Not on file   Tobacco Use    Smoking status: Never Smoker    Smokeless tobacco: Never Used   Vaping Use    Vaping Use: Never used   Substance and Sexual Activity    Alcohol use: Not Currently    Drug use: Never    Sexual activity: Yes     Partners: Male   Other Topics Concern    Not on file   Social History Narrative    Not on file     Social Determinants of Health     Financial Resource Strain:     Difficulty of Paying Living Expenses:    Food Insecurity:     Worried About Running Out of Food in the Last Year:     [address] in the Last Year:    Transportation Needs:     Lack of Transportation (Medical):  Lack of Transportation (Non-Medical):    Physical Activity:     Days of Exercise per Week:     Minutes of Exercise per Session:    Stress:     Feeling of Stress :    Social Connections:     Frequency of Communication with Friends and Family:     Frequency of Social Gatherings with Friends and Family:     Attends Rastafarian Services:     Active Member of Clubs or Organizations:     Attends Club or Organization Meetings:     Marital Status:    Intimate Partner Violence:     Fear of Current or Ex-Partner:     Emotionally Abused:     Physically Abused:     Sexually Abused: ALLERGIES: Other food; Tramadol; Beef containing products; Cheese; Gabapentin; Codeine; Other plant, animal, environmental; and Pcn [penicillins]    Review of Systems   Constitutional: Positive for chills. HENT: Positive for rhinorrhea and sore throat. Respiratory: Positive for cough. Gastrointestinal: Negative for nausea. Musculoskeletal: Positive for myalgias. Neurological: Positive for headaches.    All other systems reviewed and are negative. Vitals:    09/22/21 1448   Pulse: 95   Resp: 20   Temp: 98.2 °F (36.8 °C)   SpO2: 95%       Physical Exam  Vitals and nursing note reviewed. Constitutional:       General: She is not in acute distress. Appearance: She is not ill-appearing. Pulmonary:      Effort: Pulmonary effort is normal. No respiratory distress. Breath sounds: No wheezing. MDM    Procedures      ICD-10-CM ICD-9-CM    1. Suspected COVID-19 virus infection  Z20.822 V01.79 NOVEL CORONAVIRUS (COVID-19)      AMB POC SARS-COV-2     No orders of the defined types were placed in this encounter. Results for orders placed or performed in visit on 09/22/21   AMB POC SARS-COV-2   Result Value Ref Range    SARS-COV-2 POC Negative Negative     The patients condition was discussed with the patient and they understand. The patient is to follow up with primary care doctor. If signs and symptoms become worse the pt is to go to the ER. The patient is to take medications as prescribed.

## 2021-09-22 NOTE — TELEPHONE ENCOUNTER
----- Message from Keshawn Kelly sent at 9/22/2021  3:33 PM EDT -----  Regarding: /telephone  Contact: [phone]  General Message/Vendor Calls    Caller's first and last name: n/a       Reason for call: Go over results of urine sample with doctor       Callback required yes/no and why: Yes to go over results       Best contact number(s): [phone]      Details to clarify the request: n/a       Keshawn Kelly

## 2021-09-23 NOTE — TELEPHONE ENCOUNTER
Called patient. ID verified with Name and . Spoke with patient in regards to message received. Informed patient, per provider, that UA was normal. Patient states that she understands the information received and has no further questions or concerns at this time.

## 2021-09-25 LAB
SARS-COV-2, NAA 2 DAY TAT: NORMAL
SARS-COV-2, NAA: NOT DETECTED

## 2021-09-27 ENCOUNTER — TRANSCRIBE ORDER (OUTPATIENT)
Dept: SCHEDULING | Age: 76
End: 2021-09-27

## 2021-09-27 DIAGNOSIS — M54.10 RADICULITIS: ICD-10-CM

## 2021-09-27 DIAGNOSIS — M54.6 THORACIC BACK PAIN: Primary | ICD-10-CM

## 2021-10-10 ENCOUNTER — TRANSCRIBE ORDER (OUTPATIENT)
Dept: REGISTRATION | Age: 76
End: 2021-10-10

## 2021-10-10 ENCOUNTER — HOSPITAL ENCOUNTER (OUTPATIENT)
Dept: GENERAL RADIOLOGY | Age: 76
Discharge: HOME | End: 2021-10-10
Attending: NEUROLOGICAL SURGERY
Payer: MEDICARE

## 2021-10-10 ENCOUNTER — HOSPITAL ENCOUNTER (OUTPATIENT)
Dept: MRI IMAGING | Age: 76
Discharge: HOME | End: 2021-10-10
Attending: NEUROLOGICAL SURGERY
Payer: MEDICARE

## 2021-10-10 DIAGNOSIS — M54.10 RADICULITIS: ICD-10-CM

## 2021-10-10 DIAGNOSIS — M54.6 THORACIC BACK PAIN: Primary | ICD-10-CM

## 2021-10-10 DIAGNOSIS — M54.6 THORACIC BACK PAIN: ICD-10-CM

## 2021-10-10 PROCEDURE — 72146 MRI CHEST SPINE W/O DYE: CPT

## 2021-10-10 PROCEDURE — 72070 X-RAY EXAM THORAC SPINE 2VWS: CPT

## 2021-10-25 ENCOUNTER — VIRTUAL VISIT (OUTPATIENT)
Dept: INTERNAL MEDICINE CLINIC | Age: 76
End: 2021-10-25
Payer: MEDICARE

## 2021-10-25 DIAGNOSIS — R35.0 URINE FREQUENCY: Primary | ICD-10-CM

## 2021-10-25 DIAGNOSIS — N30.90 CYSTITIS: ICD-10-CM

## 2021-10-25 PROCEDURE — 99441 PR PHYS/QHP TELEPHONE EVALUATION 5-10 MIN: CPT | Performed by: INTERNAL MEDICINE

## 2021-10-25 RX ORDER — CIPROFLOXACIN 250 MG/1
250 TABLET, FILM COATED ORAL 2 TIMES DAILY
Qty: 10 TABLET | Refills: 0 | Status: SHIPPED | OUTPATIENT
Start: 2021-10-25 | End: 2022-01-12 | Stop reason: ALTCHOICE

## 2021-10-25 NOTE — PROGRESS NOTES
HISTORY OF PRESENT ILLNESS  Cande Mcbride is a 64 y.o. female. HPI   Cande Mcbride, who was evaluated through a synchronous (real-time) audio only encounter, and/or her healthcare decision maker, is aware that it is a billable service, with coverage as determined by her insurance carrier. She provided verbal consent to proceed: Yes, and patient identification was verified. This visit was conducted pursuant to the emergency declaration under the [facility], [address] [place] [address] authority and the [employer] Act. A caregiver was present when appropriate. Ability to conduct physical exam was limited. The patient was located in a state where the provider was credentialed to provide care. --Laila Mcmahon MD on 10/25/2021 at 4:45 PM        TC x 10 minutes      Hx HLD overweight, hx retinal vein occlusion , hx right breast cancer s/p mastectomy  osteoporosis on prolia, prediabetes, hypothyroid     Slight buring and increased frequency x 1 week  Increased frequency all night last night  Some loss of control and pain in right overay  UA was negative last month for dysuria    Had e coli UTI in May of this year    Intolerant of macrobid    Was URO Dr Gao Ing for frequency this year and did not have a UTI when seen -? OAB, pt declined medication  Last OV     Last a1c 5.7   Sees Dr Karely Garcia for osteoporosis-on prolia     Had lower back surgery in May this year---Dr Bola Mark for right sciatica--lumbar fusion  gettng PT for back surgery and left knee surgery last year     Yearly mammogram at [facility]     Had colonoscopy ths year-Dr Kim----repeat 5 yr due to [facility] Colon cancer.  Stomach ulcer too         On es tylenol 6/d for knee and back pain-asking about taking NSAID with low risk for bleeding due to hx ulcer  Uses salon pas-requests lidoderm    C/o slight dysuria symptoms        Patient Active Problem List    Diagnosis Date Noted    Mixed hyperlipidemia 01/14/2020    Overweight (BMI 25.0-29.9) 11/14/2018    Other specified hypothyroidism 05/16/2018    Gastroesophageal reflux disease without esophagitis 05/16/2018    History of CVA (cerebrovascular accident) 05/16/2018    Osteopenia of multiple sites 05/16/2018    HX: breast cancer 05/16/2018    Closed fracture of left lower extremity with routine healing 05/16/2018    Endometriosis 05/16/2018     Current Outpatient Medications   Medication Sig Dispense Refill    ciprofloxacin HCl (CIPRO) 250 mg tablet Take 1 Tablet by mouth two (2) times a day. 10 Tablet 0    ezetimibe (ZETIA) 10 mg tablet Take 1 Tablet by mouth daily. 90 Tablet 3    magnesium oxide 250 mg magnesium tablet Take 250 mg by mouth daily.  celecoxib (CELEBREX) 200 mg capsule Take 1 Capsule by mouth daily as needed for Pain for up to 90 days. 30 Capsule 5    lidocaine (LIDODERM) 5 % 1 Patch by TransDERmal route every twenty-four (24) hours. Apply patch to the affected area for 12 hours a day and remove for 12 hours a day. 30 Each 5    oxaprozin (DAYPRO) 600 mg tablet TAKE 1 TABLET BY MOUTH EVERY DAY AS NEEDED 90 Tablet 0    omeprazole (PRILOSEC) 40 mg capsule Take 40 mg by mouth daily.  levothyroxine (SYNTHROID) 50 mcg tablet Take 1 Tab by mouth Daily (before breakfast). Take 50 mcg every day except 75 mcg q Monday and Friday 105 Tab 3    simvastatin (ZOCOR) 40 mg tablet Take 1 tablet by mouth nightly 90 Tab 3    acetaminophen/diphenhydramine (TYLENOL PM PO) Take  by mouth.  pregabalin (LYRICA) 25 mg capsule Take 25 mg by mouth two (2) times a day.  aspirin 81 mg chewable tablet Take 1 Tab by mouth daily. 30 Tab 1    denosumab (PROLIA) 60 mg/mL injection 60 mg by SubCUTAneous route every 6 months.  metFORMIN (GLUCOPHAGE) 500 mg tablet Take 500 mg by mouth daily (with breakfast).  folic acid/multivit-min/lutein (CENTRUM SILVER PO) Take 1 Tab by mouth daily.       cetirizine (ZYRTEC) 10 mg tablet Take 10 mg by mouth daily.  cyanocobalamin (VITAMIN B12) 100 mcg tablet Take 100 mcg by mouth daily.  diclofenac EC (VOLTAREN) 50 mg EC tablet Take 1 Tab by mouth two (2) times daily as needed for Pain. (Patient not taking: Reported on 6/9/2021) 60 Tab 3    doxycycline (ADOXA) 100 mg tablet Take 1 Tab by mouth two (2) times a day. (Patient not taking: Reported on 6/9/2021) 20 Tab 0    senna-docusate (PERICOLACE) 8.6-50 mg per tablet Take 1 Tab by mouth daily. (Patient not taking: Reported on 6/9/2021)      bisacodyl (DULCOLAX) 10 mg suppository Insert 10 mg into rectum daily. 28 Each 0     Allergies   Allergen Reactions    Other Food Anaphylaxis     Steak, cheese, grass, smoke    Tramadol Other (comments)     Hyponatremia, seizure    Beef Containing Products Other (comments)     Itching and can cause mouth to swell     Cheese Itching and Swelling    Gabapentin Other (comments)     As of 1/28/2020, pt requests not to have this. It \"completely wipes me out. \"    Codeine Rash and Swelling     Mouth    Other Plant, Animal, Environmental Hives     Rubber    Pcn [Penicillins] Rash and Swelling     Mouth      Lab Results   Component Value Date/Time    WBC 9.3 01/07/2021 10:37 AM    HGB 12.1 01/07/2021 10:37 AM    HCT 37.9 01/07/2021 10:37 AM    PLATELET 965 37/40/6695 10:37 AM     (H) 01/07/2021 10:37 AM     Lab Results   Component Value Date/Time    GFR est non-AA >60 09/10/2021 09:56 AM    GFR est AA >60 09/10/2021 09:56 AM    Creatinine 0.76 09/10/2021 09:56 AM    BUN 15 09/10/2021 09:56 AM    Sodium 138 09/10/2021 09:56 AM    Potassium 4.8 09/10/2021 09:56 AM    Chloride 104 09/10/2021 09:56 AM    CO2 29 09/10/2021 09:56 AM    Magnesium 2.5 (H) 05/25/2019 01:10 AM    Phosphorus 2.4 (L) 05/26/2019 04:15 AM        ROS    Physical Exam    ASSESSMENT and PLAN  Diagnoses and all orders for this visit:    1.  Urine frequency   cipro x 5 days   To call or return if not resolved   Boby Canela Dr Jonathon and possibly get UA sent  again if symptoms not resolved-discussed  2. Cystitis    Other orders  -     ciprofloxacin HCl (CIPRO) 250 mg tablet; Take 1 Tablet by mouth two (2) times a day.

## 2021-10-25 NOTE — PATIENT INSTRUCTIONS
This is an established visit conducted via telemedicine. The patient has been instructed that this meets HIPAA criteria and acknowledges and agrees to this method of visitation.     Shara Minor [place]  19/63/29  9:08 PM

## 2022-01-12 ENCOUNTER — TELEPHONE (OUTPATIENT)
Dept: INTERNAL MEDICINE CLINIC | Age: 77
End: 2022-01-12

## 2022-01-12 RX ORDER — AZITHROMYCIN 250 MG/1
250 TABLET, FILM COATED ORAL SEE ADMIN INSTRUCTIONS
Qty: 6 TABLET | Refills: 0 | Status: SHIPPED | OUTPATIENT
Start: 2022-01-12 | End: 2022-01-13

## 2022-01-12 NOTE — TELEPHONE ENCOUNTER
Called patient. ID verified with Name and . Spoke with patient in regards to message received. Informed patient that provider sent order for antibiotic to pharmacy at this time. Patient states that she understands the information received and has no further questions or concerns at this time.

## 2022-01-12 NOTE — TELEPHONE ENCOUNTER
----- Message from Pedro Velez sent at 1/12/2022  9:43 AM EST -----  Subject: Message to Provider    QUESTIONS  Information for Provider? PATIENT IS REQUESTING PROVIDER TO CALL A   PRESCRIPTION FOR ANTIBIOTICS BECAUSE INNER EAR INFECTION (EYES ARE ITCHY)   ---------------------------------------------------------------------------  --------------  CALL BACK INFO  What is the best way for the office to contact you? OK to leave message on   voicemail  Preferred Call Back Phone Number? [phone]  ---------------------------------------------------------------------------  --------------  SCRIPT ANSWERS  Relationship to Patient?  Self

## 2022-01-13 ENCOUNTER — TELEPHONE (OUTPATIENT)
Dept: INTERNAL MEDICINE CLINIC | Age: 77
End: 2022-01-13

## 2022-01-13 RX ORDER — DOXYCYCLINE 100 MG/1
100 TABLET ORAL 2 TIMES DAILY
Qty: 14 TABLET | Refills: 0 | Status: SHIPPED | OUTPATIENT
Start: 2022-01-13 | End: 2022-01-20

## 2022-01-13 NOTE — TELEPHONE ENCOUNTER
Called patient. ID verified with Name and . Spoke with patient in regards to information received. Informed patient, per provider, to stop taking the azithromycin and that an order for doxycycline was sent to pharmacy for her ear at this time. Informed patient, per provider, to let us know if urine symptoms are not better by next week. Patient states that she understands the information received and has no further questions or concerns at this time.

## 2022-01-13 NOTE — TELEPHONE ENCOUNTER
Pt states azithromycin has warning not to take if you have a racing heart rate. Pt states she does get that and asks if the medication is safe. Please call to advise if she should start the med or not. Dr Emilie Peralta prescribed it yesterday.       Pt states that she has a UTI as well for about 2-3 days ago but thought she would treat one at a time but now states she should have mentioned it yesterday    Please advise

## 2022-01-19 ENCOUNTER — APPOINTMENT (OUTPATIENT)
Dept: CT IMAGING | Age: 77
End: 2022-01-19
Attending: EMERGENCY MEDICINE
Payer: MEDICARE

## 2022-01-19 ENCOUNTER — HOSPITAL ENCOUNTER (OUTPATIENT)
Age: 77
Setting detail: OBSERVATION
Discharge: HOME HEALTH | End: 2022-01-20
Attending: EMERGENCY MEDICINE | Admitting: INTERNAL MEDICINE
Payer: MEDICARE

## 2022-01-19 ENCOUNTER — APPOINTMENT (OUTPATIENT)
Dept: GENERAL RADIOLOGY | Age: 77
End: 2022-01-19
Attending: EMERGENCY MEDICINE
Payer: MEDICARE

## 2022-01-19 ENCOUNTER — APPOINTMENT (OUTPATIENT)
Dept: MRI IMAGING | Age: 77
End: 2022-01-19
Attending: INTERNAL MEDICINE
Payer: MEDICARE

## 2022-01-19 DIAGNOSIS — R26.9 GAIT DISTURBANCE: ICD-10-CM

## 2022-01-19 DIAGNOSIS — R42 DYSEQUILIBRIUM: Primary | ICD-10-CM

## 2022-01-19 PROBLEM — R55 POSTURAL DIZZINESS WITH PRESYNCOPE: Status: ACTIVE | Noted: 2022-01-19

## 2022-01-19 LAB
ALBUMIN SERPL-MCNC: 4.1 G/DL (ref 3.5–5)
ALBUMIN/GLOB SERPL: 1.1 {RATIO} (ref 1.1–2.2)
ALP SERPL-CCNC: 140 U/L (ref 45–117)
ALT SERPL-CCNC: 24 U/L (ref 12–78)
ANION GAP SERPL CALC-SCNC: 6 MMOL/L (ref 5–15)
APPEARANCE UR: CLEAR
AST SERPL-CCNC: 16 U/L (ref 15–37)
ATRIAL RATE: 77 BPM
BACTERIA URNS QL MICRO: NEGATIVE /HPF
BASOPHILS # BLD: 0 K/UL (ref 0–0.1)
BASOPHILS NFR BLD: 0 % (ref 0–1)
BILIRUB SERPL-MCNC: 0.3 MG/DL (ref 0.2–1)
BILIRUB UR QL: NEGATIVE
BUN SERPL-MCNC: 13 MG/DL (ref 6–20)
BUN/CREAT SERPL: 15 (ref 12–20)
CALCIUM SERPL-MCNC: 9.5 MG/DL (ref 8.5–10.1)
CALCULATED P AXIS, ECG09: 38 DEGREES
CALCULATED R AXIS, ECG10: -48 DEGREES
CALCULATED T AXIS, ECG11: 45 DEGREES
CHLORIDE SERPL-SCNC: 101 MMOL/L (ref 97–108)
CO2 SERPL-SCNC: 29 MMOL/L (ref 21–32)
COLOR UR: ABNORMAL
CREAT SERPL-MCNC: 0.85 MG/DL (ref 0.55–1.02)
DIAGNOSIS, 93000: NORMAL
DIFFERENTIAL METHOD BLD: ABNORMAL
EOSINOPHIL # BLD: 0 K/UL (ref 0–0.4)
EOSINOPHIL NFR BLD: 1 % (ref 0–7)
EPITH CASTS URNS QL MICRO: ABNORMAL /LPF
ERYTHROCYTE [DISTWIDTH] IN BLOOD BY AUTOMATED COUNT: 14 % (ref 11.5–14.5)
GLOBULIN SER CALC-MCNC: 3.7 G/DL (ref 2–4)
GLUCOSE BLD STRIP.AUTO-MCNC: 127 MG/DL (ref 65–117)
GLUCOSE SERPL-MCNC: 127 MG/DL (ref 65–100)
GLUCOSE UR STRIP.AUTO-MCNC: NEGATIVE MG/DL
HCT VFR BLD AUTO: 39 % (ref 35–47)
HGB BLD-MCNC: 12.6 G/DL (ref 11.5–16)
HGB UR QL STRIP: NEGATIVE
HYALINE CASTS URNS QL MICRO: ABNORMAL /LPF (ref 0–5)
IMM GRANULOCYTES # BLD AUTO: 0 K/UL (ref 0–0.04)
IMM GRANULOCYTES NFR BLD AUTO: 0 % (ref 0–0.5)
KETONES UR QL STRIP.AUTO: NEGATIVE MG/DL
LEUKOCYTE ESTERASE UR QL STRIP.AUTO: ABNORMAL
LYMPHOCYTES # BLD: 3.2 K/UL (ref 0.8–3.5)
LYMPHOCYTES NFR BLD: 36 % (ref 12–49)
MCH RBC QN AUTO: 30.6 PG (ref 26–34)
MCHC RBC AUTO-ENTMCNC: 32.3 G/DL (ref 30–36.5)
MCV RBC AUTO: 94.7 FL (ref 80–99)
MONOCYTES # BLD: 0.6 K/UL (ref 0–1)
MONOCYTES NFR BLD: 7 % (ref 5–13)
NEUTS SEG # BLD: 4.9 K/UL (ref 1.8–8)
NEUTS SEG NFR BLD: 56 % (ref 32–75)
NITRITE UR QL STRIP.AUTO: NEGATIVE
NRBC # BLD: 0 K/UL (ref 0–0.01)
NRBC BLD-RTO: 0 PER 100 WBC
P-R INTERVAL, ECG05: 130 MS
PH UR STRIP: 6 [PH] (ref 5–8)
PLATELET # BLD AUTO: 382 K/UL (ref 150–400)
PMV BLD AUTO: 8.5 FL (ref 8.9–12.9)
POTASSIUM SERPL-SCNC: 4 MMOL/L (ref 3.5–5.1)
PROT SERPL-MCNC: 7.8 G/DL (ref 6.4–8.2)
PROT UR STRIP-MCNC: NEGATIVE MG/DL
Q-T INTERVAL, ECG07: 362 MS
QRS DURATION, ECG06: 80 MS
QTC CALCULATION (BEZET), ECG08: 409 MS
RBC # BLD AUTO: 4.12 M/UL (ref 3.8–5.2)
RBC #/AREA URNS HPF: ABNORMAL /HPF (ref 0–5)
SERVICE CMNT-IMP: ABNORMAL
SODIUM SERPL-SCNC: 136 MMOL/L (ref 136–145)
SP GR UR REFRACTOMETRY: 1.01 (ref 1–1.03)
TROPONIN-HIGH SENSITIVITY: 7 NG/L (ref 0–51)
UA: UC IF INDICATED,UAUC: ABNORMAL
UROBILINOGEN UR QL STRIP.AUTO: 0.2 EU/DL (ref 0.2–1)
VENTRICULAR RATE, ECG03: 77 BPM
WBC # BLD AUTO: 8.8 K/UL (ref 3.6–11)
WBC URNS QL MICRO: ABNORMAL /HPF (ref 0–4)

## 2022-01-19 PROCEDURE — 99285 EMERGENCY DEPT VISIT HI MDM: CPT

## 2022-01-19 PROCEDURE — 70450 CT HEAD/BRAIN W/O DYE: CPT

## 2022-01-19 PROCEDURE — 82962 GLUCOSE BLOOD TEST: CPT

## 2022-01-19 PROCEDURE — 84484 ASSAY OF TROPONIN QUANT: CPT

## 2022-01-19 PROCEDURE — 70496 CT ANGIOGRAPHY HEAD: CPT

## 2022-01-19 PROCEDURE — G0378 HOSPITAL OBSERVATION PER HR: HCPCS

## 2022-01-19 PROCEDURE — 81001 URINALYSIS AUTO W/SCOPE: CPT

## 2022-01-19 PROCEDURE — 71045 X-RAY EXAM CHEST 1 VIEW: CPT

## 2022-01-19 PROCEDURE — 93005 ELECTROCARDIOGRAM TRACING: CPT

## 2022-01-19 PROCEDURE — 74011000636 HC RX REV CODE- 636: Performed by: EMERGENCY MEDICINE

## 2022-01-19 PROCEDURE — 74011250637 HC RX REV CODE- 250/637: Performed by: INTERNAL MEDICINE

## 2022-01-19 PROCEDURE — 85025 COMPLETE CBC W/AUTO DIFF WBC: CPT

## 2022-01-19 PROCEDURE — 80053 COMPREHEN METABOLIC PANEL: CPT

## 2022-01-19 PROCEDURE — 96374 THER/PROPH/DIAG INJ IV PUSH: CPT

## 2022-01-19 PROCEDURE — 74011250636 HC RX REV CODE- 250/636: Performed by: INTERNAL MEDICINE

## 2022-01-19 PROCEDURE — 70551 MRI BRAIN STEM W/O DYE: CPT

## 2022-01-19 PROCEDURE — 74011250636 HC RX REV CODE- 250/636: Performed by: EMERGENCY MEDICINE

## 2022-01-19 PROCEDURE — 74011250637 HC RX REV CODE- 250/637: Performed by: NURSE PRACTITIONER

## 2022-01-19 PROCEDURE — 96372 THER/PROPH/DIAG INJ SC/IM: CPT

## 2022-01-19 PROCEDURE — 36415 COLL VENOUS BLD VENIPUNCTURE: CPT

## 2022-01-19 PROCEDURE — 96375 TX/PRO/DX INJ NEW DRUG ADDON: CPT

## 2022-01-19 PROCEDURE — 74011250637 HC RX REV CODE- 250/637: Performed by: EMERGENCY MEDICINE

## 2022-01-19 RX ORDER — AMOXICILLIN 250 MG
1 CAPSULE ORAL 2 TIMES DAILY
Status: DISCONTINUED | OUTPATIENT
Start: 2022-01-20 | End: 2022-01-20 | Stop reason: HOSPADM

## 2022-01-19 RX ORDER — ACETAMINOPHEN 325 MG/1
TABLET ORAL
COMMUNITY

## 2022-01-19 RX ORDER — PREGABALIN 25 MG/1
25 CAPSULE ORAL 2 TIMES DAILY
Status: DISCONTINUED | OUTPATIENT
Start: 2022-01-19 | End: 2022-01-20 | Stop reason: HOSPADM

## 2022-01-19 RX ORDER — ACETAMINOPHEN 325 MG/1
650 TABLET ORAL
Status: DISCONTINUED | OUTPATIENT
Start: 2022-01-19 | End: 2022-01-20 | Stop reason: HOSPADM

## 2022-01-19 RX ORDER — ASPIRIN 325 MG
325 TABLET ORAL
Status: COMPLETED | OUTPATIENT
Start: 2022-01-19 | End: 2022-01-19

## 2022-01-19 RX ORDER — LORAZEPAM 2 MG/ML
0.5 INJECTION INTRAMUSCULAR
Status: COMPLETED | OUTPATIENT
Start: 2022-01-19 | End: 2022-01-19

## 2022-01-19 RX ORDER — ATORVASTATIN CALCIUM 40 MG/1
40 TABLET, FILM COATED ORAL
Status: DISCONTINUED | OUTPATIENT
Start: 2022-01-19 | End: 2022-01-20 | Stop reason: HOSPADM

## 2022-01-19 RX ORDER — PANTOPRAZOLE SODIUM 40 MG/1
40 TABLET, DELAYED RELEASE ORAL
Status: DISCONTINUED | OUTPATIENT
Start: 2022-01-20 | End: 2022-01-20 | Stop reason: HOSPADM

## 2022-01-19 RX ORDER — HEPARIN SODIUM 5000 [USP'U]/ML
5000 INJECTION, SOLUTION INTRAVENOUS; SUBCUTANEOUS EVERY 8 HOURS
Status: DISCONTINUED | OUTPATIENT
Start: 2022-01-19 | End: 2022-01-20 | Stop reason: HOSPADM

## 2022-01-19 RX ORDER — EZETIMIBE 10 MG/1
10 TABLET ORAL DAILY
Status: DISCONTINUED | OUTPATIENT
Start: 2022-01-20 | End: 2022-01-20 | Stop reason: HOSPADM

## 2022-01-19 RX ORDER — GUAIFENESIN 100 MG/5ML
81 LIQUID (ML) ORAL DAILY
Status: DISCONTINUED | OUTPATIENT
Start: 2022-01-20 | End: 2022-01-20 | Stop reason: HOSPADM

## 2022-01-19 RX ORDER — POLYETHYLENE GLYCOL 3350 17 G/17G
17 POWDER, FOR SOLUTION ORAL
Status: DISCONTINUED | OUTPATIENT
Start: 2022-01-19 | End: 2022-01-20 | Stop reason: HOSPADM

## 2022-01-19 RX ORDER — ONDANSETRON 2 MG/ML
4 INJECTION INTRAMUSCULAR; INTRAVENOUS ONCE
Status: COMPLETED | OUTPATIENT
Start: 2022-01-19 | End: 2022-01-19

## 2022-01-19 RX ORDER — ACETAMINOPHEN 650 MG/1
650 SUPPOSITORY RECTAL
Status: DISCONTINUED | OUTPATIENT
Start: 2022-01-19 | End: 2022-01-20 | Stop reason: HOSPADM

## 2022-01-19 RX ORDER — LEVOTHYROXINE SODIUM 50 UG/1
50 TABLET ORAL
Status: DISCONTINUED | OUTPATIENT
Start: 2022-01-20 | End: 2022-01-20 | Stop reason: HOSPADM

## 2022-01-19 RX ADMIN — ONDANSETRON 4 MG: 2 INJECTION INTRAMUSCULAR; INTRAVENOUS at 15:02

## 2022-01-19 RX ADMIN — HEPARIN SODIUM 5000 UNITS: 5000 INJECTION, SOLUTION INTRAVENOUS; SUBCUTANEOUS at 23:05

## 2022-01-19 RX ADMIN — DOCUSATE SODIUM 50 MG AND SENNOSIDES 8.6 MG 1 TABLET: 8.6; 5 TABLET, FILM COATED ORAL at 23:54

## 2022-01-19 RX ADMIN — IOPAMIDOL 100 ML: 755 INJECTION, SOLUTION INTRAVENOUS at 13:56

## 2022-01-19 RX ADMIN — ASPIRIN 325 MG ORAL TABLET 325 MG: 325 PILL ORAL at 15:02

## 2022-01-19 RX ADMIN — HEPARIN SODIUM 5000 UNITS: 5000 INJECTION, SOLUTION INTRAVENOUS; SUBCUTANEOUS at 17:28

## 2022-01-19 RX ADMIN — ACETAMINOPHEN 650 MG: 325 TABLET ORAL at 20:16

## 2022-01-19 RX ADMIN — PREGABALIN 25 MG: 25 CAPSULE ORAL at 22:19

## 2022-01-19 RX ADMIN — LORAZEPAM 0.5 MG: 2 INJECTION INTRAMUSCULAR; INTRAVENOUS at 15:14

## 2022-01-19 RX ADMIN — ATORVASTATIN CALCIUM 40 MG: 40 TABLET, FILM COATED ORAL at 22:19

## 2022-01-19 NOTE — ED PROVIDER NOTES
EMERGENCY DEPARTMENT HISTORY AND PHYSICAL EXAM      Date: 1/19/2022  Patient Name: Anita Hull    History of Presenting Illness     Chief Complaint   Patient presents with    Dizziness     Patient stated at around 0830 patient started feeling dizzy with blurred vision and fell this morning with no LOC. On and off numbness in left arm. Take aspiring daily. History Provided By: Patient    HPI: Anita Hull, 64 y.o. female presents to the ED with cc of dizziness. 49-year-old female presents emergency department with a chief complaint of dizziness. Patient reports she has not \"felt well\" since Dharmesh. Tried a decongestant from her PCP which she did not tolerate due to palpitations. Reports intermittent dizziness, worsening over the past 4 days. Reports worse episode this morning at approximately 8 AM.  Patient reports feeling dizzy and unable to ambulate with difficulty with gait. Patient reports this is associated with binocular blurry vision. Schedule appointment with ophthalmology as she states she cannot read some well. She denies headache. Denies neck pain. Did have episodes of back pain in the past.  Reports feeling fatigued and \"wobbly leg. \"  Dizziness now resolved    No other complaints. There are no other complaints, changes, or physical findings at this time. PCP: Libertad Roy MD    No current facility-administered medications on file prior to encounter. Current Outpatient Medications on File Prior to Encounter   Medication Sig Dispense Refill    acetaminophen (TylenoL) 325 mg tablet Take  by mouth every four (4) hours as needed for Pain.  magnesium oxide 250 mg magnesium tablet Take 250 mg by mouth daily.  lidocaine (LIDODERM) 5 % 1 Patch by TransDERmal route every twenty-four (24) hours. Apply patch to the affected area for 12 hours a day and remove for 12 hours a day.  30 Each 5    oxaprozin (DAYPRO) 600 mg tablet TAKE 1 TABLET BY MOUTH EVERY DAY AS NEEDED 90 Tablet 0    omeprazole (PRILOSEC) 40 mg capsule Take 40 mg by mouth daily.  levothyroxine (SYNTHROID) 50 mcg tablet Take 1 Tab by mouth Daily (before breakfast). Take 50 mcg every day except 75 mcg q Monday and Friday 105 Tab 3    simvastatin (ZOCOR) 40 mg tablet Take 1 tablet by mouth nightly 90 Tab 3    acetaminophen/diphenhydramine (TYLENOL PM PO) Take  by mouth.  pregabalin (LYRICA) 25 mg capsule Take 25 mg by mouth two (2) times a day.  aspirin 81 mg chewable tablet Take 1 Tab by mouth daily. 30 Tab 1    bisacodyl (DULCOLAX) 10 mg suppository Insert 10 mg into rectum daily. 28 Each 0    metFORMIN (GLUCOPHAGE) 500 mg tablet Take 500 mg by mouth daily (with breakfast).  folic acid/multivit-min/lutein (CENTRUM SILVER PO) Take 1 Tab by mouth daily.  cetirizine (ZYRTEC) 10 mg tablet Take 10 mg by mouth daily.  cyanocobalamin (VITAMIN B12) 100 mcg tablet Take 100 mcg by mouth daily.  doxycycline (ADOXA) 100 mg tablet Take 1 Tablet by mouth two (2) times a day. (Patient not taking: Reported on 1/19/2022) 14 Tablet 0    ezetimibe (ZETIA) 10 mg tablet Take 1 Tablet by mouth daily. (Patient not taking: Reported on 1/19/2022) 90 Tablet 3    diclofenac EC (VOLTAREN) 50 mg EC tablet Take 1 Tab by mouth two (2) times daily as needed for Pain. (Patient not taking: Reported on 6/9/2021) 60 Tab 3    doxycycline (ADOXA) 100 mg tablet Take 1 Tab by mouth two (2) times a day. (Patient not taking: Reported on 6/9/2021) 20 Tab 0    senna-docusate (PERICOLACE) 8.6-50 mg per tablet Take 1 Tab by mouth daily. (Patient not taking: Reported on 6/9/2021)      denosumab (PROLIA) 60 mg/mL injection 60 mg by SubCUTAneous route every 6 months.          Past History     Past Medical History:  Past Medical History:   Diagnosis Date    Breast cancer ([facility] 1999    Right    Diabetes ([facility] [address]     As of 1/28/2020, pt states \"borderline\"    Fibromyalgia     GERD (gastroesophageal reflux disease)     Hiatal hernia     Hyponatremia 05/2019    As of 1/28/2020 pt had a seizure as a result to this.  Hypothyroid     Ill-defined condition     As of 1/28/2020, pt states her cardiologist says her HR drops at night but nothing to be concerned with.  Pre-diabetes     PUD (peptic ulcer disease)     PVC (premature ventricular contraction)     Too much caffeine    Seizures ([address] 05/2019    Stroke ([address] 2003    Took vision from right eye       Past Surgical History:  Past Surgical History:   Procedure Laterality Date    HX BACK SURGERY  05/16/2019    [facility]    HX COLONOSCOPY N/A     HX ENDOSCOPY      HX MASTECTOMY Right 1999    HX ORTHOPAEDIC  2000    L knee has pins and plates after a fall (fracture)    HX PARTIAL HYSTERECTOMY N/A        Family History:  Family History   Problem Relation Age of Onset    Cancer Mother         breast cancer    Heart Disease Father     Diabetes Brother     Colon Cancer Brother     Diabetes Brother     Heart Disease Brother     Other Sister 55        Gastric bypass into staph infection    No Known Problems Sister        Social History:  Social History     Tobacco Use    Smoking status: Never Smoker    Smokeless tobacco: Never Used   Vaping Use    Vaping Use: Never used   Substance Use Topics    Alcohol use: Not Currently    Drug use: Never       Allergies: Allergies   Allergen Reactions    Other Food Anaphylaxis     Steak, cheese, grass, smoke    Tramadol Other (comments)     Hyponatremia, seizure    Beef Containing Products Other (comments)     Itching and can cause mouth to swell     Cheese Itching and Swelling    Gabapentin Other (comments)     As of 1/28/2020, pt requests not to have this. It \"completely wipes me out. \"    Codeine Rash and Swelling     Mouth    Other Plant, Animal, Environmental Hives     Rubber    Pcn [Penicillins] Rash and Swelling     Mouth         Review of Systems   Review of Systems   Constitutional: Negative for activity change, chills and fever. HENT: Negative for facial swelling and voice change. Eyes: Positive for visual disturbance. Negative for redness. Respiratory: Negative for cough, shortness of breath and wheezing. Cardiovascular: Negative for chest pain and leg swelling. Gastrointestinal: Negative for abdominal pain, diarrhea, nausea and vomiting. Genitourinary: Negative for decreased urine volume. Musculoskeletal: Positive for gait problem. Skin: Negative for pallor and rash. Neurological: Positive for dizziness and light-headedness. Negative for tremors, facial asymmetry, speech difficulty and headaches. Psychiatric/Behavioral: Negative for agitation. All other systems reviewed and are negative. Physical Exam   Physical Exam  Vitals and nursing note reviewed. Constitutional:       Comments: 47-year-old female, resting bed, no distress   HENT:      Head: Normocephalic and atraumatic. Cardiovascular:      Rate and Rhythm: Normal rate and regular rhythm. Heart sounds: No murmur heard. No friction rub. No gallop. Pulmonary:      Effort: Pulmonary effort is normal.      Breath sounds: Normal breath sounds. Abdominal:      Palpations: Abdomen is soft. Tenderness: There is no abdominal tenderness. Musculoskeletal:         General: No swelling. Normal range of motion. Cervical back: Normal range of motion. Skin:     General: Skin is warm. Capillary Refill: Capillary refill takes less than 2 seconds. Neurological:      General: No focal deficit present. Mental Status: She is alert. Mental status is at baseline. Cranial Nerves: No cranial nerve deficit. Sensory: No sensory deficit. Motor: No weakness.       Coordination: Coordination normal.      Gait: Gait normal.   Psychiatric:         Mood and Affect: Mood normal.         Diagnostic Study Results     Labs -     Recent Results (from the past 12 hour(s))   EKG, 12 LEAD, INITIAL    Collection Time: 01/19/22 11:52 AM   Result Value Ref Range    Ventricular Rate 77 BPM    Atrial Rate 77 BPM    P-R Interval 130 ms    QRS Duration 80 ms    Q-T Interval 362 ms    QTC Calculation (Bezet) 409 ms    Calculated P Axis 38 degrees    Calculated R Axis -48 degrees    Calculated T Axis 45 degrees    Diagnosis       Sinus rhythm with occasional premature ventricular complexes  Left axis deviation  When compared with ECG of 23-MAY-2019 12:05,  premature ventricular complexes are now present  Confirmed by Marko Shay ([id number]) on 1/19/2022 3:59:07 PM     GLUCOSE, POC    Collection Time: 01/19/22 12:06 PM   Result Value Ref Range    Glucose (POC) 127 (H) 65 - 117 mg/dL    Performed by Elysia Wiggins    CBC WITH AUTOMATED DIFF    Collection Time: 01/19/22 12:21 PM   Result Value Ref Range    WBC 8.8 3.6 - 11.0 K/uL    RBC 4.12 3.80 - 5.20 M/uL    HGB 12.6 11.5 - 16.0 g/dL    HCT 39.0 35.0 - 47.0 %    MCV 94.7 80.0 - 99.0 FL    MCH 30.6 26.0 - 34.0 PG    MCHC 32.3 30.0 - 36.5 g/dL    RDW 14.0 11.5 - 14.5 %    PLATELET 202 333 - 422 K/uL    MPV 8.5 (L) 8.9 - 12.9 FL    NRBC 0.0 0  WBC    ABSOLUTE NRBC 0.00 0.00 - 0.01 K/uL    NEUTROPHILS 56 32 - 75 %    LYMPHOCYTES 36 12 - 49 %    MONOCYTES 7 5 - 13 %    EOSINOPHILS 1 0 - 7 %    BASOPHILS 0 0 - 1 %    IMMATURE GRANULOCYTES 0 0.0 - 0.5 %    ABS. NEUTROPHILS 4.9 1.8 - 8.0 K/UL    ABS. LYMPHOCYTES 3.2 0.8 - 3.5 K/UL    ABS. MONOCYTES 0.6 0.0 - 1.0 K/UL    ABS. EOSINOPHILS 0.0 0.0 - 0.4 K/UL    ABS. BASOPHILS 0.0 0.0 - 0.1 K/UL    ABS. IMM.  GRANS. 0.0 0.00 - 0.04 K/UL    DF AUTOMATED     METABOLIC PANEL, COMPREHENSIVE    Collection Time: 01/19/22 12:21 PM   Result Value Ref Range    Sodium 136 136 - 145 mmol/L    Potassium 4.0 3.5 - 5.1 mmol/L    Chloride 101 97 - 108 mmol/L    CO2 29 21 - 32 mmol/L    Anion gap 6 5 - 15 mmol/L    Glucose 127 (H) 65 - 100 mg/dL    BUN 13 6 - 20 MG/DL    Creatinine 0.85 0.55 - 1.02 MG/DL    BUN/Creatinine ratio 15 12 - 20      GFR est AA >60 >60 ml/min/1.73m2    GFR est non-AA >60 >60 ml/min/1.73m2    Calcium 9.5 8.5 - 10.1 MG/DL    Bilirubin, total 0.3 0.2 - 1.0 MG/DL    ALT (SGPT) 24 12 - 78 U/L    AST (SGOT) 16 15 - 37 U/L    Alk. phosphatase 140 (H) 45 - 117 U/L    Protein, total 7.8 6.4 - 8.2 g/dL    Albumin 4.1 3.5 - 5.0 g/dL    Globulin 3.7 2.0 - 4.0 g/dL    A-G Ratio 1.1 1.1 - 2.2     TROPONIN-HIGH SENSITIVITY    Collection Time: 01/19/22 12:21 PM   Result Value Ref Range    Troponin-High Sensitivity 7 0 - 51 ng/L   URINALYSIS W/ REFLEX CULTURE    Collection Time: 01/19/22  1:24 PM    Specimen: Urine   Result Value Ref Range    Color YELLOW/STRAW      Appearance CLEAR CLEAR      Specific gravity 1.011 1.003 - 1.030      pH (UA) 6.0 5.0 - 8.0      Protein Negative NEG mg/dL    Glucose Negative NEG mg/dL    Ketone Negative NEG mg/dL    Bilirubin Negative NEG      Blood Negative NEG      Urobilinogen 0.2 0.2 - 1.0 EU/dL    Nitrites Negative NEG      Leukocyte Esterase TRACE (A) NEG      WBC 0-4 0 - 4 /hpf    RBC 0-5 0 - 5 /hpf    Epithelial cells FEW FEW /lpf    Bacteria Negative NEG /hpf    UA:UC IF INDICATED CULTURE NOT INDICATED BY UA RESULT CNI      Hyaline cast 0-2 0 - 5 /lpf       Radiologic Studies -   CT HEAD WO CONT   Final Result   No acute intracranial hemorrhage, mass or infarct. CTA HEAD NECK W CONT         XR CHEST PORT   Final Result   No evidence of acute cardiopulmonary process. MRI BRAIN WO CONT    (Results Pending)     CT Results  (Last 48 hours)               01/19/22 1355  CT HEAD WO CONT Final result    Impression:  No acute intracranial hemorrhage, mass or infarct. Narrative:  INDICATION: dizziness, intermittent, no resolved        Exam: Noncontrast CT of the brain is performed with 5 mm collimation. CT dose reduction was achieved with the use of the standardized protocol   tailored for this examination and automatic exposure control for dose   modulation.        Direct comparison is made to prior CT dated May 2019. FINDINGS: There is age-appropriate diffuse cortical atrophy. There is no acute   intracranial hemorrhage, mass, mass effect or herniation. Ventricular system is   normal. The gray-white matter differentiation is well-preserved. The mastoid air   cells are well pneumatized. The visualized paranasal sinuses are normal.           01/19/22 1355  CTA HEAD NECK W CONT Preliminary result    Narrative:  **PRELIMINARY REPORT**       No intracranial large vessel occlusion. Preliminary report was provided by Dr. Annie Mora, the on-call radiologist, at 3:22   PM       Final report to follow. **END PRELIMINARY REPORT**                                   CXR Results  (Last 48 hours)               01/19/22 1305  XR CHEST PORT Final result    Impression:  No evidence of acute cardiopulmonary process. Narrative:  INDICATION: Near syncope. Chest pain. COMPARISON: 5/23/2019       FINDINGS: AP portable imaging of the chest performed at 12:52 PM demonstrates a   stable cardiomediastinal silhouette. The lungs are clear bilaterally. No   significant osseous abnormalities are seen. Medical Decision Making   I am the first provider for this patient. I reviewed the vital signs, available nursing notes, past medical history, past surgical history, family history and social history. Vital Signs-Reviewed the patient's vital signs. Patient Vitals for the past 12 hrs:   Temp Pulse Resp BP SpO2   01/19/22 1836 97.4 °F (36.3 °C) 82 16 (!) 146/63 98 %   01/19/22 1612  63 17 137/86 98 %   01/19/22 1440  69 12 (!) 157/88 98 %   01/19/22 1311     98 %   01/19/22 1206 97.3 °F (36.3 °C) 86 20 132/81 100 %     Records Reviewed: Nursing Notes and Old Medical Records    Provider Notes (Medical Decision Making):     68-year-old female presents with dizziness, visual disturbance and gait dysfunction.   Asked to evaluate patient in triage for stroke, symptoms currently resolved at baseline. Certainly this could be TIA given patient's risk factors, will check CT head and CTA. Will check for near syncope with basic labs, urine and EKG and troponin. ED Course:   Initial assessment performed. The patients presenting problems have been discussed, and they are in agreement with the care plan formulated and outlined with them. I have encouraged them to ask questions as they arise throughout their visit. ED Course as of 01/19/22 1923 Wed Jan 19, 2022   1436 Labs reviewed and unremarkable, CT head negative. Patient reassessed, intermittent episodes of dizziness. Given patient's age and risk factors believe reasonable to observe for MRI to rule out posterior stroke. Patient agreeable to plan. [MB]      ED Course User Index  [MB] MD Erica Price MD      Disposition:    Admitted    Diagnosis     Clinical Impression:   1. Dysequilibrium    2. Gait disturbance        Attestations:    Erica Starks MD    Please note that this dictation was completed with [employer], the computer voice recognition software. Quite often unanticipated grammatical, syntax, homophones, and other interpretive errors are inadvertently transcribed by the computer software. Please disregard these errors. Please excuse any errors that have escaped final proofreading. Thank you.

## 2022-01-19 NOTE — H&P
Hospitalist Admission Note    NAME: Darren Hargrove   :  1945   MRN:  [id number]     Date/Time:  2022 3:29 PM    Patient PCP: Janelle Lerma MD  ______________________________________________________________________  Given the patient's current clinical presentation, I have a high level of concern for decompensation if discharged from the emergency department. Complex decision making was performed, which includes reviewing the patient's available past medical records, laboratory results, and x-ray films. My assessment of this patient's clinical condition and my plan of care is as follows. Assessment / Plan:      Generalized weakness with dizziness to rule out acute CVA, POA   Presented with 24-hour dizziness feeling with generalized weakness   No previous stroke   CT head on admission negative for stroke   No focal neurologic deficits on exam   Risk factors including hyperlipidemia and prediabetes   Obtain CTA head and neck   Obtain MRI   Neuro consultation   Hemoglobin A1c and lipid panel   Neurochecks   PT OT   Speech eval   Telemetry   Continue home aspirin   Continue home Zetia   Change home simvastatin to Lipitor    Prediabetes   Hold metformin   Correction scale with hypoglycemia protocol    Hyperlipidemia   As above    Code Status: Full code  Surrogate Decision Maker: Her son    DVT Prophylaxis: Heparin  GI Prophylaxis: not indicated    Baseline: Active, independent      Subjective:   CHIEF COMPLAINT: Dizziness    HISTORY OF PRESENT ILLNESS:       The patient 64years old woman with past medical history significant for pre-diabetes, hyperlipidemia presented emergency department due to dizziness started early this morning. She reported that she woke up early in the morning she was feeling dizzy and having some visual changes. She also said that she was feeling generalized weak all over without focal neurologic deficits.   She denied any abdominal pain, nausea, vomiting, diarrhea, chest pain, abdominal pain, shortness of breath. She denied any syncopal episode. Patient does not smoke, drink alcohol or use illicit drugs. In the emergency department, CT head negative. We were asked to admit for work up and evaluation of the above problems. Past Medical History:   Diagnosis Date    Breast cancer ([facility] 1999    Right    Diabetes ([facility]     As of 1/28/2020, pt states \"borderline\"    Fibromyalgia     GERD (gastroesophageal reflux disease)     Hiatal hernia     Hyponatremia 05/2019    As of 1/28/2020 pt had a seizure as a result to this.  Hypothyroid     Ill-defined condition     As of 1/28/2020, pt states her cardiologist says her HR drops at night but nothing to be concerned with.      Pre-diabetes     PUD (peptic ulcer disease)     PVC (premature ventricular contraction)     Too much caffeine    Seizures ([address] 05/2019    Stroke ([facility] 2003    Took vision from right eye        Past Surgical History:   Procedure Laterality Date    [facility]    HX COLONOSCOPY N/A     HX ENDOSCOPY      HX MASTECTOMY Right 1999    HX ORTHOPAEDIC  2000    L knee has pins and plates after a fall (fracture)    HX PARTIAL HYSTERECTOMY N/A        Social History     Tobacco Use    Smoking status: Never Smoker    Smokeless tobacco: Never Used   Substance Use Topics    Alcohol use: Not Currently        Family History   Problem Relation Age of Onset    Cancer Mother         breast cancer    Heart Disease Father     Diabetes Brother     Colon Cancer Brother     Diabetes Brother     Heart Disease Brother     Other Sister 55        Gastric bypass into staph infection    No Known Problems Sister      Allergies   Allergen Reactions    Other Food Anaphylaxis     Steak, cheese, grass, smoke    Tramadol Other (comments)     Hyponatremia, seizure    Beef Containing Products Other (comments)     Itching and can cause mouth to swell  Cheese Itching and Swelling    Gabapentin Other (comments)     As of 1/28/2020, pt requests not to have this. It \"completely wipes me out. \"    Codeine Rash and Swelling     Mouth    Other Plant, Animal, Environmental Hives     Rubber    Pcn [Penicillins] Rash and Swelling     Mouth        Prior to Admission medications    Medication Sig Start Date End Date Taking? Authorizing Provider   doxycycline (ADOXA) 100 mg tablet Take 1 Tablet by mouth two (2) times a day. 1/13/22   Ruddy Baig MD   ezetimibe (ZETIA) 10 mg tablet Take 1 Tablet by mouth daily. 9/13/21   Ruddy Baig MD   magnesium oxide 250 mg magnesium tablet Take 250 mg by mouth daily. Provider, Historical   lidocaine (LIDODERM) 5 % 1 Patch by TransDERmal route every twenty-four (24) hours. Apply patch to the affected area for 12 hours a day and remove for 12 hours a day. 9/2/21   Ruddy Baig MD   oxaprozin (DAYPRO) 600 mg tablet TAKE 1 TABLET BY MOUTH EVERY DAY AS NEEDED 8/9/21   Ruddy Baig MD   omeprazole (PRILOSEC) 40 mg capsule Take 40 mg by mouth daily. Provider, Historical   diclofenac EC (VOLTAREN) 50 mg EC tablet Take 1 Tab by mouth two (2) times daily as needed for Pain. Patient not taking: Reported on 6/9/2021 3/26/21   Ruddy Baig MD   levothyroxine (SYNTHROID) 50 mcg tablet Take 1 Tab by mouth Daily (before breakfast). Take 50 mcg every day except 75 mcg q Monday and Friday 2/8/21   Ruddy Baig MD   simvastatin (ZOCOR) 40 mg tablet Take 1 tablet by mouth nightly 2/8/21   Ruddy Baig MD   doxycycline (ADOXA) 100 mg tablet Take 1 Tab by mouth two (2) times a day. Patient not taking: Reported on 6/9/2021 1/18/21   Ruddy Baig MD   senna-docusate (PERICOLACE) 8.6-50 mg per tablet Take 1 Tab by mouth daily. Patient not taking: Reported on 6/9/2021    Provider, Historical   acetaminophen/diphenhydramine (TYLENOL PM PO) Take  by mouth.     Provider, Historical   pregabalin (LYRICA) 25 mg capsule Take 25 mg by mouth two (2) times a day. Provider, Historical   aspirin 81 mg chewable tablet Take 1 Tab by mouth daily. 6/4/19   Ghulam Champion MD   bisacodyl (DULCOLAX) 10 mg suppository Insert 10 mg into rectum daily. 6/4/19   Ghulam Champion MD   denosumab (PROLIA) 60 mg/mL injection 60 mg by SubCUTAneous route every 6 months. Provider, Historical   metFORMIN (GLUCOPHAGE) 500 mg tablet Take 500 mg by mouth daily (with breakfast). Provider, Historical   folic acid/multivit-min/lutein (CENTRUM SILVER PO) Take 1 Tab by mouth daily. Provider, Historical   cetirizine (ZYRTEC) 10 mg tablet Take 10 mg by mouth daily. Provider, Historical   cyanocobalamin (VITAMIN B12) 100 mcg tablet Take 100 mcg by mouth daily. Provider, Historical       REVIEW OF SYSTEMS:     I am not able to complete the review of systems because:    The patient is intubated and sedated    The patient has altered mental status due to his acute medical problems    The patient has baseline aphasia from prior stroke(s)    The patient has baseline dementia and is not reliable historian    The patient is in acute medical distress and unable to provide information           Total of 12 systems reviewed as follows:       POSITIVE= underlined text  Negative = text not underlined  General:  fever, chills, sweats, generalized weakness, weight loss/gain,      loss of appetite   Eyes:    blurred vision, eye pain, loss of vision, double vision  ENT:    rhinorrhea, pharyngitis   Respiratory:   cough, sputum production, SOB, NAVA, wheezing, pleuritic pain   Cardiology:   chest pain, palpitations, orthopnea, PND, edema, syncope   Gastrointestinal:  abdominal pain , N/V, diarrhea, dysphagia, constipation, bleeding   Genitourinary:  frequency, urgency, dysuria, hematuria, incontinence   Muskuloskeletal :  arthralgia, myalgia, back pain  Hematology:  easy bruising, nose or gum bleeding, lymphadenopathy   Dermatological: rash, ulceration, pruritis, color change / jaundice  Endocrine:   hot flashes or polydipsia   Neurological:  headache, dizziness, confusion, focal weakness, paresthesia,     Speech difficulties, memory loss, gait difficulty  Psychological: Feelings of anxiety, depression, agitation    Objective:   VITALS:    Visit Vitals  BP (!) 157/88   Pulse 69   Temp 97.3 °F (36.3 °C)   Resp 12   Ht 5' 4\" (1.626 m)   Wt 78 kg (172 lb)   SpO2 98%   BMI 29.52 kg/m²       PHYSICAL EXAM:    General:    Alert, cooperative, no distress, appears stated age. HEENT: Atraumatic, anicteric sclerae, pink conjunctivae     No oral ulcers, mucosa moist, throat clear, dentition fair  Neck:  Supple, symmetrical,  thyroid: non tender  Lungs:   Clear to auscultation bilaterally. No Wheezing or Rhonchi. No rales. Chest wall:  No tenderness  No Accessory muscle use. Heart:   Regular  rhythm,  No  murmur   No edema  Abdomen:   Soft, non-tender. Not distended. Bowel sounds normal  Extremities: No cyanosis. No clubbing,      Skin turgor normal, Capillary refill normal, Radial dial pulse 2+  Skin:     Not pale. Not Jaundiced  No rashes   Psych:  Good insight. Not depressed. Not anxious or agitated. Neurologic: EOMs intact. No facial asymmetry. No aphasia or slurred speech. Symmetrical strength, Sensation grossly intact.  Alert and oriented X 4.     _______________________________________________________________________  Care Plan discussed with:    Comments   Patient x    Family      RN x    Care Manager                    Consultant:      _______________________________________________________________________  Expected  Disposition:   Home with Family x   HH/PT/OT/RN    SNF/LTC    IRON    ________________________________________________________________________  TOTAL TIME:  72 Minutes    Critical Care Provided     Minutes non procedure based      Comments    x Reviewed previous records   >50% of visit spent in counseling and coordination of care x Discussion with patient and/or family and questions answered       ________________________________________________________________________  Signed: Bert Whelan MD    Procedures: see electronic medical records for all procedures/Xrays and details which were not copied into this note but were reviewed prior to creation of Plan. LAB DATA REVIEWED:    Recent Results (from the past 24 hour(s))   GLUCOSE, POC    Collection Time: 01/19/22 12:06 PM   Result Value Ref Range    Glucose (POC) 127 (H) 65 - 117 mg/dL    Performed by Ángel Huang    CBC WITH AUTOMATED DIFF    Collection Time: 01/19/22 12:21 PM   Result Value Ref Range    WBC 8.8 3.6 - 11.0 K/uL    RBC 4.12 3.80 - 5.20 M/uL    HGB 12.6 11.5 - 16.0 g/dL    HCT 39.0 35.0 - 47.0 %    MCV 94.7 80.0 - 99.0 FL    MCH 30.6 26.0 - 34.0 PG    MCHC 32.3 30.0 - 36.5 g/dL    RDW 14.0 11.5 - 14.5 %    PLATELET 751 755 - 641 K/uL    MPV 8.5 (L) 8.9 - 12.9 FL    NRBC 0.0 0  WBC    ABSOLUTE NRBC 0.00 0.00 - 0.01 K/uL    NEUTROPHILS 56 32 - 75 %    LYMPHOCYTES 36 12 - 49 %    MONOCYTES 7 5 - 13 %    EOSINOPHILS 1 0 - 7 %    BASOPHILS 0 0 - 1 %    IMMATURE GRANULOCYTES 0 0.0 - 0.5 %    ABS. NEUTROPHILS 4.9 1.8 - 8.0 K/UL    ABS. LYMPHOCYTES 3.2 0.8 - 3.5 K/UL    ABS. MONOCYTES 0.6 0.0 - 1.0 K/UL    ABS. EOSINOPHILS 0.0 0.0 - 0.4 K/UL    ABS. BASOPHILS 0.0 0.0 - 0.1 K/UL    ABS. IMM.  GRANS. 0.0 0.00 - 0.04 K/UL    DF AUTOMATED     METABOLIC PANEL, COMPREHENSIVE    Collection Time: 01/19/22 12:21 PM   Result Value Ref Range    Sodium 136 136 - 145 mmol/L    Potassium 4.0 3.5 - 5.1 mmol/L    Chloride 101 97 - 108 mmol/L    CO2 29 21 - 32 mmol/L    Anion gap 6 5 - 15 mmol/L    Glucose 127 (H) 65 - 100 mg/dL    BUN 13 6 - 20 MG/DL    Creatinine 0.85 0.55 - 1.02 MG/DL    BUN/Creatinine ratio 15 12 - 20      GFR est AA >60 >60 ml/min/1.73m2    GFR est non-AA >60 >60 ml/min/1.73m2    Calcium 9.5 8.5 - 10.1 MG/DL    Bilirubin, total 0.3 0.2 - 1.0 MG/DL    ALT (SGPT) 24 12 - 78 U/L    AST (SGOT) 16 15 - 37 U/L Alk. phosphatase 140 (H) 45 - 117 U/L    Protein, total 7.8 6.4 - 8.2 g/dL    Albumin 4.1 3.5 - 5.0 g/dL    Globulin 3.7 2.0 - 4.0 g/dL    A-G Ratio 1.1 1.1 - 2.2     TROPONIN-HIGH SENSITIVITY    Collection Time: 01/19/22 12:21 PM   Result Value Ref Range    Troponin-High Sensitivity 7 0 - 51 ng/L   URINALYSIS W/ REFLEX CULTURE    Collection Time: 01/19/22  1:24 PM    Specimen: Urine   Result Value Ref Range    Color YELLOW/STRAW      Appearance CLEAR CLEAR      Specific gravity 1.011 1.003 - 1.030      pH (UA) 6.0 5.0 - 8.0      Protein Negative NEG mg/dL    Glucose Negative NEG mg/dL    Ketone Negative NEG mg/dL    Bilirubin Negative NEG      Blood Negative NEG      Urobilinogen 0.2 0.2 - 1.0 EU/dL    Nitrites Negative NEG      Leukocyte Esterase TRACE (A) NEG      WBC 0-4 0 - 4 /hpf    RBC 0-5 0 - 5 /hpf    Epithelial cells FEW FEW /lpf    Bacteria Negative NEG /hpf    UA:UC IF INDICATED CULTURE NOT INDICATED BY UA RESULT CNI      Hyaline cast 0-2 0 - 5 /lpf

## 2022-01-19 NOTE — ED NOTES
Patient is being transferred to [address], Room # 1540. Report given to Ta Riley RN on Ruel Gum for routine progression of care. Report consisted of the following information SBAR, Kardex, Intake/Output and MAR. Patient transferred to receiving unit by transport. Outstanding consults needed: Yes    Next labs due: No     The following personal items will be sent with the patient during transfer to the floor: All valuables:    Cardiac monitoring ordered: Yes    The following CURRENT information was reported to the receiving RN:    Code status: Full Code at time of transfer    Last set of vital signs:  Vital Signs  Level of Consciousness: Alert (0) (01/19/22 1206)  Temp: 97.3 °F (36.3 °C) (01/19/22 1206)  Temp Source: Oral (01/19/22 1206)  Pulse (Heart Rate): 63 (01/19/22 1612)  Heart Rate Source: Monitor (01/19/22 1206)  Cardiac Rhythm: Sinus Rhythm (01/19/22 1311)  Resp Rate: 17 (01/19/22 1612)  BP: 137/86 (01/19/22 1612)  MAP (Monitor): 98 (01/19/22 1612)  MAP (Calculated): 103 (01/19/22 1612)  BP 1 Location: Left upper arm (01/19/22 1206)  BP 1 Method: Automatic (01/19/22 1206)  BP Patient Position: Sitting (01/19/22 1206)  MEWS Score: 1 (01/19/22 1206)         Oxygen Therapy  O2 Sat (%): 98 % (01/19/22 1612)  Pulse via Oximetry: 63 beats per minute (01/19/22 1612)  O2 Device: None (Room air) (01/19/22 1311)      Last pain assessment:  Pain 1  Pain Scale 1: Numeric (0 - 10)      Wounds: No     Urinary catheter: voiding  Is there a olmedo order: No     LDAs:       Peripheral IV 01/19/22 Left Antecubital (Active)   Site Assessment Clean, dry, & intact 01/19/22 1350   Phlebitis Assessment 0 01/19/22 1350   Infiltration Assessment 0 01/19/22 1350   Dressing Status Clean, dry, & intact 01/19/22 1350   Hub Color/Line Status Green 01/19/22 1350         Opportunity for questions and clarification was provided.     Robert Garcia RN

## 2022-01-20 ENCOUNTER — APPOINTMENT (OUTPATIENT)
Dept: NON INVASIVE DIAGNOSTICS | Age: 77
End: 2022-01-20
Attending: INTERNAL MEDICINE
Payer: MEDICARE

## 2022-01-20 VITALS
RESPIRATION RATE: 18 BRPM | SYSTOLIC BLOOD PRESSURE: 122 MMHG | DIASTOLIC BLOOD PRESSURE: 73 MMHG | BODY MASS INDEX: 29.37 KG/M2 | WEIGHT: 172 LBS | OXYGEN SATURATION: 98 % | HEART RATE: 62 BPM | TEMPERATURE: 97.9 F | HEIGHT: 64 IN

## 2022-01-20 LAB
CHOLEST SERPL-MCNC: 169 MG/DL
ECHO AO ASC DIAM: 3.1 CM
ECHO AO ASCENDING AORTA INDEX: 1.69 CM/M2
ECHO AV AREA PEAK VELOCITY: 1.7 CM2
ECHO AV AREA VTI: 2.1 CM2
ECHO AV AREA/BSA PEAK VELOCITY: 0.9 CM2/M2
ECHO AV AREA/BSA VTI: 1.1 CM2/M2
ECHO AV MEAN GRADIENT: 5 MMHG
ECHO AV MEAN VELOCITY: 1 M/S
ECHO AV PEAK GRADIENT: 10 MMHG
ECHO AV PEAK VELOCITY: 1.6 M/S
ECHO AV VELOCITY RATIO: 0.5
ECHO AV VTI: 31 CM
ECHO LA DIAMETER INDEX: 1.91 CM/M2
ECHO LA DIAMETER: 3.5 CM
ECHO LA VOL 4C: 32 ML (ref 22–52)
ECHO LA VOLUME INDEX A4C: 17 ML/M2 (ref 16–34)
ECHO LV E' LATERAL VELOCITY: 6 CM/S
ECHO LV E' SEPTAL VELOCITY: 8 CM/S
ECHO LV EDV A4C: 87 ML
ECHO LV EDV INDEX A4C: 48 ML/M2
ECHO LV EJECTION FRACTION A4C: 53 %
ECHO LV ESV A4C: 41 ML
ECHO LV ESV INDEX A4C: 22 ML/M2
ECHO LV FRACTIONAL SHORTENING: 33 % (ref 28–44)
ECHO LV INTERNAL DIMENSION DIASTOLE INDEX: 2.13 CM/M2
ECHO LV INTERNAL DIMENSION DIASTOLIC: 3.9 CM (ref 3.9–5.3)
ECHO LV INTERNAL DIMENSION SYSTOLIC INDEX: 1.42 CM/M2
ECHO LV INTERNAL DIMENSION SYSTOLIC: 2.6 CM
ECHO LV IVSD: 1.1 CM (ref 0.6–0.9)
ECHO LV MASS 2D: 122.1 G (ref 67–162)
ECHO LV MASS INDEX 2D: 66.7 G/M2 (ref 43–95)
ECHO LV POSTERIOR WALL DIASTOLIC: 0.9 CM (ref 0.6–0.9)
ECHO LV RELATIVE WALL THICKNESS RATIO: 0.46
ECHO LVOT AREA: 3.1 CM2
ECHO LVOT AV VTI INDEX: 0.64
ECHO LVOT DIAM: 2 CM
ECHO LVOT MEAN GRADIENT: 1 MMHG
ECHO LVOT PEAK GRADIENT: 3 MMHG
ECHO LVOT PEAK VELOCITY: 0.8 M/S
ECHO LVOT STROKE VOLUME INDEX: 34 ML/M2
ECHO LVOT SV: 62.2 ML
ECHO LVOT VTI: 19.8 CM
ECHO MV A VELOCITY: 0.99 M/S
ECHO MV AREA VTI: 3.2 CM2
ECHO MV E DECELERATION TIME (DT): 208.4 MS
ECHO MV E VELOCITY: 0.43 M/S
ECHO MV E/A RATIO: 0.43
ECHO MV E/E' LATERAL: 7.17
ECHO MV E/E' RATIO (AVERAGED): 6.27
ECHO MV E/E' SEPTAL: 5.38
ECHO MV LVOT VTI INDEX: 0.98
ECHO MV MAX VELOCITY: 1.1 M/S
ECHO MV MEAN GRADIENT: 1 MMHG
ECHO MV MEAN VELOCITY: 0.5 M/S
ECHO MV PEAK GRADIENT: 5 MMHG
ECHO MV VTI: 19.4 CM
ECHO PV MAX VELOCITY: 0.7 M/S
ECHO PV PEAK GRADIENT: 2 MMHG
ECHO TV E WAVE: 0.5 M/S
ECHO TV REGURGITANT MAX VELOCITY: 1.38 M/S
ECHO TV REGURGITANT PEAK GRADIENT: 8 MMHG
EST. AVERAGE GLUCOSE BLD GHB EST-MCNC: 126 MG/DL
HBA1C MFR BLD: 6 % (ref 4–5.6)
HDLC SERPL-MCNC: 58 MG/DL
HDLC SERPL: 2.9 {RATIO} (ref 0–5)
LDLC SERPL CALC-MCNC: 82 MG/DL (ref 0–100)
TRIGL SERPL-MCNC: 145 MG/DL (ref ?–150)
VLDLC SERPL CALC-MCNC: 29 MG/DL

## 2022-01-20 PROCEDURE — 97165 OT EVAL LOW COMPLEX 30 MIN: CPT | Performed by: OCCUPATIONAL THERAPIST

## 2022-01-20 PROCEDURE — 97530 THERAPEUTIC ACTIVITIES: CPT

## 2022-01-20 PROCEDURE — 97116 GAIT TRAINING THERAPY: CPT

## 2022-01-20 PROCEDURE — 93306 TTE W/DOPPLER COMPLETE: CPT

## 2022-01-20 PROCEDURE — 36415 COLL VENOUS BLD VENIPUNCTURE: CPT

## 2022-01-20 PROCEDURE — G0378 HOSPITAL OBSERVATION PER HR: HCPCS

## 2022-01-20 PROCEDURE — 80061 LIPID PANEL: CPT

## 2022-01-20 PROCEDURE — 83036 HEMOGLOBIN GLYCOSYLATED A1C: CPT

## 2022-01-20 PROCEDURE — 97161 PT EVAL LOW COMPLEX 20 MIN: CPT

## 2022-01-20 PROCEDURE — 97535 SELF CARE MNGMENT TRAINING: CPT | Performed by: OCCUPATIONAL THERAPIST

## 2022-01-20 PROCEDURE — 74011250637 HC RX REV CODE- 250/637: Performed by: INTERNAL MEDICINE

## 2022-01-20 PROCEDURE — 74011250637 HC RX REV CODE- 250/637: Performed by: NURSE PRACTITIONER

## 2022-01-20 PROCEDURE — 97530 THERAPEUTIC ACTIVITIES: CPT | Performed by: OCCUPATIONAL THERAPIST

## 2022-01-20 RX ORDER — CETIRIZINE HCL 10 MG
10 TABLET ORAL DAILY
Status: DISCONTINUED | OUTPATIENT
Start: 2022-01-20 | End: 2022-01-20 | Stop reason: HOSPADM

## 2022-01-20 RX ORDER — CYCLOBENZAPRINE HCL 10 MG
5 TABLET ORAL 2 TIMES DAILY
Qty: 30 TABLET | Refills: 1 | Status: SHIPPED | OUTPATIENT
Start: 2022-01-20 | End: 2022-01-20 | Stop reason: SDUPTHER

## 2022-01-20 RX ORDER — CYCLOBENZAPRINE HCL 10 MG
5 TABLET ORAL
Status: DISCONTINUED | OUTPATIENT
Start: 2022-01-20 | End: 2022-01-20 | Stop reason: HOSPADM

## 2022-01-20 RX ORDER — CYCLOBENZAPRINE HCL 10 MG
5 TABLET ORAL
Qty: 30 TABLET | Refills: 1 | Status: SHIPPED | OUTPATIENT
Start: 2022-01-20 | End: 2022-02-16 | Stop reason: SDUPTHER

## 2022-01-20 RX ADMIN — DOCUSATE SODIUM 50 MG AND SENNOSIDES 8.6 MG 1 TABLET: 8.6; 5 TABLET, FILM COATED ORAL at 08:56

## 2022-01-20 RX ADMIN — ASPIRIN 81 MG CHEWABLE TABLET 81 MG: 81 TABLET CHEWABLE at 08:56

## 2022-01-20 RX ADMIN — PREGABALIN 25 MG: 25 CAPSULE ORAL at 08:56

## 2022-01-20 RX ADMIN — CETIRIZINE HYDROCHLORIDE 10 MG: 10 TABLET, FILM COATED ORAL at 08:57

## 2022-01-20 RX ADMIN — LEVOTHYROXINE SODIUM 50 MCG: 0.05 TABLET ORAL at 05:58

## 2022-01-20 RX ADMIN — PANTOPRAZOLE SODIUM 40 MG: 40 TABLET, DELAYED RELEASE ORAL at 08:56

## 2022-01-20 RX ADMIN — EZETIMIBE 10 MG: 10 TABLET ORAL at 08:56

## 2022-01-20 NOTE — PROGRESS NOTES
Problem: Falls - Risk of  Goal: *Absence of Falls  Description: Document [place] Fall Risk and appropriate interventions in the flowsheet.   Outcome: Progressing Towards Goal  Note: Fall Risk Interventions:  Mobility Interventions: Bed/chair exit alarm,Patient to call before getting OOB                   History of Falls Interventions: Bed/chair exit alarm,Door open when patient unattended,Room close to nurse's station         Problem: Patient Education: Go to Patient Education Activity  Goal: Patient/Family Education  Outcome: Progressing Towards Goal     Problem: TIA/CVA Stroke: 0-24 hours  Goal: Off Pathway (Use only if patient is Off Pathway)  Outcome: Progressing Towards Goal  Goal: Activity/Safety  Outcome: Progressing Towards Goal  Goal: Consults, if ordered  Outcome: Progressing Towards Goal  Goal: Diagnostic Test/Procedures  Outcome: Progressing Towards Goal  Goal: Nutrition/Diet  Outcome: Progressing Towards Goal  Goal: Discharge Planning  Outcome: Progressing Towards Goal  Goal: Medications  Outcome: Progressing Towards Goal  Goal: Respiratory  Outcome: Progressing Towards Goal  Goal: Treatments/Interventions/Procedures  Outcome: Progressing Towards Goal  Goal: Minimize risk of bleeding post-thrombolytic infusion  Outcome: Progressing Towards Goal  Goal: Monitor for complications post-thrombolytic infusion  Outcome: Progressing Towards Goal  Goal: Psychosocial  Outcome: Progressing Towards Goal  Goal: *Hemodynamically stable  Outcome: Progressing Towards Goal  Goal: *Neurologically stable  Description: Absence of additional neurological deficits    Outcome: Progressing Towards Goal  Goal: *Verbalizes anxiety and depression are reduced or absent  Outcome: Progressing Towards Goal  Goal: *Absence of Signs of Aspiration on Current Diet  Outcome: Progressing Towards Goal  Goal: *Absence of deep venous thrombosis signs and symptoms(Stroke Metric)  Outcome: Progressing Towards Goal  Goal: *Ability to perform ADLs and demonstrates progressive mobility and function  Outcome: Progressing Towards Goal  Goal: *Stroke education started(Stroke Metric)  Outcome: Progressing Towards Goal  Goal: *Dysphagia screen performed(Stroke Metric)  Outcome: Progressing Towards Goal  Goal: *Rehab consulted(Stroke Metric)  Outcome: Progressing Towards Goal

## 2022-01-20 NOTE — PROGRESS NOTES
End of Shift Note    Bedside shift change report given to RADHA Barber (oncoming nurse) by Shaniqua Cortes RN (offgoing nurse). Report included the following information SBAR, Kardex and Recent Results    Shift worked: 7a-7p   Shift summary and any significant changes:    New admission. Concerns for physician to address: None   Zone phone for oncoming shift:  [phone]     Patient Information  Bria Floor  64 y.o.  1/19/2022 12:37 PM by Milton Escobar MD. Bria Alanis was admitted from Home    Problem List  Patient Active Problem List    Diagnosis Date Noted    Postural dizziness with presyncope 01/19/2022    Mixed hyperlipidemia 01/14/2020    Overweight (BMI 25.0-29.9) 11/14/2018    Other specified hypothyroidism 05/16/2018    Gastroesophageal reflux disease without esophagitis 05/16/2018    History of CVA (cerebrovascular accident) 05/16/2018    Osteopenia of multiple sites 05/16/2018    HX: breast cancer 05/16/2018    Closed fracture of left lower extremity with routine healing 05/16/2018    Endometriosis 05/16/2018     Past Medical History:   Diagnosis Date    Breast cancer ([address] 1999    Right    Diabetes ([address]     As of 1/28/2020, pt states \"borderline\"    Fibromyalgia     GERD (gastroesophageal reflux disease)     Hiatal hernia     Hyponatremia 05/2019    As of 1/28/2020 pt had a seizure as a result to this.  Hypothyroid     Ill-defined condition     As of 1/28/2020, pt states her cardiologist says her HR drops at night but nothing to be concerned with.      Pre-diabetes     PUD (peptic ulcer disease)     PVC (premature ventricular contraction)     Too much caffeine    Seizures ([address] 05/2019    Stroke ([address] 2003    Took vision from right eye       Core Measures:  CVA: Yes Yes  CHF:No Not applicable  PNA:No Not applicable    Activity:     Number times ambulated in hallways past shift: 0  Number of times OOB to chair past shift: 0    Cardiac:   Cardiac Monitoring: Yes      Cardiac Rhythm: Sinus Rhythm    Access:   Current line(s): PIV   Central Line? No  PICC LINE? No     Genitourinary:   Urinary status: external catheter  Urinary Catheter? No     Respiratory:   O2 Device: None (Room air)  Chronic home O2 use?: NO  Incentive spirometer at bedside: N/A       GI:     Current diet:  ADULT DIET Regular  Passing flatus: YES  Tolerating current diet: YES       Pain Management:   Patient states pain is manageable on current regimen: YES    Skin:     Interventions: increase time out of bed and nutritional support     Patient Safety:  Fall Score: Total Score: 2  Interventions: bed/chair alarm, assistive device (walker, cane, etc), gripper socks, pt to call before getting OOB and stay with me (per policy)       DVT prophylaxis:  DVT prophylaxis Med- Yes  DVT prophylaxis SCD or ANAHI- No     Wounds: (If Applicable)  Wounds- No    Active Consults:  IP CONSULT TO NEUROLOGY    Length of Stay:  Expected LOS: - - -  Actual LOS: 0  Discharge Plan:  To [facility] 1/20/22      Jose Arnold RN

## 2022-01-20 NOTE — PROGRESS NOTES
1052 - Oral and Written notification given to patient and/or caregiver informing patient of current Observation status receiving care in our facility. Signed copy on bedside chart    Medicare Outpatient Observation Notice (MOON) provided to patient/representative with verbal explanation of the notice. Time allotted for questions regarding the notice. Patient /representative provided a completed copy of the [place] notice. Signed copy on bedside chart  Rory Hargrove Care Management Specialist      Attempted to deliver and verbally explain the state observation and [address] letter with patient. Patient was CHRISTINE. Will attempt later in the day.  Rory Hargrove Care Management Specialist

## 2022-01-20 NOTE — PROGRESS NOTES
Problem: Self Care Deficits Care Plan (Adult)  Goal: *Acute Goals and Plan of Care (Insert Text)  Description:     FUNCTIONAL STATUS PRIOR TO ADMISSION: Patient was independent and active without use of DME, including performing IADLs and driving. HOME SUPPORT: The patient lived alone with her daughter and her family living near by. She does not typically need assistance from family. Occupational Therapy Goals  Initiated 1/20/2022  1. Patient will perform grooming standing at sink with independence within 7 day(s). 2.  Patient will perform upper body dressing with independence within 7 day(s). 3.  Patient will perform lower body dressing with independence within 7 day(s). 4.  Patient will perform toilet transfers with independence within 7 day(s). 5.  Patient will perform all aspects of toileting with independence within 7 day(s). 6.  Patient will perform ADL setup with independence within 7 day(s). 1/20/2022 1407 by OKSANA Garcia  Outcome: Progressing Towards Goal    OCCUPATIONAL THERAPY EVALUATION  Patient: Pratik Ivy (74 y.o. female)  Date: 1/20/2022  Primary Diagnosis: Postural dizziness with presyncope [R42, R55]        Precautions: Falls       ASSESSMENT  Based on the objective data described below, the patient presents with mild GW, almost constant mild dizziness, decreased activity tolerance and decreased standing balance which is impairing her functional independence. Her dizziness does increase some when tilting her head downward during seated LB dressing and ambulation. The patient is functioning below her independent baseline, now performing ADLs and functional mobility at an independent to [facility] level. She will benefit from acute OT intervention and depending on her progress acutely she may need SNF rehab after discharge. Patient lives alone and her family members that live near her presently have Emily. Functional Outcome Measure:   The patient scored 70/100 on the Barthel Index outcome measure which is indicative of a 30% impairment in function. PLAN :  Recommendations and Planned Interventions: self care training, functional mobility training, therapeutic exercise, balance training, therapeutic activities, patient education, and home safety training      Frequency/Duration: Patient will be followed by occupational therapy 3 times a week to address goals. Recommendation for discharge: (in order for the patient to meet his/her long term goals)  To be determined: Pending progress acutely, she may need SNF rehab 2/2 living alone and her family members that live near by  dong Diaz. Equipment recommendations for successful discharge (if) home: TBD pending progress. OBJECTIVE DATA SUMMARY:   HISTORY:   Past Medical History:   Diagnosis Date    Breast cancer ([facility] [address] 1999    Right    Diabetes ([facility] [address]     As of 1/28/2020, pt states \"borderline\"    Fibromyalgia     GERD (gastroesophageal reflux disease)     Hiatal hernia     Hyponatremia 05/2019    As of 1/28/2020 pt had a seizure as a result to this. Hypothyroid     Ill-defined condition     As of 1/28/2020, pt states her cardiologist says her HR drops at night but nothing to be concerned with.      Pre-diabetes     PUD (peptic ulcer disease)     PVC (premature ventricular contraction)     Too much caffeine    Seizures ([address] 05/2019    Stroke ([facility] [address] 2003    Took vision from right eye     Past Surgical History:   Procedure Laterality Date    HX BACK SURGERY  05/16/2019    [facility]    HX COLONOSCOPY N/A     HX ENDOSCOPY      HX MASTECTOMY Right 1999    HX ORTHOPAEDIC  2000    L knee has pins and plates after a fall (fracture)    HX PARTIAL HYSTERECTOMY N/A        Expanded or extensive additional review of patient history:     Home Situation  # Steps to Enter: 10  Rails to Enter: Yes  Hand Rails : Bilateral  One/Two Story Residence: One story  Living Alone: Yes  Support Systems: Other Family Member(s)  Current DME Used/Available at Home: [employer], rolling,Raised toilet seat,Grab bars  Tub or Shower Type: Shower    Hand dominance: Right    EXAMINATION OF PERFORMANCE DEFICITS:  Cognitive/Behavioral Status:  Neurologic State: Alert  Orientation Level: Oriented X4  Cognition: Appropriate decision making; Appropriate for age attention/concentration; Appropriate safety awareness; Follows commands  Perception: Appears intact  Perseveration: No perseveration noted  Safety/Judgement: Awareness of environment; Insight into deficits        Vision/Perceptual:    Acuity: Within Defined Limits         Range of Motion:  AROM: Within functional limits    Strength:  Strength: Generally decreased, functional    Coordination:  Coordination: Within functional limits            Tone & Sensation:  Tone: Normal  Sensation: Intact       Balance:  Sitting: Intact  Standing: Impaired  Standing - Static: Good  Standing - Dynamic : Fair    Functional Mobility and Transfers for ADLs:  Bed Mobility:  Rolling: Independent  Supine to Sit: Independent  Scooting: Independent    Transfers:  Sit to Stand: Stand-by assistance  Stand to Sit: Stand-by assistance  Bed to Chair: Contact guard assistance  Bathroom Mobility: Contact guard assistance (ambulating hand hold assist)  Toilet Transfer : Contact guard assistance  Tub Transfer: Stand-by assistance (using grab bars )    ADL Assessment:  Feeding: Independent    Oral Facial Hygiene/Grooming: Stand-by assistance    Bathing: Contact guard assistance    Upper Body Dressing: Supervision;Setup    Lower Body Dressing: Contact guard assistance    Toileting: Stand by assistance           Functional Measure:  Barthel Index:    Bathin  Bladder: 10  Bowels: 10  Groomin  Dressin  Feeding: 10  Mobility: 10  Stairs: 5  Toilet Use: 5  Transfer (Bed to Chair and Back): 10  Total: 70/100        Percentage of impairment   0%   1-19%   20-39%   40-59%   60-79%   80-99%   100%   Barthel Score 0-100 100 99-80 79-60 59-40 20-39 1-19   0     The Barthel ADL Index: Guidelines  1. The index should be used as a record of what a patient does, not as a record of what a patient could do. 2. The main aim is to establish degree of independence from any help, physical or verbal, however minor and for whatever reason. 3. The need for supervision renders the patient not independent. 4. A patient's performance should be established using the best available evidence. Asking the patient, friends/relatives and nurses are the usual sources, but direct observation and common sense are also important. However direct testing is not needed. 5. Usually the patient's performance over the preceding 24-48 hours is important, but occasionally longer periods will be relevant. 6. Middle categories imply that the patient supplies over 50 per cent of the effort. 7. Use of aids to be independent is allowed. Juana Calero., Barthel, D.W. ([id number]). Functional evaluation: the Barthel Index. [address] [facility] (14)2. MARGOTH Macias, Kristie Zapata., Franco Hall., [facility], [address] [place] [address] (1999). Measuring the change indisability after inpatient rehabilitation; comparison of the responsiveness of the Barthel Index and Functional [place] Measure. Journal of Neurology, Neurosurgery, and Psychiatry, 66(4), 892-139. CARRIE Rodriges.DOIR, JANNETTE Serrano, & Lila Bedoya MMADY. (2004.) Assessment of post-stroke quality of life in cost-effectiveness studies: The usefulness of the Barthel Index and the EuroQoL-5D. Quality of Life Research, 13, 255-44        Pain Rating:  No complaint of pain    Activity Tolerance:   Fair  C/o constant dizziness, but her BP was stable with positional changes and s/p ambulation. Some increased dizziness when tilting her head downward.        After treatment patient left in no apparent distress:    Sitting in chair, Call bell within reach, and Bed / chair alarm activated    COMMUNICATION/EDUCATION:   The patients plan of care was discussed with: Physical Therapist and Registered Nurse. Home safety education was provided and the patient/caregiver indicated understanding., Patient/family have participated as able in goal setting and plan of care. , and Patient/family agree to work toward stated goals and plan of care. This patients plan of care is appropriate for delegation to [facility] [place].     Thank you for this referral.  Tres Solitario, OTR/L  Time Calculation: 35 mins

## 2022-01-20 NOTE — PROGRESS NOTES
Problem: Mobility Impaired (Adult and Pediatric)  Goal: *Acute Goals and Plan of Care (Insert Text)  Description: FUNCTIONAL STATUS PRIOR TO ADMISSION: Patient was independent and active without use of DME.    HOME SUPPORT PRIOR TO ADMISSION: The patient lived alone, with her grandson and his family close by. Physical Therapy Goals  Initiated 1/20/2022  1. Patient will move from supine to sit and sit to supine , scoot up and down, and roll side to side in bed with modified independence within 7 day(s). 2.  Patient will transfer from bed to chair and chair to bed with modified independence using the least restrictive device within 7 day(s). 3.  Patient will perform sit to stand with modified independence within 7 day(s). 4.  Patient will ambulate with modified independence for 150 feet with the least restrictive device within 7 day(s). 5.  Patient will ascend/descend 12 stairs with B handrail(s) with modified independence within 7 day(s). Outcome: Progressing Towards Goal   PHYSICAL THERAPY EVALUATION  Patient: Shaniqua Gee (32 y.o. female)  Date: 1/20/2022  Primary Diagnosis: Postural dizziness with presyncope [R42, R55]       Precautions:   Fall    ASSESSMENT  Based on the objective data described below, the patient presents with report of constant dizziness, intermittent report of being lightheaded, generalized weakness, impaired standing balance, and overall decreased functional independence. Pt not reporting blurred vision at this time, but did report having it in the ED, as well as some numbness in her LUE. Blood pressures were stable for the most part, but pt initially orthostatic and symptomatic after standing the first time, however her BP stabilized the second time she stood and ambulated to and from the bathroom. Pt reports her dizziness increases some when moving her head down and noted burping and report of nausea. Gait initially ataxic, but improved with time.   Pt able to navigate stairs with B rail, using step to and reciprocal patterns with SBA to CGA. Pt is functioning below her independent baseline and lives alone. Recommending SNF rehab at discharge, pending her progress in the acute setting. She would also benefit from and ENT consult at discharge. Current Level of Function Impacting Discharge (mobility/balance): SBA to CGA    Functional Outcome Measure: The patient scored 70/100 on the Barthel Index outcome measure which is indicative of 30% impaired function/adls. Other factors to consider for discharge: lives alone, local family currently has COVID     Patient will benefit from skilled therapy intervention to address the above noted impairments. PLAN :  Recommendations and Planned Interventions: bed mobility training, transfer training, gait training, therapeutic exercises, patient and family training/education and therapeutic activities      Frequency/Duration: Patient will be followed by physical therapy:  3 times a week to address goals. Recommendation for discharge: (in order for the patient to meet his/her long term goals)  Therapy up to 5 days/week in SNF setting vs Home with HHPT pending progress    This discharge recommendation:  Has been made in collaboration with the attending provider and/or case management    IF patient discharges home will need the following DME: patient owns DME required for discharge         SUBJECTIVE:   Patient stated I wish someone would check my ears.     OBJECTIVE DATA SUMMARY:   HISTORY:    Past Medical History:   Diagnosis Date    Breast cancer ([facility] [address] 1999    Right    Diabetes ([facility] [address]     As of 1/28/2020, pt states \"borderline\"    Fibromyalgia     GERD (gastroesophageal reflux disease)     Hiatal hernia     Hyponatremia 05/2019    As of 1/28/2020 pt had a seizure as a result to this.      Hypothyroid     Ill-defined condition     As of 1/28/2020, pt states her cardiologist says her HR drops at night but nothing to be concerned with.      Pre-diabetes     PUD (peptic ulcer disease)     PVC (premature ventricular contraction)     Too much caffeine    Seizures ([facility] [address] 05/2019    Stroke ([facility] [address] 2003    Took vision from right eye     Past Surgical History:   Procedure Laterality Date    [facility]    HX COLONOSCOPY N/A     HX ENDOSCOPY      HX MASTECTOMY Right 1999    HX ORTHOPAEDIC  2000    L knee has pins and plates after a fall (fracture)    HX PARTIAL HYSTERECTOMY N/A        Personal factors and/or comorbidities impacting plan of care: dizziness, nausea, impaired balance    Home Situation  Home Environment: Private residence  # Steps to Enter: 5  Rails to Enter: Yes  Hand Rails : Bilateral  One/Two Story Residence: Two story, live on 1st floor  Living Alone: Yes  Support Systems: Other Family Member(s)  Current DME Used/Available at Home: Walker, rolling,Raised toilet seat,Grab bars  Tub or Shower Type: Shower    EXAMINATION/PRESENTATION/DECISION MAKING:   Critical Behavior:  Neurologic State: Alert  Orientation Level: Oriented X4  Cognition: Appropriate decision making,Appropriate for age attention/concentration,Appropriate safety awareness,Follows commands  Safety/Judgement: Awareness of environment,Insight into deficits  Range Of Motion:  AROM: Within functional limits           Strength:    Strength: Generally decreased, functional         Tone & Sensation:   Tone: Normal              Sensation: Intact               Coordination:  Coordination: Within functional limits  Vision:   Acuity: Within Defined Limits  Functional Mobility:  Bed Mobility:  Rolling: Independent  Supine to Sit: Independent     Scooting: Independent  Transfers:  Sit to Stand: Stand-by assistance  Stand to Sit: Stand-by assistance        Bed to Chair: Contact guard assistance        Balance:   Sitting: Intact  Standing: Impaired  Standing - Static: Good  Standing - Dynamic : Fair  Ambulation/Gait Training:  Distance (ft): 120 Feet (ft)     Ambulation - Level of Assistance: Contact guard assistance     Gait Description (WDL): Exceptions to WDL  Gait Abnormalities: Decreased step clearance (initially ataxic, improved with time)        Base of Support: Widened     Speed/Paola: Pace decreased (<100 feet/min)  Step Length: Left shortened;Right shortened          Stairs:  Number of Stairs Trained: 12  Stairs - Level of Assistance: Stand-by assistance;Contact guard assistance   Rail Use: Both        Functional Measure:  Barthel Index:    Bathin  Bladder: 10  Bowels: 10  Groomin  Dressin  Feeding: 10  Mobility: 10  Stairs: 5  Toilet Use: 5  Transfer (Bed to Chair and Back): 10  Total: 70/100       The Barthel ADL Index: Guidelines  1. The index should be used as a record of what a patient does, not as a record of what a patient could do. 2. The main aim is to establish degree of independence from any help, physical or verbal, however minor and for whatever reason. 3. The need for supervision renders the patient not independent. 4. A patient's performance should be established using the best available evidence. Asking the patient, friends/relatives and nurses are the usual sources, but direct observation and common sense are also important. However direct testing is not needed. 5. Usually the patient's performance over the preceding 24-48 hours is important, but occasionally longer periods will be relevant. 6. Middle categories imply that the patient supplies over 50 per cent of the effort. 7. Use of aids to be independent is allowed. Score Interpretation (from [address] [facility])    Independent   60-79 Minimally independent   40-59 Partially dependent   20-39 Very dependent   <20 Totally dependent     -Kale Brizuela., Barthel, D.W. (1965). Functional evaluation: the Barthel Index. [address] [facility] ([address] (1997).  The Barthel activities of daily living index: self-reporting versus actual performance in the old (> or = 75 years). Journal of [address] [place] [address] 45(4), [facility], MARGOTH, Linh Logan., Glenna Cruz. (1999). Measuring the change in disability after inpatient rehabilitation; comparison of the responsiveness of the Barthel Index and Functional [place] Measure. Journal of Neurology, Neurosurgery, and Psychiatry, 66(4), 168-051. SHEY Kumar, JANNETTE Serrano, & Cristy Weiss M.A. (2004) Assessment of post-stroke quality of life in cost-effectiveness studies: The usefulness of the Barthel Index and the EuroQoL-5D. Quality of Life Research, 15, 161-71        Activity Tolerance:   Fair and SpO2 stable on RA,initially orthostatic but improved with mobility    After treatment patient left in no apparent distress:   Sitting in chair, Call bell within reach, and Bed / chair alarm activated    COMMUNICATION/EDUCATION:   The patients plan of care was discussed with: Occupational therapist, Registered nurse, and Case management. Fall prevention education was provided and the patient/caregiver indicated understanding., Patient/family have participated as able in goal setting and plan of care. , and Patient/family agree to work toward stated goals and plan of care.     Thank you for this referral.  Ata Mccoy, PT   Time Calculation: 35 mins

## 2022-01-20 NOTE — PROGRESS NOTES
Speech Pathology Note    Reviewed chart and note patient admitted with dizziness with concern for CVA. Note CT showed \"no acute intracranial hemorrhage, mass or infarct\" and MRI showed \"no acute infarct. \" Note patient passed the STAND and a regular diet was ordered. NIHSS=0. Discussed case with RN who reported no SLP-related concerns. Introduced self and role of SLP to patient. Patient denied any decline in motor speech, language, cognitive, or swallowing function, and patient informally observed drinking thin liquids with no difficulty. Patient Ox4. Formal SLP evaluation not clinically indicated at this time. Will sign off. Please re-consult if further needs arise. Thank you.     Neema Manley M.S., CF-SLP

## 2022-01-20 NOTE — DISCHARGE SUMMARY
Hospitalist Discharge Summary     Patient ID:  Liam Wilson  [id number]  55 y.o.  1945    PCP on record: Loli Mauro MD    Admit date: 1/19/2022  Discharge date and time: 1/20/2022      DISCHARGE DIAGNOSIS:    Cervicogenic dizziness  Fibromyalgia  Prediabetes  Hyperlipidemia      CONSULTATIONS:  IP CONSULT TO NEUROLOGY    Excerpted HPI from H&P of Nasima Duff MD:  CHIEF COMPLAINT: Dizziness     HISTORY OF PRESENT ILLNESS:        The patient 64years old woman with past medical history significant for pre-diabetes, hyperlipidemia presented emergency department due to dizziness started early this morning. She reported that she woke up early in the morning she was feeling dizzy and having some visual changes. She also said that she was feeling generalized weak all over without focal neurologic deficits. She denied any abdominal pain, nausea, vomiting, diarrhea, chest pain, abdominal pain, shortness of breath. She denied any syncopal episode. Patient does not smoke, drink alcohol or use illicit drugs.     In the emergency department, CT head negative.    ______________________________________________________________________  DISCHARGE SUMMARY/HOSPITAL COURSE:  for full details see H&P, daily progress notes, labs, consult notes. Dizziness  Fibromyalgia   -CTA head and neck: No evidence for acute large vessel arterial occlusion or significant stenosis. -MRI brain: Minimal chronic microvascular ischemic disease. No acute infarct.  -Echo EF 55-60%, no shunt seen  -Neurology input appreciated. Suspect her sensation of dizziness is musculoskeletal or related to her Fibromyalgia, cervicogenic dizziness. Recommend cyclobenzaprine BID and continue with her lyrica. -follow up with PCP in a week and neurology in 3 weeks. -set up [facility] PT     PreDM  -HgA1c 6.0    HLD  -LDL 82.   Continue statin     25.0 - 29.9 Overweight / Body mass index is 29.52 kg/m².      _____________________________________________________________________  Patient seen and examined by me on discharge day. Pertinent Findings:  Gen:    Not in distress  Chest: Clear lungs  CVS:   Regular rhythm. No edema  Abd:  Soft, not distended, not tender  Neuro:  Alert, Oriented x 4, grossly non focal exam  _______________________________________________________________________  DISCHARGE MEDICATIONS:   Current Discharge Medication List      START taking these medications    Details   cyclobenzaprine (FLEXERIL) 10 mg tablet Take 0.5 Tablets by mouth two (2) times a day for 60 days. Qty: 30 Tablet, Refills: 1  Start date: 1/20/2022, End date: 3/21/2022         CONTINUE these medications which have NOT CHANGED    Details   acetaminophen (TylenoL) 325 mg tablet Take  by mouth every four (4) hours as needed for Pain.      magnesium oxide 250 mg magnesium tablet Take 250 mg by mouth daily. lidocaine (LIDODERM) 5 % 1 Patch by TransDERmal route every twenty-four (24) hours. Apply patch to the affected area for 12 hours a day and remove for 12 hours a day. Qty: 30 Each, Refills: 5    Associated Diagnoses: Chronic low back pain, unspecified back pain laterality, unspecified whether sciatica present      oxaprozin (DAYPRO) 600 mg tablet TAKE 1 TABLET BY MOUTH EVERY DAY AS NEEDED  Qty: 90 Tablet, Refills: 0      omeprazole (PRILOSEC) 40 mg capsule Take 40 mg by mouth daily. levothyroxine (SYNTHROID) 50 mcg tablet Take 1 Tab by mouth Daily (before breakfast). Take 50 mcg every day except 75 mcg q Monday and Friday  Qty: 105 Tab, Refills: 3      simvastatin (ZOCOR) 40 mg tablet Take 1 tablet by mouth nightly  Qty: 90 Tab, Refills: 3      pregabalin (LYRICA) 25 mg capsule Take 25 mg by mouth two (2) times a day. aspirin 81 mg chewable tablet Take 1 Tab by mouth daily. Qty: 30 Tab, Refills: 1      bisacodyl (DULCOLAX) 10 mg suppository Insert 10 mg into rectum daily.   Qty: 28 Each, Refills: 0 metFORMIN (GLUCOPHAGE) 500 mg tablet Take 500 mg by mouth daily (with breakfast). cetirizine (ZYRTEC) 10 mg tablet Take 10 mg by mouth daily. cyanocobalamin (VITAMIN B12) 100 mcg tablet Take 100 mcg by mouth daily. denosumab (PROLIA) 60 mg/mL injection 60 mg by SubCUTAneous route every 6 months. STOP taking these medications       acetaminophen/diphenhydramine (TYLENOL PM PO) Comments:   Reason for Stopping:         folic acid/multivit-min/lutein (CENTRUM SILVER PO) Comments:   Reason for Stopping:         doxycycline (ADOXA) 100 mg tablet Comments:   Reason for Stopping:         ezetimibe (ZETIA) 10 mg tablet Comments:   Reason for Stopping:         diclofenac EC (VOLTAREN) 50 mg EC tablet Comments:   Reason for Stopping:         doxycycline (ADOXA) 100 mg tablet Comments:   Reason for Stopping:         senna-docusate (PERICOLACE) 8.6-50 mg per tablet Comments:   Reason for Stopping:               My Recommended Diet, Activity, Wound Care, and follow-up labs are listed in the patient's Discharge Insturctions which I have personally completed and reviewed.   Risk of deterioration: Low    Condition at Discharge:  Stable  _____________________________________________________________________    Disposition  Home with family, no needs  ____________________________________________________________________    Care Plan discussed with:   Patient, Family, RN, Care Manager, Consultant    ____________________________________________________________________    Code Status: Full Code  ____________________________________________________________________      Condition at Discharge:  Stable  _____________________________________________________________________  Follow up with:   PCP : Tushar Cavazos MD  Follow-up Information     Follow up With Specialties Details Why Contact Info    Tushar Cavazos MD Internal Medicine In 1 week  [address] [facility]  [phone] Total time in minutes spent coordinating this discharge (includes going over instructions, follow-up, prescriptions, and preparing report for sign off to her PCP) :  35 minutes    Signed:  Erick Rudd MD

## 2022-01-20 NOTE — PROGRESS NOTES
Received notification from bedside RN about patient with regards to: requesting her PTA Lyrica and Tylenol PM for sleep  VS: /60, HR 79, RR 17, o2 sat 95% on RA    Intervention given: resumed PTA Lyrica, Tylenol PM q hs PRN ordered    2322: requesting medication for constipation    - Nilsa-colace BID with 1st dose now, Miralax PO daily PRN ordered

## 2022-01-20 NOTE — PROGRESS NOTES
Update - 5:29 PM: CM secured HH through [facility] for d/c; agency aware of pt's d/c this evening. CM placed [facility] HH's information in AVS for reference. CM contacted bedside RN & requested for her to re-print d/c paperwork to reflect accepting HH's agency's information; RN to complete request. CM needs completed. Initial note: CM acknowledged d/c. CM reviewed pt's chart prior to moving forward with d/c planning. MD requested for CM to arrange [facility] intervention tomorrow (1/21/22) to avoid delaying d/c this evening, as pt already has ride on the way; CM acknowledged request. CM contacted pt via bedside phone, introduced role as d/c planner, and attempted to complete initial assessment. Pt reported she is waiting for a call from her ride & requested for CM to contact her via mobile phone ([phone]) tomorrow. Pt informed CM needed to verify her home address in effort to arrange [facility] services for d/c; pt confirmed address on file is accurate. CM inquired if pt had preference in [facility] provider; pt declined. Pt provided verbal consent for CM to send mass referral to [facility] agencies in-network with her insurance. FOC offered, verbal consent provided via telephone. CM will send mass referral for [facility] via [employer] & contact pt tomorrow with the information for the accepting agency. CM unable to schedule f/u apts needed for d/c due to offices being closed for the day. CM placed f/u apts needed for d/c (PCP & neurology) in AVS for reference; CM specified in AVS the time-frame each apt needed to be scheduled by. CM also placed [facility]'s information in AVS for reference. Care Management Interventions  PCP Verified by CM:  Yes  Palliative Care Criteria Met (RRAT>21 & CHF Dx)?: No  Transition of Care Consult (CM Consult): Discharge Planning  Discharge Durable Medical Equipment: No  Physical Therapy Consult: Yes  Occupational Therapy Consult: Yes  Speech Therapy Consult: Yes  Support Systems: Other Family Member(s)  Confirm Follow Up Transport: Family  The Plan for Transition of Care is Related to the Following Treatment Goals : [place]  The Patient and/or Patient Representative was Provided with a Choice of Provider and Agrees with the Discharge Plan?: Yes  Name of the Patient Representative Who was Provided with a Choice of Provider and Agrees with the Discharge Plan: patient Lethaniel Massing)  [place] of Choice List was Provided with Basic Dialogue that Supports the Patient's Individualized Plan of Care/Goals, Treatment Preferences and Shares the Quality Data Associated with the Providers?: Yes   Resource Information Provided?: No  Discharge Location  Patient Expects to be Discharged to[de-identified] Home with home health      Mukesh Mccracken, [facility], [address] [place] [address]

## 2022-01-20 NOTE — PROGRESS NOTES
Problem: Falls - Risk of  Goal: *Absence of Falls  Description: Document Clearence Skates Fall Risk and appropriate interventions in the flowsheet.   Outcome: Progressing Towards Goal  Note: Fall Risk Interventions:  Mobility Interventions: Utilize walker, cane, or other assistive device                   History of Falls Interventions: Room close to nurse's station,Bed/chair exit alarm,Door open when patient unattended         Problem: Patient Education: Go to Patient Education Activity  Goal: Patient/Family Education  Outcome: Progressing Towards Goal     Problem: Patient Education: Go to Patient Education Activity  Goal: Patient/Family Education  Outcome: Progressing Towards Goal     Problem: TIA/CVA Stroke: 0-24 hours  Goal: Off Pathway (Use only if patient is Off Pathway)  Outcome: Progressing Towards Goal  Goal: Activity/Safety  Outcome: Progressing Towards Goal  Goal: Consults, if ordered  Outcome: Progressing Towards Goal  Goal: Diagnostic Test/Procedures  Outcome: Progressing Towards Goal  Goal: Nutrition/Diet  Outcome: Progressing Towards Goal  Goal: Discharge Planning  Outcome: Progressing Towards Goal  Goal: Medications  Outcome: Progressing Towards Goal  Goal: Respiratory  Outcome: Progressing Towards Goal  Goal: Treatments/Interventions/Procedures  Outcome: Progressing Towards Goal  Goal: Minimize risk of bleeding post-thrombolytic infusion  Outcome: Progressing Towards Goal  Goal: Monitor for complications post-thrombolytic infusion  Outcome: Progressing Towards Goal  Goal: Psychosocial  Outcome: Progressing Towards Goal  Goal: *Hemodynamically stable  Outcome: Progressing Towards Goal  Goal: *Neurologically stable  Description: Absence of additional neurological deficits    Outcome: Progressing Towards Goal  Goal: *Verbalizes anxiety and depression are reduced or absent  Outcome: Progressing Towards Goal  Goal: *Absence of Signs of Aspiration on Current Diet  Outcome: Progressing Towards Goal  Goal: *Absence of deep venous thrombosis signs and symptoms(Stroke Metric)  Outcome: Progressing Towards Goal  Goal: *Ability to perform ADLs and demonstrates progressive mobility and function  Outcome: Progressing Towards Goal  Goal: *Stroke education started(Stroke Metric)  Outcome: Progressing Towards Goal  Goal: *Dysphagia screen performed(Stroke Metric)  Outcome: Progressing Towards Goal  Goal: *Rehab consulted(Stroke Metric)  Outcome: Progressing Towards Goal     Problem: TIA/CVA Stroke: Day 2 Until Discharge  Goal: Off Pathway (Use only if patient is Off Pathway)  Outcome: Progressing Towards Goal  Goal: Activity/Safety  Outcome: Progressing Towards Goal  Goal: Diagnostic Test/Procedures  Outcome: Progressing Towards Goal  Goal: Nutrition/Diet  Outcome: Progressing Towards Goal  Goal: Discharge Planning  Outcome: Progressing Towards Goal  Goal: Medications  Outcome: Progressing Towards Goal  Goal: Respiratory  Outcome: Progressing Towards Goal  Goal: Treatments/Interventions/Procedures  Outcome: Progressing Towards Goal  Goal: Psychosocial  Outcome: Progressing Towards Goal  Goal: *Verbalizes anxiety and depression are reduced or absent  Outcome: Progressing Towards Goal  Goal: *Absence of aspiration  Outcome: Progressing Towards Goal  Goal: *Absence of deep venous thrombosis signs and symptoms(Stroke Metric)  Outcome: Progressing Towards Goal  Goal: *Optimal pain control at patient's stated goal  Outcome: Progressing Towards Goal  Goal: *Tolerating diet  Outcome: Progressing Towards Goal  Goal: *Ability to perform ADLs and demonstrates progressive mobility and function  Outcome: Progressing Towards Goal  Goal: *Stroke education continued(Stroke Metric)  Outcome: Progressing Towards Goal     Problem: Ischemic Stroke: Discharge Outcomes  Goal: *Verbalizes anxiety and depression are reduced or absent  Outcome: Progressing Towards Goal  Goal: *Verbalize understanding of risk factor modification(Stroke Metric)  Outcome: Progressing Towards Goal  Goal: *Hemodynamically stable  Outcome: Progressing Towards Goal  Goal: *Absence of aspiration pneumonia  Outcome: Progressing Towards Goal  Goal: *Aware of needed dietary changes  Outcome: Progressing Towards Goal  Goal: *Verbalize understanding of prescribed medications including anti-coagulants, anti-lipid, and/or anti-platelets(Stroke Metric)  Outcome: Progressing Towards Goal  Goal: *Tolerating diet  Outcome: Progressing Towards Goal  Goal: *Aware of follow-up diagnostics related to anticoagulants  Outcome: Progressing Towards Goal  Goal: *Ability to perform ADLs and demonstrates progressive mobility and function  Outcome: Progressing Towards Goal  Goal: *Absence of DVT(Stroke Metric)  Outcome: Progressing Towards Goal  Goal: *Absence of aspiration  Outcome: Progressing Towards Goal  Goal: *Optimal pain control at patient's stated goal  Outcome: Progressing Towards Goal  Goal: *Home safety concerns addressed  Outcome: Progressing Towards Goal  Goal: *Describes available resources and support systems  Outcome: Progressing Towards Goal  Goal: *Verbalizes understanding of activation of EMS(911) for stroke symptoms(Stroke Metric)  Outcome: Progressing Towards Goal  Goal: *Understands and describes signs and symptoms to report to providers(Stroke Metric)  Outcome: Progressing Towards Goal  Goal: *Neurolgocially stable (absence of additional neurological deficits)  Outcome: Progressing Towards Goal  Goal: *Verbalizes importance of follow-up with primary care physician(Stroke Metric)  Outcome: Progressing Towards Goal  Goal: *Smoking cessation discussed,if applicable(Stroke Metric)  Outcome: Progressing Towards Goal  Goal: *Depression screening completed(Stroke Metric)  Outcome: Progressing Towards Goal     Problem: Pressure Injury - Risk of  Goal: *Prevention of pressure injury  Description: Document Henrik Scale and appropriate interventions in the flowsheet.   Outcome: Progressing Towards Goal  Note: Pressure Injury Interventions:  Sensory Interventions: Assess changes in LOC         Activity Interventions: PT/OT evaluation                               Problem: Patient Education: Go to Patient Education Activity  Goal: Patient/Family Education  Outcome: Progressing Towards Goal

## 2022-01-20 NOTE — CONSULTS
IP CONSULT TO NEUROLOGY  Consult performed by: Judy Loyd MD  Consult ordered by: Rene Parish MD            NEUROLOGY CONSULT    NAME Ratna Ceballos AGE 64 y.o. MRN [id number]  1945     REQUESTING PHYSICIAN: Amira Espinoza MD      CHIEF COMPLAINT: Dizziness     This is a 64 y.o. female with a medical history of fibromyalgia, acquired hypothyroidism, hyperlipidemia and diabetes type 2. The patient is admitted for acute onset dizziness. There have been no recent illnesses or recent changes in medications. Head CT in the ER was unremarkable for any acute changes. She denies any double vision, dysphagia or tinnitus. Dinniness has been ongoing since . Dizziness is worse the longer she stands and when turning head to the right. SHe has significant neck tenderness and pain. ASSESSMENT AND PLAN     1. Dizziness  MRI of the brain was unremarkable for acute changes. CTA was unremarkable for any flow-limiting stenosis  Follow-up with ear nose and throat on discharge. May go home today    2. Gait disturbance  Continue physical therapy    3. Acquired hypothyroidism  Continue Synthroid    4. Diabetes type 2  Continue metformin    5. Fibromyalgia  Add cyclobenzaprine   continue lyrica         ALLERGIES:  Other food; Tramadol; Beef containing products; Cheese; Gabapentin; Codeine; Other plant, animal, environmental; and Pcn [penicillins]         Past Medical History:   Diagnosis Date    Breast cancer ([facility] [address]     Right    Diabetes ([facility] [address]     As of 2020, pt states \"borderline\"    Fibromyalgia     GERD (gastroesophageal reflux disease)     Hiatal hernia     Hyponatremia 2019    As of 2020 pt had a seizure as a result to this.  Hypothyroid     Ill-defined condition     As of 2020, pt states her cardiologist says her HR drops at night but nothing to be concerned with.     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 HENT: Negative for ear pain. Eyes: Positive for blurred vision. Negative for pain and discharge. Respiratory: Negative for cough and hemoptysis. Cardiovascular: Negative for chest pain and claudication. Gastrointestinal: Negative for constipation and diarrhea. Genitourinary: Negative for flank pain and hematuria. Musculoskeletal: Negative for back pain and myalgias. Skin: Negative for itching and rash. Neurological: Positive for dizziness. Negative for sensory change and headaches. Endo/Heme/Allergies: Negative for environmental allergies. Does not bruise/bleed easily. Psychiatric/Behavioral: Negative for depression and hallucinations. Visit Vitals  /78   Pulse 60   Temp 98.3 °F (36.8 °C)   Resp 18   Ht 5' 4\" (1.626 m)   Wt 172 lb (78 kg)   SpO2 97%   BMI 29.52 kg/m²      General: Well developed, well nourished. Patient in no distress   Head: Normocephalic, atraumatic, anicteric sclera   Neck Normal ROM, No thyromegally   Lungs:  Clear to auscultation bilaterally   Cardiac: Regular rate and rhythm with no murmurs. Abd: Bowel sounds were audible. Ext: No pedal edema   Skin: Supple no rash     NeurologicExam:  Mental Status: Alert and oriented to person place and time   Speech: Fluent no aphasia or dysarthria.    Cranial Nerves:  II - XII Intact Motor:  Full and symmetric strength of upper and lower extremities  Normal bulk and tone. Reflexes:   +1/4 over the proximal tendons of the upper and lower extremities. +0/4 over distal tendons. Sensory:   Symmetric distal reduction in sensory perception affecting all modalities   Gait:   Deferred   Tremor:   No tremor noted. Cerebellar:  Coordination intact. Neurovascular: No carotid bruits. No JVD         REVIEWED IMAGING:    MRI :  Results from Hospital Encounter encounter on 01/19/22    MRI BRAIN WO CONT    Narrative  EXAM: MRI BRAIN WO CONT    INDICATION: Generalized weakness and dizziness. COMPARISON: CT head and CT angiography head earlier today. CONTRAST: None. TECHNIQUE:  Multiplanar multisequence acquisition without contrast of the brain. FINDINGS:  The ventricles are normal in size and position. There is no acute infarct,  hemorrhage, extra-axial fluid collection, or mass effect. Minimal chronic  microvascular ischemic disease in the frontal periventricular white matter. Expected arterial flow-voids are present. Midline structures are within normal limits. Medial temporal lobes are within  normal limits. Orbits are symmetric. Impression  Minimal chronic microvascular ischemic disease. No acute infarct. CT:  Results from Hospital Encounter encounter on 01/19/22    CTA HEAD NECK W CONT    Narrative  *PRELIMINARY REPORT    No intracranial large vessel occlusion. Preliminary report was provided by Dr. Devan Wiggins, the on-call radiologist, at 3:22  PM    Final report to follow. *END PRELIMINARY REPORT*    CLINICAL HISTORY: Dizziness, blurry vision    EXAMINATION:  CT ANGIOGRAPHY HEAD AND NECK    COMPARISON: CT head 1/19/2022    TECHNIQUE:  Following the uneventful administration of iodinated contrast  material, axial CT angiography of the head and neck was performed. Delayed axial  images through the head were also obtained.  Coronal and sagittal reconstructions  were obtained. Manual postprocessing of images was performed. 3-D  Sagittal  maximal intensity projection images were obtained. 3-D Coronal maximal  intensity projections were obtained. CT dose reduction was achieved through use  of a standardized protocol tailored for this examination and automatic exposure  control for dose modulation. FINDINGS:    Delayed contrast-enhanced head CT:    The ventricles are midline without hydrocephalus. There is no acute intra or  extra-axial hemorrhage. Mild bilateral subcortical and periventricular areas of  patchy low attenuation is nonspecific but likely related to changes of chronic  small vessel ischemic disease. The basal cisterns are clear. The paranasal  sinuses are clear. CTA NECK:    Great vessels: Bovine arch anatomy with patent origins    Right subclavian artery: Patent with mild atherosclerosis    Left subclavian artery: Patent with mild atherosclerosis    Right common carotid artery: Patent    Left common carotid artery: Patent    Cervical right internal carotid artery: Patent with no significant stenosis by  NASCET criteria. Cervical left internal carotid artery: Patent with no significant stenosis by  NASCET criteria. Right vertebral artery: Patent    Left vertebral artery: Patent    Mild to moderate multilevel cervical spondylosis    CTA HEAD:    Right cavernous internal carotid artery: Patent with mild atherosclerosis    Left cavernous internal carotid artery: Patent with mild atherosclerosis    Anterior cerebral arteries: Hypoplastic left A1 segment but otherwise patent and  unremarkable    Anterior communicating artery: Patent    Right middle cerebral artery: Patent    Left middle cerebral artery: Patent    Posterior communicating arteries: Patent    Posterior cerebral arteries: Patent    Basilar artery: Patent    Distal vertebral arteries: Patent    Measurements use NASCET criteria.     Impression  No evidence for acute large vessel arterial occlusion or significant stenosis.       REVIEWED LABS:  Lab Results   Component Value Date/Time    WBC 8.8 01/19/2022 12:21 PM    HCT 39.0 01/19/2022 12:21 PM    HGB 12.6 01/19/2022 12:21 PM    PLATELET 572 76/58/6476 12:21 PM     Lab Results   Component Value Date/Time    Sodium 136 01/19/2022 12:21 PM    Potassium 4.0 01/19/2022 12:21 PM    Chloride 101 01/19/2022 12:21 PM    CO2 29 01/19/2022 12:21 PM    Glucose 127 (H) 01/19/2022 12:21 PM    BUN 13 01/19/2022 12:21 PM    Creatinine 0.85 01/19/2022 12:21 PM    Calcium 9.5 01/19/2022 12:21 PM     Lab Results   Component Value Date/Time    Vitamin B12 718 07/10/2009 08:37 AM     Lab Results   Component Value Date/Time    LDL, calculated 82 01/20/2022 02:11 AM     Lab Results   Component Value Date/Time    Hemoglobin A1c 6.0 (H) 01/20/2022 02:11 AM

## 2022-01-20 NOTE — DISCHARGE INSTRUCTIONS
HOSPITALIST DISCHARGE INSTRUCTIONS    NAME: Darren Hargrove   :  1945   MRN:  [id number]     Date/Time:  2022 4:11 PM    ADMIT DATE: 2022   DISCHARGE DATE: 2022         · It is important that you take the medication exactly as they are prescribed. · Keep your medication in the bottles provided by the pharmacist and keep a list of the medication names, dosages, and times to be taken in your wallet. · Do not take other medications without consulting your doctor. What to do at [address]:  Cardiac Diet    Recommended activity: Activity as tolerated      If you have questions regarding the hospital related prescriptions or hospital related issues please call [facility] at [phone]. You can always direct your questions to your primary care doctor if you are unable to reach your hospital physician; your PCP works as an extension of your hospital doctor just like your hospital doctor is an extension of your PCP for your time at the hospital [facility])    If you experience any of the following symptoms then please call your primary care physician or return to the emergency room if you cannot get hold of your doctor:    Fever, chills, nausea, vomiting, or persistent diarrhea  Worsening weakness or new problems with your speech or balance  Dark stools or visible blood in your stools  New Leg swelling or shortness of breath as these could be signs of a clot    Additional Instructions:      Bring these papers with you to your follow up appointments. The papers will help your doctors be sure to continue the care plan from the hospital.              Information obtained by :  I understand that if any problems occur once I am at home I am to contact my physician. I understand and acknowledge receipt of the instructions indicated above. Physician's or R.N.'s Signature                                                                  Date/Time                                                                                                                                              Patient or Representative Signature

## 2022-01-20 NOTE — PROGRESS NOTES
End of Shift Note    Bedside shift change report given to Charolet Hamman (oncoming nurse) by VALERIY Washington (offgoing nurse). Report included the following information SBAR    Shift worked:  [id number]     Shift summary and any significant changes:     MRI completed; PRN Tylenol x1; PRN Tylenol PM x1       Concerns for physician to address:  NONE   Zone phone for oncoming shift:  [phone]     Patient Information  Juvenal Yee  64 y.o.  1/19/2022 12:37 PM by Srinivas Sweet MD. Juvenal Yee was admitted from Home    Problem List  Patient Active Problem List    Diagnosis Date Noted    Postural dizziness with presyncope 01/19/2022    Mixed hyperlipidemia 01/14/2020    Overweight (BMI 25.0-29.9) 11/14/2018    Other specified hypothyroidism 05/16/2018    Gastroesophageal reflux disease without esophagitis 05/16/2018    History of CVA (cerebrovascular accident) 05/16/2018    Osteopenia of multiple sites 05/16/2018    HX: breast cancer 05/16/2018    Closed fracture of left lower extremity with routine healing 05/16/2018    Endometriosis 05/16/2018     Past Medical History:   Diagnosis Date    Breast cancer ([address] 1999    Right    Diabetes ([address]     As of 1/28/2020, pt states \"borderline\"    Fibromyalgia     GERD (gastroesophageal reflux disease)     Hiatal hernia     Hyponatremia 05/2019    As of 1/28/2020 pt had a seizure as a result to this.  Hypothyroid     Ill-defined condition     As of 1/28/2020, pt states her cardiologist says her HR drops at night but nothing to be concerned with.  Pre-diabetes     PUD (peptic ulcer disease)     PVC (premature ventricular contraction)     Too much caffeine    Seizures ([address] 05/2019    Stroke ([address] 2003    Took vision from right eye       Core Measures:  CVA: NO  CHF:NO  PNA: NO    Activity:  Activity Level:  Up with Assistance  Number times ambulated in hallways past shift: 0  Number of times OOB to chair past shift: 1    Cardiac:   Cardiac Monitoring: YES Cardiac Rhythm: Sinus Rhythm    Access:   Current line(s): PIV       Genitourinary:   Urinary status:  voiding  Urinary Catheter? No     Respiratory:   O2 Device: None (Room air)  Chronic home O2 use?: NO  Incentive spirometer at bedside: NO       GI:  Last Bowel Movement Date:  (unknown)  Current diet:  ADULT DIET Regular  Passing flatus: YES  Tolerating current diet: YES       Pain Management:   Patient states pain is manageable on current regimen: YES    Skin:  Henrik Score: 18  Interventions: PT/OT consult    Patient Safety:  Fall Score:  Total Score: 2  Interventions: bed/chair alarm     @Rollbelt  @dexterity to release roll belt  Yes/No ( must document dexterity  here by stating Yes or No here, otherwise this is a restraint and must follow restraint documentation policy.)    DVT prophylaxis:  DVT prophylaxis Med- Yes  DVT prophylaxis SCD or ANAHI- No     Wounds: (If Applicable)  Wounds- No      Active Consults:  IP CONSULT TO NEUROLOGY    Length of Stay:  Expected LOS: - - -  Actual LOS: 0  Discharge Plan: No; VALERIY Burnham

## 2022-01-20 NOTE — PROGRESS NOTES
Hospitalist Progress Note    NAME: Jeromy Carmona   :  1945   MRN:  [id number]     Interim Hospital Summary: 64 y.o. female whom presented on 2022 with      Assessment / Plan:    Dizziness  Fibromyalgia   -CTA head and neck: No evidence for acute large vessel arterial occlusion or significant stenosis. -MRI brain: Minimal chronic microvascular ischemic disease. No acute infarct.  -Echo EF 55-60%, no shunt seen  -Neurology input appreciated. Suspect musculoskeletal or related to her Fibromyalgia. Recommend cyclobenzaprine BID and continue with her lyrica. -follow up with PCP in a week and neurology in 3 weeks. -set up [facility] PT  -she will try get a friend to take her home, weather permitting     PreDM  -HgA1c 6.0    HLD  -LDL 82. Continue statin    25.0 - 29.9 Overweight / Body mass index is 29.52 kg/m². Code status: Full  Prophylaxis: Hep SQ  Recommended Disposition: Home w/Family       Subjective:     Chief Complaint / Reason for Physician Visit  Follow up of dizziness, FM  Chart reviewed in detail. Discussed with RN events overnight. Review of Systems:  Symptom Y/N Comments  Symptom Y/N Comments   Fever/Chills    Chest Pain     Poor Appetite    Edema     Cough    Abdominal Pain     Sputum    Joint Pain     SOB/NAVA    Pruritis/Rash     Nausea/vomit    Tolerating PT/OT     Diarrhea    Tolerating Diet     Constipation    Other       Could NOT obtain due to:      PO intake: No data found. Objective:     VITALS:   Last 24hrs VS reviewed since prior progress note.  Most recent are:  Patient Vitals for the past 24 hrs:   Temp Pulse Resp BP SpO2   22 1508 97.9 °F (36.6 °C) 62 18 122/73 98 %   22 0907    120/78    22 0451 98.3 °F (36.8 °C) 60 18 120/78 97 %   22 2230 98 °F (36.7 °C) 68 18 124/73 95 %   22 2037 98 °F (36.7 °C) 79 17 119/60 95 %   22 1836 97.4 °F (36.3 °C) 82 16 (!) 146/63 98 % Intake/Output Summary (Last 24 hours) at 1/20/2022 1622  Last data filed at 1/20/2022 1508  Gross per 24 hour   Intake 1200 ml   Output 300 ml   Net 900 ml        I had a face to face encounter, and independently examined this patient on 1/20/2022, as outlined below:  PHYSICAL EXAM:  General: WD, WN. Alert, cooperative, no acute distress    EENT:  EOMI. Anicteric sclerae. MMM  Resp:  CTA bilaterally, no wheezing or rales. No accessory muscle use  CV:  Regular  rhythm,  No edema  GI:  Soft, Non distended, Non tender. +Bowel sounds  Neurologic:  Alert and oriented X 3, normal speech,   Psych:   Good insight. Not anxious nor agitated  Skin:  No rashes. No jaundice    Reviewed most current lab test results and cultures  YES  Reviewed most current radiology test results   YES  Review and summation of old records today    NO  Reviewed patient's current orders and MAR    YES  PMH/ reviewed - no change compared to H&P  ________________________________________________________________________  Care Plan discussed with:    Comments   Patient x    Family      RN     Care Manager     Consultant  x                      Multidiciplinary team rounds were held today with , nursing, pharmacist and clinical coordinator. Patient's plan of care was discussed; medications were reviewed and discharge planning was addressed. ________________________________________________________________________  Total NON critical care TIME:  25   Minutes    Total CRITICAL CARE TIME Spent:   Minutes non procedure based      Comments   >50% of visit spent in counseling and coordination of care x     This includes time during multidisciplinary rounds if indicated above   ________________________________________________________________________  Vero Stewart MD     Procedures: see electronic medical records for all procedures/Xrays and details which were not copied into this note but were reviewed prior to creation of Plan. LABS:  I reviewed today's most current labs and imaging studies.   Pertinent labs include:  Recent Labs     01/19/22  1221   WBC 8.8   HGB 12.6   HCT 39.0        Recent Labs     01/19/22  1221      K 4.0      CO2 29   *   BUN 13   CREA 0.85   CA 9.5   ALB 4.1   TBILI 0.3   ALT 24

## 2022-01-20 NOTE — PROGRESS NOTES
Patient discharged home with friend. She was taken to car by wheelchair. She took all personal belongings with her. All discharge instructions were reviewed and patient voiced understanding.

## 2022-01-22 ENCOUNTER — NURSE TRIAGE (OUTPATIENT)
Dept: OTHER | Facility: CLINIC | Age: 77
End: 2022-01-22

## 2022-01-22 NOTE — TELEPHONE ENCOUNTER
Received call from  [place] at [facility] with Red Flag Complaint. Subjective: Caller states \"dizziness\"     Current Symptoms:   Pt reports severe lower abdominal pain that is sharp, onset yesterday, pt states she had some  Constipation        Constipation   Pt had a slight bowel movement  This morning     Onset:  10/10 stabbing pain     Associated Symptoms:    Dizziness- continues    Feels shaking -     Pain Severity:  Intermittent pain every 10 minutes, 10/10 stabbing- bottom  Temperature: n/a    What has been tried:  Muscle relaxers, 3 senokot       Recommended disposition: ED  Now       Care advice provided, patient verbalizes understanding; denies any other questions or concerns; instructed to call back for any new or worsening symptoms. Patient proceeding to n/a Emergency Department    Attention Provider: Thank you for allowing me to participate in the care of your patient. The patient was connected to triage in response to information provided to the [facility]. Please do not respond through this encounter as the response is not directed to a shared pool.     Reason for Disposition   [1] Abdomen pain is main symptom AND [2] adult female   [1] SEVERE pain AND [2] age > 60 years    Protocols used: CONSTIPATION-ADULT-, ABDOMINAL PAIN - [facility]

## 2022-01-24 ENCOUNTER — OFFICE VISIT (OUTPATIENT)
Dept: INTERNAL MEDICINE CLINIC | Age: 77
End: 2022-01-24
Payer: MEDICARE

## 2022-01-24 VITALS
RESPIRATION RATE: 16 BRPM | OXYGEN SATURATION: 98 % | WEIGHT: 173.6 LBS | DIASTOLIC BLOOD PRESSURE: 79 MMHG | HEART RATE: 86 BPM | SYSTOLIC BLOOD PRESSURE: 144 MMHG | TEMPERATURE: 98.2 F | BODY MASS INDEX: 29.64 KG/M2 | HEIGHT: 64 IN

## 2022-01-24 DIAGNOSIS — K64.9 HEMORRHOIDS, UNSPECIFIED HEMORRHOID TYPE: ICD-10-CM

## 2022-01-24 DIAGNOSIS — N39.8 VOIDING DYSFUNCTION: Primary | ICD-10-CM

## 2022-01-24 DIAGNOSIS — R52 PAIN, UNSPECIFIED: ICD-10-CM

## 2022-01-24 PROCEDURE — 1101F PT FALLS ASSESS-DOCD LE1/YR: CPT | Performed by: FAMILY MEDICINE

## 2022-01-24 PROCEDURE — 1090F PRES/ABSN URINE INCON ASSESS: CPT | Performed by: FAMILY MEDICINE

## 2022-01-24 PROCEDURE — G8399 PT W/DXA RESULTS DOCUMENT: HCPCS | Performed by: FAMILY MEDICINE

## 2022-01-24 PROCEDURE — G8432 DEP SCR NOT DOC, RNG: HCPCS | Performed by: FAMILY MEDICINE

## 2022-01-24 PROCEDURE — G8417 CALC BMI ABV UP PARAM F/U: HCPCS | Performed by: FAMILY MEDICINE

## 2022-01-24 PROCEDURE — G0463 HOSPITAL OUTPT CLINIC VISIT: HCPCS | Performed by: FAMILY MEDICINE

## 2022-01-24 PROCEDURE — G8427 DOCREV CUR MEDS BY ELIG CLIN: HCPCS | Performed by: FAMILY MEDICINE

## 2022-01-24 PROCEDURE — G8536 NO DOC ELDER MAL SCRN: HCPCS | Performed by: FAMILY MEDICINE

## 2022-01-24 PROCEDURE — 99214 OFFICE O/P EST MOD 30 MIN: CPT | Performed by: FAMILY MEDICINE

## 2022-01-24 NOTE — PROGRESS NOTES
SUBJECTIVE:   Ms. Shilo Huntley is a 64 y.o. female who is here for follow up of routine medical issues. She notes trouble going to the bathroom. She currently is having pain from external hemoroids, and is taking uses sitz baths. She has not used any  type of hemorrhoid cream. She is having difficulty voiding and can only urinate, when \"it is wet\" like in the shower. She also reports  taking Flexeril. On Sunday, she noted terrible stomach cramps, she described it as\" like having a baby\". She has a history of diarrhea. She notes numbness in right ring finger. The finger gets cold easily and are blue when exposed to cold. She has been experiencing right calf and leg pain, which maybe due to the back surgery in May. She denies chest pain and shortness of breath. At this time, she is otherwise doing well and has brought no other complaints to my attention today. For a list of the medical issues addressed today, see the assessment and plan below. PMH:   Past Medical History:   Diagnosis Date    Breast cancer ([address] 1999    Right    Diabetes ([address]     As of 1/28/2020, pt states \"borderline\"    Fibromyalgia     GERD (gastroesophageal reflux disease)     Hiatal hernia     Hyponatremia 05/2019    As of 1/28/2020 pt had a seizure as a result to this.  Hypothyroid     Ill-defined condition     As of 1/28/2020, pt states her cardiologist says her HR drops at night but nothing to be concerned with.  Pre-diabetes     PUD (peptic ulcer disease)     PVC (premature ventricular contraction)     Too much caffeine    Seizures ([address] 05/2019    Stroke ([address] 2003    Took vision from right eye     PSH:  has a past surgical history that includes hx partial hysterectomy (N/A); hx colonoscopy (N/A); hx mastectomy (Right, 1999); hx orthopaedic (2000); hx endoscopy; and hx back surgery (05/16/2019).     All: is allergic to other food; tramadol; beef containing products; cheese; gabapentin; codeine; other plant, animal, environmental; and pcn [penicillins]. MEDS:   Current Outpatient Medications   Medication Sig    cyclobenzaprine (FLEXERIL) 10 mg tablet Take 0.5 Tablets by mouth two (2) times daily as needed for Muscle Spasm(s) for up to 60 days.  acetaminophen (TylenoL) 325 mg tablet Take  by mouth every four (4) hours as needed for Pain.  magnesium oxide 250 mg magnesium tablet Take 250 mg by mouth daily.  lidocaine (LIDODERM) 5 % 1 Patch by TransDERmal route every twenty-four (24) hours. Apply patch to the affected area for 12 hours a day and remove for 12 hours a day.  oxaprozin (DAYPRO) 600 mg tablet TAKE 1 TABLET BY MOUTH EVERY DAY AS NEEDED    omeprazole (PRILOSEC) 40 mg capsule Take 40 mg by mouth daily.  levothyroxine (SYNTHROID) 50 mcg tablet Take 1 Tab by mouth Daily (before breakfast). Take 50 mcg every day except 75 mcg q Monday and Friday    simvastatin (ZOCOR) 40 mg tablet Take 1 tablet by mouth nightly    pregabalin (LYRICA) 25 mg capsule Take 25 mg by mouth two (2) times a day.  aspirin 81 mg chewable tablet Take 1 Tab by mouth daily.  bisacodyl (DULCOLAX) 10 mg suppository Insert 10 mg into rectum daily.  denosumab (PROLIA) 60 mg/mL injection 60 mg by SubCUTAneous route every 6 months.  metFORMIN (GLUCOPHAGE) 500 mg tablet Take 500 mg by mouth daily (with breakfast).  cetirizine (ZYRTEC) 10 mg tablet Take 10 mg by mouth daily.  cyanocobalamin (VITAMIN B12) 100 mcg tablet Take 100 mcg by mouth daily. No current facility-administered medications for this visit. FH: family history includes Cancer in her mother; Colon Cancer in her brother; Diabetes in her brother and brother; Heart Disease in her brother and father; No Known Problems in her sister; Other (age of onset: 55) in her sister. SH:  reports that she has never smoked. She has never used smokeless tobacco. She reports previous alcohol use. She reports that she does not use drugs.      Review of Systems - History obtained from the patient  General ROS: no fever, chills, fatigue, body aches  Psychological ROS: no change in anxiety, depression, SI/HI  Ophthalmic ROS: no blurred vision, myopia, double vision  ENT ROS: no dysphagia, otalgia, otorrhea, rhinorrhea, post nasal drip  Respiratory ROS: no cough, shortness of breath, or wheezing  Cardiovascular ROS: no chest pain or dyspnea on exertion  Gastrointestinal ROS: no abdominal pain, change in bowel habits, or black or bloody stools  Genito-Urinary ROS: no frequency, urgency, incontinence, dysuria, hematouria. Musculoskeletal ROS: no arthralagia, myalgia  Neurological ROS: no headaches, dizziness, lightheadedness, tremors, seizures  Dermatological ROS: no rash or lesions    OBJECTIVE:   Vitals:   Visit Vitals  BP (!) 144/79 (BP 1 Location: Right arm, BP Patient Position: Sitting, BP Cuff Size: Small adult)   Pulse 86   Temp 98.2 °F (36.8 °C) (Temporal)   Resp 16   Ht 5' 4\" (1.626 m)   Wt 173 lb 9.6 oz (78.7 kg)   SpO2 98%   BMI 29.80 kg/m²      Gen: Pleasant 64 y.o.  female in NAD. HEENT: PERRLA. EOMI. OP moist and pink. Neck: Supple. No LAD. HEART: RRR, No M/G/R.      LUNGS: CTAB No W/R. ABDOMEN: S, NT, ND, BS+. EXTREMITIES: Warm. No C/C/E.    MUSCULOSKELETAL: Normal ROM, muscle strength 5/5 all groups. NEURO: Alert and oriented x 3. Cranial nerves grossly intact. No focal sensory or motor deficits noted. SKIN: Warm. Dry. No rashes or other lesions noted. ASSESSMENT/ PLAN: Diagnoses and all orders for this visit:    1. Voiding dysfunction  -     METABOLIC PANEL, COMPREHENSIVE; Future  -     URINALYSIS W/ RFLX MICROSCOPIC; Future  -     CULTURE, URINE; Future    2. Hemorrhoids, unspecified hemorrhoid type    3. Pain, unspecified   -     CULTURE, URINE; Future        ICD-10-CM ICD-9-CM    1. Voiding dysfunction  X82.0 579.6 METABOLIC PANEL, COMPREHENSIVE      URINALYSIS W/ RFLX MICROSCOPIC      CULTURE, URINE   2.  Hemorrhoids, unspecified hemorrhoid type  K64.9 455.6    3. Pain, unspecified   R52 780.96 CULTURE, URINE     Voiding Discomfort/ Hemorrhoids : I ordered a CMP to evaluate renal function, urinalysis, and urine culture. Ear Discomfort:I recommend using Debrox for the dry earwax noted in both ears. She was advised to make a nurse visit to have her ears flushed in the next week. Hemorrhoids: I recommended she try preparation H and tucks pads. If no relief from otc treatments she was advised to contact the office. I have reviewed the patient's medications and risks/side effects/benefits were discussed. Diagnosis(-es) explained to patient and questions answered. Literature provided where appropriate.      Written by [facility], as dictated by Kemar Maldonado MD.

## 2022-01-24 NOTE — PROGRESS NOTES
Identified pt with two pt identifiers(name and ). Reviewed record in preparation for visit and have obtained necessary documentation. Chief Complaint   Patient presents with    Bladder Infection     not able to urinate all night. took some stool softners. was able to go this morning.  Other     had a CT Scan and other tests on Wed Not able to use the bathroom or having difficulty since then. Vitals:    22 1207   BP: (!) 144/79   Pulse: 86   Resp: 16   Temp: 98.2 °F (36.8 °C)   TempSrc: Temporal   SpO2: 98%   Weight: 173 lb 9.6 oz (78.7 kg)   Height: 5' 4\" (1.626 m)   PainSc:   0 - No pain       Health Maintenance Due   Topic    DTaP/Tdap/Td series (1 - Tdap)    Shingrix Vaccine Age 50> (1 of 2)    Pneumococcal 65+ years (2 of 2 - PPSV23)    COVID-19 Vaccine (3 - Booster for [employer] series)    Flu Vaccine (1)    Medicare Yearly Exam        Coordination of Care Questionnaire:  :   1) Have you been to an emergency room, urgent care, or hospitalized since your last visit? If yes, where when, and reason for visit? yes       2. Have seen or consulted any other health care provider since your last visit? If yes, where when, and reason for visit? NO      Patient is accompanied by self I have received verbal consent from Jose Ruiz to discuss any/all medical information while they are present in the room. An electronic signature was used to authenticate this note.   -- Shantel Rasmussen LPN

## 2022-01-25 LAB
ALBUMIN SERPL-MCNC: 4.3 G/DL (ref 3.5–5)
ALBUMIN/GLOB SERPL: 1.4 {RATIO} (ref 1.1–2.2)
ALP SERPL-CCNC: 128 U/L (ref 45–117)
ALT SERPL-CCNC: 24 U/L (ref 12–78)
ANION GAP SERPL CALC-SCNC: 4 MMOL/L (ref 5–15)
AST SERPL-CCNC: 21 U/L (ref 15–37)
BILIRUB SERPL-MCNC: 0.3 MG/DL (ref 0.2–1)
BUN SERPL-MCNC: 15 MG/DL (ref 6–20)
BUN/CREAT SERPL: 21 (ref 12–20)
CALCIUM SERPL-MCNC: 10.1 MG/DL (ref 8.5–10.1)
CHLORIDE SERPL-SCNC: 98 MMOL/L (ref 97–108)
CO2 SERPL-SCNC: 30 MMOL/L (ref 21–32)
CREAT SERPL-MCNC: 0.7 MG/DL (ref 0.55–1.02)
GLOBULIN SER CALC-MCNC: 3 G/DL (ref 2–4)
GLUCOSE SERPL-MCNC: 96 MG/DL (ref 65–100)
POTASSIUM SERPL-SCNC: 5 MMOL/L (ref 3.5–5.1)
PROT SERPL-MCNC: 7.3 G/DL (ref 6.4–8.2)
SODIUM SERPL-SCNC: 132 MMOL/L (ref 136–145)

## 2022-01-26 ENCOUNTER — TELEPHONE (OUTPATIENT)
Dept: INTERNAL MEDICINE CLINIC | Age: 77
End: 2022-01-26

## 2022-01-26 DIAGNOSIS — R52 PAIN: ICD-10-CM

## 2022-01-26 DIAGNOSIS — N39.8 VOIDING DYSFUNCTION: Primary | ICD-10-CM

## 2022-01-30 ENCOUNTER — TELEPHONE (OUTPATIENT)
Dept: INTERNAL MEDICINE CLINIC | Age: 77
End: 2022-01-30

## 2022-01-30 NOTE — TELEPHONE ENCOUNTER
This patient was contacted to follow-up on her difficulty with voiding. She reports that she was unable to leave a sample in the office and unfortunately was not given a cup to take home. She did purchase the Preparation H and Tucks pads which have resolved her hemorrhoids. She reports that when she got home her home health nurse did come in to see her and offered to remove the urine by catheter. She informed the home health nurse that she was severely constipated which was not discussed at her appointment. She reports the home health nurse performed a digital exam to try to dislodge the impacted stool. After the attempted disimpaction was able to pass stool. With passing stool she also started urinating and has not had any issues urinating since having a bowel movement.

## 2022-01-30 NOTE — PROGRESS NOTES
CMP:Normal electrolyte levels except for an low sodium level ,normal renal ,and liver function . Alkaline phosphatase is closer to normal range.

## 2022-02-08 ENCOUNTER — TELEPHONE (OUTPATIENT)
Dept: INTERNAL MEDICINE CLINIC | Age: 77
End: 2022-02-08

## 2022-02-08 NOTE — TELEPHONE ENCOUNTER
----- Message from Al Lee sent at 2/8/2022 10:56 AM EST -----  Subject: Message to Provider    QUESTIONS  Information for Provider? pt recently had a hospital visit and spoke with   a neurologist there and they have some concerns with off and on numbness   in both legs. She visited [facility] 1/19/22. Please contact   pt to discuss further care.  ---------------------------------------------------------------------------  --------------  CALL BACK INFO  What is the best way for the office to contact you? OK to leave message on   voicemail  Preferred Call Back Phone Number? [phone]  ---------------------------------------------------------------------------  --------------  SCRIPT ANSWERS  Relationship to Patient?  Self

## 2022-02-09 NOTE — TELEPHONE ENCOUNTER
Called patient. ID verified with Name and . Spoke with patient in regards to message received. Patient states that she would like to set up an appt for a hospital follow up from her visit on 2022. Patient states that she was instructed that she needed to see a neurologist at this visit and she would like to discuss all of this information with provider first. Patient was set up via call center for hospital follow up appt in May, patient would like to be seen sooner than May at this time.

## 2022-02-11 ENCOUNTER — TELEPHONE (OUTPATIENT)
Dept: INTERNAL MEDICINE CLINIC | Age: 77
End: 2022-02-11

## 2022-02-11 NOTE — TELEPHONE ENCOUNTER
Called patient. ID verified with Name and . Patient accepted appt for 2022 at 11:45. No further actions required at this time.

## 2022-02-11 NOTE — TELEPHONE ENCOUNTER
----- Message from Sravani Echeverria sent at 2/11/2022  9:23 AM EST -----  Subject: Message to Provider    QUESTIONS  Information for Provider? Patient states she is waiting on call to get in   for a hospital follow up because of her kidney issues and was told to get   a scan. Please advise. Thanks  ---------------------------------------------------------------------------  --------------  Gerard SOMERS  What is the best way for the office to contact you? OK to leave message on   voicemail  Preferred Call Back Phone Number? [phone]  ---------------------------------------------------------------------------  --------------  SCRIPT ANSWERS  Relationship to Patient?  Self

## 2022-02-16 ENCOUNTER — OFFICE VISIT (OUTPATIENT)
Dept: INTERNAL MEDICINE CLINIC | Age: 77
End: 2022-02-16
Payer: MEDICARE

## 2022-02-16 VITALS
TEMPERATURE: 97.2 F | SYSTOLIC BLOOD PRESSURE: 130 MMHG | OXYGEN SATURATION: 100 % | WEIGHT: 176.6 LBS | RESPIRATION RATE: 16 BRPM | BODY MASS INDEX: 30.15 KG/M2 | HEIGHT: 64 IN | DIASTOLIC BLOOD PRESSURE: 80 MMHG

## 2022-02-16 DIAGNOSIS — R30.0 DYSURIA: ICD-10-CM

## 2022-02-16 DIAGNOSIS — M79.7 FIBROMYALGIA: ICD-10-CM

## 2022-02-16 DIAGNOSIS — R42 DIZZINESS: ICD-10-CM

## 2022-02-16 DIAGNOSIS — Z23 NEEDS FLU SHOT: Primary | ICD-10-CM

## 2022-02-16 DIAGNOSIS — R73.03 PREDIABETES: ICD-10-CM

## 2022-02-16 LAB
BILIRUB UR QL STRIP: NEGATIVE
GLUCOSE UR-MCNC: NEGATIVE MG/DL
KETONES P FAST UR STRIP-MCNC: NEGATIVE MG/DL
PH UR STRIP: 7 [PH] (ref 4.6–8)
PROT UR QL STRIP: NEGATIVE
SP GR UR STRIP: 1.01 (ref 1–1.03)
UA UROBILINOGEN AMB POC: NORMAL (ref 0.2–1)
URINALYSIS CLARITY POC: CLEAR
URINALYSIS COLOR POC: YELLOW
URINE BLOOD POC: NEGATIVE
URINE LEUKOCYTES POC: NEGATIVE
URINE NITRITES POC: NEGATIVE

## 2022-02-16 PROCEDURE — 99213 OFFICE O/P EST LOW 20 MIN: CPT | Performed by: INTERNAL MEDICINE

## 2022-02-16 PROCEDURE — 81001 URINALYSIS AUTO W/SCOPE: CPT | Performed by: INTERNAL MEDICINE

## 2022-02-16 PROCEDURE — G8536 NO DOC ELDER MAL SCRN: HCPCS | Performed by: INTERNAL MEDICINE

## 2022-02-16 PROCEDURE — 1090F PRES/ABSN URINE INCON ASSESS: CPT | Performed by: INTERNAL MEDICINE

## 2022-02-16 PROCEDURE — G8427 DOCREV CUR MEDS BY ELIG CLIN: HCPCS | Performed by: INTERNAL MEDICINE

## 2022-02-16 PROCEDURE — G0463 HOSPITAL OUTPT CLINIC VISIT: HCPCS | Performed by: INTERNAL MEDICINE

## 2022-02-16 PROCEDURE — G8432 DEP SCR NOT DOC, RNG: HCPCS | Performed by: INTERNAL MEDICINE

## 2022-02-16 PROCEDURE — 1101F PT FALLS ASSESS-DOCD LE1/YR: CPT | Performed by: INTERNAL MEDICINE

## 2022-02-16 PROCEDURE — G8399 PT W/DXA RESULTS DOCUMENT: HCPCS | Performed by: INTERNAL MEDICINE

## 2022-02-16 PROCEDURE — G8417 CALC BMI ABV UP PARAM F/U: HCPCS | Performed by: INTERNAL MEDICINE

## 2022-02-16 PROCEDURE — 90694 VACC AIIV4 NO PRSRV 0.5ML IM: CPT | Performed by: INTERNAL MEDICINE

## 2022-02-16 RX ORDER — CYCLOBENZAPRINE HCL 10 MG
5 TABLET ORAL
Qty: 30 TABLET | Refills: 5 | Status: SHIPPED | OUTPATIENT
Start: 2022-02-16 | End: 2022-04-17

## 2022-02-16 NOTE — PROGRESS NOTES
HISTORY OF PRESENT ILLNESS  Ramone Arora is a 64 y.o. female. HPI        Hx HLD overweight, hx retinal vein occlusion , hx right breast cancer s/p mastectomy  osteoporosis on prolia, prediabetes, hypothyroid   hospital fu appt admitted for dizziness and vision changes--cervicogenic or r/t fibromyalgia per notes--flexeril recommended. MRI--microvascular dz  Improved overall -not dizzy , flexerill may be helping per pt  Did not have spinning sensation  Will see eye MD next week--acuity diminished , hx retinal veinocclusion  Voiding better now and  Constipation has improved  Had to get urinary catheter inserted by [facility] after hospital d/c -able to void ok now  Has pain in left knee-- [facility], back pain s/p fusion last year, fibromyagia pain  Saw Dr Anais Monae yesterday-no sx planned  Flexeril has helped some of her pain    Saw Dr Lázaro Barraza 1/24   Last OV  Admit date: 1/19/2022  Discharge date and time: 1/20/2022        DISCHARGE DIAGNOSIS:     Cervicogenic dizziness  Fibromyalgia  Prediabetes  Hyperlipidemia        CONSULTATIONS:  IP CONSULT TO NEUROLOGY     Excerpted HPI from H&P of Jhonny Perez MD:  CHIEF COMPLAINT: Dizziness     HISTORY OF PRESENT ILLNESS:        The patient 64years old woman with past medical history significant for pre-diabetes, hyperlipidemia presented emergency department due to dizziness started early this morning.  She reported that she woke up early in the morning she was feeling dizzy and having some visual changes.  She also said that she was feeling generalized weak all over without focal neurologic deficits.  She denied any abdominal pain, nausea, vomiting, diarrhea, chest pain, abdominal pain, shortness of breath.  She denied any syncopal episode.   Patient does not smoke, drink alcohol or use illicit drugs.     In the emergency department, CT head negative.     ______________________________________________________________________  DISCHARGE SUMMARY/HOSPITAL COURSE:  for full details see H&P, daily progress notes, labs, consult notes.      Dizziness  Fibromyalgia   -CTA head and neck: No evidence for acute large vessel arterial occlusion or significant stenosis. -MRI brain: Minimal chronic microvascular ischemic disease. No acute infarct.  -Echo EF 55-60%, no shunt seen  -Neurology input appreciated. Suspect her sensation of dizziness is musculoskeletal or related to her Fibromyalgia, cervicogenic dizziness.  Recommend cyclobenzaprine BID and continue with her lyrica.    -follow up with PCP in a week and neurology in 3 weeks. -set up [facility] PT     PreDM  -HgA1c 6.0    HLD  -LDL 82.  Continue statin     25.0 - 29.9 Overweight / Body mass index is 29.52 kg/m².        _____________________________________________________________________  Patient seen and examined by me on discharge day. Pertinent Findings:  Gen:    Not in distress  Chest:  Clear lungs  CVS:    Regular rhythm. No edema  Abd:     Soft, not distended, not tender  Neuro:  Alert, Oriented x 4, grossly non focal exam  _______________________________________________________________________  DISCHARGE MEDICATIONS:         Current Discharge Medication List             START taking these medications     Details   cyclobenzaprine (FLEXERIL) 10 mg tablet Take 0.5 Tablets by mouth two (2) times a day for 60 days. Qty: 30 Tablet, Refills: 1  Start date: 1/20/2022, End date: 3/21/2022                 CONTINUE these medications which have NOT CHANGED     Details   acetaminophen (TylenoL) 325 mg tablet Take  by mouth every four (4) hours as needed for Pain.       magnesium oxide 250 mg magnesium tablet Take 250 mg by mouth daily.       lidocaine (LIDODERM) 5 % 1 Patch by TransDERmal route every twenty-four (24) hours. Apply patch to the affected area for 12 hours a day and remove for 12 hours a day.   Qty: 30 Each, Refills: 5     Associated Diagnoses: Chronic low back pain, unspecified back pain laterality, unspecified whether sciatica present     oxaprozin (DAYPRO) 600 mg tablet TAKE 1 TABLET BY MOUTH EVERY DAY AS NEEDED  Qty: 90 Tablet, Refills: 0       omeprazole (PRILOSEC) 40 mg capsule Take 40 mg by mouth daily.       levothyroxine (SYNTHROID) 50 mcg tablet Take 1 Tab by mouth Daily (before breakfast). Take 50 mcg every day except 75 mcg q Monday and Friday  Qty: 105 Tab, Refills: 3       simvastatin (ZOCOR) 40 mg tablet Take 1 tablet by mouth nightly  Qty: 90 Tab, Refills: 3       pregabalin (LYRICA) 25 mg capsule Take 25 mg by mouth two (2) times a day.       aspirin 81 mg chewable tablet Take 1 Tab by mouth daily. Qty: 30 Tab, Refills: 1       bisacodyl (DULCOLAX) 10 mg suppository Insert 10 mg into rectum daily. Qty: 28 Each, Refills: 0       metFORMIN (GLUCOPHAGE) 500 mg tablet Take 500 mg by mouth daily (with breakfast).     cetirizine (ZYRTEC) 10 mg tablet Take 10 mg by mouth daily.       cyanocobalamin (VITAMIN B12) 100 mcg tablet Take 100 mcg by mouth daily.       denosumab (PROLIA) 60 mg/mL injection 60 mg by SubCUTAneous route every 6 months.                STOP taking these medications         acetaminophen/diphenhydramine (TYLENOL PM PO) Comments:   Reason for Stopping:            folic acid/multivit-min/lutein (CENTRUM SILVER PO) Comments:   Reason for Stopping:            doxycycline (ADOXA) 100 mg tablet Comments:   Reason for Stopping:            ezetimibe (ZETIA) 10 mg tablet Comments:   Reason for Stopping:            diclofenac EC (VOLTAREN) 50 mg EC tablet Comments:   Reason for Stopping:            doxycycline (ADOXA) 100 mg tablet Comments:   Reason for Stopping:            senna-docusate (PERICOLACE) 8.6-50 mg per tablet Comments:   Reason for Stopping:                    My Recommended Diet, Activity, Wound Care, and follow-up labs are listed in the patient's Discharge Insturctions which I have personally completed and reviewed.   Risk of deterioration: Low     Condition at Discharge: Stable  _____________________________________________________________________     Disposition  Home with family, no needs  ____________________________________________________________________     Care Plan discussed with:   Patient, Family, RN, Care Manager, Consultant     ____________________________________________________________________     Code Status: Full Code  ____________________________________________________________________        Condition at Discharge:  Stable  _____________________________________________________________________  Follow up with:   PCP : John Centeno MD           Follow-up Information      Follow up With Specialties Details Why Contact Info     John Centeno MD Internal Medicine In 1 week           Patient Active Problem List    Diagnosis Date Noted    Postural dizziness with presyncope 01/19/2022    Mixed hyperlipidemia 01/14/2020    Overweight (BMI 25.0-29.9) 11/14/2018    Other specified hypothyroidism 05/16/2018    Gastroesophageal reflux disease without esophagitis 05/16/2018    History of CVA (cerebrovascular accident) 05/16/2018    Osteopenia of multiple sites 05/16/2018    HX: breast cancer 05/16/2018    Closed fracture of left lower extremity with routine healing 05/16/2018    Endometriosis 05/16/2018     Current Outpatient Medications   Medication Sig Dispense Refill    OTHER CBD cream      cyclobenzaprine (FLEXERIL) 10 mg tablet Take 0.5 Tablets by mouth two (2) times daily as needed for Muscle Spasm(s) for up to 60 days. 30 Tablet 5    magnesium oxide 250 mg magnesium tablet Take 250 mg by mouth daily.  lidocaine (LIDODERM) 5 % 1 Patch by TransDERmal route every twenty-four (24) hours. Apply patch to the affected area for 12 hours a day and remove for 12 hours a day. 30 Each 5    oxaprozin (DAYPRO) 600 mg tablet TAKE 1 TABLET BY MOUTH EVERY DAY AS NEEDED 90 Tablet 0    omeprazole (PRILOSEC) 40 mg capsule Take 40 mg by mouth daily.       levothyroxine (SYNTHROID) 50 mcg tablet Take 1 Tab by mouth Daily (before breakfast). Take 50 mcg every day except 75 mcg q Monday and Friday 105 Tab 3    simvastatin (ZOCOR) 40 mg tablet Take 1 tablet by mouth nightly 90 Tab 3    pregabalin (LYRICA) 25 mg capsule Take 25 mg by mouth two (2) times a day.  aspirin 81 mg chewable tablet Take 1 Tab by mouth daily. 30 Tab 1    denosumab (PROLIA) 60 mg/mL injection 60 mg by SubCUTAneous route every 6 months.  metFORMIN (GLUCOPHAGE) 500 mg tablet Take 500 mg by mouth daily (with breakfast).  cetirizine (ZYRTEC) 10 mg tablet Take 10 mg by mouth daily.  cyanocobalamin (VITAMIN B12) 100 mcg tablet Take 100 mcg by mouth daily.  acetaminophen (TylenoL) 325 mg tablet Take  by mouth every four (4) hours as needed for Pain.  bisacodyl (DULCOLAX) 10 mg suppository Insert 10 mg into rectum daily. (Patient not taking: Reported on 2/16/2022) 28 Each 0     Allergies   Allergen Reactions    Other Food Anaphylaxis     Steak, cheese, grass, smoke    Tramadol Other (comments)     Hyponatremia, seizure    Beef Containing Products Other (comments)     Itching and can cause mouth to swell     Cheese Itching and Swelling    Gabapentin Other (comments)     As of 1/28/2020, pt requests not to have this. It \"completely wipes me out. \"    Codeine Rash and Swelling     Mouth    Other Plant, Animal, Environmental Hives     Rubber    Pcn [Penicillins] Rash and Swelling     Mouth      Lab Results   Component Value Date/Time    WBC 8.8 01/19/2022 12:21 PM    HGB 12.6 01/19/2022 12:21 PM    HCT 39.0 01/19/2022 12:21 PM    PLATELET 159 03/20/0318 12:21 PM    MCV 94.7 01/19/2022 12:21 PM     Lab Results   Component Value Date/Time    GFR est non-AA >60 01/24/2022 01:11 PM    GFR est AA >60 01/24/2022 01:11 PM    Creatinine 0.70 01/24/2022 01:11 PM    BUN 15 01/24/2022 01:11 PM    Sodium 132 (L) 01/24/2022 01:11 PM    Potassium 5.0 01/24/2022 01:11 PM    Chloride 98 01/24/2022 01:11 PM    CO2 30 01/24/2022 01:11 PM    Magnesium 2.5 (H) 05/25/2019 01:10 AM    Phosphorus 2.4 (L) 05/26/2019 04:15 AM     Lab Results   Component Value Date/Time    Glucose 96 01/24/2022 01:11 PM    Glucose (POC) 127 (H) 01/19/2022 12:06 PM         ROS    Physical Exam  Vitals and nursing note reviewed. Constitutional:       Appearance: She is well-developed. Comments: Appears stated age   Cardiovascular:      Rate and Rhythm: Normal rate and regular rhythm. Heart sounds: Normal heart sounds. No murmur heard. No friction rub. No gallop. Pulmonary:      Effort: Pulmonary effort is normal. No respiratory distress. Breath sounds: Normal breath sounds. No wheezing. Abdominal:      General: Bowel sounds are normal.      Palpations: Abdomen is soft. Neurological:      General: No focal deficit present. Mental Status: She is alert. Cranial Nerves: No cranial nerve deficit. Motor: No weakness. Coordination: Coordination normal.      Gait: Gait normal.         ASSESSMENT and PLAN  Diagnoses and all orders for this visit:    1. Needs flu shot  -     FLU (FLUAD QUAD INFLUENZA VACCINE,QUAD,ADJUVANTED)    2. Fibromyalgia  -     cyclobenzaprine (FLEXERIL) 10 mg tablet; Take 0.5 Tablets by mouth two (2) times daily as needed for Muscle Spasm(s) for up to 60 days. 3. Dysuria  -     AMB POC URINALYSIS DIP STICK AUTO W/ MICRO-negative    4. Dizziness   Has not recurred   Hydrate, refill flexeril, will see Neurologist in a few months   Lower metformin to 1 tab every other day in case hypoglycemia had occurred  5. Prediabetes   a1c 6.0   Lower metformin to to every other day for now    Follow-up and Dispositions    · Return in about 6 months (around 8/16/2022) for CPE.

## 2022-02-16 NOTE — PROGRESS NOTES
Darren Hargrove is a 64 y.o. female who presents for routine immunizations. She denies any symptoms , reactions or allergies that would exclude them from being immunized today. Risks and adverse reactions were discussed and the VIS was given to them. All questions were addressed. She was observed for 10 min post injection. There were no reactions observed.     Heide Bliss LPN

## 2022-02-22 DIAGNOSIS — E03.8 OTHER SPECIFIED HYPOTHYROIDISM: Primary | ICD-10-CM

## 2022-02-22 RX ORDER — LEVOTHYROXINE SODIUM 50 UG/1
50 TABLET ORAL
Qty: 105 TABLET | Refills: 3 | Status: SHIPPED | OUTPATIENT
Start: 2022-02-22

## 2022-02-22 NOTE — TELEPHONE ENCOUNTER
PCP: Linn Runner, MD    Last appt: 2/16/2022  Future Appointments   Date Time Provider Teddy Carrillo   7/6/2022  2:00 PM Jose Miguel Seo MD NEU BS AMB       Requested Prescriptions     Pending Prescriptions Disp Refills    levothyroxine (SYNTHROID) 50 mcg tablet 105 Tablet 3     Sig: Take 1 Tablet by mouth Daily (before breakfast).  Take 50 mcg every day except 75 mcg q Monday and Friday       Prior labs and Blood pressures:  BP Readings from Last 3 Encounters:   02/16/22 130/80   01/24/22 (!) 144/79   01/20/22 122/73     Lab Results   Component Value Date/Time    Sodium 132 (L) 01/24/2022 01:11 PM    Potassium 5.0 01/24/2022 01:11 PM    Chloride 98 01/24/2022 01:11 PM    CO2 30 01/24/2022 01:11 PM    Anion gap 4 (L) 01/24/2022 01:11 PM    Glucose 96 01/24/2022 01:11 PM    BUN 15 01/24/2022 01:11 PM    Creatinine 0.70 01/24/2022 01:11 PM    BUN/Creatinine ratio 21 (H) 01/24/2022 01:11 PM    GFR est AA >60 01/24/2022 01:11 PM    GFR est non-AA >60 01/24/2022 01:11 PM    Calcium 10.1 01/24/2022 01:11 PM     Lab Results   Component Value Date/Time    Hemoglobin A1c 6.0 (H) 01/20/2022 02:11 AM     Lab Results   Component Value Date/Time    Cholesterol, total 169 01/20/2022 02:11 AM    HDL Cholesterol 58 01/20/2022 02:11 AM    LDL, calculated 82 01/20/2022 02:11 AM    VLDL, calculated 29 01/20/2022 02:11 AM    Triglyceride 145 01/20/2022 02:11 AM    CHOL/HDL Ratio 2.9 01/20/2022 02:11 AM     Lab Results   Component Value Date/Time    Vitamin D 25-Hydroxy 47 07/10/2009 08:37 AM       Lab Results   Component Value Date/Time    TSH 1.29 09/10/2021 09:56 AM

## 2022-02-22 NOTE — TELEPHONE ENCOUNTER
----- Message from Angela Bob sent at 2/22/2022  9:21 AM EST -----  Subject: Refill Request    QUESTIONS  Name of Medication? levothyroxine (SYNTHROID) 50 mcg tablet  Patient-reported dosage and instructions? 50mg 5x a day  How many days do you have left? 0  Preferred Pharmacy? [facility]   Pharmacy phone number (if available)? [phone]  ---------------------------------------------------------------------------  --------------  CALL BACK INFO  What is the best way for the office to contact you? OK to leave message on   voicemail  Preferred Call Back Phone Number?  [phone]

## 2022-02-23 ENCOUNTER — TELEPHONE (OUTPATIENT)
Dept: INTERNAL MEDICINE CLINIC | Age: 77
End: 2022-02-23

## 2022-02-23 NOTE — TELEPHONE ENCOUNTER
----- Message from Ciera Guadalupe sent at 2/23/2022  8:47 AM EST -----  Subject: Message to Provider    QUESTIONS  Information for Provider? PT is calling in asking if her last blood work   results good be sent over to Dr. Radha Craig office. ---------------------------------------------------------------------------  --------------  Chirag SOMERS  What is the best way for the office to contact you? OK to leave message on   voicemail  Preferred Call Back Phone Number? [phone]  ---------------------------------------------------------------------------  --------------  SCRIPT ANSWERS  Relationship to Patient?  Self

## 2022-02-24 NOTE — TELEPHONE ENCOUNTER
Called patient. ID verified with Name and . Spoke with patient in regards to message received. Informed patient that lab work has been faxed to Dr. Radha Craig office at this time. No further actions required at this time.

## 2022-03-14 NOTE — PROGRESS NOTES
Hospitalist Progress Note              Saray Vaca MD.                                                             Cell: [phone]                               NAME:  Fahad Medel  :  1945  MRN:  [id number]  Date of Service:  2019    Summary: 74 y.o.  female with past medical history of CAD, CVA, vision loss right eye, diabetes mellitus who presented on 2019 with altered mental status/confusion. Assessment/Plan:  Right Side Back Pain  RUQ abdominal pain  Suspect neuro-muscular  Symptoms uncontrolled, unable to sleep last night  Had recent back surgery  Will check MRI of T and LS spine  CT A/P with mild hyro but urology saw the patient on consult but doesn't believe to be the cause  US A/P negative for gall bladder etiology  continue lortab (can't use ultram as presented with seizure)  Make Flexeril schaduled  Bengay    Severe hyponatremia with Acute Encephalopathy and Seizure POA  Na trending down again despite fluid restriction, currently plateau at 594  Initially Manage in ICU, S/p 3% upon admission  Now managed on medical floor  Urine osmolality 338, 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 Allergic to PCN  F/u with speciation and sen report    Hypothyroidism  TFTs consistent with sick thyroid  Continue synthroid    DM type 2  Holding metformin  Start SSI    Recent Lumbar spine surgery  at [address]     CAD POA  History of stroke POA loss of vision in right eye  Hyperlipidemia POA  Continue statin, ASA     Overweight  POA Body mass index is 28.12 kg/m²     Code status: full  DVT prophylaxsis: Lovenox  Dispo:tbd         Subjective: Pt seen and examined at bedside. Continue to have Right side back and RUQ abdominal pain, unable to sleep due to intractable pain. Overnight events d/w RN    Chief Complaints: f/u Seizure, encephalopathy, hyponatremia    Review of Systems:  Symptom Y/N Comments   Symptom Y/N Comments   Fever/Chills n     Chest Pain n      Poor Appetite       Edema        Cough n     Abdominal Pain n      Sputum       Joint Pain        SOB/NAVA n     Pruritis/Rash        Nausea/vomit n     Tolerating PT/OT        Diarrhea       Tolerating Diet y      Constipation       Other           Could NOT obtain due to:            Objective:     VITALS:   Last 24hrs VS reviewed since prior progress note. Most recent are:  Visit Vitals  /68 (BP 1 Location: Left arm, BP Patient Position: At rest)   Pulse 65   Temp 98 °F (36.7 °C)   Resp 16   Ht 5' 4\" (1.626 m)   Wt 74.5 kg (164 lb 3.9 oz)   SpO2 99%   BMI 28.19 kg/m²       Intake/Output Summary (Last 24 hours) at 5/30/2019 0954  Last data filed at 5/30/2019 0331  Gross per 24 hour   Intake 532 ml   Output 1400 ml   Net -868 ml        PHYSICAL EXAM:  General: No acute distress, cooperative, pleasant   EENT: EOMI. Anicteric sclerae. Oral mucous moist, oropharynx benign  Resp: CTA bilaterally. No wheezing/rhonchi/rales. No accessory muscle use  CV: Regular rhythm, normal rate, no murmurs, gallops, rubs  GI: Soft, non distended, non tender. normoactive bowel sounds, no hepatosplenomegaly Extremities: No edema, warm, 2+ pulses throughout  Neurologic: Moves all extremities. Psych: Good insight. Not anxious nor agitated.   Skin: Good Turgor, no rashes or ulcers    Lab Data Personally Reviewed: (see below)     Medications list Personally Reviewed:  x YES  NO     _______________________________________________________________________  Care Plan discussed with:  Patientand Nurse    Total NON critical care TIME:  20 minutes    Tonio Sandhu MD     Procedures: see electronic medical records for all procedures/Xrays and details which were not copied into this note but were reviewed prior to creation of Plan. LABS:  Recent Labs     05/29/19  0320   WBC 13.5*   HGB 12.7   HCT 37.5   *     Recent Labs     05/30/19  0319 05/29/19  0320 05/28/19  0111   * 128* 129*   K 4.1 3.9 3.7   CL 94* 95* 96*   CO2 26 25 25   BUN 12 9 8   CREA 0.69 0.64 0.62   * 122* 125*   CA 8.5 8.6 8.5     Recent Labs     05/28/19  0111   LPSE 85     No results for input(s): INR, PTP, APTT in the last 72 hours. No lab exists for component: INREXT, INREXT   No results for input(s): FE, TIBC, PSAT, FERR in the last 72 hours. No results found for: FOL, RBCF   No results for input(s): PH, PCO2, PO2 in the last 72 hours. No results for input(s): CPK, CKNDX, TROIQ in the last 72 hours.     No lab exists for component: CPKMB  Lab Results   Component Value Date/Time    Cholesterol, total 209 (H) 07/10/2009 08:37 AM    HDL Cholesterol 70 (H) 07/10/2009 08:37 AM    LDL, calculated 121.4 (H) 07/10/2009 08:37 AM    Triglyceride 88 07/10/2009 08:37 AM    CHOL/HDL Ratio 3.0 07/10/2009 08:37 AM     Lab Results   Component Value Date/Time    Glucose (POC) 100 05/30/2019 07:28 AM    Glucose (POC) 107 (H) 05/29/2019 08:36 PM    Glucose (POC) 108 (H) 05/29/2019 04:05 PM    Glucose (POC) 110 (H) 05/29/2019 11:32 AM    Glucose (POC) 101 (H) 05/29/2019 07:45 AM     Lab Results   Component Value Date/Time    Color YELLOW/STRAW 05/23/2019 02:28 PM    Appearance CLOUDY (A) 05/23/2019 02:28 PM    Specific gravity 1.010 05/23/2019 02:28 PM    pH (UA) 7.5 05/23/2019 02:28 PM    Protein 30 (A) 05/23/2019 02:28 PM    Glucose 250 (A) 05/23/2019 02:28 PM    Ketone 15 (A) 05/23/2019 02:28 PM    Bilirubin NEGATIVE  05/23/2019 02:28 PM    Urobilinogen 0.2 05/23/2019 02:28 PM    Nitrites NEGATIVE  05/23/2019 02:28 PM    Leukocyte Esterase NEGATIVE  05/23/2019 02:28 PM    Epithelial cells FEW 05/23/2019 02:28 PM    Bacteria 2+ (A) 05/23/2019 02:28 PM    WBC 0-4 05/23/2019 02:28 PM RBC 5-10 05/23/2019 02:28 PM Oral Minoxidil Pregnancy And Lactation Text: This medication should only be used when clearly needed if you are pregnant, attempting to become pregnant or breast feeding.

## 2022-03-18 PROBLEM — R42 POSTURAL DIZZINESS WITH PRESYNCOPE: Status: ACTIVE | Noted: 2022-01-19

## 2022-03-18 PROBLEM — R55 POSTURAL DIZZINESS WITH PRESYNCOPE: Status: ACTIVE | Noted: 2022-01-19

## 2022-03-18 PROBLEM — N80.9 ENDOMETRIOSIS: Status: ACTIVE | Noted: 2018-05-16

## 2022-03-18 PROBLEM — S82.92XD CLOSED FRACTURE OF LEFT LOWER EXTREMITY WITH ROUTINE HEALING: Status: ACTIVE | Noted: 2018-05-16

## 2022-03-18 PROBLEM — K21.9 GASTROESOPHAGEAL REFLUX DISEASE WITHOUT ESOPHAGITIS: Status: ACTIVE | Noted: 2018-05-16

## 2022-03-19 PROBLEM — Z85.3 HX: BREAST CANCER: Status: ACTIVE | Noted: 2018-05-16

## 2022-03-19 PROBLEM — M85.89 OSTEOPENIA OF MULTIPLE SITES: Status: ACTIVE | Noted: 2018-05-16

## 2022-03-19 PROBLEM — E03.8 OTHER SPECIFIED HYPOTHYROIDISM: Status: ACTIVE | Noted: 2018-05-16

## 2022-03-19 PROBLEM — Z86.73 HISTORY OF CVA (CEREBROVASCULAR ACCIDENT): Status: ACTIVE | Noted: 2018-05-16

## 2022-03-19 PROBLEM — E78.2 MIXED HYPERLIPIDEMIA: Status: ACTIVE | Noted: 2020-01-14

## 2022-03-20 PROBLEM — E66.3 OVERWEIGHT (BMI 25.0-29.9): Status: ACTIVE | Noted: 2018-11-14

## 2022-04-06 ENCOUNTER — TELEPHONE (OUTPATIENT)
Dept: INTERNAL MEDICINE CLINIC | Age: 77
End: 2022-04-06

## 2022-04-06 ENCOUNTER — NURSE TRIAGE (OUTPATIENT)
Dept: OTHER | Facility: CLINIC | Age: 77
End: 2022-04-06

## 2022-04-06 NOTE — TELEPHONE ENCOUNTER
Called patient. ID verified with Name and . Spoke with patient in regards to message received. Patient states that she is still experiencing dizziness that she was seen in the ED for on  and discussed with provider during her visit on . At the time of the visit, provider referred patient to neuro in regards to her dizziness. Patient made appt for first available, which isn't until July. Patient would like to know what she needs to be doing in regards to her dizziness at this time. Patient states that she will not go back to the ED or urgent care for this issue. Writer did inform patient that provider was currently out of the office and would not return until 2022. Information discussed with in office provider, instructed to send to provider at this time. Writer informed patient that information would be forwarded to provider for review upon his return.

## 2022-04-06 NOTE — TELEPHONE ENCOUNTER
#[id number]  Pt has had dizziness for six days now. Pt states that she had this is Jan and ended up in the hospital.    Pt states she needs a call back as soon as possible. [facility] Triage wanted pt to be seen today.

## 2022-04-06 NOTE — TELEPHONE ENCOUNTER
Received call from [facility] at [facility] with [employer] Complaint. Subjective: Caller states Milagros Mas is dizzy and has been for a long time with hospitalization after a fall from the drops. She is using systane for dry eyes, and when she puts the drops in it triggers a dizzy spell, vision changes. The center of the phone is farther away than the sides of the phone. Feels like the room is closing in on her. Has a but hard to explain. \"     Current Symptoms: vision changes    Onset: 1 week ago; gradual, worsening, waxing and waning    Associated Symptoms: reduced activity    Pain Severity: 0/10; n/a; n/a    Temperature: not assessed n/a    What has been tried: nothing    LMP: NA Pregnant: NA    Recommended disposition: n/a    Care advice provided, patient verbalizes understanding; denies any other questions or concerns; instructed to call back for any new or worsening symptoms. n/a    Attention Provider: Thank you for allowing me to participate in the care of your patient. The patient was connected to triage in response to information provided to the [facility]. Please do not respond through this encounter as the response is not directed to a shared pool.

## 2022-04-06 NOTE — TELEPHONE ENCOUNTER
Received call from Ayo Yousif at [facility] with [employer] Complaint. Subjective: Caller states \"dizziness\"     Current Symptoms: dizziness, room does not spin, but when she looks down she gets dizziness; At night feels like something in head is moving;  Drawing sensations on side of face; Hears pops in brain at night;  Ear pain deep in ears; Admitted in January with \"mini strokes\" and CT and MRI found nothing; She feels it is in her ears; Was told she has wax in ears; Feels \"tired and foggy headed\"; Putting eye drops in eyes makes dizziness start;  Might have a UTI, has some burning and an odor in urine; Left breast itching; has had a right mastectomy already; Onset: 4 months ago; worsening    Associated Symptoms: increased wakefulness    Pain Severity: 0/10; Temperature: denies    What has been tried: denies    Recommended disposition: See in Office Today   If unable to schedule or symptoms get worse go to [address] [place] [address] advice provided, patient verbalizes understanding; denies any other questions or concerns; instructed to call back for any new or worsening symptoms. Patient/Caller agrees with recommended disposition; writer provided warm transfer to [facility] at [facility] for appointment scheduling    Attention Provider: Thank you for allowing me to participate in the care of your patient. The patient was connected to triage in response to information provided to the [facility]. Please do not respond through this encounter as the response is not directed to a shared pool.       Reason for Disposition   Patient wants to be seen    Protocols used: [id number]

## 2022-04-07 NOTE — TELEPHONE ENCOUNTER
----- Message from Pearl Paz sent at 4/7/2022 10:09 AM EDT -----  Subject: Message to Provider    QUESTIONS  Information for Provider? Patient says she does not need to be seen   because someone came to her house to look at her . please advise   ---------------------------------------------------------------------------  --------------  CALL BACK INFO  What is the best way for the office to contact you? Do not leave any   message, patient will call back for answer  Preferred Call Back Phone Number? [phone]  ---------------------------------------------------------------------------  --------------  SCRIPT ANSWERS  Relationship to Patient?  Self

## 2022-04-14 ENCOUNTER — TELEPHONE (OUTPATIENT)
Dept: INTERNAL MEDICINE CLINIC | Age: 77
End: 2022-04-14

## 2022-04-14 RX ORDER — MECLIZINE HYDROCHLORIDE 25 MG/1
25 TABLET ORAL
Qty: 30 TABLET | Refills: 0 | Status: SHIPPED | OUTPATIENT
Start: 2022-04-14 | End: 2022-04-24

## 2022-04-14 NOTE — TELEPHONE ENCOUNTER
Spoke with patient using 2 identifiers. Patient was informed  sent in RX meclizine for her dizziness and placed and order PT for Vertigo @ [facility]. Patient verbalized understanding.

## 2022-04-14 NOTE — TELEPHONE ENCOUNTER
[phone]      [facility] saw patient for sinus/ear/dizziness about a week ago. [facility] gave patient antibiotic: cefdinir 300 mg , 7 days. Pt has completed antibiotic regimine. States still has symptom of Dizziness (was looking up on wall to hang something and that's when dizziness started)    Patient took a dramamine today when she felt dizzy. States took it about 10 min prior to calling this psr. States no nausea. States Not dizzy sitting in chair with head stable, but when gets moving she gets dizzy. Ears were hurting at night a couple days ago but has not returned. States feels very tired, all the time. Sleeps well at night, takes tylenol pm.    Takes naps but when wakes up still tired, nothing helps with tiredness.           States no covid test taken

## 2022-04-18 ENCOUNTER — TELEPHONE (OUTPATIENT)
Dept: INTERNAL MEDICINE CLINIC | Age: 77
End: 2022-04-18

## 2022-04-18 NOTE — TELEPHONE ENCOUNTER
----- Message from Patricia Peterson sent at 4/18/2022 11:07 AM EDT -----  Subject: Referral Request    QUESTIONS   Reason for referral request? Physical Therapy referral request   Has the physician seen you for this condition before? Yes  Select a date? 2022-02-16  Select the Provider the patient wants to be referred to, if known (PCP or   Specialist)? Tanna Wilson   Preferred Specialist (if applicable)? Do you already have an appointment scheduled? No  Additional Information for Provider? Pt waiting for referral to schedule   an appt.  ---------------------------------------------------------------------------  --------------  CALL BACK INFO  What is the best way for the office to contact you? OK to leave message on   voicemail  Preferred Call Back Phone Number? [phone]  ---------------------------------------------------------------------------  --------------  SCRIPT ANSWERS  Relationship to Patient?  Self

## 2022-04-18 NOTE — TELEPHONE ENCOUNTER
Called patient. ID verified with Name and . Spoke with patient in regards to information received. Informed patient that referral has been sent over to [facility] via fax today. Patient states that she understands the information received and has no further questions or concerns at this time.

## 2022-06-21 ENCOUNTER — DOCUMENTATION ONLY (OUTPATIENT)
Dept: INTERNAL MEDICINE CLINIC | Age: 77
End: 2022-06-21

## 2022-06-21 ENCOUNTER — TELEPHONE (OUTPATIENT)
Dept: INTERNAL MEDICINE CLINIC | Age: 77
End: 2022-06-21

## 2022-06-21 NOTE — PROGRESS NOTES
Returned patient's call. Patient called in with symptoms of a sinus infection. Unable to speak with patient. Left voicemail with appointment options. Currently waiting for patient to call back.

## 2022-06-21 NOTE — TELEPHONE ENCOUNTER
----- Message from Naheed Simms sent at 6/21/2022  9:42 AM EDT -----  Subject: Appointment Request    Reason for Call: Semi-Routine Cough, Cold Symptoms    QUESTIONS  Type of Appointment? Established Patient  Reason for appointment request? No appointments available during search  Additional Information for Provider? pt has a possible sinus infection   runny nose, drainage, requesting for some meds to be sent to the pharmacy   requesting a call back to confirm   ---------------------------------------------------------------------------  --------------  [address]  What is the best way for the office to contact you? OK to leave message on   voicemail  Preferred Call Back Phone Number? [phone]  ---------------------------------------------------------------------------  --------------  SCRIPT ANSWERS  Relationship to Patient? Self  Are you currently unable to finish sentences due to any difficulty   breathing? No  Are you unable to swallow liquids? No  Are you having fevers (100.4 or greater), chills, or sweats? No  Do you have COPD, asthma or a chronic lung condition? No  Have your symptoms been present for more than 5 days? No  Have you recently (14 days) been seen by a provider for this issue?  No

## 2022-06-23 ENCOUNTER — VIRTUAL VISIT (OUTPATIENT)
Dept: INTERNAL MEDICINE CLINIC | Age: 77
End: 2022-06-23
Payer: MEDICARE

## 2022-06-23 DIAGNOSIS — J06.9 URI, ACUTE: Primary | ICD-10-CM

## 2022-06-23 PROCEDURE — G8399 PT W/DXA RESULTS DOCUMENT: HCPCS | Performed by: FAMILY MEDICINE

## 2022-06-23 PROCEDURE — 1090F PRES/ABSN URINE INCON ASSESS: CPT | Performed by: FAMILY MEDICINE

## 2022-06-23 PROCEDURE — G8432 DEP SCR NOT DOC, RNG: HCPCS | Performed by: FAMILY MEDICINE

## 2022-06-23 PROCEDURE — 99213 OFFICE O/P EST LOW 20 MIN: CPT | Performed by: FAMILY MEDICINE

## 2022-06-23 PROCEDURE — 1101F PT FALLS ASSESS-DOCD LE1/YR: CPT | Performed by: FAMILY MEDICINE

## 2022-06-23 PROCEDURE — G0463 HOSPITAL OUTPT CLINIC VISIT: HCPCS | Performed by: FAMILY MEDICINE

## 2022-06-23 PROCEDURE — G8427 DOCREV CUR MEDS BY ELIG CLIN: HCPCS | Performed by: FAMILY MEDICINE

## 2022-06-23 RX ORDER — AZITHROMYCIN 250 MG/1
TABLET, FILM COATED ORAL
COMMUNITY
Start: 2022-06-21 | End: 2022-07-06 | Stop reason: ALTCHOICE

## 2022-06-23 RX ORDER — BENZONATATE 200 MG/1
200 CAPSULE ORAL
Qty: 21 CAPSULE | Refills: 0 | Status: SHIPPED | OUTPATIENT
Start: 2022-06-23 | End: 2022-06-30

## 2022-06-23 NOTE — PROGRESS NOTES
Chief Complaint   Patient presents with    Cold Symptoms     Pt in [place] Please call [phone]       1. Have you been to the ER, urgent care clinic since your last visit? Hospitalized since your last visit? [facility] cold symptoms. Covid Test negative. 2. Have you seen or consulted any other health care providers outside of the [address] [place] [address] since your last visit? Include any pap smears or colon screening. No      Pt c/o sinus drainage, cough, chills x5 days.

## 2022-06-23 NOTE — PROGRESS NOTES
Yogesh Bowen is a 64 y.o. female who was seen by synchronous (real-time) audio-video technology on 6/23/2022 for Cold Symptoms (Pt in [place] Please call [phone])        Assessment & Plan:   Diagnoses and all orders for this visit:    1. URI, acute  -     benzonatate (TESSALON) 200 mg capsule; Take 1 Capsule by mouth three (3) times daily as needed for Cough for up to 7 days. I prescribed Tessalon Perles. I advised pt to hold Corcidin, if she starts [employer]. Subjective:   She, a pt of Dr. Erica Pineda, presents today for a cc of cold symptoms[facility] her a Z-cruzito. She has taken 3 tablets of Z-cruzito so far. Her symptoms started last Sunday. She initially noted throat tickling, nasal drainage, and coughing. She denies a sore throat. Previously, she does not take an antihistamine. She is taking coricidin and Allegra now. There are no improvements to her symptoms. She denies an upset stomach and changes to her bowel habits. She followed up with Dr. Eric Farias for an upper EGD on 10/06 for dark stools. Prior to Admission medications    Medication Sig Start Date End Date Taking? Authorizing Provider   benzonatate (TESSALON) 200 mg capsule Take 1 Capsule by mouth three (3) times daily as needed for Cough for up to 7 days. 6/23/22 6/30/22 Yes Levert Leyden, MD   simvastatin (ZOCOR) 40 mg tablet TAKE 1 TABLET BY MOUTH ONCE DAILY AT NIGHT 3/6/22  Yes Nuha Galvez MD   levothyroxine (SYNTHROID) 50 mcg tablet Take 1 Tablet by mouth Daily (before breakfast). Take 50 mcg every day except 75 mcg q Monday and Friday 2/22/22  Yes Nuha Galvez MD   OTHER CBD cream   Yes Provider, Historical   acetaminophen (TylenoL) 325 mg tablet Take  by mouth every four (4) hours as needed for Pain. Yes Provider, Historical   magnesium oxide 250 mg magnesium tablet Take 250 mg by mouth daily.    Yes Provider, Historical   lidocaine (LIDODERM) 5 % 1 Patch by TransDERmal route every twenty-four (24) hours. Apply patch to the affected area for 12 hours a day and remove for 12 hours a day. 9/2/21  Yes Ragini Puentes MD   oxaprozin (DAYPRO) 600 mg tablet TAKE 1 TABLET BY MOUTH EVERY DAY AS NEEDED 8/9/21  Yes Ragini Puentes MD   omeprazole (PRILOSEC) 40 mg capsule Take 40 mg by mouth daily. Yes Provider, Historical   pregabalin (LYRICA) 25 mg capsule Take 25 mg by mouth two (2) times a day. Yes Provider, Historical   aspirin 81 mg chewable tablet Take 1 Tab by mouth daily. 6/4/19  Yes Pablo Tineo MD   denosumab (PROLIA) 60 mg/mL injection 60 mg by SubCUTAneous route every 6 months. Yes Provider, Historical   metFORMIN (GLUCOPHAGE) 500 mg tablet Take 500 mg by mouth daily (with breakfast). Yes Provider, Historical   cetirizine (ZYRTEC) 10 mg tablet Take 10 mg by mouth daily. Yes Provider, Historical   cyanocobalamin (VITAMIN B12) 100 mcg tablet Take 100 mcg by mouth daily. Yes Provider, Historical   azithromycin (ZITHROMAX) 250 mg tablet  6/21/22   Provider, Historical   bisacodyl (DULCOLAX) 10 mg suppository Insert 10 mg into rectum daily. Patient not taking: Reported on 2/16/2022 6/4/19   Pablo Tineo MD     Patient Active Problem List    Diagnosis Date Noted    Postural dizziness with presyncope 01/19/2022    Mixed hyperlipidemia 01/14/2020    Overweight (BMI 25.0-29.9) 11/14/2018    Other specified hypothyroidism 05/16/2018    Gastroesophageal reflux disease without esophagitis 05/16/2018    History of CVA (cerebrovascular accident) 05/16/2018    Osteopenia of multiple sites 05/16/2018    HX: breast cancer 05/16/2018    Closed fracture of left lower extremity with routine healing 05/16/2018    Endometriosis 05/16/2018     Current Outpatient Medications   Medication Sig Dispense Refill    benzonatate (TESSALON) 200 mg capsule Take 1 Capsule by mouth three (3) times daily as needed for Cough for up to 7 days.  21 Capsule 0    simvastatin (ZOCOR) 40 mg tablet TAKE 1 TABLET BY MOUTH ONCE DAILY AT NIGHT 90 Tablet 3    levothyroxine (SYNTHROID) 50 mcg tablet Take 1 Tablet by mouth Daily (before breakfast). Take 50 mcg every day except 75 mcg q Monday and Friday 105 Tablet 3    OTHER CBD cream      acetaminophen (TylenoL) 325 mg tablet Take  by mouth every four (4) hours as needed for Pain.  magnesium oxide 250 mg magnesium tablet Take 250 mg by mouth daily.  lidocaine (LIDODERM) 5 % 1 Patch by TransDERmal route every twenty-four (24) hours. Apply patch to the affected area for 12 hours a day and remove for 12 hours a day. 30 Each 5    oxaprozin (DAYPRO) 600 mg tablet TAKE 1 TABLET BY MOUTH EVERY DAY AS NEEDED 90 Tablet 0    omeprazole (PRILOSEC) 40 mg capsule Take 40 mg by mouth daily.  pregabalin (LYRICA) 25 mg capsule Take 25 mg by mouth two (2) times a day.  aspirin 81 mg chewable tablet Take 1 Tab by mouth daily. 30 Tab 1    denosumab (PROLIA) 60 mg/mL injection 60 mg by SubCUTAneous route every 6 months.  metFORMIN (GLUCOPHAGE) 500 mg tablet Take 500 mg by mouth daily (with breakfast).  cetirizine (ZYRTEC) 10 mg tablet Take 10 mg by mouth daily.  cyanocobalamin (VITAMIN B12) 100 mcg tablet Take 100 mcg by mouth daily.  azithromycin (ZITHROMAX) 250 mg tablet       bisacodyl (DULCOLAX) 10 mg suppository Insert 10 mg into rectum daily. (Patient not taking: Reported on 2/16/2022) 28 Each 0     Allergies   Allergen Reactions    Other Food Anaphylaxis     Steak, cheese, grass, smoke    Penicillins Rash, Swelling and Itching     Mouth  Other reaction(s): ITCHING AND SWELLING  Other reaction(s): Adverse reaction to substance ([id number]);  Severity: Mild      Tramadol Other (comments)     Hyponatremia, seizure    Beef Containing Products Other (comments)     Itching and can cause mouth to swell   Other reaction(s): SWELLING AND HIVES AND ITCHING    Cheese Itching and Swelling    Codeine Rash and Swelling     Mouth    Gabapentin Other (comments)     As of 1/28/2020, pt requests not to have this. It \"completely wipes me out. \"  Other reaction(s): WEAKNESS    Other Plant, Animal, Environmental Hives     Rubber     Past Medical History:   Diagnosis Date    Breast cancer ([facility] 1999    Right    Diabetes ([facility]     As of 1/28/2020, pt states \"borderline\"    Fibromyalgia     GERD (gastroesophageal reflux disease)     Hiatal hernia     Hyponatremia 05/2019    As of 1/28/2020 pt had a seizure as a result to this.  Hypothyroid     Ill-defined condition     As of 1/28/2020, pt states her cardiologist says her HR drops at night but nothing to be concerned with.  Pre-diabetes     PUD (peptic ulcer disease)     PVC (premature ventricular contraction)     Too much caffeine    Seizures ([address] 05/2019    Stroke ([facility] [address] 2003    Took vision from right eye     Past Surgical History:   Procedure Laterality Date    HX BACK SURGERY  05/16/2019    [facility]    HX COLONOSCOPY N/A     HX ENDOSCOPY      HX MASTECTOMY Right 1999    HX ORTHOPAEDIC  2000    L knee has pins and plates after a fall (fracture)    HX PARTIAL HYSTERECTOMY N/A      Family History   Problem Relation Age of Onset    Cancer Mother         breast cancer    Heart Disease Father     Diabetes Brother     [place] Cancer Brother     Diabetes Brother     Heart Disease Brother     Other Sister 55        Gastric bypass into staph infection    No Known Problems Sister      Social History     Tobacco Use    Smoking status: Never Smoker    Smokeless tobacco: Never Used   Substance Use Topics    Alcohol use: Not Currently       Review of Systems   Constitutional: Negative. HENT: Negative. Eyes: Negative. Respiratory: Positive for cough. Cardiovascular: Negative. Gastrointestinal: Negative. Genitourinary: Negative. Musculoskeletal: Negative. Skin: Negative. Neurological: Negative. Endo/Heme/Allergies: Negative.     Psychiatric/Behavioral: Negative. Objective:     Patient-Reported Vitals 6/23/2022   Patient-Reported Weight -   Patient-Reported Systolic  500   Patient-Reported Diastolic 86   Patient-Reported LMP none        [INSTRUCTIONS:  \"[x]\" Indicates a positive item  \"[]\" Indicates a negative item  -- DELETE ALL ITEMS NOT EXAMINED]    Constitutional: [x] Appears well-developed and well-nourished [x] No apparent distress      [] Abnormal -     Mental status: [x] Alert and awake  [x] Oriented to person/place/time [x] Able to follow commands    [] Abnormal -     Eyes:   EOM    [x]  Normal    [] Abnormal -   Sclera  [x]  Normal    [] Abnormal -          Discharge [x]  None visible   [] Abnormal -     HENT: [x] Normocephalic, atraumatic  [] Abnormal -   [x] Mouth/Throat: Mucous membranes are moist    External Ears [x] Normal  [] Abnormal -    Neck: [x] No visualized mass [] Abnormal -     Pulmonary/Chest: [x] Respiratory effort normal   [x] No visualized signs of difficulty breathing or respiratory distress        [] Abnormal -      Musculoskeletal:   [x] Normal gait with no signs of ataxia         [x] Normal range of motion of neck        [] Abnormal -     Neurological:        [x] No Facial Asymmetry (Cranial nerve 7 motor function) (limited exam due to video visit)          [x] No gaze palsy        [] Abnormal -          Skin:        [x] No significant exanthematous lesions or discoloration noted on facial skin         [] Abnormal -            Psychiatric:       [x] Normal Affect [] Abnormal -        [x] No Hallucinations    Other pertinent observable physical exam findings:-        We discussed the expected course, resolution and complications of the diagnosis(es) in detail. Medication risks, benefits, costs, interactions, and alternatives were discussed as indicated. I advised her to contact the office if her condition worsens, changes or fails to improve as anticipated. She expressed understanding with the diagnosis(es) and plan. [place], was evaluated through a synchronous (real-time) audio-video encounter. The patient (or guardian if applicable) is aware that this is a billable service, which includes applicable co-pays. This Virtual Visit was conducted with patient's (and/or legal guardian's) consent. The visit was conducted pursuant to the emergency declaration under the [address] [place] [address] and the [employer] and [employer] General Act. Patient identification was verified, and a caregiver was present when appropriate. The patient was located at: Home: [facility] [address]  The provider was located at:  Facility (Appt Department): [address] [place] [address],[address]    Written by Deanna Galvez, as dictated by Arnaldo Woodward MD.      Arpan Cote

## 2022-07-06 ENCOUNTER — OFFICE VISIT (OUTPATIENT)
Dept: NEUROLOGY | Age: 77
End: 2022-07-06
Payer: MEDICARE

## 2022-07-06 VITALS
HEIGHT: 64 IN | SYSTOLIC BLOOD PRESSURE: 120 MMHG | WEIGHT: 182 LBS | DIASTOLIC BLOOD PRESSURE: 66 MMHG | RESPIRATION RATE: 18 BRPM | OXYGEN SATURATION: 98 % | TEMPERATURE: 98.2 F | BODY MASS INDEX: 31.07 KG/M2 | HEART RATE: 66 BPM

## 2022-07-06 DIAGNOSIS — D51.8 OTHER VITAMIN B12 DEFICIENCY ANEMIAS: ICD-10-CM

## 2022-07-06 DIAGNOSIS — G62.9 NEUROPATHY: Primary | ICD-10-CM

## 2022-07-06 DIAGNOSIS — E56.9 HYPOVITAMINOSIS: ICD-10-CM

## 2022-07-06 DIAGNOSIS — E08.43 DIABETES MELLITUS DUE TO UNDERLYING CONDITION WITH DIABETIC AUTONOMIC NEUROPATHY, WITHOUT LONG-TERM CURRENT USE OF INSULIN (HCC): ICD-10-CM

## 2022-07-06 DIAGNOSIS — M79.18 MYOFASCIAL MUSCLE PAIN: ICD-10-CM

## 2022-07-06 DIAGNOSIS — E87.6 HYPOKALEMIA: ICD-10-CM

## 2022-07-06 PROCEDURE — G8427 DOCREV CUR MEDS BY ELIG CLIN: HCPCS | Performed by: PSYCHIATRY & NEUROLOGY

## 2022-07-06 PROCEDURE — G8399 PT W/DXA RESULTS DOCUMENT: HCPCS | Performed by: PSYCHIATRY & NEUROLOGY

## 2022-07-06 PROCEDURE — 1101F PT FALLS ASSESS-DOCD LE1/YR: CPT | Performed by: PSYCHIATRY & NEUROLOGY

## 2022-07-06 PROCEDURE — G8536 NO DOC ELDER MAL SCRN: HCPCS | Performed by: PSYCHIATRY & NEUROLOGY

## 2022-07-06 PROCEDURE — 3044F HG A1C LEVEL LT 7.0%: CPT | Performed by: PSYCHIATRY & NEUROLOGY

## 2022-07-06 PROCEDURE — 99205 OFFICE O/P NEW HI 60 MIN: CPT | Performed by: PSYCHIATRY & NEUROLOGY

## 2022-07-06 PROCEDURE — 1090F PRES/ABSN URINE INCON ASSESS: CPT | Performed by: PSYCHIATRY & NEUROLOGY

## 2022-07-06 PROCEDURE — G8510 SCR DEP NEG, NO PLAN REQD: HCPCS | Performed by: PSYCHIATRY & NEUROLOGY

## 2022-07-06 PROCEDURE — G8417 CALC BMI ABV UP PARAM F/U: HCPCS | Performed by: PSYCHIATRY & NEUROLOGY

## 2022-07-06 PROCEDURE — 1123F ACP DISCUSS/DSCN MKR DOCD: CPT | Performed by: PSYCHIATRY & NEUROLOGY

## 2022-07-06 RX ORDER — PANTOPRAZOLE SODIUM 40 MG/1
40 TABLET, DELAYED RELEASE ORAL 2 TIMES DAILY
COMMUNITY

## 2022-07-06 RX ORDER — PREGABALIN 25 MG/1
25 CAPSULE ORAL 2 TIMES DAILY
Qty: 60 CAPSULE | Refills: 2 | Status: SHIPPED | OUTPATIENT
Start: 2022-07-06 | End: 2022-09-13 | Stop reason: DRUGHIGH

## 2022-07-06 RX ORDER — LEVOTHYROXINE SODIUM 75 UG/1
1 TABLET ORAL
COMMUNITY

## 2022-07-06 RX ORDER — DULOXETIN HYDROCHLORIDE 20 MG/1
20 CAPSULE, DELAYED RELEASE ORAL DAILY
Qty: 30 CAPSULE | Refills: 1 | Status: SHIPPED | OUTPATIENT
Start: 2022-07-06 | End: 2022-08-01 | Stop reason: DRUGHIGH

## 2022-07-06 RX ORDER — BISMUTH SUBSALICYLATE 262 MG
1 TABLET,CHEWABLE ORAL DAILY
COMMUNITY

## 2022-07-06 NOTE — PROGRESS NOTES
Chief Complaint   Patient presents with    New Patient     referred by hospatalist for pain in both feet and legs - wakes her up during the night - constant in the left leg      1. Have you been to the ER, urgent care clinic since your last visit? Hospitalized since your last visit? NO     2. Have you seen or consulted any other health care providers outside of the [address] [place] [address] since your last visit? Include any pap smears or colon screening.   NO

## 2022-07-08 ENCOUNTER — TELEPHONE (OUTPATIENT)
Dept: NEUROLOGY | Age: 77
End: 2022-07-08

## 2022-07-08 LAB — RHEUMATOID FACT SERPL-ACNC: <10 IU/ML (ref 0–15)

## 2022-07-08 NOTE — TELEPHONE ENCOUNTER
Voicemail:    Patient called to speak with Nurse about the new medication she was prescribed. Said that she was unable to sleep last night and when she went to take her blood pressure this morning after she'd been up for  While, her blood pressure was 148/69. Pt was a little concerned and requested a call back before our offices closes today.    [phone]

## 2022-07-08 NOTE — TELEPHONE ENCOUNTER
Returned call,verified pt with two pt identifiers, pt advised she took the Duloxetine 20 mg on 7/6/22 and felt great all day. Then yesterday felt a little worse. Didn't sleep well at all last night. Took her BP this am and was 148/69. She wanted to know if this is ok. Advised the systolic number is slightly elevated but since she is tired and not feeling too well , advised this is probably why it is elevated slightly. She has not rechecked since this am. Advised the tiredness could be her fibromyalgia or since started a new med it could just be a side effect from that. Advised fatigue is a common side effect from most medications. Pt advised no serous reactions such as rash, difficulty breathing or throat tightness. Advised to monitor symptoms for next few days and call us back if something new happens or if fatigue is worrisome. Advised I will also let  know what is going on. Advised if he advises anything I will call her back. Pt verbalized understanding.

## 2022-07-13 ENCOUNTER — CLINICAL SUPPORT (OUTPATIENT)
Dept: INTERNAL MEDICINE CLINIC | Age: 77
End: 2022-07-13

## 2022-07-13 ENCOUNTER — TELEPHONE (OUTPATIENT)
Dept: NEUROLOGY | Age: 77
End: 2022-07-13

## 2022-07-13 VITALS
HEART RATE: 62 BPM | OXYGEN SATURATION: 100 % | TEMPERATURE: 97.4 F | DIASTOLIC BLOOD PRESSURE: 70 MMHG | SYSTOLIC BLOOD PRESSURE: 138 MMHG

## 2022-07-13 DIAGNOSIS — I10 ESSENTIAL HYPERTENSION: Primary | ICD-10-CM

## 2022-07-13 NOTE — TELEPHONE ENCOUNTER
Called pt,verified pt with two pt identifiers, advised pt I wanted to go over her BP readings since starting Duloxetine . Pt then advised she is sitting at her PCP office now getting ready to get her BP taken. I advised I am going to hang up now so they can address her BP issues. She wanted her lab results. Advised we can do this later as she is at her PCP office now. Pt verbalized understanding.

## 2022-07-13 NOTE — TELEPHONE ENCOUNTER
Pt came into clinic today regding previous message. Pt states that it may be her duloxetine that is causing the increase. Please call.  Pt will also call her PCP office

## 2022-07-13 NOTE — TELEPHONE ENCOUNTER
VOICEMAIL    Pt is concerned about blood pressure. Sitting at 167/78. Earlier it was 167/82. Wants to know if she should be concerned with the new medication. Please call.  [phone]

## 2022-07-13 NOTE — PROGRESS NOTES
Patient is here today for nurse visit for BP  check . Blood pressure is good. Reviewed medication  Patient is on a new med. for her pain . (Duloxetine) she was concerned the med. Is causing high BP. Patient need to schedule MWV.

## 2022-07-14 ENCOUNTER — TELEPHONE (OUTPATIENT)
Dept: INTERNAL MEDICINE CLINIC | Age: 77
End: 2022-07-14

## 2022-07-14 NOTE — TELEPHONE ENCOUNTER
Spoke with patient using 2 identifiers. Patient was advised to go to  if BP is high .  Patient scheduled [address] 9/14/22 @ 8:45am

## 2022-07-14 NOTE — TELEPHONE ENCOUNTER
Patient need MWV.soon she is concern about BP and need colon rectal exam. Patient was seen recently by Dr. Mary Fernández for acute care ABD  Pain. It was suggested by Dr. Mary Fernández for patient to have colon exam stated by patient. No order is placed in patient's chart. Patient is requesting appointment with Ld Moya. I will check with him and his schedule and will notify patient.

## 2022-07-15 ENCOUNTER — TELEPHONE (OUTPATIENT)
Dept: NEUROLOGY | Age: 77
End: 2022-07-15

## 2022-07-19 ENCOUNTER — TELEPHONE (OUTPATIENT)
Dept: NEUROLOGY | Age: 77
End: 2022-07-19

## 2022-07-19 NOTE — TELEPHONE ENCOUNTER
VOICEMAIL    Pt called about her lab work. States it is highly unacceptable that she cannot get a call back with her lab work. States she has left multiple messages.  Please call [phone]

## 2022-07-20 DIAGNOSIS — R76.8 POSITIVE ANA (ANTINUCLEAR ANTIBODY): Primary | ICD-10-CM

## 2022-07-20 NOTE — TELEPHONE ENCOUNTER
Spoke with patient. Verified name/. Apologized that no one has reached out to her regarding these results. Stated I will forward these to a provider so they can review ASAP. She thanked me and verbalized understanding.

## 2022-07-26 ENCOUNTER — TELEPHONE (OUTPATIENT)
Dept: INTERNAL MEDICINE CLINIC | Age: 77
End: 2022-07-26

## 2022-07-26 DIAGNOSIS — R76.8 POSITIVE ANA (ANTINUCLEAR ANTIBODY): Primary | ICD-10-CM

## 2022-07-26 NOTE — TELEPHONE ENCOUNTER
----- Message from Stefany Gamez sent at 7/26/2022  9:07 AM EDT -----  Subject: Referral Request    Reason for referral request? rheumatologist for post gonzalo - Anti clear   antibody   Provider patient wants to be referred to(if known):     Provider Phone Number(if known): Additional Information for Provider?  Dr Lynda Cordova needs to send the referral -   dr Morgan Economy office will not take the referral from the patient - Nay Osorio   wrote it and needs to send it to Dr Aparna Gillespie  ---------------------------------------------------------------------------  --------------  [facility]    [phone]; OK to leave message on voicemail  ---------------------------------------------------------------------------  --------------

## 2022-07-28 ENCOUNTER — TELEPHONE (OUTPATIENT)
Dept: NEUROLOGY | Age: 77
End: 2022-07-28

## 2022-07-28 NOTE — TELEPHONE ENCOUNTER
Patient called to let Dr Mohan Chan know that the Cymbalta worked for about 8 days, pain free, but now it has stopped working. Patient is in need of a refill but wanted to let Dr know in case the dosage needs to be increased. Please advise.  [phone]

## 2022-08-01 RX ORDER — DULOXETIN HYDROCHLORIDE 30 MG/1
30 CAPSULE, DELAYED RELEASE ORAL DAILY
Qty: 30 CAPSULE | Refills: 1 | Status: SHIPPED | OUTPATIENT
Start: 2022-08-01 | End: 2022-08-09 | Stop reason: ALTCHOICE

## 2022-08-01 NOTE — TELEPHONE ENCOUNTER
Message  Received: Today  Elma Elliott MD sent to Tanna Beard LPN  Caller: Unspecified (4 days ago, 10:04 AM)  Yes, dose increased to 30 mg p.o. every morning          Noted pt was on Cymbalta 20 mg daily. 30 mg daily sent to pharmacy in [facility] pt,verified pt with two pt identifiers, advised pt I sent her message to  for further advice. He advised to increase the Cymbalta 30 mg daily and it has been sent to the pharmacy for refill. She asked why the medication stopped working for her, I advised I can't explain that. Other than sometimes it is just a trial and error for learning what works for the pt. Pt verbalized understanding.

## 2022-08-08 ENCOUNTER — TELEPHONE (OUTPATIENT)
Dept: NEUROLOGY | Age: 77
End: 2022-08-08

## 2022-08-08 ENCOUNTER — TELEPHONE (OUTPATIENT)
Dept: INTERNAL MEDICINE CLINIC | Age: 77
End: 2022-08-08

## 2022-08-08 NOTE — TELEPHONE ENCOUNTER
Voicemail:    Patient called stating that the increased dosage did not help. She stated that she is in so much pain and has no idea what to do. She requested phone call back asap. Please advise.  [phone]

## 2022-08-08 NOTE — TELEPHONE ENCOUNTER
----- Message from Tanna Castle sent at 8/8/2022 10:37 AM EDT -----  Subject: Message to Provider    QUESTIONS  Information for Provider? PT stated that she didn't realize she has a   rheumatologist and would like for blood work to be sent over. The name is   Dr. Brandi [place], located at [facility], Fax? [phone]. [address], [address].   ---------------------------------------------------------------------------  --------------  Ho VALENZUELA  [phone]; OK to leave message on voicemail  ---------------------------------------------------------------------------  --------------  SCRIPT ANSWERS  Relationship to Patient?  Self

## 2022-08-09 DIAGNOSIS — M79.18 MYOFASCIAL MUSCLE PAIN: ICD-10-CM

## 2022-08-09 DIAGNOSIS — G62.9 NEUROPATHY: Primary | ICD-10-CM

## 2022-08-09 RX ORDER — DULOXETIN HYDROCHLORIDE 60 MG/1
60 CAPSULE, DELAYED RELEASE ORAL
Qty: 30 CAPSULE | Refills: 5 | Status: SHIPPED | OUTPATIENT
Start: 2022-08-09 | End: 2022-09-15 | Stop reason: DRUGHIGH

## 2022-08-09 NOTE — TELEPHONE ENCOUNTER
Called pt,verified pt with two pt identifiers, pt advised she is back in pain. The increase of Cymbalta did not work for her. She is advising pain in bottom of feet, legs are very painful. She is using her walker this am because it is very hard to walk today.  increased the Cymbalta on 8/1/22 and it worked the first day but has not since. When she first started the Cymbalta it worked for the first 8 days then stopped and it was then increased to 30 mg daily. She has an appt tomorrow with a Neuropathy seminar and appt on Thursday with her Rheumatology. She asked I fax her labs to them at [place] [address] at [phone] and phone # [phone]. She is upset she is in so much pain and unable to see . advised he is out of office for the next 2 weeks and the dr on call will cover his calls. I will send her message to him for advice and send her labs to her rheumatologist office. Pt stated she might go to ER- I advised if she is in that much pain then yes I would advise she do that. Go to ER. Advised I will call her back with any suggestions. Pt verbalized understanding. Faxed labs to [place] [address] -Rheumatology office at [phone] and received fax confirmation.

## 2022-08-09 NOTE — TELEPHONE ENCOUNTER
I called the patient, no answer, so I left a message we will increase the Cymbalta to 60 mg and send in the new prescription which I did today to her pharmacy, and in the meantime she can take to 30 mg, but she has to give the medicine time to work, it takes 2 to 3 weeks to get in the system and reaches maximum efficacy, and she is to wait that period of time and check with her doctor after that.

## 2022-08-16 ENCOUNTER — TELEPHONE (OUTPATIENT)
Dept: NEUROLOGY | Age: 77
End: 2022-08-16

## 2022-08-16 NOTE — TELEPHONE ENCOUNTER
Called pt,verified pt with two pt identifiers, pt advised it is the Cymbalta causing the high blood pressure not the Lyrica. The rheumatologist took her off of Cymbalta due to it causing the high blood pressure.  did not put her on blood pressure medication. She is taking the Cymbalta 30 mg daily this week and next week will be taking Cymbalta 30 mg every other day and by 3rd week will be done. She wants to know what she should take in place of the Cymbalta. Advised at this point she needs to wean off of Cymbalta , monitor her BP and continue all other medications as taking. Advised this will not be a good time to start a new medication until Cymbalta is out of her system. Advised I will send the message to  to advise on when he returns to office next week. Pt in agreement and verbalized understanding.

## 2022-08-16 NOTE — TELEPHONE ENCOUNTER
Pt's rheumatologist has started weaning pt off of the Lyrica. Her BP was 174/98. She has 1 more week of weaning and then she will need a different script for her neuropathy that will not raise her BP.  Please call [phone]

## 2022-08-22 NOTE — TELEPHONE ENCOUNTER
VOICEMAIL    . Pt called wanting to know what Dr. Priya Malik plans to do about her Cymbalta. States she is in extreme pain and wants to talk to a nurse or Dr. Priya Malik about the medication. Pt did not sound happy in the VM.  Please call [phone]

## 2022-08-23 ENCOUNTER — TRANSCRIBE ORDER (OUTPATIENT)
Dept: SCHEDULING | Age: 77
End: 2022-08-23

## 2022-08-23 DIAGNOSIS — M54.17 LUMBOSACRAL RADICULOPATHY: Primary | ICD-10-CM

## 2022-08-25 NOTE — TELEPHONE ENCOUNTER
Pt called to speak with Celine. Please call [phone]    States she does not want to start weaning off cymbalta until we have a replacement medication for her. Also, the excruciating pain in her right leg is coming from her back. States she called about this 4-5 days ago and has not gotten a call back.

## 2022-08-30 NOTE — TELEPHONE ENCOUNTER
JULIA Randolph is very upset that she still has not gotten a call back from out office. Wants to know what is going on with her cymbalta. Her rheumatologist has started weaning her. She wants to talk to Dr. Ton Francis. States she has been leaving messages for 3-4 weeks and has not heard from him.  Please call [phone]

## 2022-09-02 ENCOUNTER — HOSPITAL ENCOUNTER (OUTPATIENT)
Dept: MRI IMAGING | Age: 77
Discharge: HOME | End: 2022-09-02
Payer: MEDICARE

## 2022-09-02 DIAGNOSIS — M54.17 LUMBOSACRAL RADICULOPATHY: ICD-10-CM

## 2022-09-02 PROCEDURE — 72148 MRI LUMBAR SPINE W/O DYE: CPT

## 2022-09-02 NOTE — TELEPHONE ENCOUNTER
Message  Received:  Today  Rosa Montesinos MD sent to Merced Bo LPN  Caller: Unspecified (2 weeks ago)  I believe that would be dealt with the rheumatologist

## 2022-09-09 ENCOUNTER — TELEPHONE (OUTPATIENT)
Dept: NEUROLOGY | Age: 77
End: 2022-09-09

## 2022-09-09 NOTE — TELEPHONE ENCOUNTER
See other encounter regarding this matter. It has been combined with the original encounter. Will close this one out.

## 2022-09-09 NOTE — TELEPHONE ENCOUNTER
Олег Shore KRUPA 4 hours ago (8:32 AM)     TW  Patient called very upset. She would like a call back to discuss weaning her off the cymbalta due to it causing her blood pressure to increase. Please call             Called pt,verified pt with two pt identifiers, advised pt I am sorry we have not gotten back to her sooner but every message was sent to her. Advised  looked at her chart and wants to know if she has continue with the Cymbalta. Pt advised she has continued the Cymbalta 30 mg daily but only has 7 pills left. Verified she is on Lyrica 25 mg BID. Advised he might want to increase that since she will come off of Cymbalta. Asked pt if her BP is still elevated. She advised when she went to [facility] it was 148/? . It still has been elevated and she is not on any BP meds at this point. She has seen [facility] and they prescribed Hydrocodone 5-325 mg as needed for her pain. She only takes if her pain is very severe. Advised I will send this message to  to advise on today and hopefully call her back by end of day. Pt verbalized understanding.

## 2022-09-09 NOTE — TELEPHONE ENCOUNTER
Patient called very upset. She would like a call back to discuss weaning her off the cymbalta due to it causing her blood pressure to increase.  Please call

## 2022-09-12 NOTE — TELEPHONE ENCOUNTER
VOICEMAIL    Pt called very upset. States she was supposed to receive a call back from Olga Francois by 5pm on Friday. States we did not call her and thinks it is ridiculous how this office runs. States that she has never had such a poor response to phone calls and she is almost [de-identified]years old. States she has 3 pills left and wants to know if we are planning on renewing script. Please call to discus.  [phone]

## 2022-09-13 ENCOUNTER — OFFICE VISIT (OUTPATIENT)
Dept: NEUROLOGY | Age: 77
End: 2022-09-13
Payer: MEDICARE

## 2022-09-13 DIAGNOSIS — R20.2 PARESTHESIA: ICD-10-CM

## 2022-09-13 DIAGNOSIS — M54.17 LUMBOSACRAL RADICULOPATHY: ICD-10-CM

## 2022-09-13 DIAGNOSIS — G62.9 NEUROPATHY: Primary | ICD-10-CM

## 2022-09-13 PROCEDURE — 1101F PT FALLS ASSESS-DOCD LE1/YR: CPT | Performed by: PSYCHIATRY & NEUROLOGY

## 2022-09-13 PROCEDURE — G8536 NO DOC ELDER MAL SCRN: HCPCS | Performed by: PSYCHIATRY & NEUROLOGY

## 2022-09-13 PROCEDURE — 95886 MUSC TEST DONE W/N TEST COMP: CPT | Performed by: PSYCHIATRY & NEUROLOGY

## 2022-09-13 PROCEDURE — 99212 OFFICE O/P EST SF 10 MIN: CPT | Performed by: PSYCHIATRY & NEUROLOGY

## 2022-09-13 PROCEDURE — G8417 CALC BMI ABV UP PARAM F/U: HCPCS | Performed by: PSYCHIATRY & NEUROLOGY

## 2022-09-13 PROCEDURE — 1123F ACP DISCUSS/DSCN MKR DOCD: CPT | Performed by: PSYCHIATRY & NEUROLOGY

## 2022-09-13 PROCEDURE — G8427 DOCREV CUR MEDS BY ELIG CLIN: HCPCS | Performed by: PSYCHIATRY & NEUROLOGY

## 2022-09-13 PROCEDURE — 1090F PRES/ABSN URINE INCON ASSESS: CPT | Performed by: PSYCHIATRY & NEUROLOGY

## 2022-09-13 PROCEDURE — 95910 NRV CNDJ TEST 7-8 STUDIES: CPT | Performed by: PSYCHIATRY & NEUROLOGY

## 2022-09-13 PROCEDURE — G8432 DEP SCR NOT DOC, RNG: HCPCS | Performed by: PSYCHIATRY & NEUROLOGY

## 2022-09-13 PROCEDURE — G8399 PT W/DXA RESULTS DOCUMENT: HCPCS | Performed by: PSYCHIATRY & NEUROLOGY

## 2022-09-13 RX ORDER — PREGABALIN 50 MG/1
50 CAPSULE ORAL 2 TIMES DAILY
Qty: 60 CAPSULE | Refills: 2 | Status: SHIPPED | OUTPATIENT
Start: 2022-09-13

## 2022-09-13 NOTE — TELEPHONE ENCOUNTER
Called pt,verified pt with two pt identifiers, advised pt it was actually  calling pt , not me. I advised he was seeing pt's in office yesterday so he was not able to get back with her. Pt then advised she has her EMG today at 1 pm. I advised that DR. Seo will be there and performing the EMG so maybe they can touch base today. I advised I will send him this message regarding her appt today so they can hopefully talk. Pt verbalized understanding.

## 2022-09-13 NOTE — PROGRESS NOTES
[address], [address]  p: [phone]  f: [phone]    Test Date:  2022    Patient: Radha Cardenas : 1945 Physician: Dr. Nathalie Marcos   Sex: Female Height:  cm Ref Phys: Dr. Nathalie Marcos   ID#: [id number] Weight: 182 lbs. Technician: Deanne Coronel, EMG Tech     Patient Complaints: Numbness and tingling sensation of both feet, pain      Medications: See chart      Patient History / Exam: This is a 68-year-old right-handed white female who is being evaluated for numbness and tingling sensation of the feet, periodic sharp pain going down to the legs. NCV & EMG Findings:  All nerve conduction studies (as indicated in the following tables) were within normal limits. Needle evaluation of the right vastus medialis muscle showed slightly increased spontaneous activity. All remaining muscles (as indicated in the following table) showed no evidence of electrical instability. Impression: There is no electrodiagnostic evidence of peripheral neuropathy, however, radiculopathy versus small fiber neuropathy cannot be excluded.       Recommendations: Lumbosacral neuroimaging suggested      ___________________________  Dr. Nathalie Marcos        Nerve Conduction Studies  Anti Sensory Summary Table     Stim Site NR Peak (ms) Norm Peak (ms) O-P Amp (µV) Norm O-P Amp Site1 Site2 Delta-0 (ms) Dist (cm) Maurice (m/s) Norm Maurice (m/s)   Left Sup Fibular Anti Sensory (Ant Lat Mall)  33°C   14 cm    2.4 <4.4 11.9 >6 14 cm Ant Lat Mall 1.8 10.0 56    Right Sup Fibular Anti Sensory (Ant Lat Mall)  33°C   14 cm    2.7 <4.4 9.6 >6 14 cm Ant Lat Mall 1.9 10.0 53    Left Sural Anti Sensory (Lat Mall)  33°C   Calf    2.3 <4.4 9.9 >6 Calf Lat Mall 1.4 14.0 100    Right Sural Anti Sensory (Lat Mall)  33°C   Calf    2.9 <4.4 10.6 >6 Calf Lat Mall 2.1 14.0 67      Motor Summary Table     Stim Site NR Onset (ms) Norm Onset (ms) O-P Amp (mV) Norm O-P Amp Site1 Site2 Delta-0 (ms) Dist (cm) Maurice (m/s) Norm Maurice (m/s)   Left Fibular Motor (Ext Dig Brev)  33°C   Ankle    3.6 <6.5 4.8 >1.3 B Fib Ankle 6.8 34.0 50 >38   B Fib    10.4  4.2  Poplt B Fib 1.9 10.0 53 >40   Poplt    12.3  3.7          Right Fibular Motor (Ext Dig Brev)  33°C   Ankle    4.6 <6.5 5.4 >1.3 B Fib Ankle 7.3 33.0 45 >38   B Fib    11.9  4.5  Poplt B Fib 1.5 10.0 67 >40   Poplt    13.4  4.7          Left Tibial Motor (Abd Jeffries Brev)  33°C   Ankle    3.2 <6.1 14.8 >4.4 Knee Ankle 9.4 37.0 39 >39   Knee    12.6  7.9          Right Tibial Motor (Abd Jeffries Brev)  33°C   Ankle    3.4 <6.1 13.0 >4.4 Knee Ankle 7.9 36.0 46 >39   Knee    11.3  11.1            EMG+     Side Muscle Nerve Root Ins Act Fibs Psw Amp Dur Poly Recrt Int Kennis Merck Comment   Right VastusMed Femoral L2-4 Nml Nml 1+ Nml Nml 0 Nml Nml    Right VastusLat Femoral L2-4 Nml Nml Nml Nml Nml 0 Nml Nml    Right QuadratusFem QuadFemoris L4-5, S1 Nml Nml Nml Nml Nml 0 Nml Nml    Right AntTibialis Dp Br Fibular L4-5 Nml Nml Nml Nml Nml 0 Nml Nml    Right Fibularis Long Sup Br Fibular L5-S1 Nml Nml Nml Nml Nml 0 Nml Nml        Nerve Conduction Studies  Anti Sensory Left/Right Comparison     Stim Site L Lat (ms) R Lat (ms) L-R Lat (ms) L Amp (µV) R Amp (µV) L-R Amp (%) Site1 Site2 L Maurice (m/s) R Maurice (m/s) L-R Maurice (m/s)   Sup Fibular Anti Sensory (Ant Lat Mall)  33°C   14 cm 2.4 2.7 0.3 11.9 9.6 19.3 14 cm Ant Lat Mall 56 53 3   Sural Anti Sensory (Lat Mall)  33°C   Calf 2.3 2.9 0.6 9.9 10.6 6.6 Calf Lat Mall 100 67 33     Motor Left/Right Comparison     Stim Site L Lat (ms) R Lat (ms) L-R Lat (ms) L Amp (mV) R Amp (mV) L-R Amp (%) Site1 Site2 L Maurice (m/s) R Maurice (m/s) L-R Maurice (m/s)   Fibular Motor (Ext Dig Brev)  33°C   Ankle 3.6 4.6 1.0 4.8 5.4 11.1 B Fib Ankle 50 45 5   B Fib 10.4 11.9 1.5 4.2 4.5 6.7 Poplt B Fib 53 67 14   Poplt 12.3 13.4 1.1 3.7 4.7 21.3        Tibial Motor (Abd Jeffries Brev)  33°C   Ankle 3.2 3.4 0.2 14.8 13.0 12.2 Knee Ankle 39 46 7 Knee 12.6 11.3 1.3 7.9 11.1 28.8              Waveforms:

## 2022-09-14 ENCOUNTER — TELEPHONE (OUTPATIENT)
Dept: NEUROLOGY | Age: 77
End: 2022-09-14

## 2022-09-14 ENCOUNTER — TELEPHONE (OUTPATIENT)
Dept: INTERNAL MEDICINE CLINIC | Age: 77
End: 2022-09-14

## 2022-09-14 RX ORDER — EZETIMIBE 10 MG/1
10 TABLET ORAL DAILY
Qty: 90 TABLET | Refills: 1 | Status: SHIPPED | OUTPATIENT
Start: 2022-09-14

## 2022-09-14 NOTE — TELEPHONE ENCOUNTER
VOICEMAIL    Pt wants to speak with Celine about her Cymbalta scrip. Pt has 6 pills left. Is taking one every other day enough to wean her off completely. Please call to discuss.  [phone]

## 2022-09-14 NOTE — TELEPHONE ENCOUNTER
----- Message from Arpita De sent at 9/14/2022  3:13 PM EDT -----  Subject: Appointment Request    Reason for Call: Established Patient Appointment needed: Routine Medicare   AWV    QUESTIONS    Reason for appointment request? No appointments available during search     Additional Information for Provider? patient had to hang up would like to   schedule Medicare AWV. and talk to  about cholesterol   ---------------------------------------------------------------------------  --------------  [email or web address]  [phone]; OK to leave message on voicemail  ---------------------------------------------------------------------------  --------------  SCRIPT ANSWERS  JOSEID Screen: Jacklyn Raygoza

## 2022-09-14 NOTE — TELEPHONE ENCOUNTER
You  Judyann Hammans, MD Yesterday (9:21 AM)     DR  She has EMG today at 1 with you. Maybe yamateo can talk today. Thanks. Message  Received: Today  Judyann Hammans, MD sent to Edilson Rasmussen LPN  Caller: Unspecified (4 weeks ago)  Noted   EMG performed yesterday. Pt's lyrica was increased yesterday.

## 2022-09-14 NOTE — TELEPHONE ENCOUNTER
Called pt,verified pt with two pt identifiers, pt advised she is down to her last 6 pills of Cymbalta. She did not take one last night. She will take one every other day to wean herself off of Cymbalta. She wants to know if this will be good enough to wean herself of to it. She then advised  said he would call with EMG report today. Advised I will send  a message regarding this matter and call her back. In another note, pt advised while at hospital yesterday for her EMG testing she got caught up in a shrub or vine? While getting in her car. She was helped up by her friend that drive her and another gentleman there. She noted a goose egg on her head. It has gone down today. She advised she did call [address] [place] [address] to report someone should take care of the vine/shrub so no one else will get hurt. She just wanted me to be aware of this.

## 2022-09-14 NOTE — TELEPHONE ENCOUNTER
----- Message from Tonyagraciela Perry sent at 9/14/2022  3:22 PM EDT -----  Subject: Refill Request    QUESTIONS  Name of Medication? Other - EZETIMIBE   Patient-reported dosage and instructions? 10 MG 1 TAB PO QD  How many days do you have left? 0  Preferred Pharmacy? [id number]   Pharmacy phone number (if available)? [phone]  Additional Information for Provider? PT WOULD LIKE TO GET THIS MEDICATION   REFILLED.   ---------------------------------------------------------------------------  --------------  CALL BACK INFO  What is the best way for the office to contact you? OK to leave message on   voicemail  Preferred Call Back Phone Number? [phone]  ---------------------------------------------------------------------------  --------------  SCRIPT ANSWERS  Relationship to Patient?  Self

## 2022-09-15 RX ORDER — DULOXETIN HYDROCHLORIDE 20 MG/1
CAPSULE, DELAYED RELEASE ORAL
Qty: 30 CAPSULE | Refills: 0 | Status: SHIPPED | OUTPATIENT
Start: 2022-09-15

## 2022-09-15 NOTE — TELEPHONE ENCOUNTER
Called pt,verified pt with two pt identifiers, advised pt that  is going to call her today with the advice on Cymbalta and her EMG results. Advised I wanted her to be aware incase she sees a different number pop up on her phone. Advised it might not be until this afternoon-as he is seeing pt's at [facility] now. Pt verbalized understanding.

## 2022-09-15 NOTE — TELEPHONE ENCOUNTER
VOICEMAIL    Pt states this is not a game. She is waiting on a call back from [facility] about whether or not the 6 pills is enough to wean off of. Doesn't understand why we refused her refill request so she can taper safely.  Please call [phone]

## 2022-09-16 NOTE — TELEPHONE ENCOUNTER
You  Jose Miguel Seo MD 21 hours ago (11:26 AM)       [id number] [address] Garcias she is awaiting your call, thanks. Message  Received: Wesley Lloyd MD sent to Faith Diamond LPN  Caller: Unspecified (2 days ago, 10:41 AM)  Spoke to patient, medication called into the pharmacy           Noted. Cymbalta sent to pharmacy yesterday for cymbalta 20 mg tab every other day for 4 weeks and then stop.

## 2022-10-10 ENCOUNTER — TELEPHONE (OUTPATIENT)
Dept: INTERNAL MEDICINE CLINIC | Age: 77
End: 2022-10-10

## 2022-10-10 NOTE — TELEPHONE ENCOUNTER
[facility] She declined appointment with Dr. Daily Truong tomorrow. She stated she wants to keep her appointment on 10/17/22.

## 2022-10-10 NOTE — TELEPHONE ENCOUNTER
----- Message from Aurora Bob sent at 10/10/2022  9:02 AM EDT -----  Subject: Message to Provider    QUESTIONS  Information for Provider? pt says that she was seen by [facility] on   Saturday 10/8/2022 for a UTI and blood in urine and was instructed to   follow up with PCP-screened green  ---------------------------------------------------------------------------  --------------  [facility] INFO  [phone]; OK to leave message on voicemail  ---------------------------------------------------------------------------  --------------  SCRIPT ANSWERS  Relationship to Patient? Self  Have you recently (14 days) seen a provider for this problem?  Yes

## 2022-11-29 RX ORDER — METFORMIN HYDROCHLORIDE 500 MG/1
500 TABLET ORAL 2 TIMES DAILY WITH MEALS
Qty: 90 TABLET | Refills: 1 | Status: SHIPPED | OUTPATIENT
Start: 2022-11-29

## 2022-11-29 NOTE — TELEPHONE ENCOUNTER
PCP: Yudi Thomas MD    Last appt: 7/13/2022  No future appointments. Requested Prescriptions     Pending Prescriptions Disp Refills    metFORMIN (GLUCOPHAGE) 500 mg tablet 90 Tablet 1     Sig: Take 1 Tablet by mouth two (2) times daily (with meals).      No upcoming appts

## 2022-12-11 DIAGNOSIS — E03.8 OTHER SPECIFIED HYPOTHYROIDISM: ICD-10-CM

## 2022-12-11 RX ORDER — SIMVASTATIN 40 MG/1
TABLET, FILM COATED ORAL
Qty: 90 TABLET | Refills: 0 | Status: SHIPPED | OUTPATIENT
Start: 2022-12-11

## 2022-12-11 RX ORDER — LEVOTHYROXINE SODIUM 50 UG/1
TABLET ORAL
Qty: 105 TABLET | Refills: 0 | Status: SHIPPED | OUTPATIENT
Start: 2022-12-11

## 2022-12-13 ENCOUNTER — VIRTUAL VISIT (OUTPATIENT)
Dept: INTERNAL MEDICINE CLINIC | Age: 77
End: 2022-12-13
Payer: MEDICARE

## 2022-12-13 DIAGNOSIS — R05.1 ACUTE COUGH: Primary | ICD-10-CM

## 2022-12-13 PROBLEM — E11.9 TYPE 2 DIABETES MELLITUS (HCC): Status: ACTIVE | Noted: 2022-12-13

## 2022-12-13 PROCEDURE — 1101F PT FALLS ASSESS-DOCD LE1/YR: CPT | Performed by: INTERNAL MEDICINE

## 2022-12-13 PROCEDURE — 99442 PR PHYS/QHP TELEPHONE EVALUATION 11-20 MIN: CPT | Performed by: INTERNAL MEDICINE

## 2022-12-13 RX ORDER — LEVOFLOXACIN 500 MG/1
TABLET, FILM COATED ORAL
COMMUNITY

## 2022-12-13 RX ORDER — ALBUTEROL SULFATE 90 UG/1
2 AEROSOL, METERED RESPIRATORY (INHALATION)
Qty: 18 G | Refills: 0 | Status: SHIPPED | OUTPATIENT
Start: 2022-12-13

## 2022-12-13 RX ORDER — METHYLPREDNISOLONE 4 MG/1
TABLET ORAL
Qty: 1 DOSE PACK | Refills: 0 | Status: SHIPPED | OUTPATIENT
Start: 2022-12-13

## 2022-12-13 RX ORDER — BENZONATATE 200 MG/1
CAPSULE ORAL
COMMUNITY

## 2022-12-13 NOTE — PROGRESS NOTES
HISTORY OF PRESENT ILLNESS  Marleni Garcia is a 76 y.o. female. HPI  Marleni Garcia, was evaluated through a synchronous (real-time) audio-video encounter. The patient (or guardian if applicable) is aware that this is a billable service, which includes applicable co-pays. This Virtual Visit was conducted with patient's (and/or legal guardian's) consent. The visit was conducted pursuant to the emergency declaration under the [address] [place] [address] and the [place] Act. Patient identification was verified, and a caregiver was present when appropriate. The patient was located at: Home: [facility] [address]  The provider was located at: Facility (Appt Department): [address] [place] [address]      --Baltazar Rogers MD on 12/13/2022 at 3:55 PM    An electronic signature was used to authenticate this note. Marleni Garcia, who was evaluated through a synchronous (real-time) audio only encounter, and/or her healthcare decision maker, is aware that it is a billable service, which includes applicable co-pays, with coverage as determined by her insurance carrier. She provided verbal consent to proceed and patient identification was verified. This visit was conducted pursuant to the emergency declaration under the [address] [place] [address] and the [employer] Act. A caregiver was present when appropriate. Ability to conduct physical exam was limited. The patient was located at: Home: [facility] [address]  The provider was located at:  Facility (Appt Department): [address] [place] [address]    --Baltazar Rogers MD on 12/13/2022 at 4:13 PM      TC x 11 minutes       Hx HLD overweight, hx retinal vein occlusion , hx right breast cancer s/p mastectomy  osteoporosis on prolia, prediabetes, hypothyroid   Cough x 2days  No fever  Has chills and some sob   Home covid test-pedning -should result tomorrow per pt  Sob when coughing and wheezing   Saw Gyn MD this week and on levaquin 500mg every day x 7d for UTI and was given rx for tessalon  Taking delsum and mucinex too      Patient Active Problem List    Diagnosis Date Noted    Postural dizziness with presyncope 01/19/2022    Mixed hyperlipidemia 01/14/2020    Overweight (BMI 25.0-29.9) 11/14/2018    Other specified hypothyroidism 05/16/2018    Gastroesophageal reflux disease without esophagitis 05/16/2018    History of CVA (cerebrovascular accident) 05/16/2018    Osteopenia of multiple sites 05/16/2018    HX: breast cancer 05/16/2018    Closed fracture of left lower extremity with routine healing 05/16/2018    Endometriosis 05/16/2018     Current Outpatient Medications   Medication Sig Dispense Refill    benzonatate (TESSALON) 200 mg capsule benzonatate 200 mg capsule   Take 1 capsule 3 times a day by oral route as needed. levoFLOXacin (LEVAQUIN) 500 mg tablet levofloxacin 500 mg tablet   Take 1 tablet every 24 hours by oral route for 7 days. simvastatin (ZOCOR) 40 mg tablet TAKE 1 TABLET BY MOUTH ONCE DAILY AT NIGHT 90 Tablet 0    levothyroxine (SYNTHROID) 50 mcg tablet TAKE 1 TABLET BY MOUTH ONCE DAILY BEFORE BREAKFAST. TAKE 50 MCG EVERY DAY EXCEPT 76 [address] BYNUM 105 Tablet 0    metFORMIN (GLUCOPHAGE) 500 mg tablet Take 1 Tablet by mouth two (2) times daily (with meals). 90 Tablet 1    ezetimibe (ZETIA) 10 mg tablet Take 1 Tablet by mouth daily. 90 Tablet 1    pregabalin (LYRICA) 50 mg capsule Take 1 Capsule by mouth two (2) times a day. Max Daily Amount: 100 mg. 60 Capsule 2    levothyroxine (SYNTHROID) 75 mcg tablet Take 1 Tablet by mouth every Monday and Friday. pantoprazole (PROTONIX) 40 mg tablet Take 40 mg by mouth two (2) times a day.       multivitamin (ONE A DAY) tablet Take 1 Tablet by mouth daily. CENTRUM SILVER FOR WOMEN      OTHER CBD cream      acetaminophen (TYLENOL) 325 mg tablet Take  by mouth every four (4) hours as needed for Pain. Takes 6 daily      magnesium oxide 250 mg magnesium tablet Take 250 mg by mouth daily. lidocaine (LIDODERM) 5 % 1 Patch by TransDERmal route every twenty-four (24) hours. Apply patch to the affected area for 12 hours a day and remove for 12 hours a day. 30 Each 5    aspirin 81 mg chewable tablet Take 1 Tab by mouth daily. 30 Tab 1    denosumab (PROLIA) 60 mg/mL injection 60 mg by SubCUTAneous route every 6 months. cetirizine (ZYRTEC) 10 mg tablet Take 10 mg by mouth daily. cyanocobalamin (VITAMIN B12) 100 mcg tablet Take 100 mcg by mouth daily. DULoxetine (CYMBALTA) 20 mg capsule Take 1 capsule every other day for 4 weeks and stop (Patient not taking: Reported on 12/13/2022) 30 Capsule 0    oxaprozin (DAYPRO) 600 mg tablet TAKE 1 TABLET BY MOUTH EVERY DAY AS NEEDED (Patient not taking: No sig reported) 90 Tablet 0     Allergies   Allergen Reactions    Other Food Anaphylaxis     Steak, cheese, grass, smoke    Penicillins Rash, Swelling and Itching     Mouth  Other reaction(s): ITCHING AND SWELLING  Other reaction(s): Adverse reaction to substance ([id number]); Severity: Mild      Tramadol Other (comments)     Hyponatremia, seizure    Beef Containing Products Other (comments)     Itching and can cause mouth to swell   Other reaction(s): SWELLING AND HIVES AND ITCHING    Cheese Itching and Swelling    Codeine Rash and Swelling     Mouth    Gabapentin Other (comments)     As of 1/28/2020, pt requests not to have this. It \"completely wipes me out. \"  Other reaction(s): WEAKNESS    Other Plant, Animal, Environmental Hives     Rubber      Lab Results   Component Value Date/Time    WBC 8.8 01/19/2022 12:21 PM    HGB 12.6 01/19/2022 12:21 PM    HCT 39.0 01/19/2022 12:21 PM    PLATELET 313 36/73/7557 12:21 PM    MCV 94.7 01/19/2022 12:21 PM     Lab Results   Component Value Date/Time    GFR est non-AA >60 07/07/2022 08:47 AM    GFR est AA >60 07/07/2022 08:47 AM    Creatinine 0.76 07/07/2022 08:47 AM    BUN 12 07/07/2022 08:47 AM    Sodium 137 07/07/2022 08:47 AM    Potassium 5.0 07/07/2022 08:47 AM    Chloride 101 07/07/2022 08:47 AM    CO2 30 07/07/2022 08:47 AM    Magnesium 2.5 (H) 05/25/2019 01:10 AM    Phosphorus 2.4 (L) 05/26/2019 04:15 AM        ROS    Physical Exam    ASSESSMENT and PLAN    ICD-10-CM ICD-9-CM    1.  Acute cough  R05.1 786.2 methylPREDNISolone (MEDROL DOSEPACK) 4 mg tablet      albuterol (PROVENTIL HFA, VENTOLIN HFA, PROAIR HFA) 90 mcg/actuation inhaler  Pt will inform office if covid positive tomorrow  Continue cough medications  On abx for UTI

## 2022-12-13 NOTE — PROGRESS NOTES
1. Have you been to the ER, urgent care clinic since your last visit? Hospitalized since your last visit? No    2. Have you seen or consulted any other health care providers outside of the [address] [place] [address] since your last visit? Include any pap smears or colon screening.  No        Patient seen [facility] today for covid test- no results yet

## 2022-12-15 ENCOUNTER — TELEPHONE (OUTPATIENT)
Dept: INTERNAL MEDICINE CLINIC | Age: 77
End: 2022-12-15

## 2022-12-15 RX ORDER — OSELTAMIVIR PHOSPHATE 75 MG/1
75 CAPSULE ORAL 2 TIMES DAILY
Qty: 10 CAPSULE | Refills: 0 | Status: SHIPPED | OUTPATIENT
Start: 2022-12-15 | End: 2022-12-20

## 2022-12-15 NOTE — TELEPHONE ENCOUNTER
Spoke with patient. Advised of prescription sent into and recommendations. Patient verbalized understanding.        Per Dr. Alice Oliva pt I escribed tamiflu--needs to rest and hydrate \"

## 2022-12-15 NOTE — TELEPHONE ENCOUNTER
----- Message from Anel Mercado sent at 12/15/2022 11:12 AM EST -----  Subject: Message to Provider    QUESTIONS  Information for Provider? Pt is calling in stating she took a test on   12/13/2022 at [facility] and has flu A. Pt would like to know if there is   anything she can take to help with a cough, body aches, and fevers. Pt   started with symptoms on 12/11/2022. Pt would like a call back. ---------------------------------------------------------------------------  --------------  [facility]  [phone]; OK to leave message on voicemail  ---------------------------------------------------------------------------  --------------  SCRIPT ANSWERS  Relationship to Patient?  Self

## 2022-12-15 NOTE — TELEPHONE ENCOUNTER
Spoke with patient. Tested neg for covid. Positive for Flu A. States she is still having cough, body aches, fever, and chest congestions. She has taken mucinex, steroids, and inhaler as recommended. Patient requesting stronger medication to relieve symptoms. Please advise.

## 2022-12-20 ENCOUNTER — TELEPHONE (OUTPATIENT)
Dept: INTERNAL MEDICINE CLINIC | Age: 77
End: 2022-12-20

## 2022-12-20 NOTE — TELEPHONE ENCOUNTER
----- Message from Spencer Fry sent at 12/20/2022  2:24 PM EST -----  Subject: Message to Provider    QUESTIONS  Information for Provider? please call patient back. she called yesterday   and was expecting a call yesterday. she had the flu and congestion in   chest  ---------------------------------------------------------------------------  --------------  Israel SOMERS  [phone]; OK to leave message on voicemail  ---------------------------------------------------------------------------  --------------  SCRIPT ANSWERS  Relationship to Patient?  Self

## 2022-12-21 DIAGNOSIS — R05.1 ACUTE COUGH: Primary | ICD-10-CM

## 2022-12-21 RX ORDER — LEVOFLOXACIN 500 MG/1
500 TABLET, FILM COATED ORAL DAILY
Qty: 10 TABLET | Refills: 0 | Status: SHIPPED | OUTPATIENT
Start: 2022-12-21 | End: 2022-12-21 | Stop reason: ALTCHOICE

## 2022-12-21 RX ORDER — AZITHROMYCIN 250 MG/1
250 TABLET, FILM COATED ORAL SEE ADMIN INSTRUCTIONS
Qty: 6 TABLET | Refills: 0 | Status: SHIPPED | OUTPATIENT
Start: 2022-12-21 | End: 2022-12-26

## 2022-12-21 NOTE — TELEPHONE ENCOUNTER
Spoke with patient. Advised of rx sent to pharmacy and cxr ordered. Advised to go to [address] [place] [address],[address] and no appt is needed. Patient verbalized understanding.        Per Dr Alba Dash" Tel pt I sent in a zpak and ordered a CXR \"

## 2022-12-21 NOTE — TELEPHONE ENCOUNTER
Pt called again    States still congested and wants to know if anything is can be called in as she is not better yet. Advised pt that the nurse did send a message to the provider and we are still awaiting his reply    PSR offered pt the option to do a VV visit with another provider, pt declined stating no video. PSR advised pt that she has the option to wait for the phone call from the nurse as soon as dr advises a care plan, or to use Urgent Care walk in clinic as a more immediate option. Please call pt back to advise next step.

## 2022-12-27 ENCOUNTER — TELEPHONE (OUTPATIENT)
Dept: INTERNAL MEDICINE CLINIC | Age: 77
End: 2022-12-27

## 2022-12-27 ENCOUNTER — HOSPITAL ENCOUNTER (OUTPATIENT)
Dept: GENERAL RADIOLOGY | Age: 77
Discharge: HOME | End: 2022-12-27
Payer: MEDICARE

## 2022-12-27 ENCOUNTER — PATIENT MESSAGE (OUTPATIENT)
Dept: INTERNAL MEDICINE CLINIC | Age: 77
End: 2022-12-27

## 2022-12-27 DIAGNOSIS — R05.1 ACUTE COUGH: ICD-10-CM

## 2022-12-27 PROCEDURE — 71046 X-RAY EXAM CHEST 2 VIEWS: CPT

## 2022-12-27 NOTE — TELEPHONE ENCOUNTER
----- Message from Cinnamon sent at 12/27/2022 11:11 AM EST -----  Subject: Message to Provider    QUESTIONS  Information for Provider? pt is asking for a all back from a nurse   ---------------------------------------------------------------------------  --------------  [address] [employer]  [phone]; OK to leave message on voicemail  ---------------------------------------------------------------------------  --------------  SCRIPT ANSWERS  Relationship to Patient?  Self

## 2022-12-28 ENCOUNTER — TELEPHONE (OUTPATIENT)
Dept: INTERNAL MEDICINE CLINIC | Age: 77
End: 2022-12-28

## 2022-12-28 NOTE — TELEPHONE ENCOUNTER
Patient called back, informed x ray clear. Patient states should she have refill on levofloxin, she has two pills left to take and has already finished azithromycin. Informed patient to finish up the levoquin and wait a few days and if she is still feeling bad she can call back and set up a virtual visit to discuss a treatment plan.

## 2022-12-28 NOTE — TELEPHONE ENCOUNTER
Left detailed message on personal voice mail informing patient that 3:31 is when her Xray was performed, 4:28pm Dr. Gemma Mike reviewed and resulted Xray and his nurse sent My Chart message at 4:41pm informing of this. Also informed patient that my Chart message will be replied to in regards to this.

## 2022-12-28 NOTE — TELEPHONE ENCOUNTER
From: Pratik Ivy  Sent: 12/27/2022 4:59 PM EST  To: [facility] Nurse Pool  Subject: Chest XRAY    I just had the X-Ray done at hospital. I got text from you before x-Ray was performed. I am confused. Please call.

## 2023-01-25 ENCOUNTER — TRANSCRIBE ORDER (OUTPATIENT)
Dept: CARDIAC REHAB | Age: 78
End: 2023-01-25

## 2023-01-25 ENCOUNTER — HOSPITAL ENCOUNTER (OUTPATIENT)
Age: 78
Setting detail: OUTPATIENT SURGERY
Discharge: HOME | End: 2023-01-25
Attending: INTERNAL MEDICINE | Admitting: INTERNAL MEDICINE
Payer: MEDICARE

## 2023-01-25 VITALS
HEIGHT: 64 IN | SYSTOLIC BLOOD PRESSURE: 137 MMHG | HEART RATE: 76 BPM | RESPIRATION RATE: 19 BRPM | BODY MASS INDEX: 29.71 KG/M2 | OXYGEN SATURATION: 96 % | TEMPERATURE: 97.9 F | WEIGHT: 174 LBS | DIASTOLIC BLOOD PRESSURE: 68 MMHG

## 2023-01-25 DIAGNOSIS — R93.1 ABNORMAL SCREENING CT OF HEART: ICD-10-CM

## 2023-01-25 DIAGNOSIS — Z95.5 STENTED CORONARY ARTERY: Primary | ICD-10-CM

## 2023-01-25 LAB
ATRIAL RATE: 60 BPM
CALCULATED P AXIS, ECG09: 58 DEGREES
CALCULATED R AXIS, ECG10: -54 DEGREES
CALCULATED T AXIS, ECG11: 52 DEGREES
DIAGNOSIS, 93000: NORMAL
GLUCOSE BLD STRIP.AUTO-MCNC: 85 MG/DL (ref 65–117)
GLUCOSE BLD STRIP.AUTO-MCNC: 93 MG/DL (ref 65–117)
P-R INTERVAL, ECG05: 156 MS
Q-T INTERVAL, ECG07: 436 MS
QRS DURATION, ECG06: 86 MS
QTC CALCULATION (BEZET), ECG08: 436 MS
SERVICE CMNT-IMP: NORMAL
SERVICE CMNT-IMP: NORMAL
VENTRICULAR RATE, ECG03: 60 BPM

## 2023-01-25 PROCEDURE — 93005 ELECTROCARDIOGRAM TRACING: CPT

## 2023-01-25 PROCEDURE — 77030013715 HC INFL SYS MRTM -B: Performed by: INTERNAL MEDICINE

## 2023-01-25 PROCEDURE — 92928 PRQ TCAT PLMT NTRAC ST 1 LES: CPT | Performed by: INTERNAL MEDICINE

## 2023-01-25 PROCEDURE — 82962 GLUCOSE BLOOD TEST: CPT

## 2023-01-25 PROCEDURE — 74011000250 HC RX REV CODE- 250: Performed by: INTERNAL MEDICINE

## 2023-01-25 PROCEDURE — C1874 STENT, COATED/COV W/DEL SYS: HCPCS | Performed by: INTERNAL MEDICINE

## 2023-01-25 PROCEDURE — 93452 LEFT HRT CATH W/VENTRCLGRPHY: CPT | Performed by: INTERNAL MEDICINE

## 2023-01-25 PROCEDURE — C1887 CATHETER, GUIDING: HCPCS | Performed by: INTERNAL MEDICINE

## 2023-01-25 PROCEDURE — C1769 GUIDE WIRE: HCPCS | Performed by: INTERNAL MEDICINE

## 2023-01-25 PROCEDURE — C1894 INTRO/SHEATH, NON-LASER: HCPCS | Performed by: INTERNAL MEDICINE

## 2023-01-25 PROCEDURE — 74011000258 HC RX REV CODE- 258: Performed by: INTERNAL MEDICINE

## 2023-01-25 PROCEDURE — 77030019569 HC BND COMPR RAD TERU -B: Performed by: INTERNAL MEDICINE

## 2023-01-25 PROCEDURE — 99152 MOD SED SAME PHYS/QHP 5/>YRS: CPT | Performed by: INTERNAL MEDICINE

## 2023-01-25 PROCEDURE — 99153 MOD SED SAME PHYS/QHP EA: CPT | Performed by: INTERNAL MEDICINE

## 2023-01-25 PROCEDURE — 74011000636 HC RX REV CODE- 636: Performed by: INTERNAL MEDICINE

## 2023-01-25 PROCEDURE — 77030040934 HC CATH DIAG DXTERITY MEDT -A: Performed by: INTERNAL MEDICINE

## 2023-01-25 PROCEDURE — 74011250636 HC RX REV CODE- 250/636: Performed by: INTERNAL MEDICINE

## 2023-01-25 PROCEDURE — 77030013744: Performed by: INTERNAL MEDICINE

## 2023-01-25 PROCEDURE — 74011250637 HC RX REV CODE- 250/637: Performed by: INTERNAL MEDICINE

## 2023-01-25 PROCEDURE — 77030018729 HC ELECTRD DEFIB PAD CARD -B: Performed by: INTERNAL MEDICINE

## 2023-01-25 DEVICE — STENT RONYX30015UX RESOLUTE ONYX 3.00X15
Type: IMPLANTABLE DEVICE | Status: FUNCTIONAL
Brand: RESOLUTE ONYX™

## 2023-01-25 RX ORDER — LIDOCAINE HYDROCHLORIDE 10 MG/ML
INJECTION INFILTRATION; PERINEURAL AS NEEDED
Status: DISCONTINUED | OUTPATIENT
Start: 2023-01-25 | End: 2023-01-25 | Stop reason: HOSPADM

## 2023-01-25 RX ORDER — ASPIRIN 325 MG
325 TABLET ORAL ONCE
Status: COMPLETED | OUTPATIENT
Start: 2023-01-25 | End: 2023-01-25

## 2023-01-25 RX ORDER — FLUOROMETHOLONE 1 MG/ML
2 SOLUTION/ DROPS OPHTHALMIC 2 TIMES DAILY
COMMUNITY

## 2023-01-25 RX ORDER — CLOPIDOGREL 300 MG/1
TABLET, FILM COATED ORAL AS NEEDED
Status: DISCONTINUED | OUTPATIENT
Start: 2023-01-25 | End: 2023-01-25 | Stop reason: HOSPADM

## 2023-01-25 RX ORDER — SODIUM CHLORIDE 9 MG/ML
1.5 INJECTION, SOLUTION INTRAVENOUS CONTINUOUS
Status: DISPENSED | OUTPATIENT
Start: 2023-01-25 | End: 2023-01-25

## 2023-01-25 RX ORDER — FENTANYL CITRATE 50 UG/ML
INJECTION, SOLUTION INTRAMUSCULAR; INTRAVENOUS AS NEEDED
Status: DISCONTINUED | OUTPATIENT
Start: 2023-01-25 | End: 2023-01-25 | Stop reason: HOSPADM

## 2023-01-25 RX ORDER — ACETAMINOPHEN 325 MG/1
650 TABLET ORAL ONCE
Status: COMPLETED | OUTPATIENT
Start: 2023-01-25 | End: 2023-01-25

## 2023-01-25 RX ORDER — METOPROLOL SUCCINATE 25 MG/1
12.5 TABLET, EXTENDED RELEASE ORAL DAILY
Qty: 15 TABLET | Refills: 3 | Status: SHIPPED | OUTPATIENT
Start: 2023-01-25

## 2023-01-25 RX ORDER — HYDROCODONE BITARTRATE AND ACETAMINOPHEN 5; 325 MG/1; MG/1
1 TABLET ORAL
COMMUNITY

## 2023-01-25 RX ORDER — HEPARIN SODIUM 1000 [USP'U]/ML
INJECTION, SOLUTION INTRAVENOUS; SUBCUTANEOUS AS NEEDED
Status: DISCONTINUED | OUTPATIENT
Start: 2023-01-25 | End: 2023-01-25 | Stop reason: HOSPADM

## 2023-01-25 RX ORDER — VERAPAMIL HYDROCHLORIDE 2.5 MG/ML
INJECTION, SOLUTION INTRAVENOUS AS NEEDED
Status: DISCONTINUED | OUTPATIENT
Start: 2023-01-25 | End: 2023-01-25 | Stop reason: HOSPADM

## 2023-01-25 RX ORDER — CLOPIDOGREL BISULFATE 75 MG/1
75 TABLET ORAL DAILY
Qty: 30 TABLET | Refills: 5 | Status: SHIPPED | OUTPATIENT
Start: 2023-01-25

## 2023-01-25 RX ORDER — MIDAZOLAM HYDROCHLORIDE 1 MG/ML
INJECTION, SOLUTION INTRAMUSCULAR; INTRAVENOUS AS NEEDED
Status: DISCONTINUED | OUTPATIENT
Start: 2023-01-25 | End: 2023-01-25 | Stop reason: HOSPADM

## 2023-01-25 RX ORDER — ASPIRIN 81 MG/1
81 TABLET ORAL DAILY
Qty: 30 TABLET | Refills: 5 | Status: SHIPPED | OUTPATIENT
Start: 2023-01-25

## 2023-01-25 RX ORDER — LEVOTHYROXINE SODIUM 75 UG/1
75 TABLET ORAL
COMMUNITY

## 2023-01-25 RX ADMIN — ASPIRIN 325 MG ORAL TABLET 325 MG: 325 PILL ORAL at 08:47

## 2023-01-25 RX ADMIN — ACETAMINOPHEN 650 MG: 325 TABLET ORAL at 15:02

## 2023-01-25 RX ADMIN — SODIUM CHLORIDE 1.5 ML/KG/HR: 9 INJECTION, SOLUTION INTRAVENOUS at 09:36

## 2023-01-25 NOTE — PROGRESS NOTES
TRANSFER - IN REPORT:    Verbal report received from Lois on Monie Mensah, Procedure [facility] , from the Cardiac Cath lab, for routine progression of care. Report consisted of patients Situation, Background, Assessment and Recommendations(SBAR). Information from the following report(s) Procedure Summary, MAR, and Recent Results was reviewed with the receiving clinician. Opportunity for questions and clarification was provided. Assessment completed upon patients arrival to [address] [place] [address] and care assumed. Cardiac Cath Lab Recovery Arrival Note:     Monie Mensah arrived to [facility] recovery area. Patient on cardiac monitor, non-invasive blood pressure, Patient status doing well without problems. Patient is A&Ox 4. Patient reports no complaints. Procedure site without any bleeding and no hematoma.

## 2023-01-25 NOTE — PROGRESS NOTES
[address]:  Sandra Maya ambulated @ 9941 (time) approximately 20 feet. Patient tolerated ambulation without adverse advents. Right radial (right/left, groin/arm)  without bleeding or hematoma noted.

## 2023-01-25 NOTE — DISCHARGE INSTRUCTIONS
[email or web address]    Patient Discharge Instructions    Almas Bills / [id number] : 1945    Admitted 2023 Discharged: 2023       It is important that you take the medication exactly as they are prescribed. Keep your medication in the bottles provided by the pharmacist and keep a list of the medication names, dosages, and times to be taken in your wallet. Do not take other medications without consulting your doctor. BRING ALL OF YOUR MEDICINES TO YOUR OFFICE VISIT with Dr. Amanda Hi    Follow-up with Tessa Mckeon MD in 2 week      Radial Cardiac Catheterization / Angiography Discharge Instructions    It is normal to feel tired the first couple days. Take it easy and follow the physicians instructions. CHECK THE CATHETER INSERTION SITE DAILY:  Remove the wrist dressing 24 hours after the procedure. You may shower 24 hours after the procedure. Wash with soap and water and pat dry. Gentle cleaning of the site with soap and water is sufficient, cover with a dry clean dressing or bandage. Do not apply creams or powders to the area. No soaking the wrist for 3 days. CALL THE PHYSICIAN:  If the site becomes red, swollen or feels warm to the touch. If there is bleeding or drainage or if there is unusual pain at the radial site. If there is any minor oozing, you may apply a band-aid and remove after 12 hours. If the bleeding continues, hold pressure with the middle finger against the puncture site and the thumb against the back of the wrist, call 911 to be transported to the hospital.  DO NOT DRIVE YOURSELF, [facility]. ACTIVITY:  For the first 24 hours do not manipulate the wrist.  No lifting, pushing or pulling over 3-5 pounds with the affected wrist for 7 days and no straining the insertion site. Do not lift grocery bags or the garbage can, do not run the vacuum  or  for 7 days.   Start with short walks as in the hospital and gradually increase as tolerated each day. It is recommended to walk 30 minutes 5-7 days per week. Follow your physicians instructions on activity. Avoid walking outside in extremes of heat or cold. Walk inside when it is cold and windy or hot and humid. THINGS TO KEEP IN MIND:  No driving for at least 24 hours, or as designated by your physician. Limit the number of times you go up and down the stairs  Take rests and pace yourself with activity. Be careful and do not strain with bowel movements. MEDICATIONS:  Take all medications as prescribed. Call your physician if you have any questions. Keep an updated list of your medications with you at all times and give a list to your physician and pharmacist.    SIGNS AND SYMPTOMS:  Be cautions of symptoms of angina or recurrent symptoms such as chest discomfort, unusual shortness of breath or fatigue. These could be symptoms of restenosis, a new blockage or a heart attack. If your symptoms are relieved with rest it is still recommended that you notify your physician of recurrent chest pain or discomfort. For CHEST PAIN or symptoms of angina not relieved with rest:  If the discomfort is not relieved with rest and you have been prescribed Nitroglycerin, take as directed (taken under the tongue, one at a time 5 minutes apart for a total of 3 doses). If the discomfort is not relieved after the 3rd nitroglycerin, call 911. AFTER CARE:  Follow up with you physician as instructed. Follow a heart healthy diet with proper portion control, daily stress management, daily      exercise, blood pressure and cholesterol control, and smoking cessation.

## 2023-01-25 NOTE — PROGRESS NOTES
Cardiac Cath Lab Recovery Arrival Note:      Flo Macdonald arrived to Cardiac Cath Lab, Recovery Area. Staff introduced to patient. Patient identifiers verified with NAME and DATE OF BIRTH. Procedure verified with patient. Consent forms reviewed and signed by patient or authorized representative and verified. Allergies verified. Patient informed of procedure and plan of care. Questions answered with review. Patient prepped for procedure, per orders from physician, prior to arrival.    Patient on cardiac monitor, non-invasive blood pressure, SPO2 monitor. Patient is A&Ox 4. Patient reports no complaints. Patient in stretcher, in low position, with side rails up, call bell within reach, patient instructed to call of assistance as needed. Patient prep in: [facility] Recovery Area, Bed# RR3. Family in: waiting room. Prep by: LIZA Mojica

## 2023-01-25 NOTE — PROGRESS NOTES
1330: R radial TR band removed. No bleeding and no hematoma noted. Sterile gauze and tegaderm applied to the site. 1335: I have reviewed discharge instructions with the patient. The patient verbalized understanding. 1345: Aleksandra Maldonado ambulated @ 1445(time) approximately 20 feet. Patient tolerated ambulation without adverse advents. 1350: R radial bled from site. Manual pressure held and TR band reapplied with 12cc of air. No bleeding and no hematoma. Increased bruising noted at site. 1420: Called son to update on patient status. 1440: Update Dr. Alaina Balderas.    [id number]:  New prescriptions called into the pharmacy on file to the pharmacist [facility]. 1745: Pt taken to [facility] via wheel chair for discharge. Son awaiting with car.

## 2023-01-25 NOTE — PROGRESS NOTES
CATH LAB to RECOVERY ROOM REPORT    Procedure: [facility]     MD:    The procedure was an Intervention    Verbal Report given to Recovery Nurse on patient being transferred to Cardiac Cath Lab  for routine post-op. Patient stable upon transfer to . Vitals, mental status, MAR, procedural summary discussed with recovery RN. Medication given during procedure:    Contrast:60mL                          Heparin:2000units     Versed:1mg                                                               Fentanyl:25mcg                                                           Plavix:600 mg         Angiomax running 27.6ml/hr.   Stop drip 0820      Sheaths:    Right radial sheath pulled at 0820 am, band at 11mL of air

## 2023-01-25 NOTE — PROGRESS NOTES
Cardiac Cath Lab Procedure Area Arrival Note:    Krista Barksdale arrived to Cardiac Cath Lab, Procedure Area. Patient identifiers verified with NAME and DATE OF BIRTH. Procedure verified with patient. Consent forms verified. Allergies verified. Patient informed of procedure and plan of care. Questions answered with review. Patient voiced understanding of procedure and plan of care. Patient on cardiac monitor, non-invasive blood pressure, SPO2 monitor. On oxygen or O2 @ 2 lpm via nc. IV of ns on pump at 50 ml/hr. Patient status doing well without problems. Patient is A&Ox 4. Patient reports no complaints. Patient medicated during procedure with orders obtained and verified by Dr. Sary Araujo. Refer to patients Cardiac Cath Lab PROCEDURE REPORT for vital signs, assessment, status, and response during procedure, printed at end of case. Printed report on chart or scanned into chart.

## 2023-02-07 DIAGNOSIS — G62.9 NEUROPATHY: ICD-10-CM

## 2023-02-07 RX ORDER — PREGABALIN 50 MG/1
50 CAPSULE ORAL 2 TIMES DAILY
Qty: 60 CAPSULE | Refills: 2 | Status: SHIPPED | OUTPATIENT
Start: 2023-02-07

## 2023-02-07 NOTE — TELEPHONE ENCOUNTER
PCP: Tosin Hernandez MD    Last appt: 12/13/2022  Future Appointments   Date Time Provider Teddy Carrillo   3/16/2023 10:00 AM Ilana Sharma MD [facility] BS AMB       Requested Prescriptions     Pending Prescriptions Disp Refills    pregabalin (LYRICA) 50 mg capsule 60 Capsule 2     Sig: Take 1 Capsule by mouth two (2) times a day. Max Daily Amount: 100 mg.

## 2023-02-25 DIAGNOSIS — Z95.5 STENTED CORONARY ARTERY: ICD-10-CM

## 2023-03-01 ENCOUNTER — OFFICE VISIT (OUTPATIENT)
Dept: URGENT CARE | Age: 78
End: 2023-03-01
Payer: MEDICARE

## 2023-03-01 ENCOUNTER — HOSPITAL ENCOUNTER (OUTPATIENT)
Dept: CARDIAC REHAB | Age: 78
Discharge: HOME | End: 2023-03-01

## 2023-03-01 VITALS
OXYGEN SATURATION: 97 % | SYSTOLIC BLOOD PRESSURE: 151 MMHG | RESPIRATION RATE: 18 BRPM | BODY MASS INDEX: 30.55 KG/M2 | TEMPERATURE: 97.4 F | WEIGHT: 178 LBS | HEART RATE: 68 BPM | DIASTOLIC BLOOD PRESSURE: 86 MMHG

## 2023-03-01 DIAGNOSIS — R82.90 URINE MALODOR: Primary | ICD-10-CM

## 2023-03-01 DIAGNOSIS — R82.90 ABNORMAL URINALYSIS: ICD-10-CM

## 2023-03-01 LAB
BILIRUB UR QL STRIP: NEGATIVE
GLUCOSE UR-MCNC: NEGATIVE MG/DL
KETONES P FAST UR STRIP-MCNC: NEGATIVE MG/DL
PH UR STRIP: 6.5 [PH] (ref 4.6–8)
PROT UR QL STRIP: NEGATIVE
SP GR UR STRIP: 1.01 (ref 1–1.03)
UA UROBILINOGEN AMB POC: ABNORMAL (ref 0.2–1)
URINALYSIS CLARITY POC: ABNORMAL
URINALYSIS COLOR POC: YELLOW
URINE BLOOD POC: NEGATIVE
URINE LEUKOCYTES POC: ABNORMAL
URINE NITRITES POC: NEGATIVE

## 2023-03-01 PROCEDURE — G8399 PT W/DXA RESULTS DOCUMENT: HCPCS | Performed by: NURSE PRACTITIONER

## 2023-03-01 PROCEDURE — G8427 DOCREV CUR MEDS BY ELIG CLIN: HCPCS | Performed by: NURSE PRACTITIONER

## 2023-03-01 PROCEDURE — 81003 URINALYSIS AUTO W/O SCOPE: CPT | Performed by: NURSE PRACTITIONER

## 2023-03-01 PROCEDURE — G8417 CALC BMI ABV UP PARAM F/U: HCPCS | Performed by: NURSE PRACTITIONER

## 2023-03-01 PROCEDURE — G8432 DEP SCR NOT DOC, RNG: HCPCS | Performed by: NURSE PRACTITIONER

## 2023-03-01 PROCEDURE — 99213 OFFICE O/P EST LOW 20 MIN: CPT | Performed by: NURSE PRACTITIONER

## 2023-03-01 PROCEDURE — 1123F ACP DISCUSS/DSCN MKR DOCD: CPT | Performed by: NURSE PRACTITIONER

## 2023-03-01 PROCEDURE — 1101F PT FALLS ASSESS-DOCD LE1/YR: CPT | Performed by: NURSE PRACTITIONER

## 2023-03-01 PROCEDURE — 1090F PRES/ABSN URINE INCON ASSESS: CPT | Performed by: NURSE PRACTITIONER

## 2023-03-01 PROCEDURE — G8536 NO DOC ELDER MAL SCRN: HCPCS | Performed by: NURSE PRACTITIONER

## 2023-03-01 NOTE — CARDIO/PULMONARY
Patient presented to cardiac rehab. Having a lot of pain due to knee, leg and back issues. She is starting physical therapy on March 10. We mutually decided to hold off on cardiac rehab until after she starts physical therapy and is also able to receive a steroid shot in her back to alleviate her pain. She does not feel like she would be able to work out well presently. We will follow up  n May to reevaluate.

## 2023-03-01 NOTE — LETTER
NOTIFICATION RETURN TO WORK / SCHOOL    3/1/2023 4:44 PM    Ms. Maryann Rojas  [address]   [address] [id number]      To Whom It May Concern:    Maryann Rojas is currently under the care of [facility]. Was evaluated today. Low suspicion of having UTI. If there are questions or concerns please have the patient contact our office.         Sincerely,      GHE PROVIDER

## 2023-03-01 NOTE — PROGRESS NOTES
75 yo female here for possible UTI  Cloudy urine and urine odor for 2-3 days  No burning urgency or frequency  Feels completely well otherwise. Denies: fever, chills, n/v, severe abdominal pain, flank pain, blood in urine, inability to push out urine, vaginal discharge         Past Medical History:   Diagnosis Date    Breast cancer ([facility] 1999    Right    Diabetes ([facility] [address]     As of 1/28/2020, pt states \"borderline\"    Fibromyalgia     GERD (gastroesophageal reflux disease)     Hiatal hernia     Hyponatremia 05/2019    As of 1/28/2020 pt had a seizure as a result to this. Hypothyroid     Ill-defined condition     As of 1/28/2020, pt states her cardiologist says her HR drops at night but nothing to be concerned with.      Pre-diabetes     PUD (peptic ulcer disease)     PVC (premature ventricular contraction)     Too much caffeine    Seizures ([address] 05/2019    Stroke ([facility] [address] 2003    Took vision from right eye        Past Surgical History:   Procedure Laterality Date    HX BACK SURGERY  05/16/2019    [facility]    HX COLONOSCOPY N/A     HX ENDOSCOPY      HX MASTECTOMY Right 1999    HX ORTHOPAEDIC  2000    L knee has pins and plates after a fall (fracture)    HX PARTIAL HYSTERECTOMY N/A          Family History   Problem Relation Age of Onset    Cancer Mother         breast cancer    Heart Disease Father     Diabetes Brother     Colon Cancer Brother     Diabetes Brother     Heart Disease Brother     Other Sister 55        Gastric bypass into staph infection    No Known Problems Sister         Social History     Socioeconomic History    Marital status:      Spouse name: Not on file    Number of children: Not on file    Years of education: Not on file    Highest education level: Not on file   Occupational History    Not on file   Tobacco Use    Smoking status: Never    Smokeless tobacco: Never   Vaping Use    Vaping Use: Never used   Substance and Sexual Activity    Alcohol use: Not Currently    Drug use: Never Comment: CBD cream    Sexual activity: Yes     Partners: Male   Other Topics Concern    Not on file   Social History Narrative    Not on file     Social Determinants of Health     Financial Resource Strain: Not on file   Food Insecurity: Not on file   Transportation Needs: Not on file   Physical Activity: Not on file   Stress: Not on file   Social Connections: Not on file   Intimate Partner Violence: Not on file   Housing Stability: Not on file                ALLERGIES: Other food; Penicillins; Tramadol; Beef containing products; Cheese; Codeine; Gabapentin; and Other plant, animal, environmental    Review of Systems   All other systems reviewed and are negative. Vitals:    03/01/23 1554 03/01/23 1557   BP: (!) 162/76 (!) 151/86   Pulse: 68    Resp: 18    Temp: 97.4 °F (36.3 °C)    SpO2: 97%    Weight: 178 lb (80.7 kg)        Physical Exam  Constitutional:       General: She is not in acute distress. Appearance: She is not ill-appearing, toxic-appearing or diaphoretic. HENT:      Head: Normocephalic and atraumatic. Eyes:      Extraocular Movements: Extraocular movements intact. Cardiovascular:      Rate and Rhythm: Normal rate and regular rhythm. Pulses: Normal pulses. Heart sounds: Normal heart sounds. No murmur heard. No friction rub. No gallop. Pulmonary:      Effort: Pulmonary effort is normal. No respiratory distress. Breath sounds: Normal breath sounds. No wheezing or rales. Abdominal:      General: There is no distension. Palpations: Abdomen is soft. There is no mass. Tenderness: There is no abdominal tenderness. There is no right CVA tenderness, left CVA tenderness or guarding. Skin:     Coloration: Skin is not pale. Neurological:      Mental Status: She is alert and oriented to person, place, and time. Psychiatric:         Mood and Affect: Mood normal.         Behavior: Behavior normal.         Thought Content:  Thought content normal.       MDM Differential Diagnosis; Clinical Impression; Plan:       CLINICAL IMPRESSION:  (R82.90) Urine malodor  (primary encounter diagnosis)  (R82.90) Abnormal urinalysis      Plan:  Odor and cloudiness without burning urgency or frequency  Low suspicion of UTI. Given trace GLEN on UA will send culture to confirm and treat if needed. Maintain adequate fluid intake        We have reviewed concerning signs/symptoms, normal vs abnormal progression of medical condition and when to seek immediate medical attention. Schedule with PCP or Urgent Care immediately for worsening or new symptoms. See your PCP if there is not at least some improvement in symptoms within the next 1 week  You should see your PCP for updates on your routine health maintenance.              Procedures      Results for orders placed or performed in visit on 03/01/23   AMB POC URINALYSIS DIP STICK AUTO W/ MICRO   Result Value Ref Range    Color (UA POC) Yellow     Clarity (UA POC) Cloudy     Glucose (UA POC) Negative Negative    Bilirubin (UA POC) Negative Negative    Ketones (UA POC) Negative Negative    Specific gravity (UA POC) 1.015 1.001 - 1.035    Blood (UA POC) Negative Negative    pH (UA POC) 6.5 4.6 - 8.0    Protein (UA POC) Negative Negative    Urobilinogen (UA POC) 0.2 mg/dL 0.2 - 1    Nitrites (UA POC) Negative Negative    Leukocyte esterase (UA POC) Trace Negative

## 2023-03-03 LAB
BACTERIA SPEC CULT: NORMAL
SERVICE CMNT-IMP: NORMAL

## 2023-03-04 DIAGNOSIS — E03.8 OTHER SPECIFIED HYPOTHYROIDISM: ICD-10-CM

## 2023-03-04 RX ORDER — LEVOTHYROXINE SODIUM 50 UG/1
TABLET ORAL
Qty: 105 TABLET | Refills: 0 | Status: SHIPPED | OUTPATIENT
Start: 2023-03-04

## 2023-03-23 DIAGNOSIS — M25.559 HIP PAIN: Primary | ICD-10-CM

## 2023-03-23 RX ORDER — METHYLPREDNISOLONE 4 MG/1
TABLET ORAL
Qty: 1 DOSE PACK | Refills: 0 | Status: SHIPPED | OUTPATIENT
Start: 2023-03-23 | End: 2023-03-25 | Stop reason: SDUPTHER

## 2023-03-23 RX ORDER — NITROFURANTOIN 25; 75 MG/1; MG/1
100 CAPSULE ORAL 2 TIMES DAILY
Qty: 14 CAPSULE | Refills: 0 | Status: SHIPPED | OUTPATIENT
Start: 2023-03-23

## 2023-03-25 DIAGNOSIS — M25.559 HIP PAIN: ICD-10-CM

## 2023-03-25 RX ORDER — METHYLPREDNISOLONE 4 MG/1
TABLET ORAL
Qty: 1 DOSE PACK | Refills: 0 | Status: SHIPPED | OUTPATIENT
Start: 2023-03-25

## 2023-03-25 NOTE — PROGRESS NOTES
HISTORY OF PRESENT ILLNESS  Ciaran Mazariegos is a 76 y.o. female. HPI  Hx HLD overweight, hx retinal vein occlusion , hx right breast cancer s/p mastectomy  osteoporosis on prolia, prediabetes, hypothyroid   Pt called c/o left hip pain and los back pain requesting a steroid cruzito and requesting another abx for UTI treated at [facility] ER on 3/15--macrobid and medrol cruzito were sent in  [facility] the medrol yesterday-only minor relief  Goes to [facility] and was given muscle relaxers and SI injection  Went to ER for dizziness and flonase for dizziness  [facility] appt 3/23--Urine dip was negative but she wants a recheck of urine  Had had about 6 uti in last 6 months per pt--no cysto. Will get CT nextg week from [facility]. Took estrace vag crm years ago  C/o blurry vision but has cataracts  Not orthostatic by bp and pulse today  C/o dizzy when supine at night chronically. Had neg wuwith ENT Dr Alley Elam per pt in the past  Had cardiac stent placed this January-Dr Pina--no chest pain  C/o itchy rash on bacl    Patient Active Problem List    Diagnosis Date Noted    Type 2 diabetes mellitus 12/13/2022    Postural dizziness with presyncope 01/19/2022    Mixed hyperlipidemia 01/14/2020    Overweight (BMI 25.0-29.9) 11/14/2018    Other specified hypothyroidism 05/16/2018    Gastroesophageal reflux disease without esophagitis 05/16/2018    History of CVA (cerebrovascular accident) 05/16/2018    Osteopenia of multiple sites 05/16/2018    HX: breast cancer 05/16/2018    Closed fracture of left lower extremity with routine healing 05/16/2018    Endometriosis 05/16/2018     Current Outpatient Medications   Medication Sig Dispense Refill    nitrofurantoin, macrocrystal-monohydrate, (MACROBID) 100 mg capsule Take 1 Capsule by mouth two (2) times a day.  14 Capsule 0    methylPREDNISolone (MEDROL DOSEPACK) 4 mg tablet As direc  liliana 1 Dose Pack 0    levothyroxine (SYNTHROID) 50 mcg tablet TAKE  1 TAB (50MCG) EVERY  DAY  EXCEPT  1.5  TAB  (75MCG) EVERY [facility]. TAKE BEFORE BREAKFAST 105 Tablet 0    simvastatin (ZOCOR) 40 mg tablet TAKE 1 TABLET BY MOUTH ONCE DAILY AT NIGHT 90 Tablet 3    pregabalin (LYRICA) 50 mg capsule Take 1 Capsule by mouth two (2) times a day. Max Daily Amount: 100 mg. 60 Capsule 2    levothyroxine (SYNTHROID) 75 mcg tablet Take 75 mcg by mouth Daily (before breakfast). Monday, Tuesday, Wednesday, Thursday, Friday      fluorometholone (FML) 0.1 % ophthalmic suspension Administer 2 Drops to both eyes two (2) times a day. HYDROcodone-acetaminophen (NORCO) 5-325 mg per tablet Take 1 Tablet by mouth once as needed for Pain. aspirin delayed-release 81 mg tablet Take 1 Tablet by mouth daily. 30 Tablet 5    clopidogreL (Plavix) 75 mg tab Take 1 Tablet by mouth daily. 30 Tablet 5    metoprolol succinate (Toprol XL) 25 mg XL tablet Take 0.5 Tablets by mouth daily. 15 Tablet 3    metFORMIN (GLUCOPHAGE) 500 mg tablet Take 1 Tablet by mouth two (2) times daily (with meals). 90 Tablet 1    ezetimibe (ZETIA) 10 mg tablet Take 1 Tablet by mouth daily. 90 Tablet 1    levothyroxine (SYNTHROID) 75 mcg tablet Take 1 Tablet by mouth two (2) times daily on Sat & Sun.      pantoprazole (PROTONIX) 40 mg tablet Take 40 mg by mouth two (2) times a day. multivitamin (ONE A DAY) tablet Take 1 Tablet by mouth daily. CENTRUM SILVER FOR WOMEN      OTHER CBD cream-back      acetaminophen (TYLENOL) 325 mg tablet Take  by mouth every four (4) hours as needed for Pain. Takes 6 daily      magnesium oxide 250 mg magnesium tablet Take 250 mg by mouth daily. lidocaine (LIDODERM) 5 % 1 Patch by TransDERmal route every twenty-four (24) hours. Apply patch to the affected area for 12 hours a day and remove for 12 hours a day. 30 Each 5    aspirin 81 mg chewable tablet Take 1 Tab by mouth daily. 30 Tab 1    denosumab (PROLIA) 60 mg/mL injection 60 mg by SubCUTAneous route every 6 months. cetirizine (ZYRTEC) 10 mg tablet Take 10 mg by mouth daily. cyanocobalamin (VITAMIN B12) 100 mcg tablet Take 100 mcg by mouth daily. Allergies   Allergen Reactions    Other Food Anaphylaxis     Steak, cheese, grass, smoke    Penicillins Rash, Swelling and Itching     Mouth  Other reaction(s): ITCHING AND SWELLING  Other reaction(s): Adverse reaction to substance ([id number]); Severity: Mild      Tramadol Other (comments)     Hyponatremia, seizure    Beef Containing Products Other (comments)     Itching and can cause mouth to swell   Other reaction(s): SWELLING AND HIVES AND ITCHING    Cheese Itching and Swelling    Codeine Rash and Swelling     Mouth    Gabapentin Other (comments)     As of 1/28/2020, pt requests not to have this. It \"completely wipes me out. \"  Other reaction(s): WEAKNESS    Other Plant, Animal, Environmental Hives     Rubber      Lab Results   Component Value Date/Time    WBC 8.8 01/19/2022 12:21 PM    HGB 12.6 01/19/2022 12:21 PM    HCT 39.0 01/19/2022 12:21 PM    PLATELET 381 09/85/4950 12:21 PM    MCV 94.7 01/19/2022 12:21 PM     Lab Results   Component Value Date/Time    GFR est non-AA >60 07/07/2022 08:47 AM    GFR est AA >60 07/07/2022 08:47 AM    Creatinine 0.76 07/07/2022 08:47 AM    BUN 12 07/07/2022 08:47 AM    Sodium 137 07/07/2022 08:47 AM    Potassium 5.0 07/07/2022 08:47 AM    Chloride 101 07/07/2022 08:47 AM    CO2 30 07/07/2022 08:47 AM    Magnesium 2.5 (H) 05/25/2019 01:10 AM    Phosphorus 2.4 (L) 05/26/2019 04:15 AM        ROS    Physical Exam  Vitals and nursing note reviewed. Constitutional:       Appearance: She is well-developed. HENT:      Head: Normocephalic and atraumatic. Right Ear: Tympanic membrane normal.      Left Ear: Tympanic membrane normal.   Eyes:      Pupils: Pupils are equal, round, and reactive to light. Neck:      Vascular: No carotid bruit. Cardiovascular:      Rate and Rhythm: Normal rate and regular rhythm. Pulses: Normal pulses. Heart sounds: Normal heart sounds. No murmur heard. No friction rub. No gallop. Pulmonary:      Effort: Pulmonary effort is normal. No respiratory distress. Breath sounds: Normal breath sounds. No wheezing or rales. Abdominal:      Palpations: Abdomen is soft. Musculoskeletal:      Cervical back: Normal range of motion and neck supple. Right lower leg: No edema. Left lower leg: No edema. Lymphadenopathy:      Cervical: No cervical adenopathy. Skin:     General: Skin is warm. Neurological:      General: No focal deficit present. Mental Status: She is alert. Psychiatric:         Mood and Affect: Mood normal.         Behavior: Behavior normal.         Thought Content: Thought content normal.         Judgment: Judgment normal.       ASSESSMENT and PLAN    ICD-10-CM ICD-9-CM    1. Urine frequency  R35.0 788.41 URINALYSIS W/MICROSCOPIC      CULTURE, URINE  Hold off an abx  Last Urine dip negative last week but ongoing sxs  Might need estrace crm and ? Cysto  CT to be done  Advised fu and exam at [address]      2. Diabetes mellitus due to underlying condition with diabetic autonomic neuropathy, without long-term current use of insulin ([facility])  E08.43 249.60      337.1       3. Dizziness  R42 780.4 Trial of meclizine-consider PT      4. Rash of back  R21 782. 1 Clobetasol crm      5.  Coronary artery disease involving native coronary artery of native heart without angina pectoris  I25.10 414.01 Fu CARD   6        Low back pain to left buttocks--complete medrol cruzito

## 2023-03-27 ENCOUNTER — OFFICE VISIT (OUTPATIENT)
Dept: INTERNAL MEDICINE CLINIC | Age: 78
End: 2023-03-27
Payer: MEDICARE

## 2023-03-27 VITALS
BODY MASS INDEX: 29.91 KG/M2 | TEMPERATURE: 97.3 F | OXYGEN SATURATION: 97 % | RESPIRATION RATE: 16 BRPM | DIASTOLIC BLOOD PRESSURE: 78 MMHG | WEIGHT: 175.2 LBS | HEIGHT: 64 IN | HEART RATE: 70 BPM | SYSTOLIC BLOOD PRESSURE: 130 MMHG

## 2023-03-27 DIAGNOSIS — R42 DIZZINESS: ICD-10-CM

## 2023-03-27 DIAGNOSIS — E08.43 DIABETES MELLITUS DUE TO UNDERLYING CONDITION WITH DIABETIC AUTONOMIC NEUROPATHY, WITHOUT LONG-TERM CURRENT USE OF INSULIN (HCC): ICD-10-CM

## 2023-03-27 DIAGNOSIS — I25.10 CORONARY ARTERY DISEASE INVOLVING NATIVE CORONARY ARTERY OF NATIVE HEART WITHOUT ANGINA PECTORIS: ICD-10-CM

## 2023-03-27 DIAGNOSIS — R21 RASH OF BACK: ICD-10-CM

## 2023-03-27 DIAGNOSIS — R35.0 URINE FREQUENCY: Primary | ICD-10-CM

## 2023-03-27 PROCEDURE — 1090F PRES/ABSN URINE INCON ASSESS: CPT | Performed by: INTERNAL MEDICINE

## 2023-03-27 PROCEDURE — G8432 DEP SCR NOT DOC, RNG: HCPCS | Performed by: INTERNAL MEDICINE

## 2023-03-27 PROCEDURE — G8536 NO DOC ELDER MAL SCRN: HCPCS | Performed by: INTERNAL MEDICINE

## 2023-03-27 PROCEDURE — G8417 CALC BMI ABV UP PARAM F/U: HCPCS | Performed by: INTERNAL MEDICINE

## 2023-03-27 PROCEDURE — G8427 DOCREV CUR MEDS BY ELIG CLIN: HCPCS | Performed by: INTERNAL MEDICINE

## 2023-03-27 PROCEDURE — G8399 PT W/DXA RESULTS DOCUMENT: HCPCS | Performed by: INTERNAL MEDICINE

## 2023-03-27 PROCEDURE — 1101F PT FALLS ASSESS-DOCD LE1/YR: CPT | Performed by: INTERNAL MEDICINE

## 2023-03-27 PROCEDURE — G0463 HOSPITAL OUTPT CLINIC VISIT: HCPCS | Performed by: INTERNAL MEDICINE

## 2023-03-27 PROCEDURE — 99214 OFFICE O/P EST MOD 30 MIN: CPT | Performed by: INTERNAL MEDICINE

## 2023-03-27 RX ORDER — CLOBETASOL PROPIONATE 0.5 MG/G
CREAM TOPICAL 2 TIMES DAILY
Qty: 30 G | Refills: 0 | Status: SHIPPED | OUTPATIENT
Start: 2023-03-27

## 2023-03-27 RX ORDER — MECLIZINE HYDROCHLORIDE 25 MG/1
25 TABLET ORAL
Qty: 21 TABLET | Refills: 0 | Status: SHIPPED | OUTPATIENT
Start: 2023-03-27 | End: 2023-04-06

## 2023-03-27 RX ORDER — TRIAMCINOLONE ACETONIDE 55 UG/1
2 SPRAY, METERED NASAL DAILY
COMMUNITY

## 2023-03-27 RX ORDER — METHOCARBAMOL 500 MG/1
500 TABLET, FILM COATED ORAL 3 TIMES DAILY
COMMUNITY

## 2023-03-27 RX ORDER — AMOXICILLIN 250 MG
1 CAPSULE ORAL DAILY
COMMUNITY

## 2023-03-27 RX ORDER — GLUCOSAMINE SULFATE 1500 MG
POWDER IN PACKET (EA) ORAL DAILY
COMMUNITY

## 2023-03-27 NOTE — PROGRESS NOTES
1. Have you been to the ER, urgent care clinic since your last visit? Hospitalized since your last visit? [facility] [facility] for bladder infection     2. Have you seen or consulted any other health care providers outside of the [address] [place] [address] since your last visit? Include any pap smears or colon screening.  No

## 2023-03-28 LAB
APPEARANCE UR: CLEAR
BACTERIA URNS QL MICRO: NEGATIVE /HPF
BILIRUB UR QL: NEGATIVE
COLOR UR: ABNORMAL
EPITH CASTS URNS QL MICRO: ABNORMAL /LPF
GLUCOSE UR STRIP.AUTO-MCNC: NEGATIVE MG/DL
HGB UR QL STRIP: NEGATIVE
HYALINE CASTS URNS QL MICRO: ABNORMAL /LPF (ref 0–5)
KETONES UR QL STRIP.AUTO: ABNORMAL MG/DL
LEUKOCYTE ESTERASE UR QL STRIP.AUTO: ABNORMAL
NITRITE UR QL STRIP.AUTO: NEGATIVE
PH UR STRIP: 6 (ref 5–8)
PROT UR STRIP-MCNC: ABNORMAL MG/DL
RBC #/AREA URNS HPF: ABNORMAL /HPF (ref 0–5)
SP GR UR REFRACTOMETRY: 1.02 (ref 1–1.03)
UROBILINOGEN UR QL STRIP.AUTO: 0.2 EU/DL (ref 0.2–1)
WBC URNS QL MICRO: ABNORMAL /HPF (ref 0–4)

## 2023-03-29 LAB
BACTERIA SPEC CULT: NORMAL
CC UR VC: NORMAL
SERVICE CMNT-IMP: NORMAL

## 2023-03-29 NOTE — PROGRESS NOTES
PT called at this time. 2 identifiers confirmed. Per Dr. Cory Beard:  Ucx is negative for UTI-. Has some wbc in urine possible due to vaginal secretions.  she needs to get the CT scan and fu with [facility] MD for dysuria

## 2023-03-29 NOTE — PROGRESS NOTES
Tell pt her Ucx is negative for UTI-. Has some wbc in urine possible due to vaginal secretions. she needs to get the CT scan and fu with [facility] MD for dysuria.

## 2023-04-17 ENCOUNTER — TELEPHONE (OUTPATIENT)
Dept: INTERNAL MEDICINE CLINIC | Age: 78
End: 2023-04-17

## 2023-04-17 DIAGNOSIS — M25.559 HIP PAIN: ICD-10-CM

## 2023-04-17 DIAGNOSIS — M16.10 HIP ARTHRITIS: Primary | ICD-10-CM

## 2023-04-17 DIAGNOSIS — R73.03 PREDIABETES: Primary | ICD-10-CM

## 2023-04-17 DIAGNOSIS — E78.5 HYPERLIPIDEMIA, UNSPECIFIED HYPERLIPIDEMIA TYPE: ICD-10-CM

## 2023-04-17 RX ORDER — METHYLPREDNISOLONE 4 MG/1
TABLET ORAL
Qty: 1 DOSE PACK | Refills: 0 | Status: SHIPPED | OUTPATIENT
Start: 2023-04-17

## 2023-04-17 RX ORDER — FLUTICASONE PROPIONATE 50 MCG
2 SPRAY, SUSPENSION (ML) NASAL DAILY
Qty: 1 EACH | Refills: 5 | Status: SHIPPED | OUTPATIENT
Start: 2023-04-17

## 2023-04-17 NOTE — TELEPHONE ENCOUNTER
----- Message from Micky Fine sent at 4/17/2023  9:40 AM EDT -----  Subject: Refill Request    QUESTIONS  Name of Medication? methylPREDNISolone (MEDROL DOSEPACK) 4 mg tablet  Patient-reported dosage and instructions? 6 a day, & so on   How many days do you have left? 0  Preferred Pharmacy? [id number]   Pharmacy phone number (if available)? [phone]  Additional Information for Provider? Patient is having hip pain.   ---------------------------------------------------------------------------  --------------  CALL BACK INFO  What is the best way for the office to contact you? OK to leave message on   voicemail  Preferred Call Back Phone Number? [phone]  ---------------------------------------------------------------------------  --------------  SCRIPT ANSWERS  Relationship to Patient?  Self

## 2023-04-27 ENCOUNTER — OFFICE VISIT (OUTPATIENT)
Dept: ORTHOPEDIC SURGERY | Age: 78
End: 2023-04-27

## 2023-04-27 VITALS — HEIGHT: 64 IN | WEIGHT: 122 LBS | BODY MASS INDEX: 20.83 KG/M2

## 2023-04-27 DIAGNOSIS — Z98.1 S/P SPINAL FUSION: Primary | ICD-10-CM

## 2023-04-27 DIAGNOSIS — M53.3 SACROILIAC JOINT DYSFUNCTION: ICD-10-CM

## 2023-04-27 NOTE — PROGRESS NOTES
Called Dr. Joshua Pina's office to see if Suzanne Franks can come off the Plavix for 5 days for an SI joint injection, Spoke with Venessa Peterson who reports Suzanne Franks  can stop the Plavix in April. Her stents was placed in 01/25/2023 and per note in Feb 2023 patient can stop plavix in April.        Anival Bradshaw RN, BSN  Registered Nurse to [address] [place] [address]    Office: [phone]  Fax: [phone]

## 2023-04-27 NOTE — PROGRESS NOTES
Maria A Nolan (: 1945) is a 76 y.o. female, patient, here for evaluation of the following chief complaint(s):  Leg Pain and LOW BACK PAIN       ASSESSMENT/PLAN:    Below is the assessment and plan developed based on review of pertinent history, physical exam, labs, studies, and medications. She has developed some focal stenosis below her previous L4-5 fusion. She has pinpoint tenderness over the left SI joint. Contacted her cardiologist office and she has not come off the Plavix at this time for her cardiac stents. We will set her up for a left SI joint injection. If she fails improve with this I may consider bilateral L5-S1 epidurals. She will see us back after the injections. 1. S/P spinal fusion  -     XR SPINE LUMB MIN 4 V; Future  -     REFERRAL TO PAIN MANAGEMENT  2. Sacroiliac joint dysfunction      No follow-ups on file. SUBJECTIVE/OBJECTIVE:  Maria A Nolan (: 1945) is a 76 y.o. female. Pain Assessment  2023   Location of Pain Leg;Back   Location Modifiers Left;Posterior   Severity of Pain 8   Quality of Pain Aching        The patient comes in today on self-referral for severe left sided posterior buttock pain. She is a couple years out from an L4-5 fusion elsewhere. Approximately 6 months she developed severe low posterior buttock back pain. Denies any trauma. Pain does not radiate past the posterior buttock and hip region. It is somewhat with walking. It gets also better with rest at times. She tried physical therapy which seemed to make it worse. She did not have the injection because of some recent cardiac stents and that she is on Plavix. Imaging:    XR Results (most recent):  Results from Appointment encounter on 23    XR SPINE LUMB MIN 4 V    Narrative  AP and lateral and flexion-extension of the lumbar spine reviewed today. Stable L4-5 lumbar fusion is noted.   Spondylosis noted above and below the fusion particular in the mid lumbar region. No fractures or lytic lesions. No instability       MRI Results (most recent):  Results from Hospital Encounter encounter on 09/02/22    MRI LUMB SPINE WO CONT    Narrative  EXAM: MRI LUMB SPINE WO CONT    INDICATION: History of breast cancer. Radiculopathy, lumbosacral region. Back  pain radiating down right leg. Pain in left buttock and posterior left leg. COMPARISON: MRI 7/29/2021    TECHNIQUE: MR imaging of the lumbar spine was performed using the following  sequences: sagittal T1, T2, STIR;  axial T1, T2.    CONTRAST:  None. FINDINGS:    Conus position, morphology and signal remain normal. The conus tip is located at  the inferior L1 level. An S-shaped curve is again shown, mild to moderate in severity, axial convex at  L2 and levoconvex at L4-5, unchanged. Posterior spinal fixation artifacts are  again noted at L4-5 with bilateral L4 and L5 pedicle screws secured with 2  vertical rods. Interbody graft artifact at L4-5 is also again noted. 2 to 3 mm  retrolisthesis at L4-5 is unchanged. Vertebral body heights remain within normal limits. Type I discogenic endplate  signal changes are shown at L2-3 and mild type II changes at L1-2 and L3-4. No intraspinal or paraspinal mass is demonstrated. Lower thoracic spine: T10-T11 and T11-12 are shown on sagittal images only. Mild  disc space narrowing at both levels with mild central disc bulging at T11-12  appear unchanged. Mild bilateral facet osteoarthrosis at both levels again  noted. No substantial canal stenosis or foraminal stenosis is shown. There is no  change in the appearance in the interval.    T12-L1: Nearly normal disc height. Minimal central and bilateral paracentral  disc bulging. No substantial facet arthrosis. No canal or foraminal stenosis. No  change. L1-L2: Moderate disc space narrowing, greater on the left. Moderate leftward  lateralizing diffuse disc bulging.  Mild-moderate bilateral facet osteoarthrosis,  greater on the left. No canal stenosis. Severe left and mild right foraminal  stenosis. No change. L2-L3: Severe disc space narrowing. Mild to moderate diffuse disc bulging. Mild  bilateral facet osteoarthrosis. No substantial canal stenosis. Severe left and  moderate right subarticular and foraminal stenosis. L3-L4: Moderate disc space narrowing and diffuse disc bulging. No substantial  facet arthrosis. Mild to moderate canal stenosis moderate bilateral foraminal  stenosis. L4-L5: Mild disc space narrowing and moderate diffuse disc bulging. Previously  noted right foraminal enhancing collection is not evident on these images which  are obtained without IV contrast. Mild canal stenosis. Moderate bilateral  foraminal stenosis. L5-S1: Mild disc space narrowing. Moderate diffuse disc bulging. Moderate right  and mild-moderate left facet osteoarthrosis. Severe canal stenosis. Moderate  right and mild left foraminal stenosis. Findings progressed somewhat in the  interval.    Impression  Postoperative and degenerative findings which are detailed by level above. Postoperative findings noted at the L4-5 level. Degenerative findings at L5-S1  have progressed somewhat in the interval.         Allergies   Allergen Reactions    Other Food Anaphylaxis     Steak, cheese, grass, smoke    Penicillins Rash, Swelling and Itching     Mouth  Other reaction(s): ITCHING AND SWELLING  Other reaction(s): Adverse reaction to substance ([id number]); Severity: Mild      Tramadol Other (comments)     Hyponatremia, seizure    Beef Containing Products Other (comments)     Itching and can cause mouth to swell   Other reaction(s): SWELLING AND HIVES AND ITCHING    Cheese Itching and Swelling    Codeine Rash and Swelling     Mouth    Gabapentin Other (comments)     As of 1/28/2020, pt requests not to have this. It \"completely wipes me out. \"  Other reaction(s): WEAKNESS    Other Plant, Animal, Environmental Hives     Rubber       Current Outpatient Medications   Medication Sig    fluticasone propionate (FLONASE) 50 mcg/actuation nasal spray 2 Sprays by Both Nostrils route daily. triamcinolone (Nasal Allergy) 55 mcg nasal inhaler 2 Sprays by Both Nostrils route daily. ezetimibe (ZETIA) 10 mg tablet Take 1 tablet by mouth once daily    senna-docusate (PERICOLACE) 8.6-50 mg per tablet Take 1 Tablet by mouth daily. cholecalciferol (VITAMIN D3) 25 mcg (1,000 unit) cap Take  by mouth daily. clobetasoL (TEMOVATE) 0.05 % topical cream Apply  to affected area two (2) times a day. nitrofurantoin, macrocrystal-monohydrate, (MACROBID) 100 mg capsule Take 1 Capsule by mouth two (2) times a day. levothyroxine (SYNTHROID) 50 mcg tablet TAKE  1 TAB (50MCG) EVERY  DAY  EXCEPT  1.5  TAB  (75MCG) EVERY  MONDAY  AND  FRIDAY. TAKE BEFORE BREAKFAST    simvastatin (ZOCOR) 40 mg tablet TAKE 1 TABLET BY MOUTH ONCE DAILY AT NIGHT    pregabalin (LYRICA) 50 mg capsule Take 1 Capsule by mouth two (2) times a day. Max Daily Amount: 100 mg.    fluorometholone (FML) 0.1 % ophthalmic suspension Administer 2 Drops to both eyes two (2) times a day. clopidogreL (Plavix) 75 mg tab Take 1 Tablet by mouth daily. metoprolol succinate (Toprol XL) 25 mg XL tablet Take 0.5 Tablets by mouth daily. metFORMIN (GLUCOPHAGE) 500 mg tablet Take 1 Tablet by mouth two (2) times daily (with meals). levothyroxine (SYNTHROID) 75 mcg tablet Take 1 Tablet by mouth two (2) times daily on Sat & Sun.    pantoprazole (PROTONIX) 40 mg tablet Take 1 Tablet by mouth two (2) times a day. multivitamin (ONE A DAY) tablet Take 1 Tablet by mouth daily. CENTRUM SILVER FOR WOMEN    OTHER CBD cream-back    acetaminophen (TYLENOL) 325 mg tablet Take  by mouth every four (4) hours as needed for Pain. Takes 6 daily    magnesium oxide 250 mg magnesium tablet Take 1 Tablet by mouth daily. lidocaine (LIDODERM) 5 % 1 Patch by TransDERmal route every twenty-four (24) hours.  Apply patch to the affected area for 12 hours a day and remove for 12 hours a day. aspirin 81 mg chewable tablet Take 1 Tab by mouth daily. denosumab (PROLIA) 60 mg/mL injection 1 mL by SubCUTAneous route every 6 months. cetirizine (ZYRTEC) 10 mg tablet Take 1 Tablet by mouth daily. cyanocobalamin (VITAMIN B12) 100 mcg tablet Take 1 Tablet by mouth daily. methylPREDNISolone (MEDROL DOSEPACK) 4 mg tablet As directed (Patient not taking: Reported on 4/27/2023)    methylPREDNISolone (MEDROL DOSEPACK) 4 mg tablet As directed (Patient not taking: Reported on 4/27/2023)    methocarbamoL (ROBAXIN) 500 mg tablet Take 1 Tablet by mouth three (3) times daily. (Patient not taking: Reported on 4/27/2023)    levothyroxine (SYNTHROID) 75 mcg tablet Take 1 Tablet by mouth Daily (before breakfast). Monday, Tuesday, Wednesday, Thursday, Friday (Patient not taking: Reported on 4/27/2023)    HYDROcodone-acetaminophen (NORCO) 5-325 mg per tablet Take 1 Tablet by mouth once as needed for Pain. (Patient not taking: Reported on 4/27/2023)     No current facility-administered medications for this visit. Past Medical History:   Diagnosis Date    Breast cancer ([address] 1999    Right    Diabetes ([address]     As of 1/28/2020, pt states \"borderline\"    Fibromyalgia     GERD (gastroesophageal reflux disease)     Hiatal hernia     Hyponatremia 05/2019    As of 1/28/2020 pt had a seizure as a result to this. Hypothyroid     Ill-defined condition     As of 1/28/2020, pt states her cardiologist says her HR drops at night but nothing to be concerned with.      Pre-diabetes     PUD (peptic ulcer disease)     PVC (premature ventricular contraction)     Too much caffeine    Seizures ([address] 05/2019    Stroke ([address] 2003    Took vision from right eye        Past Surgical History:   Procedure Laterality Date    HX BACK SURGERY  05/16/2019    [facility]    HX COLONOSCOPY N/A     HX ENDOSCOPY      HX MASTECTOMY Right 1999    HX ORTHOPAEDIC  2000    L knee has pins and plates after a fall (fracture)    HX PARTIAL HYSTERECTOMY N/A        Family History   Problem Relation Age of Onset    Cancer Mother         breast cancer    Heart Disease Father     Diabetes Brother     Colon Cancer Brother     Diabetes Brother     Heart Disease Brother     Other Sister 55        Gastric bypass into staph infection    No Known Problems Sister         Social History     Tobacco Use    Smoking status: Never    Smokeless tobacco: Never   Vaping Use    Vaping Use: Never used   Substance Use Topics    Alcohol use: Not Currently    Drug use: Never     Comment: CBD cream        Review of Systems       Vitals:  Ht 5' 4\" (1.626 m)   Wt 122 lb (55.3 kg)   BMI 20.94 kg/m²    Body mass index is 20.94 kg/m². Ortho Exam         Integumentary  Assessment of Surgical Incision - healing and consistent with normal anticipated wound healing. Neurologic  Sensory  Light Touch - Intact - Globally. Overall Assessment of Muscle Strength and Tone reveals  Lower Extremities - Right Iliopsoas - 5/5. Left Iliopsoas - 5/5. Right Tibialis Anterior - 5/5. Left Tibialis Anterior - 5/5. Right Gastroc-Soleus - 5/5. Left Gastroc-Soleus - 5/5. Right EHL - 5/5. Left EHL - 5/5. General Assessment of Reflexes  Right Ankle - Clonus is not present. Left Ankle - Clonus is not present. Reflexes (Dermatomes)  2/2 Normal - Left Achilles (L5-S2), Left Knee (L2-4), Right Achilles (L5-S2) and Right Knee (L2-4). Musculoskeletal  Global Assessment  Examination of related systems reveals - well-developed, well-nourished, in no acute distress, alert and oriented x 3 and normal coordination. Spine/Ribs/Pelvis  Lumbosacral Spine - Examination of the lumbosacral spine reveals - no known fractures or deformities. Inspection and Palpation - Tenderness - moderate. Assessment of pain reveals the following findings - The pain is characterized as - moderate. Location - pain refers to lower back bilaterally.   Severe left posterior buttock and SI joint. An electronic signature was used to authenticate this note.   -- Avril Sapp MD

## 2023-04-27 NOTE — PATIENT INSTRUCTIONS
Spondylolysis and Spondylolisthesis: Exercises  Introduction  Here are some examples of exercises for you to try. The exercises may be suggested for a condition or for rehabilitation. Start each exercise slowly. Ease off the exercises if you start to have pain. You will be told when to start these exercises and which ones will work best for you. How to do the exercises  Single knee-to-chest  Lie on your back with your knees bent and your feet flat on the floor. You can put a small pillow under your head and neck if it is more comfortable. Bring one knee to your chest, keeping the other foot flat on the floor. Keep your lower back pressed to the floor. Hold for 15 to 30 seconds. Relax, and lower the knee to the starting position. Repeat with the other leg. Repeat 2 to 4 times with each leg. To get more stretch, put your other leg flat on the floor while pulling your knee to your chest.  Double knee-to-chest  Lie on your back with your knees bent and your feet flat on the floor. You can put a small pillow under your head and neck if it is more comfortable. Bring both knees to your chest.  Keep your lower back pressed to the floor. Hold for 15 to 30 seconds. Relax, and lower your knees to the starting position. Repeat 2 to 4 times. Alternate arm and leg (bird dog) exercise  Do this exercise slowly. Try to keep your body straight at all times. Start on the floor, on your hands and knees. Tighten your belly muscles by pulling your belly button in toward your spine. Be sure you continue to breathe normally and do not hold your breath. Raise one arm off the floor, and hold it straight out in front of you. Be careful not to let your shoulder drop down, because that will twist your trunk. Hold for about 6 seconds, then lower your arm and switch to your other arm. Repeat 8 to 12 times on each arm. When you can do this exercise with ease and no pain, repeat steps 1 through 5.  But this time do it with one leg raised off the floor, holding your leg straight out behind you. Be careful not to let your hip drop down, because that will twist your trunk. When holding your leg straight out becomes easier, try raising your opposite arm at the same time, and repeat steps 1 through 5. Bridging (feet flat)  Lie on your back with both knees bent and your feet flat on the floor. Your knees should be bent about 90 degrees. Tighten your belly muscles by pulling your belly button in toward your spine. Push your feet into the floor, squeeze your buttocks, and lift your hips off the floor until your shoulders, hips, and knees are all in a straight line. Keep your hips level. Hold about 6 seconds as you continue to breathe normally. Slowly lower your hips back to the floor. Repeat 8 to 12 times. Curl-ups  Lie on the floor on your back with your knees bent at a 90-degree angle. Your feet should be flat on the floor, about 12 inches from your buttocks. Cross your arms over your chest. If this bothers your neck, try putting your hands behind your neck (not your head), with your elbows spread apart. Slowly tighten your belly muscles and raise your shoulder blades off the floor. Keep your head in line with your body, and do not press your chin to your chest.  Hold this position for 1 or 2 seconds, then slowly lower yourself back down to the floor. Repeat 8 to 12 times. Plank  Do this exercise slowly. Try to keep your body straight at all times, and do not let one hip drop lower than the other. Lie on your stomach, resting your upper body on your forearms. Tighten your belly muscles by pulling your belly button in toward your spine. Keeping your knees on the floor, press down with your forearms to lift your upper body off the floor. Hold for about 6 seconds, then lower your body to the floor. Rest for up to 10 seconds. Repeat 8 to 12 times. Over time, work up to holding for 15 to 30 seconds each time.   If this exercise is easy to do with your knees on the floor, try doing this exercise with your knees and legs straight, supported by your toes on the floor. Follow-up care is a key part of your treatment and safety. Be sure to make and go to all appointments, and call your doctor if you are having problems. It's also a good idea to know your test results and keep a list of the medicines you take. Current as of: November 9, 2022               Content Version: 13.6  © 2006-2023 [employer], [employer]. Care instructions adapted under license by [employer] (which disclaims liability or warranty for this information). If you have questions about a medical condition or this instruction, always ask your healthcare professional. [address] any warranty or liability for your use of this information.

## 2023-04-28 ENCOUNTER — TELEPHONE (OUTPATIENT)
Dept: ORTHOPEDIC SURGERY | Age: 78
End: 2023-04-28

## 2023-04-28 DIAGNOSIS — Z98.1 S/P SPINAL FUSION: Primary | ICD-10-CM

## 2023-05-08 ENCOUNTER — OFFICE VISIT (OUTPATIENT)
Age: 78
End: 2023-05-08

## 2023-05-08 VITALS
HEART RATE: 62 BPM | SYSTOLIC BLOOD PRESSURE: 120 MMHG | BODY MASS INDEX: 30.21 KG/M2 | RESPIRATION RATE: 16 BRPM | WEIGHT: 176 LBS | OXYGEN SATURATION: 98 % | DIASTOLIC BLOOD PRESSURE: 79 MMHG | TEMPERATURE: 98.6 F

## 2023-05-08 DIAGNOSIS — R35.0 URINE FREQUENCY: Primary | ICD-10-CM

## 2023-05-08 DIAGNOSIS — R82.90 ABNORMAL URINE ODOR: ICD-10-CM

## 2023-05-08 LAB
BILIRUBIN, URINE, POC: NEGATIVE
BLOOD URINE, POC: NEGATIVE
GLUCOSE URINE, POC: NEGATIVE
KETONES, URINE, POC: NEGATIVE
LEUKOCYTE ESTERASE, URINE, POC: NEGATIVE
NITRITE, URINE, POC: NEGATIVE
PH, URINE, POC: 7 (ref 4.6–8)
PROTEIN,URINE, POC: NEGATIVE
SPECIFIC GRAVITY, URINE, POC: 1.02 (ref 1–1.03)
URINALYSIS CLARITY, POC: CLEAR
URINALYSIS COLOR, POC: NORMAL
UROBILINOGEN, POC: NORMAL

## 2023-05-08 NOTE — PROGRESS NOTES
tobacco: Never   Substance and Sexual Activity    Alcohol use: Not Currently    Drug use: Never     Social Determinants of Health     Financial Resource Strain: Low Risk     Difficulty of Paying Living Expenses: Not hard at all   Food Insecurity: No Food Insecurity    Worried About Running Out of Food in the Last Year: Never true    Ran Out of Food in the Last Year: Never true          Current Outpatient Medications   Medication Sig    acetaminophen (TYLENOL) 325 MG tablet Take by mouth every 4 hours as needed    aspirin 81 MG chewable tablet Take 1 tablet by mouth daily    aspirin 81 MG EC tablet Take 1 tablet by mouth daily    cetirizine (ZYRTEC) 10 MG tablet Take 1 tablet by mouth daily    clopidogrel (PLAVIX) 75 MG tablet Take 1 tablet by mouth daily    cyanocobalamin 100 MCG tablet Take 1 tablet by mouth daily    denosumab (PROLIA) 60 MG/ML SOSY SC injection Inject 1 mL into the skin    ezetimibe (ZETIA) 10 MG tablet Take 1 tablet by mouth daily    fluorometholone (FML) 0.1 % ophthalmic suspension Apply 2 drops to eye 2 times daily    levothyroxine (SYNTHROID) 50 MCG tablet TAKE  1 TAB (50MCG) EVERY  DAY  EXCEPT  1.5  TAB  (75MCG) EVERY  MONDAY  AND  FRIDAY. TAKE BEFORE BREAKFAST    levothyroxine (SYNTHROID) 75 MCG tablet Take 1 tablet by mouth every morning (before breakfast)    lidocaine (LIDODERM) 5 % Place 1 patch onto the skin every 24 hours    Magnesium Oxide (MAGNESIUM-OXIDE) 250 MG TABS tablet Take 1 tablet by mouth daily    metFORMIN (GLUCOPHAGE) 500 MG tablet Take 1 tablet by mouth 2 times daily (with meals)    metoprolol succinate (TOPROL XL) 25 MG extended release tablet Take 0.5 tablets by mouth daily    pantoprazole (PROTONIX) 40 MG tablet Take 1 tablet by mouth 2 times daily    pregabalin (LYRICA) 50 MG capsule Take 1 capsule by mouth 2 times daily.     simvastatin (ZOCOR) 40 MG tablet TAKE 1 TABLET BY MOUTH ONCE DAILY AT NIGHT    HYDROcodone-acetaminophen (NORCO) 5-325 MG per tablet Take 1 tablet

## 2023-05-22 RX ORDER — LEVOTHYROXINE SODIUM 0.05 MG/1
TABLET ORAL
Qty: 105 TABLET | Refills: 3 | Status: SHIPPED | OUTPATIENT
Start: 2023-05-22

## 2023-06-05 ENCOUNTER — TELEPHONE (OUTPATIENT)
Age: 78
End: 2023-06-05

## 2023-06-05 NOTE — TELEPHONE ENCOUNTER
Pt states she is to have back surgery and needs to have a pre op to get the form filled out. Please call to schedule. Needs early am appt.

## 2023-06-06 NOTE — TELEPHONE ENCOUNTER
Patient requesting am appt for pre op. She wants a fasting glucose check. Appointment scheduled for 7/6/23.

## 2023-07-01 ENCOUNTER — HOSPITAL ENCOUNTER (OUTPATIENT)
Facility: HOSPITAL | Age: 78
End: 2023-07-01
Attending: ORTHOPAEDIC SURGERY
Payer: MEDICARE

## 2023-07-01 DIAGNOSIS — G89.29 CHRONIC BILATERAL LOW BACK PAIN WITHOUT SCIATICA: ICD-10-CM

## 2023-07-01 DIAGNOSIS — Z98.1 S/P LUMBAR FUSION: ICD-10-CM

## 2023-07-01 DIAGNOSIS — M48.062 SPINAL STENOSIS OF LUMBAR REGION WITH NEUROGENIC CLAUDICATION: ICD-10-CM

## 2023-07-01 DIAGNOSIS — M54.50 CHRONIC BILATERAL LOW BACK PAIN WITHOUT SCIATICA: ICD-10-CM

## 2023-07-01 PROCEDURE — 73721 MRI JNT OF LWR EXTRE W/O DYE: CPT

## 2023-07-04 NOTE — PROGRESS NOTES
HISTORY OF PRESENT ILLNESS   Ricky Salas   is a 76 y.o.  female. Hx HLD overweight, hx retinal vein occlusion , hx right breast cancer s/p mastectomy  osteoporosis on prolia, prediabetes, hypothyroid CAD s/p stent 2023-_Dr Panda s/p RCA stent----   s/p lumbar fusion    Her for preop revison laminectomy and fusion -Dr Sylvia Faith scheduled 7/17/23  Had PAT yesterday at [facility] Panda-on asp/plavix s/p RCA stent 1/2023  Has been cleared for surgery by Dr Panda--she will hold aspirn nd plavix x 1 week priro to surgery and metformin for 2 d prior to surgery 7/17  Had heart monitor or holter   Denies cp or sob  Has some palpitations at night    C/o dizziness when she lay on left side at night  Patient Active Problem List    Diagnosis Date Noted    Coronary artery disease involving native coronary artery of native heart without angina pectoris 03/27/2023    Type 2 diabetes mellitus ([address]) 12/13/2022    Postural dizziness with presyncope 01/19/2022    Mixed hyperlipidemia 01/14/2020    Overweight (BMI 25.0-29.9) 11/14/2018    Endometriosis 05/16/2018    Closed fracture of left lower extremity with routine healing 05/16/2018    Gastroesophageal reflux disease without esophagitis 05/16/2018    HX: breast cancer 05/16/2018    Osteopenia of multiple sites 05/16/2018    History of CVA (cerebrovascular accident) 05/16/2018    Other specified hypothyroidism 05/16/2018     Current Outpatient Medications   Medication Sig Dispense Refill    HYDROcodone-acetaminophen (NORCO)  MG per tablet Take 1 tablet by mouth every 6-8 hours as needed for Pain for up to 30 days. Intended supply: 30 days Max Daily Amount: 4 tablets 30 tablet 0    vitamin D 25 MCG (1000 UT) CAPS Take by mouth daily      senna (SENOKOT) 8.6 MG tablet Take 1 tablet by mouth as needed      NONFORMULARY CBD Cream for back      clobetasol (TEMOVATE) 0.05 % cream Apply topically 2 times daily Apply topically 2 times daily.       meclizine (ANTIVERT) 25 MG tablet Take 1 tablet

## 2023-07-05 ENCOUNTER — HOSPITAL ENCOUNTER (OUTPATIENT)
Facility: HOSPITAL | Age: 78
Discharge: HOME | End: 2023-07-08
Attending: ORTHOPAEDIC SURGERY
Payer: MEDICARE

## 2023-07-05 VITALS
BODY MASS INDEX: 32.54 KG/M2 | TEMPERATURE: 97.9 F | HEART RATE: 75 BPM | RESPIRATION RATE: 18 BRPM | WEIGHT: 176.81 LBS | HEIGHT: 62 IN | OXYGEN SATURATION: 95 % | SYSTOLIC BLOOD PRESSURE: 128 MMHG | DIASTOLIC BLOOD PRESSURE: 63 MMHG

## 2023-07-05 DIAGNOSIS — Z98.1 S/P LUMBAR FUSION: ICD-10-CM

## 2023-07-05 DIAGNOSIS — M48.062 SPINAL STENOSIS OF LUMBAR REGION WITH NEUROGENIC CLAUDICATION: ICD-10-CM

## 2023-07-05 DIAGNOSIS — M54.50 CHRONIC BILATERAL LOW BACK PAIN WITHOUT SCIATICA: ICD-10-CM

## 2023-07-05 DIAGNOSIS — G89.29 CHRONIC BILATERAL LOW BACK PAIN WITHOUT SCIATICA: ICD-10-CM

## 2023-07-05 LAB
ANION GAP SERPL CALC-SCNC: 3 MMOL/L (ref 5–15)
APPEARANCE UR: CLEAR
BACTERIA URNS QL MICRO: NEGATIVE /HPF
BILIRUB UR QL: NEGATIVE
BUN SERPL-MCNC: 11 MG/DL (ref 6–20)
BUN/CREAT SERPL: 17 (ref 12–20)
CALCIUM SERPL-MCNC: 9.9 MG/DL (ref 8.5–10.1)
CHLORIDE SERPL-SCNC: 101 MMOL/L (ref 97–108)
CO2 SERPL-SCNC: 30 MMOL/L (ref 21–32)
COLOR UR: NORMAL
CREAT SERPL-MCNC: 0.64 MG/DL (ref 0.55–1.02)
EPITH CASTS URNS QL MICRO: NORMAL /LPF
ERYTHROCYTE [DISTWIDTH] IN BLOOD BY AUTOMATED COUNT: 13.7 % (ref 11.5–14.5)
EST. AVERAGE GLUCOSE BLD GHB EST-MCNC: 105 MG/DL
GLUCOSE SERPL-MCNC: 118 MG/DL (ref 65–100)
GLUCOSE UR STRIP.AUTO-MCNC: NEGATIVE MG/DL
HBA1C MFR BLD: 5.3 % (ref 4–5.6)
HCT VFR BLD AUTO: 37.5 % (ref 35–47)
HGB BLD-MCNC: 12 G/DL (ref 11.5–16)
HGB UR QL STRIP: NEGATIVE
HYALINE CASTS URNS QL MICRO: NORMAL /LPF (ref 0–5)
INR PPP: 1 (ref 0.9–1.1)
KETONES UR QL STRIP.AUTO: NEGATIVE MG/DL
LEUKOCYTE ESTERASE UR QL STRIP.AUTO: NEGATIVE
MCH RBC QN AUTO: 31.5 PG (ref 26–34)
MCHC RBC AUTO-ENTMCNC: 32 G/DL (ref 30–36.5)
MCV RBC AUTO: 98.4 FL (ref 80–99)
NITRITE UR QL STRIP.AUTO: NEGATIVE
NRBC # BLD: 0 K/UL (ref 0–0.01)
NRBC BLD-RTO: 0 PER 100 WBC
PH UR STRIP: 6.5 (ref 5–8)
PLATELET # BLD AUTO: 316 K/UL (ref 150–400)
PMV BLD AUTO: 8.7 FL (ref 8.9–12.9)
POTASSIUM SERPL-SCNC: 4.2 MMOL/L (ref 3.5–5.1)
PROT UR STRIP-MCNC: NEGATIVE MG/DL
PROTHROMBIN TIME: 10.1 SEC (ref 9–11.1)
RBC # BLD AUTO: 3.81 M/UL (ref 3.8–5.2)
RBC #/AREA URNS HPF: NORMAL /HPF (ref 0–5)
SODIUM SERPL-SCNC: 134 MMOL/L (ref 136–145)
SP GR UR REFRACTOMETRY: 1.01 (ref 1–1.03)
URINE CULTURE IF INDICATED: NORMAL
UROBILINOGEN UR QL STRIP.AUTO: 0.2 EU/DL (ref 0.2–1)
WBC # BLD AUTO: 9 K/UL (ref 3.6–11)
WBC URNS QL MICRO: NORMAL /HPF (ref 0–4)

## 2023-07-05 PROCEDURE — NSP99 NSP99 NON-BILLABLE CODE: Performed by: NURSE PRACTITIONER

## 2023-07-05 PROCEDURE — 72082 X-RAY EXAM ENTIRE SPI 2/3 VW: CPT

## 2023-07-05 PROCEDURE — 86900 BLOOD TYPING SEROLOGIC ABO: CPT

## 2023-07-05 PROCEDURE — 36415 COLL VENOUS BLD VENIPUNCTURE: CPT

## 2023-07-05 PROCEDURE — 83036 HEMOGLOBIN GLYCOSYLATED A1C: CPT

## 2023-07-05 PROCEDURE — 81001 URINALYSIS AUTO W/SCOPE: CPT

## 2023-07-05 PROCEDURE — 80048 BASIC METABOLIC PNL TOTAL CA: CPT

## 2023-07-05 PROCEDURE — 86850 RBC ANTIBODY SCREEN: CPT

## 2023-07-05 PROCEDURE — 85610 PROTHROMBIN TIME: CPT

## 2023-07-05 PROCEDURE — 85027 COMPLETE CBC AUTOMATED: CPT

## 2023-07-05 PROCEDURE — 86901 BLOOD TYPING SEROLOGIC RH(D): CPT

## 2023-07-05 RX ORDER — NITROFURANTOIN MACROCRYSTALS 100 MG/1
100 CAPSULE ORAL 2 TIMES DAILY
COMMUNITY

## 2023-07-05 ASSESSMENT — PAIN DESCRIPTION - ORIENTATION: ORIENTATION: LOWER

## 2023-07-05 ASSESSMENT — PAIN SCALES - GENERAL: PAINLEVEL_OUTOF10: 5

## 2023-07-05 ASSESSMENT — PAIN - FUNCTIONAL ASSESSMENT: PAIN_FUNCTIONAL_ASSESSMENT: PREVENTS OR INTERFERES SOME ACTIVE ACTIVITIES AND ADLS

## 2023-07-05 ASSESSMENT — PAIN DESCRIPTION - DESCRIPTORS: DESCRIPTORS: ACHING;BURNING;SPASM

## 2023-07-05 ASSESSMENT — PAIN DESCRIPTION - LOCATION: LOCATION: BACK

## 2023-07-05 NOTE — PERIOP NOTE
ORTHO PRE OP AND MEDICATION INSTRUCTIONS PRINTED AND REVIEWED WITH PATIENT. TWO BOTTLES OF CHG SOAP GIVEN. SURGICAL SITE INFECTION SHEET GIVEN. OPPORTUNITY FOR QUESTIONS GIVEN AND ALL QUESTIONS WERE ANSWERED. REQUESTED CARDIAC NOTES FROM DR. MADDY OVIEDO

## 2023-07-05 NOTE — PERIOP NOTE
[address] [place] [address]  ORTHOPAEDIC    Surgery Date:   07-    Your surgeon's office or [facility] staff will call you between 4 PM- 8 PM the day before surgery with your arrival time. If your surgery is on a Monday, you will receive a call the preceding Friday. Please report to [facility] Patient Access/Admitting on the 1st floor. Bring your insurance card, photo identification, and any copayment (if applicable). If you are going home the same day of your surgery, you must have a responsible adult to drive you home. You need to have a responsible adult to stay with you the first 24 hours after surgery and you should not drive a car for 24 hours following your surgery. Do NOT eat any solid foods after midnight the night before surgery including candy, mints or gum. You may drink clear liquids from midnight until 1 hour prior to arrival time. You may drink up to 12 ounces at one time every 4 hours. Do NOT drink alcohol or smoke 24 hours before surgery. STOP smoking for 14 days prior as it helps with breathing and healing after surgery. If you are being admitted to the hospital,please leave personal belongings/luggage in your car until you have an assigned hospital room number. Please wear comfortable clothes. Wear your glasses instead of contacts. We ask that all money, jewelry and valuables be left at home. Wear no make up, particularly mascara, the day of surgery. All body piercings, rings, and jewelry need to be removed and left at home. Please remove any nail polish or artificial nails from your fingernails. Please wear your hair loose or down. Please no pony-tails, buns, or any metal hair accessories. If you shower the morning of surgery, please do not apply any lotions or powders afterwards. You may wear deodorant. Do not shave any body area within 24 hours of your surgery. Please follow all instructions to avoid any potential surgical cancellation.   Should your

## 2023-07-06 ENCOUNTER — OFFICE VISIT (OUTPATIENT)
Age: 78
End: 2023-07-06
Payer: MEDICARE

## 2023-07-06 VITALS
RESPIRATION RATE: 16 BRPM | HEIGHT: 62 IN | HEART RATE: 74 BPM | WEIGHT: 173.8 LBS | DIASTOLIC BLOOD PRESSURE: 90 MMHG | OXYGEN SATURATION: 99 % | BODY MASS INDEX: 31.98 KG/M2 | SYSTOLIC BLOOD PRESSURE: 138 MMHG | TEMPERATURE: 97 F

## 2023-07-06 DIAGNOSIS — M48.061 SPINAL STENOSIS OF LUMBAR REGION, UNSPECIFIED WHETHER NEUROGENIC CLAUDICATION PRESENT: ICD-10-CM

## 2023-07-06 DIAGNOSIS — R03.0 ELEVATED BLOOD PRESSURE READING: ICD-10-CM

## 2023-07-06 DIAGNOSIS — E78.5 HYPERLIPIDEMIA, UNSPECIFIED HYPERLIPIDEMIA TYPE: ICD-10-CM

## 2023-07-06 DIAGNOSIS — M81.0 OSTEOPOROSIS, UNSPECIFIED OSTEOPOROSIS TYPE, UNSPECIFIED PATHOLOGICAL FRACTURE PRESENCE: ICD-10-CM

## 2023-07-06 DIAGNOSIS — H81.12 BENIGN PAROXYSMAL POSITIONAL VERTIGO OF LEFT EAR: ICD-10-CM

## 2023-07-06 DIAGNOSIS — R73.03 PREDIABETES: ICD-10-CM

## 2023-07-06 DIAGNOSIS — Z00.00 MEDICARE ANNUAL WELLNESS VISIT, SUBSEQUENT: ICD-10-CM

## 2023-07-06 DIAGNOSIS — I25.10 CORONARY ARTERY DISEASE INVOLVING NATIVE CORONARY ARTERY OF NATIVE HEART WITHOUT ANGINA PECTORIS: Primary | ICD-10-CM

## 2023-07-06 DIAGNOSIS — Z23 ENCOUNTER FOR IMMUNIZATION: ICD-10-CM

## 2023-07-06 PROBLEM — Z01.818 ENCOUNTER FOR PREADMISSION TESTING: Status: ACTIVE | Noted: 2023-07-06

## 2023-07-06 LAB
BACTERIA SPEC CULT: NORMAL
BACTERIA SPEC CULT: NORMAL
SERVICE CMNT-IMP: NORMAL

## 2023-07-06 PROCEDURE — PBSHW PNEUMOCOCCAL, PCV20, PREVNAR 20, (AGE 18 YRS+), IM, PF: Performed by: INTERNAL MEDICINE

## 2023-07-06 PROCEDURE — G8417 CALC BMI ABV UP PARAM F/U: HCPCS | Performed by: INTERNAL MEDICINE

## 2023-07-06 PROCEDURE — 90677 PCV20 VACCINE IM: CPT | Performed by: INTERNAL MEDICINE

## 2023-07-06 PROCEDURE — G0009 ADMIN PNEUMOCOCCAL VACCINE: HCPCS | Performed by: INTERNAL MEDICINE

## 2023-07-06 PROCEDURE — 1036F TOBACCO NON-USER: CPT | Performed by: INTERNAL MEDICINE

## 2023-07-06 PROCEDURE — G8427 DOCREV CUR MEDS BY ELIG CLIN: HCPCS | Performed by: INTERNAL MEDICINE

## 2023-07-06 PROCEDURE — G0439 PPPS, SUBSEQ VISIT: HCPCS | Performed by: INTERNAL MEDICINE

## 2023-07-06 PROCEDURE — 1123F ACP DISCUSS/DSCN MKR DOCD: CPT | Performed by: INTERNAL MEDICINE

## 2023-07-06 PROCEDURE — G8400 PT W/DXA NO RESULTS DOC: HCPCS | Performed by: INTERNAL MEDICINE

## 2023-07-06 PROCEDURE — 99213 OFFICE O/P EST LOW 20 MIN: CPT | Performed by: INTERNAL MEDICINE

## 2023-07-06 PROCEDURE — 1090F PRES/ABSN URINE INCON ASSESS: CPT | Performed by: INTERNAL MEDICINE

## 2023-07-06 RX ORDER — METHENAMINE HIPPURATE 1000 MG/1
TABLET ORAL
COMMUNITY
Start: 2023-06-21

## 2023-07-06 RX ORDER — MISOPROSTOL 200 UG/1
TABLET ORAL
COMMUNITY

## 2023-07-06 SDOH — ECONOMIC STABILITY: FOOD INSECURITY: WITHIN THE PAST 12 MONTHS, YOU WORRIED THAT YOUR FOOD WOULD RUN OUT BEFORE YOU GOT MONEY TO BUY MORE.: NEVER TRUE

## 2023-07-06 SDOH — ECONOMIC STABILITY: INCOME INSECURITY: HOW HARD IS IT FOR YOU TO PAY FOR THE VERY BASICS LIKE FOOD, HOUSING, MEDICAL CARE, AND HEATING?: NOT HARD AT ALL

## 2023-07-06 SDOH — ECONOMIC STABILITY: HOUSING INSECURITY
IN THE LAST 12 MONTHS, WAS THERE A TIME WHEN YOU DID NOT HAVE A STEADY PLACE TO SLEEP OR SLEPT IN A SHELTER (INCLUDING NOW)?: NO

## 2023-07-06 SDOH — ECONOMIC STABILITY: FOOD INSECURITY: WITHIN THE PAST 12 MONTHS, THE FOOD YOU BOUGHT JUST DIDN'T LAST AND YOU DIDN'T HAVE MONEY TO GET MORE.: NEVER TRUE

## 2023-07-06 ASSESSMENT — PATIENT HEALTH QUESTIONNAIRE - PHQ9
SUM OF ALL RESPONSES TO PHQ QUESTIONS 1-9: 2
2. FEELING DOWN, DEPRESSED OR HOPELESS: 1
1. LITTLE INTEREST OR PLEASURE IN DOING THINGS: 1
SUM OF ALL RESPONSES TO PHQ QUESTIONS 1-9: 2
SUM OF ALL RESPONSES TO PHQ9 QUESTIONS 1 & 2: 2

## 2023-07-06 ASSESSMENT — LIFESTYLE VARIABLES
HOW MANY STANDARD DRINKS CONTAINING ALCOHOL DO YOU HAVE ON A TYPICAL DAY: PATIENT DOES NOT DRINK
HOW OFTEN DO YOU HAVE A DRINK CONTAINING ALCOHOL: NEVER

## 2023-07-06 NOTE — PROGRESS NOTES
1. \"Have you been to the ER, urgent care clinic since your last visit? Hospitalized since your last visit? \"         [facility]    2. \"Have you seen or consulted any other health care providers outside of the [address] [place] [address] since your last visit? \" No     3. For patients aged 43-73: Has the patient had a colonoscopy / FIT/ Cologuard? No      If the patient is female:    4. For patients aged 43-66: Has the patient had a mammogram within the past 2 years? No      5. For patients aged 21-65: Has the patient had a pap smear?   No

## 2023-07-07 LAB
ABO + RH BLD: NORMAL
BLOOD GROUP ANTIBODIES SERPL: NORMAL
SPECIMEN EXP DATE BLD: NORMAL

## 2023-07-10 ENCOUNTER — TELEPHONE (OUTPATIENT)
Age: 78
End: 2023-07-10

## 2023-07-10 NOTE — TELEPHONE ENCOUNTER
----- Message from Familia Camp sent at 7/10/2023 11:10 AM EDT -----  Subject: Message to Provider    QUESTIONS  Information for Provider? pt stated that she has been tired over the   weekend and that she is having like coal in her mouth. She would like   antibiotic. she is also seeing Dr. Graciela Valdivia today  ---------------------------------------------------------------------------  --------------  [address]  [phone]; OK to leave message on voicemail  ---------------------------------------------------------------------------  --------------  SCRIPT ANSWERS  Relationship to Patient?  Self

## 2023-07-12 NOTE — H&P
Eric Fuentes (: 1945) is a 76 y.o. female, patient, here for evaluation of the following chief complaint(s):  Lower Back Pain (Follow up after ELLEN x1 )        ASSESSMENT/PLAN:     Below is the assessment and plan developed based on review of pertinent history, physical exam, labs, studies, and medications. She continues to have severe lower back and right leg pain. We reviewed the imaging studies again. She has developed severe stenosis above and below her previous L4-5 fusion. Given the symptoms that she is having and the severity and the failure with improvement I think she is a candidate for a revision laminectomy L3-S1 with a posterior lumbar fusion L3-S1. Risk and benefits were discussed with her. Pending medical clearance we will proceed. We have given her some hydrocodone in the interim. Procedure: Revision laminectomy L3-S1 with a revision fusion L3-S1 with robotics        The risks and benefits were discussed at length with the patient and the patient has elected to proceed. Indications for surgery include failed conservative treatment. Alternative treatments, risks and the perioperative course were discussed with the patient. All questions were answered. The risks and benefits of the procedure were explained. Benefits include definitive diagnosis, relief of pain, elimination of deformity and improved function. Risks of surgery including bleeding, infection, weakness, numbness, CSF leak, failure to improve symptoms, exacerbation of medical co-morbidities and even death were discussed with the patient. 1. S/P lumbar fusion  -     HYDROcodone-acetaminophen (NORCO) 5-325 MG per tablet; Take 1 tablet by mouth every 6 hours as needed for Pain for up to 14 days. Max Daily Amount: 4 tablets, Disp-30 tablet, R-0Normal  2. Spinal stenosis of lumbar region with neurogenic claudication  3. Spondylolisthesis of lumbar region        Return in about 6 weeks (around 2023).      Update

## 2023-07-17 ENCOUNTER — ANESTHESIA (OUTPATIENT)
Facility: HOSPITAL | Age: 78
End: 2023-07-17
Payer: MEDICARE

## 2023-07-17 ENCOUNTER — APPOINTMENT (OUTPATIENT)
Facility: HOSPITAL | Age: 78
End: 2023-07-17
Attending: ORTHOPAEDIC SURGERY
Payer: MEDICARE

## 2023-07-17 ENCOUNTER — HOSPITAL ENCOUNTER (INPATIENT)
Facility: HOSPITAL | Age: 78
LOS: 5 days | Discharge: OTHER INSTITUTION | End: 2023-07-22
Attending: ORTHOPAEDIC SURGERY | Admitting: ORTHOPAEDIC SURGERY
Payer: MEDICARE

## 2023-07-17 ENCOUNTER — ANESTHESIA EVENT (OUTPATIENT)
Facility: HOSPITAL | Age: 78
End: 2023-07-17
Payer: MEDICARE

## 2023-07-17 DIAGNOSIS — Z98.1 S/P LUMBAR FUSION: Primary | ICD-10-CM

## 2023-07-17 PROBLEM — M41.9 KYPHOSCOLIOSIS: Status: ACTIVE | Noted: 2023-07-17

## 2023-07-17 PROBLEM — M48.062 LUMBAR STENOSIS WITH NEUROGENIC CLAUDICATION: Status: ACTIVE | Noted: 2023-07-17

## 2023-07-17 LAB
GLUCOSE BLD STRIP.AUTO-MCNC: 126 MG/DL (ref 65–117)
GLUCOSE BLD STRIP.AUTO-MCNC: 147 MG/DL (ref 65–117)
GLUCOSE BLD STRIP.AUTO-MCNC: 70 MG/DL (ref 65–117)
SERVICE CMNT-IMP: ABNORMAL
SERVICE CMNT-IMP: ABNORMAL
SERVICE CMNT-IMP: NORMAL

## 2023-07-17 PROCEDURE — 6360000002 HC RX W HCPCS: Performed by: ORTHOPAEDIC SURGERY

## 2023-07-17 PROCEDURE — 6370000000 HC RX 637 (ALT 250 FOR IP): Performed by: ORTHOPAEDIC SURGERY

## 2023-07-17 PROCEDURE — 7100000000 HC PACU RECOVERY - FIRST 15 MIN: Performed by: ORTHOPAEDIC SURGERY

## 2023-07-17 PROCEDURE — 3600000004 HC SURGERY LEVEL 4 BASE: Performed by: ORTHOPAEDIC SURGERY

## 2023-07-17 PROCEDURE — 0ST40ZZ RESECTION OF LUMBOSACRAL DISC, OPEN APPROACH: ICD-10-PCS | Performed by: ORTHOPAEDIC SURGERY

## 2023-07-17 PROCEDURE — 2500000003 HC RX 250 WO HCPCS: Performed by: STUDENT IN AN ORGANIZED HEALTH CARE EDUCATION/TRAINING PROGRAM

## 2023-07-17 PROCEDURE — 3700000000 HC ANESTHESIA ATTENDED CARE: Performed by: ORTHOPAEDIC SURGERY

## 2023-07-17 PROCEDURE — 63047 LAM FACETEC & FORAMOT LUMBAR: CPT | Performed by: ORTHOPAEDIC SURGERY

## 2023-07-17 PROCEDURE — 01NB0ZZ RELEASE LUMBAR NERVE, OPEN APPROACH: ICD-10-PCS | Performed by: ORTHOPAEDIC SURGERY

## 2023-07-17 PROCEDURE — 22614 ARTHRD PST TQ 1NTRSPC EA ADD: CPT | Performed by: STUDENT IN AN ORGANIZED HEALTH CARE EDUCATION/TRAINING PROGRAM

## 2023-07-17 PROCEDURE — 63053 LAM FACTC/FRMT ARTHRD LUM EA: CPT | Performed by: ORTHOPAEDIC SURGERY

## 2023-07-17 PROCEDURE — 2709999900 HC NON-CHARGEABLE SUPPLY: Performed by: ORTHOPAEDIC SURGERY

## 2023-07-17 PROCEDURE — 6360000002 HC RX W HCPCS: Performed by: STUDENT IN AN ORGANIZED HEALTH CARE EDUCATION/TRAINING PROGRAM

## 2023-07-17 PROCEDURE — 2500000003 HC RX 250 WO HCPCS: Performed by: ORTHOPAEDIC SURGERY

## 2023-07-17 PROCEDURE — 2580000003 HC RX 258: Performed by: PHYSICIAN ASSISTANT

## 2023-07-17 PROCEDURE — 22633 ARTHRD CMBN 1NTRSPC LUMBAR: CPT | Performed by: STUDENT IN AN ORGANIZED HEALTH CARE EDUCATION/TRAINING PROGRAM

## 2023-07-17 PROCEDURE — 63052 LAM FACETC/FRMT ARTHRD LUM 1: CPT | Performed by: STUDENT IN AN ORGANIZED HEALTH CARE EDUCATION/TRAINING PROGRAM

## 2023-07-17 PROCEDURE — 22842 INSERT SPINE FIXATION DEVICE: CPT | Performed by: ORTHOPAEDIC SURGERY

## 2023-07-17 PROCEDURE — 2580000003 HC RX 258: Performed by: ANESTHESIOLOGY

## 2023-07-17 PROCEDURE — 0SG3071 FUSION OF LUMBOSACRAL JOINT WITH AUTOLOGOUS TISSUE SUBSTITUTE, POSTERIOR APPROACH, POSTERIOR COLUMN, OPEN APPROACH: ICD-10-PCS | Performed by: ORTHOPAEDIC SURGERY

## 2023-07-17 PROCEDURE — 22842 INSERT SPINE FIXATION DEVICE: CPT | Performed by: STUDENT IN AN ORGANIZED HEALTH CARE EDUCATION/TRAINING PROGRAM

## 2023-07-17 PROCEDURE — 6360000002 HC RX W HCPCS: Performed by: PHYSICIAN ASSISTANT

## 2023-07-17 PROCEDURE — 2580000003 HC RX 258: Performed by: STUDENT IN AN ORGANIZED HEALTH CARE EDUCATION/TRAINING PROGRAM

## 2023-07-17 PROCEDURE — 63047 LAM FACETEC & FORAMOT LUMBAR: CPT | Performed by: STUDENT IN AN ORGANIZED HEALTH CARE EDUCATION/TRAINING PROGRAM

## 2023-07-17 PROCEDURE — 3600000014 HC SURGERY LEVEL 4 ADDTL 15MIN: Performed by: ORTHOPAEDIC SURGERY

## 2023-07-17 PROCEDURE — 0SG00AJ FUSION OF LUMBAR VERTEBRAL JOINT WITH INTERBODY FUSION DEVICE, POSTERIOR APPROACH, ANTERIOR COLUMN, OPEN APPROACH: ICD-10-PCS | Performed by: ORTHOPAEDIC SURGERY

## 2023-07-17 PROCEDURE — 01NR0ZZ RELEASE SACRAL NERVE, OPEN APPROACH: ICD-10-PCS | Performed by: ORTHOPAEDIC SURGERY

## 2023-07-17 PROCEDURE — 22633 ARTHRD CMBN 1NTRSPC LUMBAR: CPT | Performed by: ORTHOPAEDIC SURGERY

## 2023-07-17 PROCEDURE — 4A11X4G MONITORING OF PERIPHERAL NERVOUS ELECTRICAL ACTIVITY, INTRAOPERATIVE, EXTERNAL APPROACH: ICD-10-PCS | Performed by: ORTHOPAEDIC SURGERY

## 2023-07-17 PROCEDURE — 6360000002 HC RX W HCPCS: Performed by: ANESTHESIOLOGY

## 2023-07-17 PROCEDURE — C1713 ANCHOR/SCREW BN/BN,TIS/BN: HCPCS | Performed by: ORTHOPAEDIC SURGERY

## 2023-07-17 PROCEDURE — 22634 ARTHRD CMBN 1NTRSPC EA ADDL: CPT | Performed by: STUDENT IN AN ORGANIZED HEALTH CARE EDUCATION/TRAINING PROGRAM

## 2023-07-17 PROCEDURE — 7100000001 HC PACU RECOVERY - ADDTL 15 MIN: Performed by: ORTHOPAEDIC SURGERY

## 2023-07-17 PROCEDURE — 82962 GLUCOSE BLOOD TEST: CPT

## 2023-07-17 PROCEDURE — 22634 ARTHRD CMBN 1NTRSPC EA ADDL: CPT | Performed by: ORTHOPAEDIC SURGERY

## 2023-07-17 PROCEDURE — 8E0W0CZ ROBOTIC ASSISTED PROCEDURE OF TRUNK REGION, OPEN APPROACH: ICD-10-PCS | Performed by: ORTHOPAEDIC SURGERY

## 2023-07-17 PROCEDURE — 22853 INSJ BIOMECHANICAL DEVICE: CPT | Performed by: ORTHOPAEDIC SURGERY

## 2023-07-17 PROCEDURE — 0ST20ZZ RESECTION OF LUMBAR VERTEBRAL DISC, OPEN APPROACH: ICD-10-PCS | Performed by: ORTHOPAEDIC SURGERY

## 2023-07-17 PROCEDURE — 3700000001 HC ADD 15 MINUTES (ANESTHESIA): Performed by: ORTHOPAEDIC SURGERY

## 2023-07-17 PROCEDURE — 0SG1071 FUSION OF 2 OR MORE LUMBAR VERTEBRAL JOINTS WITH AUTOLOGOUS TISSUE SUBSTITUTE, POSTERIOR APPROACH, POSTERIOR COLUMN, OPEN APPROACH: ICD-10-PCS | Performed by: ORTHOPAEDIC SURGERY

## 2023-07-17 PROCEDURE — 63053 LAM FACTC/FRMT ARTHRD LUM EA: CPT | Performed by: STUDENT IN AN ORGANIZED HEALTH CARE EDUCATION/TRAINING PROGRAM

## 2023-07-17 PROCEDURE — 22853 INSJ BIOMECHANICAL DEVICE: CPT | Performed by: STUDENT IN AN ORGANIZED HEALTH CARE EDUCATION/TRAINING PROGRAM

## 2023-07-17 PROCEDURE — 6370000000 HC RX 637 (ALT 250 FOR IP): Performed by: ANESTHESIOLOGY

## 2023-07-17 PROCEDURE — 0SG30AJ FUSION OF LUMBOSACRAL JOINT WITH INTERBODY FUSION DEVICE, POSTERIOR APPROACH, ANTERIOR COLUMN, OPEN APPROACH: ICD-10-PCS | Performed by: ORTHOPAEDIC SURGERY

## 2023-07-17 PROCEDURE — 61783 SCAN PROC SPINAL: CPT | Performed by: ORTHOPAEDIC SURGERY

## 2023-07-17 PROCEDURE — C1889 IMPLANT/INSERT DEVICE, NOC: HCPCS | Performed by: ORTHOPAEDIC SURGERY

## 2023-07-17 PROCEDURE — 6370000000 HC RX 637 (ALT 250 FOR IP): Performed by: PHYSICIAN ASSISTANT

## 2023-07-17 PROCEDURE — 2720000010 HC SURG SUPPLY STERILE: Performed by: ORTHOPAEDIC SURGERY

## 2023-07-17 PROCEDURE — 1100000000 HC RM PRIVATE

## 2023-07-17 PROCEDURE — C9359 IMPLNT,BON VOID FILLER-PUTTY: HCPCS | Performed by: ORTHOPAEDIC SURGERY

## 2023-07-17 PROCEDURE — 2780000010 HC IMPLANT OTHER: Performed by: ORTHOPAEDIC SURGERY

## 2023-07-17 PROCEDURE — 22614 ARTHRD PST TQ 1NTRSPC EA ADD: CPT | Performed by: ORTHOPAEDIC SURGERY

## 2023-07-17 PROCEDURE — P9045 ALBUMIN (HUMAN), 5%, 250 ML: HCPCS | Performed by: STUDENT IN AN ORGANIZED HEALTH CARE EDUCATION/TRAINING PROGRAM

## 2023-07-17 PROCEDURE — 6370000000 HC RX 637 (ALT 250 FOR IP): Performed by: STUDENT IN AN ORGANIZED HEALTH CARE EDUCATION/TRAINING PROGRAM

## 2023-07-17 PROCEDURE — 63052 LAM FACETC/FRMT ARTHRD LUM 1: CPT | Performed by: ORTHOPAEDIC SURGERY

## 2023-07-17 DEVICE — SPACER 6069073 CATALYFT PL40 SHORT 7MM
Type: IMPLANTABLE DEVICE | Site: SPINE LUMBAR | Status: FUNCTIONAL
Brand: CATALYFT PL EXPANDABLE INTERBODY SYSTEM

## 2023-07-17 DEVICE — GRAFT DELIVERY SYSTEM SET 12 CC W/ GRFT DBF CANN ACCELERATE: Type: IMPLANTABLE DEVICE | Site: SPINE LUMBAR | Status: FUNCTIONAL

## 2023-07-17 DEVICE — SCREW 55840006540 5.5/6 MAS 6.5X40 CC
Type: IMPLANTABLE DEVICE | Site: SPINE LUMBAR | Status: FUNCTIONAL
Brand: CD HORIZON® SPINAL SYSTEM

## 2023-07-17 DEVICE — ROD SPNL 4 LEVEL 5.5 MM TI NS UNID EXP LTX: Type: IMPLANTABLE DEVICE | Site: SPINE LUMBAR | Status: FUNCTIONAL

## 2023-07-17 DEVICE — GRAFT BNE SUB M CANC FRZN MORSELIZED W/ VIABLE CELL TRINITY: Type: IMPLANTABLE DEVICE | Site: SPINE LUMBAR | Status: FUNCTIONAL

## 2023-07-17 DEVICE — GRAFT BNE SUB M W20XH7XL30MM COMPRESSIBLE SPNG STRP PROVIDE: Type: IMPLANTABLE DEVICE | Site: SPINE LUMBAR | Status: FUNCTIONAL

## 2023-07-17 DEVICE — GRAFT BNE SUB 15GM GRN CA COMPND PTTY AND SILICATE BASE INJ: Type: IMPLANTABLE DEVICE | Site: SPINE LUMBAR | Status: FUNCTIONAL

## 2023-07-17 DEVICE — GRAFT BNE SUB L CANC FRZN MORSELIZED W/ VIABLE CELL TRINITY: Type: IMPLANTABLE DEVICE | Site: SPINE LUMBAR | Status: FUNCTIONAL

## 2023-07-17 RX ORDER — LIDOCAINE HYDROCHLORIDE 10 MG/ML
1 INJECTION, SOLUTION EPIDURAL; INFILTRATION; INTRACAUDAL; PERINEURAL
Status: DISCONTINUED | OUTPATIENT
Start: 2023-07-17 | End: 2023-07-17 | Stop reason: HOSPADM

## 2023-07-17 RX ORDER — ATORVASTATIN CALCIUM 20 MG/1
20 TABLET, FILM COATED ORAL NIGHTLY
Status: DISCONTINUED | OUTPATIENT
Start: 2023-07-17 | End: 2023-07-22 | Stop reason: HOSPADM

## 2023-07-17 RX ORDER — LIDOCAINE HYDROCHLORIDE 20 MG/ML
INJECTION, SOLUTION EPIDURAL; INFILTRATION; INTRACAUDAL; PERINEURAL PRN
Status: DISCONTINUED | OUTPATIENT
Start: 2023-07-17 | End: 2023-07-17 | Stop reason: SDUPTHER

## 2023-07-17 RX ORDER — HYDRALAZINE HYDROCHLORIDE 20 MG/ML
10 INJECTION INTRAMUSCULAR; INTRAVENOUS
Status: DISCONTINUED | OUTPATIENT
Start: 2023-07-17 | End: 2023-07-17 | Stop reason: HOSPADM

## 2023-07-17 RX ORDER — EPHEDRINE SULFATE 50 MG/ML
INJECTION INTRAVENOUS PRN
Status: DISCONTINUED | OUTPATIENT
Start: 2023-07-17 | End: 2023-07-17 | Stop reason: SDUPTHER

## 2023-07-17 RX ORDER — SODIUM CHLORIDE 0.9 % (FLUSH) 0.9 %
5-40 SYRINGE (ML) INJECTION PRN
Status: DISCONTINUED | OUTPATIENT
Start: 2023-07-17 | End: 2023-07-17 | Stop reason: HOSPADM

## 2023-07-17 RX ORDER — PROCHLORPERAZINE EDISYLATE 5 MG/ML
5 INJECTION INTRAMUSCULAR; INTRAVENOUS
Status: DISCONTINUED | OUTPATIENT
Start: 2023-07-17 | End: 2023-07-17 | Stop reason: HOSPADM

## 2023-07-17 RX ORDER — SODIUM CHLORIDE 0.9 % (FLUSH) 0.9 %
5-40 SYRINGE (ML) INJECTION EVERY 12 HOURS SCHEDULED
Status: DISCONTINUED | OUTPATIENT
Start: 2023-07-17 | End: 2023-07-22 | Stop reason: HOSPADM

## 2023-07-17 RX ORDER — MAGNESIUM OXIDE 400 MG/1
400 TABLET ORAL DAILY
Status: DISCONTINUED | OUTPATIENT
Start: 2023-07-17 | End: 2023-07-19

## 2023-07-17 RX ORDER — ACETAMINOPHEN 500 MG
1000 TABLET ORAL EVERY 6 HOURS
Status: DISCONTINUED | OUTPATIENT
Start: 2023-07-17 | End: 2023-07-22 | Stop reason: HOSPADM

## 2023-07-17 RX ORDER — SODIUM CHLORIDE 9 MG/ML
INJECTION, SOLUTION INTRAVENOUS PRN
Status: DISCONTINUED | OUTPATIENT
Start: 2023-07-17 | End: 2023-07-17 | Stop reason: HOSPADM

## 2023-07-17 RX ORDER — KETAMINE HCL IN NACL, ISO-OSM 100MG/10ML
SYRINGE (ML) INJECTION PRN
Status: DISCONTINUED | OUTPATIENT
Start: 2023-07-17 | End: 2023-07-17 | Stop reason: SDUPTHER

## 2023-07-17 RX ORDER — LEVOTHYROXINE SODIUM 0.05 MG/1
50 TABLET ORAL DAILY
Status: DISCONTINUED | OUTPATIENT
Start: 2023-07-18 | End: 2023-07-22 | Stop reason: HOSPADM

## 2023-07-17 RX ORDER — MAGNESIUM SULFATE HEPTAHYDRATE 40 MG/ML
INJECTION, SOLUTION INTRAVENOUS PRN
Status: DISCONTINUED | OUTPATIENT
Start: 2023-07-17 | End: 2023-07-17 | Stop reason: SDUPTHER

## 2023-07-17 RX ORDER — ALBUMIN, HUMAN INJ 5% 5 %
SOLUTION INTRAVENOUS PRN
Status: DISCONTINUED | OUTPATIENT
Start: 2023-07-17 | End: 2023-07-17 | Stop reason: SDUPTHER

## 2023-07-17 RX ORDER — DEXTROSE MONOHYDRATE 100 MG/ML
INJECTION, SOLUTION INTRAVENOUS CONTINUOUS PRN
Status: DISCONTINUED | OUTPATIENT
Start: 2023-07-17 | End: 2023-07-22 | Stop reason: HOSPADM

## 2023-07-17 RX ORDER — SUCCINYLCHOLINE/SOD CL,ISO/PF 200MG/10ML
SYRINGE (ML) INTRAVENOUS PRN
Status: DISCONTINUED | OUTPATIENT
Start: 2023-07-17 | End: 2023-07-17 | Stop reason: SDUPTHER

## 2023-07-17 RX ORDER — INSULIN LISPRO 100 [IU]/ML
0-4 INJECTION, SOLUTION INTRAVENOUS; SUBCUTANEOUS
Status: DISCONTINUED | OUTPATIENT
Start: 2023-07-17 | End: 2023-07-22 | Stop reason: HOSPADM

## 2023-07-17 RX ORDER — VANCOMYCIN HYDROCHLORIDE 1 G/20ML
INJECTION, POWDER, LYOPHILIZED, FOR SOLUTION INTRAVENOUS PRN
Status: DISCONTINUED | OUTPATIENT
Start: 2023-07-17 | End: 2023-07-17 | Stop reason: HOSPADM

## 2023-07-17 RX ORDER — PREGABALIN 75 MG/1
75 CAPSULE ORAL EVERY MORNING
Status: DISCONTINUED | OUTPATIENT
Start: 2023-07-18 | End: 2023-07-22 | Stop reason: HOSPADM

## 2023-07-17 RX ORDER — PROCHLORPERAZINE MALEATE 10 MG
10 TABLET ORAL EVERY 6 HOURS PRN
Status: DISCONTINUED | OUTPATIENT
Start: 2023-07-17 | End: 2023-07-22 | Stop reason: HOSPADM

## 2023-07-17 RX ORDER — PROPOFOL 10 MG/ML
INJECTION, EMULSION INTRAVENOUS PRN
Status: DISCONTINUED | OUTPATIENT
Start: 2023-07-17 | End: 2023-07-17 | Stop reason: SDUPTHER

## 2023-07-17 RX ORDER — SENNA AND DOCUSATE SODIUM 50; 8.6 MG/1; MG/1
1 TABLET, FILM COATED ORAL 2 TIMES DAILY
Status: DISCONTINUED | OUTPATIENT
Start: 2023-07-17 | End: 2023-07-22 | Stop reason: HOSPADM

## 2023-07-17 RX ORDER — ACETAMINOPHEN 500 MG
1000 TABLET ORAL ONCE
Status: COMPLETED | OUTPATIENT
Start: 2023-07-17 | End: 2023-07-17

## 2023-07-17 RX ORDER — CLOPIDOGREL BISULFATE 75 MG/1
75 TABLET ORAL DAILY
Status: DISCONTINUED | OUTPATIENT
Start: 2023-07-17 | End: 2023-07-22 | Stop reason: HOSPADM

## 2023-07-17 RX ORDER — FENTANYL CITRATE 50 UG/ML
25 INJECTION, SOLUTION INTRAMUSCULAR; INTRAVENOUS EVERY 5 MIN PRN
Status: DISCONTINUED | OUTPATIENT
Start: 2023-07-17 | End: 2023-07-17 | Stop reason: HOSPADM

## 2023-07-17 RX ORDER — NALOXONE HYDROCHLORIDE 0.4 MG/ML
INJECTION, SOLUTION INTRAMUSCULAR; INTRAVENOUS; SUBCUTANEOUS PRN
Status: DISCONTINUED | OUTPATIENT
Start: 2023-07-17 | End: 2023-07-18

## 2023-07-17 RX ORDER — DIPHENHYDRAMINE HCL 25 MG
25 CAPSULE ORAL EVERY 6 HOURS PRN
Status: DISCONTINUED | OUTPATIENT
Start: 2023-07-17 | End: 2023-07-22 | Stop reason: HOSPADM

## 2023-07-17 RX ORDER — DEXAMETHASONE SODIUM PHOSPHATE 4 MG/ML
INJECTION, SOLUTION INTRA-ARTICULAR; INTRALESIONAL; INTRAMUSCULAR; INTRAVENOUS; SOFT TISSUE PRN
Status: DISCONTINUED | OUTPATIENT
Start: 2023-07-17 | End: 2023-07-17 | Stop reason: SDUPTHER

## 2023-07-17 RX ORDER — PANTOPRAZOLE SODIUM 40 MG/1
40 TABLET, DELAYED RELEASE ORAL
Status: DISCONTINUED | OUTPATIENT
Start: 2023-07-18 | End: 2023-07-22 | Stop reason: HOSPADM

## 2023-07-17 RX ORDER — MISOPROSTOL 200 UG/1
200 TABLET ORAL DAILY
Status: DISCONTINUED | OUTPATIENT
Start: 2023-07-17 | End: 2023-07-18

## 2023-07-17 RX ORDER — OXYCODONE HYDROCHLORIDE 5 MG/1
5 TABLET ORAL EVERY 4 HOURS PRN
Status: DISCONTINUED | OUTPATIENT
Start: 2023-07-17 | End: 2023-07-22 | Stop reason: HOSPADM

## 2023-07-17 RX ORDER — FLUOROMETHOLONE 0.1 %
2 SUSPENSION, DROPS(FINAL DOSAGE FORM)(ML) OPHTHALMIC (EYE) 2 TIMES DAILY
Status: DISCONTINUED | OUTPATIENT
Start: 2023-07-17 | End: 2023-07-22 | Stop reason: HOSPADM

## 2023-07-17 RX ORDER — CYCLOBENZAPRINE HCL 10 MG
10 TABLET ORAL EVERY 12 HOURS PRN
Status: DISCONTINUED | OUTPATIENT
Start: 2023-07-17 | End: 2023-07-22 | Stop reason: HOSPADM

## 2023-07-17 RX ORDER — SODIUM CHLORIDE, SODIUM LACTATE, POTASSIUM CHLORIDE, CALCIUM CHLORIDE 600; 310; 30; 20 MG/100ML; MG/100ML; MG/100ML; MG/100ML
INJECTION, SOLUTION INTRAVENOUS CONTINUOUS PRN
Status: DISCONTINUED | OUTPATIENT
Start: 2023-07-17 | End: 2023-07-17 | Stop reason: SDUPTHER

## 2023-07-17 RX ORDER — FENTANYL CITRATE 50 UG/ML
100 INJECTION, SOLUTION INTRAMUSCULAR; INTRAVENOUS
Status: DISCONTINUED | OUTPATIENT
Start: 2023-07-17 | End: 2023-07-17 | Stop reason: HOSPADM

## 2023-07-17 RX ORDER — SODIUM CHLORIDE 0.9 % (FLUSH) 0.9 %
5-40 SYRINGE (ML) INJECTION EVERY 12 HOURS SCHEDULED
Status: DISCONTINUED | OUTPATIENT
Start: 2023-07-17 | End: 2023-07-17 | Stop reason: HOSPADM

## 2023-07-17 RX ORDER — FAMOTIDINE 20 MG/1
20 TABLET, FILM COATED ORAL 2 TIMES DAILY
Status: DISCONTINUED | OUTPATIENT
Start: 2023-07-17 | End: 2023-07-22 | Stop reason: HOSPADM

## 2023-07-17 RX ORDER — PREGABALIN 75 MG/1
75 CAPSULE ORAL ONCE
Status: COMPLETED | OUTPATIENT
Start: 2023-07-17 | End: 2023-07-17

## 2023-07-17 RX ORDER — ACETAMINOPHEN 500 MG
1000 TABLET ORAL ONCE
Status: DISCONTINUED | OUTPATIENT
Start: 2023-07-17 | End: 2023-07-17 | Stop reason: DRUGHIGH

## 2023-07-17 RX ORDER — MIDAZOLAM HYDROCHLORIDE 2 MG/2ML
2 INJECTION, SOLUTION INTRAMUSCULAR; INTRAVENOUS
Status: DISCONTINUED | OUTPATIENT
Start: 2023-07-17 | End: 2023-07-17 | Stop reason: HOSPADM

## 2023-07-17 RX ORDER — METHENAMINE HIPPURATE 1000 MG/1
1 TABLET ORAL 2 TIMES DAILY
Status: DISCONTINUED | OUTPATIENT
Start: 2023-07-17 | End: 2023-07-22 | Stop reason: HOSPADM

## 2023-07-17 RX ORDER — CLOBETASOL PROPIONATE 0.5 MG/G
CREAM TOPICAL 2 TIMES DAILY
Status: DISCONTINUED | OUTPATIENT
Start: 2023-07-17 | End: 2023-07-22 | Stop reason: HOSPADM

## 2023-07-17 RX ORDER — LEVOTHYROXINE SODIUM 0.03 MG/1
25 TABLET ORAL
Status: DISCONTINUED | OUTPATIENT
Start: 2023-07-21 | End: 2023-07-22 | Stop reason: HOSPADM

## 2023-07-17 RX ORDER — OXYCODONE HYDROCHLORIDE 5 MG/1
10 TABLET ORAL EVERY 4 HOURS PRN
Status: DISCONTINUED | OUTPATIENT
Start: 2023-07-17 | End: 2023-07-22 | Stop reason: HOSPADM

## 2023-07-17 RX ORDER — INSULIN LISPRO 100 [IU]/ML
0-8 INJECTION, SOLUTION INTRAVENOUS; SUBCUTANEOUS
Status: DISCONTINUED | OUTPATIENT
Start: 2023-07-17 | End: 2023-07-17 | Stop reason: DRUGHIGH

## 2023-07-17 RX ORDER — VITAMIN B COMPLEX
1000 TABLET ORAL DAILY
Status: DISCONTINUED | OUTPATIENT
Start: 2023-07-18 | End: 2023-07-22 | Stop reason: HOSPADM

## 2023-07-17 RX ORDER — DIPHENHYDRAMINE HYDROCHLORIDE 50 MG/ML
25 INJECTION INTRAMUSCULAR; INTRAVENOUS EVERY 6 HOURS PRN
Status: DISCONTINUED | OUTPATIENT
Start: 2023-07-17 | End: 2023-07-22 | Stop reason: HOSPADM

## 2023-07-17 RX ORDER — KETOROLAC TROMETHAMINE 30 MG/ML
15 INJECTION, SOLUTION INTRAMUSCULAR; INTRAVENOUS EVERY 6 HOURS
Status: COMPLETED | OUTPATIENT
Start: 2023-07-17 | End: 2023-07-18

## 2023-07-17 RX ORDER — PREGABALIN 100 MG/1
100 CAPSULE ORAL NIGHTLY
Status: DISCONTINUED | OUTPATIENT
Start: 2023-07-17 | End: 2023-07-22 | Stop reason: HOSPADM

## 2023-07-17 RX ORDER — SODIUM CHLORIDE 9 MG/ML
INJECTION, SOLUTION INTRAVENOUS PRN
Status: DISCONTINUED | OUTPATIENT
Start: 2023-07-17 | End: 2023-07-22 | Stop reason: HOSPADM

## 2023-07-17 RX ORDER — SODIUM CHLORIDE, SODIUM LACTATE, POTASSIUM CHLORIDE, CALCIUM CHLORIDE 600; 310; 30; 20 MG/100ML; MG/100ML; MG/100ML; MG/100ML
INJECTION, SOLUTION INTRAVENOUS CONTINUOUS
Status: DISCONTINUED | OUTPATIENT
Start: 2023-07-17 | End: 2023-07-17 | Stop reason: HOSPADM

## 2023-07-17 RX ORDER — TRANEXAMIC ACID 100 MG/ML
INJECTION, SOLUTION INTRAVENOUS PRN
Status: DISCONTINUED | OUTPATIENT
Start: 2023-07-17 | End: 2023-07-17 | Stop reason: SDUPTHER

## 2023-07-17 RX ORDER — ROCURONIUM BROMIDE 10 MG/ML
INJECTION, SOLUTION INTRAVENOUS PRN
Status: DISCONTINUED | OUTPATIENT
Start: 2023-07-17 | End: 2023-07-17 | Stop reason: SDUPTHER

## 2023-07-17 RX ORDER — CETIRIZINE HYDROCHLORIDE 10 MG/1
10 TABLET ORAL DAILY
Status: DISCONTINUED | OUTPATIENT
Start: 2023-07-18 | End: 2023-07-22 | Stop reason: HOSPADM

## 2023-07-17 RX ORDER — OXYCODONE HYDROCHLORIDE 5 MG/1
5 TABLET ORAL
Status: DISCONTINUED | OUTPATIENT
Start: 2023-07-17 | End: 2023-07-17 | Stop reason: HOSPADM

## 2023-07-17 RX ORDER — POLYETHYLENE GLYCOL 3350 17 G/17G
17 POWDER, FOR SOLUTION ORAL DAILY
Status: DISCONTINUED | OUTPATIENT
Start: 2023-07-17 | End: 2023-07-22 | Stop reason: HOSPADM

## 2023-07-17 RX ORDER — UBIDECARENONE 75 MG
100 CAPSULE ORAL DAILY
Status: DISCONTINUED | OUTPATIENT
Start: 2023-07-18 | End: 2023-07-22 | Stop reason: HOSPADM

## 2023-07-17 RX ORDER — INSULIN LISPRO 100 [IU]/ML
0-4 INJECTION, SOLUTION INTRAVENOUS; SUBCUTANEOUS NIGHTLY
Status: DISCONTINUED | OUTPATIENT
Start: 2023-07-17 | End: 2023-07-22 | Stop reason: HOSPADM

## 2023-07-17 RX ORDER — ONDANSETRON 2 MG/ML
4 INJECTION INTRAMUSCULAR; INTRAVENOUS
Status: DISCONTINUED | OUTPATIENT
Start: 2023-07-17 | End: 2023-07-17 | Stop reason: HOSPADM

## 2023-07-17 RX ORDER — HYDROMORPHONE HYDROCHLORIDE 1 MG/ML
0.5 INJECTION, SOLUTION INTRAMUSCULAR; INTRAVENOUS; SUBCUTANEOUS
Status: DISCONTINUED | OUTPATIENT
Start: 2023-07-17 | End: 2023-07-18

## 2023-07-17 RX ORDER — HYDROMORPHONE HYDROCHLORIDE 1 MG/ML
0.5 INJECTION, SOLUTION INTRAMUSCULAR; INTRAVENOUS; SUBCUTANEOUS EVERY 5 MIN PRN
Status: DISCONTINUED | OUTPATIENT
Start: 2023-07-17 | End: 2023-07-17 | Stop reason: HOSPADM

## 2023-07-17 RX ORDER — MIDAZOLAM HYDROCHLORIDE 1 MG/ML
INJECTION INTRAMUSCULAR; INTRAVENOUS PRN
Status: DISCONTINUED | OUTPATIENT
Start: 2023-07-17 | End: 2023-07-17 | Stop reason: SDUPTHER

## 2023-07-17 RX ORDER — ASPIRIN 81 MG/1
81 TABLET ORAL DAILY
Status: DISCONTINUED | OUTPATIENT
Start: 2023-07-18 | End: 2023-07-22 | Stop reason: HOSPADM

## 2023-07-17 RX ORDER — FENTANYL CITRATE 50 UG/ML
INJECTION, SOLUTION INTRAMUSCULAR; INTRAVENOUS PRN
Status: DISCONTINUED | OUTPATIENT
Start: 2023-07-17 | End: 2023-07-17 | Stop reason: SDUPTHER

## 2023-07-17 RX ORDER — PROCHLORPERAZINE EDISYLATE 5 MG/ML
10 INJECTION INTRAMUSCULAR; INTRAVENOUS EVERY 6 HOURS PRN
Status: DISCONTINUED | OUTPATIENT
Start: 2023-07-17 | End: 2023-07-22 | Stop reason: HOSPADM

## 2023-07-17 RX ORDER — CELECOXIB 100 MG/1
100 CAPSULE ORAL ONCE
Status: COMPLETED | OUTPATIENT
Start: 2023-07-17 | End: 2023-07-17

## 2023-07-17 RX ORDER — METOPROLOL SUCCINATE 25 MG/1
12.5 TABLET, EXTENDED RELEASE ORAL
Status: DISCONTINUED | OUTPATIENT
Start: 2023-07-17 | End: 2023-07-22 | Stop reason: HOSPADM

## 2023-07-17 RX ORDER — SODIUM CHLORIDE 9 MG/ML
INJECTION, SOLUTION INTRAVENOUS CONTINUOUS
Status: DISCONTINUED | OUTPATIENT
Start: 2023-07-17 | End: 2023-07-20

## 2023-07-17 RX ORDER — ONDANSETRON 2 MG/ML
INJECTION INTRAMUSCULAR; INTRAVENOUS PRN
Status: DISCONTINUED | OUTPATIENT
Start: 2023-07-17 | End: 2023-07-17 | Stop reason: SDUPTHER

## 2023-07-17 RX ORDER — SODIUM CHLORIDE 0.9 % (FLUSH) 0.9 %
5-40 SYRINGE (ML) INJECTION PRN
Status: DISCONTINUED | OUTPATIENT
Start: 2023-07-17 | End: 2023-07-22 | Stop reason: HOSPADM

## 2023-07-17 RX ADMIN — ROCURONIUM BROMIDE 5 MG: 10 SOLUTION INTRAVENOUS at 09:57

## 2023-07-17 RX ADMIN — KETOROLAC TROMETHAMINE 15 MG: 30 INJECTION, SOLUTION INTRAMUSCULAR; INTRAVENOUS at 20:39

## 2023-07-17 RX ADMIN — SODIUM CHLORIDE, POTASSIUM CHLORIDE, SODIUM LACTATE AND CALCIUM CHLORIDE: 600; 310; 30; 20 INJECTION, SOLUTION INTRAVENOUS at 08:54

## 2023-07-17 RX ADMIN — PREGABALIN 100 MG: 100 CAPSULE ORAL at 22:00

## 2023-07-17 RX ADMIN — WATER 2000 MG: 1 INJECTION INTRAMUSCULAR; INTRAVENOUS; SUBCUTANEOUS at 20:37

## 2023-07-17 RX ADMIN — SODIUM CHLORIDE, POTASSIUM CHLORIDE, SODIUM LACTATE AND CALCIUM CHLORIDE: 600; 310; 30; 20 INJECTION, SOLUTION INTRAVENOUS at 09:50

## 2023-07-17 RX ADMIN — PREGABALIN 75 MG: 75 CAPSULE ORAL at 08:42

## 2023-07-17 RX ADMIN — FENTANYL CITRATE 50 MCG: 50 INJECTION, SOLUTION INTRAMUSCULAR; INTRAVENOUS at 13:17

## 2023-07-17 RX ADMIN — LIDOCAINE HYDROCHLORIDE 80 MG: 20 INJECTION, SOLUTION EPIDURAL; INFILTRATION; INTRACAUDAL; PERINEURAL at 09:57

## 2023-07-17 RX ADMIN — SODIUM CHLORIDE, PRESERVATIVE FREE 10 ML: 5 INJECTION INTRAVENOUS at 20:43

## 2023-07-17 RX ADMIN — ACETAMINOPHEN 1000 MG: 500 TABLET ORAL at 08:41

## 2023-07-17 RX ADMIN — Medication 40 MG: at 10:28

## 2023-07-17 RX ADMIN — PROPOFOL 100 MCG/KG/MIN: 10 INJECTION, EMULSION INTRAVENOUS at 10:01

## 2023-07-17 RX ADMIN — EPHEDRINE SULFATE 10 MG: 50 INJECTION INTRAVENOUS at 10:02

## 2023-07-17 RX ADMIN — METOPROLOL SUCCINATE 12.5 MG: 25 TABLET, EXTENDED RELEASE ORAL at 22:00

## 2023-07-17 RX ADMIN — ATORVASTATIN CALCIUM 20 MG: 20 TABLET, FILM COATED ORAL at 20:40

## 2023-07-17 RX ADMIN — CELECOXIB 100 MG: 100 CAPSULE ORAL at 08:42

## 2023-07-17 RX ADMIN — MIDAZOLAM 2 MG: 1 INJECTION INTRAMUSCULAR; INTRAVENOUS at 09:49

## 2023-07-17 RX ADMIN — MAGNESIUM SULFATE HEPTAHYDRATE 2000 MG: 40 INJECTION, SOLUTION INTRAVENOUS at 10:01

## 2023-07-17 RX ADMIN — DEXAMETHASONE SODIUM PHOSPHATE 4 MG: 4 INJECTION, SOLUTION INTRAMUSCULAR; INTRAVENOUS at 10:03

## 2023-07-17 RX ADMIN — METHENAMINE HIPPURATE 1 G: 1 TABLET ORAL at 20:39

## 2023-07-17 RX ADMIN — ACETAMINOPHEN 1000 MG: 500 TABLET ORAL at 20:40

## 2023-07-17 RX ADMIN — Medication 10 MG: at 12:33

## 2023-07-17 RX ADMIN — PROPOFOL 150 MG: 10 INJECTION, EMULSION INTRAVENOUS at 09:57

## 2023-07-17 RX ADMIN — TRANEXAMIC ACID 1000 MG: 100 INJECTION, SOLUTION INTRAVENOUS at 10:01

## 2023-07-17 RX ADMIN — CLOBETASOL PROPIONATE: 0.5 CREAM TOPICAL at 20:39

## 2023-07-17 RX ADMIN — WATER 2000 MG: 1 INJECTION INTRAMUSCULAR; INTRAVENOUS; SUBCUTANEOUS at 10:03

## 2023-07-17 RX ADMIN — ONDANSETRON HYDROCHLORIDE 4 MG: 2 INJECTION, SOLUTION INTRAMUSCULAR; INTRAVENOUS at 12:51

## 2023-07-17 RX ADMIN — EPHEDRINE SULFATE 10 MG: 50 INJECTION INTRAVENOUS at 11:54

## 2023-07-17 RX ADMIN — Medication 140 MG: at 09:57

## 2023-07-17 RX ADMIN — SENNOSIDES AND DOCUSATE SODIUM 1 TABLET: 50; 8.6 TABLET ORAL at 22:04

## 2023-07-17 RX ADMIN — FAMOTIDINE 20 MG: 20 TABLET, FILM COATED ORAL at 22:00

## 2023-07-17 RX ADMIN — Medication: at 14:06

## 2023-07-17 RX ADMIN — HYDROMORPHONE HYDROCHLORIDE 0.5 MG: 1 INJECTION, SOLUTION INTRAMUSCULAR; INTRAVENOUS; SUBCUTANEOUS at 20:37

## 2023-07-17 RX ADMIN — ALBUMIN (HUMAN) 250 ML: 12.5 INJECTION, SOLUTION INTRAVENOUS at 10:02

## 2023-07-17 RX ADMIN — FENTANYL CITRATE 50 MCG: 50 INJECTION, SOLUTION INTRAMUSCULAR; INTRAVENOUS at 09:57

## 2023-07-17 ASSESSMENT — PAIN - FUNCTIONAL ASSESSMENT
PAIN_FUNCTIONAL_ASSESSMENT: 0-10
PAIN_FUNCTIONAL_ASSESSMENT: 0-10
PAIN_FUNCTIONAL_ASSESSMENT: PREVENTS OR INTERFERES SOME ACTIVE ACTIVITIES AND ADLS
PAIN_FUNCTIONAL_ASSESSMENT: ACTIVITIES ARE NOT PREVENTED

## 2023-07-17 ASSESSMENT — PAIN DESCRIPTION - DESCRIPTORS: DESCRIPTORS: ACHING

## 2023-07-17 ASSESSMENT — PAIN SCALES - GENERAL
PAINLEVEL_OUTOF10: 7
PAINLEVEL_OUTOF10: 8
PAINLEVEL_OUTOF10: 4
PAINLEVEL_OUTOF10: 5

## 2023-07-17 ASSESSMENT — PAIN DESCRIPTION - LOCATION: LOCATION: BACK

## 2023-07-17 ASSESSMENT — PAIN DESCRIPTION - ORIENTATION: ORIENTATION: RIGHT

## 2023-07-17 NOTE — FLOWSHEET NOTE
07/17/23 1044   Family Communication    Relationship to Patient Sister    Phone Number Moon Ayala, [phone]   Family/Significant Other Update Called   Delivery Origin Nurse   Message Disposition Family present - message delivered   Update Given Yes   Family Communication   Family Update Message Procedure started;Surgeon working

## 2023-07-17 NOTE — OP NOTE
[facility]  OPERATIVE REPORT    Name:  Stan Antoine  MR#:  [id number]  :  1945  ACCOUNT #:  [de-identified]  DATE OF SERVICE:  2023    PREOPERATIVE DIAGNOSES:  Status post L4-5 fusion, lumbar degenerative scoliosis with lumbar stenosis L2-3, L3-4, L5-S1. POSTOPERATIVE DIAGNOSES:  Status post L4-5 fusion, lumbar degenerative scoliosis with lumbar stenosis L2-3, L3-4, L5-S1. PROCEDURES PERFORMED:  Lumbar revision laminectomy L2-3, L3-4 with facetectomy for decompression of L2, L3, and L4 nerve roots. L5-S1 decompression bilaterally with facetectomy and decompression of L5 and S1 nerve roots. Posterior lumbar fusion L2 to S1 with segmental instrumentation. Transforaminal interbody fusion L3-4 and L5-S1 and robotic guidance for placement of pedicle screw instrumentation. SURGEON:  Radha Wheatley MD    ASSISTANT:  Dangelo Jaeger PA-C    ANESTHESIA:  General anesthesia. COMPLICATIONS:  None. SPECIMENS REMOVED:  None. IMPLANTS:  Instrumentation includes Medtronic Solera pedicle screws and Medtronic Catalyft interbody graft. ESTIMATED BLOOD LOSS:  Minimal.    DRAINS:  One mini Hemovac placed. INDICATIONS:  This is a pleasant 52-year-old female, previous history of L4-5 lumbar fusion, now with lumbar degenerative scoliosis with stenosis at L5-S1 below as well as adjacent junctional stenosis of L2-3 and L3-4 above with degenerative scoliosis above as well. She is for decompression and stabilization. The patient understood the risks and benefits, elected to proceed. PROCEDURE:  The patient was identified, brought to the operative suite, underwent general anesthesia without difficulty. Placed in the prone position on the Nathaniel frame with all bony prominences well padded. Back was prepped and draped sterilely. [employer] robotic system was attached to the right hand side of bed and draped out sterilely as well. Time-out was confirmed.   We then did a midline

## 2023-07-17 NOTE — ANESTHESIA PRE PROCEDURE
pregabalin (LYRICA) 75 MG capsule Take 1 capsule by mouth every morning for 90 days. Max Daily Amount: 75 mg 6/13/23 9/11/23  Shirlene Moreno DO   metFORMIN (GLUCOPHAGE) 500 MG tablet Take 1 tablet by mouth 2 times daily (with meals)  Patient taking differently: Take 1 tablet by mouth nightly 5/25/23   Ty Bonilla MD   levothyroxine (SYNTHROID) 50 MCG tablet TAKE 1 TABLET BY MOUTH ONCE DAILY BEFORE BREAKFAST EXCEPT  1.5  TAB  (75MCG)  EVERY  MONDAY  AND  FRIDAY.  5/22/23   Ty Bonilla MD   acetaminophen (TYLENOL) 325 MG tablet Take by mouth every 4 hours as needed    Ar Automatic Reconciliation   aspirin 81 MG EC tablet Take 1 tablet by mouth daily 1/25/23   Ar Automatic Reconciliation   cetirizine (ZYRTEC) 10 MG tablet Take 1 tablet by mouth daily    Ar Automatic Reconciliation   clopidogrel (PLAVIX) 75 MG tablet Take 1 tablet by mouth daily 1/25/23   Ar Automatic Reconciliation   cyanocobalamin 100 MCG tablet Take 1 tablet by mouth daily    Ar Automatic Reconciliation   denosumab (PROLIA) 60 MG/ML SOSY SC injection Inject 1 mL into the skin    Ar Automatic Reconciliation   ezetimibe (ZETIA) 10 MG tablet Take 1 tablet by mouth daily  Patient not taking: Reported on 7/6/2023 9/14/22   Ar Automatic Reconciliation   fluorometholone (FML) 0.1 % ophthalmic suspension Apply 2 drops to eye 2 times daily    Ar Automatic Reconciliation   lidocaine (LIDODERM) 5 % Place 1 patch onto the skin every 24 hours 9/2/21   Ar Automatic Reconciliation   Magnesium Oxide (MAGNESIUM-OXIDE) 250 MG TABS tablet Take 1 tablet by mouth daily    Ar Automatic Reconciliation   metoprolol succinate (TOPROL XL) 25 MG extended release tablet Take 0.5 tablets by mouth nightly 1/25/23   Ar Automatic Reconciliation   pantoprazole (PROTONIX) 40 MG tablet Take 1 tablet by mouth 2 times daily    Ar Automatic Reconciliation   simvastatin (ZOCOR) 40 MG tablet TAKE 1 TABLET BY MOUTH ONCE DAILY AT NIGHT 2/22/23   Ar Automatic Reconciliation       Current

## 2023-07-17 NOTE — PERIOP NOTE
Surgifoam Absorbable Gelatin Sponge, U.S.P.  Size 12-7 2cm x 6cm x 0.7cm   Reference Number: [id number]   Lot Number: [id number]   Expiration Date: 2026/11/09     Floseal Hemostatic Matrix 10mL '  Reference: [id number]   Lot number: [id number]   Expiration date: 2025/03/17   Placed on surgical field

## 2023-07-17 NOTE — FLOWSHEET NOTE
07/17/23 1043   Family Communication    Relationship to Patient Son    Phone Number [employer], [phone]   Family/Significant Other Update Called   Delivery Origin Nurse   Message Disposition Family present - message delivered   Update Given Yes   Family Communication   Family Update Message Surgeon working;Procedure started

## 2023-07-18 LAB
ANION GAP SERPL CALC-SCNC: 9 MMOL/L (ref 5–15)
BUN SERPL-MCNC: 12 MG/DL (ref 6–20)
BUN/CREAT SERPL: 19 (ref 12–20)
CALCIUM SERPL-MCNC: 8 MG/DL (ref 8.5–10.1)
CHLORIDE SERPL-SCNC: 106 MMOL/L (ref 97–108)
CO2 SERPL-SCNC: 23 MMOL/L (ref 21–32)
CREAT SERPL-MCNC: 0.63 MG/DL (ref 0.55–1.02)
GLUCOSE BLD STRIP.AUTO-MCNC: 116 MG/DL (ref 65–117)
GLUCOSE BLD STRIP.AUTO-MCNC: 124 MG/DL (ref 65–117)
GLUCOSE BLD STRIP.AUTO-MCNC: 142 MG/DL (ref 65–117)
GLUCOSE BLD STRIP.AUTO-MCNC: 155 MG/DL (ref 65–117)
GLUCOSE SERPL-MCNC: 129 MG/DL (ref 65–100)
HCT VFR BLD AUTO: 30.5 % (ref 35–47)
HGB BLD-MCNC: 9.5 G/DL (ref 11.5–16)
POTASSIUM SERPL-SCNC: 4.7 MMOL/L (ref 3.5–5.1)
SERVICE CMNT-IMP: ABNORMAL
SERVICE CMNT-IMP: NORMAL
SODIUM SERPL-SCNC: 138 MMOL/L (ref 136–145)

## 2023-07-18 PROCEDURE — 2580000003 HC RX 258: Performed by: STUDENT IN AN ORGANIZED HEALTH CARE EDUCATION/TRAINING PROGRAM

## 2023-07-18 PROCEDURE — L0628 LSO FLEX NO RI STAYS PRE OTS: HCPCS

## 2023-07-18 PROCEDURE — 97116 GAIT TRAINING THERAPY: CPT

## 2023-07-18 PROCEDURE — 2700000000 HC OXYGEN THERAPY PER DAY

## 2023-07-18 PROCEDURE — 36415 COLL VENOUS BLD VENIPUNCTURE: CPT

## 2023-07-18 PROCEDURE — 99024 POSTOP FOLLOW-UP VISIT: CPT | Performed by: PHYSICIAN ASSISTANT

## 2023-07-18 PROCEDURE — 97161 PT EVAL LOW COMPLEX 20 MIN: CPT

## 2023-07-18 PROCEDURE — 80048 BASIC METABOLIC PNL TOTAL CA: CPT

## 2023-07-18 PROCEDURE — 97162 PT EVAL MOD COMPLEX 30 MIN: CPT

## 2023-07-18 PROCEDURE — 85018 HEMOGLOBIN: CPT

## 2023-07-18 PROCEDURE — 97535 SELF CARE MNGMENT TRAINING: CPT

## 2023-07-18 PROCEDURE — 82962 GLUCOSE BLOOD TEST: CPT

## 2023-07-18 PROCEDURE — 6360000002 HC RX W HCPCS: Performed by: STUDENT IN AN ORGANIZED HEALTH CARE EDUCATION/TRAINING PROGRAM

## 2023-07-18 PROCEDURE — 97165 OT EVAL LOW COMPLEX 30 MIN: CPT

## 2023-07-18 PROCEDURE — 2580000003 HC RX 258: Performed by: PHYSICIAN ASSISTANT

## 2023-07-18 PROCEDURE — 1100000000 HC RM PRIVATE

## 2023-07-18 PROCEDURE — 97530 THERAPEUTIC ACTIVITIES: CPT

## 2023-07-18 PROCEDURE — 6370000000 HC RX 637 (ALT 250 FOR IP): Performed by: STUDENT IN AN ORGANIZED HEALTH CARE EDUCATION/TRAINING PROGRAM

## 2023-07-18 PROCEDURE — 85014 HEMATOCRIT: CPT

## 2023-07-18 RX ORDER — HYDROMORPHONE HYDROCHLORIDE 1 MG/ML
0.25 INJECTION, SOLUTION INTRAMUSCULAR; INTRAVENOUS; SUBCUTANEOUS EVERY 6 HOURS PRN
Status: DISCONTINUED | OUTPATIENT
Start: 2023-07-18 | End: 2023-07-22 | Stop reason: HOSPADM

## 2023-07-18 RX ORDER — 0.9 % SODIUM CHLORIDE 0.9 %
500 INTRAVENOUS SOLUTION INTRAVENOUS ONCE
Status: COMPLETED | OUTPATIENT
Start: 2023-07-18 | End: 2023-07-18

## 2023-07-18 RX ADMIN — ACETAMINOPHEN 1000 MG: 500 TABLET ORAL at 00:23

## 2023-07-18 RX ADMIN — PREGABALIN 75 MG: 75 CAPSULE ORAL at 08:52

## 2023-07-18 RX ADMIN — SENNOSIDES AND DOCUSATE SODIUM 1 TABLET: 50; 8.6 TABLET ORAL at 21:37

## 2023-07-18 RX ADMIN — FAMOTIDINE 20 MG: 20 TABLET, FILM COATED ORAL at 08:54

## 2023-07-18 RX ADMIN — ACETAMINOPHEN 1000 MG: 500 TABLET ORAL at 07:06

## 2023-07-18 RX ADMIN — FLUOROMETHOLONE 2 DROP: 1 SOLUTION/ DROPS OPHTHALMIC at 21:50

## 2023-07-18 RX ADMIN — WATER 2000 MG: 1 INJECTION INTRAMUSCULAR; INTRAVENOUS; SUBCUTANEOUS at 01:55

## 2023-07-18 RX ADMIN — ACETAMINOPHEN 1000 MG: 500 TABLET ORAL at 12:02

## 2023-07-18 RX ADMIN — CLOPIDOGREL BISULFATE 75 MG: 75 TABLET ORAL at 08:55

## 2023-07-18 RX ADMIN — KETOROLAC TROMETHAMINE 15 MG: 30 INJECTION, SOLUTION INTRAMUSCULAR; INTRAVENOUS at 01:55

## 2023-07-18 RX ADMIN — KETOROLAC TROMETHAMINE 15 MG: 30 INJECTION, SOLUTION INTRAMUSCULAR; INTRAVENOUS at 07:06

## 2023-07-18 RX ADMIN — METHENAMINE HIPPURATE 1 G: 1 TABLET ORAL at 22:00

## 2023-07-18 RX ADMIN — PANTOPRAZOLE SODIUM 40 MG: 40 TABLET, DELAYED RELEASE ORAL at 15:18

## 2023-07-18 RX ADMIN — PREGABALIN 100 MG: 100 CAPSULE ORAL at 21:37

## 2023-07-18 RX ADMIN — ATORVASTATIN CALCIUM 20 MG: 20 TABLET, FILM COATED ORAL at 21:36

## 2023-07-18 RX ADMIN — OXYCODONE HYDROCHLORIDE 5 MG: 5 TABLET ORAL at 18:46

## 2023-07-18 RX ADMIN — FLUOROMETHOLONE 2 DROP: 1 SOLUTION/ DROPS OPHTHALMIC at 08:56

## 2023-07-18 RX ADMIN — OXYCODONE HYDROCHLORIDE 5 MG: 5 TABLET ORAL at 13:49

## 2023-07-18 RX ADMIN — LEVOTHYROXINE SODIUM 50 MCG: 0.05 TABLET ORAL at 07:07

## 2023-07-18 RX ADMIN — Medication 1000 UNITS: at 09:00

## 2023-07-18 RX ADMIN — METHENAMINE HIPPURATE 1 G: 1 TABLET ORAL at 08:59

## 2023-07-18 RX ADMIN — FAMOTIDINE 20 MG: 20 TABLET, FILM COATED ORAL at 21:37

## 2023-07-18 RX ADMIN — HYDROMORPHONE HYDROCHLORIDE 0.5 MG: 1 INJECTION, SOLUTION INTRAMUSCULAR; INTRAVENOUS; SUBCUTANEOUS at 07:14

## 2023-07-18 RX ADMIN — KETOROLAC TROMETHAMINE 15 MG: 30 INJECTION, SOLUTION INTRAMUSCULAR; INTRAVENOUS at 12:02

## 2023-07-18 RX ADMIN — CYCLOBENZAPRINE 10 MG: 10 TABLET, FILM COATED ORAL at 15:41

## 2023-07-18 RX ADMIN — ASPIRIN 81 MG: 81 TABLET, COATED ORAL at 08:54

## 2023-07-18 RX ADMIN — POLYETHYLENE GLYCOL 3350 17 G: 17 POWDER, FOR SOLUTION ORAL at 08:52

## 2023-07-18 RX ADMIN — CETIRIZINE HYDROCHLORIDE 10 MG: 10 TABLET, FILM COATED ORAL at 08:54

## 2023-07-18 RX ADMIN — Medication 100 MCG: at 08:54

## 2023-07-18 RX ADMIN — OXYCODONE HYDROCHLORIDE 5 MG: 5 TABLET ORAL at 10:10

## 2023-07-18 RX ADMIN — SENNOSIDES AND DOCUSATE SODIUM 1 TABLET: 50; 8.6 TABLET ORAL at 08:54

## 2023-07-18 RX ADMIN — SODIUM CHLORIDE 500 ML: 9 INJECTION, SOLUTION INTRAVENOUS at 15:18

## 2023-07-18 RX ADMIN — PANTOPRAZOLE SODIUM 40 MG: 40 TABLET, DELAYED RELEASE ORAL at 07:07

## 2023-07-18 RX ADMIN — OXYCODONE HYDROCHLORIDE 10 MG: 5 TABLET ORAL at 01:56

## 2023-07-18 RX ADMIN — MAGNESIUM OXIDE TAB 400 MG (241.3 MG ELEMENTAL MG) 400 MG: 400 (241.3 MG) TAB at 09:02

## 2023-07-18 ASSESSMENT — PAIN - FUNCTIONAL ASSESSMENT
PAIN_FUNCTIONAL_ASSESSMENT: PREVENTS OR INTERFERES SOME ACTIVE ACTIVITIES AND ADLS
PAIN_FUNCTIONAL_ASSESSMENT: ACTIVITIES ARE NOT PREVENTED
PAIN_FUNCTIONAL_ASSESSMENT: PREVENTS OR INTERFERES SOME ACTIVE ACTIVITIES AND ADLS

## 2023-07-18 ASSESSMENT — PAIN SCALES - GENERAL
PAINLEVEL_OUTOF10: 5
PAINLEVEL_OUTOF10: 7
PAINLEVEL_OUTOF10: 3
PAINLEVEL_OUTOF10: 6
PAINLEVEL_OUTOF10: 3
PAINLEVEL_OUTOF10: 3
PAINLEVEL_OUTOF10: 8
PAINLEVEL_OUTOF10: 6
PAINLEVEL_OUTOF10: 3
PAINLEVEL_OUTOF10: 4
PAINLEVEL_OUTOF10: 4

## 2023-07-18 ASSESSMENT — PAIN DESCRIPTION - ORIENTATION
ORIENTATION: LOWER
ORIENTATION: LOWER;POSTERIOR
ORIENTATION: LOWER

## 2023-07-18 ASSESSMENT — PAIN DESCRIPTION - LOCATION
LOCATION: BACK
LOCATION: BACK;INCISION
LOCATION: BACK;INCISION
LOCATION: BACK

## 2023-07-18 ASSESSMENT — PAIN DESCRIPTION - ONSET
ONSET: ON-GOING
ONSET: ON-GOING

## 2023-07-18 ASSESSMENT — PAIN SCALES - WONG BAKER
WONGBAKER_NUMERICALRESPONSE: 6
WONGBAKER_NUMERICALRESPONSE: 0

## 2023-07-18 ASSESSMENT — PAIN DESCRIPTION - DESCRIPTORS
DESCRIPTORS: ACHING

## 2023-07-18 ASSESSMENT — PAIN DESCRIPTION - PAIN TYPE
TYPE: SURGICAL PAIN
TYPE: SURGICAL PAIN

## 2023-07-18 NOTE — PLAN OF CARE
[address] [facility]        NAME: Ayden Mayen is a 59 y o  male  : 1963    MRN: [id number]  DATE: 2022  TIME: 10:33 AM    Assessment and Plan   Essential hypertension [I10]  1  Essential hypertension  ECG 12 lead   2  Numbness  CT head wo contrast    Ambulatory Referral to Neurology    CANCELED: Ambulatory Referral to Neurology   3  Chronic nonintractable headache, unspecified headache type  CT head wo contrast    Ambulatory Referral to Neurology   4  Tachycardia  ECG 12 lead     1  Essential hypertension   will get EKG, none in our system  Having episodes of elevated HR  HR normal at visit  - ECG 12 lead; Future    2  Numbness  Numbness in head reported by patient that was happening last night and will happen after he is on his medication for awhile    - CT head wo contrast; Future  - Ambulatory Referral to Neurology; Future    3  Chronic nonintractable headache, unspecified headache type  Chronic headaches that are intermittent  Gets numbness in LE and weakness in b/l legs  - CT head wo contrast; Future  - Ambulatory Referral to Neurology; Future    4  Tachycardia  Notices after smoking but other times too  Highest while sitting about 117    - ECG 12 lead; Future      Patient Instructions     There are no Patient Instructions on file for this visit  Chief Complaint     Chief Complaint   Patient presents with    Dizziness     Pt has complaints of "brain feels numb"  Dixie Johns that he feels sometimes this way on his medications so he stops taking them  When he starts feeling better he will begin to take them again  Notices this happens when he drinks alcohol  Said last night he was at home, had one or two drinks, was chopping wood and started to feel like "brain was numb"  Pulse was elevated  History of Present Illness       Patient here for acute visit with c/o brain feeling numb after drinking alcohol last night    Reports that he starts and stops medications because Problem: Pain  Goal: Verbalizes/displays adequate comfort level or baseline comfort level  7/18/2023 1244 by Dodie Yun RN  Outcome: Progressing  7/18/2023 0409 by Gatito Kapoor RN  Flowsheets (Taken 7/18/2023 4584)  Verbalizes/displays adequate comfort level or baseline comfort level:   Assess pain using appropriate pain scale   Encourage patient to monitor pain and request assistance   Administer analgesics based on type and severity of pain and evaluate response   Implement non-pharmacological measures as appropriate and evaluate response   Consider cultural and social influences on pain and pain management   Notify Licensed Independent Practitioner if interventions unsuccessful or patient reports new pain     Problem: Physical Therapy - Adult  Goal: By Discharge: Performs mobility at highest level of function for planned discharge setting. See evaluation for individualized goals. Description: FUNCTIONAL STATUS PRIOR TO ADMISSION: Patient was independent to occasionally modified independent depending on her back pain. Pt denies any falls in the last year. HOME SUPPORT PRIOR TO ADMISSION: The patient lived alone with friends available to provide assistance and has hired house keeper. Physical Therapy Goals  Initiated 7/18/2023  1. Patient will move from supine to sit and sit to supine and roll side to side in bed with independence within 4 day(s). 2.  Patient will perform sit to stand with modified independence within 4 day(s). 3.  Patient will transfer from bed to chair and chair to bed with modified independence using the least restrictive device within 4 day(s). 4.  Patient will ambulate with modified independence for 150 feet with the least restrictive device within 4 day(s). 5.  Patient will ascend/descend 5 stairs with 1 handrail(s) with modified independence within 4 day(s).   6. Patient will verbalize and demonstrate understanding of spinal precautions (No bending, lifting he started to stagger from the medication  Reports HR went up to 117 and then his head gets numb  Reports that he had been taking his medication everyday for the last 2-3 months then started staggering and getting numbness then stopped the medication again  C/o head numbness, nausea "feeling sick", elevated HR  Feels like he is waking up with a hangover and he doesn't drink most of the time  Gets numbness in arms sometimes and weakness in legs  States " someone has to figure out what is going on, I haven't felt right in the last 2 years "    Alcohol use, reported he stopped for a few months and then had some alcohol after he recently stopped his medications and felt worse  Review of Systems   Review of Systems   Constitutional: Negative for fatigue and fever  Respiratory: Negative  Cardiovascular: Positive for palpitations (elevated HR)  Skin: Negative  Neurological: Positive for dizziness, weakness, numbness and headaches         PHQ-2/9 Depression Screening    Little interest or pleasure in doing things: 0 - not at all  Feeling down, depressed, or hopeless: 0 - not at all  PHQ-2 Score: 0  PHQ-2 Interpretation: Negative depression screen        Current Medications       Current Outpatient Medications:     atorvastatin (LIPITOR) 10 mg tablet, Take 1 tablet (10 mg total) by mouth daily at bedtime, Disp: 90 tablet, Rfl: 3    buPROPion (Wellbutrin SR) 100 mg 12 hr tablet, Take 1 tablet (100 mg total) by mouth 2 (two) times a day, Disp: 60 tablet, Rfl: 1    lisinopril (ZESTRIL) 20 mg tablet, Take 1 tablet (20 mg total) by mouth daily, Disp: 90 tablet, Rfl: 1    Microlet Lancets MISC, Use 2 (two) times a day, Disp: 100 each, Rfl: 2    naproxen (Naprosyn) 500 mg tablet, Take 1 tablet (500 mg total) by mouth 2 (two) times a day with meals, Disp: 180 tablet, Rfl: 3    ondansetron (ZOFRAN) 4 mg tablet, Take 1 tablet (4 mg total) by mouth every 8 (eight) hours as needed for nausea or vomiting, Disp: 20 tablet, Rfl: 1    colchicine (COLCRYS) 0 6 mg tablet, 1 2mg PO x 1 then 0 6mg PO 1 hour later x 1  Total of 3 pills per attack  May not repeat for 3 days (Patient not taking: Reported on 1/11/2022 ), Disp: 3 tablet, Rfl: 2    glucose blood test strip, Use as instructed (Patient not taking: Reported on 1/28/2022 ), Disp: 100 each, Rfl: 3    glucose monitoring kit (FREESTYLE) monitoring kit, Use 1 each 2 (two) times a day before meals (Patient not taking: Reported on 1/28/2022 ), Disp: 1 each, Rfl: 0    Lancets (freestyle) lancets, Use as instructed (Patient not taking: Reported on 1/28/2022 ), Disp: 100 each, Rfl: 3    Current Allergies     Allergies as of 04/13/2022    (No Known Allergies)            The following portions of the patient's history were reviewed and updated as appropriate: allergies, current medications, past family history, past medical history, past social history, past surgical history and problem list      Past Medical History:   Diagnosis Date    Gout     Osteoarthritis        No past surgical history on file  Family History   Problem Relation Age of Onset    Arthritis Mother     Coronary artery disease Father          Medications have been verified  Objective   /78   Pulse 92   Temp 98 3 °F (36 8 °C)   Ht 5' 10" (1 778 m)   Wt 100 kg (221 lb)   SpO2 97%   BMI 31 71 kg/m²        Physical Exam     Physical Exam  Vitals and nursing note reviewed  Constitutional:       General: He is not in acute distress  Appearance: He is well-developed  He is not ill-appearing or diaphoretic  HENT:      Nose: Nose normal    Eyes:      Extraocular Movements: Extraocular movements intact  Right eye: Normal extraocular motion and no nystagmus  Left eye: Normal extraocular motion and no nystagmus  Neck:      Trachea: No tracheal deviation  Cardiovascular:      Rate and Rhythm: Normal rate and regular rhythm  Heart sounds: Normal heart sounds   No murmur heard        Comments: Normal carotids  Pulmonary:      Effort: Pulmonary effort is normal  No tachypnea, accessory muscle usage or respiratory distress  Breath sounds: No stridor  Wheezing (scant in b/l bases  chronic smoker) present  Neurological:      General: No focal deficit present  Mental Status: He is alert and oriented to person, place, and time  GCS: GCS eye subscore is 4  GCS verbal subscore is 5  GCS motor subscore is 6  Cranial Nerves: Cranial nerves are intact  Sensory: Sensation is intact  Psychiatric:         Speech: Speech normal          Behavior: Behavior normal  Behavior is cooperative  Thought Content:  Thought content normal

## 2023-07-18 NOTE — PLAN OF CARE
Problem: Physical Therapy - Adult  Goal: By Discharge: Performs mobility at highest level of function for planned discharge setting. See evaluation for individualized goals. Description: FUNCTIONAL STATUS PRIOR TO ADMISSION: Patient was independent to occasionally modified independent depending on her back pain. Pt denies any falls in the last year. HOME SUPPORT PRIOR TO ADMISSION: The patient lived alone with friends available to provide assistance and has hired house keeper. Physical Therapy Goals  Initiated 7/18/2023  1. Patient will move from supine to sit and sit to supine and roll side to side in bed with independence within 4 day(s). 2.  Patient will perform sit to stand with modified independence within 4 day(s). 3.  Patient will transfer from bed to chair and chair to bed with modified independence using the least restrictive device within 4 day(s). 4.  Patient will ambulate with modified independence for 150 feet with the least restrictive device within 4 day(s). 5.  Patient will ascend/descend 5 stairs with 1 handrail(s) with modified independence within 4 day(s). 6. Patient will verbalize and demonstrate understanding of spinal precautions (No bending, lifting greater than 5 lbs, or twisting; log-roll technique; frequent repositioning as instructed) within 4 days.    7/18/2023 1607 by Ana Sousa, PT  Outcome: Progressing  7/18/2023 1243 by Ana Sousa, PT  Outcome: Not Progressing   PHYSICAL THERAPY TREATMENT    Patient: Allan Dunbar (17 y.o. female)  Date: 7/18/2023  Diagnosis: S/P lumbar spinal fusion [Z98.1]  Spinal stenosis, lumbar region, with neurogenic claudication [M48.062]  Spondylolisthesis of lumbar region [M43.16]  Lumbar stenosis with neurogenic claudication [M48.062] Lumbar stenosis with neurogenic claudication  Procedure(s) (LRB):  REVISION LAMINECTOMY L2 - S1 AND POSTERIOR LUMBAR FUSION L2 - 4 (MAZOR/OARM)  (ERAS) (N/A) 1 Day Post-Op  Precautions:           Spinal

## 2023-07-18 NOTE — PLAN OF CARE
Problem: Skin/Tissue Integrity  Goal: Absence of new skin breakdown  Description: 1. Monitor for areas of redness and/or skin breakdown  2. Assess vascular access sites hourly  3. Every 4-6 hours minimum:  Change oxygen saturation probe site  4. Every 4-6 hours:  If on nasal continuous positive airway pressure, respiratory therapy assess nares and determine need for appliance change or resting period.   Outcome: Progressing     Problem: Pain  Goal: Verbalizes/displays adequate comfort level or baseline comfort level  Flowsheets (Taken 7/18/2023 0409)  Verbalizes/displays adequate comfort level or baseline comfort level:   Assess pain using appropriate pain scale   Encourage patient to monitor pain and request assistance   Administer analgesics based on type and severity of pain and evaluate response   Implement non-pharmacological measures as appropriate and evaluate response   Consider cultural and social influences on pain and pain management   Notify Licensed Independent Practitioner if interventions unsuccessful or patient reports new pain     Problem: Safety - Adult  Goal: Free from fall injury  Flowsheets (Taken 7/17/2023 2231)  Free From Fall Injury: Instruct family/caregiver on patient safety     Problem: Discharge Planning  Goal: Discharge to home or other facility with appropriate resources  Flowsheets (Taken 7/18/2023 040)  Discharge to home or other facility with appropriate resources:   Identify discharge learning needs (meds, wound care, etc)   Arrange for needed discharge resources and transportation as appropriate   Identify barriers to discharge with patient and caregiver   Refer to discharge planning if patient needs post-hospital services based on physician order or complex needs related to functional status, cognitive ability or social support system   Arrange for interpreters to assist at discharge as needed

## 2023-07-18 NOTE — PLAN OF CARE
Problem: Pain  Goal: Verbalizes/displays adequate comfort level or baseline comfort level  7/18/2023 1244 by Mariama Cornejo RN  Outcome: Progressing  7/18/2023 1244 by Mariama Cornejo RN  Outcome: Progressing  7/18/2023 0409 by Federica Mendoza RN  Flowsheets (Taken 7/18/2023 0409)  Verbalizes/displays adequate comfort level or baseline comfort level:   Assess pain using appropriate pain scale   Encourage patient to monitor pain and request assistance   Administer analgesics based on type and severity of pain and evaluate response   Implement non-pharmacological measures as appropriate and evaluate response   Consider cultural and social influences on pain and pain management   Notify Licensed Independent Practitioner if interventions unsuccessful or patient reports new pain     Problem: Physical Therapy - Adult  Goal: By Discharge: Performs mobility at highest level of function for planned discharge setting. See evaluation for individualized goals. Description: FUNCTIONAL STATUS PRIOR TO ADMISSION: Patient was independent to occasionally modified independent depending on her back pain. Pt denies any falls in the last year. HOME SUPPORT PRIOR TO ADMISSION: The patient lived alone with friends available to provide assistance and has hired house keeper. Physical Therapy Goals  Initiated 7/18/2023  1. Patient will move from supine to sit and sit to supine and roll side to side in bed with independence within 4 day(s). 2.  Patient will perform sit to stand with modified independence within 4 day(s). 3.  Patient will transfer from bed to chair and chair to bed with modified independence using the least restrictive device within 4 day(s). 4.  Patient will ambulate with modified independence for 150 feet with the least restrictive device within 4 day(s). 5.  Patient will ascend/descend 5 stairs with 1 handrail(s) with modified independence within 4 day(s).   6. Patient will verbalize and demonstrate understanding of spinal precautions (No bending, lifting greater than 5 lbs, or twisting; log-roll technique; frequent repositioning as instructed) within 4 days.    7/18/2023 1243 by Tan Piper, PT  Outcome: Not Progressing

## 2023-07-18 NOTE — CARE COORDINATION
Care Management Initial Assessment       RUR: 15%   Readmission? No  1st IM letter given? Yes -   1st  letter given: No  Dispo: Home with Family Assistance; Pending Therapies Recs   Preferred Pharmacy; [address] Transport: Family   Barrier; Medical, Therapies Recs, DME?,   The pt reported that she lives alone in a 2 level home but able to live on the first level. The pt reported that she has 5 steps to enter her home. The pt reported that she owns a Rollator and she is borrowing her neighbors RW.      07/18/23 1008   Service Assessment   Patient Orientation Alert and Oriented   Cognition Alert   History Provided By Patient   Primary [address] [place] Family Members   Patient's Healthcare Decision Maker is: Legal Next of [address] [place] [address]   PCP Verified by CM Yes   Last Visit to PCP Within last 3 months   Prior Functional Level Independent in ADLs/IADLs   Current Functional Level Independent in ADLs/IADLs   Can patient return to prior living arrangement Yes   Ability to make needs known: Good   Family able to assist with home care needs: Yes   Financial Resources Medicare   Social/Functional History   Lives With Alone   Type of [address] Two level; Able to Live on Main level with bedroom/bathroom   Home Access Stairs to enter with rails   Entrance Stairs - Number of Steps 5   Bathroom Shower/Tub Walk-in shower   Bathroom Toilet Standard   Bathroom Equipment Grab bars in shower   [address] Help From Family   ADL Assistance Independent   Homemaking Assistance Independent   Ambulation Assistance Independent   Transfer Assistance Independent   Active  Yes   Mode of Transportation Car   Discharge Planning   Type of [facility] Prior To Admission None   [address] Medications No   Type of [address] [facility];PT   Services At/After Discharge   Transition of Care Consult (CM Consult) Discharge Planning   Mode of Transport at Discharge Other (see comment)   Confirm Follow Up Transport Self

## 2023-07-18 NOTE — PLAN OF CARE
Problem: Occupational Therapy - Adult  Goal: By Discharge: Performs self-care activities at highest level of function for planned discharge setting. See evaluation for individualized goals. Description: FUNCTIONAL STATUS PRIOR TO ADMISSION:  Patient lives alone in a 2 level home with bed/bath available on 1st floor. She is active and independent with ADL/IADL. She does having a cleaning person  1x/month. Receives Help From: Friend(s) (), ADL Assistance: Independent,   Homemaking Assistance: Independent, Ambulation Assistance: Independent, Transfer Assistance: Independent, Active : Yes       Occupational Therapy Goals:  Initiated 7/18/2023    1. Patient will perform lower body bathing/dressing with Supervision using AE PRN within 7 days. 2.  Patient will perform toileting with Supervision using most appropriate DME within 7 days. 3.  Patient will perform toilet transfer at Supervision within 7 days. 4.  Patient will don/doff back brace at Supervision within 7 days. 5.  Patient will verbalize/demonstrate 3/3 back precautions during ADL tasks without cues within 7 days. 6.  Patient will perform grooming in stand with Supervision within 7 days. OCCUPATIONAL THERAPY EVALUATION    Patient: Maria Elena Gomez (66 y.o. female)  Date: 7/18/2023  Primary Diagnosis: S/P lumbar spinal fusion [Z98.1]  Spinal stenosis, lumbar region, with neurogenic claudication [M48.062]  Spondylolisthesis of lumbar region [M43.16]  Lumbar stenosis with neurogenic claudication [M48.062]  Procedure(s) (LRB):  REVISION LAMINECTOMY L2 - S1 AND POSTERIOR LUMBAR FUSION L2 - 4 (MAZOR/OARM)  (ERAS) (N/A) 1 Day Post-Op     Precautions:           Spinal Precautions: No Bending, No Lifting, No Twisting        ASSESSMENT :  The patient is limited by decreased functional mobility, independence in ADLs, high-level IADLs, ROM, strength, body mechanics, activity tolerance, endurance, balance, increased pain levels.  Patient's

## 2023-07-18 NOTE — CONSULTS
Consult for post op management  History and Physical    Date of Service:  7/18/2023  Primary Care Provider: Darlene Ng MD  Source of information: The patient and Chart review    Chief Complaint: Back Pain    History of Presenting Illness:   Sal Gary is a 76 y.o. female who presented to [facility] for a planned lumbar surgery. Pt underwent a revision laminectomy L2-S1 and posterioir lumbar fusion L2-4 on 7/17/2023. Hospitalists were consulted for postoperative medical management. Patient was seen and examined, she was lying in bed in no acute distress though reports back pain that has not been relieved with oxycodone. Discussed pain modality available, has IV Dilaudid that may be offered-discussed with nursing who ultimately deferred giving IV pain medicine due to patient's relative hypotension. Hypotension was accompanied by dizziness however patient reports that she has a history of dizziness and is not much different now from what she came in. Plan to hold patient's blood pressure medication (metoprolol) in light of hypotension. Will suggest that orthostatic measurements be taken when patient getting up with physical therapy. she otherwise denies any headaches, chest pain, chest pressure, shortness of breath. Denies any GI complaints such as nausea, vomiting, diarrhea or constipation. Kraft catheter was removed today and she has not yet felt the urge to void. Reviewed prior to admission med list, patient reports that she is missing a nasal spray similar to Flonase but this is not on her PTA med list.     REVIEW OF SYSTEMS:  As mentioned above in the HPI    Past Medical History:   Diagnosis Date    Arthritis     Breast cancer ([address]) 1999    Right    CAD (coronary artery disease)     ONE STENT INSERTED    Fibromyalgia     GERD (gastroesophageal reflux disease)     Hiatal hernia     Hyperlipidemia     Hypertension     Hyponatremia 05/2019    As of 1/28/2020 pt had a seizure as a result to this. 250 MG TABS tablet Take 1 tablet by mouth daily    Ar Automatic Reconciliation   metoprolol succinate (TOPROL XL) 25 MG extended release tablet Take 0.5 tablets by mouth nightly 1/25/23   Ar Automatic Reconciliation   pantoprazole (PROTONIX) 40 MG tablet Take 1 tablet by mouth 2 times daily    Ar Automatic Reconciliation   simvastatin (ZOCOR) 40 MG tablet TAKE 1 TABLET BY MOUTH ONCE DAILY AT NIGHT 2/22/23   Ar Automatic Reconciliation     Allergies   Allergen Reactions    Latex Itching and Rash     Other reaction(s): Rash  Other reaction(s): Adverse reaction to substance ([id number]); Severity: Mild      Other Anaphylaxis     Steak, cheese, grass, smoke    Penicillins Itching, Rash and Swelling     Mouth  Other reaction(s): ITCHING AND SWELLING  Other reaction(s): Adverse reaction to substance ([id number]); Severity: Mild    Tramadol Other (See Comments)     Hyponatremia, seizure    Cheese Itching and Swelling    Codeine Rash and Swelling     Mouth    Gabapentin Other (See Comments)     As of 1/28/2020, pt requests not to have this. It \"completely wipes me out. \"  Other reaction(s): WEAKNESS      Family History   Problem Relation Age of Onset    Cancer Mother         breast cancer    Heart Disease Father     No Known Problems Sister     Other Sister 55        Gastric bypass into staph infection    Heart Disease Brother     Diabetes Brother     Colon Cancer Brother     Diabetes Brother     No Known Problems Son     No Known Problems Son     Anesth Problems Neg Hx       Social History:  reports that she has never smoked. She has never used smokeless tobacco. She reports that she does not currently use alcohol. She reports that she does not use drugs.    Social Determinants of Health     Tobacco Use: Low Risk     Smoking Tobacco Use: Never    Smokeless Tobacco Use: Never    Passive Exposure: Not on file   Alcohol Use: Not At Risk    Frequency of Alcohol Consumption: Never    Average Number of Drinks: Patient does not drink

## 2023-07-18 NOTE — PROGRESS NOTES
Spine Surgery Progress Note    Admit Date: 7/17/2023   LOS: 1 day      Daily Progress Note: 7/18/2023    POD:1 Day Post-Op    S/P: Procedure(s):  REVISION LAMINECTOMY L2 - S1 AND POSTERIOR LUMBAR FUSION L2 - 4 (MAZOR/OARM)  (ERAS)    Vitals:    07/18/23 1501   BP: (!) 94/50   Pulse: 87   Resp:    Temp:    SpO2:       Lab Results   Component Value Date/Time    HGB 9.5 07/18/2023 01:53 AM    INR 1.0 07/05/2023 01:42 PM       Episode of hypotension. No nausea or vomiting. Complaints of pain radiating into left groin. States leg pain is worse than preop. Progressing with PT/OT   Ambulating without issue. Voiding without issue. Denies nausea, vomiting, fever, chills, chest pain, dyspnea, headache. Calves soft/NTTP Bilaterally. Moving BLE well. Neurocirculatory exam WNL. Motor and sensation intact. Incision OK; no drainage. Dressing clean and dry. Plan:  -Pain control   -PT/OT; in brace  -Bladder checks q6hr; straight cath  -Regular diet  -Monitor HVAC drain output; d/c tomorrow AM  -Bowel regimen  -Cont home meds as ordered  -IVF bolus  -Daily dressing changes to incision  -? Consider decadron for leg pain  [facility]     Discharge pending. All questions were answered. Follow up in Dr. Octaviano Guillory office in 2 weeks, sooner if needed.     [facility], [address] [place] [address]

## 2023-07-19 ENCOUNTER — APPOINTMENT (OUTPATIENT)
Facility: HOSPITAL | Age: 78
End: 2023-07-19
Attending: ORTHOPAEDIC SURGERY
Payer: MEDICARE

## 2023-07-19 LAB
ANION GAP SERPL CALC-SCNC: 4 MMOL/L (ref 5–15)
APPEARANCE UR: CLEAR
BACTERIA URNS QL MICRO: NEGATIVE /HPF
BASOPHILS # BLD: 0 K/UL (ref 0–0.1)
BASOPHILS NFR BLD: 0 % (ref 0–1)
BILIRUB UR QL: NEGATIVE
BUN SERPL-MCNC: 13 MG/DL (ref 6–20)
BUN/CREAT SERPL: 20 (ref 12–20)
CALCIUM SERPL-MCNC: 8.2 MG/DL (ref 8.5–10.1)
CHLORIDE SERPL-SCNC: 108 MMOL/L (ref 97–108)
CO2 SERPL-SCNC: 26 MMOL/L (ref 21–32)
COLOR UR: ABNORMAL
CREAT SERPL-MCNC: 0.66 MG/DL (ref 0.55–1.02)
DIFFERENTIAL METHOD BLD: ABNORMAL
EOSINOPHIL # BLD: 0 K/UL (ref 0–0.4)
EOSINOPHIL NFR BLD: 0 % (ref 0–7)
EPITH CASTS URNS QL MICRO: ABNORMAL /LPF
ERYTHROCYTE [DISTWIDTH] IN BLOOD BY AUTOMATED COUNT: 13.7 % (ref 11.5–14.5)
GLUCOSE BLD STRIP.AUTO-MCNC: 112 MG/DL (ref 65–117)
GLUCOSE BLD STRIP.AUTO-MCNC: 153 MG/DL (ref 65–117)
GLUCOSE BLD STRIP.AUTO-MCNC: 157 MG/DL (ref 65–117)
GLUCOSE BLD STRIP.AUTO-MCNC: 171 MG/DL (ref 65–117)
GLUCOSE BLD STRIP.AUTO-MCNC: 70 MG/DL (ref 65–117)
GLUCOSE SERPL-MCNC: 124 MG/DL (ref 65–100)
GLUCOSE UR STRIP.AUTO-MCNC: NEGATIVE MG/DL
HCT VFR BLD AUTO: 28.5 % (ref 35–47)
HGB BLD-MCNC: 8.9 G/DL (ref 11.5–16)
HGB UR QL STRIP: ABNORMAL
HYALINE CASTS URNS QL MICRO: ABNORMAL /LPF (ref 0–5)
IMM GRANULOCYTES # BLD AUTO: 0 K/UL
IMM GRANULOCYTES NFR BLD AUTO: 0 %
KETONES UR QL STRIP.AUTO: NEGATIVE MG/DL
LACTATE SERPL-SCNC: 1.1 MMOL/L (ref 0.4–2)
LEUKOCYTE ESTERASE UR QL STRIP.AUTO: NEGATIVE
LYMPHOCYTES # BLD: 2.5 K/UL (ref 0.8–3.5)
LYMPHOCYTES NFR BLD: 19 % (ref 12–49)
MCH RBC QN AUTO: 31.2 PG (ref 26–34)
MCHC RBC AUTO-ENTMCNC: 31.2 G/DL (ref 30–36.5)
MCV RBC AUTO: 100 FL (ref 80–99)
MONOCYTES # BLD: 1 K/UL (ref 0–1)
MONOCYTES NFR BLD: 8 % (ref 5–13)
NEUTS SEG # BLD: 9.4 K/UL (ref 1.8–8)
NEUTS SEG NFR BLD: 73 % (ref 32–75)
NITRITE UR QL STRIP.AUTO: NEGATIVE
NRBC # BLD: 0 K/UL (ref 0–0.01)
NRBC BLD-RTO: 0 PER 100 WBC
PH UR STRIP: 7.5 (ref 5–8)
PLATELET # BLD AUTO: 268 K/UL (ref 150–400)
PMV BLD AUTO: 8.5 FL (ref 8.9–12.9)
POTASSIUM SERPL-SCNC: 4.2 MMOL/L (ref 3.5–5.1)
PROT UR STRIP-MCNC: ABNORMAL MG/DL
RBC # BLD AUTO: 2.85 M/UL (ref 3.8–5.2)
RBC #/AREA URNS HPF: ABNORMAL /HPF (ref 0–5)
RBC MORPH BLD: ABNORMAL
SERVICE CMNT-IMP: ABNORMAL
SERVICE CMNT-IMP: NORMAL
SERVICE CMNT-IMP: NORMAL
SODIUM SERPL-SCNC: 138 MMOL/L (ref 136–145)
SP GR UR REFRACTOMETRY: 1.01 (ref 1–1.03)
URINE CULTURE IF INDICATED: ABNORMAL
UROBILINOGEN UR QL STRIP.AUTO: 0.2 EU/DL (ref 0.2–1)
WBC # BLD AUTO: 12.9 K/UL (ref 3.6–11)
WBC URNS QL MICRO: ABNORMAL /HPF (ref 0–4)

## 2023-07-19 PROCEDURE — 71045 X-RAY EXAM CHEST 1 VIEW: CPT

## 2023-07-19 PROCEDURE — 82962 GLUCOSE BLOOD TEST: CPT

## 2023-07-19 PROCEDURE — 83605 ASSAY OF LACTIC ACID: CPT

## 2023-07-19 PROCEDURE — 97530 THERAPEUTIC ACTIVITIES: CPT

## 2023-07-19 PROCEDURE — 2580000003 HC RX 258: Performed by: ORTHOPAEDIC SURGERY

## 2023-07-19 PROCEDURE — 2580000003 HC RX 258: Performed by: STUDENT IN AN ORGANIZED HEALTH CARE EDUCATION/TRAINING PROGRAM

## 2023-07-19 PROCEDURE — 36415 COLL VENOUS BLD VENIPUNCTURE: CPT

## 2023-07-19 PROCEDURE — 6370000000 HC RX 637 (ALT 250 FOR IP): Performed by: STUDENT IN AN ORGANIZED HEALTH CARE EDUCATION/TRAINING PROGRAM

## 2023-07-19 PROCEDURE — 1100000000 HC RM PRIVATE

## 2023-07-19 PROCEDURE — 97116 GAIT TRAINING THERAPY: CPT

## 2023-07-19 PROCEDURE — 85025 COMPLETE CBC W/AUTO DIFF WBC: CPT

## 2023-07-19 PROCEDURE — 80048 BASIC METABOLIC PNL TOTAL CA: CPT

## 2023-07-19 PROCEDURE — 6360000002 HC RX W HCPCS: Performed by: PHYSICIAN ASSISTANT

## 2023-07-19 PROCEDURE — 81001 URINALYSIS AUTO W/SCOPE: CPT

## 2023-07-19 PROCEDURE — 70450 CT HEAD/BRAIN W/O DYE: CPT

## 2023-07-19 RX ORDER — KETOROLAC TROMETHAMINE 30 MG/ML
15 INJECTION, SOLUTION INTRAMUSCULAR; INTRAVENOUS ONCE
Status: COMPLETED | OUTPATIENT
Start: 2023-07-19 | End: 2023-07-19

## 2023-07-19 RX ORDER — 0.9 % SODIUM CHLORIDE 0.9 %
500 INTRAVENOUS SOLUTION INTRAVENOUS ONCE
Status: COMPLETED | OUTPATIENT
Start: 2023-07-19 | End: 2023-07-19

## 2023-07-19 RX ORDER — LANOLIN ALCOHOL/MO/W.PET/CERES
400 CREAM (GRAM) TOPICAL DAILY
Status: DISCONTINUED | OUTPATIENT
Start: 2023-07-20 | End: 2023-07-22 | Stop reason: HOSPADM

## 2023-07-19 RX ADMIN — LEVOTHYROXINE SODIUM 50 MCG: 0.05 TABLET ORAL at 05:49

## 2023-07-19 RX ADMIN — FAMOTIDINE 20 MG: 20 TABLET, FILM COATED ORAL at 21:52

## 2023-07-19 RX ADMIN — KETOROLAC TROMETHAMINE 15 MG: 30 INJECTION, SOLUTION INTRAMUSCULAR; INTRAVENOUS at 15:18

## 2023-07-19 RX ADMIN — CLOPIDOGREL BISULFATE 75 MG: 75 TABLET ORAL at 10:00

## 2023-07-19 RX ADMIN — SODIUM CHLORIDE 500 ML: 9 INJECTION, SOLUTION INTRAVENOUS at 11:21

## 2023-07-19 RX ADMIN — POLYETHYLENE GLYCOL 3350 17 G: 17 POWDER, FOR SOLUTION ORAL at 10:00

## 2023-07-19 RX ADMIN — SODIUM CHLORIDE, PRESERVATIVE FREE 10 ML: 5 INJECTION INTRAVENOUS at 22:16

## 2023-07-19 RX ADMIN — METHENAMINE HIPPURATE 1 G: 1 TABLET ORAL at 10:04

## 2023-07-19 RX ADMIN — ATORVASTATIN CALCIUM 20 MG: 20 TABLET, FILM COATED ORAL at 21:52

## 2023-07-19 RX ADMIN — Medication 100 MCG: at 10:01

## 2023-07-19 RX ADMIN — ACETAMINOPHEN 1000 MG: 500 TABLET ORAL at 12:12

## 2023-07-19 RX ADMIN — SENNOSIDES AND DOCUSATE SODIUM 1 TABLET: 50; 8.6 TABLET ORAL at 21:52

## 2023-07-19 RX ADMIN — METHENAMINE HIPPURATE 1 G: 1 TABLET ORAL at 21:52

## 2023-07-19 RX ADMIN — FLUOROMETHOLONE 2 DROP: 1 SOLUTION/ DROPS OPHTHALMIC at 08:22

## 2023-07-19 RX ADMIN — FLUOROMETHOLONE 2 DROP: 1 SOLUTION/ DROPS OPHTHALMIC at 22:13

## 2023-07-19 RX ADMIN — ASPIRIN 81 MG: 81 TABLET, COATED ORAL at 10:00

## 2023-07-19 RX ADMIN — PANTOPRAZOLE SODIUM 40 MG: 40 TABLET, DELAYED RELEASE ORAL at 05:49

## 2023-07-19 RX ADMIN — CETIRIZINE HYDROCHLORIDE 10 MG: 10 TABLET, FILM COATED ORAL at 10:01

## 2023-07-19 RX ADMIN — OXYCODONE HYDROCHLORIDE 5 MG: 5 TABLET ORAL at 04:21

## 2023-07-19 RX ADMIN — SENNOSIDES AND DOCUSATE SODIUM 1 TABLET: 50; 8.6 TABLET ORAL at 10:00

## 2023-07-19 RX ADMIN — ACETAMINOPHEN 1000 MG: 500 TABLET ORAL at 18:11

## 2023-07-19 RX ADMIN — Medication 1000 UNITS: at 10:04

## 2023-07-19 RX ADMIN — FAMOTIDINE 20 MG: 20 TABLET, FILM COATED ORAL at 10:00

## 2023-07-19 RX ADMIN — MAGNESIUM OXIDE TAB 400 MG (241.3 MG ELEMENTAL MG) 400 MG: 400 (241.3 MG) TAB at 10:04

## 2023-07-19 ASSESSMENT — PAIN DESCRIPTION - LOCATION: LOCATION: BACK

## 2023-07-19 ASSESSMENT — PAIN DESCRIPTION - DESCRIPTORS: DESCRIPTORS: ACHING

## 2023-07-19 ASSESSMENT — PAIN DESCRIPTION - ORIENTATION: ORIENTATION: LOWER

## 2023-07-19 ASSESSMENT — PAIN SCALES - WONG BAKER: WONGBAKER_NUMERICALRESPONSE: 4

## 2023-07-19 ASSESSMENT — PAIN SCALES - GENERAL: PAINLEVEL_OUTOF10: 4

## 2023-07-19 ASSESSMENT — PAIN - FUNCTIONAL ASSESSMENT: PAIN_FUNCTIONAL_ASSESSMENT: ACTIVITIES ARE NOT PREVENTED

## 2023-07-19 NOTE — PLAN OF CARE
Problem: Physical Therapy - Adult  Goal: By Discharge: Performs mobility at highest level of function for planned discharge setting. See evaluation for individualized goals. Description: FUNCTIONAL STATUS PRIOR TO ADMISSION: Patient was independent to occasionally modified independent depending on her back pain. Pt denies any falls in the last year. HOME SUPPORT PRIOR TO ADMISSION: The patient lived alone with friends available to provide assistance and has hired house keeper. Physical Therapy Goals  Initiated 7/18/2023  1. Patient will move from supine to sit and sit to supine and roll side to side in bed with independence within 4 day(s). 2.  Patient will perform sit to stand with modified independence within 4 day(s). 3.  Patient will transfer from bed to chair and chair to bed with modified independence using the least restrictive device within 4 day(s). 4.  Patient will ambulate with modified independence for 150 feet with the least restrictive device within 4 day(s). 5.  Patient will ascend/descend 5 stairs with 1 handrail(s) with modified independence within 4 day(s). 6. Patient will verbalize and demonstrate understanding of spinal precautions (No bending, lifting greater than 5 lbs, or twisting; log-roll technique; frequent repositioning as instructed) within 4 days.    Outcome: Not Progressing   PHYSICAL THERAPY TREATMENT    Patient: Sachi Mcgarry (46 y.o. female)  Date: 7/19/2023  Diagnosis: S/P lumbar spinal fusion [Z98.1]  Spinal stenosis, lumbar region, with neurogenic claudication [M48.062]  Spondylolisthesis of lumbar region [M43.16]  Lumbar stenosis with neurogenic claudication [M48.062] Lumbar stenosis with neurogenic claudication  Procedure(s) (LRB):  REVISION LAMINECTOMY L2 - S1 AND POSTERIOR LUMBAR FUSION L2 - 4 (MAZOR/OARM)  (ERAS) (N/A) 2 Days Post-Op  Precautions:           Spinal Precautions: No Bending, No Lifting, No Twisting          ASSESSMENT:  Patient continues to place; Disoriented to time;Disoriented to situation  Cognition  Overall Cognitive Status: Exceptions  Following Commands: Follows all commands without difficulty  Memory: Decreased recall of recent events;Decreased short term memory;Decreased recall of precautions  Safety Judgement: Decreased awareness of need for assistance;Decreased awareness of need for safety  Insights: Decreased awareness of deficits  Initiation: Requires cues for all  Sequencing: Requires cues for all    Functional Mobility Training:  Bed Mobility:  Bed Mobility Training  Bed Mobility Training: Yes  Rolling: Minimum assistance;Assist X1;Additional time; Adaptive equipment  Supine to Sit: Assist X1;Additional time; Adaptive equipment; Moderate assistance  Sit to Supine: Minimum assistance;Assist X1;Additional time; Adaptive equipment  Scooting: Contact-guard assistance;Assist X1;Additional time  Transfers:  Transfer Training  Sit to Stand: Minimum assistance;Assist X1  Stand to Sit: Contact-guard assistance;Assist X1  Bed to chair: minimal assist x 1  Balance:  Balance  Sitting: Intact  Standing: Impaired  Standing - Static: Constant support; Fair  Standing - Dynamic: Constant support; Fair   Ambulation/Gait Training:        Neuro Re-Education:                                                                                                                                                                                                                                         Intervention/Education specific to: \"Spinal fusion or laminectomy\"    The patient stated 0/3 spinal precautions when prompted. Reviewed all 3 precautions.                                                                                                                                                                                                                                  Pain Ratin/10, R groin, reports more painful than her back pain   Activity Tolerance:   Poor, requires

## 2023-07-19 NOTE — PLAN OF CARE
person;Disoriented to place; Disoriented to situation  Cognition  Overall Cognitive Status: Exceptions  Following Commands: Follows all commands without difficulty  Attention Span: Appears intact  Memory: Decreased recall of recent events;Decreased short term memory  Safety Judgement: Decreased awareness of need for assistance;Decreased awareness of need for safety  Insights: Decreased awareness of deficits    Functional Mobility Training:  Bed Mobility:  Bed Mobility Training  Bed Mobility Training: Yes  Rolling: Minimum assistance;Assist X1;Additional time; Adaptive equipment  Supine to Sit: Assist X1;Additional time; Adaptive equipment; Moderate assistance  Scooting: Contact-guard assistance;Assist X1;Additional time  Transfers:  Transfer Training  Sit to Stand: Minimum assistance;Assist X1  Stand to Sit: Contact-guard assistance;Assist X1  Balance:  Balance  Sitting: Intact  Standing: Impaired  Standing - Static: Constant support; Fair  Standing - Dynamic: Constant support; Fair   Ambulation/Gait Training:     Gait  Overall Level of Assistance: Contact-guard assistance;Assist X1  Interventions: Safety awareness training;Verbal cues  Speed/Neva: Pace decreased (< 100 feet/min)  Step Length: Right shortened;Left shortened  Gait Abnormalities: Antalgic;Decreased step clearance (flexed posture)  Distance (ft):  (9 feet x 1, 7 feet x 1, 15 feet x 1, seated rest break between each trial)  Assistive Device: Brace/splint; Walker, rolling;Gait belt                                                                                                                                                                                                                               Intervention/Education specific to: \"Spinal fusion or laminectomy\"    Educated on and reviewed log roll technique for bed mobility. The patient stated 3/3 spinal precautions when prompted.  Reviewed all 3 precautions and discussed sitting for approximately 30

## 2023-07-19 NOTE — CARE COORDINATION
Transition of Care Plan:    RUR: 11%   Prior Level of Functioning: Independent    Disposition: SNF   If SNF or IPR: Date FOC offered: 7/19  Date [address] received: 7/19  [facility]  Date authorization started with reference number: N/A   Date authorization received and expires: N/A   Follow up appointments: PCP   DME needed: None   Transportation at discharge: BLS   IM/IMM Medicare/ letter given: 2nd IMM Letter Needed   Caregiver Contact: Viri Perdomo (Brother/Sister)   [phone]   Barriers to discharge: Medical       07/19/23 1146   Condition of Participation: Discharge Planning   The Plan for Transition of Care is related to the following treatment goals: Skilled Nursing Services   The Patient and/or Patient Representative was provided with a Choice of Provider? Patient   The Patient and/Or Patient Representative agree with the Discharge Plan? Yes   Freedom of Choice list was provided with basic dialogue that supports the patient's individualized plan of care/goals, treatment preferences, and shares the quality data associated with the providers?   Yes

## 2023-07-19 NOTE — PROGRESS NOTES
0100: Bedside shift change report given to Robb Benson (oncoming nurse) by RN (offgoing nurse). Report included the following information Nurse Handoff Report, Index, Intake/Output, and MAR.         0200: Pt refusing labs at this time. States that \" someone just did this a few hours ago and I'm tired of being stuck\". Explained to patient that she had an IV placed a few hours ago but that she still needed blood work done. Pt allowed RN to attempt x1 and refused any further attempts. RN to reassess later in shift. 0630: Pt stand up at side of bed and stating that she \"has a gun\". Pt states that we are \"running a scam here\" and she wants us to call the police or security. RN attempted to reassure patient and put her back in bed. Pt refusing at this time. Nursing supervisors and Security notified. 5: RN spoke with son and son was able to reassure patient to get back in bed. Pt in bed and bed alarm is on. 0730: Bedside shift change report given to RN (oncoming nurse) by Robb Benson  (offgoing nurse). Report included the following information Nurse Handoff Report, Index, Intake/Output, and MAR.

## 2023-07-20 LAB
ERYTHROCYTE [DISTWIDTH] IN BLOOD BY AUTOMATED COUNT: 13.7 % (ref 11.5–14.5)
GLUCOSE BLD STRIP.AUTO-MCNC: 121 MG/DL (ref 65–117)
GLUCOSE BLD STRIP.AUTO-MCNC: 134 MG/DL (ref 65–117)
GLUCOSE BLD STRIP.AUTO-MCNC: 136 MG/DL (ref 65–117)
GLUCOSE BLD STRIP.AUTO-MCNC: 142 MG/DL (ref 65–117)
HCT VFR BLD AUTO: 24.1 % (ref 35–47)
HGB BLD-MCNC: 7.7 G/DL (ref 11.5–16)
MCH RBC QN AUTO: 31.6 PG (ref 26–34)
MCHC RBC AUTO-ENTMCNC: 32 G/DL (ref 30–36.5)
MCV RBC AUTO: 98.8 FL (ref 80–99)
NRBC # BLD: 0 K/UL (ref 0–0.01)
NRBC BLD-RTO: 0 PER 100 WBC
PLATELET # BLD AUTO: 263 K/UL (ref 150–400)
PMV BLD AUTO: 8.8 FL (ref 8.9–12.9)
RBC # BLD AUTO: 2.44 M/UL (ref 3.8–5.2)
SERVICE CMNT-IMP: ABNORMAL
WBC # BLD AUTO: 13.1 K/UL (ref 3.6–11)

## 2023-07-20 PROCEDURE — 36415 COLL VENOUS BLD VENIPUNCTURE: CPT

## 2023-07-20 PROCEDURE — 82962 GLUCOSE BLOOD TEST: CPT

## 2023-07-20 PROCEDURE — 1100000000 HC RM PRIVATE

## 2023-07-20 PROCEDURE — 6370000000 HC RX 637 (ALT 250 FOR IP): Performed by: ORTHOPAEDIC SURGERY

## 2023-07-20 PROCEDURE — 97535 SELF CARE MNGMENT TRAINING: CPT

## 2023-07-20 PROCEDURE — 85027 COMPLETE CBC AUTOMATED: CPT

## 2023-07-20 PROCEDURE — 6370000000 HC RX 637 (ALT 250 FOR IP): Performed by: STUDENT IN AN ORGANIZED HEALTH CARE EDUCATION/TRAINING PROGRAM

## 2023-07-20 PROCEDURE — 2580000003 HC RX 258: Performed by: STUDENT IN AN ORGANIZED HEALTH CARE EDUCATION/TRAINING PROGRAM

## 2023-07-20 RX ORDER — OXYCODONE HYDROCHLORIDE 5 MG/1
2.5 TABLET ORAL EVERY 4 HOURS PRN
Status: DISCONTINUED | OUTPATIENT
Start: 2023-07-20 | End: 2023-07-22 | Stop reason: HOSPADM

## 2023-07-20 RX ADMIN — ACETAMINOPHEN 1000 MG: 500 TABLET ORAL at 06:29

## 2023-07-20 RX ADMIN — CYCLOBENZAPRINE 10 MG: 10 TABLET, FILM COATED ORAL at 02:24

## 2023-07-20 RX ADMIN — SENNOSIDES AND DOCUSATE SODIUM 1 TABLET: 50; 8.6 TABLET ORAL at 21:11

## 2023-07-20 RX ADMIN — PANTOPRAZOLE SODIUM 40 MG: 40 TABLET, DELAYED RELEASE ORAL at 15:15

## 2023-07-20 RX ADMIN — METHENAMINE HIPPURATE 1 G: 1 TABLET ORAL at 21:11

## 2023-07-20 RX ADMIN — CLOPIDOGREL BISULFATE 75 MG: 75 TABLET ORAL at 09:08

## 2023-07-20 RX ADMIN — SENNOSIDES AND DOCUSATE SODIUM 1 TABLET: 50; 8.6 TABLET ORAL at 09:08

## 2023-07-20 RX ADMIN — OXYCODONE HYDROCHLORIDE 2.5 MG: 5 TABLET ORAL at 19:25

## 2023-07-20 RX ADMIN — Medication 1000 UNITS: at 09:14

## 2023-07-20 RX ADMIN — LEVOTHYROXINE SODIUM 50 MCG: 0.05 TABLET ORAL at 06:28

## 2023-07-20 RX ADMIN — FLUOROMETHOLONE 2 DROP: 1 SOLUTION/ DROPS OPHTHALMIC at 21:11

## 2023-07-20 RX ADMIN — CYCLOBENZAPRINE 10 MG: 10 TABLET, FILM COATED ORAL at 10:02

## 2023-07-20 RX ADMIN — CETIRIZINE HYDROCHLORIDE 10 MG: 10 TABLET, FILM COATED ORAL at 09:08

## 2023-07-20 RX ADMIN — METHENAMINE HIPPURATE 1 G: 1 TABLET ORAL at 09:13

## 2023-07-20 RX ADMIN — OXYCODONE HYDROCHLORIDE 2.5 MG: 5 TABLET ORAL at 12:31

## 2023-07-20 RX ADMIN — ACETAMINOPHEN 1000 MG: 500 TABLET ORAL at 01:27

## 2023-07-20 RX ADMIN — MAGNESIUM OXIDE TAB 400 MG (241.3 MG ELEMENTAL MG) 400 MG: 400 (241.3 MG) TAB at 09:08

## 2023-07-20 RX ADMIN — ACETAMINOPHEN 1000 MG: 500 TABLET ORAL at 19:25

## 2023-07-20 RX ADMIN — FAMOTIDINE 20 MG: 20 TABLET, FILM COATED ORAL at 21:10

## 2023-07-20 RX ADMIN — Medication 100 MCG: at 09:08

## 2023-07-20 RX ADMIN — PANTOPRAZOLE SODIUM 40 MG: 40 TABLET, DELAYED RELEASE ORAL at 06:29

## 2023-07-20 RX ADMIN — FAMOTIDINE 20 MG: 20 TABLET, FILM COATED ORAL at 09:09

## 2023-07-20 RX ADMIN — SODIUM CHLORIDE, PRESERVATIVE FREE 10 ML: 5 INJECTION INTRAVENOUS at 09:10

## 2023-07-20 RX ADMIN — SODIUM CHLORIDE, PRESERVATIVE FREE 10 ML: 5 INJECTION INTRAVENOUS at 21:31

## 2023-07-20 RX ADMIN — CLOBETASOL PROPIONATE: 0.5 CREAM TOPICAL at 21:11

## 2023-07-20 RX ADMIN — ATORVASTATIN CALCIUM 20 MG: 20 TABLET, FILM COATED ORAL at 21:11

## 2023-07-20 RX ADMIN — POLYETHYLENE GLYCOL 3350 17 G: 17 POWDER, FOR SOLUTION ORAL at 09:14

## 2023-07-20 RX ADMIN — FLUOROMETHOLONE 2 DROP: 1 SOLUTION/ DROPS OPHTHALMIC at 09:10

## 2023-07-20 RX ADMIN — ASPIRIN 81 MG: 81 TABLET, COATED ORAL at 09:08

## 2023-07-20 ASSESSMENT — PAIN DESCRIPTION - ORIENTATION
ORIENTATION: RIGHT;LEFT
ORIENTATION: ANTERIOR;POSTERIOR

## 2023-07-20 ASSESSMENT — PAIN DESCRIPTION - LOCATION
LOCATION: BACK;PELVIS
LOCATION: LEG

## 2023-07-20 ASSESSMENT — PAIN SCALES - GENERAL: PAINLEVEL_OUTOF10: 7

## 2023-07-20 ASSESSMENT — PAIN DESCRIPTION - DESCRIPTORS: DESCRIPTORS: THROBBING

## 2023-07-20 NOTE — PLAN OF CARE
Problem: Occupational Therapy - Adult  Goal: By Discharge: Performs self-care activities at highest level of function for planned discharge setting. See evaluation for individualized goals. Description: FUNCTIONAL STATUS PRIOR TO ADMISSION:  Patient lives alone in a 2 level home with bed/bath available on 1st floor. She is active and independent with ADL/IADL. She does having a cleaning person  1x/month. Receives Help From: Friend(s) (), ADL Assistance: Independent,   Homemaking Assistance: Independent, Ambulation Assistance: Independent, Transfer Assistance: Independent, Active : Yes       Occupational Therapy Goals:  Initiated 7/18/2023    1. Patient will perform lower body bathing/dressing with Supervision using AE PRN within 7 days. 2.  Patient will perform toileting with Supervision using most appropriate DME within 7 days. 3.  Patient will perform toilet transfer at Supervision within 7 days. 4.  Patient will don/doff back brace at Supervision within 7 days. 5.  Patient will verbalize/demonstrate 3/3 back precautions during ADL tasks without cues within 7 days. 6.  Patient will perform grooming in stand with Supervision within 7 days. 7/20/2023 1219 by Dennis Angelucci  Outcome: Progressing    OCCUPATIONAL THERAPY TREATMENT  Patient: Zonia Solorzano (49 y.o. female)  Date: 7/20/2023  Primary Diagnosis: S/P lumbar spinal fusion [Z98.1]  Spinal stenosis, lumbar region, with neurogenic claudication [M48.062]  Spondylolisthesis of lumbar region [M43.16]  Lumbar stenosis with neurogenic claudication [M48.062]  Procedure(s) (LRB):  REVISION LAMINECTOMY L2 - S1 AND POSTERIOR LUMBAR FUSION L2 - 4 (MAZOR/OARM)  (ERAS) (N/A) 3 Days Post-Op   Precautions:           Spinal Precautions: No Bending, No Lifting, No Twisting      Chart, occupational therapy assessment, plan of care, and goals were reviewed.     ASSESSMENT  Patient continues to benefit from skilled OT services and is X1      Balance:  Standing: With support  Balance  Sitting: Intact  Standing: With support  Standing - Static: Constant support; Fair  Standing - Dynamic: Constant support; Fair      ADL Intervention:      Grooming: Contact guard assistance   Grooming Skilled Clinical Factors: Standing at the sink to wash hands, requiring one verbal cue to prevent crossing midline in reaching      Toileting: Minimal assistance  Toileting Skilled Clinical Factors: min A to transfer off toilet        Pain Ratin/10   Pain Intervention(s):   nursing notified and addressing      Activity Tolerance:   Fair   Please refer to the flowsheet for vital signs taken during this treatment. After treatment:   Patient left in no apparent distress sitting up in chair, Call bell within reach, and Caregiver / family present    COMMUNICATION/EDUCATION:   The patient's plan of care was discussed with: registered nurse      Thank you for this referral.  Bianca Kraft  Minutes: 20     Regarding student involvement in patient care:  A student participated in this treatment session. Per CMS Medicare statements and AOTA guidelines I certify that the following was true:  1. I was present and directly observed the entire session. 2. I made all skilled judgments and clinical decisions regarding care. 3. I am the practitioner responsible for assessment, treatment, and documentation.

## 2023-07-20 NOTE — PROGRESS NOTES
[address] [facility]  Hospitalist Group                                                                                          Hospitalist Progress Note  Violet Flanagan  Answering service: [phone] from in house phone        Date of Service:  2023  NAME:  Gurmeet Rojo  :  1945  MRN:  [id number]      Admission Summary:   Gurmeet Rojo is a 76 y.o. female who presented to [facility] for a planned lumbar surgery. Pt underwent a revision laminectomy L2-S1 and posterioir lumbar fusion L2-4 on 2023. Hospitalists were consulted for postoperative medical management. Patient was seen and examined, she was lying in bed in no acute distress though reports back pain that has not been relieved with oxycodone. Discussed pain modality available, has IV Dilaudid that may be offered-discussed with nursing who ultimately deferred giving IV pain medicine due to patient's relative hypotension. Hypotension was accompanied by dizziness however patient reports that she has a history of dizziness and is not much different now from what she came in. Plan to hold patient's blood pressure medication (metoprolol) in light of hypotension. Will suggest that orthostatic measurements be taken when patient getting up with physical therapy. she otherwise denies any headaches, chest pain, chest pressure, shortness of breath. Denies any GI complaints such as nausea, vomiting, diarrhea or constipation. Kraft catheter was removed today and she has not yet felt the urge to void. Reviewed prior to admission med list, patient reports that she is missing a nasal spray similar to Flonase but this is not on her PTA med list.    Interval history / Subjective:   No acute interval events, patient still having back pain.  Explained to her that this is common post-operatively and she should ambulate as tolerated and work closely with PT to give herself the best chance at pain reduction in the future

## 2023-07-20 NOTE — PROGRESS NOTES
Physical Therapy Note    1400 Patient has just returned to bed and requests time to rest    [phone] Patient is sleeping soundly and does not wake to her voice. Will continue to allow patient to rest. Patient to D/C to SNF tomorrow.

## 2023-07-20 NOTE — PROGRESS NOTES
Patient IV infiltrated. This RN and other primary RN unsuccessful in attempts to put in new IV. Patient requested to wait for another attempt at placement. Educated patient on need for IV, patient understood, requested to have a break as wanted to rest. RN will update dayshift RN at morning report.

## 2023-07-21 LAB
ANION GAP SERPL CALC-SCNC: 5 MMOL/L (ref 5–15)
BASOPHILS # BLD: 0 K/UL (ref 0–0.1)
BASOPHILS NFR BLD: 0 % (ref 0–1)
BUN SERPL-MCNC: 8 MG/DL (ref 6–20)
BUN/CREAT SERPL: 15 (ref 12–20)
CALCIUM SERPL-MCNC: 9.1 MG/DL (ref 8.5–10.1)
CHLORIDE SERPL-SCNC: 107 MMOL/L (ref 97–108)
CO2 SERPL-SCNC: 24 MMOL/L (ref 21–32)
CREAT SERPL-MCNC: 0.52 MG/DL (ref 0.55–1.02)
DIFFERENTIAL METHOD BLD: ABNORMAL
EOSINOPHIL # BLD: 0.1 K/UL (ref 0–0.4)
EOSINOPHIL NFR BLD: 1 % (ref 0–7)
ERYTHROCYTE [DISTWIDTH] IN BLOOD BY AUTOMATED COUNT: 13.8 % (ref 11.5–14.5)
GLUCOSE BLD STRIP.AUTO-MCNC: 104 MG/DL (ref 65–117)
GLUCOSE BLD STRIP.AUTO-MCNC: 110 MG/DL (ref 65–117)
GLUCOSE BLD STRIP.AUTO-MCNC: 147 MG/DL (ref 65–117)
GLUCOSE BLD STRIP.AUTO-MCNC: 99 MG/DL (ref 65–117)
GLUCOSE SERPL-MCNC: 117 MG/DL (ref 65–100)
HCT VFR BLD AUTO: 27.8 % (ref 35–47)
HGB BLD-MCNC: 9 G/DL (ref 11.5–16)
IMM GRANULOCYTES # BLD AUTO: 0.1 K/UL (ref 0–0.04)
IMM GRANULOCYTES NFR BLD AUTO: 1 % (ref 0–0.5)
LYMPHOCYTES # BLD: 3.5 K/UL (ref 0.8–3.5)
LYMPHOCYTES NFR BLD: 24 % (ref 12–49)
MCH RBC QN AUTO: 31.8 PG (ref 26–34)
MCHC RBC AUTO-ENTMCNC: 32.4 G/DL (ref 30–36.5)
MCV RBC AUTO: 98.2 FL (ref 80–99)
MONOCYTES # BLD: 1.4 K/UL (ref 0–1)
MONOCYTES NFR BLD: 10 % (ref 5–13)
NEUTS SEG # BLD: 9.4 K/UL (ref 1.8–8)
NEUTS SEG NFR BLD: 64 % (ref 32–75)
NRBC # BLD: 0 K/UL (ref 0–0.01)
NRBC BLD-RTO: 0 PER 100 WBC
PLATELET # BLD AUTO: 322 K/UL (ref 150–400)
PMV BLD AUTO: 8.6 FL (ref 8.9–12.9)
POTASSIUM SERPL-SCNC: 3.7 MMOL/L (ref 3.5–5.1)
PROCALCITONIN SERPL-MCNC: 0.14 NG/ML
RBC # BLD AUTO: 2.83 M/UL (ref 3.8–5.2)
SERVICE CMNT-IMP: ABNORMAL
SERVICE CMNT-IMP: NORMAL
SODIUM SERPL-SCNC: 136 MMOL/L (ref 136–145)
WBC # BLD AUTO: 14.6 K/UL (ref 3.6–11)

## 2023-07-21 PROCEDURE — 80048 BASIC METABOLIC PNL TOTAL CA: CPT

## 2023-07-21 PROCEDURE — 82962 GLUCOSE BLOOD TEST: CPT

## 2023-07-21 PROCEDURE — 1100000000 HC RM PRIVATE

## 2023-07-21 PROCEDURE — 6370000000 HC RX 637 (ALT 250 FOR IP): Performed by: STUDENT IN AN ORGANIZED HEALTH CARE EDUCATION/TRAINING PROGRAM

## 2023-07-21 PROCEDURE — 2580000003 HC RX 258: Performed by: STUDENT IN AN ORGANIZED HEALTH CARE EDUCATION/TRAINING PROGRAM

## 2023-07-21 PROCEDURE — 6370000000 HC RX 637 (ALT 250 FOR IP): Performed by: ORTHOPAEDIC SURGERY

## 2023-07-21 PROCEDURE — 36415 COLL VENOUS BLD VENIPUNCTURE: CPT

## 2023-07-21 PROCEDURE — 97530 THERAPEUTIC ACTIVITIES: CPT

## 2023-07-21 PROCEDURE — 85025 COMPLETE CBC W/AUTO DIFF WBC: CPT

## 2023-07-21 PROCEDURE — 99024 POSTOP FOLLOW-UP VISIT: CPT | Performed by: PHYSICIAN ASSISTANT

## 2023-07-21 PROCEDURE — 84145 PROCALCITONIN (PCT): CPT

## 2023-07-21 RX ORDER — OXYCODONE HYDROCHLORIDE 5 MG/1
2.5 TABLET ORAL EVERY 4 HOURS PRN
Qty: 30 TABLET | Refills: 0 | Status: SHIPPED | OUTPATIENT
Start: 2023-07-21 | End: 2023-07-21 | Stop reason: SDUPTHER

## 2023-07-21 RX ORDER — OXYCODONE HYDROCHLORIDE 5 MG/1
2.5 TABLET ORAL EVERY 4 HOURS PRN
Qty: 30 TABLET | Refills: 0 | Status: SHIPPED | OUTPATIENT
Start: 2023-07-21 | End: 2023-07-31

## 2023-07-21 RX ADMIN — PANTOPRAZOLE SODIUM 40 MG: 40 TABLET, DELAYED RELEASE ORAL at 15:05

## 2023-07-21 RX ADMIN — FLUOROMETHOLONE 2 DROP: 1 SOLUTION/ DROPS OPHTHALMIC at 22:04

## 2023-07-21 RX ADMIN — ASPIRIN 81 MG: 81 TABLET, COATED ORAL at 09:10

## 2023-07-21 RX ADMIN — SODIUM CHLORIDE, PRESERVATIVE FREE 10 ML: 5 INJECTION INTRAVENOUS at 09:11

## 2023-07-21 RX ADMIN — FLUOROMETHOLONE 2 DROP: 1 SOLUTION/ DROPS OPHTHALMIC at 09:10

## 2023-07-21 RX ADMIN — OXYCODONE HYDROCHLORIDE 2.5 MG: 5 TABLET ORAL at 20:31

## 2023-07-21 RX ADMIN — ATORVASTATIN CALCIUM 20 MG: 20 TABLET, FILM COATED ORAL at 22:01

## 2023-07-21 RX ADMIN — SENNOSIDES AND DOCUSATE SODIUM 1 TABLET: 50; 8.6 TABLET ORAL at 09:10

## 2023-07-21 RX ADMIN — METHENAMINE HIPPURATE 1 G: 1 TABLET ORAL at 22:01

## 2023-07-21 RX ADMIN — POLYETHYLENE GLYCOL 3350 17 G: 17 POWDER, FOR SOLUTION ORAL at 09:09

## 2023-07-21 RX ADMIN — FAMOTIDINE 20 MG: 20 TABLET, FILM COATED ORAL at 22:01

## 2023-07-21 RX ADMIN — Medication 1000 UNITS: at 09:12

## 2023-07-21 RX ADMIN — LEVOTHYROXINE SODIUM 50 MCG: 0.05 TABLET ORAL at 06:33

## 2023-07-21 RX ADMIN — OXYCODONE HYDROCHLORIDE 2.5 MG: 5 TABLET ORAL at 04:44

## 2023-07-21 RX ADMIN — OXYCODONE HYDROCHLORIDE 2.5 MG: 5 TABLET ORAL at 09:19

## 2023-07-21 RX ADMIN — SENNOSIDES AND DOCUSATE SODIUM 1 TABLET: 50; 8.6 TABLET ORAL at 22:01

## 2023-07-21 RX ADMIN — ACETAMINOPHEN 1000 MG: 500 TABLET ORAL at 06:32

## 2023-07-21 RX ADMIN — CLOPIDOGREL BISULFATE 75 MG: 75 TABLET ORAL at 09:10

## 2023-07-21 RX ADMIN — CETIRIZINE HYDROCHLORIDE 10 MG: 10 TABLET, FILM COATED ORAL at 09:09

## 2023-07-21 RX ADMIN — OXYCODONE HYDROCHLORIDE 2.5 MG: 5 TABLET ORAL at 15:05

## 2023-07-21 RX ADMIN — Medication 100 MCG: at 09:10

## 2023-07-21 RX ADMIN — CYCLOBENZAPRINE 10 MG: 10 TABLET, FILM COATED ORAL at 01:42

## 2023-07-21 RX ADMIN — CYCLOBENZAPRINE 10 MG: 10 TABLET, FILM COATED ORAL at 13:22

## 2023-07-21 RX ADMIN — LEVOTHYROXINE SODIUM 25 MCG: 0.03 TABLET ORAL at 06:33

## 2023-07-21 RX ADMIN — PANTOPRAZOLE SODIUM 40 MG: 40 TABLET, DELAYED RELEASE ORAL at 06:33

## 2023-07-21 RX ADMIN — FAMOTIDINE 20 MG: 20 TABLET, FILM COATED ORAL at 09:09

## 2023-07-21 RX ADMIN — ACETAMINOPHEN 1000 MG: 500 TABLET ORAL at 00:17

## 2023-07-21 RX ADMIN — METHENAMINE HIPPURATE 1 G: 1 TABLET ORAL at 09:12

## 2023-07-21 RX ADMIN — SODIUM CHLORIDE, PRESERVATIVE FREE 10 ML: 5 INJECTION INTRAVENOUS at 22:05

## 2023-07-21 RX ADMIN — MAGNESIUM OXIDE TAB 400 MG (241.3 MG ELEMENTAL MG) 400 MG: 400 (241.3 MG) TAB at 09:10

## 2023-07-21 ASSESSMENT — PAIN DESCRIPTION - DESCRIPTORS: DESCRIPTORS: ACHING

## 2023-07-21 ASSESSMENT — PAIN SCALES - GENERAL
PAINLEVEL_OUTOF10: 9
PAINLEVEL_OUTOF10: 5
PAINLEVEL_OUTOF10: 6
PAINLEVEL_OUTOF10: 6

## 2023-07-21 ASSESSMENT — PAIN DESCRIPTION - LOCATION
LOCATION: BACK
LOCATION: LEG
LOCATION: LEG;HIP

## 2023-07-21 ASSESSMENT — PAIN DESCRIPTION - ORIENTATION
ORIENTATION: RIGHT;UPPER
ORIENTATION: LOWER

## 2023-07-21 NOTE — DISCHARGE SUMMARY
reaction(s): Rash  Other reaction(s): Adverse reaction to substance ([id number]); Severity: Mild      Other Anaphylaxis     Steak, cheese, grass, smoke    Penicillins Itching, Rash and Swelling     Mouth  Other reaction(s): ITCHING AND SWELLING  Other reaction(s): Adverse reaction to substance ([id number]); Severity: Mild    Tramadol Other (See Comments)     Hyponatremia, seizure    Cheese Itching and Swelling    Codeine Rash and Swelling     Mouth    Gabapentin Other (See Comments)     As of 1/28/2020, pt requests not to have this. It \"completely wipes me out. \"  Other reaction(s): [address] Course: The patient underwent surgery. Complications:  None; patient tolerated the procedure well. Was taken to the PACU in stable condition and then transferred to the ortho floor. Pt experienced confusion on POD#0/POD#1, resolved. Lyrica and narcotics held. Pt required multiple fluid boluses for hypotension which has resolved. VSS. WBC slightly elevated; procalcitonin and lactic acid WNL. Afebrile. Pt has no complaints aside from feeling tired and being unable to sleep in the hospital.   Hospitalist consulted; appreciate their assistance in caring for this patient. Perioperative Antibiotics:  Ancef     Postoperative Pain Management:  Oxycodone & Tylenol     Postoperative transfusions:    Number of units banked PRBCs =   none     Post Op complications: none    Hemoglobin at discharge:    Lab Results   Component Value Date/Time    HGB 9.0 (L) 07/21/2023 03:01 AM    INR 1.0 07/05/2023 01:42 PM       Dressing was changed on POD # 1,2,3. Incision - clean, dry and intact. No significant erythema or swelling. Wound appears to be healing without any evidence of infection. Neurovascular exam found to be within normal limits. Physical Therapy started following surgery and participated in bed mobility, transfers and ambulation.           Discharged to: SNF    Condition on Discharge:   Good, stable    Discharge instructions:  - Take pain medications as prescribed  - Resume pre hospital diet      - Discharge activity: activity as tolerated  - Ambulate as tolerated  - Avoid bending, lifting and twisting  - Lumbar brace when oob  - Wound Care Keep wound clean and dry. See discharge instruction sheet. -DISCHARGE MEDICATION LIST        Medication List        START taking these medications      oxyCODONE 5 MG immediate release tablet  Commonly known as: ROXICODONE  Take 0.5 tablets by mouth every 4 hours as needed for Pain for up to 10 days. Max Daily Amount: 15 mg            CONTINUE taking these medications      acetaminophen 325 MG tablet  Commonly known as: TYLENOL     aspirin 81 MG EC tablet     cetirizine 10 MG tablet  Commonly known as: ZYRTEC     clobetasol 0.05 % cream  Commonly known as: TEMOVATE     clopidogrel 75 MG tablet  Commonly known as: PLAVIX     cyanocobalamin 100 MCG tablet     denosumab 60 MG/ML Sosy SC injection  Commonly known as: PROLIA     fluorometholone 0.1 % ophthalmic suspension  Commonly known as: FML     levothyroxine 50 MCG tablet  Commonly known as: SYNTHROID  TAKE 1 TABLET BY MOUTH ONCE DAILY BEFORE BREAKFAST EXCEPT  1.5  TAB  (75MCG)  EVERY  MONDAY  AND  FRIDAY.      Magnesium Oxide 250 MG Tabs tablet  Commonly known as: MAGNESIUM-OXIDE     metFORMIN 500 MG tablet  Commonly known as: GLUCOPHAGE  Take 1 tablet by mouth 2 times daily (with meals)     methenamine 1 g tablet  Commonly known as: HIPREX     metoprolol succinate 25 MG extended release tablet  Commonly known as: TOPROL XL     pantoprazole 40 MG tablet  Commonly known as: PROTONIX     senna 8.6 MG tablet  Commonly known as: SENOKOT     simvastatin 40 MG tablet  Commonly known as: ZOCOR     therapeutic multivitamin-minerals tablet     vitamin D 25 MCG (1000 UT) Caps            STOP taking these medications      CBD KINGS EX     HYDROcodone-acetaminophen  MG per tablet  Commonly known as: Norco     lidocaine 5

## 2023-07-21 NOTE — CARE COORDINATION
Transition of Care Plan:    RUR: 12%   Prior Level of Functioning: Independent    Disposition: SNF  If SNF or IPR: Date FOC offered: 7/19  Date [address] received: 7/19  Accepting facility: [facility]   Report# [id number] [facility] to accept the pt 7/22   Packet Complete on Chart   Date authorization started with reference number: N/A   Date authorization received and expires: N/A   Follow up appointments: PCP  DME needed: NONE   Transportation at discharge: [facility] 1:00PM confirmed for 7/22    IM/IMM Medicare/ letter given: RECEIVED   Caregiver Contact: Negra Ann Ar [phone]   Discharge Caregiver contacted prior to discharge?    Barriers to discharge: Discharge postponed until 7/22 as the pt reported that she is not feeling well

## 2023-07-21 NOTE — PLAN OF CARE
Problem: Physical Therapy - Adult  Goal: By Discharge: Performs mobility at highest level of function for planned discharge setting. See evaluation for individualized goals. Description: FUNCTIONAL STATUS PRIOR TO ADMISSION: Patient was independent to occasionally modified independent depending on her back pain. Pt denies any falls in the last year. HOME SUPPORT PRIOR TO ADMISSION: The patient lived alone with friends available to provide assistance and has hired house keeper. Physical Therapy Goals  Initiated 7/18/2023  1. Patient will move from supine to sit and sit to supine and roll side to side in bed with independence within 4 day(s). 2.  Patient will perform sit to stand with modified independence within 4 day(s). 3.  Patient will transfer from bed to chair and chair to bed with modified independence using the least restrictive device within 4 day(s). 4.  Patient will ambulate with modified independence for 150 feet with the least restrictive device within 4 day(s). 5.  Patient will ascend/descend 5 stairs with 1 handrail(s) with modified independence within 4 day(s). 6. Patient will verbalize and demonstrate understanding of spinal precautions (No bending, lifting greater than 5 lbs, or twisting; log-roll technique; frequent repositioning as instructed) within 4 days.    Outcome: Progressing   PHYSICAL THERAPY TREATMENT    Patient: Sachi Mcgarry (36 y.o. female)  Date: 7/21/2023  Diagnosis: S/P lumbar spinal fusion [Z98.1]  Spinal stenosis, lumbar region, with neurogenic claudication [M48.062]  Spondylolisthesis of lumbar region [M43.16]  Lumbar stenosis with neurogenic claudication [M48.062] Lumbar stenosis with neurogenic claudication  Procedure(s) (LRB):  REVISION LAMINECTOMY L2 - S1 AND POSTERIOR LUMBAR FUSION L2 - 4 (MAZOR/OARM)  (ERAS) (N/A) 4 Days Post-Op  Precautions:           Spinal Precautions: No Bending, No Lifting, No Twisting          ASSESSMENT:  Patient continues to benefit

## 2023-07-22 VITALS
OXYGEN SATURATION: 98 % | BODY MASS INDEX: 31.4 KG/M2 | HEIGHT: 62 IN | RESPIRATION RATE: 17 BRPM | DIASTOLIC BLOOD PRESSURE: 74 MMHG | TEMPERATURE: 98.4 F | HEART RATE: 89 BPM | WEIGHT: 170.64 LBS | SYSTOLIC BLOOD PRESSURE: 131 MMHG

## 2023-07-22 LAB
ANION GAP SERPL CALC-SCNC: 5 MMOL/L (ref 5–15)
BASOPHILS # BLD: 0 K/UL (ref 0–0.1)
BASOPHILS NFR BLD: 0 % (ref 0–1)
BUN SERPL-MCNC: 8 MG/DL (ref 6–20)
BUN/CREAT SERPL: 14 (ref 12–20)
CALCIUM SERPL-MCNC: 9.1 MG/DL (ref 8.5–10.1)
CHLORIDE SERPL-SCNC: 105 MMOL/L (ref 97–108)
CO2 SERPL-SCNC: 25 MMOL/L (ref 21–32)
CREAT SERPL-MCNC: 0.57 MG/DL (ref 0.55–1.02)
DIFFERENTIAL METHOD BLD: ABNORMAL
EOSINOPHIL # BLD: 0.1 K/UL (ref 0–0.4)
EOSINOPHIL NFR BLD: 1 % (ref 0–7)
ERYTHROCYTE [DISTWIDTH] IN BLOOD BY AUTOMATED COUNT: 13.7 % (ref 11.5–14.5)
GLUCOSE BLD STRIP.AUTO-MCNC: 108 MG/DL (ref 65–117)
GLUCOSE BLD STRIP.AUTO-MCNC: 123 MG/DL (ref 65–117)
GLUCOSE BLD STRIP.AUTO-MCNC: 127 MG/DL (ref 65–117)
GLUCOSE SERPL-MCNC: 127 MG/DL (ref 65–100)
HCT VFR BLD AUTO: 28.8 % (ref 35–47)
HGB BLD-MCNC: 9.1 G/DL (ref 11.5–16)
IMM GRANULOCYTES # BLD AUTO: 0 K/UL (ref 0–0.04)
IMM GRANULOCYTES NFR BLD AUTO: 0 % (ref 0–0.5)
LYMPHOCYTES # BLD: 3.3 K/UL (ref 0.8–3.5)
LYMPHOCYTES NFR BLD: 30 % (ref 12–49)
MCH RBC QN AUTO: 31.3 PG (ref 26–34)
MCHC RBC AUTO-ENTMCNC: 31.6 G/DL (ref 30–36.5)
MCV RBC AUTO: 99 FL (ref 80–99)
MONOCYTES # BLD: 1.1 K/UL (ref 0–1)
MONOCYTES NFR BLD: 10 % (ref 5–13)
NEUTS SEG # BLD: 6.6 K/UL (ref 1.8–8)
NEUTS SEG NFR BLD: 59 % (ref 32–75)
NRBC # BLD: 0 K/UL (ref 0–0.01)
NRBC BLD-RTO: 0 PER 100 WBC
PLATELET # BLD AUTO: 370 K/UL (ref 150–400)
PMV BLD AUTO: 8.5 FL (ref 8.9–12.9)
POTASSIUM SERPL-SCNC: 4.2 MMOL/L (ref 3.5–5.1)
RBC # BLD AUTO: 2.91 M/UL (ref 3.8–5.2)
SERVICE CMNT-IMP: ABNORMAL
SERVICE CMNT-IMP: ABNORMAL
SERVICE CMNT-IMP: NORMAL
SODIUM SERPL-SCNC: 135 MMOL/L (ref 136–145)
WBC # BLD AUTO: 11.2 K/UL (ref 3.6–11)

## 2023-07-22 PROCEDURE — 6370000000 HC RX 637 (ALT 250 FOR IP): Performed by: STUDENT IN AN ORGANIZED HEALTH CARE EDUCATION/TRAINING PROGRAM

## 2023-07-22 PROCEDURE — 82962 GLUCOSE BLOOD TEST: CPT

## 2023-07-22 PROCEDURE — 6370000000 HC RX 637 (ALT 250 FOR IP): Performed by: ORTHOPAEDIC SURGERY

## 2023-07-22 PROCEDURE — 85025 COMPLETE CBC W/AUTO DIFF WBC: CPT

## 2023-07-22 PROCEDURE — 36415 COLL VENOUS BLD VENIPUNCTURE: CPT

## 2023-07-22 PROCEDURE — 80048 BASIC METABOLIC PNL TOTAL CA: CPT

## 2023-07-22 RX ADMIN — ASPIRIN 81 MG: 81 TABLET, COATED ORAL at 08:40

## 2023-07-22 RX ADMIN — FLUOROMETHOLONE 2 DROP: 1 SOLUTION/ DROPS OPHTHALMIC at 08:42

## 2023-07-22 RX ADMIN — CLOPIDOGREL BISULFATE 75 MG: 75 TABLET ORAL at 08:41

## 2023-07-22 RX ADMIN — METHENAMINE HIPPURATE 1 G: 1 TABLET ORAL at 08:40

## 2023-07-22 RX ADMIN — LEVOTHYROXINE SODIUM 50 MCG: 0.05 TABLET ORAL at 06:26

## 2023-07-22 RX ADMIN — OXYCODONE HYDROCHLORIDE 2.5 MG: 5 TABLET ORAL at 03:12

## 2023-07-22 RX ADMIN — Medication 1000 UNITS: at 08:41

## 2023-07-22 RX ADMIN — FAMOTIDINE 20 MG: 20 TABLET, FILM COATED ORAL at 08:41

## 2023-07-22 RX ADMIN — Medication 100 MCG: at 08:41

## 2023-07-22 RX ADMIN — MAGNESIUM OXIDE TAB 400 MG (241.3 MG ELEMENTAL MG) 400 MG: 400 (241.3 MG) TAB at 08:40

## 2023-07-22 RX ADMIN — POLYETHYLENE GLYCOL 3350 17 G: 17 POWDER, FOR SOLUTION ORAL at 08:40

## 2023-07-22 RX ADMIN — PANTOPRAZOLE SODIUM 40 MG: 40 TABLET, DELAYED RELEASE ORAL at 06:26

## 2023-07-22 RX ADMIN — CETIRIZINE HYDROCHLORIDE 10 MG: 10 TABLET, FILM COATED ORAL at 08:41

## 2023-07-22 RX ADMIN — SENNOSIDES AND DOCUSATE SODIUM 1 TABLET: 50; 8.6 TABLET ORAL at 08:41

## 2023-07-22 RX ADMIN — OXYCODONE HYDROCHLORIDE 2.5 MG: 5 TABLET ORAL at 07:23

## 2023-07-22 ASSESSMENT — PAIN SCALES - GENERAL
PAINLEVEL_OUTOF10: 6
PAINLEVEL_OUTOF10: 7

## 2023-07-22 ASSESSMENT — PAIN DESCRIPTION - LOCATION
LOCATION: BACK;HIP
LOCATION: BACK

## 2023-07-22 ASSESSMENT — PAIN DESCRIPTION - DESCRIPTORS
DESCRIPTORS: ACHING
DESCRIPTORS: ACHING

## 2023-07-22 ASSESSMENT — PAIN DESCRIPTION - ORIENTATION: ORIENTATION: POSTERIOR

## 2023-07-22 NOTE — PLAN OF CARE
Problem: Pain  Goal: Verbalizes/displays adequate comfort level or baseline comfort level  7/22/2023 1311 by Mel Main RN  Outcome: [facility] 114 Resolved Met  7/22/2023 1024 by Mel Main RN  Outcome: Progressing  7/22/2023 0006 by Haseeb Carbajal RN  Outcome: Progressing     Problem: Safety - Adult  Goal: Free from fall injury  7/22/2023 1311 by Mel Main RN  Outcome: [facility] 114 Resolved Met  7/22/2023 0006 by Haseeb Carbajal RN  Outcome: Progressing     Problem: Discharge Planning  Goal: Discharge to home or other facility with appropriate resources  7/22/2023 1311 by Mel Main RN  Outcome: [address] [place] [address] Resolved Met  7/22/2023 0006 by Haseeb Carbajal RN  Outcome: Progressing  Flowsheets (Taken 7/21/2023 2015)  Discharge to home or other facility with appropriate resources: Arrange for needed discharge resources and transportation as appropriate     Problem: Chronic Conditions and Co-morbidities  Goal: Patient's chronic conditions and co-morbidity symptoms are monitored and maintained or improved  7/22/2023 1311 by Mel Main RN  Outcome: [facility] 114 Resolved Met  7/22/2023 0006 by Haseeb Carbajal RN  Outcome: Progressing  Flowsheets (Taken 7/21/2023 2015)  Care Plan - Patient's Chronic Conditions and Co-Morbidity Symptoms are Monitored and Maintained or Improved: Monitor and assess patient's chronic conditions and comorbid symptoms for stability, deterioration, or improvement     Problem: Skin/Tissue Integrity  Goal: Absence of new skin breakdown  Description: 1. Monitor for areas of redness and/or skin breakdown  2. Assess vascular access sites hourly  3. Every 4-6 hours minimum:  Change oxygen saturation probe site  4. Every 4-6 hours:  If on nasal continuous positive airway pressure, respiratory therapy assess nares and determine need for appliance change or resting period.   7/22/2023 1311 by Mel Main RN  Outcome: [facility] 114 Resolved Met  7/22/2023 0006 by Lata Saucedo Ashvin, RN  Outcome: Progressing

## 2023-07-22 NOTE — PROGRESS NOTES
Lino Giron called back and said they did not receive report. RN explained to them on the phone report was called, and RN conformed the phone number which was correct. RN also explained she gave report to Elisa Ibanez, and RN said they do not have a nurse by that name. RN gave report to the supervisor there.

## 2023-07-22 NOTE — PLAN OF CARE
Problem: Pain  Goal: Verbalizes/displays adequate comfort level or baseline comfort level  Outcome: Progressing     Problem: Safety - Adult  Goal: Free from fall injury  Outcome: Progressing     Problem: Discharge Planning  Goal: Discharge to home or other facility with appropriate resources  Outcome: Progressing  Flowsheets (Taken 7/21/2023 2015)  Discharge to home or other facility with appropriate resources: Arrange for needed discharge resources and transportation as appropriate     Problem: Chronic Conditions and Co-morbidities  Goal: Patient's chronic conditions and co-morbidity symptoms are monitored and maintained or improved  Outcome: Progressing  Flowsheets (Taken 7/21/2023 2015)  Care Plan - Patient's Chronic Conditions and Co-Morbidity Symptoms are Monitored and Maintained or Improved: Monitor and assess patient's chronic conditions and comorbid symptoms for stability, deterioration, or improvement     Problem: Skin/Tissue Integrity  Goal: Absence of new skin breakdown  Description: 1. Monitor for areas of redness and/or skin breakdown  2. Assess vascular access sites hourly  3. Every 4-6 hours minimum:  Change oxygen saturation probe site  4. Every 4-6 hours:  If on nasal continuous positive airway pressure, respiratory therapy assess nares and determine need for appliance change or resting period. Outcome: Progressing     Problem: Physical Therapy - Adult  Goal: By Discharge: Performs mobility at highest level of function for planned discharge setting. See evaluation for individualized goals. Description: FUNCTIONAL STATUS PRIOR TO ADMISSION: Patient was independent to occasionally modified independent depending on her back pain. Pt denies any falls in the last year. HOME SUPPORT PRIOR TO ADMISSION: The patient lived alone with friends available to provide assistance and has hired house keeper. Physical Therapy Goals  Initiated 7/18/2023  1.   Patient will move from supine to sit and sit to supine and roll side to side in bed with independence within 4 day(s). 2.  Patient will perform sit to stand with modified independence within 4 day(s). 3.  Patient will transfer from bed to chair and chair to bed with modified independence using the least restrictive device within 4 day(s). 4.  Patient will ambulate with modified independence for 150 feet with the least restrictive device within 4 day(s). 5.  Patient will ascend/descend 5 stairs with 1 handrail(s) with modified independence within 4 day(s). 6. Patient will verbalize and demonstrate understanding of spinal precautions (No bending, lifting greater than 5 lbs, or twisting; log-roll technique; frequent repositioning as instructed) within 4 days.    7/21/2023 1026 by Judy Pickett PT  Outcome: Progressing

## 2023-07-22 NOTE — PROGRESS NOTES
Bedside and Verbal shift change report given to Erasto Sumner (oncoming nurse) by LILLY Dorsey (offgoing nurse). Report included the following information Nurse Handoff Report, ED SBAR, Adult Overview, Surgery Report, Intake/Output, MAR, Recent Results, Med Rec Status, Neuro Assessment, and Event Log.

## 2023-07-22 NOTE — PLAN OF CARE
Problem: Pain  Goal: Verbalizes/displays adequate comfort level or baseline comfort level  7/22/2023 1024 by Vernon Vaughn RN  Outcome: Progressing  7/22/2023 0006 by Nader Harvey RN  Outcome: Progressing     Problem: Safety - Adult  Goal: Free from fall injury  7/22/2023 0006 by Nader Harvey RN  Outcome: Progressing     Problem: Discharge Planning  Goal: Discharge to home or other facility with appropriate resources  7/22/2023 0006 by Nader Harvey RN  Outcome: Progressing  Flowsheets (Taken 7/21/2023 2015)  Discharge to home or other facility with appropriate resources: Arrange for needed discharge resources and transportation as appropriate     Problem: Chronic Conditions and Co-morbidities  Goal: Patient's chronic conditions and co-morbidity symptoms are monitored and maintained or improved  7/22/2023 0006 by Nader Harvey RN  Outcome: Progressing  Flowsheets (Taken 7/21/2023 2015)  Care Plan - Patient's Chronic Conditions and Co-Morbidity Symptoms are Monitored and Maintained or Improved: Monitor and assess patient's chronic conditions and comorbid symptoms for stability, deterioration, or improvement     Problem: Skin/Tissue Integrity  Goal: Absence of new skin breakdown  Description: 1. Monitor for areas of redness and/or skin breakdown  2. Assess vascular access sites hourly  3. Every 4-6 hours minimum:  Change oxygen saturation probe site  4. Every 4-6 hours:  If on nasal continuous positive airway pressure, respiratory therapy assess nares and determine need for appliance change or resting period.   7/22/2023 0006 by Nader Harvey RN  Outcome: Progressing

## 2023-08-09 ENCOUNTER — TELEPHONE (OUTPATIENT)
Age: 78
End: 2023-08-09

## 2023-08-09 NOTE — TELEPHONE ENCOUNTER
Patient called in stating she recently had back surgery on 7/17 and the surgeon decreased the dosage on her pregabalin and she would like  to prescribe pregabalin again with original dosage which was 75mg in AM and 100 mg PM

## 2023-08-10 DIAGNOSIS — M79.2 NEUROPATHIC PAIN: ICD-10-CM

## 2023-08-10 DIAGNOSIS — M54.17 RADICULOPATHY, LUMBOSACRAL REGION: ICD-10-CM

## 2023-08-10 RX ORDER — PREGABALIN 100 MG/1
100 CAPSULE ORAL NIGHTLY
Qty: 30 CAPSULE | Refills: 5 | Status: SHIPPED | OUTPATIENT
Start: 2023-08-10 | End: 2024-02-06

## 2023-08-10 RX ORDER — PREGABALIN 75 MG/1
75 CAPSULE ORAL EVERY MORNING
Qty: 30 CAPSULE | Refills: 5 | Status: SHIPPED | OUTPATIENT
Start: 2023-08-10 | End: 2024-02-06

## 2023-08-21 ENCOUNTER — TELEPHONE (OUTPATIENT)
Age: 78
End: 2023-08-21

## 2023-08-21 RX ORDER — SULFAMETHOXAZOLE AND TRIMETHOPRIM 800; 160 MG/1; MG/1
1 TABLET ORAL 2 TIMES DAILY
Qty: 14 TABLET | Refills: 0 | Status: ON HOLD | OUTPATIENT
Start: 2023-08-21 | End: 2023-08-25 | Stop reason: HOSPADM

## 2023-08-21 NOTE — TELEPHONE ENCOUNTER
----- Message from Diego Claire sent at 8/21/2023 10:43 AM EDT -----  Subject: Appointment Request    Reason for Call: Established Patient Appointment needed: Semi-Routine Skin   Problem    QUESTIONS    Reason for appointment request? Available appointments did not meet   patient need     Additional Information for Provider? Patient has some concerns about cut   on leg, patient would like something for next week.  If an antibiotic could   be prescribed before then patient would like that as well   ---------------------------------------------------------------------------  --------------  Girish Marine Lorenzo  [phone]; OK to leave message on voicemail  ---------------------------------------------------------------------------  --------------  SCRIPT ANSWERS

## 2023-08-23 ENCOUNTER — TELEPHONE (OUTPATIENT)
Age: 78
End: 2023-08-23

## 2023-08-23 ENCOUNTER — HOSPITAL ENCOUNTER (INPATIENT)
Facility: HOSPITAL | Age: 78
LOS: 1 days | Discharge: HOME | End: 2023-08-25
Attending: EMERGENCY MEDICINE | Admitting: GENERAL ACUTE CARE HOSPITAL
Payer: MEDICARE

## 2023-08-23 ENCOUNTER — APPOINTMENT (OUTPATIENT)
Facility: HOSPITAL | Age: 78
End: 2023-08-23
Payer: MEDICARE

## 2023-08-23 DIAGNOSIS — R55 POSTURAL DIZZINESS WITH NEAR SYNCOPE: ICD-10-CM

## 2023-08-23 DIAGNOSIS — R20.0 NUMBNESS AND TINGLING OF FOOT: ICD-10-CM

## 2023-08-23 DIAGNOSIS — R20.2 TINGLING OF FACE: Primary | ICD-10-CM

## 2023-08-23 DIAGNOSIS — L03.116 CELLULITIS OF LEFT LOWER EXTREMITY: ICD-10-CM

## 2023-08-23 DIAGNOSIS — R20.2 NUMBNESS AND TINGLING OF FOOT: ICD-10-CM

## 2023-08-23 DIAGNOSIS — E86.0 DEHYDRATION: ICD-10-CM

## 2023-08-23 DIAGNOSIS — R42 POSTURAL DIZZINESS WITH NEAR SYNCOPE: ICD-10-CM

## 2023-08-23 DIAGNOSIS — R55 PRE-SYNCOPE: ICD-10-CM

## 2023-08-23 LAB
ANION GAP SERPL CALC-SCNC: 4 MMOL/L (ref 5–15)
APPEARANCE UR: CLEAR
BACTERIA URNS QL MICRO: NEGATIVE /HPF
BASOPHILS # BLD: 0 K/UL (ref 0–0.1)
BASOPHILS NFR BLD: 0 % (ref 0–1)
BILIRUB UR QL: NEGATIVE
BUN SERPL-MCNC: 14 MG/DL (ref 6–20)
BUN/CREAT SERPL: 18 (ref 12–20)
CALCIUM SERPL-MCNC: 8.7 MG/DL (ref 8.5–10.1)
CHLORIDE SERPL-SCNC: 106 MMOL/L (ref 97–108)
CO2 SERPL-SCNC: 26 MMOL/L (ref 21–32)
COLOR UR: ABNORMAL
CREAT SERPL-MCNC: 0.77 MG/DL (ref 0.55–1.02)
DIFFERENTIAL METHOD BLD: ABNORMAL
EOSINOPHIL # BLD: 0.2 K/UL (ref 0–0.4)
EOSINOPHIL NFR BLD: 2 % (ref 0–7)
EPITH CASTS URNS QL MICRO: ABNORMAL /LPF
ERYTHROCYTE [DISTWIDTH] IN BLOOD BY AUTOMATED COUNT: 15.1 % (ref 11.5–14.5)
GLUCOSE SERPL-MCNC: 108 MG/DL (ref 65–100)
GLUCOSE UR STRIP.AUTO-MCNC: NEGATIVE MG/DL
HCT VFR BLD AUTO: 30.3 % (ref 35–47)
HGB BLD-MCNC: 9.5 G/DL (ref 11.5–16)
HGB UR QL STRIP: NEGATIVE
HYALINE CASTS URNS QL MICRO: ABNORMAL /LPF (ref 0–2)
IMM GRANULOCYTES # BLD AUTO: 0 K/UL (ref 0–0.04)
IMM GRANULOCYTES NFR BLD AUTO: 0 % (ref 0–0.5)
KETONES UR QL STRIP.AUTO: NEGATIVE MG/DL
LEUKOCYTE ESTERASE UR QL STRIP.AUTO: ABNORMAL
LYMPHOCYTES # BLD: 3.3 K/UL (ref 0.8–3.5)
LYMPHOCYTES NFR BLD: 40 % (ref 12–49)
MAGNESIUM SERPL-MCNC: 2.3 MG/DL (ref 1.6–2.4)
MCH RBC QN AUTO: 30.7 PG (ref 26–34)
MCHC RBC AUTO-ENTMCNC: 31.4 G/DL (ref 30–36.5)
MCV RBC AUTO: 98.1 FL (ref 80–99)
MONOCYTES # BLD: 0.8 K/UL (ref 0–1)
MONOCYTES NFR BLD: 9 % (ref 5–13)
NEUTS SEG # BLD: 4.1 K/UL (ref 1.8–8)
NEUTS SEG NFR BLD: 49 % (ref 32–75)
NITRITE UR QL STRIP.AUTO: NEGATIVE
NRBC # BLD: 0 K/UL (ref 0–0.01)
NRBC BLD-RTO: 0 PER 100 WBC
PH UR STRIP: 6 (ref 5–8)
PLATELET # BLD AUTO: 467 K/UL (ref 150–400)
PMV BLD AUTO: 8.2 FL (ref 8.9–12.9)
POTASSIUM SERPL-SCNC: 4.2 MMOL/L (ref 3.5–5.1)
PROT UR STRIP-MCNC: NEGATIVE MG/DL
RBC # BLD AUTO: 3.09 M/UL (ref 3.8–5.2)
RBC #/AREA URNS HPF: ABNORMAL /HPF (ref 0–5)
SODIUM SERPL-SCNC: 136 MMOL/L (ref 136–145)
SP GR UR REFRACTOMETRY: 1.01
URINE CULTURE IF INDICATED: ABNORMAL
UROBILINOGEN UR QL STRIP.AUTO: 0.2 EU/DL (ref 0.2–1)
WBC # BLD AUTO: 8.4 K/UL (ref 3.6–11)
WBC URNS QL MICRO: ABNORMAL /HPF (ref 0–4)

## 2023-08-23 PROCEDURE — 81001 URINALYSIS AUTO W/SCOPE: CPT

## 2023-08-23 PROCEDURE — 6360000004 HC RX CONTRAST MEDICATION

## 2023-08-23 PROCEDURE — 80048 BASIC METABOLIC PNL TOTAL CA: CPT

## 2023-08-23 PROCEDURE — 4A03X5D MEASUREMENT OF ARTERIAL FLOW, INTRACRANIAL, EXTERNAL APPROACH: ICD-10-PCS

## 2023-08-23 PROCEDURE — 6370000000 HC RX 637 (ALT 250 FOR IP)

## 2023-08-23 PROCEDURE — 70498 CT ANGIOGRAPHY NECK: CPT

## 2023-08-23 PROCEDURE — 99285 EMERGENCY DEPT VISIT HI MDM: CPT

## 2023-08-23 PROCEDURE — 85025 COMPLETE CBC W/AUTO DIFF WBC: CPT

## 2023-08-23 PROCEDURE — 6370000000 HC RX 637 (ALT 250 FOR IP): Performed by: EMERGENCY MEDICINE

## 2023-08-23 PROCEDURE — 70450 CT HEAD/BRAIN W/O DYE: CPT

## 2023-08-23 PROCEDURE — 83735 ASSAY OF MAGNESIUM: CPT

## 2023-08-23 PROCEDURE — 93005 ELECTROCARDIOGRAM TRACING: CPT

## 2023-08-23 PROCEDURE — 36415 COLL VENOUS BLD VENIPUNCTURE: CPT

## 2023-08-23 RX ORDER — CLINDAMYCIN HYDROCHLORIDE 150 MG/1
450 CAPSULE ORAL
Status: COMPLETED | OUTPATIENT
Start: 2023-08-23 | End: 2023-08-23

## 2023-08-23 RX ORDER — ACETAMINOPHEN 500 MG
1000 TABLET ORAL
Status: COMPLETED | OUTPATIENT
Start: 2023-08-23 | End: 2023-08-23

## 2023-08-23 RX ORDER — CLINDAMYCIN HYDROCHLORIDE 300 MG/1
300 CAPSULE ORAL 3 TIMES DAILY
Qty: 21 CAPSULE | Refills: 0 | Status: SHIPPED | OUTPATIENT
Start: 2023-08-23 | End: 2023-08-30

## 2023-08-23 RX ADMIN — IOPAMIDOL 100 ML: 755 INJECTION, SOLUTION INTRAVENOUS at 22:40

## 2023-08-23 RX ADMIN — ACETAMINOPHEN 1000 MG: 500 TABLET ORAL at 23:49

## 2023-08-23 RX ADMIN — CLINDAMYCIN HYDROCHLORIDE 450 MG: 150 CAPSULE ORAL at 23:50

## 2023-08-23 ASSESSMENT — PAIN DESCRIPTION - LOCATION: LOCATION: LEG

## 2023-08-23 ASSESSMENT — PAIN DESCRIPTION - ORIENTATION: ORIENTATION: LEFT;RIGHT

## 2023-08-23 ASSESSMENT — PAIN SCALES - GENERAL: PAINLEVEL_OUTOF10: 2

## 2023-08-23 NOTE — TELEPHONE ENCOUNTER
----- Message from Romeo Collado sent at 8/23/2023 11:16 AM EDT -----  Subject: Medication Problem    Medication: sulfamethoxazole-trimethoprim (BACTRIM DS;SEPTRA DS) 800-160   MG per tablet  Dosage: unknown  Ordering Provider: zahra    Question/Problem: Patient advised when she started this medication about   30-45 minutes in she started itching all over and believes she is allergic   to this and is requesting if a new medication can be called in for her. Please advise.        Pharmacy: [address] [phone] [facility] [phone]    ---------------------------------------------------------------------------  --------------  Ailin PLATA  [phone]; OK to leave message on voicemail  ---------------------------------------------------------------------------  --------------    SCRIPT ANSWERS  Relationship to Patient: Self

## 2023-08-23 NOTE — TELEPHONE ENCOUNTER
Patient states she needs a call back in reference to Allergic Reaction to Antibiotic prescribed. Patient need a call back to discuss getting something else prescribed. Please call.  Thank you

## 2023-08-24 ENCOUNTER — APPOINTMENT (OUTPATIENT)
Facility: HOSPITAL | Age: 78
End: 2023-08-24
Payer: MEDICARE

## 2023-08-24 ENCOUNTER — APPOINTMENT (OUTPATIENT)
Facility: HOSPITAL | Age: 78
End: 2023-08-24
Attending: GENERAL ACUTE CARE HOSPITAL
Payer: MEDICARE

## 2023-08-24 DIAGNOSIS — L03.90 CELLULITIS, UNSPECIFIED CELLULITIS SITE: Primary | ICD-10-CM

## 2023-08-24 PROBLEM — R55 PRE-SYNCOPE: Status: ACTIVE | Noted: 2023-08-24

## 2023-08-24 LAB
ECHO AO ASC DIAM: 3.3 CM
ECHO AO ASCENDING AORTA INDEX: 1.83 CM/M2
ECHO AV AREA PEAK VELOCITY: 1.9 CM2
ECHO AV AREA VTI: 1.9 CM2
ECHO AV AREA/BSA PEAK VELOCITY: 1.1 CM2/M2
ECHO AV AREA/BSA VTI: 1.1 CM2/M2
ECHO AV MEAN GRADIENT: 7 MMHG
ECHO AV MEAN VELOCITY: 1.2 M/S
ECHO AV PEAK GRADIENT: 12 MMHG
ECHO AV PEAK VELOCITY: 1.8 M/S
ECHO AV VELOCITY RATIO: 0.61
ECHO AV VTI: 37.3 CM
ECHO BSA: 1.86 M2
ECHO LA DIAMETER INDEX: 2.28 CM/M2
ECHO LA DIAMETER: 4.1 CM
ECHO LA VOL 2C: 52 ML (ref 22–52)
ECHO LA VOL 2C: 56 ML (ref 22–52)
ECHO LA VOL 4C: 34 ML (ref 22–52)
ECHO LA VOL 4C: 35 ML (ref 22–52)
ECHO LA VOLUME AREA LENGTH: 46 ML
ECHO LA VOLUME INDEX AREA LENGTH: 26 ML/M2 (ref 16–34)
ECHO LV E' LATERAL VELOCITY: 9 CM/S
ECHO LV E' SEPTAL VELOCITY: 7 CM/S
ECHO LV EDV A4C: 79 ML
ECHO LV EDV INDEX A4C: 44 ML/M2
ECHO LV EJECTION FRACTION A4C: 56 %
ECHO LV ESV A4C: 35 ML
ECHO LV ESV INDEX A4C: 19 ML/M2
ECHO LV FRACTIONAL SHORTENING: 37 % (ref 28–44)
ECHO LV INTERNAL DIMENSION DIASTOLE INDEX: 2.39 CM/M2
ECHO LV INTERNAL DIMENSION DIASTOLIC: 4.3 CM (ref 3.9–5.3)
ECHO LV INTERNAL DIMENSION SYSTOLIC INDEX: 1.5 CM/M2
ECHO LV INTERNAL DIMENSION SYSTOLIC: 2.7 CM
ECHO LV IVSD: 1 CM (ref 0.6–0.9)
ECHO LV MASS 2D: 123.3 G (ref 67–162)
ECHO LV MASS INDEX 2D: 68.5 G/M2 (ref 43–95)
ECHO LV POSTERIOR WALL DIASTOLIC: 0.8 CM (ref 0.6–0.9)
ECHO LV RELATIVE WALL THICKNESS RATIO: 0.37
ECHO LVOT AREA: 3.1 CM2
ECHO LVOT AV VTI INDEX: 0.64
ECHO LVOT DIAM: 2 CM
ECHO LVOT MEAN GRADIENT: 2 MMHG
ECHO LVOT PEAK GRADIENT: 5 MMHG
ECHO LVOT PEAK VELOCITY: 1.1 M/S
ECHO LVOT STROKE VOLUME INDEX: 41.9 ML/M2
ECHO LVOT SV: 75.4 ML
ECHO LVOT VTI: 24 CM
ECHO MV A VELOCITY: 1.06 M/S
ECHO MV AREA VTI: 2.4 CM2
ECHO MV E DECELERATION TIME (DT): 227.5 MS
ECHO MV E VELOCITY: 0.91 M/S
ECHO MV E/A RATIO: 0.86
ECHO MV E/E' LATERAL: 10.11
ECHO MV E/E' RATIO (AVERAGED): 11.56
ECHO MV E/E' SEPTAL: 13
ECHO MV LVOT VTI INDEX: 1.32
ECHO MV MAX VELOCITY: 1.2 M/S
ECHO MV MEAN GRADIENT: 2 MMHG
ECHO MV MEAN VELOCITY: 0.7 M/S
ECHO MV PEAK GRADIENT: 6 MMHG
ECHO MV VTI: 31.7 CM
ECHO RV FREE WALL PEAK S': 14 CM/S
ECHO RV TAPSE: 2.1 CM (ref 1.7–?)
ECHO TV REGURGITANT MAX VELOCITY: 2.59 M/S
ECHO TV REGURGITANT PEAK GRADIENT: 27 MMHG
EKG ATRIAL RATE: 64 BPM
EKG DIAGNOSIS: NORMAL
EKG P AXIS: 71 DEGREES
EKG P-R INTERVAL: 144 MS
EKG Q-T INTERVAL: 414 MS
EKG QRS DURATION: 84 MS
EKG QTC CALCULATION (BAZETT): 427 MS
EKG R AXIS: -37 DEGREES
EKG T AXIS: 23 DEGREES
EKG VENTRICULAR RATE: 64 BPM

## 2023-08-24 PROCEDURE — 6370000000 HC RX 637 (ALT 250 FOR IP): Performed by: NURSE PRACTITIONER

## 2023-08-24 PROCEDURE — 2060000000 HC ICU INTERMEDIATE R&B

## 2023-08-24 PROCEDURE — 6370000000 HC RX 637 (ALT 250 FOR IP): Performed by: EMERGENCY MEDICINE

## 2023-08-24 PROCEDURE — 99222 1ST HOSP IP/OBS MODERATE 55: CPT | Performed by: PSYCHIATRY & NEUROLOGY

## 2023-08-24 PROCEDURE — 6370000000 HC RX 637 (ALT 250 FOR IP): Performed by: GENERAL ACUTE CARE HOSPITAL

## 2023-08-24 PROCEDURE — 2580000003 HC RX 258: Performed by: GENERAL ACUTE CARE HOSPITAL

## 2023-08-24 PROCEDURE — 93306 TTE W/DOPPLER COMPLETE: CPT

## 2023-08-24 PROCEDURE — 2580000003 HC RX 258: Performed by: EMERGENCY MEDICINE

## 2023-08-24 PROCEDURE — 70551 MRI BRAIN STEM W/O DYE: CPT

## 2023-08-24 RX ORDER — CETIRIZINE HYDROCHLORIDE 10 MG/1
5 TABLET ORAL DAILY
Status: DISCONTINUED | OUTPATIENT
Start: 2023-08-25 | End: 2023-08-25 | Stop reason: HOSPADM

## 2023-08-24 RX ORDER — PANTOPRAZOLE SODIUM 40 MG/1
40 TABLET, DELAYED RELEASE ORAL 2 TIMES DAILY
Status: DISCONTINUED | OUTPATIENT
Start: 2023-08-24 | End: 2023-08-25 | Stop reason: HOSPADM

## 2023-08-24 RX ORDER — UBIDECARENONE 75 MG
100 CAPSULE ORAL DAILY
Status: DISCONTINUED | OUTPATIENT
Start: 2023-08-25 | End: 2023-08-25 | Stop reason: HOSPADM

## 2023-08-24 RX ORDER — PREGABALIN 25 MG/1
75 CAPSULE ORAL EVERY MORNING
Status: DISCONTINUED | OUTPATIENT
Start: 2023-08-25 | End: 2023-08-25 | Stop reason: HOSPADM

## 2023-08-24 RX ORDER — LEVOTHYROXINE SODIUM 0.05 MG/1
50 TABLET ORAL DAILY
Status: DISCONTINUED | OUTPATIENT
Start: 2023-08-25 | End: 2023-08-25 | Stop reason: HOSPADM

## 2023-08-24 RX ORDER — 0.9 % SODIUM CHLORIDE 0.9 %
1000 INTRAVENOUS SOLUTION INTRAVENOUS ONCE
Status: COMPLETED | OUTPATIENT
Start: 2023-08-24 | End: 2023-08-24

## 2023-08-24 RX ORDER — ACETAMINOPHEN 325 MG/1
325 TABLET ORAL EVERY 4 HOURS PRN
Status: DISCONTINUED | OUTPATIENT
Start: 2023-08-24 | End: 2023-08-25 | Stop reason: HOSPADM

## 2023-08-24 RX ORDER — ENOXAPARIN SODIUM 100 MG/ML
40 INJECTION SUBCUTANEOUS DAILY
Status: DISCONTINUED | OUTPATIENT
Start: 2023-08-25 | End: 2023-08-25 | Stop reason: HOSPADM

## 2023-08-24 RX ORDER — SENNOSIDES A AND B 8.6 MG/1
1 TABLET, FILM COATED ORAL NIGHTLY
Status: DISCONTINUED | OUTPATIENT
Start: 2023-08-24 | End: 2023-08-25 | Stop reason: HOSPADM

## 2023-08-24 RX ORDER — SODIUM CHLORIDE 0.9 % (FLUSH) 0.9 %
5-40 SYRINGE (ML) INJECTION PRN
Status: DISCONTINUED | OUTPATIENT
Start: 2023-08-24 | End: 2023-08-25 | Stop reason: HOSPADM

## 2023-08-24 RX ORDER — SODIUM CHLORIDE 0.9 % (FLUSH) 0.9 %
5-40 SYRINGE (ML) INJECTION EVERY 12 HOURS SCHEDULED
Status: DISCONTINUED | OUTPATIENT
Start: 2023-08-24 | End: 2023-08-25 | Stop reason: HOSPADM

## 2023-08-24 RX ORDER — DOXYCYCLINE HYCLATE 100 MG
100 TABLET ORAL 2 TIMES DAILY
Qty: 14 TABLET | Refills: 0 | Status: ON HOLD | OUTPATIENT
Start: 2023-08-24 | End: 2023-08-25 | Stop reason: HOSPADM

## 2023-08-24 RX ORDER — PREGABALIN 100 MG/1
100 CAPSULE ORAL NIGHTLY
Status: DISCONTINUED | OUTPATIENT
Start: 2023-08-24 | End: 2023-08-25 | Stop reason: HOSPADM

## 2023-08-24 RX ORDER — POLYETHYLENE GLYCOL 3350 17 G/17G
17 POWDER, FOR SOLUTION ORAL DAILY PRN
Status: DISCONTINUED | OUTPATIENT
Start: 2023-08-24 | End: 2023-08-25 | Stop reason: HOSPADM

## 2023-08-24 RX ORDER — ATORVASTATIN CALCIUM 20 MG/1
20 TABLET, FILM COATED ORAL
Status: DISCONTINUED | OUTPATIENT
Start: 2023-08-24 | End: 2023-08-25 | Stop reason: HOSPADM

## 2023-08-24 RX ORDER — SODIUM CHLORIDE 9 MG/ML
INJECTION, SOLUTION INTRAVENOUS PRN
Status: DISCONTINUED | OUTPATIENT
Start: 2023-08-24 | End: 2023-08-25 | Stop reason: HOSPADM

## 2023-08-24 RX ORDER — OXYCODONE HYDROCHLORIDE 5 MG/1
2.5 TABLET ORAL EVERY 4 HOURS PRN
Status: DISCONTINUED | OUTPATIENT
Start: 2023-08-24 | End: 2023-08-25 | Stop reason: HOSPADM

## 2023-08-24 RX ORDER — ONDANSETRON 4 MG/1
4 TABLET, ORALLY DISINTEGRATING ORAL EVERY 8 HOURS PRN
Status: DISCONTINUED | OUTPATIENT
Start: 2023-08-24 | End: 2023-08-25 | Stop reason: HOSPADM

## 2023-08-24 RX ORDER — CLOPIDOGREL BISULFATE 75 MG/1
75 TABLET ORAL DAILY
Status: DISCONTINUED | OUTPATIENT
Start: 2023-08-24 | End: 2023-08-25 | Stop reason: HOSPADM

## 2023-08-24 RX ORDER — LANOLIN ALCOHOL/MO/W.PET/CERES
400 CREAM (GRAM) TOPICAL DAILY
Status: DISCONTINUED | OUTPATIENT
Start: 2023-08-25 | End: 2023-08-25 | Stop reason: HOSPADM

## 2023-08-24 RX ORDER — M-VIT,TX,IRON,MINS/CALC/FOLIC 27MG-0.4MG
1 TABLET ORAL DAILY
Status: DISCONTINUED | OUTPATIENT
Start: 2023-08-25 | End: 2023-08-25 | Stop reason: HOSPADM

## 2023-08-24 RX ORDER — EZETIMIBE 10 MG/1
10 TABLET ORAL DAILY
Status: DISCONTINUED | OUTPATIENT
Start: 2023-08-25 | End: 2023-08-25 | Stop reason: HOSPADM

## 2023-08-24 RX ORDER — ONDANSETRON 2 MG/ML
4 INJECTION INTRAMUSCULAR; INTRAVENOUS EVERY 4 HOURS PRN
Status: DISCONTINUED | OUTPATIENT
Start: 2023-08-24 | End: 2023-08-25 | Stop reason: HOSPADM

## 2023-08-24 RX ORDER — ASPIRIN 81 MG/1
81 TABLET ORAL DAILY
Status: DISCONTINUED | OUTPATIENT
Start: 2023-08-24 | End: 2023-08-25 | Stop reason: HOSPADM

## 2023-08-24 RX ORDER — ACETAMINOPHEN 325 MG/1
650 TABLET ORAL EVERY 4 HOURS PRN
Status: DISCONTINUED | OUTPATIENT
Start: 2023-08-24 | End: 2023-08-25 | Stop reason: HOSPADM

## 2023-08-24 RX ORDER — ONDANSETRON 2 MG/ML
4 INJECTION INTRAMUSCULAR; INTRAVENOUS EVERY 6 HOURS PRN
Status: DISCONTINUED | OUTPATIENT
Start: 2023-08-24 | End: 2023-08-25 | Stop reason: HOSPADM

## 2023-08-24 RX ORDER — LANOLIN ALCOHOL/MO/W.PET/CERES
3 CREAM (GRAM) TOPICAL
Status: COMPLETED | OUTPATIENT
Start: 2023-08-24 | End: 2023-08-24

## 2023-08-24 RX ADMIN — ACETAMINOPHEN 650 MG: 325 TABLET ORAL at 14:36

## 2023-08-24 RX ADMIN — MELATONIN 3 MG: at 23:30

## 2023-08-24 RX ADMIN — SODIUM CHLORIDE, PRESERVATIVE FREE 5 ML: 5 INJECTION INTRAVENOUS at 20:40

## 2023-08-24 RX ADMIN — FLUOROMETHOLONE 1 DROP: 2.5 SUSPENSION/ DROPS OPHTHALMIC at 21:51

## 2023-08-24 RX ADMIN — PANTOPRAZOLE SODIUM 40 MG: 40 TABLET, DELAYED RELEASE ORAL at 20:39

## 2023-08-24 RX ADMIN — ASPIRIN 81 MG: 81 TABLET, COATED ORAL at 12:41

## 2023-08-24 RX ADMIN — ATORVASTATIN CALCIUM 20 MG: 20 TABLET, FILM COATED ORAL at 20:39

## 2023-08-24 RX ADMIN — PREGABALIN 100 MG: 100 CAPSULE ORAL at 20:39

## 2023-08-24 RX ADMIN — SENNOSIDES 8.6 MG: 8.6 TABLET, FILM COATED ORAL at 20:39

## 2023-08-24 RX ADMIN — CLOPIDOGREL BISULFATE 75 MG: 75 TABLET ORAL at 12:41

## 2023-08-24 RX ADMIN — ACETAMINOPHEN 650 MG: 325 TABLET ORAL at 08:19

## 2023-08-24 RX ADMIN — OXYCODONE HYDROCHLORIDE 2.5 MG: 5 TABLET ORAL at 16:55

## 2023-08-24 RX ADMIN — SODIUM CHLORIDE 1000 ML: 9 INJECTION, SOLUTION INTRAVENOUS at 08:20

## 2023-08-24 RX ADMIN — OXYCODONE HYDROCHLORIDE 2.5 MG: 5 TABLET ORAL at 20:39

## 2023-08-24 ASSESSMENT — PAIN SCALES - GENERAL
PAINLEVEL_OUTOF10: 6
PAINLEVEL_OUTOF10: 0
PAINLEVEL_OUTOF10: 6
PAINLEVEL_OUTOF10: 7
PAINLEVEL_OUTOF10: 0
PAINLEVEL_OUTOF10: 7
PAINLEVEL_OUTOF10: 2
PAINLEVEL_OUTOF10: 0
PAINLEVEL_OUTOF10: 2

## 2023-08-24 ASSESSMENT — PAIN DESCRIPTION - PAIN TYPE
TYPE: ACUTE PAIN;CHRONIC PAIN
TYPE: CHRONIC PAIN;ACUTE PAIN
TYPE: ACUTE PAIN

## 2023-08-24 ASSESSMENT — PAIN DESCRIPTION - LOCATION
LOCATION: BACK
LOCATION: HIP
LOCATION: BACK

## 2023-08-24 ASSESSMENT — PAIN DESCRIPTION - ONSET
ONSET: GRADUAL
ONSET: GRADUAL

## 2023-08-24 ASSESSMENT — PAIN DESCRIPTION - FREQUENCY
FREQUENCY: INTERMITTENT
FREQUENCY: INTERMITTENT

## 2023-08-24 ASSESSMENT — PAIN SCALES - WONG BAKER
WONGBAKER_NUMERICALRESPONSE: 0
WONGBAKER_NUMERICALRESPONSE: 6
WONGBAKER_NUMERICALRESPONSE: 0

## 2023-08-24 ASSESSMENT — PAIN DESCRIPTION - ORIENTATION
ORIENTATION: POSTERIOR
ORIENTATION: MID;POSTERIOR
ORIENTATION: LEFT

## 2023-08-24 ASSESSMENT — PAIN - FUNCTIONAL ASSESSMENT
PAIN_FUNCTIONAL_ASSESSMENT: ACTIVITIES ARE NOT PREVENTED
PAIN_FUNCTIONAL_ASSESSMENT: ACTIVITIES ARE NOT PREVENTED
PAIN_FUNCTIONAL_ASSESSMENT: PREVENTS OR INTERFERES SOME ACTIVE ACTIVITIES AND ADLS

## 2023-08-24 ASSESSMENT — PAIN DESCRIPTION - DESCRIPTORS
DESCRIPTORS: ACHING

## 2023-08-24 NOTE — ED NOTES
22g IV obtained in left wrist by Jeronimo Villarreal at this time.  Fluids restarted     Blessing Carrera RN  08/24/23 200 [facility], [address] [place] [address]  08/24/23 5942

## 2023-08-24 NOTE — ED PROVIDER NOTES
complications noted. Labs: ordered. Decision-making details documented in ED Course. Radiology: ordered. Decision-making details documented in ED Course. ECG/medicine tests: ordered. Decision-making details documented in ED Course. Risk  OTC drugs. Prescription drug management. ED Course as of 08/24/23 0011   Wed Aug 23, 2023   2146 Hemoglobin Quant(!): 9.5  At patients baseline  [MM]   2148 Remainder of CBC without leukocytosis. Urinalysis without evidence of UTI or hematuria. [MM]   0885 BMP without acute electrolyte abnormalities, without evidence of MYRIAM. [MM]   6925 CTA head and neck without evidence of large vessel occlusion or other vascular abnormality. CT head without IV contrast without evidence of acute intracranial abnormality including hemorrhage. [MM]      ED Course User Index  [MM] Bebo Parks MD         FINAL IMPRESSION   No diagnosis found. DISPOSITION/PLAN   Tiffany May's  results have been reviewed with her. She has been counseled regarding her diagnosis, treatment, and plan. She verbally conveys understanding and agreement of the signs, symptoms, diagnosis, treatment and prognosis and additionally agrees to follow up as discussed. She also agrees with the care-plan and conveys that all of her questions have been answered. I have also provided discharge instructions for her that include: educational information regarding their diagnosis and treatment, and list of reasons why they would want to return to the ED prior to their follow-up appointment, should her condition change. CLINICAL IMPRESSION    Discharge Note: The patient is stable for discharge home. The signs, symptoms, diagnosis, and discharge instructions have been discussed, understanding conveyed, and agreed upon. The patient is to follow up as recommended or return to ER should their symptoms worsen. PATIENT REFERRED TO:  No follow-up provider specified.      DISCHARGE MEDICATIONS:

## 2023-08-24 NOTE — PROGRESS NOTES
Speech Pathology Note    SLP orders received and chart reviewed. Patient currently YOSELIN and note H&P pending. Will defer SLP evaluation at this time and will continue to follow. Note patient passed the [address] and a Regular/Thin Liquid diet was ordered.      Racheal Roger M.S., CCC-SLP

## 2023-08-24 NOTE — ED NOTES
RN to bedside. Pt brief changed and purewick re-positioned.   No other needs at this time     Nish Chaidez RN  08/24/23 7422

## 2023-08-24 NOTE — PROGRESS NOTES
PT order acknowledged and chart reviewed. Attempted to see patient, but she was on her way to MRI when PT arrived. Will follow up tomorrow.     Robbi Ricks, PT

## 2023-08-24 NOTE — ED NOTES
Assumed care of pt at this time. Report received from [place], [place].  Pt resting in bed on monitor x3, still refusing IV/fluids     Britt Montez RN  08/24/23 8466

## 2023-08-24 NOTE — ED NOTES
Report called to PCU at this time. PCU RN states that they can come  pt from echo after procedure.       Elizabeth PIERRE  08/24/23 4008

## 2023-08-24 NOTE — ED NOTES
Attempted orthostatics at this time. Not orthostatic going form lying to sitting, however pt stated BP cuff was too painful when attempting to get standing BP and asked that I stop. Denies any dizziness/lightheadedness on standing, able to ambulate with assistance.       IGNACIO, [address]  08/24/23 9245

## 2023-08-24 NOTE — CONSULTS
[address] [facility], [address] [place] [address]    Neurology Consultation    Date: August 24, 2023    Libby Mata  [id number]  1945  [address] [place] [id number]    Primary Care Physician:  Angelito Ho MD  [unfilled]  [phone]   [phone]    Referring Physician:  Colonel Jessica MD    Impression: We we are asked to see the patient after a presyncopal spell as well as right facial symptoms yesterday. Her facial symptoms are atypical for CNS ischemia in my opinion. The presyncope simply occurred briefly after urinating. Her neurologic examination is unremarkable. CT scan of the head and CTA of the head and neck were unremarkable. Plan and Recommendations: We will sign off but if the MRI of the brain shows a significant abnormality please let me know. Barrier to Discharge from Neurologic Perspective: None. Outpatient Neurology Follow Up: None anticipated. Michael Escamilla M.D. Diplomate, American Board of Neurology and Psychiatry  Diplomate, American Board of Electrodiagnostic Medicine  Subspecialty Certification, Headache Medicine, [facility]  ______________________________________________________________________    Reason for Consultation: Aung Chowdhury is a 78 y.o. y/o female who we are asked to see in consultation for near syncope and right facial symptoms. History of Present Illness: This patient had the onset yesterday afternoon around 4 PM of some right facial tingling and a tight pulling sensation. It lasted up until about midnight and resolved spontaneously. She looked in the mirror and she had no facial asymmetry. The symptoms were relatively mild. As mentioned there was a tingling and tight sensation to it. She was seen in the emergency room and getting ready to be discharged. She went into the toilet and upon standing after voiding became extremely lightheaded.   She was admitted for further evaluation. Past medical History:  Past Medical History:   Diagnosis Date    Arthritis     Breast cancer ([address]) 1999    Right    CAD (coronary artery disease)     ONE STENT INSERTED    Fibromyalgia     GERD (gastroesophageal reflux disease)     Hiatal hernia     Hyperlipidemia     Hypertension     Hyponatremia 05/2019    As of 1/28/2020 pt had a seizure as a result to this. Hypothyroid     Ill-defined condition     As of 1/28/2020, pt states her cardiologist says her HR drops at night but nothing to be concerned with.      Prediabetes     PUD (peptic ulcer disease)     PVC (premature ventricular contraction)     Too much caffeine    Seizures ([address]) 05/2019    Stroke ([address]) 2003    Took vision from right eye       Past Surgical History:  Past Surgical History:   Procedure Laterality Date    COLONOSCOPY N/A     CORONARY ANGIOPLASTY WITH STENT PLACEMENT  01/25/2023    one stent inserted    LUMBAR FUSION  05/2019    LUMBAR SPINE SURGERY N/A 7/17/2023    REVISION LAMINECTOMY L2 - S1 AND POSTERIOR LUMBAR FUSION L2 - 4 (MAZOR/OARM)  (ERAS) performed by Asiya Rivera MD at [address] [place] [address] knee has pins and plates after a fall (fracture)    PARTIAL HYSTERECTOMY (CERVIX NOT REMOVED) N/A     TONSILLECTOMY      UPPER GASTROINTESTINAL ENDOSCOPY         Medications:    Current Facility-Administered Medications:     acetaminophen (TYLENOL) tablet 650 mg, 650 mg, Oral, Q4H PRN, Delores Ricketts MD, 650 mg at 08/24/23 0819    ondansetron (ZOFRAN) injection 4 mg, 4 mg, IntraVENous, Q4H PRN, Delores Ricketts MD    acetaminophen (TYLENOL) tablet 325 mg, 325 mg, Oral, Q4H PRN, Raúl Lizarraga MD    aspirin EC tablet 81 mg, 81 mg, Oral, Daily, Chrissy Fuentes MD, 81 mg at 08/24/23 1241    [START ON 8/25/2023] cetirizine (ZYRTEC) tablet 5 mg, 5 mg, Oral, Daily, Chrissy Fuentes MD    clopidogrel (PLAVIX) tablet 75 mg, 75 mg, Oral, Daily, Raúl Lizarraga MD, 75 mg

## 2023-08-24 NOTE — CARE COORDINATION
[facility] with [facility]/PT/OT will be needed upon discharge.     Poncho Riojas RN  Case Management  [phone]

## 2023-08-24 NOTE — CARE COORDINATION
Care Management Initial Assessment       RUR: 16% moderate   Readmission? No  1st IM letter given? No, to be given by Patient Access  1st  letter given: No    Initial note: CM reviewed chart. CM completed assessment with pt at bedside. CM introduced self, role of CM, verified demographics, and discussed transition of care planning. Pt plans to return home at time of d/c, pt's son is anticipated to transport. Pt ambulates with rollator or rolling walker short distances since her back surgery. She is open with HH PT/OT, needs to provide CM with agency name to ensure MARIZA orders are placed in preparation of d/c. Pt reports independence with ADLs. Pt voiced no concerns with transition of care plan at this time. No barriers identified by CM. Care management will continue to be available to assist as transition of care planning needs arise. Full assessment below:       08/24/23 1200   Service Assessment   Patient Orientation Alert and Oriented   Cognition Alert   History Provided By Patient   Primary Caregiver Self   Support Systems Children;Family Members;Friends/Neighbors   Patient's Healthcare Decision Maker is: Legal Next of Kin  (Son, Kike Vang, is legal next of kin)   PCP Verified by CM Yes  (Dr. Zafar Drafts)   Last Visit to PCP Within last 3 months  (July 2023)   Prior Functional Level Independent in ADLs/IADLs   Current Functional Level Independent in ADLs/IADLs   Can patient return to prior living arrangement Yes   Family able to assist with home care needs: Yes   Would you like for me to discuss the discharge plan with any other family members/significant others, and if so, who?  Yes  (Kike Dominguez)   Financial Resources Medicare   Social/Functional History   Lives With Alone   Type of [address] [place] [address] Dr Multi-level;Performs ADL's on one [address] [employer] [address] to enter with rails   Entrance Stairs - Number of Steps 4   Bathroom Shower/Tub None   [address],

## 2023-08-24 NOTE — ED NOTES
RN to bedside to initiate fluids. U/S IV infiltrated when flushed, pt states she does not want any more sticks tonight. Pt refused fluids or any other interventions for phlebotomy or IV therapy.      Zachary Hahn RN  08/24/23 3601

## 2023-08-24 NOTE — PROGRESS NOTES
1400: Patient brought to PCU from echo. Admission vitals and assessment complete. Oriented to unit, family at bedside, call bell within reach. 1500: Pain reassessment completed. Patient states pain is still 6 out of 10. Will check back with the patient in about 15-20 mins    1600: Patient off the floor to MRI    1700: Patient back on unit up in the chair, giving stronger pain meds at this time as pain is still not controlled with tylenol alone. End of Shift Note    Bedside shift change report given to Eloise (oncoming nurse) by Warren Jane RN (offgoing nurse). Report included the following information SBAR, Kardex, Recent Results, and Cardiac Rhythm      Shift worked:  Dayshift     Shift summary and any significant changes:     N/a     Concerns for physician to address:  N/a     Zone phone for oncoming shift:          Activity:     Number times ambulated in hallways past shift: 0  Number of times OOB to chair past shift: 1    Cardiac:   Cardiac Monitoring: Yes           Access:  Current line(s): PIV     Genitourinary:   Urinary status: voiding    Respiratory:      Chronic home O2 use?: NO  Incentive spirometer at bedside: NO       GI:     Current diet:  ADULT DIET;  Regular  DIET ONE TIME MESSAGE;  Passing flatus: YES  Tolerating current diet: YES       Pain Management:   Patient states pain is manageable on current regimen: YES    Skin:     Interventions: float heels and increase time out of bed    Patient Safety:  Fall Score:    Interventions: bed/chair alarm, gripper socks, and pt to call before getting OOB       Length of Stay:  Expected LOS: 2  Actual LOS: 0      Warren Jane RN

## 2023-08-24 NOTE — PROGRESS NOTES
MRI PENDING    Completion of MRI Screening Sheet    Fax to [phone] when completed    Please call [phone]  When this is done    Thank You

## 2023-08-24 NOTE — PLAN OF CARE
Plan of care reviewed with patient. Pt verbalized understanding. Pt refused AM lab work x2. Pt stated, \" It hurts\". NP Mona Anders notified.

## 2023-08-25 VITALS
RESPIRATION RATE: 19 BRPM | TEMPERATURE: 97.4 F | WEIGHT: 173.5 LBS | OXYGEN SATURATION: 97 % | SYSTOLIC BLOOD PRESSURE: 126 MMHG | BODY MASS INDEX: 31.93 KG/M2 | DIASTOLIC BLOOD PRESSURE: 70 MMHG | HEART RATE: 69 BPM | HEIGHT: 62 IN

## 2023-08-25 PROCEDURE — 2580000003 HC RX 258: Performed by: GENERAL ACUTE CARE HOSPITAL

## 2023-08-25 PROCEDURE — 97165 OT EVAL LOW COMPLEX 30 MIN: CPT | Performed by: OCCUPATIONAL THERAPIST

## 2023-08-25 PROCEDURE — 97116 GAIT TRAINING THERAPY: CPT | Performed by: PHYSICAL THERAPIST

## 2023-08-25 PROCEDURE — 97535 SELF CARE MNGMENT TRAINING: CPT | Performed by: OCCUPATIONAL THERAPIST

## 2023-08-25 PROCEDURE — 97161 PT EVAL LOW COMPLEX 20 MIN: CPT | Performed by: PHYSICAL THERAPIST

## 2023-08-25 PROCEDURE — 6370000000 HC RX 637 (ALT 250 FOR IP): Performed by: GENERAL ACUTE CARE HOSPITAL

## 2023-08-25 RX ADMIN — ASPIRIN 81 MG: 81 TABLET, COATED ORAL at 08:33

## 2023-08-25 RX ADMIN — CLOPIDOGREL BISULFATE 75 MG: 75 TABLET ORAL at 08:33

## 2023-08-25 RX ADMIN — MULTIPLE VITAMINS W/ MINERALS TAB 1 TABLET: TAB at 08:33

## 2023-08-25 RX ADMIN — CETIRIZINE HYDROCHLORIDE 5 MG: 10 TABLET, FILM COATED ORAL at 08:33

## 2023-08-25 RX ADMIN — LEVOTHYROXINE SODIUM 50 MCG: 0.05 TABLET ORAL at 06:11

## 2023-08-25 RX ADMIN — VITAM B12 100 MCG: 100 TAB at 08:33

## 2023-08-25 RX ADMIN — EZETIMIBE 10 MG: 10 TABLET ORAL at 08:33

## 2023-08-25 RX ADMIN — FLUOROMETHOLONE 1 DROP: 2.5 SUSPENSION/ DROPS OPHTHALMIC at 09:47

## 2023-08-25 RX ADMIN — PANTOPRAZOLE SODIUM 40 MG: 40 TABLET, DELAYED RELEASE ORAL at 08:33

## 2023-08-25 RX ADMIN — SODIUM CHLORIDE, PRESERVATIVE FREE 10 ML: 5 INJECTION INTRAVENOUS at 08:34

## 2023-08-25 RX ADMIN — Medication 400 MG: at 08:33

## 2023-08-25 RX ADMIN — PREGABALIN 75 MG: 25 CAPSULE ORAL at 08:34

## 2023-08-25 RX ADMIN — OXYCODONE HYDROCHLORIDE 2.5 MG: 5 TABLET ORAL at 08:33

## 2023-08-25 ASSESSMENT — PAIN DESCRIPTION - LOCATION
LOCATION: BACK
LOCATION: BACK

## 2023-08-25 ASSESSMENT — PAIN SCALES - GENERAL
PAINLEVEL_OUTOF10: 0
PAINLEVEL_OUTOF10: 4
PAINLEVEL_OUTOF10: 6

## 2023-08-25 ASSESSMENT — PAIN DESCRIPTION - DESCRIPTORS
DESCRIPTORS: ACHING
DESCRIPTORS: ACHING

## 2023-08-25 ASSESSMENT — PAIN SCALES - WONG BAKER: WONGBAKER_NUMERICALRESPONSE: 0

## 2023-08-25 ASSESSMENT — PAIN DESCRIPTION - ORIENTATION
ORIENTATION: POSTERIOR;LOWER
ORIENTATION: POSTERIOR;LOWER

## 2023-08-25 ASSESSMENT — PAIN DESCRIPTION - PAIN TYPE
TYPE: SURGICAL PAIN
TYPE: ACUTE PAIN

## 2023-08-25 NOTE — DISCHARGE SUMMARY
Discharge Summary    Name: Shayy Thompson  [id number]  YOB: 1945 (Age: 78 y.o.)   Date of Admission: 8/23/2023  Date of Discharge: 8/25/2023  Attending Physician: Bao Portillo MD      Discharge Diagnosis:   Presyncope  Right facial numbness  CAD  Fibromyalgia  GERD  Hypertension/hyperlipidemia  Hypothyroidism  History of stroke      Consultations:  IP CONSULT TO HOSPITALIST  IP CONSULT TO NEUROLOGY  IP CONSULT TO CASE MANAGEMENT      Brief Admission History/Reason for Admission Per Bao Portillo MD:   Yolanda Scales is a 78 y.o.  female with PMHx significant for  has a past medical history of Arthritis, Breast cancer ([address]), CAD (coronary artery disease), Fibromyalgia, GERD (gastroesophageal reflux disease), Hiatal hernia, Hyperlipidemia, Hypertension, Hyponatremia, Hypothyroid, Ill-defined condition, Prediabetes, PUD (peptic ulcer disease), PVC (premature ventricular contraction), Seizures ([address]), and Stroke ([address]). Patient presented emergency room for abnormal sensation on the right side of the face. Started around 4 PM.  Felt as if her face is being pulled and tingling sensation. Episode resolved on its own, midnight. Patient denies any headache, change in vision, trouble speaking/swallowing, focal weakness and numbness. While in the emergency room, patient went to the bathroom to urinate and when she stood up she felt dizzy and lightheaded and about to fall. Patient denies shortness of breath and chest pain. Of note, patient had back surgery done on 7/17/2023. ED w/up showed hemoglobin 9.5, platelets 155, normal WBC, chemistry not that remarkable. UA negative. EKG without acute ischemic changes or significant arrhythmia. CTA HEAD NECK W WO CONTRAST     Result Date: 8/23/2023  No large vessel occlusion or hemodynamically significant carotid stenosis. CT Head W/O Contrast     Result Date: 8/23/2023  No acute process.          [facility]

## 2023-08-25 NOTE — PROGRESS NOTES
Attempted to schedule hospital follow up for PCP appointment. Sent a message to PCP office to find patient an appointment. Awaiting callback from PCP office.  Radha Rosenbaum, Care Management Assistant

## 2023-08-25 NOTE — CARE COORDINATION
Transition of Care Plan:    RUR: 16% (Moderate RUR)  Prior Level of Functioning: Independent in ADLs/IADLs  Disposition: Home with home health (MARIZA with at home care)  If SNF or IPR: Date FOC offered: NA  Date FOC received:   Accepting facility:   Date authorization started with reference number:   Date authorization received and expires: Follow up appointments: pcp/specialist   DME needed: Has a walker and rollator; denies the need for other DMEs  Transportation at discharge: nadeem Givens [phone]   IM/IMM Medicare/ letter given: 2nd IM needed  Is patient a  and connected with VA? na   If yes, was [employer] transfer form completed and VA notified? Caregiver Contact: nadeem Givens [phone]   Discharge Caregiver contacted prior to discharge? Will be contacted  Care Conference needed? No  Barriers to discharge: Medical stability    nadeem Givens [phone] will provide a ride upon discharge. CM sent  MARIZA of care to at home care that included SN/PT/OT. Care management will continue to be available to assist as transition of care planning needs arise. 08/25/23 1043   [address] Discharge   Transition of Care Consult (CM Consult) Javier Olson  (at home care)   Internal Home Health No   Reason Outside [address] [place] [address] of service area   [address] [place] [address]  (At home care)   Mode of Transport at Discharge Other (see comment)  nadeem Givens [phone])   Confirm Follow Up Transport Self   Condition of Participation: Discharge Planning   The Plan for Transition of Care is related to the following treatment goals: Home health   The Patient and/or Patient Representative was provided with a Choice of Provider? Patient   The Patient and/Or Patient Representative agree with the Discharge Plan?  Yes   Freedom of Choice list was provided with basic dialogue that supports the patient's individualized plan of care/goals, treatment preferences, and shares the quality

## 2023-08-25 NOTE — PROGRESS NOTES
Eval complete and note to follow. Pt was negative for orthostasis. Recommend return to home with caregiver support and HHPT services only. Pt is in agreement.

## 2023-08-25 NOTE — PROGRESS NOTES
Spiritual Care Partner Volunteer visited patient at [facility] in MRM 2 PROGRESSIVE CARE on 8/25/2023   Documented by:  OSCAR Sullivan, [facility], Staff [facility]    [address] [place] [address] Paging Service  [phone])

## 2023-08-25 NOTE — PROGRESS NOTES
Speech pathology note  Reviewed chart and note patient admitted with presyncope and R facial numbness with concern for CVA. Note CT showed no acute process and MRI showed no acute intracranial abnormality. Note patient passed the nursing dysphagia screen and a regular diet was ordered. NIHSS=0. Discussed case with RN who reported no SLP-related concerns. Formal SLP evaluation not clinically indicated at this time. Will sign off. Please re-consult if further needs arise. Thank you.     Chanelle Blakely, SLP

## 2023-08-28 ENCOUNTER — TELEPHONE (OUTPATIENT)
Age: 78
End: 2023-08-28

## 2023-08-28 ENCOUNTER — CLINICAL DOCUMENTATION (OUTPATIENT)
Age: 78
End: 2023-08-28

## 2023-08-28 DIAGNOSIS — L03.90 CELLULITIS, UNSPECIFIED CELLULITIS SITE: Primary | ICD-10-CM

## 2023-08-28 RX ORDER — DOXYCYCLINE HYCLATE 100 MG
100 TABLET ORAL 2 TIMES DAILY
Qty: 14 TABLET | Refills: 0 | Status: SHIPPED | OUTPATIENT
Start: 2023-08-28 | End: 2023-09-04

## 2023-08-28 NOTE — TELEPHONE ENCOUNTER
Pt had to cancel today's hfu due to her  having covid. She just had surgery and can not walk. Please call to reschedule.

## 2023-08-28 NOTE — TELEPHONE ENCOUNTER
Rescheduled appt to 9/12/23. During conversation with patient, she said he was having a reaction to Sulfamethoxazole. It is making her itch.

## 2023-08-28 NOTE — TELEPHONE ENCOUNTER
States she cannot take Bactrim or clindamycin. This was for an open area  to groin from finger nail into skin. Is requesting an ABT be sent in to her pharmacy that she can tolerate. Care Transitions Initial Follow Up Call    Outreach made within 2 business days of discharge: No    Patient: Nhan Rosales Patient : 1945   MRN: [id number]  Reason for Admission: There are no discharge diagnoses documented for the most recent discharge. Discharge Date: 23       Spoke with: Gwen Barahona    Discharge department/facility: [facility] [place]    TCM Interactive Patient Contact:  Was patient able to fill all prescriptions: Yes  Was patient instructed to bring all medications to the follow-up visit: Yes  Is patient taking all medications as directed in the discharge summary?  Yes  Does patient understand their discharge instructions: Yes  Does patient have questions or concerns that need addressed prior to 7-14 day follow up office visit: no    Scheduled appointment with PCP within 7-14 days    Follow Up  Future Appointments   Date Time Provider [address]   2023 10:30 AM MD PHILIPPE Sahni   2023  2:00 PM Glynn Ervin MD [facility] BS AMB       Kerry Wong LPN

## 2023-08-28 NOTE — TELEPHONE ENCOUNTER
Pt states she is returning your call. Pt had to cancel appt for today as her  has COVID. Pt states that she will still need that script as the one Dr. Fern Serrano called in she was allergic to. Please call pt pertaining to med/script for leg.

## 2023-09-10 NOTE — PROGRESS NOTES
HISTORY OF PRESENT ILLNESS   Simpson Cabot   is a 78 y.o.  female. Hx HLD overweight, hx retinal vein occlusion , hx right breast cancer s/p mastectomy  osteoporosis on prolia, prediabetes, hypothyroid CAD s/p stent 2023-_Dr Panda s/p RCA stent----   s/p lumbar fusion    Had lumbar fusion 7/17 Dr Escalera-d/c home  Admitted ED [facility]  8/23-8/25 for right face numbness and presyncope.   Imaging, echo and labs negative for CVA/TIA unlikely  Presyncope most likely vasovagal--IVF  Continued abx for cellulitis left thigh-clinda x 7d on discharge  Then on another course offdoxycylcline which helpd but recurred when completed the xdoxycyline    Had fu Dr Cristina Alvarado on 9/7 6 weeks post op      Getting [facility] nursing and PT  Last     C/o toes appearing black sometimes, feet are always cold    Feeling weaker last few days    Last OV     Her for preop revison laminectomy and fusion -Dr Cristina Alvarado scheduled 7/17/23  Had PAT yesterday at [facility]  Patient Active Problem List    Diagnosis Date Noted    Pre-syncope 08/24/2023    Lumbar stenosis with neurogenic claudication 07/17/2023    S/P lumbar fusion 07/17/2023    Kyphoscoliosis 07/17/2023    Encounter for preadmission testing 07/06/2023    Coronary artery disease involving native coronary artery of native heart without angina pectoris 03/27/2023    Type 2 diabetes mellitus ([address]) 12/13/2022    Postural dizziness with presyncope 01/19/2022    Mixed hyperlipidemia 01/14/2020    Overweight (BMI 25.0-29.9) 11/14/2018    Endometriosis 05/16/2018    Closed fracture of left lower extremity with routine healing 05/16/2018    Gastroesophageal reflux disease without esophagitis 05/16/2018    HX: breast cancer 05/16/2018    Osteopenia of multiple sites 05/16/2018    History of CVA (cerebrovascular accident) 05/16/2018    Other specified hypothyroidism 05/16/2018     Current Outpatient Medications   Medication Sig Dispense Refill    oxyCODONE (ROXICODONE) 5 MG immediate release tablet Take 1 tablet by mouth every 6

## 2023-09-12 ENCOUNTER — OFFICE VISIT (OUTPATIENT)
Age: 78
End: 2023-09-12
Payer: MEDICARE

## 2023-09-12 VITALS
RESPIRATION RATE: 16 BRPM | HEIGHT: 61 IN | SYSTOLIC BLOOD PRESSURE: 114 MMHG | BODY MASS INDEX: 33 KG/M2 | OXYGEN SATURATION: 96 % | TEMPERATURE: 97.2 F | HEART RATE: 66 BPM | DIASTOLIC BLOOD PRESSURE: 74 MMHG | WEIGHT: 174.8 LBS

## 2023-09-12 DIAGNOSIS — R55 PRE-SYNCOPE: Primary | ICD-10-CM

## 2023-09-12 DIAGNOSIS — E03.9 HYPOTHYROIDISM, UNSPECIFIED TYPE: ICD-10-CM

## 2023-09-12 DIAGNOSIS — R73.03 PREDIABETES: ICD-10-CM

## 2023-09-12 DIAGNOSIS — D50.9 IRON DEFICIENCY ANEMIA, UNSPECIFIED IRON DEFICIENCY ANEMIA TYPE: ICD-10-CM

## 2023-09-12 DIAGNOSIS — E78.00 PURE HYPERCHOLESTEROLEMIA: ICD-10-CM

## 2023-09-12 DIAGNOSIS — L03.116 LEFT LEG CELLULITIS: ICD-10-CM

## 2023-09-12 DIAGNOSIS — Z98.1 S/P LUMBAR FUSION: ICD-10-CM

## 2023-09-12 DIAGNOSIS — R20.0 RIGHT FACIAL NUMBNESS: ICD-10-CM

## 2023-09-12 LAB
CHOLEST SERPL-MCNC: 147 MG/DL
ERYTHROCYTE [DISTWIDTH] IN BLOOD BY AUTOMATED COUNT: 14.6 % (ref 11.5–14.5)
HCT VFR BLD AUTO: 34.1 % (ref 35–47)
HDLC SERPL-MCNC: 62 MG/DL
HDLC SERPL: 2.4 (ref 0–5)
HGB BLD-MCNC: 10.4 G/DL (ref 11.5–16)
LDLC SERPL CALC-MCNC: 65.8 MG/DL (ref 0–100)
MCH RBC QN AUTO: 29.7 PG (ref 26–34)
MCHC RBC AUTO-ENTMCNC: 30.5 G/DL (ref 30–36.5)
MCV RBC AUTO: 97.4 FL (ref 80–99)
NRBC # BLD: 0 K/UL (ref 0–0.01)
NRBC BLD-RTO: 0 PER 100 WBC
PLATELET # BLD AUTO: 427 K/UL (ref 150–400)
PMV BLD AUTO: 8.7 FL (ref 8.9–12.9)
RBC # BLD AUTO: 3.5 M/UL (ref 3.8–5.2)
TRIGL SERPL-MCNC: 96 MG/DL
TSH SERPL DL<=0.05 MIU/L-ACNC: 0.86 UIU/ML (ref 0.36–3.74)
VLDLC SERPL CALC-MCNC: 19.2 MG/DL
WBC # BLD AUTO: 7.8 K/UL (ref 3.6–11)

## 2023-09-12 PROCEDURE — G8427 DOCREV CUR MEDS BY ELIG CLIN: HCPCS | Performed by: INTERNAL MEDICINE

## 2023-09-12 PROCEDURE — 1111F DSCHRG MED/CURRENT MED MERGE: CPT | Performed by: INTERNAL MEDICINE

## 2023-09-12 PROCEDURE — 1090F PRES/ABSN URINE INCON ASSESS: CPT | Performed by: INTERNAL MEDICINE

## 2023-09-12 PROCEDURE — 99214 OFFICE O/P EST MOD 30 MIN: CPT | Performed by: INTERNAL MEDICINE

## 2023-09-12 PROCEDURE — 1123F ACP DISCUSS/DSCN MKR DOCD: CPT | Performed by: INTERNAL MEDICINE

## 2023-09-12 PROCEDURE — G8400 PT W/DXA NO RESULTS DOC: HCPCS | Performed by: INTERNAL MEDICINE

## 2023-09-12 PROCEDURE — G8417 CALC BMI ABV UP PARAM F/U: HCPCS | Performed by: INTERNAL MEDICINE

## 2023-09-12 PROCEDURE — 1036F TOBACCO NON-USER: CPT | Performed by: INTERNAL MEDICINE

## 2023-09-12 RX ORDER — DOXYCYCLINE HYCLATE 100 MG
100 TABLET ORAL 2 TIMES DAILY
Qty: 20 TABLET | Refills: 0 | Status: SHIPPED | OUTPATIENT
Start: 2023-09-12 | End: 2023-09-22

## 2023-09-12 RX ORDER — LANOLIN ALCOHOL/MO/W.PET/CERES
3 CREAM (GRAM) TOPICAL DAILY
COMMUNITY

## 2023-09-12 RX ORDER — FLUTICASONE PROPIONATE 50 MCG
SPRAY, SUSPENSION (ML) NASAL
COMMUNITY
Start: 2023-08-21

## 2023-09-12 NOTE — PROGRESS NOTES
1. \"Have you been to the ER, urgent care clinic since your last visit? Hospitalized since your last visit? \"         ER follow up // face tingling     2. \"Have you seen or consulted any other health care providers outside of the [address] [place] [address] since your last visit? \" No

## 2023-09-13 LAB
EST. AVERAGE GLUCOSE BLD GHB EST-MCNC: 114 MG/DL
HBA1C MFR BLD: 5.6 % (ref 4–5.6)

## 2023-09-20 ENCOUNTER — OFFICE VISIT (OUTPATIENT)
Age: 78
End: 2023-09-20
Payer: MEDICARE

## 2023-09-20 VITALS
TEMPERATURE: 97.2 F | RESPIRATION RATE: 18 BRPM | DIASTOLIC BLOOD PRESSURE: 60 MMHG | BODY MASS INDEX: 32.51 KG/M2 | SYSTOLIC BLOOD PRESSURE: 98 MMHG | OXYGEN SATURATION: 96 % | HEART RATE: 64 BPM | WEIGHT: 172.2 LBS | HEIGHT: 61 IN

## 2023-09-20 DIAGNOSIS — L72.0 EPIDERMAL CYST: Primary | ICD-10-CM

## 2023-09-20 PROCEDURE — 1111F DSCHRG MED/CURRENT MED MERGE: CPT | Performed by: SURGERY

## 2023-09-20 PROCEDURE — 99203 OFFICE O/P NEW LOW 30 MIN: CPT | Performed by: SURGERY

## 2023-09-20 PROCEDURE — 1036F TOBACCO NON-USER: CPT | Performed by: SURGERY

## 2023-09-20 PROCEDURE — G8427 DOCREV CUR MEDS BY ELIG CLIN: HCPCS | Performed by: SURGERY

## 2023-09-20 PROCEDURE — 1090F PRES/ABSN URINE INCON ASSESS: CPT | Performed by: SURGERY

## 2023-09-20 PROCEDURE — G8400 PT W/DXA NO RESULTS DOC: HCPCS | Performed by: SURGERY

## 2023-09-20 PROCEDURE — 1123F ACP DISCUSS/DSCN MKR DOCD: CPT | Performed by: SURGERY

## 2023-09-20 PROCEDURE — G8417 CALC BMI ABV UP PARAM F/U: HCPCS | Performed by: SURGERY

## 2023-09-20 RX ORDER — DOXYCYCLINE HYCLATE 100 MG
100 TABLET ORAL 2 TIMES DAILY
Qty: 20 TABLET | Refills: 0 | Status: SHIPPED | OUTPATIENT
Start: 2023-09-20 | End: 2023-09-30

## 2023-09-20 ASSESSMENT — PATIENT HEALTH QUESTIONNAIRE - PHQ9
SUM OF ALL RESPONSES TO PHQ QUESTIONS 1-9: 0
1. LITTLE INTEREST OR PLEASURE IN DOING THINGS: 0
SUM OF ALL RESPONSES TO PHQ9 QUESTIONS 1 & 2: 0
2. FEELING DOWN, DEPRESSED OR HOPELESS: 0
SUM OF ALL RESPONSES TO PHQ QUESTIONS 1-9: 0

## 2023-09-26 ENCOUNTER — TELEPHONE (OUTPATIENT)
Age: 78
End: 2023-09-26

## 2023-09-26 RX ORDER — CIPROFLOXACIN 250 MG/1
250 TABLET, FILM COATED ORAL 2 TIMES DAILY
Qty: 6 TABLET | Refills: 0 | Status: SHIPPED | OUTPATIENT
Start: 2023-09-26 | End: 2023-09-28 | Stop reason: ALTCHOICE

## 2023-09-26 NOTE — TELEPHONE ENCOUNTER
Caller States:  Symptoms/concern:   Urinary odor  Urinary frequency  Home UTI test shows very pink  Onset:   Sunday   Other :   Takes med to prevent bladder infections but also currently on antibiotics for upcoming procedure on leg (taking thru Thursday).      APPOINTMENTS:  Date of last appointment:    9/12/2023     Pharmacy confirmed as:  [facility] [address], [address] [place] [address] [phone] Regan Mendez [phone]        REQUEST:  Asking for med for UTI  Call to advise how to take uti meds (along with or stop other antibiotics)

## 2023-09-26 NOTE — TELEPHONE ENCOUNTER
Pt called. 2 identifiers confirmed. Notified that medication was sent in to pharmacy. No further concerns voiced.

## 2023-09-28 ENCOUNTER — HOSPITAL ENCOUNTER (OUTPATIENT)
Facility: HOSPITAL | Age: 78
Setting detail: SPECIMEN
Discharge: HOME | End: 2023-10-01

## 2023-09-28 ENCOUNTER — PROCEDURE VISIT (OUTPATIENT)
Age: 78
End: 2023-09-28

## 2023-09-28 VITALS
DIASTOLIC BLOOD PRESSURE: 74 MMHG | WEIGHT: 174.4 LBS | RESPIRATION RATE: 18 BRPM | BODY MASS INDEX: 32.93 KG/M2 | OXYGEN SATURATION: 98 % | TEMPERATURE: 96.9 F | HEIGHT: 61 IN | HEART RATE: 69 BPM | SYSTOLIC BLOOD PRESSURE: 116 MMHG

## 2023-09-28 DIAGNOSIS — L72.0 EPIDERMAL CYST: Primary | ICD-10-CM

## 2023-09-28 NOTE — PATIENT INSTRUCTIONS
bathing, gently blot your wound dry with a soft towel. If a protective dressing is being used, apply a fresh, dry bandage, being sure to keep the tape off the DERMABOND adhesive film. Apply a clean, dry bandage over the wound if necessary to protect it. Protect your wound from injury until the skin has had sufficient time to heal.   Do not scratch, rub, or pick at the DERMABOND adhesive film. This may loosen the film before your wound is healed. Protect the wound from prolonged exposure to sunlight or tanning lamps while the film is in place.

## 2023-09-28 NOTE — PROGRESS NOTES
PROCEDURE NOTE      Fay Frankel is a 78 y.o. female who has been brought to the procedure room for excision of a 2.5 cm sebaceous cyst located on the left thigh. The risks, benefits, and alternatives were explained and consent was obtained for the procedure. The area was sterile prepped and draped in the usual manner. 1% lidocaine with epinephrine was infiltrated into the skin and soft tissue surrounding the lesion. An incision was made. This was a thin walled cyst containing some white caseous material consistent with a sebaceous cyst.  It was sharply dissected free of surrounding tissues and excised in its entirety and sent to pathology. Hemostasis was noted. The wound was closed with interrupted 3-0 Vicryl, 4-0 Monocryl; followed by  Dermabond. Wound care instructions were given. She tolerated the procedure without difficulty. Length of incision: 2.5  cm x 2 cm to include minimal margins.

## 2023-10-04 ENCOUNTER — TELEPHONE (OUTPATIENT)
Age: 78
End: 2023-10-04

## 2023-10-04 DIAGNOSIS — C44.729 SQUAMOUS CELL CARCINOMA OF SKIN OF LEFT LOWER LIMB, INCLUDING HIP: ICD-10-CM

## 2023-10-04 DIAGNOSIS — C44.92 SQUAMOUS CELL CARCINOMA OF SKIN: ICD-10-CM

## 2023-10-04 DIAGNOSIS — D04.9 SQUAMOUS CELL CARCINOMA IN SITU (SCCIS) OF SKIN: Primary | ICD-10-CM

## 2023-10-04 NOTE — TELEPHONE ENCOUNTER
Skin, left leg lesion, excision:        Invasive, keratinizing squamous cell carcinoma, well-differentiated        Peripheral and deep margins are uninvolved by carcinoma     This was discussed with the patient. Will obtain a CTAP to evaluate for any lymph nodes. Will discuss CT results with the patient once completed.  She will need a follow up apt in 6 months     Mikhail Phan MD

## 2023-10-16 ENCOUNTER — TELEPHONE (OUTPATIENT)
Age: 78
End: 2023-10-16

## 2023-10-16 ENCOUNTER — HOSPITAL ENCOUNTER (OUTPATIENT)
Facility: HOSPITAL | Age: 78
Discharge: HOME | End: 2023-10-19
Attending: SURGERY
Payer: MEDICARE

## 2023-10-16 DIAGNOSIS — C44.92 SQUAMOUS CELL CARCINOMA OF SKIN: ICD-10-CM

## 2023-10-16 DIAGNOSIS — C44.729 SQUAMOUS CELL CARCINOMA OF SKIN OF LEFT LOWER LIMB, INCLUDING HIP: ICD-10-CM

## 2023-10-16 PROCEDURE — 6360000004 HC RX CONTRAST MEDICATION: Performed by: SURGERY

## 2023-10-16 PROCEDURE — 2500000003 HC RX 250 WO HCPCS: Performed by: SURGERY

## 2023-10-16 PROCEDURE — 74177 CT ABD & PELVIS W/CONTRAST: CPT

## 2023-10-16 RX ADMIN — BARIUM SULFATE 900 ML: 20 SUSPENSION ORAL at 13:49

## 2023-10-16 RX ADMIN — IOPAMIDOL 100 ML: 755 INJECTION, SOLUTION INTRAVENOUS at 13:49

## 2023-10-16 NOTE — TELEPHONE ENCOUNTER
Pt was disconnected from triage line  Called, Spoke with Pt  Received two pt identifiers  Pt states has not had any trouble, slurred speech, or vision troubles  Advised pt she should go ED. Pt states would like to see a provider  Advised pt can make an appt for Wednesday  Pt offered and accepted appt for 10/1/23 @ 3pm  Advised pt if any sx worsen to go to ED immediately  Pt verbalizes understanding of the instructions and has no further questions at this time.

## 2023-10-16 NOTE — TELEPHONE ENCOUNTER
Patient / Nurse States:  Current Symptoms:   Left hand bruise like spot on center of palm  Currently has  Raised to where if you rest hand on counter, you can feel the bruise  Left hand turned purple, cold, then numb  Today, lasted 5 min  Left arm pain from elbow to hand,  Was 2 weeks ago  Was unable to close hand at the time      Appointments:  Date of last appointment:    9/12/2023       Call dropped while on hold --please call back

## 2023-10-18 ENCOUNTER — TELEPHONE (OUTPATIENT)
Age: 78
End: 2023-10-18

## 2023-10-18 ENCOUNTER — OFFICE VISIT (OUTPATIENT)
Age: 78
End: 2023-10-18
Payer: MEDICARE

## 2023-10-18 VITALS
SYSTOLIC BLOOD PRESSURE: 107 MMHG | TEMPERATURE: 97.5 F | DIASTOLIC BLOOD PRESSURE: 65 MMHG | OXYGEN SATURATION: 97 % | WEIGHT: 173.6 LBS | RESPIRATION RATE: 12 BRPM | HEIGHT: 61 IN | BODY MASS INDEX: 32.77 KG/M2 | HEART RATE: 58 BPM

## 2023-10-18 DIAGNOSIS — R10.9 ABDOMINAL PRESSURE: ICD-10-CM

## 2023-10-18 DIAGNOSIS — G56.02 CARPAL TUNNEL SYNDROME OF LEFT WRIST: Primary | ICD-10-CM

## 2023-10-18 DIAGNOSIS — R20.2 LEFT HAND PARESTHESIA: ICD-10-CM

## 2023-10-18 LAB
BILIRUBIN, URINE, POC: NEGATIVE
BLOOD URINE, POC: NEGATIVE
GLUCOSE URINE, POC: NEGATIVE
KETONES, URINE, POC: NEGATIVE
LEUKOCYTE ESTERASE, URINE, POC: NEGATIVE
NITRITE, URINE, POC: NEGATIVE
PH, URINE, POC: 7 (ref 4.6–8)
PROTEIN,URINE, POC: NEGATIVE
SPECIFIC GRAVITY, URINE, POC: 1.01 (ref 1–1.03)
URINALYSIS CLARITY, POC: CLEAR
URINALYSIS COLOR, POC: YELLOW
UROBILINOGEN, POC: NORMAL

## 2023-10-18 PROCEDURE — G8417 CALC BMI ABV UP PARAM F/U: HCPCS | Performed by: STUDENT IN AN ORGANIZED HEALTH CARE EDUCATION/TRAINING PROGRAM

## 2023-10-18 PROCEDURE — G8484 FLU IMMUNIZE NO ADMIN: HCPCS | Performed by: STUDENT IN AN ORGANIZED HEALTH CARE EDUCATION/TRAINING PROGRAM

## 2023-10-18 PROCEDURE — 99213 OFFICE O/P EST LOW 20 MIN: CPT | Performed by: STUDENT IN AN ORGANIZED HEALTH CARE EDUCATION/TRAINING PROGRAM

## 2023-10-18 PROCEDURE — G8400 PT W/DXA NO RESULTS DOC: HCPCS | Performed by: STUDENT IN AN ORGANIZED HEALTH CARE EDUCATION/TRAINING PROGRAM

## 2023-10-18 PROCEDURE — 1123F ACP DISCUSS/DSCN MKR DOCD: CPT | Performed by: STUDENT IN AN ORGANIZED HEALTH CARE EDUCATION/TRAINING PROGRAM

## 2023-10-18 PROCEDURE — 1036F TOBACCO NON-USER: CPT | Performed by: STUDENT IN AN ORGANIZED HEALTH CARE EDUCATION/TRAINING PROGRAM

## 2023-10-18 PROCEDURE — 81003 URINALYSIS AUTO W/O SCOPE: CPT | Performed by: STUDENT IN AN ORGANIZED HEALTH CARE EDUCATION/TRAINING PROGRAM

## 2023-10-18 PROCEDURE — PBSHW AMB POC URINALYSIS DIP STICK AUTO W/O MICRO: Performed by: STUDENT IN AN ORGANIZED HEALTH CARE EDUCATION/TRAINING PROGRAM

## 2023-10-18 PROCEDURE — 1090F PRES/ABSN URINE INCON ASSESS: CPT | Performed by: STUDENT IN AN ORGANIZED HEALTH CARE EDUCATION/TRAINING PROGRAM

## 2023-10-18 PROCEDURE — G8427 DOCREV CUR MEDS BY ELIG CLIN: HCPCS | Performed by: STUDENT IN AN ORGANIZED HEALTH CARE EDUCATION/TRAINING PROGRAM

## 2023-10-18 RX ORDER — METHOCARBAMOL 500 MG/1
500 TABLET, FILM COATED ORAL 2 TIMES DAILY
COMMUNITY

## 2023-10-18 RX ORDER — METFORMIN HYDROCHLORIDE 500 MG/1
500 TABLET, EXTENDED RELEASE ORAL DAILY
COMMUNITY
Start: 2023-09-25

## 2023-10-18 NOTE — PROGRESS NOTES
Chief Complaint   Patient presents with    Hand Pain     Left, bruising on palm  Patient states that the pain and bruising is coming from her neck. 1. Have you been to the ER, urgent care clinic since your last visit? Hospitalized since your last visit? Yes 1 week ago to [facility] urgent care ([address]) for pain in her left arm/hand    2. Have you seen or consulted any other health care providers outside of the [address] [place] [address] Avenue since your last visit? Include any pap smears or colon screening.  No
suspension, Apply 2 drops to eye 2 times daily, Disp: , Rfl:     Magnesium Oxide (MAGNESIUM-OXIDE) 250 MG TABS tablet, Take 1 tablet by mouth daily, Disp: , Rfl:     metoprolol succinate (TOPROL XL) 25 MG extended release tablet, Take 0.5 tablets by mouth nightly, Disp: , Rfl:     pantoprazole (PROTONIX) 40 MG tablet, Take 1 tablet by mouth 2 times daily, Disp: , Rfl:     simvastatin (ZOCOR) 40 MG tablet, TAKE 1 TABLET BY MOUTH ONCE DAILY AT NIGHT, Disp: , Rfl:     nitrofurantoin, macrocrystal-monohydrate, (MACROBID) 100 MG capsule, Take 1 capsule by mouth 2 times daily for 10 days, Disp: 20 capsule, Rfl: 0    methylPREDNISolone (MEDROL DOSEPACK) 4 MG tablet, Use as directed (Patient not taking: Reported on 10/18/2023), Disp: 1 kit, Rfl: 0    oxyCODONE (ROXICODONE) 5 MG immediate release tablet, Take 1 tablet by mouth every 6 hours as needed for Pain for up to 7 days. Supervising Physician: Asiya Rivera MD NPI: [id number] ELEONORA: [id number] Max Daily Amount: 20 mg (Patient not taking: Reported on 10/18/2023), Disp: 28 tablet, Rfl: 0    LABS:   UA w/o signs of UTI. ASSESSMENT and PLAN  1. Carpal tunnel syndrome of left wrist  Comments:  Likely from tightly gripping handle of new rollator walking leading to bruising of the palm as well. Orders:  -     Mackenzie Da Silva MD, Orthopaedic Surgery (elbow, hand, wrist), [place]  2. Abdominal pressure  -     AMB POC URINALYSIS DIP STICK AUTO W/O MICRO  3. Left hand paresthesia  -     DME Order for [facility]) as OP       Follow up:  No follow-ups on file.     Sherley Tamez MD

## 2023-10-18 NOTE — TELEPHONE ENCOUNTER
Discussed CT results with the patient. CTAP: IMPRESSION:  No evidence of metastatic disease in the abdomen or pelvis. Follow up in 6 months for site check at the excision site.     Samina Tran MD

## 2023-11-03 ENCOUNTER — HOSPITAL ENCOUNTER (OUTPATIENT)
Facility: HOSPITAL | Age: 78
End: 2023-11-03
Attending: ORTHOPAEDIC SURGERY
Payer: MEDICARE

## 2023-11-03 DIAGNOSIS — Z98.1 S/P LUMBAR FUSION: ICD-10-CM

## 2023-11-03 PROCEDURE — 72131 CT LUMBAR SPINE W/O DYE: CPT

## 2023-11-20 ENCOUNTER — TELEPHONE (OUTPATIENT)
Age: 78
End: 2023-11-20

## 2023-11-22 ENCOUNTER — PROCEDURE VISIT (OUTPATIENT)
Age: 78
End: 2023-11-22
Payer: MEDICARE

## 2023-11-22 DIAGNOSIS — M54.10 RADICULAR PAIN OF RIGHT LOWER EXTREMITY: Primary | ICD-10-CM

## 2023-11-22 DIAGNOSIS — M48.062 SPINAL STENOSIS OF LUMBAR REGION WITH NEUROGENIC CLAUDICATION: ICD-10-CM

## 2023-11-22 DIAGNOSIS — Z98.1 S/P LUMBAR FUSION: ICD-10-CM

## 2023-11-22 PROCEDURE — 95908 NRV CNDJ TST 3-4 STUDIES: CPT | Performed by: PSYCHIATRY & NEUROLOGY

## 2023-11-22 PROCEDURE — 95886 MUSC TEST DONE W/N TEST COMP: CPT | Performed by: PSYCHIATRY & NEUROLOGY

## 2023-12-01 ENCOUNTER — TELEPHONE (OUTPATIENT)
Age: 78
End: 2023-12-01

## 2023-12-01 NOTE — TELEPHONE ENCOUNTER
Patient stated that she received a missed phone call and haven't had time to check VM to see who called.

## 2023-12-04 NOTE — TELEPHONE ENCOUNTER
Called patient. No answer. Left a VM stating from our office ([place]) we did not call on our end.

## 2023-12-29 NOTE — PROGRESS NOTES
Oncology End of Shift Note      Bedside shift change report given to Moriah Pace RN (incoming nurse) by Michael Bermudez (outgoing nurse) on [facility]. Report included the following information SBAR, Kardex, Intake/Output and MAR. Shift Summary: Pt remained stable during shift. PRN pain medication x2. Labs drawn. Na now 135. No major complaints through the night. Issues for Physician to Address:       Patient on Cardiac Monitoring?     [] Yes  [x] No    Rhythm:          Michael Bermudez warm and dry

## 2024-01-10 ENCOUNTER — TELEPHONE (OUTPATIENT)
Age: 79
End: 2024-01-10

## 2024-01-10 NOTE — TELEPHONE ENCOUNTER
Patient would like to schedule an appointment due to dizziness. Patient states that it started yesterday and the dizziness comes and goes.   Patient would prefer an afternoon appointment.  Please call patient to schedule.

## 2024-01-11 NOTE — TELEPHONE ENCOUNTER
Spoke with patient. C/o dizziness. States she is \"extremely dizziness\". Slight headache over right eye. Called EMS two days ago. States BP and BS was normal. Patient refused to go to ER at time of EMS visit. Symptoms continue to get worse.   States slightly sick in stomach  Very tired.  Trying to stay hydrated and continue eating   Denies sob/chest pain/blurry vision    Scheduled with Dr. Escamilla at 1045  States she will go to ER if symptoms progress before then    Please advise as needed

## 2024-03-15 RX ORDER — LEVOTHYROXINE SODIUM 0.05 MG/1
TABLET ORAL
Qty: 105 TABLET | Refills: 3 | Status: SHIPPED | OUTPATIENT
Start: 2024-03-15

## 2024-03-15 NOTE — TELEPHONE ENCOUNTER
Received faxed refill request from pharmacy      PCP: Byron Mcdonald MD    Last appt: 9/12/2023  Future Appointments   Date Time Provider Department Center   5/2/2024  3:00 PM Glen Rowe MD [facility] BS AMB       Requested Prescriptions     Pending Prescriptions Disp Refills    levothyroxine (SYNTHROID) 50 MCG tablet 105 tablet 0     Sig: TAKE 1 TABLET BY MOUTH ONCE DAILY BEFORE BREAKFAST EXCEPT  1.5  TAB  (75MCG)  EVERY  MONDAY  AND  FRIDAY.

## 2024-03-18 ENCOUNTER — TELEPHONE (OUTPATIENT)
Age: 79
End: 2024-03-18

## 2024-03-18 NOTE — TELEPHONE ENCOUNTER
Offered patient appt for 12:00 tomorrow. Patient has a conflicting appointment. Patent accepted appointment for 4:00 tomorrow.

## 2024-03-18 NOTE — TELEPHONE ENCOUNTER
steroid shot on Friday   Feels like she is having high sugars.   Face is hot.  Vision is blurred   Mind Is foggy   Patient has not been seen since 2023.  Metformin is on board she take 1 a day.   Patient wants to know if we can see her.

## 2024-03-19 ENCOUNTER — OFFICE VISIT (OUTPATIENT)
Age: 79
End: 2024-03-19
Payer: MEDICARE

## 2024-03-19 VITALS
RESPIRATION RATE: 16 BRPM | HEART RATE: 92 BPM | OXYGEN SATURATION: 97 % | BODY MASS INDEX: 34.01 KG/M2 | DIASTOLIC BLOOD PRESSURE: 70 MMHG | WEIGHT: 180 LBS | TEMPERATURE: 98.1 F | SYSTOLIC BLOOD PRESSURE: 118 MMHG

## 2024-03-19 DIAGNOSIS — M48.061 SPINAL STENOSIS OF LUMBAR REGION, UNSPECIFIED WHETHER NEUROGENIC CLAUDICATION PRESENT: ICD-10-CM

## 2024-03-19 DIAGNOSIS — R73.03 PREDIABETES: Primary | ICD-10-CM

## 2024-03-19 LAB — GLUCOSE, POC: 109 MG/DL

## 2024-03-19 PROCEDURE — G8400 PT W/DXA NO RESULTS DOC: HCPCS | Performed by: INTERNAL MEDICINE

## 2024-03-19 PROCEDURE — PBSHW AMB POC GLUCOSE, QUANTITATIVE, BLOOD: Performed by: INTERNAL MEDICINE

## 2024-03-19 PROCEDURE — 1123F ACP DISCUSS/DSCN MKR DOCD: CPT | Performed by: INTERNAL MEDICINE

## 2024-03-19 PROCEDURE — 1036F TOBACCO NON-USER: CPT | Performed by: INTERNAL MEDICINE

## 2024-03-19 PROCEDURE — 99213 OFFICE O/P EST LOW 20 MIN: CPT | Performed by: INTERNAL MEDICINE

## 2024-03-19 PROCEDURE — G8417 CALC BMI ABV UP PARAM F/U: HCPCS | Performed by: INTERNAL MEDICINE

## 2024-03-19 PROCEDURE — G8484 FLU IMMUNIZE NO ADMIN: HCPCS | Performed by: INTERNAL MEDICINE

## 2024-03-19 PROCEDURE — 1090F PRES/ABSN URINE INCON ASSESS: CPT | Performed by: INTERNAL MEDICINE

## 2024-03-19 PROCEDURE — G8427 DOCREV CUR MEDS BY ELIG CLIN: HCPCS | Performed by: INTERNAL MEDICINE

## 2024-03-19 PROCEDURE — 82947 ASSAY GLUCOSE BLOOD QUANT: CPT | Performed by: INTERNAL MEDICINE

## 2024-03-19 ASSESSMENT — PATIENT HEALTH QUESTIONNAIRE - PHQ9
SUM OF ALL RESPONSES TO PHQ QUESTIONS 1-9: 0
1. LITTLE INTEREST OR PLEASURE IN DOING THINGS: NOT AT ALL
SUM OF ALL RESPONSES TO PHQ QUESTIONS 1-9: 0
SUM OF ALL RESPONSES TO PHQ QUESTIONS 1-9: 0
2. FEELING DOWN, DEPRESSED OR HOPELESS: NOT AT ALL
SUM OF ALL RESPONSES TO PHQ9 QUESTIONS 1 & 2: 0
SUM OF ALL RESPONSES TO PHQ QUESTIONS 1-9: 0

## 2024-03-19 NOTE — PROGRESS NOTES
Identified pt with two pt identifiers(name and ). Reviewed record in preparation for visit and have obtained necessary documentation.  Chief Complaint   Patient presents with    Other     Glucose level increased after steroid shot for back.        Vitals:    24 1608   BP: 118/70   Pulse: 92   Resp: 16   Temp: 98.1 °F (36.7 °C)   TempSrc: Temporal   SpO2: 97%   Weight: 81.6 kg (180 lb)      Pain Scale: /10  Health Maintenance Due   Topic    Shingles vaccine (1 of 2)    DEXA (modify frequency per FRAX score)     Respiratory Syncytial Virus (RSV) Pregnant or age 60 yrs+ (1 - 1-dose 60+ series)    Flu vaccine (1)    COVID-19 Vaccine (3 - 2023-24 season)     Coordination of Care Questionnaire:  :   1. Have you been to the ER, urgent care clinic since your last visit?  Hospitalized since your last visit?no  2. Have seen or consulted any other health care providers outside of the [facility] since your last visit?  Include any pap smears or colon screening. no

## 2024-03-19 NOTE — PROGRESS NOTES
HISTORY OF PRESENT ILLNESS   Sera May   is a 78 y.o.  female.  Pt had a Lumbar ELLEN procedure last Friday.  Yesterday felt like she had high glucsoe--red face blurry vision , foggy.  Glucose check last night 141  Today in office 109 and A1c 5.6  On meformin 500mg qd  Feels better today and redness of face resolved  Patient Active Problem List    Diagnosis Date Noted    Pre-syncope 08/24/2023    Lumbar stenosis with neurogenic claudication 07/17/2023    S/P lumbar fusion 07/17/2023    Kyphoscoliosis 07/17/2023    Encounter for preadmission testing 07/06/2023    Coronary artery disease involving native coronary artery of native heart without angina pectoris 03/27/2023    Type 2 diabetes mellitus (HCC) 12/13/2022    Postural dizziness with presyncope 01/19/2022    Mixed hyperlipidemia 01/14/2020    Overweight (BMI 25.0-29.9) 11/14/2018    Endometriosis 05/16/2018    Closed fracture of left lower extremity with routine healing 05/16/2018    Gastroesophageal reflux disease without esophagitis 05/16/2018    HX: breast cancer 05/16/2018    Osteopenia of multiple sites 05/16/2018    History of CVA (cerebrovascular accident) 05/16/2018    Other specified hypothyroidism 05/16/2018     Current Outpatient Medications   Medication Sig Dispense Refill    levothyroxine (SYNTHROID) 50 MCG tablet TAKE 1 TABLET BY MOUTH ONCE DAILY BEFORE BREAKFAST EXCEPT  1.5  TAB  (75MCG)  EVERY  MONDAY  AND  FRIDAY. 105 tablet 3    pregabalin (LYRICA) 150 MG capsule Take 1 capsule by mouth twice daily 60 capsule 0    metFORMIN (GLUCOPHAGE-XR) 500 MG extended release tablet Take 1 tablet by mouth daily      methocarbamol (ROBAXIN) 500 MG tablet Take 1 tablet by mouth in the morning and at bedtime      fluticasone (FLONASE) 50 MCG/ACT nasal spray USE 2 SPRAY(S) IN EACH NOSTRIL ONCE DAILY      diphenhydrAMINE-APAP, sleep, (TYLENOL PM EXTRA STRENGTH)  MG/30ML LIQD Take by mouth      melatonin 3 MG TABS tablet Take 1 tablet by mouth daily

## 2024-04-05 RX ORDER — METFORMIN HYDROCHLORIDE 500 MG/1
500 TABLET, EXTENDED RELEASE ORAL DAILY
Qty: 30 TABLET | Refills: 5 | Status: SHIPPED | OUTPATIENT
Start: 2024-04-05

## 2024-04-05 NOTE — TELEPHONE ENCOUNTER
metFORMIN (GLUCOPHAGE-XR) 500 MG extended release tablet  2/day     Pt states she is no longer going to physician that prescribed this.    She would like a refill [facility] #[phone]

## 2024-04-18 RX ORDER — FLUTICASONE PROPIONATE 50 MCG
SPRAY, SUSPENSION (ML) NASAL
Qty: 16 G | Refills: 5 | Status: SHIPPED | OUTPATIENT
Start: 2024-04-18

## 2024-04-18 RX ORDER — SIMVASTATIN 40 MG
TABLET ORAL
Qty: 90 TABLET | Refills: 3 | Status: SHIPPED | OUTPATIENT
Start: 2024-04-18

## 2024-05-13 RX ORDER — EZETIMIBE 10 MG/1
10 TABLET ORAL DAILY
Qty: 90 TABLET | Refills: 3 | Status: SHIPPED | OUTPATIENT
Start: 2024-05-13

## 2024-06-10 ENCOUNTER — TELEPHONE (OUTPATIENT)
Age: 79
End: 2024-06-10

## 2024-06-10 NOTE — TELEPHONE ENCOUNTER
Attempted to reach patient. Left message on vm to return call    Did pt reach out to Dr. Escalera office for refill being provider prescribed medication and changed dosage? If not patient needs to contact Dr. Escalera office for refill

## 2024-06-10 NOTE — TELEPHONE ENCOUNTER
pregabalin (LYRICA) 150 MG capsule ()    Dr. Escalera changed dosage to 150 MG/ MG/PM    Pt will need a refill to reflect the change.     Refill to [facility][phone] [address]     Pt is out of medication.  She states she is very nervous being without.  I did advise of turn around time.

## 2024-06-12 ENCOUNTER — TELEPHONE (OUTPATIENT)
Age: 79
End: 2024-06-12

## 2024-06-12 NOTE — TELEPHONE ENCOUNTER
----- Message from Jorge Arboleda sent at 6/12/2024 12:41 PM EDT -----  Subject: Appointment Request    Reason for Call: Established Patient Appointment needed: Routine Medicare   AWV    QUESTIONS    Reason for appointment request? No appointments available during search     Additional Information for Provider? attempting to schedule awv. nothing   available persfers nicanor masters am appt to complete lab work   ---------------------------------------------------------------------------  --------------  CALL BACK INFO  [phone]; OK to leave message on voicemail  ---------------------------------------------------------------------------  --------------  SCRIPT ANSWERS

## 2024-06-12 NOTE — TELEPHONE ENCOUNTER
Attempted to reach patient x2. Left message on vm to return call     Did pt reach out to Dr. Tiwari for refill being provider prescribed medication and changed dosage? If not patient needs to contact Dr. Tiwari for refill    Per chart - refill was sent yesterday 6/11/2024 by Dr. Escalera office.    Closing encounter    Progress Note    he is a 13y.o. year old male who presents for evalution. Subjective: Anxiety a little worse on the Focalin 20 mom states he is better on 15 so we will decrease. Also taking ritalin 5 in the afternoon. Zoloft is helping with anxiety. Mom wants Rx for melatonin using 3mg nightly for insomnia, works well for him. Reviewed PmHx, RxHx, FmHx, SocHx, AllgHx and updated and dated in the chart. Review of Systems - negative except as listed above in the HPI    Objective:     Vitals:    02/15/23 1506   BP: 122/73   Pulse: 73   Resp: 22   Temp: 97.7 °F (36.5 °C)   TempSrc: Oral   SpO2: 98%   Weight: (!) 161 lb 9.6 oz (73.3 kg)   Height: (!) 5' 1.46\" (1.561 m)       Current Outpatient Medications   Medication Sig    dexmethylphenidate 15 mg BP50 Take 15 mg by mouth daily for 29 days. Max Daily Amount: 15 mg. [START ON 3/17/2023] dexmethylphenidate 15 mg BP50 Take 15 mg by mouth daily for 29 days. Max Daily Amount: 15 mg. [START ON 4/16/2023] dexmethylphenidate 15 mg BP50 Take 15 mg by mouth daily for 29 days. Max Daily Amount: 15 mg.    methylphenidate HCl (RITALIN) 5 mg tablet 1 tab PO Qafternoon Fill 4/16/23    methylphenidate HCl (RITALIN) 5 mg tablet Take 1 tab PO every afternoon. Fill 3/17/23    methylphenidate HCl (RITALIN) 5 mg tablet 1 tab PO Qafternoon fill 2/15/23    melatonin, disintegrating, 3 mg TbDi tablet Take 1 Tablet by mouth nightly as needed (insomnia). methylphenidate HCl (RITALIN) 5 mg tablet 1 tab PO Qafternoon fill 1/18/23    sertraline (ZOLOFT) 50 mg tablet Take 3 Tablets by mouth daily. cetirizine (ZYRTEC) 1 mg/mL solution TAKE 1 TEASPOONFUL AS NEEDED DAILY    fluticasone propionate (FLONASE) 50 mcg/actuation nasal spray INHALE 1 SPRAY IN EACH NOSTRIL AS NEEDED    erythromycin (ILOTYCIN) ophthalmic ointment 1/4 inch ribbon tid to affected eye (Patient not taking: No sig reported)     No current facility-administered medications for this visit. Physical Examination: General appearance - alert, well appearing, and in no distress  Chest - clear to auscultation, no wheezes, rales or rhonchi, symmetric air entry  Heart - normal rate, regular rhythm, normal S1, S2, no murmurs, rubs, clicks or gallops      Assessment/ Plan:   Diagnoses and all orders for this visit:    1. Attention deficit hyperactivity disorder (ADHD), combined type  -     dexmethylphenidate 15 mg BP50; Take 15 mg by mouth daily for 29 days. Max Daily Amount: 15 mg.  -     dexmethylphenidate 15 mg BP50; Take 15 mg by mouth daily for 29 days. Max Daily Amount: 15 mg.  -     dexmethylphenidate 15 mg BP50; Take 15 mg by mouth daily for 29 days. Max Daily Amount: 15 mg.  -     methylphenidate HCl (RITALIN) 5 mg tablet; 1 tab PO Qafternoon Fill 4/16/23  -     methylphenidate HCl (RITALIN) 5 mg tablet; Take 1 tab PO every afternoon. Fill 3/17/23  -     methylphenidate HCl (RITALIN) 5 mg tablet; 1 tab PO Qafternoon fill 2/15/23  Adjust Focalin dose back down to 15 mg  2. Oppositional defiant disorder  -     dexmethylphenidate 15 mg BP50; Take 15 mg by mouth daily for 29 days. Max Daily Amount: 15 mg.  -     dexmethylphenidate 15 mg BP50; Take 15 mg by mouth daily for 29 days. Max Daily Amount: 15 mg.  -     dexmethylphenidate 15 mg BP50; Take 15 mg by mouth daily for 29 days. Max Daily Amount: 15 mg.    3. Adjustment insomnia  -     melatonin, disintegrating, 3 mg TbDi tablet; Take 1 Tablet by mouth nightly as needed (insomnia). Follow-up and Dispositions    Return in about 3 months (around 5/15/2023), or if symptoms worsen or fail to improve. I have discussed the diagnosis with the patient and the intended plan as seen in the above orders. The patient has received an after-visit summary and questions were answered concerning future plans. Pt conveyed understanding of plan.     Medication Side Effects and Warnings were discussed with patient    An electronic signature was used to authenticate this note  Renay Astudillo, DO

## 2024-06-12 NOTE — TELEPHONE ENCOUNTER
Pt needs awv, looks like note recommended September    No availability    Can pt be worked in September

## 2024-06-12 NOTE — TELEPHONE ENCOUNTER
To clarify....  Pt states she couldn't get a hold of dr yoon's office and she was running out, so she reached out to dr Mcdonald to see if he could help so she could keep it in her system for worry of seizure.    States understands that ortho did send in the medication as of yesterday.      July will be last visit with dr yoon.  Will need Dr Mcdonald to take over the medication.    Please be advised

## 2024-06-17 ENCOUNTER — TELEPHONE (OUTPATIENT)
Age: 79
End: 2024-06-17

## 2024-06-17 NOTE — TELEPHONE ENCOUNTER
Consulted with Dr. cMdonald in office. States patient needs to contact Dr. Escalera regarding reucing and weaning off pregablin.    Spoke with patient and advised. Verbalized understanding     No further questions.

## 2024-06-17 NOTE — TELEPHONE ENCOUNTER
Patient states she needs a Call back in reference to reducing & weaning her Pregabalin. Please call to discuss. Thank you

## 2024-07-02 ENCOUNTER — TELEPHONE (OUTPATIENT)
Age: 79
End: 2024-07-02

## 2024-07-02 NOTE — TELEPHONE ENCOUNTER
Pt states she is having a time with bursitis and would like an appt with Dr. Mcdonald about this.  Offered with another physician and pt declined.  Please call to schedule.

## 2024-07-04 NOTE — PROGRESS NOTES
HISTORY OF PRESENT ILLNESS   Sera May   is a 78 y.o.  female.  Hx HLD overweight, hx retinal vein occlusion , hx right breast cancer s/p mastectomy  osteoporosis on prolia, prediabetes, hypothyroid CAD s/p stent 2023-_Dr Panda s/p RCA stent----   s/p lumbar fusion    C/o bursitis h/l hip and buttocks in back    Has had PT acupuncture s/p lumbar fusion last year ( left sciatica resolved)-ELLEN Dr Blackburn    Could not get nerve ablation for SI pain-not covered per pt  Had b/l hip and buttocks injections which has not helped    On lyrica and norco now for pain  Has fu Dr Escalera July 18    Still going to PT 2 times per week for hip and buttocks pain  Dry needling helping     Patient Active Problem List    Diagnosis Date Noted    Pre-syncope 08/24/2023    Lumbar stenosis with neurogenic claudication 07/17/2023    S/P lumbar fusion 07/17/2023    Kyphoscoliosis 07/17/2023    Encounter for preadmission testing 07/06/2023    Coronary artery disease involving native coronary artery of native heart without angina pectoris 03/27/2023    Type 2 diabetes mellitus (HCC) 12/13/2022    Postural dizziness with presyncope 01/19/2022    Mixed hyperlipidemia 01/14/2020    Overweight (BMI 25.0-29.9) 11/14/2018    Endometriosis 05/16/2018    Closed fracture of left lower extremity with routine healing 05/16/2018    Gastroesophageal reflux disease without esophagitis 05/16/2018    HX: breast cancer 05/16/2018    Osteopenia of multiple sites 05/16/2018    History of CVA (cerebrovascular accident) 05/16/2018    Other specified hypothyroidism 05/16/2018     Current Outpatient Medications   Medication Sig Dispense Refill    pregabalin (LYRICA) 150 MG capsule Take 1 capsule by mouth 2 times daily for 30 days. Max Daily Amount: 300 mg 60 capsule 0    ezetimibe (ZETIA) 10 MG tablet Take 1 tablet by mouth once daily 90 tablet 3    simvastatin (ZOCOR) 40 MG tablet TAKE 1 TABLET BY MOUTH ONCE DAILY AT NIGHT 90 tablet 3    fluticasone (FLONASE) 50

## 2024-07-08 ENCOUNTER — OFFICE VISIT (OUTPATIENT)
Age: 79
End: 2024-07-08
Payer: MEDICARE

## 2024-07-08 VITALS
DIASTOLIC BLOOD PRESSURE: 72 MMHG | BODY MASS INDEX: 33.19 KG/M2 | TEMPERATURE: 97.1 F | RESPIRATION RATE: 16 BRPM | SYSTOLIC BLOOD PRESSURE: 122 MMHG | HEART RATE: 70 BPM | HEIGHT: 61 IN | WEIGHT: 175.8 LBS | OXYGEN SATURATION: 97 %

## 2024-07-08 DIAGNOSIS — E08.43 DIABETES MELLITUS DUE TO UNDERLYING CONDITION WITH DIABETIC AUTONOMIC NEUROPATHY, WITHOUT LONG-TERM CURRENT USE OF INSULIN (HCC): ICD-10-CM

## 2024-07-08 DIAGNOSIS — M54.32 LEFT SCIATIC NERVE PAIN: ICD-10-CM

## 2024-07-08 DIAGNOSIS — N39.0 URINARY TRACT INFECTION WITHOUT HEMATURIA, SITE UNSPECIFIED: ICD-10-CM

## 2024-07-08 DIAGNOSIS — R73.03 PREDIABETES: ICD-10-CM

## 2024-07-08 DIAGNOSIS — M70.72 BURSITIS OF BOTH HIPS, UNSPECIFIED BURSA: ICD-10-CM

## 2024-07-08 DIAGNOSIS — M70.71 BURSITIS OF BOTH HIPS, UNSPECIFIED BURSA: ICD-10-CM

## 2024-07-08 DIAGNOSIS — E78.00 PURE HYPERCHOLESTEROLEMIA: Primary | ICD-10-CM

## 2024-07-08 PROBLEM — E11.9 TYPE 2 DIABETES MELLITUS (HCC): Status: RESOLVED | Noted: 2022-12-13 | Resolved: 2024-07-08

## 2024-07-08 LAB
APPEARANCE UR: CLEAR
BACTERIA URNS QL MICRO: NEGATIVE /HPF
BILIRUB UR QL: NEGATIVE
COLOR UR: ABNORMAL
EPITH CASTS URNS QL MICRO: ABNORMAL /LPF
GLUCOSE UR STRIP.AUTO-MCNC: NEGATIVE MG/DL
HGB UR QL STRIP: NEGATIVE
KETONES UR QL STRIP.AUTO: NEGATIVE MG/DL
LEUKOCYTE ESTERASE UR QL STRIP.AUTO: ABNORMAL
NITRITE UR QL STRIP.AUTO: NEGATIVE
PH UR STRIP: 6.5 (ref 5–8)
PROT UR STRIP-MCNC: NEGATIVE MG/DL
RBC #/AREA URNS HPF: ABNORMAL /HPF (ref 0–5)
SP GR UR REFRACTOMETRY: 1.01 (ref 1–1.03)
URINE CULTURE IF INDICATED: ABNORMAL
UROBILINOGEN UR QL STRIP.AUTO: 0.2 EU/DL (ref 0.2–1)
WBC URNS QL MICRO: ABNORMAL /HPF (ref 0–4)

## 2024-07-08 PROCEDURE — G8427 DOCREV CUR MEDS BY ELIG CLIN: HCPCS | Performed by: INTERNAL MEDICINE

## 2024-07-08 PROCEDURE — G8417 CALC BMI ABV UP PARAM F/U: HCPCS | Performed by: INTERNAL MEDICINE

## 2024-07-08 PROCEDURE — 1090F PRES/ABSN URINE INCON ASSESS: CPT | Performed by: INTERNAL MEDICINE

## 2024-07-08 PROCEDURE — 1036F TOBACCO NON-USER: CPT | Performed by: INTERNAL MEDICINE

## 2024-07-08 PROCEDURE — G8400 PT W/DXA NO RESULTS DOC: HCPCS | Performed by: INTERNAL MEDICINE

## 2024-07-08 PROCEDURE — 99214 OFFICE O/P EST MOD 30 MIN: CPT | Performed by: INTERNAL MEDICINE

## 2024-07-08 PROCEDURE — 1123F ACP DISCUSS/DSCN MKR DOCD: CPT | Performed by: INTERNAL MEDICINE

## 2024-07-08 RX ORDER — PREGABALIN 100 MG/1
100 CAPSULE ORAL 2 TIMES DAILY
Qty: 60 CAPSULE | Refills: 5 | Status: SHIPPED | OUTPATIENT
Start: 2024-07-08 | End: 2025-01-04

## 2024-07-08 RX ORDER — METOPROLOL TARTRATE 100 MG/1
TABLET ORAL
COMMUNITY
Start: 2024-04-16

## 2024-07-08 RX ORDER — METHYLPREDNISOLONE 4 MG/1
TABLET ORAL
Qty: 1 KIT | Refills: 0 | Status: SHIPPED | OUTPATIENT
Start: 2024-07-08 | End: 2024-07-14

## 2024-07-08 SDOH — ECONOMIC STABILITY: FOOD INSECURITY: WITHIN THE PAST 12 MONTHS, THE FOOD YOU BOUGHT JUST DIDN'T LAST AND YOU DIDN'T HAVE MONEY TO GET MORE.: NEVER TRUE

## 2024-07-08 SDOH — ECONOMIC STABILITY: FOOD INSECURITY: WITHIN THE PAST 12 MONTHS, YOU WORRIED THAT YOUR FOOD WOULD RUN OUT BEFORE YOU GOT MONEY TO BUY MORE.: NEVER TRUE

## 2024-07-08 SDOH — ECONOMIC STABILITY: INCOME INSECURITY: HOW HARD IS IT FOR YOU TO PAY FOR THE VERY BASICS LIKE FOOD, HOUSING, MEDICAL CARE, AND HEATING?: NOT HARD AT ALL

## 2024-07-08 NOTE — PROGRESS NOTES
\"Have you been to the ER, urgent care clinic since your last visit?  Hospitalized since your last visit?\"    NO    “Have you seen or consulted any other health care providers outside of [facility] since your last visit?”    [facility]             Click Here for Release of Records Request

## 2024-07-09 LAB
ALBUMIN SERPL-MCNC: 4.2 G/DL (ref 3.5–5)
ALBUMIN/GLOB SERPL: 1.3 (ref 1.1–2.2)
ALP SERPL-CCNC: 102 U/L (ref 45–117)
ALT SERPL-CCNC: 34 U/L (ref 12–78)
ANION GAP SERPL CALC-SCNC: 5 MMOL/L (ref 5–15)
AST SERPL-CCNC: 23 U/L (ref 15–37)
BILIRUB SERPL-MCNC: 0.4 MG/DL (ref 0.2–1)
BUN SERPL-MCNC: 14 MG/DL (ref 6–20)
BUN/CREAT SERPL: 19 (ref 12–20)
CALCIUM SERPL-MCNC: 9.1 MG/DL (ref 8.5–10.1)
CHLORIDE SERPL-SCNC: 103 MMOL/L (ref 97–108)
CHOLEST SERPL-MCNC: 185 MG/DL
CO2 SERPL-SCNC: 28 MMOL/L (ref 21–32)
CREAT SERPL-MCNC: 0.72 MG/DL (ref 0.55–1.02)
ERYTHROCYTE [DISTWIDTH] IN BLOOD BY AUTOMATED COUNT: 15.9 % (ref 11.5–14.5)
EST. AVERAGE GLUCOSE BLD GHB EST-MCNC: 114 MG/DL
GLOBULIN SER CALC-MCNC: 3.2 G/DL (ref 2–4)
GLUCOSE SERPL-MCNC: 90 MG/DL (ref 65–100)
HBA1C MFR BLD: 5.6 % (ref 4–5.6)
HCT VFR BLD AUTO: 38.4 % (ref 35–47)
HDLC SERPL-MCNC: 79 MG/DL
HDLC SERPL: 2.3 (ref 0–5)
HGB BLD-MCNC: 12.2 G/DL (ref 11.5–16)
LDLC SERPL CALC-MCNC: 93.6 MG/DL (ref 0–100)
MCH RBC QN AUTO: 30.7 PG (ref 26–34)
MCHC RBC AUTO-ENTMCNC: 31.8 G/DL (ref 30–36.5)
MCV RBC AUTO: 96.5 FL (ref 80–99)
NRBC # BLD: 0 K/UL (ref 0–0.01)
NRBC BLD-RTO: 0 PER 100 WBC
PLATELET # BLD AUTO: 351 K/UL (ref 150–400)
PMV BLD AUTO: 8.4 FL (ref 8.9–12.9)
POTASSIUM SERPL-SCNC: 4.3 MMOL/L (ref 3.5–5.1)
PROT SERPL-MCNC: 7.4 G/DL (ref 6.4–8.2)
RBC # BLD AUTO: 3.98 M/UL (ref 3.8–5.2)
SODIUM SERPL-SCNC: 136 MMOL/L (ref 136–145)
TRIGL SERPL-MCNC: 62 MG/DL
TSH SERPL DL<=0.05 MIU/L-ACNC: 1.71 UIU/ML (ref 0.36–3.74)
VLDLC SERPL CALC-MCNC: 12.4 MG/DL
WBC # BLD AUTO: 8.1 K/UL (ref 3.6–11)

## 2024-07-15 ENCOUNTER — TELEPHONE (OUTPATIENT)
Age: 79
End: 2024-07-15

## 2024-07-15 DIAGNOSIS — R42 POSTURAL DIZZINESS WITH PRESYNCOPE: Primary | ICD-10-CM

## 2024-07-15 DIAGNOSIS — R55 POSTURAL DIZZINESS WITH PRESYNCOPE: Primary | ICD-10-CM

## 2024-07-15 NOTE — TELEPHONE ENCOUNTER
----- Message from Sobeida Perez sent at 7/15/2024  8:25 AM EDT -----  Regarding: Lab Results    Patient calling about lab results.     ----- Message -----  From: Alex Meade  Sent: 7/11/2024  10:08 AM EDT  To: Sobeida Perez  Subject: FW: ECC Results Request                            ----- Message -----  From: Rinku Christianson  Sent: 7/11/2024   9:41 AM EDT  To: #  Subject: ECC Results Request                              ECC Results Request    Which lab or imaging result is the patient calling about: lab test result     Which provider ordered the test?  Byron Mcdonald    Was this a Non-[facility] Provider: No    Date the test was preformed (DARIO/YANN/YYYY):07/08/2024   --------------------------------------------------------------------------------------------------------------------------    Relationship to Patient: Self     Call Back Info: OK to leave message on voicemail  Preferred Call Back Number: Phone [phone]

## 2024-07-15 NOTE — TELEPHONE ENCOUNTER
Pt states she is dizzy and feels it has to do with there ears.     Pt is asking for medication to be called into [facility]   #[phone] [facility]     Please call pt to discuss if needed.

## 2024-07-15 NOTE — TELEPHONE ENCOUNTER
Attempted to reach patient. Left message on vm to return call    GenieBeltt message sent regarding both telephone encounters today

## 2024-07-15 NOTE — TELEPHONE ENCOUNTER
Attempted to reach patient. Left message on vm to return call      Per Dr. Mcdonald  None. Keep up the good work!  Continue with current  diet and medications.  Normal blood cell counts, glucose, kidneys,liver, electrolytes, cholesterol and thyroid levels.  Urine is clear.

## 2024-07-16 RX ORDER — MECLIZINE HYDROCHLORIDE 25 MG/1
25 TABLET ORAL 3 TIMES DAILY PRN
Qty: 30 TABLET | Refills: 0 | Status: SHIPPED | OUTPATIENT
Start: 2024-07-16 | End: 2024-07-26

## 2024-07-16 NOTE — TELEPHONE ENCOUNTER
Spoke with patient. Advised. Rx sent to pharmacy.   Pt verbalized understanding.     Per Dr. Mcdonald  Advised to hydrate. I can refill her meclizine if needed-let me know or call in meclizine 25 mg tid prn 30/nr. See ENT MD

## 2024-07-16 NOTE — TELEPHONE ENCOUNTER
Attempted to reach patient. Left message on vm to return call    Per Dr. Mcdonald  Advised to hydrate. I can refill her meclizine if needed-let me know or call in meclizine 25 mg tid prn 30/nr. See ENT MD

## 2024-07-16 NOTE — TELEPHONE ENCOUNTER
Pt has not checked BP. Ongoing problem. Advised to contact Dr. Gonzalez office as well. Advised will send message to PCP for further recommendations.     Please advise.

## 2024-07-16 NOTE — TELEPHONE ENCOUNTER
Pt called back    Psr verbally read pt the [facility] message for results and the questions from the nurse regarding dizziness.    Pt replied:    How long has the dizziness been going on?   Today has cleared up a little but it has been about 3 days so far.  Onset 3 days.  Has there been any medication or lifestyle changes recently?   No   Is the dizziness constant or do you notice it at certain times?   Constant   Have you ever seen an ENT specialist for this?   Not for this episode, but has had ear problems in the past and saw dr Gonzalez in the [place] for that. They put pt on a med for dizziness and it started with an \"m\".  Took one of the meds last night.  Specialist las seen 4 months ago.      Please call to further advise

## 2024-07-18 ENCOUNTER — TELEPHONE (OUTPATIENT)
Age: 79
End: 2024-07-18

## 2024-07-18 PROBLEM — E11.9 TYPE 2 DIABETES MELLITUS (HCC): Status: ACTIVE | Noted: 2024-07-18

## 2024-07-18 NOTE — TELEPHONE ENCOUNTER
Spoke with patient  States she was holding her head in her hand while leaning over the table for prolonged period  She states the numbness is starting to subside and she is feeling better   States she does text on her phone quite a bit    Advised to call back if symptoms worsen

## 2024-07-18 NOTE — TELEPHONE ENCOUNTER
Last night, had elbows on table and rested hands on table.  Right pinky finger and outside of right  hand went numb.  States it went away a little but is still present.    Asks if this is something to be concerned about.    Please call to advise.

## 2024-08-08 ENCOUNTER — TELEPHONE (OUTPATIENT)
Age: 79
End: 2024-08-08

## 2024-08-08 RX ORDER — CIPROFLOXACIN 250 MG/1
250 TABLET, FILM COATED ORAL 2 TIMES DAILY
Qty: 6 TABLET | Refills: 0 | Status: SHIPPED | OUTPATIENT
Start: 2024-08-08 | End: 2024-08-11

## 2024-08-08 NOTE — TELEPHONE ENCOUNTER
Placed call to patient and advised that Dr. Mcdonald did e-scribe cipro for UTI symptoms.   Patient verbalized understanding.

## 2024-08-08 NOTE — TELEPHONE ENCOUNTER
Pt has frequency, pressure, stomach pain and when she urinates there is a bad sensation that make her cringe.     Pt has UTI she states with these symptoms.    Pt states there is a tornado watch and she can't come in or go to UC.     Pt has taken the AZO for a week now.     Pt is asking for medication to be call into [facility] #[phone]  [facility]

## 2024-08-30 ENCOUNTER — TELEPHONE (OUTPATIENT)
Age: 79
End: 2024-08-30

## 2024-08-30 NOTE — TELEPHONE ENCOUNTER
Pt states on her take home paper it has her to start prednisone.  She does not have script for this and it hasn't been sent to the pharmacy.     Please call to go over.

## 2024-08-30 NOTE — TELEPHONE ENCOUNTER
Attempted to reach patient. Left message on vm to return call    What take home paper? Pt has not been seen by Dr. Mcdonald since 7/8/2024

## 2024-09-03 NOTE — TELEPHONE ENCOUNTER
States was incorrect, dr bhatti did give her prednisone as a pack only.    States she originally thought it was ongoing script.    States please disregard previous request.

## 2024-09-03 NOTE — TELEPHONE ENCOUNTER
Attempted to reach patient x2. Left message on vm to return call     What take home paper? Pt has not been seen by Dr. Mcdonald since 7/8/2024     Closing encounter

## 2024-11-13 NOTE — TELEPHONE ENCOUNTER
[facility] [address] [address]  [phone]          November 13, 2024    RE:  Kurtis Senior                                                                                                                                                       [address] [place] [address]            To whom it may concern:    Kurtis Senior is under my professional care for a left carpal tunnel release. He may return to working light duty with no repetitive, prolonged or hard grasping. He has a follow up appointment in approximately 4 weeks where he will be re-evaluated and his work restrictions will be modified as needed at that time.      Sincerely,        Praful Bhagta MD     Signed order received from physician and faxed [facility] at  with confirmation of receipt. This writer attempted to contact patient to informed of the above information. This writer was not able to reach patient at this time, a detailed voicemail was left on a secure voicemail informing patient of the above information . Patient was advised to contact the office with further question if applicable.  No further action required at this time

## 2024-11-19 ENCOUNTER — APPOINTMENT (OUTPATIENT)
Facility: HOSPITAL | Age: 79
End: 2024-11-19
Payer: MEDICARE

## 2024-11-19 ENCOUNTER — HOSPITAL ENCOUNTER (EMERGENCY)
Facility: HOSPITAL | Age: 79
Discharge: HOME | End: 2024-11-19
Attending: STUDENT IN AN ORGANIZED HEALTH CARE EDUCATION/TRAINING PROGRAM
Payer: MEDICARE

## 2024-11-19 VITALS
HEIGHT: 64 IN | BODY MASS INDEX: 29.17 KG/M2 | DIASTOLIC BLOOD PRESSURE: 86 MMHG | RESPIRATION RATE: 18 BRPM | SYSTOLIC BLOOD PRESSURE: 171 MMHG | WEIGHT: 170.86 LBS | HEART RATE: 66 BPM | TEMPERATURE: 98.8 F | OXYGEN SATURATION: 98 %

## 2024-11-19 DIAGNOSIS — R53.1 GENERAL WEAKNESS: Primary | ICD-10-CM

## 2024-11-19 LAB
ALBUMIN SERPL-MCNC: 4.4 G/DL (ref 3.5–5)
ALBUMIN/GLOB SERPL: 1.1 (ref 1.1–2.2)
ALP SERPL-CCNC: 105 U/L (ref 45–117)
ALT SERPL-CCNC: 37 U/L (ref 12–78)
ANION GAP SERPL CALC-SCNC: 6 MMOL/L (ref 2–12)
APPEARANCE UR: CLEAR
AST SERPL-CCNC: 28 U/L (ref 15–37)
BACTERIA URNS QL MICRO: NEGATIVE /HPF
BASOPHILS # BLD: 0 K/UL (ref 0–0.1)
BASOPHILS NFR BLD: 0 % (ref 0–1)
BILIRUB SERPL-MCNC: 0.2 MG/DL (ref 0.2–1)
BILIRUB UR QL: NEGATIVE
BUN SERPL-MCNC: 20 MG/DL (ref 6–20)
BUN/CREAT SERPL: 25 (ref 12–20)
CALCIUM SERPL-MCNC: 10.1 MG/DL (ref 8.5–10.1)
CHLORIDE SERPL-SCNC: 98 MMOL/L (ref 97–108)
CO2 SERPL-SCNC: 28 MMOL/L (ref 21–32)
COLOR UR: ABNORMAL
COMMENT:: NORMAL
CREAT SERPL-MCNC: 0.81 MG/DL (ref 0.55–1.02)
DIFFERENTIAL METHOD BLD: ABNORMAL
EKG ATRIAL RATE: 66 BPM
EKG DIAGNOSIS: NORMAL
EKG P AXIS: 79 DEGREES
EKG P-R INTERVAL: 140 MS
EKG Q-T INTERVAL: 394 MS
EKG QRS DURATION: 78 MS
EKG QTC CALCULATION (BAZETT): 413 MS
EKG R AXIS: -70 DEGREES
EKG T AXIS: 70 DEGREES
EKG VENTRICULAR RATE: 66 BPM
EOSINOPHIL # BLD: 0 K/UL (ref 0–0.4)
EOSINOPHIL NFR BLD: 0 % (ref 0–7)
EPITH CASTS URNS QL MICRO: ABNORMAL /LPF
ERYTHROCYTE [DISTWIDTH] IN BLOOD BY AUTOMATED COUNT: 14.2 % (ref 11.5–14.5)
GLOBULIN SER CALC-MCNC: 4 G/DL (ref 2–4)
GLUCOSE SERPL-MCNC: 82 MG/DL (ref 65–100)
GLUCOSE UR STRIP.AUTO-MCNC: NEGATIVE MG/DL
HCT VFR BLD AUTO: 42.6 % (ref 35–47)
HGB BLD-MCNC: 13.9 G/DL (ref 11.5–16)
HGB UR QL STRIP: NEGATIVE
HYALINE CASTS URNS QL MICRO: ABNORMAL /LPF (ref 0–2)
IMM GRANULOCYTES # BLD AUTO: 0 K/UL (ref 0–0.04)
IMM GRANULOCYTES NFR BLD AUTO: 0 % (ref 0–0.5)
KETONES UR QL STRIP.AUTO: NEGATIVE MG/DL
LEUKOCYTE ESTERASE UR QL STRIP.AUTO: ABNORMAL
LYMPHOCYTES # BLD: 3.8 K/UL (ref 0.8–3.5)
LYMPHOCYTES NFR BLD: 31 % (ref 12–49)
MCH RBC QN AUTO: 31.1 PG (ref 26–34)
MCHC RBC AUTO-ENTMCNC: 32.6 G/DL (ref 30–36.5)
MCV RBC AUTO: 95.3 FL (ref 80–99)
MONOCYTES # BLD: 1.2 K/UL (ref 0–1)
MONOCYTES NFR BLD: 10 % (ref 5–13)
NEUTS SEG # BLD: 7.4 K/UL (ref 1.8–8)
NEUTS SEG NFR BLD: 59 % (ref 32–75)
NITRITE UR QL STRIP.AUTO: NEGATIVE
NRBC # BLD: 0 K/UL (ref 0–0.01)
NRBC BLD-RTO: 0 PER 100 WBC
PH UR STRIP: 6.5 (ref 5–8)
PLATELET # BLD AUTO: 393 K/UL (ref 150–400)
PMV BLD AUTO: 8.8 FL (ref 8.9–12.9)
POTASSIUM SERPL-SCNC: 4.3 MMOL/L (ref 3.5–5.1)
PROT SERPL-MCNC: 8.4 G/DL (ref 6.4–8.2)
PROT UR STRIP-MCNC: ABNORMAL MG/DL
RBC # BLD AUTO: 4.47 M/UL (ref 3.8–5.2)
RBC #/AREA URNS HPF: ABNORMAL /HPF (ref 0–5)
SODIUM SERPL-SCNC: 132 MMOL/L (ref 136–145)
SP GR UR REFRACTOMETRY: 1.01
SPECIMEN HOLD: NORMAL
TROPONIN I SERPL HS-MCNC: 8 NG/L (ref 0–51)
URINE CULTURE IF INDICATED: ABNORMAL
UROBILINOGEN UR QL STRIP.AUTO: 0.2 EU/DL (ref 0.2–1)
WBC # BLD AUTO: 12.5 K/UL (ref 3.6–11)
WBC URNS QL MICRO: ABNORMAL /HPF (ref 0–4)

## 2024-11-19 PROCEDURE — 87086 URINE CULTURE/COLONY COUNT: CPT

## 2024-11-19 PROCEDURE — 84484 ASSAY OF TROPONIN QUANT: CPT

## 2024-11-19 PROCEDURE — 99285 EMERGENCY DEPT VISIT HI MDM: CPT

## 2024-11-19 PROCEDURE — 93005 ELECTROCARDIOGRAM TRACING: CPT | Performed by: STUDENT IN AN ORGANIZED HEALTH CARE EDUCATION/TRAINING PROGRAM

## 2024-11-19 PROCEDURE — 6360000004 HC RX CONTRAST MEDICATION

## 2024-11-19 PROCEDURE — 74177 CT ABD & PELVIS W/CONTRAST: CPT

## 2024-11-19 PROCEDURE — 85025 COMPLETE CBC W/AUTO DIFF WBC: CPT

## 2024-11-19 PROCEDURE — 81001 URINALYSIS AUTO W/SCOPE: CPT

## 2024-11-19 PROCEDURE — 80053 COMPREHEN METABOLIC PANEL: CPT

## 2024-11-19 PROCEDURE — 36415 COLL VENOUS BLD VENIPUNCTURE: CPT

## 2024-11-19 RX ORDER — 0.9 % SODIUM CHLORIDE 0.9 %
500 INTRAVENOUS SOLUTION INTRAVENOUS ONCE
Status: DISCONTINUED | OUTPATIENT
Start: 2024-11-19 | End: 2024-11-19 | Stop reason: HOSPADM

## 2024-11-19 RX ORDER — IOPAMIDOL 755 MG/ML
100 INJECTION, SOLUTION INTRAVASCULAR
Status: COMPLETED | OUTPATIENT
Start: 2024-11-19 | End: 2024-11-19

## 2024-11-19 RX ADMIN — IOPAMIDOL 100 ML: 755 INJECTION, SOLUTION INTRAVENOUS at 18:38

## 2024-11-19 ASSESSMENT — ENCOUNTER SYMPTOMS
ABDOMINAL PAIN: 1
NAUSEA: 0
SHORTNESS OF BREATH: 0
VOMITING: 0

## 2024-11-19 NOTE — ED PROVIDER NOTES
provider to review them with you.                2. @[facility]@  3.  Return to ED if worse     Diagnosis     Clinical Impression: No diagnosis found.    Attestations:    Olga Foley, DO    Please note that this dictation was completed with Catglobe, the computer voice recognition software.  Quite often unanticipated grammatical, syntax, homophones, and other interpretive errors are inadvertently transcribed by the computer software.  Please disregard these errors.  Please excuse any errors that have escaped final proofreading.  Thank you.

## 2024-11-20 LAB
BACTERIA SPEC CULT: NORMAL
SERVICE CMNT-IMP: NORMAL

## 2024-11-20 NOTE — DISCHARGE INSTRUCTIONS
You were seen emergency department today for weakness.  Your labs and imaging are all reassuring.  Please return the emergency department if you develop worsening weakness, fever, chills, your condition worsens or you are concerned.

## 2024-11-27 ENCOUNTER — HOSPITAL ENCOUNTER (EMERGENCY)
Facility: HOSPITAL | Age: 79
Discharge: OTHER INSTITUTION | End: 2024-11-27
Attending: STUDENT IN AN ORGANIZED HEALTH CARE EDUCATION/TRAINING PROGRAM
Payer: MEDICARE

## 2024-11-27 ENCOUNTER — APPOINTMENT (OUTPATIENT)
Facility: HOSPITAL | Age: 79
End: 2024-11-27
Payer: MEDICARE

## 2024-11-27 VITALS
HEIGHT: 63 IN | SYSTOLIC BLOOD PRESSURE: 141 MMHG | RESPIRATION RATE: 16 BRPM | TEMPERATURE: 98.8 F | OXYGEN SATURATION: 97 % | WEIGHT: 170 LBS | DIASTOLIC BLOOD PRESSURE: 64 MMHG | BODY MASS INDEX: 30.12 KG/M2 | HEART RATE: 79 BPM

## 2024-11-27 DIAGNOSIS — M00.9 PYOGENIC ARTHRITIS OF LEFT KNEE JOINT, DUE TO UNSPECIFIED ORGANISM: Primary | ICD-10-CM

## 2024-11-27 LAB
ALBUMIN SERPL-MCNC: 3.6 G/DL (ref 3.5–5)
ALBUMIN/GLOB SERPL: 1 (ref 1.1–2.2)
ALP SERPL-CCNC: 99 U/L (ref 45–117)
ALT SERPL-CCNC: 29 U/L (ref 12–78)
ANION GAP SERPL CALC-SCNC: 3 MMOL/L (ref 2–12)
APPEARANCE SNV: ABNORMAL
APPEARANCE UR: CLEAR
AST SERPL-CCNC: 19 U/L (ref 15–37)
BACTERIA URNS QL MICRO: NEGATIVE /HPF
BASOPHILS # BLD: 0.1 K/UL (ref 0–0.1)
BASOPHILS NFR BLD: 0 % (ref 0–1)
BILIRUB SERPL-MCNC: 0.4 MG/DL (ref 0.2–1)
BILIRUB UR QL: NEGATIVE
BODY FLD TYPE: NORMAL
BUN SERPL-MCNC: 8 MG/DL (ref 6–20)
BUN/CREAT SERPL: 10 (ref 12–20)
CALCIUM SERPL-MCNC: 9.2 MG/DL (ref 8.5–10.1)
CHLORIDE SERPL-SCNC: 103 MMOL/L (ref 97–108)
CO2 SERPL-SCNC: 29 MMOL/L (ref 21–32)
COLOR SNV: ABNORMAL
COLOR UR: ABNORMAL
CREAT SERPL-MCNC: 0.79 MG/DL (ref 0.55–1.02)
CRP SERPL-MCNC: 6.36 MG/DL (ref 0–0.3)
CRYSTALS FLD MICRO: NORMAL
DIFFERENTIAL METHOD BLD: ABNORMAL
EOSINOPHIL # BLD: 0 K/UL (ref 0–0.4)
EOSINOPHIL NFR BLD: 0 % (ref 0–7)
EPITH CASTS URNS QL MICRO: ABNORMAL /LPF
ERYTHROCYTE [DISTWIDTH] IN BLOOD BY AUTOMATED COUNT: 14.6 % (ref 11.5–14.5)
ERYTHROCYTE [SEDIMENTATION RATE] IN BLOOD: 37 MM/HR (ref 0–30)
GLOBULIN SER CALC-MCNC: 3.6 G/DL (ref 2–4)
GLUCOSE SERPL-MCNC: 128 MG/DL (ref 65–100)
GLUCOSE UR STRIP.AUTO-MCNC: NEGATIVE MG/DL
HCT VFR BLD AUTO: 39.2 % (ref 35–47)
HGB BLD-MCNC: 12.7 G/DL (ref 11.5–16)
HGB UR QL STRIP: NEGATIVE
HYALINE CASTS URNS QL MICRO: ABNORMAL /LPF (ref 0–2)
IMM GRANULOCYTES # BLD AUTO: 0.1 K/UL (ref 0–0.04)
IMM GRANULOCYTES NFR BLD AUTO: 1 % (ref 0–0.5)
KETONES UR QL STRIP.AUTO: ABNORMAL MG/DL
LEUKOCYTE ESTERASE UR QL STRIP.AUTO: NEGATIVE
LYMPHOCYTES # BLD: 1.4 K/UL (ref 0.8–3.5)
LYMPHOCYTES NFR BLD: 8 % (ref 12–49)
LYMPHOCYTES NFR SNV MANUAL: 2 % (ref 0–74)
MCH RBC QN AUTO: 30.7 PG (ref 26–34)
MCHC RBC AUTO-ENTMCNC: 32.4 G/DL (ref 30–36.5)
MCV RBC AUTO: 94.7 FL (ref 80–99)
MONOCYTES # BLD: 0.9 K/UL (ref 0–1)
MONOCYTES NFR BLD: 5 % (ref 5–13)
MONOCYTES NFR SNV MANUAL: 4 % (ref 0–69)
NEUTROPHILS NFR SNV MANUAL: 94 % (ref 0–24)
NEUTS SEG # BLD: 16.2 K/UL (ref 1.8–8)
NEUTS SEG NFR BLD: 86 % (ref 32–75)
NITRITE UR QL STRIP.AUTO: NEGATIVE
NRBC # BLD: 0 K/UL (ref 0–0.01)
NRBC BLD-RTO: 0 PER 100 WBC
PH UR STRIP: 8 (ref 5–8)
PLATELET # BLD AUTO: 320 K/UL (ref 150–400)
PMV BLD AUTO: 8.2 FL (ref 8.9–12.9)
POTASSIUM SERPL-SCNC: 4.1 MMOL/L (ref 3.5–5.1)
PROT SERPL-MCNC: 7.2 G/DL (ref 6.4–8.2)
PROT UR STRIP-MCNC: 30 MG/DL
RBC # BLD AUTO: 4.14 M/UL (ref 3.8–5.2)
RBC # SNV: >100 /CU MM
RBC #/AREA URNS HPF: ABNORMAL /HPF (ref 0–5)
SODIUM SERPL-SCNC: 135 MMOL/L (ref 136–145)
SP GR UR REFRACTOMETRY: 1.01
SPECIMEN SOURCE FLD: ABNORMAL
URINE CULTURE IF INDICATED: ABNORMAL
UROBILINOGEN UR QL STRIP.AUTO: 0.2 EU/DL (ref 0.2–1)
WBC # BLD AUTO: 18.7 K/UL (ref 3.6–11)
WBC # SNV: ABNORMAL /CU MM (ref 0–150)
WBC URNS QL MICRO: ABNORMAL /HPF (ref 0–4)

## 2024-11-27 PROCEDURE — 89060 EXAM SYNOVIAL FLUID CRYSTALS: CPT

## 2024-11-27 PROCEDURE — 80053 COMPREHEN METABOLIC PANEL: CPT

## 2024-11-27 PROCEDURE — 73562 X-RAY EXAM OF KNEE 3: CPT

## 2024-11-27 PROCEDURE — 85652 RBC SED RATE AUTOMATED: CPT

## 2024-11-27 PROCEDURE — 99283 EMERGENCY DEPT VISIT LOW MDM: CPT | Performed by: ORTHOPAEDIC SURGERY

## 2024-11-27 PROCEDURE — 20610 DRAIN/INJ JOINT/BURSA W/O US: CPT | Performed by: ORTHOPAEDIC SURGERY

## 2024-11-27 PROCEDURE — 85025 COMPLETE CBC W/AUTO DIFF WBC: CPT

## 2024-11-27 PROCEDURE — 86140 C-REACTIVE PROTEIN: CPT

## 2024-11-27 PROCEDURE — 73502 X-RAY EXAM HIP UNI 2-3 VIEWS: CPT

## 2024-11-27 PROCEDURE — 87070 CULTURE OTHR SPECIMN AEROBIC: CPT

## 2024-11-27 PROCEDURE — 89050 BODY FLUID CELL COUNT: CPT

## 2024-11-27 PROCEDURE — 6360000002 HC RX W HCPCS: Performed by: STUDENT IN AN ORGANIZED HEALTH CARE EDUCATION/TRAINING PROGRAM

## 2024-11-27 PROCEDURE — 87205 SMEAR GRAM STAIN: CPT

## 2024-11-27 PROCEDURE — 96374 THER/PROPH/DIAG INJ IV PUSH: CPT

## 2024-11-27 PROCEDURE — 36415 COLL VENOUS BLD VENIPUNCTURE: CPT

## 2024-11-27 PROCEDURE — 99285 EMERGENCY DEPT VISIT HI MDM: CPT

## 2024-11-27 PROCEDURE — 2580000003 HC RX 258: Performed by: STUDENT IN AN ORGANIZED HEALTH CARE EDUCATION/TRAINING PROGRAM

## 2024-11-27 PROCEDURE — 87040 BLOOD CULTURE FOR BACTERIA: CPT

## 2024-11-27 PROCEDURE — 20610 DRAIN/INJ JOINT/BURSA W/O US: CPT

## 2024-11-27 PROCEDURE — 81001 URINALYSIS AUTO W/SCOPE: CPT

## 2024-11-27 RX ORDER — SODIUM CHLORIDE 9 MG/ML
125 INJECTION, SOLUTION INTRAVENOUS ONCE
Status: DISCONTINUED | OUTPATIENT
Start: 2024-11-27 | End: 2024-11-27 | Stop reason: HOSPADM

## 2024-11-27 RX ORDER — VANCOMYCIN 2 GRAM/500 ML IN 0.9 % SODIUM CHLORIDE INTRAVENOUS
25 ONCE
Status: DISCONTINUED | OUTPATIENT
Start: 2024-11-27 | End: 2024-11-27

## 2024-11-27 RX ORDER — OXYCODONE HYDROCHLORIDE 5 MG/1
5 TABLET ORAL EVERY 6 HOURS PRN
Status: DISCONTINUED | OUTPATIENT
Start: 2024-11-27 | End: 2024-11-27 | Stop reason: HOSPADM

## 2024-11-27 RX ORDER — ACETAMINOPHEN 325 MG/1
650 TABLET ORAL EVERY 6 HOURS PRN
Status: DISCONTINUED | OUTPATIENT
Start: 2024-11-27 | End: 2024-11-27 | Stop reason: HOSPADM

## 2024-11-27 RX ORDER — VANCOMYCIN 2 GRAM/500 ML IN 0.9 % SODIUM CHLORIDE INTRAVENOUS
25 ONCE
Status: DISCONTINUED | OUTPATIENT
Start: 2024-11-27 | End: 2024-11-27 | Stop reason: SDUPTHER

## 2024-11-27 RX ADMIN — WATER 2000 MG: 1 INJECTION INTRAMUSCULAR; INTRAVENOUS; SUBCUTANEOUS at 14:34

## 2024-11-27 ASSESSMENT — LIFESTYLE VARIABLES
HOW OFTEN DO YOU HAVE A DRINK CONTAINING ALCOHOL: NEVER
HOW MANY STANDARD DRINKS CONTAINING ALCOHOL DO YOU HAVE ON A TYPICAL DAY: PATIENT DOES NOT DRINK

## 2024-11-27 ASSESSMENT — PAIN DESCRIPTION - LOCATION: LOCATION: LEG

## 2024-11-27 ASSESSMENT — PAIN - FUNCTIONAL ASSESSMENT: PAIN_FUNCTIONAL_ASSESSMENT: 0-10

## 2024-11-27 ASSESSMENT — PAIN DESCRIPTION - ORIENTATION: ORIENTATION: LEFT

## 2024-11-27 ASSESSMENT — PAIN SCALES - GENERAL: PAINLEVEL_OUTOF10: 8

## 2024-11-27 NOTE — ED PROVIDER NOTES
[facility] EMERGENCY DEPT  EMERGENCY DEPARTMENT ENCOUNTER       Pt Name: Sera May  MRN: [id number]  Birthdate 1945  Date of evaluation: 11/27/2024  Provider: Salinas Cope MD   PCP: Byron Mcdonald MD  Note Started: 10:22 AM EST 11/27/24     CHIEF COMPLAINT       Chief Complaint   Patient presents with    Knee Pain     L knee pain.  Patient had a L knee replacement 2 years ago and has been doing PT for her atrophy in R buttocks.  Patient had PT recently and now with increased pain to the leg and states she is unable to walk. Pain started yesterday before she went to bed. Patient took hydrocodone at home.      HISTORY OF PRESENT ILLNESS: 1 or more elements      History From: patient, History limited by: none     Sera May is a 79 y.o. female with history of below including arthritis with left total knee replacement and recent back pain.  She reports that she has been going to physical therapy and feels that she \"overdid it yesterday \"and has significant left-sided pain.  She has pain involving both her left upper extremity and left lower extremity.  She feels that the pain is the most severe at her left knee and she is unable to bend this.  She has had chronic swelling and pain of her left lower extremity.  She has no improvement with opioids or muscle relaxers at home.  She reports that she has had chills recently, but no fevers.  No recent interventions.    She had her left knee replacement performed 2 years ago by Dr. Huggins    Please See MDM for Additional Details of the HPI/PMH  Nursing Notes were all reviewed and agreed with or any disagreements were addressed in the HPI.     REVIEW OF SYSTEMS        Positives and Pertinent negatives as per HPI.    PAST HISTORY     Past Medical History:  Past Medical History:   Diagnosis Date    Arthritis     Breast cancer (HCC) 1999    Right    CAD (coronary artery disease)     ONE STENT INSERTED    Fibromyalgia     GERD (gastroesophageal reflux disease)     Hiatal

## 2024-11-27 NOTE — CONSULTS
Orthopedic consult note:    79-year-old female presenting to the emergency department for evaluation of left knee pain.  Patient has a history of previous  tibial plateau ORIF and subsequent total knee arthroplasty by Dr. Huggins in 2020.  Patient has had chronic left knee pain, but states yesterday she started to develop significantly worsening knee pain.  She states over the last 24 hours she has had a hard time ambulating or bending her knee.  She states it was quite difficult for her to get out of her recliner.  Patient presented to the emergency department for evaluation found to have concerns for septic joint.  Patient complains of pain with range of motion to left knee.  She states she has had a hard time ambulating.  She does admit to recent bronchitis a couple of weeks ago, but otherwise denies recent infections.  She denies any recent injuries.  She denies any recent procedures.  Her only other complaint is some diffuse muscle aches in her upper extremities.  She denies other complaints this time.    Exam: Inspection of the left knee is a mild effusion.  There is a well-healed surgical scar about the left knee.  No obvious erythema.  Mild warmth to the touch.  Patient performing active and passive range of motion almost full extension to approximately 40 degrees of flexion, but it was unable to further range of motion.  Strength intact throughout tolerated range of motion.  Sensation to light touch intact left lower extremity.  No open wounds appreciated.    Imaging:EXAM: XR KNEE LEFT (3 VIEWS)     INDICATION: left lower extremity pain; knee swelling; unable to bend knee.     COMPARISON: None.     FINDINGS: Three views of the left knee demonstrate postsurgical changes of total  knee arthroplasty and proximal tibial fixation. There is no evidence of acute  hardware complication. No evidence of acute fracture. There is a small joint  effusion.     IMPRESSION:  1. No evidence of acute fracture. Small joint

## 2024-11-28 LAB
BACTERIA SPEC CULT: NORMAL
GRAM STN SPEC: NORMAL
GRAM STN SPEC: NORMAL
SERVICE CMNT-IMP: NORMAL

## 2024-12-01 LAB
BACTERIA SPEC CULT: NORMAL
BACTERIA SPEC CULT: NORMAL
SERVICE CMNT-IMP: NORMAL
SERVICE CMNT-IMP: NORMAL

## 2024-12-11 LAB
BACTERIA SPEC CULT: NORMAL
GRAM STN SPEC: NORMAL
GRAM STN SPEC: NORMAL
SERVICE CMNT-IMP: NORMAL

## 2025-02-27 ENCOUNTER — HOSPITAL ENCOUNTER (OUTPATIENT)
Facility: HOSPITAL | Age: 80
Discharge: HOME | End: 2025-02-27
Payer: MEDICARE

## 2025-02-27 DIAGNOSIS — Z98.1 S/P LUMBAR FUSION: ICD-10-CM

## 2025-02-27 DIAGNOSIS — M48.062 SPINAL STENOSIS OF LUMBAR REGION WITH NEUROGENIC CLAUDICATION: ICD-10-CM

## 2025-02-27 PROCEDURE — 72131 CT LUMBAR SPINE W/O DYE: CPT

## 2025-03-07 ENCOUNTER — TELEMEDICINE (OUTPATIENT)
Age: 80
End: 2025-03-07

## 2025-03-07 DIAGNOSIS — H81.12 BENIGN PAROXYSMAL POSITIONAL VERTIGO OF LEFT EAR: Primary | ICD-10-CM

## 2025-03-07 DIAGNOSIS — R19.7 DIARRHEA, UNSPECIFIED TYPE: ICD-10-CM

## 2025-03-07 RX ORDER — MECLIZINE HYDROCHLORIDE 25 MG/1
25 TABLET ORAL 3 TIMES DAILY PRN
Qty: 30 TABLET | Refills: 0 | Status: SHIPPED | OUTPATIENT
Start: 2025-03-07 | End: 2025-03-17

## 2025-03-07 SDOH — ECONOMIC STABILITY: FOOD INSECURITY: WITHIN THE PAST 12 MONTHS, THE FOOD YOU BOUGHT JUST DIDN'T LAST AND YOU DIDN'T HAVE MONEY TO GET MORE.: NEVER TRUE

## 2025-03-07 SDOH — ECONOMIC STABILITY: FOOD INSECURITY: WITHIN THE PAST 12 MONTHS, YOU WORRIED THAT YOUR FOOD WOULD RUN OUT BEFORE YOU GOT MONEY TO BUY MORE.: NEVER TRUE

## 2025-03-07 ASSESSMENT — PATIENT HEALTH QUESTIONNAIRE - PHQ9
SUM OF ALL RESPONSES TO PHQ QUESTIONS 1-9: 0
2. FEELING DOWN, DEPRESSED OR HOPELESS: NOT AT ALL
SUM OF ALL RESPONSES TO PHQ QUESTIONS 1-9: 0
1. LITTLE INTEREST OR PLEASURE IN DOING THINGS: NOT AT ALL

## 2025-03-07 ASSESSMENT — ENCOUNTER SYMPTOMS: SHORTNESS OF BREATH: 0

## 2025-03-07 NOTE — PROGRESS NOTES
positional vertigo   2. Diarrhea, unspecified type  C Difficile Toxin Gene ANA   Discussed probiotic will get C. difficile checked if persistent symptoms   I have reviewed the note documented by the scribe.  The services provided are my own.  The documentation is accurate     Depression screen reviewed and negative.  Scribed by Nimco Boo, as dictated by Dr. Prisca Duarte.    Current diagnosis and concerns discussed with pt at length. Pt understands risks and benefits or current treatment plan and medications, and accepts the treatment and medication with any possible risks. Pt asks appropriate questions, which were answered. Pt was instructed to call with any concerns or problems.    I have reviewed the note documented by the scribe. The services provided are my own. The documentation is accurate.  Sera May, was evaluated through a synchronous (real-time) audio-video encounter. The patient (or guardian if applicable) is aware that this is a billable service, which includes applicable co-pays. This Virtual Visit was conducted with patient's (and/or legal guardian's) consent. Patient identification was verified, and a caregiver was present when appropriate.   The patient was located at Home: [address] [place] [id number]  Provider was located at Home (Appt Dept State): [place]  Confirm you are appropriately licensed, registered, or certified to deliver care in the state where the patient is located as indicated above. If you are not or unsure, please re-schedule the visit: Yes, I confirm.      --Nimco Etienne on 3/7/2025 at 12:12 PM  An electronic signature was used to authenticate this note.

## 2025-03-11 ENCOUNTER — TELEPHONE (OUTPATIENT)
Age: 80
End: 2025-03-11

## 2025-03-11 DIAGNOSIS — R15.9 INCONTINENCE OF FECES, UNSPECIFIED FECAL INCONTINENCE TYPE: Primary | ICD-10-CM

## 2025-03-11 DIAGNOSIS — R19.7 DIARRHEA, UNSPECIFIED TYPE: ICD-10-CM

## 2025-03-11 NOTE — TELEPHONE ENCOUNTER
Patient requesting a referral for a Gastro doctor that is not apart of Dr. Vipul Ernandez's office.    Please advise

## 2025-03-11 NOTE — TELEPHONE ENCOUNTER
Spoke with patient. Requesting GI referral due to abdominal issues. Pt states frequent diarrhea and has no control over bowels.   States bowel movements have a terrific smell.   Currently taking immodium D. Mornings are worse than afternoons.   Able to keep some fluid and foods down.     Requesting referral and other suggestions to take OTC    Please adivse

## 2025-03-12 NOTE — TELEPHONE ENCOUNTER
Spoke with patient. Advised. GI contact information given to pt  Verbalized understanding.     Per Dr. Harry Lentz AD. Can try probiotic otc. Refer to GI Dr Munoz or associate unless she has seen another GI MD in the past then can go back to that one

## 2025-03-15 DIAGNOSIS — M54.32 LEFT SCIATIC NERVE PAIN: ICD-10-CM

## 2025-03-17 RX ORDER — LEVOTHYROXINE SODIUM 50 UG/1
TABLET ORAL
Qty: 105 TABLET | Refills: 3 | Status: SHIPPED | OUTPATIENT
Start: 2025-03-17

## 2025-03-17 RX ORDER — PREGABALIN 100 MG/1
CAPSULE ORAL
Qty: 60 CAPSULE | Refills: 0 | Status: SHIPPED | OUTPATIENT
Start: 2025-03-17 | End: 2025-09-13

## 2025-03-17 NOTE — TELEPHONE ENCOUNTER
PCP: Byron Mcdonald MD    Last appt: 3/7/2025   Future Appointments   Date Time Provider Department Center   3/27/2025 10:10 AM Glen Rowe MD [facility]       Requested Prescriptions     Pending Prescriptions Disp Refills    levothyroxine (SYNTHROID) 50 MCG tablet 105 tablet 0     Sig: TAKE 1 TABLET BY MOUTH ONCE DAILY BEFORE BREAKFAST EXCEPT  1.5  TAB  (75MCG)  EVERY  MONDAY  AND  FRIDAY.

## 2025-03-17 NOTE — TELEPHONE ENCOUNTER
levothyroxine (SYNTHROID) 50 MCG tablet    Pt accidentally threw a full bottle away and not the empty one.     Please call in another script for 90 days to [facility] #[phone]

## 2025-03-25 DIAGNOSIS — R19.7 DIARRHEA, UNSPECIFIED TYPE: ICD-10-CM

## 2025-03-27 LAB
C DIFF TOX GENS STL QL NAA+PROBE: POSITIVE
SPECIMEN SOURCE: ABNORMAL

## 2025-03-28 ENCOUNTER — RESULTS FOLLOW-UP (OUTPATIENT)
Age: 80
End: 2025-03-28

## 2025-03-28 RX ORDER — VANCOMYCIN HYDROCHLORIDE 125 MG/1
125 CAPSULE ORAL 4 TIMES DAILY
Qty: 40 CAPSULE | Refills: 0 | Status: SHIPPED | OUTPATIENT
Start: 2025-03-28 | End: 2025-04-07

## 2025-03-28 NOTE — TELEPHONE ENCOUNTER
Future Appointments:  Future Appointments   Date Time Provider Department Center   4/17/2025  1:40 PM Glen Rowe MD [facility]        Last Appointment With Me:  3/7/2025     Requested Prescriptions     Pending Prescriptions Disp Refills    vancomycin (VANCOCIN) 125 MG capsule 40 capsule 0     Sig: Take 1 capsule by mouth 4 times daily for 10 days

## 2025-03-28 NOTE — RESULT ENCOUNTER NOTE
Called, Spoke with Pt  Received two pt identifiers  Pt informed per Dr. Duarte positive for c dif Needs tx Vancomycin 125mg qid for 10 days  Pt verbalizes understanding of the instructions and has no further questions at this time.

## 2025-03-31 ENCOUNTER — TELEPHONE (OUTPATIENT)
Age: 80
End: 2025-03-31

## 2025-03-31 NOTE — TELEPHONE ENCOUNTER
Pt states that she has C Diff.  She knows nothing about what to do.    What does she eat/not eat.  Can she go around people?  Pt has questions about this C Diff and would like a call back as soon as possible.     Thanks.

## 2025-03-31 NOTE — TELEPHONE ENCOUNTER
Spoke with patient.   Advised. Verbalized understanding.    Per Dr. Mcdonald  Due to kelfex. Complete vanc as prescribed .Try to avoid future abx unless necessary because abx can lead to loss of normal gut bacteriua and c diff can invade the gut   Needs to hydrate , no diet restriction

## 2025-04-13 ENCOUNTER — HOSPITAL ENCOUNTER (EMERGENCY)
Facility: HOSPITAL | Age: 80
Discharge: HOME | DRG: 371 | End: 2025-04-13
Attending: EMERGENCY MEDICINE
Payer: MEDICARE

## 2025-04-13 ENCOUNTER — APPOINTMENT (OUTPATIENT)
Facility: HOSPITAL | Age: 80
DRG: 371 | End: 2025-04-13
Payer: MEDICARE

## 2025-04-13 ENCOUNTER — TELEPHONE (OUTPATIENT)
Age: 80
End: 2025-04-13

## 2025-04-13 VITALS
HEART RATE: 69 BPM | TEMPERATURE: 97.8 F | DIASTOLIC BLOOD PRESSURE: 68 MMHG | OXYGEN SATURATION: 96 % | SYSTOLIC BLOOD PRESSURE: 158 MMHG | WEIGHT: 167.11 LBS | BODY MASS INDEX: 28.53 KG/M2 | HEIGHT: 64 IN | RESPIRATION RATE: 18 BRPM

## 2025-04-13 DIAGNOSIS — R19.7 DIARRHEA, UNSPECIFIED TYPE: Primary | ICD-10-CM

## 2025-04-13 DIAGNOSIS — A09 DIARRHEA OF INFECTIOUS ORIGIN: Primary | ICD-10-CM

## 2025-04-13 LAB
ALBUMIN SERPL-MCNC: 3.6 G/DL (ref 3.5–5)
ALBUMIN/GLOB SERPL: 1 (ref 1.1–2.2)
ALP SERPL-CCNC: 89 U/L (ref 45–117)
ALT SERPL-CCNC: 24 U/L (ref 12–78)
ANION GAP BLD CALC-SCNC: 10 (ref 10–20)
ANION GAP SERPL CALC-SCNC: 5 MMOL/L (ref 2–12)
AST SERPL-CCNC: 26 U/L (ref 15–37)
BASE EXCESS BLD CALC-SCNC: 1 MMOL/L
BASOPHILS # BLD: 0.03 K/UL (ref 0–0.1)
BASOPHILS NFR BLD: 0.3 % (ref 0–1)
BILIRUB SERPL-MCNC: 0.4 MG/DL (ref 0.2–1)
BUN SERPL-MCNC: 17 MG/DL (ref 6–20)
BUN/CREAT SERPL: 24 (ref 12–20)
CA-I BLD-MCNC: 1.28 MMOL/L (ref 1.15–1.33)
CALCIUM SERPL-MCNC: 9.9 MG/DL (ref 8.5–10.1)
CHLORIDE BLD-SCNC: 102 MMOL/L (ref 100–111)
CHLORIDE SERPL-SCNC: 102 MMOL/L (ref 97–108)
CO2 BLD-SCNC: 26 MMOL/L (ref 22–29)
CO2 SERPL-SCNC: 28 MMOL/L (ref 21–32)
CREAT SERPL-MCNC: 0.7 MG/DL (ref 0.55–1.02)
CREAT UR-MCNC: 0.7 MG/DL (ref 0.6–1.3)
DIFFERENTIAL METHOD BLD: ABNORMAL
EOSINOPHIL # BLD: 0.09 K/UL (ref 0–0.4)
EOSINOPHIL NFR BLD: 0.9 % (ref 0–7)
ERYTHROCYTE [DISTWIDTH] IN BLOOD BY AUTOMATED COUNT: 14.2 % (ref 11.5–14.5)
GLOBULIN SER CALC-MCNC: 3.5 G/DL (ref 2–4)
GLUCOSE BLD STRIP.AUTO-MCNC: 98 MG/DL (ref 74–99)
GLUCOSE SERPL-MCNC: 106 MG/DL (ref 65–100)
HCO3 BLDA-SCNC: 26 MMOL/L
HCT VFR BLD AUTO: 33.7 % (ref 35–47)
HGB BLD-MCNC: 10.9 G/DL (ref 11.5–16)
IMM GRANULOCYTES # BLD AUTO: 0.02 K/UL (ref 0–0.04)
IMM GRANULOCYTES NFR BLD AUTO: 0.2 % (ref 0–0.5)
LACTATE BLD-SCNC: 0.83 MMOL/L (ref 0.4–2)
LYMPHOCYTES # BLD: 3.36 K/UL (ref 0.8–3.5)
LYMPHOCYTES NFR BLD: 34 % (ref 12–49)
MCH RBC QN AUTO: 29.9 PG (ref 26–34)
MCHC RBC AUTO-ENTMCNC: 32.3 G/DL (ref 30–36.5)
MCV RBC AUTO: 92.3 FL (ref 80–99)
MONOCYTES # BLD: 0.94 K/UL (ref 0–1)
MONOCYTES NFR BLD: 9.5 % (ref 5–13)
NEUTS SEG # BLD: 5.43 K/UL (ref 1.8–8)
NEUTS SEG NFR BLD: 55.1 % (ref 32–75)
NRBC # BLD: 0 K/UL (ref 0–0.01)
NRBC BLD-RTO: 0 PER 100 WBC
PCO2 BLDV: 43.7 MMHG (ref 41–51)
PH BLDV: 7.39 (ref 7.32–7.42)
PLATELET # BLD AUTO: 325 K/UL (ref 150–400)
PMV BLD AUTO: 8.4 FL (ref 8.9–12.9)
PO2 BLDV: <27 MMHG (ref 25–40)
POTASSIUM BLD-SCNC: 4.4 MMOL/L (ref 3.5–5.5)
POTASSIUM SERPL-SCNC: 4.1 MMOL/L (ref 3.5–5.1)
PROT SERPL-MCNC: 7.1 G/DL (ref 6.4–8.2)
RBC # BLD AUTO: 3.65 M/UL (ref 3.8–5.2)
SODIUM BLD-SCNC: 138 MMOL/L (ref 136–145)
SODIUM SERPL-SCNC: 135 MMOL/L (ref 136–145)
SPECIMEN SITE: NORMAL
WBC # BLD AUTO: 9.9 K/UL (ref 3.6–11)

## 2025-04-13 PROCEDURE — 80053 COMPREHEN METABOLIC PANEL: CPT

## 2025-04-13 PROCEDURE — 84132 ASSAY OF SERUM POTASSIUM: CPT

## 2025-04-13 PROCEDURE — 36415 COLL VENOUS BLD VENIPUNCTURE: CPT

## 2025-04-13 PROCEDURE — 85025 COMPLETE CBC W/AUTO DIFF WBC: CPT

## 2025-04-13 PROCEDURE — 6360000004 HC RX CONTRAST MEDICATION: Performed by: EMERGENCY MEDICINE

## 2025-04-13 PROCEDURE — 84295 ASSAY OF SERUM SODIUM: CPT

## 2025-04-13 PROCEDURE — 82803 BLOOD GASES ANY COMBINATION: CPT

## 2025-04-13 PROCEDURE — 87449 NOS EACH ORGANISM AG IA: CPT

## 2025-04-13 PROCEDURE — 82330 ASSAY OF CALCIUM: CPT

## 2025-04-13 PROCEDURE — 87506 IADNA-DNA/RNA PROBE TQ 6-11: CPT

## 2025-04-13 PROCEDURE — 74177 CT ABD & PELVIS W/CONTRAST: CPT

## 2025-04-13 PROCEDURE — 82947 ASSAY GLUCOSE BLOOD QUANT: CPT

## 2025-04-13 PROCEDURE — 87324 CLOSTRIDIUM AG IA: CPT

## 2025-04-13 RX ORDER — VANCOMYCIN HYDROCHLORIDE 250 MG/1
250 CAPSULE ORAL 4 TIMES DAILY
Qty: 40 CAPSULE | Refills: 0 | Status: SHIPPED | OUTPATIENT
Start: 2025-04-13 | End: 2025-04-14

## 2025-04-13 RX ORDER — IOPAMIDOL 755 MG/ML
100 INJECTION, SOLUTION INTRAVASCULAR
Status: COMPLETED | OUTPATIENT
Start: 2025-04-13 | End: 2025-04-13

## 2025-04-13 RX ADMIN — IOPAMIDOL 100 ML: 755 INJECTION, SOLUTION INTRAVENOUS at 17:40

## 2025-04-13 ASSESSMENT — PAIN - FUNCTIONAL ASSESSMENT: PAIN_FUNCTIONAL_ASSESSMENT: NONE - DENIES PAIN

## 2025-04-13 NOTE — ED PROVIDER NOTES
[facility] EMERGENCY DEPARTMENT  EMERGENCY DEPARTMENT ENCOUNTER       Pt Name: Sera May  MRN: [id number]  Birthdate 1945  Date of evaluation: 4/13/2025  Provider: Michael Sylvester DO   PCP: Byron Mcdonald MD  Note Started: 8:14 PM EDT 4/13/25     CHIEF COMPLAINT       Chief Complaint   Patient presents with    Diarrhea     Pt ambulatory into triage with a cc of diarrhea x 2 days; pt recently stopped antibiotics and PCP sent patient to ED for stool sample         HISTORY OF PRESENT ILLNESS: 1 or more elements      History From: patient, History limited by: none     Sera May is a 79 y.o. female presents to the emergency department for evaluation of diarrhea.       Please See MDM for Additional Details of the HPI/PMH  Nursing Notes were all reviewed and agreed with or any disagreements were addressed in the HPI.     REVIEW OF SYSTEMS        Positives and Pertinent negatives as per HPI.    PAST HISTORY     Past Medical History:  Past Medical History:   Diagnosis Date    Arthritis     Breast cancer (HCC) 1999    Right    CAD (coronary artery disease)     ONE STENT INSERTED    Fibromyalgia     GERD (gastroesophageal reflux disease)     Hiatal hernia     Hyperlipidemia     Hypertension     Hyponatremia 05/2019    As of 1/28/2020 pt had a seizure as a result to this.     Hypothyroid     Ill-defined condition     As of 1/28/2020, pt states her cardiologist says her HR drops at night but nothing to be concerned with.     Prediabetes     PUD (peptic ulcer disease)     PVC (premature ventricular contraction)     Too much caffeine    Seizures ([facility]) 05/2019    Stroke ([facility]) 2003    Took vision from right eye       Past Surgical History:  Past Surgical History:   Procedure Laterality Date    COLONOSCOPY N/A     CORONARY ANGIOPLASTY WITH STENT PLACEMENT  01/25/2023    one stent inserted    LUMBAR FUSION  05/2019    LUMBAR SPINE SURGERY N/A 7/17/2023    REVISION LAMINECTOMY L2 - S1 AND POSTERIOR LUMBAR FUSION L2 - 4  (SHIMA/OAYEFRI)  (ERAS) performed by Glen [facility]e MD at [facility] MAIN OR    MASTECTOMY Right 1999    ORTHOPEDIC SURGERY  2000    L knee has pins and plates after a fall (fracture)    PARTIAL HYSTERECTOMY (CERVIX NOT REMOVED) N/A     TONSILLECTOMY      UPPER GASTROINTESTINAL ENDOSCOPY         Family History:  Family History   Problem Relation Age of Onset    Cancer Mother         breast cancer    Heart Disease Father     No Known Problems Sister     Other Sister 46        Gastric bypass into staph infection    Heart Disease Brother     Diabetes Brother     Colon Cancer Brother     Diabetes Brother     No Known Problems Son     No Known Problems Son     Anesth Problems Neg Hx        Social History:  Social History     Tobacco Use    Smoking status: Never    Smokeless tobacco: Never   Vaping Use    Vaping status: Never Used   Substance Use Topics    Alcohol use: Not Currently    Drug use: Never       Allergies:  Allergies   Allergen Reactions    Latex Itching and Rash     Other reaction(s): Rash  Other reaction(s): Adverse reaction to substance ([id number]); Severity: Mild      Food Anaphylaxis     Steak cheese  no dairy no milk no beef    Other Anaphylaxis     Steak, cheese, grass, smoke    Penicillins Itching, Rash and Swelling     Mouth  Other reaction(s): ITCHING AND SWELLING  Other reaction(s): Adverse reaction to substance ([id number]); Severity: Mild    Tramadol Other (See Comments)     Hyponatremia, seizure    Cheese Itching and Swelling    Sulfa Antibiotics Itching    Codeine Rash and Swelling     Mouth    Gabapentin Other (See Comments)     As of 1/28/2020, pt requests not to have this. It \"completely wipes me out.\"  Other reaction(s): WEAKNESS       CURRENT MEDICATIONS      Discharge Medication List as of 4/13/2025  6:58 PM        CONTINUE these medications which have NOT CHANGED    Details   pregabalin (LYRICA) 100 MG capsule TAKE 1 CAPSULE BY MOUTH TWICE DAILY. MAX DAILY AMOUNT 200MG., Disp-60 capsule,

## 2025-04-14 ENCOUNTER — HOSPITAL ENCOUNTER (INPATIENT)
Facility: HOSPITAL | Age: 80
LOS: 5 days | DRG: 371 | End: 2025-04-20
Attending: EMERGENCY MEDICINE | Admitting: STUDENT IN AN ORGANIZED HEALTH CARE EDUCATION/TRAINING PROGRAM
Payer: MEDICARE

## 2025-04-14 ENCOUNTER — TELEMEDICINE (OUTPATIENT)
Age: 80
End: 2025-04-14
Payer: MEDICARE

## 2025-04-14 ENCOUNTER — RESULTS FOLLOW-UP (OUTPATIENT)
Facility: HOSPITAL | Age: 80
End: 2025-04-14

## 2025-04-14 DIAGNOSIS — E86.0 DEHYDRATION: ICD-10-CM

## 2025-04-14 DIAGNOSIS — R19.7 DIARRHEA OF PRESUMED INFECTIOUS ORIGIN: Primary | ICD-10-CM

## 2025-04-14 DIAGNOSIS — N17.9 AKI (ACUTE KIDNEY INJURY): ICD-10-CM

## 2025-04-14 DIAGNOSIS — A04.72 C. DIFFICILE COLITIS: Primary | ICD-10-CM

## 2025-04-14 LAB
ALBUMIN SERPL-MCNC: 2.9 G/DL (ref 3.5–5)
ALBUMIN/GLOB SERPL: 0.9 (ref 1.1–2.2)
ALP SERPL-CCNC: 67 U/L (ref 45–117)
ALT SERPL-CCNC: 20 U/L (ref 12–78)
ANION GAP SERPL CALC-SCNC: 7 MMOL/L (ref 2–12)
AST SERPL-CCNC: 19 U/L (ref 15–37)
BASOPHILS # BLD: 0 K/UL (ref 0–0.1)
BASOPHILS NFR BLD: 0 % (ref 0–1)
BILIRUB SERPL-MCNC: 0.5 MG/DL (ref 0.2–1)
BUN SERPL-MCNC: 25 MG/DL (ref 6–20)
BUN/CREAT SERPL: 16 (ref 12–20)
C COLI+JEJUNI TUF STL QL NAA+PROBE: NEGATIVE
C DIFF GDH STL QL: POSITIVE
C DIFF TOX A+B STL QL IA: POSITIVE
C DIFF TOXIN INTERPRETATION: ABNORMAL
CALCIUM SERPL-MCNC: 8.5 MG/DL (ref 8.5–10.1)
CHLORIDE SERPL-SCNC: 105 MMOL/L (ref 97–108)
CO2 SERPL-SCNC: 24 MMOL/L (ref 21–32)
CREAT SERPL-MCNC: 1.54 MG/DL (ref 0.55–1.02)
DIFFERENTIAL METHOD BLD: ABNORMAL
EC STX1+STX2 GENES STL QL NAA+PROBE: NEGATIVE
EOSINOPHIL # BLD: 0 K/UL (ref 0–0.4)
EOSINOPHIL NFR BLD: 0 % (ref 0–7)
ERYTHROCYTE [DISTWIDTH] IN BLOOD BY AUTOMATED COUNT: 14.6 % (ref 11.5–14.5)
ETEC ELTA+ESTB GENES STL QL NAA+PROBE: NEGATIVE
GLOBULIN SER CALC-MCNC: 3.4 G/DL (ref 2–4)
GLUCOSE SERPL-MCNC: 141 MG/DL (ref 65–100)
HCT VFR BLD AUTO: 35.9 % (ref 35–47)
HGB BLD-MCNC: 11.9 G/DL (ref 11.5–16)
IMM GRANULOCYTES # BLD AUTO: 0 K/UL (ref 0–0.04)
IMM GRANULOCYTES NFR BLD AUTO: 0 % (ref 0–0.5)
LIPASE SERPL-CCNC: 17 U/L (ref 13–75)
LYMPHOCYTES # BLD: 1.43 K/UL (ref 0.8–3.5)
LYMPHOCYTES NFR BLD: 17 % (ref 12–49)
MAGNESIUM SERPL-MCNC: 1.8 MG/DL (ref 1.6–2.4)
MCH RBC QN AUTO: 30.1 PG (ref 26–34)
MCHC RBC AUTO-ENTMCNC: 33.1 G/DL (ref 30–36.5)
MCV RBC AUTO: 90.7 FL (ref 80–99)
METAMYELOCYTES NFR BLD MANUAL: 1 %
MONOCYTES # BLD: 0.08 K/UL (ref 0–1)
MONOCYTES NFR BLD: 1 % (ref 5–13)
NEUTS BAND NFR BLD MANUAL: 11 %
NEUTS SEG # BLD: 6.8 K/UL (ref 1.8–8)
NEUTS SEG NFR BLD: 70 % (ref 32–75)
NRBC # BLD: 0 K/UL (ref 0–0.01)
NRBC BLD-RTO: 0 PER 100 WBC
P SHIGELLOIDES DNA STL QL NAA+PROBE: NEGATIVE
PLATELET # BLD AUTO: 301 K/UL (ref 150–400)
PMV BLD AUTO: 8.6 FL (ref 8.9–12.9)
POTASSIUM SERPL-SCNC: 3.6 MMOL/L (ref 3.5–5.1)
PROT SERPL-MCNC: 6.3 G/DL (ref 6.4–8.2)
RBC # BLD AUTO: 3.96 M/UL (ref 3.8–5.2)
RBC MORPH BLD: ABNORMAL
SALMONELLA SP SPAO STL QL NAA+PROBE: NEGATIVE
SHIGELLA SP+EIEC IPAH STL QL NAA+PROBE: NEGATIVE
SODIUM SERPL-SCNC: 136 MMOL/L (ref 136–145)
V CHOL+PARA+VUL DNA STL QL NAA+NON-PROBE: NEGATIVE
WBC # BLD AUTO: 8.4 K/UL (ref 3.6–11)
Y ENTEROCOL DNA STL QL NAA+NON-PROBE: NEGATIVE

## 2025-04-14 PROCEDURE — 36415 COLL VENOUS BLD VENIPUNCTURE: CPT

## 2025-04-14 PROCEDURE — 1123F ACP DISCUSS/DSCN MKR DOCD: CPT | Performed by: INTERNAL MEDICINE

## 2025-04-14 PROCEDURE — 99285 EMERGENCY DEPT VISIT HI MDM: CPT

## 2025-04-14 PROCEDURE — 83690 ASSAY OF LIPASE: CPT

## 2025-04-14 PROCEDURE — G8400 PT W/DXA NO RESULTS DOC: HCPCS | Performed by: INTERNAL MEDICINE

## 2025-04-14 PROCEDURE — G8427 DOCREV CUR MEDS BY ELIG CLIN: HCPCS | Performed by: INTERNAL MEDICINE

## 2025-04-14 PROCEDURE — 2580000003 HC RX 258: Performed by: EMERGENCY MEDICINE

## 2025-04-14 PROCEDURE — 6370000000 HC RX 637 (ALT 250 FOR IP): Performed by: EMERGENCY MEDICINE

## 2025-04-14 PROCEDURE — 99213 OFFICE O/P EST LOW 20 MIN: CPT | Performed by: INTERNAL MEDICINE

## 2025-04-14 PROCEDURE — 83735 ASSAY OF MAGNESIUM: CPT

## 2025-04-14 PROCEDURE — 96361 HYDRATE IV INFUSION ADD-ON: CPT

## 2025-04-14 PROCEDURE — 1159F MED LIST DOCD IN RCRD: CPT | Performed by: INTERNAL MEDICINE

## 2025-04-14 PROCEDURE — 1090F PRES/ABSN URINE INCON ASSESS: CPT | Performed by: INTERNAL MEDICINE

## 2025-04-14 PROCEDURE — 96360 HYDRATION IV INFUSION INIT: CPT

## 2025-04-14 PROCEDURE — 80053 COMPREHEN METABOLIC PANEL: CPT

## 2025-04-14 PROCEDURE — 85025 COMPLETE CBC W/AUTO DIFF WBC: CPT

## 2025-04-14 RX ORDER — 0.9 % SODIUM CHLORIDE 0.9 %
1000 INTRAVENOUS SOLUTION INTRAVENOUS ONCE
Status: COMPLETED | OUTPATIENT
Start: 2025-04-14 | End: 2025-04-15

## 2025-04-14 RX ORDER — ACETAMINOPHEN 500 MG
1000 TABLET ORAL ONCE
Status: COMPLETED | OUTPATIENT
Start: 2025-04-14 | End: 2025-04-15

## 2025-04-14 RX ORDER — VANCOMYCIN HYDROCHLORIDE 125 MG/1
125 CAPSULE ORAL ONCE
Status: COMPLETED | OUTPATIENT
Start: 2025-04-14 | End: 2025-04-14

## 2025-04-14 RX ADMIN — VANCOMYCIN HYDROCHLORIDE 125 MG: 125 CAPSULE ORAL at 22:48

## 2025-04-14 RX ADMIN — SODIUM CHLORIDE 1000 ML: 0.9 INJECTION, SOLUTION INTRAVENOUS at 21:29

## 2025-04-14 NOTE — PROGRESS NOTES
HISTORY OF PRESENT ILLNESS   Sera May   is a 79 y.o.  female.    Sera May, was evaluated through a synchronous (real-time) audio-video encounter. The patient (or guardian if applicable) is aware that this is a billable service, which includes applicable co-pays. This Virtual Visit was conducted with patient's (and/or legal guardian's) consent. Patient identification was verified, and a caregiver was present when appropriate.   The patient was located at Home: [address] [place] [phone]  Provider was located at Facility (Appt Dept): [address] [facility]  [address]  [place] [address]  Confirm you are appropriately licensed, registered, or certified to deliver care in the state where the patient is located as indicated above. If you are not or unsure, please re-schedule the visit: Yes, I confirm.        Total time spent for this encounter: Not billed by time    --Byron Mcdonald MD on 4/14/2025 at 4:41 PM    An electronic signature was used to authenticate this note.      Fu diarrhea due to c diff infection---has had diarrhea since early March  Positive c diff test 3/25-took oral vanc x 10 d -improved but diarrhea restarted last week 4-5 times per day   Low grade temps at home  No blood in stool  Some cramps but not severe pain  Went to ER last night--na 135, renal function wnl , wbc 9k. Enteric path panel and c diff pending  Feels fatigued  Taking immodium AD prn    Made appt with Dr Ponce office last month but appt cancelled when told she had c diff  Patient Active Problem List    Diagnosis Date Noted    Type 2 diabetes mellitus 07/18/2024    Pre-syncope 08/24/2023    Lumbar stenosis with neurogenic claudication 07/17/2023    S/P lumbar fusion 07/17/2023    Kyphoscoliosis 07/17/2023    Encounter for preadmission testing 07/06/2023    Coronary artery disease involving native coronary artery of native heart without angina pectoris 03/27/2023    Postural dizziness with presyncope

## 2025-04-14 NOTE — PROGRESS NOTES
\"Have you been to the ER, urgent care clinic since your last visit?  Hospitalized since your last visit?\"    ER 4/13/2025 diarrhea    “Have you seen or consulted any other health care providers outside our system since your last visit?”    no

## 2025-04-15 PROBLEM — A49.8 RECURRENT CLOSTRIDIOIDES DIFFICILE INFECTION: Status: ACTIVE | Noted: 2025-04-15

## 2025-04-15 LAB
25(OH)D3 SERPL-MCNC: 34 NG/ML (ref 30–100)
ALBUMIN SERPL-MCNC: 2.2 G/DL (ref 3.5–5)
ALBUMIN SERPL-MCNC: 2.8 G/DL (ref 3.5–5)
ALBUMIN/GLOB SERPL: 0.7 (ref 1.1–2.2)
ALBUMIN/GLOB SERPL: 0.8 (ref 1.1–2.2)
ALP SERPL-CCNC: 59 U/L (ref 45–117)
ALP SERPL-CCNC: 71 U/L (ref 45–117)
ALT SERPL-CCNC: 20 U/L (ref 12–78)
ALT SERPL-CCNC: 20 U/L (ref 12–78)
ANION GAP SERPL CALC-SCNC: 5 MMOL/L (ref 2–12)
ANION GAP SERPL CALC-SCNC: 7 MMOL/L (ref 2–12)
AST SERPL-CCNC: 26 U/L (ref 15–37)
AST SERPL-CCNC: 32 U/L (ref 15–37)
BASOPHILS # BLD: 0 K/UL (ref 0–0.1)
BASOPHILS # BLD: 0 K/UL (ref 0–0.1)
BASOPHILS NFR BLD: 0 % (ref 0–1)
BASOPHILS NFR BLD: 0 % (ref 0–1)
BILIRUB SERPL-MCNC: 0.6 MG/DL (ref 0.2–1)
BILIRUB SERPL-MCNC: 0.9 MG/DL (ref 0.2–1)
BUN SERPL-MCNC: 24 MG/DL (ref 6–20)
BUN SERPL-MCNC: 29 MG/DL (ref 6–20)
BUN/CREAT SERPL: 18 (ref 12–20)
BUN/CREAT SERPL: 20 (ref 12–20)
CALCIUM SERPL-MCNC: 7.7 MG/DL (ref 8.5–10.1)
CALCIUM SERPL-MCNC: 8.4 MG/DL (ref 8.5–10.1)
CHLORIDE SERPL-SCNC: 108 MMOL/L (ref 97–108)
CHLORIDE SERPL-SCNC: 110 MMOL/L (ref 97–108)
CO2 SERPL-SCNC: 20 MMOL/L (ref 21–32)
CO2 SERPL-SCNC: 23 MMOL/L (ref 21–32)
CREAT SERPL-MCNC: 1.22 MG/DL (ref 0.55–1.02)
CREAT SERPL-MCNC: 1.59 MG/DL (ref 0.55–1.02)
DIFFERENTIAL METHOD BLD: ABNORMAL
DIFFERENTIAL METHOD BLD: ABNORMAL
EOSINOPHIL # BLD: 0 K/UL (ref 0–0.4)
EOSINOPHIL # BLD: 0 K/UL (ref 0–0.4)
EOSINOPHIL NFR BLD: 0 % (ref 0–7)
EOSINOPHIL NFR BLD: 0 % (ref 0–7)
ERYTHROCYTE [DISTWIDTH] IN BLOOD BY AUTOMATED COUNT: 14.6 % (ref 11.5–14.5)
ERYTHROCYTE [DISTWIDTH] IN BLOOD BY AUTOMATED COUNT: 14.6 % (ref 11.5–14.5)
FOLATE SERPL-MCNC: 58.4 NG/ML (ref 5–21)
GLOBULIN SER CALC-MCNC: 3.2 G/DL (ref 2–4)
GLOBULIN SER CALC-MCNC: 3.5 G/DL (ref 2–4)
GLUCOSE SERPL-MCNC: 121 MG/DL (ref 65–100)
GLUCOSE SERPL-MCNC: 164 MG/DL (ref 65–100)
HCT VFR BLD AUTO: 34.1 % (ref 35–47)
HCT VFR BLD AUTO: 35.6 % (ref 35–47)
HGB BLD-MCNC: 10.9 G/DL (ref 11.5–16)
HGB BLD-MCNC: 11.4 G/DL (ref 11.5–16)
IMM GRANULOCYTES # BLD AUTO: 0 K/UL (ref 0–0.04)
IMM GRANULOCYTES # BLD AUTO: 0 K/UL (ref 0–0.04)
IMM GRANULOCYTES NFR BLD AUTO: 0 % (ref 0–0.5)
IMM GRANULOCYTES NFR BLD AUTO: 0 % (ref 0–0.5)
LYMPHOCYTES # BLD: 1.16 K/UL (ref 0.8–3.5)
LYMPHOCYTES # BLD: 1.4 K/UL (ref 0.8–3.5)
LYMPHOCYTES NFR BLD: 15 % (ref 12–49)
LYMPHOCYTES NFR BLD: 20 % (ref 12–49)
MAGNESIUM SERPL-MCNC: 1.6 MG/DL (ref 1.6–2.4)
MCH RBC QN AUTO: 29.6 PG (ref 26–34)
MCH RBC QN AUTO: 29.7 PG (ref 26–34)
MCHC RBC AUTO-ENTMCNC: 32 G/DL (ref 30–36.5)
MCHC RBC AUTO-ENTMCNC: 32 G/DL (ref 30–36.5)
MCV RBC AUTO: 92.7 FL (ref 80–99)
MCV RBC AUTO: 92.7 FL (ref 80–99)
MONOCYTES # BLD: 0.28 K/UL (ref 0–1)
MONOCYTES # BLD: 0.54 K/UL (ref 0–1)
MONOCYTES NFR BLD: 4 % (ref 5–13)
MONOCYTES NFR BLD: 7 % (ref 5–13)
NEUTS BAND NFR BLD MANUAL: 16 %
NEUTS SEG # BLD: 5.32 K/UL (ref 1.8–8)
NEUTS SEG # BLD: 6 K/UL (ref 1.8–8)
NEUTS SEG NFR BLD: 60 % (ref 32–75)
NEUTS SEG NFR BLD: 78 % (ref 32–75)
NRBC # BLD: 0 K/UL (ref 0–0.01)
NRBC # BLD: 0 K/UL (ref 0–0.01)
NRBC BLD-RTO: 0 PER 100 WBC
NRBC BLD-RTO: 0 PER 100 WBC
PHOSPHATE SERPL-MCNC: 3.8 MG/DL (ref 2.6–4.7)
PLATELET # BLD AUTO: 214 K/UL (ref 150–400)
PLATELET # BLD AUTO: 254 K/UL (ref 150–400)
PMV BLD AUTO: 8.7 FL (ref 8.9–12.9)
PMV BLD AUTO: 8.9 FL (ref 8.9–12.9)
POTASSIUM SERPL-SCNC: 3 MMOL/L (ref 3.5–5.1)
POTASSIUM SERPL-SCNC: 4 MMOL/L (ref 3.5–5.1)
PROCALCITONIN SERPL-MCNC: 12.53 NG/ML
PROT SERPL-MCNC: 5.4 G/DL (ref 6.4–8.2)
PROT SERPL-MCNC: 6.3 G/DL (ref 6.4–8.2)
RBC # BLD AUTO: 3.68 M/UL (ref 3.8–5.2)
RBC # BLD AUTO: 3.84 M/UL (ref 3.8–5.2)
RBC MORPH BLD: ABNORMAL
RBC MORPH BLD: ABNORMAL
SODIUM SERPL-SCNC: 136 MMOL/L (ref 136–145)
SODIUM SERPL-SCNC: 137 MMOL/L (ref 136–145)
WBC # BLD AUTO: 7 K/UL (ref 3.6–11)
WBC # BLD AUTO: 7.7 K/UL (ref 3.6–11)

## 2025-04-15 PROCEDURE — 2580000003 HC RX 258: Performed by: INTERNAL MEDICINE

## 2025-04-15 PROCEDURE — 6360000002 HC RX W HCPCS

## 2025-04-15 PROCEDURE — 83735 ASSAY OF MAGNESIUM: CPT

## 2025-04-15 PROCEDURE — 82306 VITAMIN D 25 HYDROXY: CPT

## 2025-04-15 PROCEDURE — 84100 ASSAY OF PHOSPHORUS: CPT

## 2025-04-15 PROCEDURE — 6360000002 HC RX W HCPCS: Performed by: STUDENT IN AN ORGANIZED HEALTH CARE EDUCATION/TRAINING PROGRAM

## 2025-04-15 PROCEDURE — 6370000000 HC RX 637 (ALT 250 FOR IP): Performed by: EMERGENCY MEDICINE

## 2025-04-15 PROCEDURE — 85025 COMPLETE CBC W/AUTO DIFF WBC: CPT

## 2025-04-15 PROCEDURE — 2060000000 HC ICU INTERMEDIATE R&B

## 2025-04-15 PROCEDURE — P9045 ALBUMIN (HUMAN), 5%, 250 ML: HCPCS

## 2025-04-15 PROCEDURE — 80053 COMPREHEN METABOLIC PANEL: CPT

## 2025-04-15 PROCEDURE — P9047 ALBUMIN (HUMAN), 25%, 50ML: HCPCS

## 2025-04-15 PROCEDURE — 2580000003 HC RX 258: Performed by: EMERGENCY MEDICINE

## 2025-04-15 PROCEDURE — 2500000003 HC RX 250 WO HCPCS: Performed by: STUDENT IN AN ORGANIZED HEALTH CARE EDUCATION/TRAINING PROGRAM

## 2025-04-15 PROCEDURE — 2580000003 HC RX 258

## 2025-04-15 PROCEDURE — 6370000000 HC RX 637 (ALT 250 FOR IP): Performed by: STUDENT IN AN ORGANIZED HEALTH CARE EDUCATION/TRAINING PROGRAM

## 2025-04-15 PROCEDURE — 82746 ASSAY OF FOLIC ACID SERUM: CPT

## 2025-04-15 PROCEDURE — 6370000000 HC RX 637 (ALT 250 FOR IP): Performed by: INTERNAL MEDICINE

## 2025-04-15 PROCEDURE — 36415 COLL VENOUS BLD VENIPUNCTURE: CPT

## 2025-04-15 PROCEDURE — 84145 PROCALCITONIN (PCT): CPT

## 2025-04-15 RX ORDER — ENOXAPARIN SODIUM 100 MG/ML
30 INJECTION SUBCUTANEOUS DAILY
Status: DISCONTINUED | OUTPATIENT
Start: 2025-04-15 | End: 2025-04-17

## 2025-04-15 RX ORDER — SODIUM CHLORIDE, SODIUM LACTATE, POTASSIUM CHLORIDE, CALCIUM CHLORIDE 600; 310; 30; 20 MG/100ML; MG/100ML; MG/100ML; MG/100ML
INJECTION, SOLUTION INTRAVENOUS CONTINUOUS
Status: ACTIVE | OUTPATIENT
Start: 2025-04-15 | End: 2025-04-16

## 2025-04-15 RX ORDER — ACETAMINOPHEN 325 MG/1
650 TABLET ORAL EVERY 6 HOURS PRN
Status: DISCONTINUED | OUTPATIENT
Start: 2025-04-15 | End: 2025-04-20 | Stop reason: HOSPADM

## 2025-04-15 RX ORDER — 0.9 % SODIUM CHLORIDE 0.9 %
500 INTRAVENOUS SOLUTION INTRAVENOUS ONCE
Status: COMPLETED | OUTPATIENT
Start: 2025-04-15 | End: 2025-04-15

## 2025-04-15 RX ORDER — SODIUM CHLORIDE, SODIUM LACTATE, POTASSIUM CHLORIDE, AND CALCIUM CHLORIDE .6; .31; .03; .02 G/100ML; G/100ML; G/100ML; G/100ML
500 INJECTION, SOLUTION INTRAVENOUS ONCE
Status: COMPLETED | OUTPATIENT
Start: 2025-04-15 | End: 2025-04-16

## 2025-04-15 RX ORDER — ASPIRIN 81 MG/1
81 TABLET ORAL DAILY
Status: DISCONTINUED | OUTPATIENT
Start: 2025-04-15 | End: 2025-04-20

## 2025-04-15 RX ORDER — SODIUM CHLORIDE 0.9 % (FLUSH) 0.9 %
5-40 SYRINGE (ML) INJECTION PRN
Status: DISCONTINUED | OUTPATIENT
Start: 2025-04-15 | End: 2025-04-20 | Stop reason: HOSPADM

## 2025-04-15 RX ORDER — METRONIDAZOLE 500 MG/100ML
500 INJECTION, SOLUTION INTRAVENOUS EVERY 8 HOURS
Status: DISCONTINUED | OUTPATIENT
Start: 2025-04-16 | End: 2025-04-20

## 2025-04-15 RX ORDER — SODIUM CHLORIDE 0.9 % (FLUSH) 0.9 %
5-40 SYRINGE (ML) INJECTION EVERY 12 HOURS SCHEDULED
Status: DISCONTINUED | OUTPATIENT
Start: 2025-04-15 | End: 2025-04-20

## 2025-04-15 RX ORDER — M-VIT,TX,IRON,MINS/CALC/FOLIC 27MG-0.4MG
1 TABLET ORAL DAILY
Status: DISCONTINUED | OUTPATIENT
Start: 2025-04-15 | End: 2025-04-20

## 2025-04-15 RX ORDER — SODIUM CHLORIDE, SODIUM LACTATE, POTASSIUM CHLORIDE, AND CALCIUM CHLORIDE .6; .31; .03; .02 G/100ML; G/100ML; G/100ML; G/100ML
500 INJECTION, SOLUTION INTRAVENOUS ONCE
Status: DISCONTINUED | OUTPATIENT
Start: 2025-04-15 | End: 2025-04-15

## 2025-04-15 RX ORDER — ONDANSETRON 2 MG/ML
4 INJECTION INTRAMUSCULAR; INTRAVENOUS EVERY 6 HOURS PRN
Status: DISCONTINUED | OUTPATIENT
Start: 2025-04-15 | End: 2025-04-20 | Stop reason: HOSPADM

## 2025-04-15 RX ORDER — METHENAMINE HIPPURATE 1000 MG/1
1 TABLET ORAL 2 TIMES DAILY
Status: DISCONTINUED | OUTPATIENT
Start: 2025-04-15 | End: 2025-04-20

## 2025-04-15 RX ORDER — PREGABALIN 75 MG/1
150 CAPSULE ORAL 2 TIMES DAILY
Status: DISCONTINUED | OUTPATIENT
Start: 2025-04-15 | End: 2025-04-20

## 2025-04-15 RX ORDER — FLUOROMETHOLONE 1 MG/ML
2 SUSPENSION/ DROPS OPHTHALMIC 2 TIMES DAILY
Status: DISCONTINUED | OUTPATIENT
Start: 2025-04-15 | End: 2025-04-15

## 2025-04-15 RX ORDER — LACTOBACILLUS RHAMNOSUS GG 10B CELL
1 CAPSULE ORAL
Status: DISCONTINUED | OUTPATIENT
Start: 2025-04-15 | End: 2025-04-20

## 2025-04-15 RX ORDER — ALBUMIN HUMAN 50 G/1000ML
12.5 SOLUTION INTRAVENOUS ONCE
Status: COMPLETED | OUTPATIENT
Start: 2025-04-15 | End: 2025-04-16

## 2025-04-15 RX ORDER — ACETAMINOPHEN 650 MG/1
650 SUPPOSITORY RECTAL EVERY 6 HOURS PRN
Status: DISCONTINUED | OUTPATIENT
Start: 2025-04-15 | End: 2025-04-20 | Stop reason: HOSPADM

## 2025-04-15 RX ORDER — CETIRIZINE HYDROCHLORIDE 10 MG/1
10 TABLET ORAL DAILY
Status: DISCONTINUED | OUTPATIENT
Start: 2025-04-15 | End: 2025-04-20

## 2025-04-15 RX ORDER — IBUPROFEN 400 MG/1
400 TABLET, FILM COATED ORAL EVERY 6 HOURS PRN
Status: DISCONTINUED | OUTPATIENT
Start: 2025-04-15 | End: 2025-04-20 | Stop reason: HOSPADM

## 2025-04-15 RX ORDER — EZETIMIBE 10 MG/1
10 TABLET ORAL DAILY
Status: DISCONTINUED | OUTPATIENT
Start: 2025-04-15 | End: 2025-04-20

## 2025-04-15 RX ORDER — ALBUMIN (HUMAN) 12.5 G/50ML
25 SOLUTION INTRAVENOUS ONCE
Status: COMPLETED | OUTPATIENT
Start: 2025-04-15 | End: 2025-04-15

## 2025-04-15 RX ORDER — VITAMIN B COMPLEX
1000 TABLET ORAL DAILY
Status: DISCONTINUED | OUTPATIENT
Start: 2025-04-15 | End: 2025-04-20

## 2025-04-15 RX ORDER — SODIUM CHLORIDE 9 MG/ML
INJECTION, SOLUTION INTRAVENOUS PRN
Status: DISCONTINUED | OUTPATIENT
Start: 2025-04-15 | End: 2025-04-20 | Stop reason: HOSPADM

## 2025-04-15 RX ORDER — PANTOPRAZOLE SODIUM 40 MG/1
40 TABLET, DELAYED RELEASE ORAL 2 TIMES DAILY
Status: DISCONTINUED | OUTPATIENT
Start: 2025-04-15 | End: 2025-04-19

## 2025-04-15 RX ORDER — SODIUM CHLORIDE, SODIUM LACTATE, POTASSIUM CHLORIDE, AND CALCIUM CHLORIDE .6; .31; .03; .02 G/100ML; G/100ML; G/100ML; G/100ML
500 INJECTION, SOLUTION INTRAVENOUS ONCE
Status: COMPLETED | OUTPATIENT
Start: 2025-04-15 | End: 2025-04-15

## 2025-04-15 RX ORDER — FLUTICASONE PROPIONATE 50 MCG
1 SPRAY, SUSPENSION (ML) NASAL DAILY PRN
Status: DISCONTINUED | OUTPATIENT
Start: 2025-04-15 | End: 2025-04-20 | Stop reason: HOSPADM

## 2025-04-15 RX ORDER — LEVOTHYROXINE SODIUM 50 UG/1
50 TABLET ORAL DAILY
Status: DISCONTINUED | OUTPATIENT
Start: 2025-04-15 | End: 2025-04-20

## 2025-04-15 RX ORDER — POTASSIUM CHLORIDE 7.45 MG/ML
10 INJECTION INTRAVENOUS
Status: COMPLETED | OUTPATIENT
Start: 2025-04-16 | End: 2025-04-16

## 2025-04-15 RX ORDER — UBIDECARENONE 75 MG
100 CAPSULE ORAL DAILY
Status: DISCONTINUED | OUTPATIENT
Start: 2025-04-15 | End: 2025-04-20

## 2025-04-15 RX ADMIN — METHENAMINE HIPPURATE 1 G: 1 TABLET ORAL at 08:32

## 2025-04-15 RX ADMIN — ASPIRIN 81 MG: 81 TABLET, COATED ORAL at 08:32

## 2025-04-15 RX ADMIN — Medication 1000 UNITS: at 08:32

## 2025-04-15 RX ADMIN — ACETAMINOPHEN 650 MG: 325 TABLET ORAL at 18:51

## 2025-04-15 RX ADMIN — Medication 100 MCG: at 08:32

## 2025-04-15 RX ADMIN — SODIUM CHLORIDE 1000 ML: 0.9 INJECTION, SOLUTION INTRAVENOUS at 00:07

## 2025-04-15 RX ADMIN — FIDAXOMICIN 200 MG: 200 TABLET, FILM COATED ORAL at 21:53

## 2025-04-15 RX ADMIN — IBUPROFEN 400 MG: 400 TABLET, FILM COATED ORAL at 17:04

## 2025-04-15 RX ADMIN — PANTOPRAZOLE SODIUM 40 MG: 40 TABLET, DELAYED RELEASE ORAL at 08:30

## 2025-04-15 RX ADMIN — SODIUM CHLORIDE, PRESERVATIVE FREE 10 ML: 5 INJECTION INTRAVENOUS at 22:07

## 2025-04-15 RX ADMIN — CETIRIZINE HYDROCHLORIDE 10 MG: 10 TABLET, FILM COATED ORAL at 08:32

## 2025-04-15 RX ADMIN — METHENAMINE HIPPURATE 1 G: 1 TABLET ORAL at 21:54

## 2025-04-15 RX ADMIN — ALBUMIN (HUMAN) 12.5 G: 12.5 INJECTION, SOLUTION INTRAVENOUS at 23:30

## 2025-04-15 RX ADMIN — ENOXAPARIN SODIUM 30 MG: 100 INJECTION SUBCUTANEOUS at 08:30

## 2025-04-15 RX ADMIN — ALBUMIN (HUMAN) 25 G: 0.25 INJECTION, SOLUTION INTRAVENOUS at 21:28

## 2025-04-15 RX ADMIN — SODIUM CHLORIDE, SODIUM LACTATE, POTASSIUM CHLORIDE, AND CALCIUM CHLORIDE: .6; .31; .03; .02 INJECTION, SOLUTION INTRAVENOUS at 20:56

## 2025-04-15 RX ADMIN — ACETAMINOPHEN 650 MG: 325 TABLET ORAL at 05:36

## 2025-04-15 RX ADMIN — SODIUM CHLORIDE, PRESERVATIVE FREE 10 ML: 5 INJECTION INTRAVENOUS at 08:36

## 2025-04-15 RX ADMIN — SODIUM CHLORIDE, SODIUM LACTATE, POTASSIUM CHLORIDE, AND CALCIUM CHLORIDE 500 ML: .6; .31; .03; .02 INJECTION, SOLUTION INTRAVENOUS at 23:25

## 2025-04-15 RX ADMIN — SODIUM CHLORIDE 500 ML: 0.9 INJECTION, SOLUTION INTRAVENOUS at 20:20

## 2025-04-15 RX ADMIN — PREGABALIN 150 MG: 75 CAPSULE ORAL at 08:32

## 2025-04-15 RX ADMIN — ACETAMINOPHEN 650 MG: 325 TABLET ORAL at 11:25

## 2025-04-15 RX ADMIN — ACETAMINOPHEN 1000 MG: 500 TABLET, FILM COATED ORAL at 00:07

## 2025-04-15 RX ADMIN — EZETIMIBE 10 MG: 10 TABLET ORAL at 08:31

## 2025-04-15 RX ADMIN — PANTOPRAZOLE SODIUM 40 MG: 40 TABLET, DELAYED RELEASE ORAL at 21:54

## 2025-04-15 RX ADMIN — LEVOTHYROXINE SODIUM 50 MCG: 0.05 TABLET ORAL at 05:36

## 2025-04-15 RX ADMIN — SODIUM CHLORIDE, SODIUM LACTATE, POTASSIUM CHLORIDE, AND CALCIUM CHLORIDE 500 ML: .6; .31; .03; .02 INJECTION, SOLUTION INTRAVENOUS at 03:22

## 2025-04-15 RX ADMIN — PREGABALIN 150 MG: 75 CAPSULE ORAL at 02:26

## 2025-04-15 RX ADMIN — Medication 1 TABLET: at 08:31

## 2025-04-15 RX ADMIN — Medication 1 CAPSULE: at 11:25

## 2025-04-15 RX ADMIN — PANTOPRAZOLE SODIUM 40 MG: 40 TABLET, DELAYED RELEASE ORAL at 02:26

## 2025-04-15 RX ADMIN — FIDAXOMICIN 200 MG: 200 TABLET, FILM COATED ORAL at 08:32

## 2025-04-15 ASSESSMENT — PAIN SCALES - GENERAL
PAINLEVEL_OUTOF10: 6
PAINLEVEL_OUTOF10: 8
PAINLEVEL_OUTOF10: 1
PAINLEVEL_OUTOF10: 6
PAINLEVEL_OUTOF10: 6

## 2025-04-15 ASSESSMENT — PAIN DESCRIPTION - LOCATION: LOCATION: BREAST

## 2025-04-15 ASSESSMENT — PAIN DESCRIPTION - DESCRIPTORS: DESCRIPTORS: ACHING

## 2025-04-15 ASSESSMENT — PAIN DESCRIPTION - ORIENTATION: ORIENTATION: RIGHT

## 2025-04-15 NOTE — PROGRESS NOTES
Hospitalist Progress Note    NAME:   Sera May   : 1945   MRN: [id number]     Date/Time: 4/15/2025 3:36 PM  Patient PCP: Byron Mcdonald MD    Estimated discharge date:  Barriers:       Assessment / Plan:    Recurrent c difficile infection   Noted diarrhea since March, positive c diff test 3/25   Had resolution of diarrhea after treatment however diarrhea recurred several days later   Repeat c diff  again positive for antigen as well as PCR   - Changed  to fidaxomicin really may of needed longer course vanco with taper  - Monitor stool output   - Consider repeat abdominal imaging if pain worsening, reports pain currently well controlled  - B12 and Folate good, Vitamin D should be adequate   - consult ID     Acute kidney injury  Likely secondary to pre-renal injury due to ongoing profuse watery diarrhea  - S/p IVF in the ED  - Encourage PO intake  - Monitor I/Os  - Repeat AM BMP      Hypothyroidism  - Continue home synthroid         Medical Decision Making:   I personally reviewed labs: Yes  I personally reviewed imaging: N/A  I personally reviewed EKG: N/A  Toxic drug monitoring: N/A     Discussed case with: RN     Code Status: Full Code   DVT Prophylaxis: Lovenox  Diet: ADULT DIET; Regular  Consults: N/A       Subjective:     Chief Complaint / Reason for Physician Visit  \"Patient disappointed it came back\".  Discussed with RN events overnight.       Objective:     VITALS:   Last 24hrs VS reviewed since prior progress note. Most recent are:  Patient Vitals for the past 24 hrs:   BP Temp Temp src Pulse Resp SpO2 Height Weight   04/15/25 0815 (!) 109/50 99.3 °F (37.4 °C) Oral 86 18 94 % -- --   04/15/25 0536 -- (!) 100.8 °F (38.2 °C) Oral 82 -- -- -- --   04/15/25 0530 (!) 140/61 -- -- -- -- -- -- --   04/15/25 0300 (!) 108/44 -- -- -- -- -- -- --   04/15/25 0215 -- -- -- -- -- -- 1.626 m (5' 4.02\") 75.8 kg (167 lb)   04/15/25 0210 (!) 100/39 98.6 °F (37 °C) Oral 76 18 91 % -- --   04/15/25 0015 (!)

## 2025-04-15 NOTE — H&P
Hospitalist Admission Note    NAME:Sera May   : 1945   MRN: [id number]     Date/Time: 4/15/2025 12:38 AM    Patient PCP: Byron Mcdonald MD    *Please be aware this note is formulated with assistance from Dragon voice-recognition dictation software. Please excuse any errors that may be present*    ______________________________________________________________________  Given the patient's current clinical presentation, I have a high level of concern for decompensation if discharged from the emergency department.  Complex decision making was performed, which includes reviewing the patient's available past medical records, laboratory results, and x-ray films.       My assessment of this patient's clinical condition and my plan of care is as follows.    Problem List:  Patient Active Problem List   Diagnosis    Postural dizziness with presyncope    Endometriosis    Closed fracture of left lower extremity with routine healing    Gastroesophageal reflux disease without esophagitis    HX: breast cancer    Mixed hyperlipidemia    Osteopenia of multiple sites    History of CVA (cerebrovascular accident)    Other specified hypothyroidism    Overweight (BMI 25.0-29.9)    Coronary artery disease involving native coronary artery of native heart without angina pectoris    Encounter for preadmission testing    Lumbar stenosis with neurogenic claudication    S/P lumbar fusion    Kyphoscoliosis    Pre-syncope    Type 2 diabetes mellitus    Recurrent Clostridioides difficile infection         Assessment / Plan:    Recurrent c difficile infection   Noted diarrhea since March, positive c diff test 3/25   Had resolution of diarrhea after treatment however diarrhea recurred several days later   Repeat c diff  again positive for antigen as well as PCR   - Change to fidaxomicin   - Monitor stool output   - Consider repeat abdominal imaging if pain worsening, reports pain currently well controlled     Acute kidney

## 2025-04-15 NOTE — PLAN OF CARE
Problem: Chronic Conditions and Co-morbidities  Goal: Patient's chronic conditions and co-morbidity symptoms are monitored and maintained or improved  Outcome: Progressing     Problem: Discharge Planning  Goal: Discharge to home or other facility with appropriate resources  Outcome: Progressing  Flowsheets (Taken 4/15/2025 0210)  Discharge to home or other facility with appropriate resources:   Identify barriers to discharge with patient and caregiver   Arrange for needed discharge resources and transportation as appropriate   Identify discharge learning needs (meds, wound care, etc)   Refer to discharge planning if patient needs post-hospital services based on physician order or complex needs related to functional status, cognitive ability or social support system     Problem: Skin/Tissue Integrity  Goal: Skin integrity remains intact  Description: 1.  Monitor for areas of redness and/or skin breakdown2.  Assess vascular access sites hourly3.  Every 4-6 hours minimum:  Change oxygen saturation probe site4.  Every 4-6 hours:  If on nasal continuous positive airway pressure, respiratory therapy assess nares and determine need for appliance change or resting period  Outcome: Progressing     Problem: Safety - Adult  Goal: Free from fall injury  Outcome: Progressing     Problem: Skin/Tissue Integrity - Adult  Goal: Skin integrity remains intact  Description: 1.  Monitor for areas of redness and/or skin breakdown2.  Assess vascular access sites hourly3.  Every 4-6 hours minimum:  Change oxygen saturation probe site4.  Every 4-6 hours:  If on nasal continuous positive airway pressure, respiratory therapy assess nares and determine need for appliance change or resting period  Outcome: Progressing  Goal: Incisions, wounds, or drain sites healing without S/S of infection  Outcome: Progressing  Goal: Oral mucous membranes remain intact  Outcome: Progressing     Problem: Gastrointestinal - Adult  Goal: Minimal or absence of

## 2025-04-15 NOTE — PROGRESS NOTES
End of Shift Note    Bedside shift change report given to LILLY Morin (oncoming nurse) by April Skelton LPN (offgoing nurse).  Report included the following information SBAR and MAR    Shift worked:  [id number]     Shift summary and any significant changes:     Patient had x5 bowel movement, still watery stools. Patient having complaints of stomach pain, Tylenol given x2. Patient spiked fever of 102.7, MD aware, Advil order put in and given. Q2H turns completed by patient.      Concerns for physician to address:  Stomach cramping/ pain     Zone phone for oncoming shift:   [phone]       Activity:  Level of Assistance: Minimal assist, patient does 75% or more  Number times ambulated in hallways past shift: 0  Number of times OOB to chair past shift: 1    Cardiac:   Cardiac Monitoring: Yes      Cardiac Rhythm: Sinus rhythm    Access:  Current line(s): PIV     Genitourinary:        Respiratory:   O2 Device: None (Room air)  Chronic home O2 use?: NO  Incentive spirometer at bedside: NO    GI:  Last BM (including prior to admit): 04/15/25  Current diet:  ADULT DIET; Regular  Passing flatus: YES    Pain Management:   Patient states pain is manageable on current regimen: NO     Skin:  Venkat Scale Score: 17  Interventions: Wound Offloading (Prevention Methods): Pillows, Repositioning    Patient Safety:  Fall Risk: Nursing Judgement-Fall Risk High(Add Comments): Yes  Fall Risk Interventions  Nursing Judgement-Fall Risk High(Add Comments): Yes  Toilet Every 2 Hours-In Advance of Need: Yes  Hourly Visual Checks: In bed, Quiet  Fall Visual Posted: Socks  Room Door Open: Deferred to promote rest  Alarm On: Bed  Patient Moved Closer to Nursing Station: No    Active Consults:   None    Length of Stay:  Expected LOS: 4  Actual LOS: 0    April Skelton LPN

## 2025-04-15 NOTE — PROGRESS NOTES
End of Shift Note    Bedside shift change report given to April SCHULTE (oncoming nurse) by Juan Alberto Washington RN (offgoing nurse).  Report included the following information SBAR, Kardex, Intake/Output, MAR, Recent Results, Cardiac Rhythm sinus rhythm, and Quality Measures    Shift worked:  [id number]     Shift summary and any significant changes:     Admission from ER. Routine admission done, Dual skin assessment done with Nonoe RN. Due medications given. Patient has low diastolic blood pressure since patient is in the ER, 1L of PNSS given in ER, Jing WRIGHT was informed, ordered to give 500ml of LR for 2 hours. Patient needs attended. Hourly rounding done.     Concerns for physician to address:  Low diastolic blood pressure     Zone phone for oncoming shift:   ***       Activity:  Level of Assistance: Minimal assist, patient does 75% or more  Number times ambulated in hallways past shift: 0  Number of times OOB to chair past shift: 0    Cardiac:   Cardiac Monitoring: Yes      Cardiac Rhythm: Sinus rhythm    Access:  Current line(s): PIV     Genitourinary:        Respiratory:   O2 Device: None (Room air)  Chronic home O2 use?: NO  Incentive spirometer at bedside: NO    GI:  Last BM (including prior to admit): 04/14/25  Current diet:  ADULT DIET; Regular  Passing flatus: YES    Pain Management:   Patient states pain is manageable on current regimen: YES    Skin:  Venkat Scale Score: 16  Interventions: Wound Offloading (Prevention Methods): Pillows, Repositioning, Turning    Patient Safety:  Fall Risk: Nursing Judgement-Fall Risk High(Add Comments): Yes  Fall Risk Interventions  Nursing Judgement-Fall Risk High(Add Comments): Yes  Toilet Every 2 Hours-In Advance of Need: Yes  Hourly Visual Checks: In bed, Quiet  Fall Visual Posted: Fall sign posted, Socks  Room Door Open: Deferred to promote rest  Alarm On: Bed  Patient Moved Closer to Nursing Station: Yes    Active Consults:   None    Length of Stay:  Expected LOS: 4  Actual

## 2025-04-15 NOTE — ED PROVIDER NOTES
COLONOSCOPY N/A     CORONARY ANGIOPLASTY WITH STENT PLACEMENT  01/25/2023    one stent inserted    LUMBAR FUSION  05/2019    LUMBAR SPINE SURGERY N/A 7/17/2023    REVISION LAMINECTOMY L2 - S1 AND POSTERIOR LUMBAR FUSION L2 - 4 (MAZOR/OARM)  (ERAS) performed by Glen Rowe MD at [facility] MAIN OR    MASTECTOMY Right 1999    ORTHOPEDIC SURGERY  2000    L knee has pins and plates after a fall (fracture)    PARTIAL HYSTERECTOMY (CERVIX NOT REMOVED) N/A     TONSILLECTOMY      UPPER GASTROINTESTINAL ENDOSCOPY         Family History:  Family History   Problem Relation Age of Onset    Cancer Mother         breast cancer    Heart Disease Father     No Known Problems Sister     Other Sister 46        Gastric bypass into staph infection    Heart Disease Brother     Diabetes Brother     Colon Cancer Brother     Diabetes Brother     No Known Problems Son     No Known Problems Son     Anesth Problems Neg Hx        Social History:  Social History     Tobacco Use    Smoking status: Never    Smokeless tobacco: Never   Vaping Use    Vaping status: Never Used   Substance Use Topics    Alcohol use: Not Currently    Drug use: Never       Allergies:  Allergies   Allergen Reactions    Latex Itching and Rash     Other reaction(s): Rash  Other reaction(s): Adverse reaction to substance ([id number]); Severity: Mild      Food Anaphylaxis     Steak cheese  no dairy no milk no beef    Other Anaphylaxis     Steak, cheese, grass, smoke    Penicillins Itching, Rash and Swelling     Mouth  Other reaction(s): ITCHING AND SWELLING  Other reaction(s): Adverse reaction to substance ([id number]); Severity: Mild    Tramadol Other (See Comments)     Hyponatremia, seizure    Cheese Itching and Swelling    Sulfa Antibiotics Itching    Codeine Rash and Swelling     Mouth    Gabapentin Other (See Comments)     As of 1/28/2020, pt requests not to have this. It \"completely wipes me out.\"  Other reaction(s): WEAKNESS       CURRENT MEDICATIONS      Current  Affect: Mood normal.         Behavior: Behavior normal.          DIAGNOSTIC RESULTS   LABS:     Recent Results (from the past 24 hours)   CBC with Auto Differential    Collection Time: 04/14/25  9:30 PM   Result Value Ref Range    WBC 8.4 3.6 - 11.0 K/uL    RBC 3.96 3.80 - 5.20 M/uL    Hemoglobin 11.9 11.5 - 16.0 g/dL    Hematocrit 35.9 35.0 - 47.0 %    MCV 90.7 80.0 - 99.0 FL    MCH 30.1 26.0 - 34.0 PG    MCHC 33.1 30.0 - 36.5 g/dL    RDW 14.6 (H) 11.5 - 14.5 %    Platelets 301 150 - 400 K/uL    MPV 8.6 (L) 8.9 - 12.9 FL    Nucleated RBCs 0.0 0  WBC    nRBC 0.00 0.00 - 0.01 K/uL    Neutrophils % 70 32.0 - 75.0 %    Band Neutrophils 11 %    Lymphocytes % 17 12.0 - 49.0 %    Monocytes % 1 (L) 5.0 - 13.0 %    Eosinophils % 0 0.0 - 7.0 %    Basophils % 0 0.0 - 1.0 %    Metamyelocytes 1 %    Immature Granulocytes % 0 0.0 - 0.5 %    Neutrophils Absolute 6.80 1.80 - 8.00 K/UL    Lymphocytes Absolute 1.43 0.80 - 3.50 K/UL    Monocytes Absolute 0.08 0.00 - 1.00 K/UL    Eosinophils Absolute 0.00 0.00 - 0.40 K/UL    Basophils Absolute 0.00 0.00 - 0.10 K/UL    Immature Granulocytes Absolute 0.00 0.00 - 0.04 K/UL    Differential Type MANUAL      RBC Comment NORMOCYTIC, NORMOCHROMIC     Comprehensive Metabolic Panel    Collection Time: 04/14/25 10:17 PM   Result Value Ref Range    Sodium 136 136 - 145 mmol/L    Potassium 3.6 3.5 - 5.1 mmol/L    Chloride 105 97 - 108 mmol/L    CO2 24 21 - 32 mmol/L    Anion Gap 7 2 - 12 mmol/L    Glucose 141 (H) 65 - 100 mg/dL    BUN 25 (H) 6 - 20 MG/DL    Creatinine 1.54 (H) 0.55 - 1.02 MG/DL    BUN/Creatinine Ratio 16 12 - 20      Est, Glom Filt Rate 34 (L) >60 ml/min/1.73m2    Calcium 8.5 8.5 - 10.1 MG/DL    Total Bilirubin 0.5 0.2 - 1.0 MG/DL    ALT 20 12 - 78 U/L    AST 19 15 - 37 U/L    Alk Phosphatase 67 45 - 117 U/L    Total Protein 6.3 (L) 6.4 - 8.2 g/dL    Albumin 2.9 (L) 3.5 - 5.0 g/dL    Globulin 3.4 2.0 - 4.0 g/dL    Albumin/Globulin Ratio 0.9 (L) 1.1 - 2.2     Lipase

## 2025-04-16 ENCOUNTER — TELEPHONE (OUTPATIENT)
Age: 80
End: 2025-04-16

## 2025-04-16 PROBLEM — B99.9 RECURRENT INFECTIONS: Status: ACTIVE | Noted: 2025-04-16

## 2025-04-16 PROBLEM — E86.0 DEHYDRATION: Status: ACTIVE | Noted: 2025-04-16

## 2025-04-16 PROBLEM — I25.10 CORONARY ARTERY DISEASE DUE TO LIPID RICH PLAQUE: Status: ACTIVE | Noted: 2023-03-27

## 2025-04-16 PROBLEM — I25.83 CORONARY ARTERY DISEASE DUE TO LIPID RICH PLAQUE: Status: ACTIVE | Noted: 2023-03-27

## 2025-04-16 PROBLEM — A04.72 C. DIFFICILE DIARRHEA: Status: ACTIVE | Noted: 2025-04-16

## 2025-04-16 PROBLEM — E86.1 HYPOVOLEMIA: Status: ACTIVE | Noted: 2025-04-16

## 2025-04-16 PROBLEM — R73.03 PREDIABETES: Status: ACTIVE | Noted: 2025-04-16

## 2025-04-16 PROBLEM — E03.9 HYPOTHYROIDISM: Status: ACTIVE | Noted: 2018-05-16

## 2025-04-16 PROBLEM — N17.9 AKI (ACUTE KIDNEY INJURY): Status: ACTIVE | Noted: 2025-04-16

## 2025-04-16 LAB
ALBUMIN SERPL-MCNC: 2.4 G/DL (ref 3.5–5)
ALBUMIN SERPL-MCNC: 2.5 G/DL (ref 3.5–5)
ALBUMIN/GLOB SERPL: 0.8 (ref 1.1–2.2)
ALBUMIN/GLOB SERPL: 1 (ref 1.1–2.2)
ALP SERPL-CCNC: 49 U/L (ref 45–117)
ALP SERPL-CCNC: 60 U/L (ref 45–117)
ALT SERPL-CCNC: 21 U/L (ref 12–78)
ALT SERPL-CCNC: 26 U/L (ref 12–78)
ANION GAP SERPL CALC-SCNC: 8 MMOL/L (ref 2–12)
ANION GAP SERPL CALC-SCNC: 9 MMOL/L (ref 2–12)
AST SERPL-CCNC: 38 U/L (ref 15–37)
AST SERPL-CCNC: 44 U/L (ref 15–37)
BASOPHILS # BLD: 0 K/UL (ref 0–0.1)
BASOPHILS NFR BLD: 0 % (ref 0–1)
BILIRUB SERPL-MCNC: 0.4 MG/DL (ref 0.2–1)
BILIRUB SERPL-MCNC: 0.7 MG/DL (ref 0.2–1)
BUN SERPL-MCNC: 23 MG/DL (ref 6–20)
BUN SERPL-MCNC: 28 MG/DL (ref 6–20)
BUN/CREAT SERPL: 19 (ref 12–20)
BUN/CREAT SERPL: 21 (ref 12–20)
CALCIUM SERPL-MCNC: 7.1 MG/DL (ref 8.5–10.1)
CALCIUM SERPL-MCNC: 7.7 MG/DL (ref 8.5–10.1)
CHLORIDE SERPL-SCNC: 109 MMOL/L (ref 97–108)
CHLORIDE SERPL-SCNC: 110 MMOL/L (ref 97–108)
CO2 SERPL-SCNC: 16 MMOL/L (ref 21–32)
CO2 SERPL-SCNC: 18 MMOL/L (ref 21–32)
CREAT SERPL-MCNC: 1.11 MG/DL (ref 0.55–1.02)
CREAT SERPL-MCNC: 1.48 MG/DL (ref 0.55–1.02)
DIFFERENTIAL METHOD BLD: ABNORMAL
EOSINOPHIL # BLD: 0.14 K/UL (ref 0–0.4)
EOSINOPHIL NFR BLD: 2 % (ref 0–7)
ERYTHROCYTE [DISTWIDTH] IN BLOOD BY AUTOMATED COUNT: 14.9 % (ref 11.5–14.5)
GLOBULIN SER CALC-MCNC: 2.3 G/DL (ref 2–4)
GLOBULIN SER CALC-MCNC: 3 G/DL (ref 2–4)
GLUCOSE SERPL-MCNC: 126 MG/DL (ref 65–100)
GLUCOSE SERPL-MCNC: 131 MG/DL (ref 65–100)
HCT VFR BLD AUTO: 35.7 % (ref 35–47)
HGB BLD-MCNC: 11.2 G/DL (ref 11.5–16)
IMM GRANULOCYTES # BLD AUTO: 0 K/UL (ref 0–0.04)
IMM GRANULOCYTES NFR BLD AUTO: 0 % (ref 0–0.5)
LACTATE SERPL-SCNC: 2.5 MMOL/L (ref 0.4–2)
LACTATE SERPL-SCNC: 4.1 MMOL/L (ref 0.4–2)
LYMPHOCYTES # BLD: 0.77 K/UL (ref 0.8–3.5)
LYMPHOCYTES NFR BLD: 11 % (ref 12–49)
MCH RBC QN AUTO: 29.5 PG (ref 26–34)
MCHC RBC AUTO-ENTMCNC: 31.4 G/DL (ref 30–36.5)
MCV RBC AUTO: 93.9 FL (ref 80–99)
MONOCYTES # BLD: 0.35 K/UL (ref 0–1)
MONOCYTES NFR BLD: 5 % (ref 5–13)
NEUTS BAND NFR BLD MANUAL: 16 %
NEUTS SEG # BLD: 5.74 K/UL (ref 1.8–8)
NEUTS SEG NFR BLD: 66 % (ref 32–75)
NRBC # BLD: 0 K/UL (ref 0–0.01)
NRBC BLD-RTO: 0 PER 100 WBC
PLATELET # BLD AUTO: 187 K/UL (ref 150–400)
POTASSIUM SERPL-SCNC: 3.6 MMOL/L (ref 3.5–5.1)
POTASSIUM SERPL-SCNC: 3.9 MMOL/L (ref 3.5–5.1)
PROCALCITONIN SERPL-MCNC: 13.69 NG/ML
PROT SERPL-MCNC: 4.7 G/DL (ref 6.4–8.2)
PROT SERPL-MCNC: 5.5 G/DL (ref 6.4–8.2)
RBC # BLD AUTO: 3.8 M/UL (ref 3.8–5.2)
RBC MORPH BLD: ABNORMAL
SODIUM SERPL-SCNC: 134 MMOL/L (ref 136–145)
SODIUM SERPL-SCNC: 136 MMOL/L (ref 136–145)
WBC # BLD AUTO: 7 K/UL (ref 3.6–11)

## 2025-04-16 PROCEDURE — 51702 INSERT TEMP BLADDER CATH: CPT

## 2025-04-16 PROCEDURE — 6360000002 HC RX W HCPCS

## 2025-04-16 PROCEDURE — 85025 COMPLETE CBC W/AUTO DIFF WBC: CPT

## 2025-04-16 PROCEDURE — 6370000000 HC RX 637 (ALT 250 FOR IP): Performed by: INTERNAL MEDICINE

## 2025-04-16 PROCEDURE — 36415 COLL VENOUS BLD VENIPUNCTURE: CPT

## 2025-04-16 PROCEDURE — 83605 ASSAY OF LACTIC ACID: CPT

## 2025-04-16 PROCEDURE — 6370000000 HC RX 637 (ALT 250 FOR IP): Performed by: STUDENT IN AN ORGANIZED HEALTH CARE EDUCATION/TRAINING PROGRAM

## 2025-04-16 PROCEDURE — 2000000000 HC ICU R&B

## 2025-04-16 PROCEDURE — P9045 ALBUMIN (HUMAN), 5%, 250 ML: HCPCS | Performed by: INTERNAL MEDICINE

## 2025-04-16 PROCEDURE — 2500000003 HC RX 250 WO HCPCS: Performed by: INTERNAL MEDICINE

## 2025-04-16 PROCEDURE — 6360000002 HC RX W HCPCS: Performed by: INTERNAL MEDICINE

## 2025-04-16 PROCEDURE — 84145 PROCALCITONIN (PCT): CPT

## 2025-04-16 PROCEDURE — 6370000000 HC RX 637 (ALT 250 FOR IP): Performed by: NURSE PRACTITIONER

## 2025-04-16 PROCEDURE — 2580000003 HC RX 258: Performed by: INTERNAL MEDICINE

## 2025-04-16 PROCEDURE — 2500000003 HC RX 250 WO HCPCS: Performed by: STUDENT IN AN ORGANIZED HEALTH CARE EDUCATION/TRAINING PROGRAM

## 2025-04-16 PROCEDURE — 2580000003 HC RX 258

## 2025-04-16 PROCEDURE — 99223 1ST HOSP IP/OBS HIGH 75: CPT | Performed by: INTERNAL MEDICINE

## 2025-04-16 PROCEDURE — 51798 US URINE CAPACITY MEASURE: CPT

## 2025-04-16 PROCEDURE — 80053 COMPREHEN METABOLIC PANEL: CPT

## 2025-04-16 PROCEDURE — 6360000002 HC RX W HCPCS: Performed by: STUDENT IN AN ORGANIZED HEALTH CARE EDUCATION/TRAINING PROGRAM

## 2025-04-16 RX ORDER — 0.9 % SODIUM CHLORIDE 0.9 %
500 INTRAVENOUS SOLUTION INTRAVENOUS ONCE
Status: COMPLETED | OUTPATIENT
Start: 2025-04-16 | End: 2025-04-16

## 2025-04-16 RX ORDER — SODIUM CHLORIDE, SODIUM LACTATE, POTASSIUM CHLORIDE, AND CALCIUM CHLORIDE .6; .31; .03; .02 G/100ML; G/100ML; G/100ML; G/100ML
500 INJECTION, SOLUTION INTRAVENOUS ONCE
Status: DISCONTINUED | OUTPATIENT
Start: 2025-04-16 | End: 2025-04-16

## 2025-04-16 RX ORDER — SODIUM CHLORIDE, SODIUM LACTATE, POTASSIUM CHLORIDE, CALCIUM CHLORIDE 600; 310; 30; 20 MG/100ML; MG/100ML; MG/100ML; MG/100ML
INJECTION, SOLUTION INTRAVENOUS CONTINUOUS
Status: DISCONTINUED | OUTPATIENT
Start: 2025-04-16 | End: 2025-04-16

## 2025-04-16 RX ORDER — SODIUM CHLORIDE, SODIUM LACTATE, POTASSIUM CHLORIDE, CALCIUM CHLORIDE 600; 310; 30; 20 MG/100ML; MG/100ML; MG/100ML; MG/100ML
INJECTION, SOLUTION INTRAVENOUS CONTINUOUS
Status: ACTIVE | OUTPATIENT
Start: 2025-04-16 | End: 2025-04-16

## 2025-04-16 RX ORDER — OXYCODONE HYDROCHLORIDE 5 MG/1
5 TABLET ORAL ONCE
Refills: 0 | Status: COMPLETED | OUTPATIENT
Start: 2025-04-16 | End: 2025-04-16

## 2025-04-16 RX ORDER — PEPPERMINT OIL
SPIRIT ORAL PRN
Status: DISCONTINUED | OUTPATIENT
Start: 2025-04-16 | End: 2025-04-20 | Stop reason: HOSPADM

## 2025-04-16 RX ORDER — INDOMETHACIN 25 MG/1
100 CAPSULE ORAL ONCE
Status: COMPLETED | OUTPATIENT
Start: 2025-04-16 | End: 2025-04-16

## 2025-04-16 RX ORDER — SODIUM CHLORIDE, SODIUM LACTATE, POTASSIUM CHLORIDE, AND CALCIUM CHLORIDE .6; .31; .03; .02 G/100ML; G/100ML; G/100ML; G/100ML
500 INJECTION, SOLUTION INTRAVENOUS ONCE
Status: COMPLETED | OUTPATIENT
Start: 2025-04-16 | End: 2025-04-16

## 2025-04-16 RX ORDER — NOREPINEPHRINE BITARTRATE 0.06 MG/ML
1-100 INJECTION, SOLUTION INTRAVENOUS CONTINUOUS
Status: DISCONTINUED | OUTPATIENT
Start: 2025-04-16 | End: 2025-04-18

## 2025-04-16 RX ORDER — CASTOR OIL AND BALSAM, PERU 788; 87 MG/G; MG/G
OINTMENT TOPICAL 2 TIMES DAILY
Status: DISCONTINUED | OUTPATIENT
Start: 2025-04-16 | End: 2025-04-20

## 2025-04-16 RX ORDER — ALBUMIN HUMAN 50 G/1000ML
25 SOLUTION INTRAVENOUS EVERY 6 HOURS
Status: COMPLETED | OUTPATIENT
Start: 2025-04-16 | End: 2025-04-16

## 2025-04-16 RX ADMIN — POTASSIUM BICARBONATE 20 MEQ: 782 TABLET, EFFERVESCENT ORAL at 09:33

## 2025-04-16 RX ADMIN — Medication 100 MCG: at 09:37

## 2025-04-16 RX ADMIN — PREGABALIN 150 MG: 75 CAPSULE ORAL at 09:33

## 2025-04-16 RX ADMIN — ALBUMIN (HUMAN) 25 G: 12.5 INJECTION, SOLUTION INTRAVENOUS at 08:35

## 2025-04-16 RX ADMIN — ALBUMIN (HUMAN) 25 G: 12.5 INJECTION, SOLUTION INTRAVENOUS at 22:15

## 2025-04-16 RX ADMIN — PANTOPRAZOLE SODIUM 40 MG: 40 TABLET, DELAYED RELEASE ORAL at 22:17

## 2025-04-16 RX ADMIN — POTASSIUM CHLORIDE 10 MEQ: 10 INJECTION, SOLUTION INTRAVENOUS at 03:58

## 2025-04-16 RX ADMIN — SODIUM CHLORIDE, PRESERVATIVE FREE 10 ML: 5 INJECTION INTRAVENOUS at 22:19

## 2025-04-16 RX ADMIN — FIDAXOMICIN 200 MG: 200 TABLET, FILM COATED ORAL at 09:25

## 2025-04-16 RX ADMIN — SODIUM CHLORIDE, SODIUM LACTATE, POTASSIUM CHLORIDE, AND CALCIUM CHLORIDE: .6; .31; .03; .02 INJECTION, SOLUTION INTRAVENOUS at 01:09

## 2025-04-16 RX ADMIN — FIDAXOMICIN 200 MG: 200 TABLET, FILM COATED ORAL at 22:17

## 2025-04-16 RX ADMIN — METRONIDAZOLE 500 MG: 500 INJECTION, SOLUTION INTRAVENOUS at 10:08

## 2025-04-16 RX ADMIN — ALBUMIN (HUMAN) 25 G: 12.5 INJECTION, SOLUTION INTRAVENOUS at 15:35

## 2025-04-16 RX ADMIN — LEVOTHYROXINE SODIUM 50 MCG: 0.05 TABLET ORAL at 10:04

## 2025-04-16 RX ADMIN — Medication 1 TABLET: at 09:37

## 2025-04-16 RX ADMIN — SODIUM CHLORIDE 500 ML: 0.9 INJECTION, SOLUTION INTRAVENOUS at 07:56

## 2025-04-16 RX ADMIN — METRONIDAZOLE 500 MG: 500 INJECTION, SOLUTION INTRAVENOUS at 00:31

## 2025-04-16 RX ADMIN — CETIRIZINE HYDROCHLORIDE 10 MG: 10 TABLET, FILM COATED ORAL at 09:35

## 2025-04-16 RX ADMIN — Medication 4 MCG/MIN: at 12:43

## 2025-04-16 RX ADMIN — PREGABALIN 150 MG: 75 CAPSULE ORAL at 22:17

## 2025-04-16 RX ADMIN — SODIUM CHLORIDE, SODIUM LACTATE, POTASSIUM CHLORIDE, AND CALCIUM CHLORIDE 500 ML: .6; .31; .03; .02 INJECTION, SOLUTION INTRAVENOUS at 01:55

## 2025-04-16 RX ADMIN — OXYCODONE 5 MG: 5 TABLET ORAL at 22:42

## 2025-04-16 RX ADMIN — MELATONIN 3 MG: at 22:17

## 2025-04-16 RX ADMIN — SODIUM CHLORIDE, SODIUM LACTATE, POTASSIUM CHLORIDE, AND CALCIUM CHLORIDE: .6; .31; .03; .02 INJECTION, SOLUTION INTRAVENOUS at 18:57

## 2025-04-16 RX ADMIN — IBUPROFEN 400 MG: 400 TABLET, FILM COATED ORAL at 10:05

## 2025-04-16 RX ADMIN — Medication: at 22:18

## 2025-04-16 RX ADMIN — PANTOPRAZOLE SODIUM 40 MG: 40 TABLET, DELAYED RELEASE ORAL at 09:33

## 2025-04-16 RX ADMIN — Medication 1 CAPSULE: at 09:34

## 2025-04-16 RX ADMIN — SODIUM CHLORIDE, SODIUM LACTATE, POTASSIUM CHLORIDE, AND CALCIUM CHLORIDE: .6; .31; .03; .02 INJECTION, SOLUTION INTRAVENOUS at 10:03

## 2025-04-16 RX ADMIN — Medication 1 AMPULE: at 22:18

## 2025-04-16 RX ADMIN — SODIUM CHLORIDE, SODIUM LACTATE, POTASSIUM CHLORIDE, AND CALCIUM CHLORIDE: .6; .31; .03; .02 INJECTION, SOLUTION INTRAVENOUS at 03:03

## 2025-04-16 RX ADMIN — POTASSIUM CHLORIDE 10 MEQ: 10 INJECTION, SOLUTION INTRAVENOUS at 00:13

## 2025-04-16 RX ADMIN — METHENAMINE HIPPURATE 1 G: 1 TABLET ORAL at 09:34

## 2025-04-16 RX ADMIN — ENOXAPARIN SODIUM 30 MG: 100 INJECTION SUBCUTANEOUS at 09:39

## 2025-04-16 RX ADMIN — SODIUM BICARBONATE 100 MEQ: 84 INJECTION, SOLUTION INTRAVENOUS at 12:59

## 2025-04-16 RX ADMIN — ASPIRIN 81 MG: 81 TABLET, COATED ORAL at 09:24

## 2025-04-16 RX ADMIN — ACETAMINOPHEN 650 MG: 325 TABLET ORAL at 10:04

## 2025-04-16 RX ADMIN — METHENAMINE HIPPURATE 1 G: 1 TABLET ORAL at 22:17

## 2025-04-16 RX ADMIN — POTASSIUM CHLORIDE 10 MEQ: 10 INJECTION, SOLUTION INTRAVENOUS at 02:01

## 2025-04-16 RX ADMIN — EZETIMIBE 10 MG: 10 TABLET ORAL at 09:35

## 2025-04-16 RX ADMIN — SODIUM CHLORIDE, PRESERVATIVE FREE 10 ML: 5 INJECTION INTRAVENOUS at 09:38

## 2025-04-16 RX ADMIN — METRONIDAZOLE 500 MG: 500 INJECTION, SOLUTION INTRAVENOUS at 15:43

## 2025-04-16 RX ADMIN — Medication 1000 UNITS: at 09:37

## 2025-04-16 ASSESSMENT — PAIN SCALES - GENERAL
PAINLEVEL_OUTOF10: 0
PAINLEVEL_OUTOF10: 0
PAINLEVEL_OUTOF10: 7
PAINLEVEL_OUTOF10: 3
PAINLEVEL_OUTOF10: 8
PAINLEVEL_OUTOF10: 0
PAINLEVEL_OUTOF10: 3

## 2025-04-16 ASSESSMENT — PAIN DESCRIPTION - LOCATION
LOCATION: ABDOMEN
LOCATION: RIB CAGE
LOCATION: BACK

## 2025-04-16 ASSESSMENT — PAIN - FUNCTIONAL ASSESSMENT: PAIN_FUNCTIONAL_ASSESSMENT: PREVENTS OR INTERFERES SOME ACTIVE ACTIVITIES AND ADLS

## 2025-04-16 ASSESSMENT — PAIN DESCRIPTION - ORIENTATION: ORIENTATION: RIGHT;LEFT

## 2025-04-16 NOTE — PROGRESS NOTES
DUAL SKIN  This RN and LILLY Pérez received patient from [facility]. Completed dual skin assessment. Noted the following: Not pressure injuries, scattered bruising

## 2025-04-16 NOTE — CONSULTS
Negative for abdominal pain, nausea, vomiting, diarrhea, constipation   Genitourinary: Negative for genital pain or genital discharge     Neuro: Negative for headache, numbness, tingling, extremity weakness,  syncope, seizures    Skin: Negative for rash, sores/open wounds   Musculoskeletal: Negative for joint pain, joint swelling, joint erythema    Endocrine: Negative for high or low blood sugars, heat or cold intolerance    Psych: Negative for manic behavior     Vitals:    04/16/25 1546   BP: (!) 119/25   Pulse: 74   Resp: 17   Temp:    SpO2: 93%           PHYSICAL EXAM:  General:          Resting , cooperative, no acute distress    EENT:              EOMI. Anicteric sclerae. MMM  Resp:               CTA bilaterally, no wheezing or rales.  No accessory muscle use  CV:                  Regular  rhythm,  No edema  GI:                   Soft, Non distended, Non tender.  +Bowel sounds  Neurologic:      Alert and oriented X 3, normal speech,   Psych:             Good insight. Not anxious nor agitated  Skin:                No rashes.  No jaundice.  Extremities: No edema.    Recent Results (from the past 24 hours)   Comprehensive Metabolic Panel    Collection Time: 04/15/25  9:18 PM   Result Value Ref Range    Sodium 137 136 - 145 mmol/L    Potassium 3.0 (L) 3.5 - 5.1 mmol/L    Chloride 110 (H) 97 - 108 mmol/L    CO2 20 (L) 21 - 32 mmol/L    Anion Gap 7 2 - 12 mmol/L    Glucose 164 (H) 65 - 100 mg/dL    BUN 29 (H) 6 - 20 MG/DL    Creatinine 1.59 (H) 0.55 - 1.02 MG/DL    BUN/Creatinine Ratio 18 12 - 20      Est, Glom Filt Rate 32 (L) >60 ml/min/1.73m2    Calcium 7.7 (L) 8.5 - 10.1 MG/DL    Total Bilirubin 0.9 0.2 - 1.0 MG/DL    ALT 20 12 - 78 U/L    AST 32 15 - 37 U/L    Alk Phosphatase 59 45 - 117 U/L    Total Protein 5.4 (L) 6.4 - 8.2 g/dL    Albumin 2.2 (L) 3.5 - 5.0 g/dL    Globulin 3.2 2.0 - 4.0 g/dL    Albumin/Globulin Ratio 0.7 (L) 1.1 - 2.2     CBC with Auto Differential    Collection Time: 04/15/25  9:18 PM  abdomen and pelvis. Coronal and sagittal reconstructions were generated. CT dose reduction was achieved through use of a standardized protocol tailored for this examination and automatic exposure control for dose modulation. FINDINGS: LOWER THORAX: Left atrial enlargement. Heavy coronary artery calcifications.. LIVER: No mass. BILIARY TREE: Gallbladder is within normal limits. CBD is not dilated. SPLEEN: within normal limits. PANCREAS: No mass or ductal dilatation. ADRENALS: Unremarkable. KIDNEYS: No mass, calculus, or hydronephrosis. STOMACH: Unremarkable. SMALL BOWEL: No dilatation or wall thickening. COLON: No dilatation or wall thickening. APPENDIX: Normal on axial image 53 PERITONEUM: No ascites or pneumoperitoneum. RETROPERITONEUM: No lymphadenopathy or aortic aneurysm. REPRODUCTIVE ORGANS: URINARY BLADDER: No mass or calculus. BONES: No destructive bone lesion L2-S1 fusion. Disc desiccation L1-2 and T12-L1 ABDOMINAL WALL: No mass or hernia. ADDITIONAL COMMENTS: N/A     No evidence for active colitis or abscess Electronically signed by Dawna Tabares    XR KNEE LEFT (3 VIEWS)  Result Date: 3/19/2025  Views: Standing AP, Lat, [place].     Left knee replacement. Cemented left total knee.  PS knee.  Hardware from prior tibial plateau fracture.  No sign of loosening or emigration no periprosthetic lysis or fracture      Greater than 50% of the time was spent on the following:  Preparing for visit and chart review.  Obtaining and/or reviewing separately obtained history  Performing a medically appropriate exam and/or evaluation  Counseling and educating a patient/family/caregiver as noted above  Placing relevant orders  Referring and communicating with other professionals (not separately reported)  Independently interpreting results (not separately reported) and communicating results to the patient/family/caregiver  Care coordination (not separately reported) as noted above  Documenting clinical information in the

## 2025-04-16 NOTE — CONSULTS
Pulmonary and Critical Care  Consult Note    Requesting Provider:     Reason for Consult: Hypotension    HPI: The patient is a 79/F who we are seeing in consultation at the request of Dr. Mcdaniel for evaluation of hypotension.     Briefly, per notes \"Sera May is a 79 y.o.  female with PMHx significant for  has a past medical history of Arthritis, Breast cancer, CAD (coronary artery disease), Fibromyalgia, GERD (gastroesophageal reflux disease), Hiatal hernia, Hyperlipidemia, Hypertension, Hyponatremia, Hypothyroid, Ill-defined condition, Prediabetes, PUD (peptic ulcer disease), PVC (premature ventricular contraction), Seizures ([facility]), and Stroke ([facility]). who presents with the above chief complaint. Patient here with chief complaint of diarrhea.  Notes that she was diagnosed with a C. difficile infection in March and completed treatment with vancomycin.  Reports compliant with all home medications.  States that she finished antibiotics on Monday and diarrhea seem to improved however reports that approximately 4 days later she had recurrence of profuse watery diarrhea.  States that it was \"pouring out of me\".  Denies any blood.\"    Review of Systems: All other systems have been reviewed and are negative except per HPI    Past Medical History:  Past Medical History:   Diagnosis Date    Arthritis     Breast cancer 1999    Right    CAD (coronary artery disease)     ONE STENT INSERTED    Fibromyalgia     GERD (gastroesophageal reflux disease)     Hiatal hernia     Hyperlipidemia     Hypertension     Hyponatremia 05/2019    As of 1/28/2020 pt had a seizure as a result to this.     Hypothyroid     Ill-defined condition     As of 1/28/2020, pt states her cardiologist says her HR drops at night but nothing to be concerned with.     Prediabetes     PUD (peptic ulcer disease)     PVC (premature ventricular contraction)     Too much caffeine    Seizures (HCC) 05/2019    Stroke (HCC) 2003    Took vision from right eye       Social

## 2025-04-16 NOTE — PLAN OF CARE
Problem: Chronic Conditions and Co-morbidities  Goal: Patient's chronic conditions and co-morbidity symptoms are monitored and maintained or improved  Outcome: Progressing     Problem: Discharge Planning  Goal: Discharge to home or other facility with appropriate resources  Outcome: Progressing     Problem: Skin/Tissue Integrity  Goal: Skin integrity remains intact  Description: 1.  Monitor for areas of redness and/or skin breakdown2.  Assess vascular access sites hourly3.  Every 4-6 hours minimum:  Change oxygen saturation probe site4.  Every 4-6 hours:  If on nasal continuous positive airway pressure, respiratory therapy assess nares and determine need for appliance change or resting period  Outcome: Progressing     Problem: Safety - Adult  Goal: Free from fall injury  Outcome: Progressing     Problem: Skin/Tissue Integrity - Adult  Goal: Skin integrity remains intact  Description: 1.  Monitor for areas of redness and/or skin breakdown2.  Assess vascular access sites hourly3.  Every 4-6 hours minimum:  Change oxygen saturation probe site4.  Every 4-6 hours:  If on nasal continuous positive airway pressure, respiratory therapy assess nares and determine need for appliance change or resting period  Outcome: Progressing  Goal: Incisions, wounds, or drain sites healing without S/S of infection  Outcome: Progressing  Goal: Oral mucous membranes remain intact  Outcome: Progressing     Problem: Gastrointestinal - Adult  Goal: Minimal or absence of nausea and vomiting  Outcome: Progressing  Goal: Maintains or returns to baseline bowel function  Outcome: Progressing  Goal: Maintains adequate nutritional intake  Outcome: Progressing     Problem: Infection - Adult  Goal: Absence of infection at discharge  Outcome: Progressing  Goal: Absence of fever/infection during anticipated neutropenic period  Outcome: Progressing     Problem: Metabolic/Fluid and Electrolytes - Adult  Goal: Electrolytes maintained within normal

## 2025-04-16 NOTE — SIGNIFICANT EVENT
The RRT was called to bedside, patient is hypotensive and need PIV.   On assessment, patient is awake, lethargic but able to answer all orientation questions. SBP labile in the 80s with map of 50s, 500 ns bolus x1, PIV x2 inserted, labs sent, spoke to Dr. Mcdaniel who saw the patient and added ablumin. Lab restuled with increased lactic. Patient bp improved initially but downtrending and patient more tired and not felling well. ICU was consulted and Dr. Rawls at bedside and manolo accepted patient. Levo started and patient transferred to room 2523 by primary RN and Charge RN @1500.     RRT RN,   Hernan, BSN, RN

## 2025-04-16 NOTE — PROGRESS NOTES
Bedside and Verbal shift change report given to Ophelia (oncoming nurse) by Hira (offgoing nurse). Report included the following information Nurse Handoff Report, Intake/Output, MAR, Recent Results, Cardiac Rhythm NSR, Neuro Assessment, and Event Log.      Pt's BP low at shift report (see flowsheet: 105/83 map 58), rapid response nurse called as pt's 2 Ivs both burning her. RR RN got new IV access, 20g L FA, and assessed. NS bolus started, BP a little better.    0800 Dr Mcdaniel here to assess pt. Albumin ordered, K ordered, see MAR. Pt's son called, able to speak to Dr Mcdaniel.    0857 Lactic acid 4.1, charge RN notified Dr Mcdaniel. Pt eating breakfast. Fingers cyanotic, gustavo and brisk refil, cool to touch. Pt states that is normal for her.    1000 Pt tachypneic, RR 36, reports 7/10 pain in bilateral rib cages. Ate a little breakfast. Albumin and 500 NS bolus complete, /64 map 64. HR .     1100 Pt resting in bed, drinking water. BP 98/52 map 67, HR high 90s low 100s  1130 up to bedside commode with one assist. Denies lightheadedness. Had about 200mL mixed urine and stool. Reported feeling hot, temp 99.8 axillary (pt just sipped ice water). Repeat BP /39-46, maps 55-64. Rapid response nurse at bedside, charge RN notified, Dr Mcdaniel updated. See orders.    1240 Rapid Response RN at bedside, getting new IV and levophed started per order. Dr Rawls came to see pt, transfer orders in to go to CCU. Student RN bladder scanned pt for 250mL, verbal order received for kim catheter. Labs drawn, charge RN walking them to lab.    1325 lactic acid 2.5, Dr Mcdaniel paged    1310 Levophed infusing at 6 mcg/min (see MAR), map 68. Pt drowsy but wakes for conversation. I asked if she wanted me to update her son, she said yes. I called Vidal Cabrera, updated him.    1415 I called report to Orestes on CCU (pt assigned bed 2523). Questions answered.  1430 RR here, adjusted levophed up to 10 mcg/min. Pt drowsy but answers questions.  1445

## 2025-04-16 NOTE — PROGRESS NOTES
Hospitalist Progress Note    NAME: Sera May   : 1945   MRN: [id number]     Date/Time: 2025 12:10 PM  Patient PCP: Byron Mcdonald MD    Assessment / Plan:     Hypotension  -BP 69/59 --> 109/60 after IVF bolus  -start scheduled albumin 5% 25gm IV q6 x 3  -stepdown for close monitoring    Addendum: 12pm  MAP <60  Starting levophed  Consult Intensivist    Severe sepsis  Recurrent c difficile infection   Noted diarrhea since March, positive c diff test 3/25   Had resolution of diarrhea after treatment however diarrhea recurred several days later   Repeat c diff  again positive for antigen as well as PCR   - Change to fidaxomicin 4/15  - trend lactate.   - Monitor stool output   - Consult GI for consideration of FMT     Acute kidney injury  NAGMA  Likely secondary to pre-renal injury due to ongoing profuse watery diarrhea  - S/p IVF in the ED  - Encourage PO intake  - Monitor I/Os  - Cr 1.54-->1.48-->  - start LR volume expansion     Hypothyroidism  - Continue home synthroid           Medical Decision Making:    [x] High (any 2)     A. Problems (any 1)  [x] Acute/Chronic Illness/injury posing threat to life or bodily function:  Recurrent C diff , MYRIAM, hypotension  [] Severe exacerbation of chronic illness:    ---------------------------------------------------------------------  B. Risk of Treatment (any 1)   [] Drugs/treatments that require intensive monitoring for toxicity include:    [] IV ABX requiring serial renal monitoring for nephrotoxicity:     [] IV Narcotic analgesia for adverse drug reaction  [] Aggressive IV diuresis requiring serial monitoring for renal impairment and electrolyte derangements  [x] Critical electrolyte abnormalities requiring IV replacement and close serial monitoring  [] Insulin - monitoring serial FSBS for Hypoglycemic adverse drug reaction  [] Other -   [] Change in code status:    [] Decision to escalate care:    [] Major surgery/procedure with associated risk  factors:    ----------------------------------------------------------------------  C. Data (any 2)  [] Discussed current management and discharge planning options with Case Management.  [] Discussed management of the case with:    [x] Telemetry personally reviewed and interpreted as documented above    [x] Imaging personally reviewed and interpreted, includes:  X ray of hips   [x] Data Review (any 3)  [x] All available Consultant notes from yesterday/today were reviewed  [x] All current labs were reviewed and interpreted for clinical significance   [x] Appropriate follow-up labs were ordered  [] Collateral history obtained from:           Central Line:      Code status: Full  Prophylaxis: Lovenox      Subjective:     Chief Complaint / Reason for Physician Visit  Follow up of recurrent C difficile colitis, MYRIAM, hypotension  Discussed with RN events overnight.   Patient transferred to PCU this AM due to low BP    Objective:     VITALS:   Most recent are:  Visit Vitals  BP (!) 105/46   Pulse 87   Temp 99.8 °F (37.7 °C) (Axillary)   Resp 19   Ht 1.626 m (5' 4.02\")   Wt 75.8 kg (167 lb)   SpO2 94%   BMI 28.65 kg/m²       I had a face to face encounter and independently examined this patient on 4/16/2025, as outlined below:  PHYSICAL EXAM:  General: WD, WN. Alert, cooperative, no acute distress    EENT:  EOMI. Anicteric sclerae. MMM  Resp:  CTA bilaterally, no wheezing or rales.  No accessory muscle use  CV:  Regular  rhythm,  No edema  GI:  Soft, Non distended, Non tender.  +Bowel sounds  Neurologic:  Alert and oriented X 3, normal speech,   Psych:   Good insight. Not anxious nor agitated  Skin:  No rashes.  No jaundice    Reviewed most current lab test results and cultures  YES  Reviewed most current radiology test results   YES  Review and summation of old records today    NO  Reviewed patient's current orders and MAR    YES  PMH/SH reviewed - no change compared to

## 2025-04-16 NOTE — TELEPHONE ENCOUNTER
Pt son called    Pt admitted at [facility] putting her on antibiotic for 3rd time    States that he wants to discuss this process, as he thought you do not use the same medication multiple times.     States wants to find out what patient was originally on from dr bhatti    Please call to discuss concern.  [phone]

## 2025-04-16 NOTE — PROGRESS NOTES
End of Shift Note    Bedside shift change report given to Ophelia CARR (oncoming nurse) by Michelle Bonilla RN (offgoing nurse).  Report included the following information SBAR, MAR, and Recent Results  Past Medical History:   Diagnosis Date    Arthritis     Breast cancer 1999    Right    CAD (coronary artery disease)     ONE STENT INSERTED    Fibromyalgia     GERD (gastroesophageal reflux disease)     Hiatal hernia     Hyperlipidemia     Hypertension     Hyponatremia 05/2019    As of 1/28/2020 pt had a seizure as a result to this.     Hypothyroid     Ill-defined condition     As of 1/28/2020, pt states her cardiologist says her HR drops at night but nothing to be concerned with.     Prediabetes     PUD (peptic ulcer disease)     PVC (premature ventricular contraction)     Too much caffeine    Seizures (HCC) 05/2019    Stroke (HCC) 2003    Took vision from right eye      Past Surgical History:   Procedure Laterality Date    COLONOSCOPY N/A     CORONARY ANGIOPLASTY WITH STENT PLACEMENT  01/25/2023    one stent inserted    LUMBAR FUSION  05/2019    LUMBAR SPINE SURGERY N/A 7/17/2023    REVISION LAMINECTOMY L2 - S1 AND POSTERIOR LUMBAR FUSION L2 - 4 (MAZOR/OARM)  (ERAS) performed by Glen Rowe MD at [facility] MAIN OR    MASTECTOMY Right 1999    ORTHOPEDIC SURGERY  2000    L knee has pins and plates after a fall (fracture)    PARTIAL HYSTERECTOMY (CERVIX NOT REMOVED) N/A     TONSILLECTOMY      UPPER GASTROINTESTINAL ENDOSCOPY      CBC with Differential:    Lab Results   Component Value Date/Time    WBC 7.7 04/15/2025 09:18 PM    RBC 3.68 04/15/2025 09:18 PM    HGB 10.9 04/15/2025 09:18 PM    HCT 34.1 04/15/2025 09:18 PM     04/15/2025 09:18 PM    MCV 92.7 04/15/2025 09:18 PM    MCH 29.6 04/15/2025 09:18 PM    MCHC 32.0 04/15/2025 09:18 PM    RDW 14.6 04/15/2025 09:18 PM    NRBC 0.0 04/15/2025 09:18 PM    NRBC 0.00 04/15/2025 09:18 PM    METASPCT 1 04/14/2025 09:30 PM    LYMPHOPCT 15 04/15/2025 09:18 PM

## 2025-04-16 NOTE — PROGRESS NOTES
Primary RN called RRT phone to assess patient after low BP reading. Patient is asymptomatic at this time. Primary RN states that the patient had a couple of loose bowel movements today and appears weaker. BP 81/41 at bedside, 500mL NSS bolus initiated per protocol and Brant NP notified of same. Orders placed for albumin and LR continuous at 100mL/hr. BP increased to 92/41 after 500mL bolus. Will continue to round on patient.     Addendum:  Patient's BP rechecked and continues to be reading low. Patient to be transferred to PCU for a higher level of care.

## 2025-04-16 NOTE — CONSULTS
I reviewed the patient's medical history, the findings on physical examination, the patient's diagnoses, and treatment plan as documented in the note.  I concur with the treatment plan as documented.  Additional suggestions noted.    Continue IVF, IV flagyl & fidaxomicin.   Can consider rectal vancomycin was well if needed.   Further work-up/management of sepsis per ICU/Internal Medicine.     KATY Weiner D.O.  [facility]          GI CONSULTATION NOTE  Skylar Jeffery PA-C  [phone] [facility] in-hospital cell phone M-F until 4:30  After 5pm or on weekends, please call  for physician on call    NAME: Sera May   :  1945   MRN:  [id number]   Attending:   Dr. Mcdaniel  Date/Time:  2025 8:41 AM  Assessment:   Recurrent non-fulminant C.diff second occurance  MYRIAM on CKD  WBC 7  Hgb 11.2 (baseline 12)  Cre 1.48 (baseline 0.7)  Enteric panel negative 2025  C.diff positive 2025 and 3/25/2025  CT A/P 2025  No evidence for active colitis or abscess     EGD/Colonoscopy on 2021 revealed a non-bleeding gastric ulcer. Bx with reactive gastropathy. Colon was unremarkable. Repeat colonoscopy not recommended due to age. Negative for microscopic colitis.   Plan:   [facility], nor is patient a candidate.  She has only failed oral vanc and this is the second recurrence. Recommend Fidaxomicin 200mg BID for 10 days.  Monitor lab work, no leukocytosis but MYRIAM from dehydration and hypotension. She is now on midodrine and receiving IVF.   Can add colestipol if diarrhea is not improving  We will continue to follow     Plan discussed with Dr. Weiner  Subjective:     HISTORY OF PRESENT ILLNESS:     Sera May is an 79 y.o.  female with a history of breast cancer, CAD, HLD, HTN, hypothyroidism, CVA, and gastric ulcer who we were consulted for recurrent c.diff. Patient seen and examined, alert and oriented x4 but states her \"mind isn't  thyroid: non tender  Back:               Symmetric,  No CVA tenderness.  Lungs:             CTA bilaterally.  No wheezing/rhonchi/rales.  Chest wall:      No tenderness or deformity. No Accessory muscle use.  Heart:              Regular rate and rhythm,  no murmur, rub or gallop.  Abdomen:        Soft, non-tender. Not distended.  Bowel sounds normal. No masses  Extremities:     Atraumatic, No cyanosis.  No edema. No clubbing  Skin:                Texture, turgor normal. No rashes/lesions/jaundice  Lymph:            Cervical, supraclavicular normal.  Psych:             Good insight.  Not depressed.  Not anxious or agitated.  Neurologic:      EOMs intact. No facial asymmetry. No aphasia or slurred speech. Normal strength, A/O X 3.     LAB DATA REVIEWED:    Recent Results (from the past 24 hours)   Vitamin D 25 Hydroxy    Collection Time: 04/15/25 10:03 AM   Result Value Ref Range    Vit D, 25-Hydroxy 34.0 30 - 100 ng/mL   Folate    Collection Time: 04/15/25 10:03 AM   Result Value Ref Range    Folate 58.4 (H) 5.0 - 21.0 ng/mL   Comprehensive Metabolic Panel    Collection Time: 04/15/25  9:18 PM   Result Value Ref Range    Sodium 137 136 - 145 mmol/L    Potassium 3.0 (L) 3.5 - 5.1 mmol/L    Chloride 110 (H) 97 - 108 mmol/L    CO2 20 (L) 21 - 32 mmol/L    Anion Gap 7 2 - 12 mmol/L    Glucose 164 (H) 65 - 100 mg/dL    BUN 29 (H) 6 - 20 MG/DL    Creatinine 1.59 (H) 0.55 - 1.02 MG/DL    BUN/Creatinine Ratio 18 12 - 20      Est, Glom Filt Rate 32 (L) >60 ml/min/1.73m2    Calcium 7.7 (L) 8.5 - 10.1 MG/DL    Total Bilirubin 0.9 0.2 - 1.0 MG/DL    ALT 20 12 - 78 U/L    AST 32 15 - 37 U/L    Alk Phosphatase 59 45 - 117 U/L    Total Protein 5.4 (L) 6.4 - 8.2 g/dL    Albumin 2.2 (L) 3.5 - 5.0 g/dL    Globulin 3.2 2.0 - 4.0 g/dL    Albumin/Globulin Ratio 0.7 (L) 1.1 - 2.2     CBC with Auto Differential    Collection Time: 04/15/25  9:18 PM   Result Value Ref Range    WBC 7.7 3.6 - 11.0 K/uL    RBC 3.68 (L) 3.80 - 5.20 M/uL

## 2025-04-16 NOTE — CARE COORDINATION
Care Management Initial Assessment       RUR: 17% moderate RUR  Readmission? No  1st IM letter given? Yes - 4/15  1st  letter given: No     04/16/25 0911   Service Assessment   Patient Orientation Alert and Oriented;Person;Place;Situation;Self   Cognition Alert   History Provided By Patient   Primary Caregiver Self   Accompanied By/Relationship no one   Support Systems Children  (Vidal May (Child)  [phone])   Patient's Healthcare Decision Maker is: Legal Next of Kin   PCP Verified by CM Yes   Last Visit to PCP Within last 3 months   Prior Functional Level Independent in ADLs/IADLs   Current Functional Level Independent in ADLs/IADLs   Can patient return to prior living arrangement Yes   Ability to make needs known: Good   Family able to assist with home care needs: Yes   Would you like for me to discuss the discharge plan with any other family members/significant others, and if so, who? Yes  (Vidal May (Child)  [phone])   Financial Resources Medicare   Community Resources None   CM/ Referral Disease Management Education   Social/Functional History   Lives With Alone   Type of Home House   Home Layout Multi-level   Home Access Stairs to enter with rails   Entrance Stairs - Number of Steps 4   Entrance Stairs - Rails Both   Home Equipment Cane;Walker - Rolling;Rollator   Active  No   Patient's  Info Pt does drive at baseline, but Pt has not driven since back surgery. Pt has been relying on her close friend, Brigid, for transportation support. Pt's son is anticipated to transport home at d/c. No transportation concerns voiced.)   Discharge Planning   Type of Residence House   Living Arrangements Alone   Current Services Prior To Admission None   Potential Assistance Needed N/A   Patient expects to be discharged to: House         The  (JOSE A) conducted an initial meeting phone call with the patient , formally introducing themselves and clarifying their role in discharge planning

## 2025-04-16 NOTE — PROGRESS NOTES
[facility] Hospital[facility] cross coverage 7p-7a:    Nursing contacted Nocturnist/cross cover provider via [employer] and notified that patient is hypotensive and asymptomatic.  Reports 5 BMs today.  After normal saline bolus and albumin were ineffective, she was transferred to stepdown unit.  Albumin 5%, continuous LR and LR bolus given. Evaluated patient at bedside, she is drowsy, does not appear volume overloaded.  If she remains hypotensive, she will likely require transfer to ICU and pressors.  Discussed plan with attending MD and added Flagyl for worsening C. difficile/intra-abdominal infection.    Nursing to notify Hospitalist for further/continued concerns, will remain available overnight for further patient needs.     Jing MULLINS

## 2025-04-16 NOTE — PROGRESS NOTES
End of Shift Note    Bedside shift change report given to LILLY Carrillo (oncoming nurse) by Patience Novak RN (offgoing nurse).  Report included the following information SBAR, Kardex, Intake/Output, MAR, Recent Results, Cardiac Rhythm sinus rhythm, and Quality Measures    Shift worked:  [id number]     Shift summary and any significant changes:    At 1955H,Received patient in bed,drowsy but answers question,AXO3, on room air, denies any pain.At 2009, vital signs checked,soft BP,denies any shortness of breath, dizziness or chest pain,perfect serve NP Jing and awaiting orders, rechecked at 2020H,BP 81/41 informed rapid nurse to check patient. 0.9 Nacl 500 ml bolus given.At 2058H blood pressure rechecked 92/41, lactated ringers 100 ml/hr started as ordered,updated NP Jing.Carried out orders for blood draws (CBC, CMP and procalcitonin),albumin 25 grams IV infusion given, and order for transfer but awaiting bed at PCU .At 2145, report given to PCU hence transferred via bed with ongoing LR at 100ml/hr.     Concerns for physician to address:       Zone phone for oncoming shift:          Activity:  Level of Assistance: Minimal assist, patient does 75% or more  Number times ambulated in hallways past shift: 0  Number of times OOB to chair past shift: 0    Cardiac:   Cardiac Monitoring: Yes      Cardiac Rhythm: Sinus rhythm    Access:  Current line(s): PIV     Genitourinary:        Respiratory:   O2 Device: None (Room air)  Chronic home O2 use?: NO  Incentive spirometer at bedside: NO    GI:  Last BM (including prior to admit): 04/15/25  Current diet:  ADULT DIET; Regular  Passing flatus: YES    Pain Management:   Patient states pain is manageable on current regimen: YES    Skin:  Venkat Scale Score: 17  Interventions: Wound Offloading (Prevention Methods): Pillows, Repositioning    Patient Safety:  Fall Risk: Nursing Judgement-Fall Risk High(Add Comments): Yes  Fall Risk Interventions  Nursing Judgement-Fall Risk High(Add  Comments): Yes  Toilet Every 2 Hours-In Advance of Need: Yes  Hourly Visual Checks: In bed, Quiet  Fall Visual Posted: Socks  Room Door Open: Deferred to promote rest  Alarm On: Bed  Patient Moved Closer to Nursing Station: No    Active Consults:   IP CONSULT TO INFECTIOUS DISEASES    Length of Stay:  Expected LOS: 3  Actual LOS: 0    Patience Novak RN

## 2025-04-16 NOTE — PROGRESS NOTES
Pt tx from [email or web address], remains hypotensive despite earlier albumin and LR bolus- BP 88/44. Pt is drowsy but rouses to voice and is otherwise asymptomatic. Nocturnist orders additional 500mL bolus LR and albumin 5%. Update provided to rapid response RN.    2315: BP 76/45.    2330: Assistance provided to primary RN Hira in administration of LR and albumin. Placed new PIV. BP 87/46, afebrile. Rouses to voice.    0030: BP improving, 98/59.     0045: /47.    0145: BP 84/43. Notified rapid response RN, neelist orders another 500mL bolus LR.     0155: Administered LR bolus and hung 2nd bag of KCl. Verified infusions. Pt has not voided.     0208: Primary RN performs bladder scan: 373mL.    0230: /56    0310: /61.    0615: 120/50. Pt more alert, able to void this AM per primary RN.

## 2025-04-17 PROBLEM — E44.0 MODERATE PROTEIN-CALORIE MALNUTRITION: Status: ACTIVE | Noted: 2025-04-17

## 2025-04-17 LAB
ALBUMIN SERPL-MCNC: 3 G/DL (ref 3.5–5)
ALBUMIN/GLOB SERPL: 1.2 (ref 1.1–2.2)
ALP SERPL-CCNC: 67 U/L (ref 45–117)
ALT SERPL-CCNC: 22 U/L (ref 12–78)
ANION GAP SERPL CALC-SCNC: 5 MMOL/L (ref 2–12)
AST SERPL-CCNC: 38 U/L (ref 15–37)
BASOPHILS # BLD: 0 K/UL (ref 0–0.1)
BASOPHILS NFR BLD: 0 % (ref 0–1)
BILIRUB DIRECT SERPL-MCNC: 0.2 MG/DL (ref 0–0.2)
BILIRUB SERPL-MCNC: 0.4 MG/DL (ref 0.2–1)
BUN SERPL-MCNC: 26 MG/DL (ref 6–20)
BUN/CREAT SERPL: 20 (ref 12–20)
CALCIUM SERPL-MCNC: 7.1 MG/DL (ref 8.5–10.1)
CHLORIDE SERPL-SCNC: 108 MMOL/L (ref 97–108)
CO2 SERPL-SCNC: 22 MMOL/L (ref 21–32)
CREAT SERPL-MCNC: 1.32 MG/DL (ref 0.55–1.02)
DIFFERENTIAL METHOD BLD: ABNORMAL
EOSINOPHIL # BLD: 0.36 K/UL (ref 0–0.4)
EOSINOPHIL NFR BLD: 4 % (ref 0–7)
ERYTHROCYTE [DISTWIDTH] IN BLOOD BY AUTOMATED COUNT: 14.6 % (ref 11.5–14.5)
GLOBULIN SER CALC-MCNC: 2.5 G/DL (ref 2–4)
GLUCOSE SERPL-MCNC: 133 MG/DL (ref 65–100)
HCT VFR BLD AUTO: 34.8 % (ref 35–47)
HGB BLD-MCNC: 11.5 G/DL (ref 11.5–16)
IMM GRANULOCYTES # BLD AUTO: 0 K/UL (ref 0–0.04)
IMM GRANULOCYTES NFR BLD AUTO: 0 % (ref 0–0.5)
LACTATE SERPL-SCNC: 1.4 MMOL/L (ref 0.4–2)
LYMPHOCYTES # BLD: 1.34 K/UL (ref 0.8–3.5)
LYMPHOCYTES NFR BLD: 15 % (ref 12–49)
MAGNESIUM SERPL-MCNC: 1.4 MG/DL (ref 1.6–2.4)
MCH RBC QN AUTO: 29.9 PG (ref 26–34)
MCHC RBC AUTO-ENTMCNC: 33 G/DL (ref 30–36.5)
MCV RBC AUTO: 90.6 FL (ref 80–99)
METAMYELOCYTES NFR BLD MANUAL: 1 %
MONOCYTES # BLD: 0.45 K/UL (ref 0–1)
MONOCYTES NFR BLD: 5 % (ref 5–13)
NEUTS BAND NFR BLD MANUAL: 1 %
NEUTS SEG # BLD: 6.68 K/UL (ref 1.8–8)
NEUTS SEG NFR BLD: 74 % (ref 32–75)
NRBC # BLD: 0.02 K/UL (ref 0–0.01)
NRBC BLD-RTO: 0.2 PER 100 WBC
PHOSPHATE SERPL-MCNC: 1.8 MG/DL (ref 2.6–4.7)
PLATELET # BLD AUTO: 167 K/UL (ref 150–400)
PMV BLD AUTO: 9.6 FL (ref 8.9–12.9)
POTASSIUM SERPL-SCNC: 3.4 MMOL/L (ref 3.5–5.1)
PROT SERPL-MCNC: 5.5 G/DL (ref 6.4–8.2)
RBC # BLD AUTO: 3.84 M/UL (ref 3.8–5.2)
RBC MORPH BLD: ABNORMAL
SODIUM SERPL-SCNC: 135 MMOL/L (ref 136–145)
WBC # BLD AUTO: 8.9 K/UL (ref 3.6–11)
WBC MORPH BLD: ABNORMAL

## 2025-04-17 PROCEDURE — 83605 ASSAY OF LACTIC ACID: CPT

## 2025-04-17 PROCEDURE — 6370000000 HC RX 637 (ALT 250 FOR IP): Performed by: INTERNAL MEDICINE

## 2025-04-17 PROCEDURE — 85025 COMPLETE CBC W/AUTO DIFF WBC: CPT

## 2025-04-17 PROCEDURE — 6360000002 HC RX W HCPCS: Performed by: STUDENT IN AN ORGANIZED HEALTH CARE EDUCATION/TRAINING PROGRAM

## 2025-04-17 PROCEDURE — 6370000000 HC RX 637 (ALT 250 FOR IP): Performed by: STUDENT IN AN ORGANIZED HEALTH CARE EDUCATION/TRAINING PROGRAM

## 2025-04-17 PROCEDURE — 2700000000 HC OXYGEN THERAPY PER DAY

## 2025-04-17 PROCEDURE — 80048 BASIC METABOLIC PNL TOTAL CA: CPT

## 2025-04-17 PROCEDURE — 2500000003 HC RX 250 WO HCPCS: Performed by: NURSE PRACTITIONER

## 2025-04-17 PROCEDURE — 36415 COLL VENOUS BLD VENIPUNCTURE: CPT

## 2025-04-17 PROCEDURE — 2500000003 HC RX 250 WO HCPCS: Performed by: STUDENT IN AN ORGANIZED HEALTH CARE EDUCATION/TRAINING PROGRAM

## 2025-04-17 PROCEDURE — 80076 HEPATIC FUNCTION PANEL: CPT

## 2025-04-17 PROCEDURE — 2000000000 HC ICU R&B

## 2025-04-17 PROCEDURE — 6360000002 HC RX W HCPCS

## 2025-04-17 PROCEDURE — 83735 ASSAY OF MAGNESIUM: CPT

## 2025-04-17 PROCEDURE — 3E033XZ INTRODUCTION OF VASOPRESSOR INTO PERIPHERAL VEIN, PERCUTANEOUS APPROACH: ICD-10-PCS | Performed by: INTERNAL MEDICINE

## 2025-04-17 PROCEDURE — 6360000002 HC RX W HCPCS: Performed by: INTERNAL MEDICINE

## 2025-04-17 PROCEDURE — 6360000002 HC RX W HCPCS: Performed by: NURSE PRACTITIONER

## 2025-04-17 PROCEDURE — 36410 VNPNXR 3YR/> PHY/QHP DX/THER: CPT

## 2025-04-17 PROCEDURE — 2500000003 HC RX 250 WO HCPCS: Performed by: INTERNAL MEDICINE

## 2025-04-17 PROCEDURE — 2580000003 HC RX 258: Performed by: NURSE PRACTITIONER

## 2025-04-17 PROCEDURE — 05HD33Z INSERTION OF INFUSION DEVICE INTO RIGHT CEPHALIC VEIN, PERCUTANEOUS APPROACH: ICD-10-PCS | Performed by: INTERNAL MEDICINE

## 2025-04-17 PROCEDURE — 84100 ASSAY OF PHOSPHORUS: CPT

## 2025-04-17 RX ORDER — ENOXAPARIN SODIUM 100 MG/ML
40 INJECTION SUBCUTANEOUS DAILY
Status: DISCONTINUED | OUTPATIENT
Start: 2025-04-17 | End: 2025-04-20

## 2025-04-17 RX ORDER — POTASSIUM CHLORIDE 7.45 MG/ML
10 INJECTION INTRAVENOUS ONCE
Status: COMPLETED | OUTPATIENT
Start: 2025-04-17 | End: 2025-04-17

## 2025-04-17 RX ORDER — MAGNESIUM SULFATE IN WATER 40 MG/ML
2000 INJECTION, SOLUTION INTRAVENOUS ONCE
Status: COMPLETED | OUTPATIENT
Start: 2025-04-17 | End: 2025-04-17

## 2025-04-17 RX ADMIN — Medication: at 20:31

## 2025-04-17 RX ADMIN — SODIUM CHLORIDE, PRESERVATIVE FREE 10 ML: 5 INJECTION INTRAVENOUS at 20:31

## 2025-04-17 RX ADMIN — PANTOPRAZOLE SODIUM 40 MG: 40 TABLET, DELAYED RELEASE ORAL at 08:37

## 2025-04-17 RX ADMIN — MAGNESIUM SULFATE HEPTAHYDRATE 2000 MG: 2 INJECTION, SOLUTION INTRAVENOUS at 04:24

## 2025-04-17 RX ADMIN — Medication 7 MCG/MIN: at 16:54

## 2025-04-17 RX ADMIN — Medication: at 08:37

## 2025-04-17 RX ADMIN — Medication 1000 UNITS: at 08:36

## 2025-04-17 RX ADMIN — MELATONIN 3 MG: at 20:31

## 2025-04-17 RX ADMIN — METRONIDAZOLE 500 MG: 500 INJECTION, SOLUTION INTRAVENOUS at 17:03

## 2025-04-17 RX ADMIN — Medication 1 TABLET: at 08:36

## 2025-04-17 RX ADMIN — PANTOPRAZOLE SODIUM 40 MG: 40 TABLET, DELAYED RELEASE ORAL at 20:31

## 2025-04-17 RX ADMIN — ONDANSETRON 4 MG: 2 INJECTION, SOLUTION INTRAMUSCULAR; INTRAVENOUS at 10:13

## 2025-04-17 RX ADMIN — PREGABALIN 150 MG: 75 CAPSULE ORAL at 20:30

## 2025-04-17 RX ADMIN — METRONIDAZOLE 500 MG: 500 INJECTION, SOLUTION INTRAVENOUS at 00:29

## 2025-04-17 RX ADMIN — Medication 1 AMPULE: at 08:37

## 2025-04-17 RX ADMIN — ASPIRIN 81 MG: 81 TABLET, COATED ORAL at 08:36

## 2025-04-17 RX ADMIN — ACETAMINOPHEN 650 MG: 325 TABLET ORAL at 06:29

## 2025-04-17 RX ADMIN — Medication 100 MCG: at 08:37

## 2025-04-17 RX ADMIN — FIDAXOMICIN 200 MG: 200 TABLET, FILM COATED ORAL at 08:36

## 2025-04-17 RX ADMIN — Medication 1 CAPSULE: at 08:36

## 2025-04-17 RX ADMIN — SODIUM CHLORIDE, PRESERVATIVE FREE 10 ML: 5 INJECTION INTRAVENOUS at 08:27

## 2025-04-17 RX ADMIN — Medication 1 AMPULE: at 20:30

## 2025-04-17 RX ADMIN — LEVOTHYROXINE SODIUM 50 MCG: 0.05 TABLET ORAL at 06:29

## 2025-04-17 RX ADMIN — METHENAMINE HIPPURATE 1 G: 1 TABLET ORAL at 08:36

## 2025-04-17 RX ADMIN — METHENAMINE HIPPURATE 1 G: 1 TABLET ORAL at 20:30

## 2025-04-17 RX ADMIN — POTASSIUM PHOSPHATE, MONOBASIC AND POTASSIUM PHOSPHATE, DIBASIC 30 MMOL: 224; 236 INJECTION, SOLUTION, CONCENTRATE INTRAVENOUS at 05:26

## 2025-04-17 RX ADMIN — EZETIMIBE 10 MG: 10 TABLET ORAL at 08:36

## 2025-04-17 RX ADMIN — POTASSIUM CHLORIDE 10 MEQ: 7.46 INJECTION, SOLUTION INTRAVENOUS at 04:22

## 2025-04-17 RX ADMIN — FIDAXOMICIN 200 MG: 200 TABLET, FILM COATED ORAL at 20:31

## 2025-04-17 RX ADMIN — ONDANSETRON 4 MG: 2 INJECTION, SOLUTION INTRAMUSCULAR; INTRAVENOUS at 18:27

## 2025-04-17 RX ADMIN — ACETAMINOPHEN 650 MG: 325 TABLET ORAL at 13:50

## 2025-04-17 RX ADMIN — CETIRIZINE HYDROCHLORIDE 10 MG: 10 TABLET, FILM COATED ORAL at 08:37

## 2025-04-17 RX ADMIN — ENOXAPARIN SODIUM 40 MG: 100 INJECTION SUBCUTANEOUS at 08:37

## 2025-04-17 RX ADMIN — METRONIDAZOLE 500 MG: 500 INJECTION, SOLUTION INTRAVENOUS at 08:35

## 2025-04-17 RX ADMIN — PREGABALIN 150 MG: 75 CAPSULE ORAL at 08:36

## 2025-04-17 ASSESSMENT — PAIN DESCRIPTION - LOCATION
LOCATION: BACK
LOCATION: BACK

## 2025-04-17 ASSESSMENT — PAIN DESCRIPTION - DESCRIPTORS: DESCRIPTORS: DISCOMFORT

## 2025-04-17 ASSESSMENT — PAIN SCALES - GENERAL
PAINLEVEL_OUTOF10: 0
PAINLEVEL_OUTOF10: 4
PAINLEVEL_OUTOF10: 7

## 2025-04-17 ASSESSMENT — PAIN DESCRIPTION - ORIENTATION: ORIENTATION: MID

## 2025-04-17 NOTE — WOUND CARE
Wound Care consult for the bilateral heels and feet that are intermittently purple. The skin blanches well and cap refill is brisk and she wiggles her toes well. The heels are red but completely blanchable.   Plan: keep the heels completely floated and remind patient not to cross her legs at the ankles. Try to keep the skin on the feet warm.   Venelex was ordered by the staff but should be discontinued if patient leaves the ICU.   Yu Carlisle RN, BSN, CWON

## 2025-04-17 NOTE — PROCEDURES
Midline Line Insertion Procedure Note    Procedure: Insertion of #4 FR/18G Midline    Indications:  Poor Access    Time out performed with Orestes CARR    Procedure Details     Consulted for DL PICC placement. Upon assessment of vessels in left upper arm could not find any viable vessel for PICC. Smallest CVR was 61%. Spoke with Dr. Rawls over phone, explained risk of placing PICC with that high of CVR. Advised pt has right cephalic that would accommodate midline at CVR of 41%. Dr. Rawls gave verbal approval to place midline on right despite right said mastectomy. States benefits of access outweigh the risks.    Informed consent was obtained for the procedure, including sedation.  Risks of lymphedema, hemorrhage, and adverse drug reaction were discussed.     #4 FR/18G Midline inserted to the R Cephalic vein per hospital protocol.   Blood return:  yes    Findings:  Catheter inserted to 16 cm, with 0 cm. Exposed. Mid upper arm circumference is 30 cm.  There were no changes to vital signs. Catheter was flushed with 20 cc NS. Patient did tolerate procedure well.    All components of kit accounted for and discarded post insertion. Handoff given to Orestes CARR. Bed in low position, call light within reach.    Recommendations:    Midline Brochure given to patient with teaching instruction.PROCEDURE NOTE  Date: 4/17/2025   Name: Sera May  YOB: 1945    Procedures

## 2025-04-17 NOTE — PROGRESS NOTES
Comprehensive Nutrition Assessment    Type and Reason for Visit:  Initial    Nutrition Recommendations/Plan:   Update diet to GI [place]  Add Banatrol TID for diarrhea  Replete lytes as needed  Please record %PO intake in I/O's in flowsheets under intake     Malnutrition Assessment:  Malnutrition Status:  Moderate malnutrition (04/17/25 1445)    Context:  Acute Illness     Findings of the 6 clinical characteristics of malnutrition:  Energy Intake:  50% or less of estimated energy requirements for 5 or more days  Weight Loss:  Unable to assess (pt reports recent 20# wt loss but no wt hx with stated method to confirm)     Body Fat Loss:  Mild body fat loss Orbital, Buccal region   Muscle Mass Loss:  Mild muscle mass loss Temples (temporalis), Clavicles (pectoralis & deltoids), Scapula (trapezius)  Fluid Accumulation:  Mild Extremities   Strength:  Not Performed    Nutrition Assessment:    Pt admitted for recurrent C. diff infection. Positive c. diff test 3/25 and 4/13. PMHx of MYRIAM, GERD, HLD, HTN, hypothyroidism, fibromyalgia, CAD, breast cancer, prediabetes, PUD, seizures, and stroke. Pt experiencing persistent diarrhea with c diff infection and subsequent low appetite. Pt becoming more hemodynamically unstable with levo at 7mcg/min. Pt reported feeling new onset nausea this morning and discussed that it may be due to her gut not having adequate blood flow from levo. Discussed the BRAT diet as bland food option that she might be able to tolerate. She was agreeable to this and requested a bland diet. Banatrol discussed and pt agreeable. Pt said that she was 184# around January and is now 162#. EHR wt hx sparse. NFPE performed and moderate malnutrition diagnosed per ASPEN criteria. Pt seemed hopeful to eat. Continue to monitor intakes. K+ 3.4, Mag 1.4, Phos 1.8. Replete lytes as needed.    Addendum: Asked pt about noted allergies. Pt clarified that she is not allergic to dairy or cheese and eats bananas at home with

## 2025-04-17 NOTE — PLAN OF CARE
Problem: Chronic Conditions and Co-morbidities  Goal: Patient's chronic conditions and co-morbidity symptoms are monitored and maintained or improved  Outcome: Progressing  Flowsheets (Taken 4/16/2025 2000)  Care Plan - Patient's Chronic Conditions and Co-Morbidity Symptoms are Monitored and Maintained or Improved:   Monitor and assess patient's chronic conditions and comorbid symptoms for stability, deterioration, or improvement   Collaborate with multidisciplinary team to address chronic and comorbid conditions and prevent exacerbation or deterioration   Update acute care plan with appropriate goals if chronic or comorbid symptoms are exacerbated and prevent overall improvement and discharge     Problem: Discharge Planning  Goal: Discharge to home or other facility with appropriate resources  Outcome: Progressing  Flowsheets (Taken 4/16/2025 2000)  Discharge to home or other facility with appropriate resources:   Identify barriers to discharge with patient and caregiver   Arrange for needed discharge resources and transportation as appropriate   Identify discharge learning needs (meds, wound care, etc)   Refer to discharge planning if patient needs post-hospital services based on physician order or complex needs related to functional status, cognitive ability or social support system     Problem: Skin/Tissue Integrity  Goal: Skin integrity remains intact  Description: 1.  Monitor for areas of redness and/or skin breakdown2.  Assess vascular access sites hourly3.  Every 4-6 hours minimum:  Change oxygen saturation probe site4.  Every 4-6 hours:  If on nasal continuous positive airway pressure, respiratory therapy assess nares and determine need for appliance change or resting period  Outcome: Progressing  Flowsheets  Taken 4/16/2025 2000 by Kristie Quigley, RN  Skin Integrity Remains Intact: Monitor for areas of redness and/or skin breakdown  Taken 4/16/2025 1600 by Orestes Neff RN  Skin Integrity Remains

## 2025-04-17 NOTE — PROGRESS NOTES
ICU Progress Note            Subjective:      - lying on the bed. Appears very weak.     Assessment and Plan:    The patient is a 79/F who we are seeing in consultation at the request of Dr. Mcdaniel for evaluation of hypotension.     Hypotension - likely due to hypovolemia from diarrhea vs sepsis from C.diff colitis. Cont. Aggressive volume resuscitation, cont. Vasopressors to keep MAP 65 and above. Monitor urine output, mental status and lactate as the surrogate of perfusion. Cont. Broad spectrum antibiotics. Follow cultures and sensitivity.      MYRIAM - due to hypovolemia. Received IVF, now 10 L positive. Urine output is dropping, likely going into ATN. Monitor closely.       C.diff colitis - Cont. Flagyl IV and Fidaxomicin 200 mg BID. GI following. ID following.      Critically ill.      Full code.        - I called sonVidal and updated. I answered all the questions.      CCM time - 40 minutes.     Vital Signs:    BP (!) 105/43   Pulse 85   Temp 97.5 °F (36.4 °C) (Oral)   Resp 18   Ht 1.626 m (5' 4.02\")   Wt 75.8 kg (167 lb)   SpO2 94%   BMI 28.65 kg/m²             Temp (24hrs), Av.1 °F (36.7 °C), Min:97.5 °F (36.4 °C), Max:99.8 °F (37.7 °C)       Intake/Output:   Last shift:       0701 - 1900  In: 447 [I.V.:10.3]  Out: 25 [Urine:25]  Last 3 shifts: 04/15 1901 -  0700  In: 22657.7 [P.O.:2330; I.V.:3149.5]  Out: 660 [Urine:460]    Intake/Output Summary (Last 24 hours) at 2025 1137  Last data filed at 2025 1052  Gross per 24 hour   Intake 5313.35 ml   Output 685 ml   Net 4628.35 ml       Physical Exam:    General: Not in acute distress  HEENT:  Anicteric sclerae; pink palpebral conjunctivae  Resp:  Bilateral air entry +  CV:  S1, S2 present  GI:  Abdomen soft  Extremities: No cyanosis  Skin:  Warm; no rashes/ lesions noted    DATA:     Current Facility-Administered Medications   Medication Dose Route Frequency    enoxaparin (LOVENOX) injection 40 mg  40 mg SubCUTAneous  Daily    Peppermint Spirit   Does not apply PRN    norepinephrine (LEVOPHED) 16 mg in sodium chloride 0.9 % 250 mL infusion (premix)  1-100 mcg/min IntraVENous Continuous    balsum peru-castor oil (VENELEX) ointment   Topical BID    alcohol 62% (NOZIN) nasal  1 ampule  1 ampule Nasal Q12H    aspirin EC tablet 81 mg  81 mg Oral Daily    cetirizine (ZYRTEC) tablet 10 mg  10 mg Oral Daily    vitamin B-12 (CYANOCOBALAMIN) tablet 100 mcg  100 mcg Oral Daily    ezetimibe (ZETIA) tablet 10 mg  10 mg Oral Daily    fluticasone (FLONASE) 50 MCG/ACT nasal spray 1 spray  1 spray Each Nostril Daily PRN    levothyroxine (SYNTHROID) tablet 50 mcg  50 mcg Oral Daily    melatonin tablet 3 mg  3 mg Oral Daily    methenamine (HIPREX) tablet 1 g  1 g Oral BID    therapeutic multivitamin-minerals 1 tablet  1 tablet Oral Daily    pantoprazole (PROTONIX) tablet 40 mg  40 mg Oral BID    pregabalin (LYRICA) capsule 150 mg  150 mg Oral BID    Vitamin D (CHOLECALCIFEROL) tablet 1,000 Units  1,000 Units Oral Daily    sodium chloride flush 0.9 % injection 5-40 mL  5-40 mL IntraVENous 2 times per day    sodium chloride flush 0.9 % injection 5-40 mL  5-40 mL IntraVENous PRN    0.9 % sodium chloride infusion   IntraVENous PRN    ondansetron (ZOFRAN) injection 4 mg  4 mg IntraVENous Q6H PRN    acetaminophen (TYLENOL) tablet 650 mg  650 mg Oral Q6H PRN    Or    acetaminophen (TYLENOL) suppository 650 mg  650 mg Rectal Q6H PRN    Fidaxomicin (DIFICID) tablet 200 mg  200 mg Oral BID    lactobacillus (CULTURELLE) capsule 1 capsule  1 capsule Oral Daily with breakfast    ibuprofen (ADVIL;MOTRIN) tablet 400 mg  400 mg Oral Q6H PRN    metroNIDAZOLE (FLAGYL) 500 mg in 0.9% NaCl 100 mL IVPB premix  500 mg IntraVENous Q8H         Recent Results (from the past 24 hours)   Lactic Acid    Collection Time: 04/16/25 12:39 PM   Result Value Ref Range    Lactic Acid, Plasma 2.5 (HH) 0.4 - 2.0 MMOL/L   Comprehensive Metabolic Panel    Collection Time:

## 2025-04-17 NOTE — PROGRESS NOTES
1427  Received report from Ophelia CARR for transfer from PCU to ICU    1515  Patient arrived to unit via bed on monitor 3 PIV in place  Levo 10 mcg and  ml/hr  CHG bath completed  Back is pink but blanches  both heels are red and blanchable offloaded both heels with pillows  right foot 1st digit posterior side purple but blanches  Wound care consult placed Venelex ordered but does not start until later  on O2 2 lpm NC  small smear of bm cleaned    1600  Assessment complete  A&O x4 no complaints of pain O2 sat's high 90's no sob will try without NC  Kraft in place with dustin urine  3 PIV in place all flushed with blood return noted   ml/hr Levo had been increased to 15 mcg see MAR KVO and Albumin running patient asking to go to sleep after assessment was completed  Dr Rawls was at bedside  made aware of increasing pressor needs if BP is remains low after Albumin I am to notify him  per Dr Rawls no need to trend lactic since it did come down earlier  DBP was on the low side causing MAP to be low    1615  BP increasing at this time  Patient now sleeping comfortably    1800  Patient given dinner tray    1900  Report given to Kristie CARR

## 2025-04-17 NOTE — PROGRESS NOTES
I reviewed the patient's medical history, the findings on physical examination, the patient's diagnoses, and treatment plan as documented in the note.  I concur with the treatment plan as documented.  Additional suggestions noted.    Further management per ID. Agree with recommendations.    We will sign off. Please page/call with any questions regarding the consult, assistance in management or changes in clinical status that might warrant re-evaluation.       KATY Weiner D.O.  [facility]        GI PROGRESS NOTE  Skylar Jeffery PA-C  [phone] [facility] in-hospital cell phone M-F until 4:30  After 5pm or on weekends, please call  for physician on call    NAME:Sera May :1945 MRN:[id number]   ATTG: Dr. Rawls  PCP: Byron Mcdonald MD  Date/Time:  2025 8:41 AM     Assessment:   Recurrent C.diff second occurrence  Sepsis due to the above  MYRIAM on CKD  Lactic acidosis- improving  WBC 7  Hgb 11.5 (baseline 12)  Cre 1.32 (baseline 0.7)  Lactate 4.1 now 2.5  Enteric panel negative 2025  C.diff positive 2025 and 3/25/2025  CT A/P 2025  No evidence for active colitis or abscess      EGD/Colonoscopy on 2021 revealed a non-bleeding gastric ulcer. Bx with reactive gastropathy. Colon was unremarkable. Repeat colonoscopy not recommended due to age. Negative for microscopic colitis.     Plan:   Continue Fidaxomicin 200mg BID. ID recommends BID dosing for 5 days and then every other day dosing for 20 days and discontinuing flagyl once diarrhea improved.   Now considered fulminant due to ICU admission and persistent hypotension.   Can add colestipol if diarrhea is not improving  Nothing further to add, FMT not available, ID supporting antibiotic choice.     Plan discussed with Dr. Weiner  Subjective:   Discussed with RN events overnight. Loly seen and examined, eating breakfast tray. She needs to have another BM. Stools are still liquid.     REVIEW OF SYSTEMS:

## 2025-04-17 NOTE — PROGRESS NOTES
1900 Bedside shift change report given to LILLY Flowers (oncoming nurse) by LILLY Carlisle (offgoing nurse). Report included the following information Nurse Handoff Report, Adult Overview, Intake/Output, MAR, Recent Results, and Cardiac Rhythm SR-ST .     2000 Shift assessment completed, see flowsheets for details. Levophed infusing, see MAR for titrations.    0000 Reassessment completed, no changes to previous assessment.     0400 Reassessment completed, no changes to previous assessment. Noted new orders for electrolyte repletion.    0700 Bedside shift change report given to LILLY Carlisle (oncoming nurse) by LILLY Flowers (offgoing nurse). Report included the following information Nurse Handoff Report, Adult Overview, Intake/Output, MAR, Recent Results, and Cardiac Rhythm SR .

## 2025-04-17 NOTE — PROGRESS NOTES
Physician Progress Note      PATIENT:               ROXANE BULLARD  CSN #:                  [id number]  :                       1945  ADMIT DATE:       2025 8:53 PM  DISCH DATE:  RESPONDING  PROVIDER #:        Sesar Rawls MD        QUERY TEXT:    Stage of Chronic Kidney Disease: Please provide further specificity, if known.    Clinical indicators include:  Options provided:  -- Chronic kidney disease stage 1  -- Chronic kidney disease stage 2  -- Chronic kidney disease stage 3  -- Chronic kidney disease stage 3a  -- Chronic kidney disease stage 3b  -- Chronic kidney disease stage 4  -- Chronic kidney disease stage 5  -- Chronic kidney disease stage 5, requiring dialysis  -- End stage renal disease  -- Other - I will add my own diagnosis  -- Disagree - Not applicable / Not valid  -- Disagree - Clinically Unable to determine / Unknown        PROVIDER RESPONSE TEXT:    The patient has end stage renal disease.          QUERY TEXT:    Noted documentation of Sepsis on  PN by GI consultant.    The clinical indicators include:  C-diff infection (recurrence)  Elevated Lactic Acid, Temp per labs, hypotension, levophed started     GI MD PN: Recurrent C.diff second occurrence, Sepsis due to the above  25 07:42 Lactic Acid, Plasma: 4.1 (HH)  25 12:39 Lactic Acid, Plasma: 2.5 (HH)  25 06:54 Procalcitonin: 13.69  4/15 1627 T 102.7     12:10 PM ICU MD PN Dr Rawls  Hypotension  -BP 69/59 --> 109/60 after IVF bolus  -start scheduled albumin 5% 25gm IV q6 x 3  -stepdown for close monitoring    Addendum: 12pm  MAP <60  Starting levophed  Consult Intensivist  Options provided:  -- Sepsis confirmed present on admission  -- Sepsis confirmed not present on admission  -- Sepsis ruled out  -- Other - I will add my own diagnosis  -- Disagree - Not applicable / Not valid  -- Disagree - Clinically unable to determine / Unknown  -- Refer to Clinical Documentation Reviewer    PROVIDER RESPONSE

## 2025-04-17 NOTE — INTERDISCIPLINARY ROUNDS
Critical care interdisciplinary rounds today.  Following members present: Case Management, , Clinical Lead, Diabetes Team, Nursing, Nutrition, Pharmacy, and Physician. Family invited to participate.  Plan of care discussed.  See clinical pathway for plan of care and interventions and desired outcomes.    Kraft in place for Strict I/O's per Dr. Rawls.

## 2025-04-18 ENCOUNTER — APPOINTMENT (OUTPATIENT)
Facility: HOSPITAL | Age: 80
DRG: 371 | End: 2025-04-18
Payer: MEDICARE

## 2025-04-18 LAB
ANION GAP SERPL CALC-SCNC: 15 MMOL/L (ref 2–12)
ANION GAP SERPL CALC-SCNC: 9 MMOL/L (ref 2–12)
BUN SERPL-MCNC: 39 MG/DL (ref 6–20)
BUN SERPL-MCNC: 47 MG/DL (ref 6–20)
BUN/CREAT SERPL: 17 (ref 12–20)
BUN/CREAT SERPL: 18 (ref 12–20)
CALCIUM SERPL-MCNC: 7.4 MG/DL (ref 8.5–10.1)
CALCIUM SERPL-MCNC: 7.7 MG/DL (ref 8.5–10.1)
CHLORIDE SERPL-SCNC: 105 MMOL/L (ref 97–108)
CHLORIDE SERPL-SCNC: 98 MMOL/L (ref 97–108)
CO2 SERPL-SCNC: 14 MMOL/L (ref 21–32)
CO2 SERPL-SCNC: 18 MMOL/L (ref 21–32)
CREAT SERPL-MCNC: 2.14 MG/DL (ref 0.55–1.02)
CREAT SERPL-MCNC: 2.69 MG/DL (ref 0.55–1.02)
ERYTHROCYTE [DISTWIDTH] IN BLOOD BY AUTOMATED COUNT: 15.5 % (ref 11.5–14.5)
GLUCOSE BLD STRIP.AUTO-MCNC: 130 MG/DL (ref 65–117)
GLUCOSE SERPL-MCNC: 157 MG/DL (ref 65–100)
GLUCOSE SERPL-MCNC: 289 MG/DL (ref 65–100)
HCT VFR BLD AUTO: 39 % (ref 35–47)
HGB BLD-MCNC: 12.7 G/DL (ref 11.5–16)
LACTATE SERPL-SCNC: 2.7 MMOL/L (ref 0.4–2)
MAGNESIUM SERPL-MCNC: 2.2 MG/DL (ref 1.6–2.4)
MAGNESIUM SERPL-MCNC: 2.4 MG/DL (ref 1.6–2.4)
MCH RBC QN AUTO: 29.5 PG (ref 26–34)
MCHC RBC AUTO-ENTMCNC: 32.6 G/DL (ref 30–36.5)
MCV RBC AUTO: 90.5 FL (ref 80–99)
NRBC # BLD: 0.08 K/UL (ref 0–0.01)
NRBC BLD-RTO: 0.4 PER 100 WBC
PHOSPHATE SERPL-MCNC: 4.6 MG/DL (ref 2.6–4.7)
PHOSPHATE SERPL-MCNC: 5 MG/DL (ref 2.6–4.7)
PLATELET # BLD AUTO: 147 K/UL (ref 150–400)
PMV BLD AUTO: 10 FL (ref 8.9–12.9)
POTASSIUM SERPL-SCNC: 4.3 MMOL/L (ref 3.5–5.1)
POTASSIUM SERPL-SCNC: 4.3 MMOL/L (ref 3.5–5.1)
RBC # BLD AUTO: 4.31 M/UL (ref 3.8–5.2)
SERVICE CMNT-IMP: ABNORMAL
SODIUM SERPL-SCNC: 127 MMOL/L (ref 136–145)
SODIUM SERPL-SCNC: 132 MMOL/L (ref 136–145)
WBC # BLD AUTO: 22.3 K/UL (ref 3.6–11)

## 2025-04-18 PROCEDURE — 6370000000 HC RX 637 (ALT 250 FOR IP): Performed by: STUDENT IN AN ORGANIZED HEALTH CARE EDUCATION/TRAINING PROGRAM

## 2025-04-18 PROCEDURE — 2580000003 HC RX 258: Performed by: STUDENT IN AN ORGANIZED HEALTH CARE EDUCATION/TRAINING PROGRAM

## 2025-04-18 PROCEDURE — 85027 COMPLETE CBC AUTOMATED: CPT

## 2025-04-18 PROCEDURE — 6360000002 HC RX W HCPCS

## 2025-04-18 PROCEDURE — 2000000000 HC ICU R&B

## 2025-04-18 PROCEDURE — 2500000003 HC RX 250 WO HCPCS

## 2025-04-18 PROCEDURE — 80048 BASIC METABOLIC PNL TOTAL CA: CPT

## 2025-04-18 PROCEDURE — 83735 ASSAY OF MAGNESIUM: CPT

## 2025-04-18 PROCEDURE — 99233 SBSQ HOSP IP/OBS HIGH 50: CPT | Performed by: INTERNAL MEDICINE

## 2025-04-18 PROCEDURE — 93005 ELECTROCARDIOGRAM TRACING: CPT | Performed by: INTERNAL MEDICINE

## 2025-04-18 PROCEDURE — 84100 ASSAY OF PHOSPHORUS: CPT

## 2025-04-18 PROCEDURE — 6360000002 HC RX W HCPCS: Performed by: INTERNAL MEDICINE

## 2025-04-18 PROCEDURE — 6370000000 HC RX 637 (ALT 250 FOR IP): Performed by: INTERNAL MEDICINE

## 2025-04-18 PROCEDURE — 74176 CT ABD & PELVIS W/O CONTRAST: CPT

## 2025-04-18 PROCEDURE — 2700000000 HC OXYGEN THERAPY PER DAY

## 2025-04-18 PROCEDURE — 82962 GLUCOSE BLOOD TEST: CPT

## 2025-04-18 PROCEDURE — 2500000003 HC RX 250 WO HCPCS: Performed by: STUDENT IN AN ORGANIZED HEALTH CARE EDUCATION/TRAINING PROGRAM

## 2025-04-18 PROCEDURE — 83605 ASSAY OF LACTIC ACID: CPT

## 2025-04-18 PROCEDURE — 2500000003 HC RX 250 WO HCPCS: Performed by: INTERNAL MEDICINE

## 2025-04-18 PROCEDURE — 36415 COLL VENOUS BLD VENIPUNCTURE: CPT

## 2025-04-18 RX ORDER — ADENOSINE 3 MG/ML
6 INJECTION, SOLUTION INTRAVENOUS ONCE
Status: COMPLETED | OUTPATIENT
Start: 2025-04-18 | End: 2025-04-18

## 2025-04-18 RX ORDER — NOREPINEPHRINE BITARTRATE 0.06 MG/ML
.5-2 INJECTION, SOLUTION INTRAVENOUS CONTINUOUS
Status: DISCONTINUED | OUTPATIENT
Start: 2025-04-19 | End: 2025-04-19

## 2025-04-18 RX ORDER — HEPARIN 100 UNIT/ML
SYRINGE INTRAVENOUS
Status: DISPENSED
Start: 2025-04-18 | End: 2025-04-19

## 2025-04-18 RX ORDER — ADENOSINE 3 MG/ML
12 INJECTION, SOLUTION INTRAVENOUS ONCE
Status: COMPLETED | OUTPATIENT
Start: 2025-04-18 | End: 2025-04-18

## 2025-04-18 RX ORDER — VANCOMYCIN HYDROCHLORIDE 125 MG/1
500 CAPSULE ORAL 4 TIMES DAILY
Status: DISCONTINUED | OUTPATIENT
Start: 2025-04-18 | End: 2025-04-19

## 2025-04-18 RX ORDER — NOREPINEPHRINE BITARTRATE 0.06 MG/ML
1-100 INJECTION, SOLUTION INTRAVENOUS CONTINUOUS
Status: DISCONTINUED | OUTPATIENT
Start: 2025-04-18 | End: 2025-04-18

## 2025-04-18 RX ADMIN — SODIUM CHLORIDE, PRESERVATIVE FREE 10 ML: 5 INJECTION INTRAVENOUS at 07:46

## 2025-04-18 RX ADMIN — PANTOPRAZOLE SODIUM 40 MG: 40 TABLET, DELAYED RELEASE ORAL at 09:54

## 2025-04-18 RX ADMIN — Medication 1 AMPULE: at 07:48

## 2025-04-18 RX ADMIN — ADENOSINE 12 MG: 3 INJECTION, SOLUTION INTRAVENOUS at 08:39

## 2025-04-18 RX ADMIN — ADENOSINE 6 MG: 3 INJECTION, SOLUTION INTRAVENOUS at 08:36

## 2025-04-18 RX ADMIN — Medication 1 AMPULE: at 20:53

## 2025-04-18 RX ADMIN — AMIODARONE HYDROCHLORIDE 1 MG/MIN: 1.8 INJECTION, SOLUTION INTRAVENOUS at 13:35

## 2025-04-18 RX ADMIN — Medication 1 CAPSULE: at 09:57

## 2025-04-18 RX ADMIN — VANCOMYCIN HYDROCHLORIDE 500 MG: 125 CAPSULE ORAL at 17:09

## 2025-04-18 RX ADMIN — Medication 1 TABLET: at 09:55

## 2025-04-18 RX ADMIN — Medication 100 MCG: at 09:58

## 2025-04-18 RX ADMIN — AMIODARONE HYDROCHLORIDE 1 MG/MIN: 1.8 INJECTION, SOLUTION INTRAVENOUS at 09:09

## 2025-04-18 RX ADMIN — AMIODARONE HYDROCHLORIDE 150 MG: 1.5 INJECTION, SOLUTION INTRAVENOUS at 08:50

## 2025-04-18 RX ADMIN — AMIODARONE HYDROCHLORIDE 0.5 MG/MIN: 1.8 INJECTION, SOLUTION INTRAVENOUS at 22:59

## 2025-04-18 RX ADMIN — EZETIMIBE 10 MG: 10 TABLET ORAL at 09:54

## 2025-04-18 RX ADMIN — METHENAMINE HIPPURATE 1 G: 1 TABLET ORAL at 09:53

## 2025-04-18 RX ADMIN — SODIUM CHLORIDE, PRESERVATIVE FREE 10 ML: 5 INJECTION INTRAVENOUS at 20:53

## 2025-04-18 RX ADMIN — LEVOTHYROXINE SODIUM 50 MCG: 0.05 TABLET ORAL at 06:13

## 2025-04-18 RX ADMIN — ENOXAPARIN SODIUM 40 MG: 100 INJECTION SUBCUTANEOUS at 09:52

## 2025-04-18 RX ADMIN — AMIODARONE HYDROCHLORIDE 0.5 MG/MIN: 1.8 INJECTION, SOLUTION INTRAVENOUS at 14:59

## 2025-04-18 RX ADMIN — SODIUM CHLORIDE: 0.9 INJECTION, SOLUTION INTRAVENOUS at 15:48

## 2025-04-18 RX ADMIN — METRONIDAZOLE 500 MG: 500 INJECTION, SOLUTION INTRAVENOUS at 01:00

## 2025-04-18 RX ADMIN — METRONIDAZOLE 500 MG: 500 INJECTION, SOLUTION INTRAVENOUS at 07:47

## 2025-04-18 RX ADMIN — Medication: at 20:36

## 2025-04-18 RX ADMIN — ACETAMINOPHEN 650 MG: 325 TABLET ORAL at 15:06

## 2025-04-18 RX ADMIN — VANCOMYCIN HYDROCHLORIDE 500 MG: 125 CAPSULE ORAL at 12:35

## 2025-04-18 RX ADMIN — CETIRIZINE HYDROCHLORIDE 10 MG: 10 TABLET, FILM COATED ORAL at 09:54

## 2025-04-18 RX ADMIN — Medication: at 07:48

## 2025-04-18 RX ADMIN — METRONIDAZOLE 500 MG: 500 INJECTION, SOLUTION INTRAVENOUS at 15:48

## 2025-04-18 RX ADMIN — Medication 1000 UNITS: at 09:57

## 2025-04-18 RX ADMIN — ASPIRIN 81 MG: 81 TABLET, COATED ORAL at 09:58

## 2025-04-18 RX ADMIN — PREGABALIN 150 MG: 75 CAPSULE ORAL at 09:57

## 2025-04-18 RX ADMIN — FIDAXOMICIN 200 MG: 200 TABLET, FILM COATED ORAL at 09:56

## 2025-04-18 ASSESSMENT — PAIN SCALES - GENERAL
PAINLEVEL_OUTOF10: 0
PAINLEVEL_OUTOF10: 0
PAINLEVEL_OUTOF10: 3
PAINLEVEL_OUTOF10: 0

## 2025-04-18 ASSESSMENT — PAIN DESCRIPTION - LOCATION: LOCATION: HEAD

## 2025-04-18 NOTE — INTERDISCIPLINARY ROUNDS
Critical care interdisciplinary rounds today.  Following members present: Case Management, , Nursing, Nutrition, Pharmacy, and Physician. Kraft in for I/O management. Remains on Levophed.  Plan of care discussed.  See clinical pathway for plan of care and interventions and desired outcomes.

## 2025-04-18 NOTE — PROGRESS NOTES
0745 Shift assessment complete, see flowsheet for details. Patient is alert and oriented x3, 2L NC, NSR/Sinus Tachy on the monitor, no complaints of pain at this time.     0830 Patient monitor alarming for heart rate in 150s and MD chip notified. EKG completed, orders for Adenosine 6mg, (see MAR) provider at bedside. Heart rate continues to climb into 180s-200s, orders for adenosine 12mg (see MAR) and continuous EKG. Amio bolus ordered (see MAR) and then start Amio gtt (see MAR).     0948 Son (Vidal) updated per patient request    1145 Reassessment complete, see flowsheet for details.     1200 Patient transported for CT with two RNS.     1500 Reassessment complete, see flowsheet for details.     1910 Bedside and Verbal shift change report given to Shelby RN (oncoming nurse) by Shea RN (offgoing nurse). Report included the following information Nurse Handoff Report, Intake/Output, MAR, Recent Results, Med Rec Status, and Cardiac Rhythm NSR .

## 2025-04-18 NOTE — PROGRESS NOTES
Infectious Disease Progress          Impression    Severe sepsis, septic shock,  Hypovolemic shock  On pressors.    Leukocytosis  WBC up to 22.3 today    C. difficile diarrhea, first recurrence  Initial diagnosis 3/25  Completed oral vancomycin x 10 days  Recurrence 4 days later  Retested 4/13 ( was positive)  CT abdomen/pelvis-no dilatation of small intestine or colon or   wall thickening.    MYRIAM worsening   Cr 2.14    Hypothyroidism  On Synthroid      GERD   Stable    H/o prediabetes    Mottling of toes both feet  Continue to monitor    Plan    Change to vancomycin 500 mg p.o. every 6 hourly  If patient has continued diarrhea change to rectal vancomycin  DC Dificid  Continue Flagyl 500 mg IV every 8 hourly  Recommend  imaging of abdomen/pelvis  Fluids, pressors per ICU team    ID service to follow.  Please contact  ID on-call with questions, concerns      Abx  Fidaxomicin-4/15- 418  Flagyl-4/15-4/18    Extensive review of chart notes, labs, imaging, cultures done  Additionally review of done: Recent reports-Labs, cultures, imaging  D/w -intensivist, RN  Sera May is a 79 y.o.  female with PMHx significant for  has a past medical history of Arthritis, Breast cancer, CAD (coronary artery disease), Fibromyalgia, GERD (gastroesophageal reflux disease), Hiatal hernia, Hyperlipidemia, Hypertension, Hyponatremia, Hypothyroid, Prediabetes, PUD (peptic ulcer disease), PVC (premature ventricular contraction), Seizures (HCC), and Stroke (HCC). who presented with chief complaint of diarrhea. Pt noted that she was diagnosed with a C. difficile infection in March and completed treatment with vancomycin.  She had been  compliant with all home medications.  Patient finished antibiotics on Monday and diarrhea seemed to improve however  approximately 4 days later she had recurrence of profuse watery diarrhea.  Patient has been admitted to hospitalist service and started on  Fidaxomicin p.o. patient has been transferred to ICU

## 2025-04-18 NOTE — PROGRESS NOTES
0700  Report from Kristie CARR    0800  Assessment complete see flow sheet  A&Ox4  on O2 2 lpm NC 3 PIV in left arm all flushed no blood return noted  Levo 4 mcg and a KVO  K-phos infusing   Kraft in place little output dustin in color    0915  Up to bedside commode large amount of watery brown diarrhea  no blood noted    1013  Nausea noted medicated see MAR    1200  Assessment complete see flow sheet    1230  Order for PICC line  Vascualr access unable to get PICC spoke with Dr Rawls ok for midline in right arm    1350  Medicated for low temp with Tylenol see MAR    1500  Spoke with Dr Rawls in reference to hardly any urine out put continue to watch for now no new orders    1600  Assessment complete no change    1827  Medicated for nausea see MAR    1900  Report to Ari CARR

## 2025-04-18 NOTE — PROGRESS NOTES
ICU Progress Note            Subjective:      - lying on the bed. Appears very weak.    - went into a.fib with RVR. Currently on levophed 5 mcg/min.     Assessment and Plan:    The patient is a 79/F who we are seeing in consultation at the request of Dr. Mcdaniel for evaluation of hypotension.     Hypotension - likely due to hypovolemia from diarrhea vs sepsis from C.diff colitis. Cont. Aggressive volume resuscitation, cont. Vasopressors to keep MAP 65 and above. Monitor urine output, mental status and lactate as the surrogate of perfusion. Cont. Broad spectrum antibiotics. Follow cultures and sensitivity.      MYRIAM/Oliguric renal failure - due to hypovolemia. Received IVF, now 11 L positive. Likely ATN. Hopefully creatinine plateau at 2 mg/dl.        A.fib with RVR - start amiodarone gtt with bolus x 1.      C.diff colitis - Cont. Flagyl IV and Fidaxomicin 200 mg BID. GI following. ID following.      Critically ill.      Full code.        - I called sonVidal and updated. I answered all the questions.      CCM time - 40 minutes.     Vital Signs:    BP (!) 151/61   Pulse (!) 104   Temp 99.2 °F (37.3 °C) (Axillary)   Resp 30   Ht 1.626 m (5' 4.02\")   Wt 75.8 kg (167 lb)   SpO2 99%   BMI 28.65 kg/m²             Temp (24hrs), Av.1 °F (36.7 °C), Min:97.4 °F (36.3 °C), Max:99.2 °F (37.3 °C)       Intake/Output:   Last shift:      No intake/output data recorded.  Last 3 shifts:  1901 -  0700  In: 3108.7 [P.O.:980; I.V.:707.2]  Out: 455 [Urine:455]    Intake/Output Summary (Last 24 hours) at 2025 1022  Last data filed at 2025 0745  Gross per 24 hour   Intake 878.61 ml   Output 195 ml   Net 683.61 ml       Physical Exam:    General: Not in acute distress  HEENT:  Anicteric sclerae; pink palpebral conjunctivae  Resp:  Bilateral air entry +  CV:  S1, S2 present  GI:  Abdomen soft  Extremities: No cyanosis  Skin:  Warm; no rashes/ lesions noted    DATA:     Current Facility-Administered  0.9% NaCl 100 mL IVPB premix  500 mg IntraVENous Q8H         Recent Results (from the past 24 hours)   Basic Metabolic Panel    Collection Time: 04/18/25  2:02 AM   Result Value Ref Range    Sodium 132 (L) 136 - 145 mmol/L    Potassium 4.3 3.5 - 5.1 mmol/L    Chloride 105 97 - 108 mmol/L    CO2 18 (L) 21 - 32 mmol/L    Anion Gap 9 2 - 12 mmol/L    Glucose 157 (H) 65 - 100 mg/dL    BUN 39 (H) 6 - 20 MG/DL    Creatinine 2.14 (H) 0.55 - 1.02 MG/DL    BUN/Creatinine Ratio 18 12 - 20      Est, Glom Filt Rate 23 (L) >60 ml/min/1.73m2    Calcium 7.4 (L) 8.5 - 10.1 MG/DL   Magnesium    Collection Time: 04/18/25  2:02 AM   Result Value Ref Range    Magnesium 2.2 1.6 - 2.4 mg/dL   Phosphorus    Collection Time: 04/18/25  2:02 AM   Result Value Ref Range    Phosphorus 4.6 2.6 - 4.7 MG/DL   CBC    Collection Time: 04/18/25  2:02 AM   Result Value Ref Range    WBC 22.3 (H) 3.6 - 11.0 K/uL    RBC 4.31 3.80 - 5.20 M/uL    Hemoglobin 12.7 11.5 - 16.0 g/dL    Hematocrit 39.0 35.0 - 47.0 %    MCV 90.5 80.0 - 99.0 FL    MCH 29.5 26.0 - 34.0 PG    MCHC 32.6 30.0 - 36.5 g/dL    RDW 15.5 (H) 11.5 - 14.5 %    Platelets 147 (L) 150 - 400 K/uL    MPV 10.0 8.9 - 12.9 FL    Nucleated RBCs 0.4 (H) 0  WBC    nRBC 0.08 (H) 0.00 - 0.01 K/uL       Imaging:    No orders to display       Sesar Rawls MD,JODY, FCCM, FCCP, ATSF, FACP, DAABIP  Interventional Pulmonology/Critical Care Medicine  [facility]

## 2025-04-18 NOTE — PROGRESS NOTES
Bedside and Verbal shift change report given to Ari RN  (oncoming nurse) by Orestes CARR  (offgoing nurse). Report included the following information Intake/Output, MAR, Recent Results, Med Rec Status, Cardiac Rhythm NSR , and Alarm Parameters.     2000 on assessment pt is alert and oriented x 3,pt denies any pain at the moment, pt is afebrile, pt is on levophed at 5, pt has been repositioned q 2hrs     0000 chg bath done    0400 labs sent awaiting results     0500 levo now at 6mcg     0700 report given to the dayshift RN

## 2025-04-18 NOTE — ACP (ADVANCE CARE PLANNING)
I spoke to Vidal, son via phone.     I updated him on patients A.fib with RVR treatment and current condition. We discussed her worsening compared to yesterday. We discussed renal failure, CT scan findings and addition of Vancomycin. I also updated him on my discussion with ID.    She is critically ill with severe/fulminant C.diff infection and sepsis with hypotension and MYRIAM.     Poor prognosis.     Sesar Rawls MD, JODY, FCCP, FCCM, ATSF, FACP, DAABIP  Interventional Pulmonology/Critical Care Medicine  [facility]  [facility]

## 2025-04-19 ENCOUNTER — APPOINTMENT (OUTPATIENT)
Facility: HOSPITAL | Age: 80
DRG: 371 | End: 2025-04-19
Payer: MEDICARE

## 2025-04-19 LAB
ALBUMIN SERPL-MCNC: 1.5 G/DL (ref 3.5–5)
ALBUMIN SERPL-MCNC: 2.4 G/DL (ref 3.5–5)
ALBUMIN/GLOB SERPL: 0.7 (ref 1.1–2.2)
ALBUMIN/GLOB SERPL: 0.8 (ref 1.1–2.2)
ALP SERPL-CCNC: 116 U/L (ref 45–117)
ALP SERPL-CCNC: 191 U/L (ref 45–117)
ALT SERPL-CCNC: 17 U/L (ref 12–78)
ALT SERPL-CCNC: 26 U/L (ref 12–78)
ANION GAP SERPL CALC-SCNC: 13 MMOL/L (ref 2–12)
ANION GAP SERPL CALC-SCNC: 18 MMOL/L (ref 2–12)
ARTERIAL PATENCY WRIST A: ABNORMAL
ARTERIAL PATENCY WRIST A: YES
AST SERPL-CCNC: 44 U/L (ref 15–37)
AST SERPL-CCNC: 69 U/L (ref 15–37)
BASE DEFICIT BLDA-SCNC: 3.8 MMOL/L
BASE DEFICIT BLDA-SCNC: 6.7 MMOL/L
BASOPHILS # BLD: 0.32 K/UL (ref 0–0.1)
BASOPHILS NFR BLD: 1 % (ref 0–1)
BDY SITE: ABNORMAL
BDY SITE: ABNORMAL
BILIRUB DIRECT SERPL-MCNC: 0.1 MG/DL (ref 0–0.2)
BILIRUB SERPL-MCNC: 0.2 MG/DL (ref 0.2–1)
BILIRUB SERPL-MCNC: 0.4 MG/DL (ref 0.2–1)
BUN SERPL-MCNC: 50 MG/DL (ref 6–20)
BUN SERPL-MCNC: 59 MG/DL (ref 6–20)
BUN/CREAT SERPL: 18 (ref 12–20)
BUN/CREAT SERPL: 20 (ref 12–20)
CALCIUM SERPL-MCNC: 7.3 MG/DL (ref 8.5–10.1)
CALCIUM SERPL-MCNC: 8 MG/DL (ref 8.5–10.1)
CHLORIDE SERPL-SCNC: 100 MMOL/L (ref 97–108)
CHLORIDE SERPL-SCNC: 94 MMOL/L (ref 97–108)
CO2 SERPL-SCNC: 12 MMOL/L (ref 21–32)
CO2 SERPL-SCNC: 19 MMOL/L (ref 21–32)
CREAT SERPL-MCNC: 2.82 MG/DL (ref 0.55–1.02)
CREAT SERPL-MCNC: 2.98 MG/DL (ref 0.55–1.02)
DIFFERENTIAL METHOD BLD: ABNORMAL
EOSINOPHIL # BLD: 0 K/UL (ref 0–0.4)
EOSINOPHIL NFR BLD: 0 % (ref 0–7)
ERYTHROCYTE [DISTWIDTH] IN BLOOD BY AUTOMATED COUNT: 15.7 % (ref 11.5–14.5)
ERYTHROCYTE [DISTWIDTH] IN BLOOD BY AUTOMATED COUNT: 16.3 % (ref 11.5–14.5)
FIO2 ON VENT: 100 %
FIO2 ON VENT: 60 %
GAS FLOW.O2 SETTING OXYMISER: 25
GAS FLOW.O2 SETTING OXYMISER: 25
GLOBULIN SER CALC-MCNC: 2.2 G/DL (ref 2–4)
GLOBULIN SER CALC-MCNC: 3 G/DL (ref 2–4)
GLUCOSE BLD STRIP.AUTO-MCNC: 122 MG/DL (ref 65–117)
GLUCOSE SERPL-MCNC: 149 MG/DL (ref 65–100)
GLUCOSE SERPL-MCNC: 432 MG/DL (ref 65–100)
HBV SURFACE AB SER QL: NONREACTIVE
HBV SURFACE AB SER-ACNC: 4.88 MIU/ML
HBV SURFACE AG SER QL: 0.4 INDEX
HBV SURFACE AG SER QL: NEGATIVE
HCO3 BLDA-SCNC: 17 MMOL/L (ref 22–26)
HCO3 BLDA-SCNC: 20 MMOL/L (ref 22–26)
HCT VFR BLD AUTO: 35.5 % (ref 35–47)
HCT VFR BLD AUTO: 39 % (ref 35–47)
HGB BLD-MCNC: 11.4 G/DL (ref 11.5–16)
HGB BLD-MCNC: 13.3 G/DL (ref 11.5–16)
IMM GRANULOCYTES # BLD AUTO: 0 K/UL (ref 0–0.04)
IMM GRANULOCYTES NFR BLD AUTO: 0 % (ref 0–0.5)
LACTATE SERPL-SCNC: 1.9 MMOL/L (ref 0.4–2)
LACTATE SERPL-SCNC: 2.1 MMOL/L (ref 0.4–2)
LYMPHOCYTES # BLD: 1.61 K/UL (ref 0.8–3.5)
LYMPHOCYTES NFR BLD: 5 % (ref 12–49)
MAGNESIUM SERPL-MCNC: 2 MG/DL (ref 1.6–2.4)
MCH RBC QN AUTO: 29.2 PG (ref 26–34)
MCH RBC QN AUTO: 29.2 PG (ref 26–34)
MCHC RBC AUTO-ENTMCNC: 32.1 G/DL (ref 30–36.5)
MCHC RBC AUTO-ENTMCNC: 34.1 G/DL (ref 30–36.5)
MCV RBC AUTO: 85.7 FL (ref 80–99)
MCV RBC AUTO: 90.8 FL (ref 80–99)
MONOCYTES # BLD: 1.93 K/UL (ref 0–1)
MONOCYTES NFR BLD: 6 % (ref 5–13)
NEUTS BAND NFR BLD MANUAL: 5 %
NEUTS SEG # BLD: 28.24 K/UL (ref 1.8–8)
NEUTS SEG NFR BLD: 83 % (ref 32–75)
NRBC # BLD: 0.23 K/UL (ref 0–0.01)
NRBC # BLD: 0.39 K/UL (ref 0–0.01)
NRBC BLD-RTO: 0.9 PER 100 WBC
NRBC BLD-RTO: 1.2 PER 100 WBC
PCO2 BLDA: 31 MMHG (ref 35–45)
PCO2 BLDA: 32 MMHG (ref 35–45)
PEEP RESPIRATORY: 5
PEEP RESPIRATORY: 5
PH BLDA: 7.36 (ref 7.35–7.45)
PH BLDA: 7.41 (ref 7.35–7.45)
PHOSPHATE SERPL-MCNC: 4.7 MG/DL (ref 2.6–4.7)
PLATELET # BLD AUTO: 117 K/UL (ref 150–400)
PLATELET # BLD AUTO: 152 K/UL (ref 150–400)
PMV BLD AUTO: 9.9 FL (ref 8.9–12.9)
PMV BLD AUTO: 9.9 FL (ref 8.9–12.9)
PO2 BLDA: 247 MMHG (ref 80–100)
PO2 BLDA: 71 MMHG (ref 80–100)
POTASSIUM SERPL-SCNC: 4.1 MMOL/L (ref 3.5–5.1)
POTASSIUM SERPL-SCNC: 4.1 MMOL/L (ref 3.5–5.1)
PROT SERPL-MCNC: 3.7 G/DL (ref 6.4–8.2)
PROT SERPL-MCNC: 5.4 G/DL (ref 6.4–8.2)
RBC # BLD AUTO: 3.91 M/UL (ref 3.8–5.2)
RBC # BLD AUTO: 4.55 M/UL (ref 3.8–5.2)
RBC MORPH BLD: ABNORMAL
SAO2 % BLD: 100 % (ref 92–97)
SAO2 % BLD: 95 % (ref 92–97)
SAO2% DEVICE SAO2% SENSOR NAME: ABNORMAL
SAO2% DEVICE SAO2% SENSOR NAME: ABNORMAL
SERVICE CMNT-IMP: ABNORMAL
SODIUM SERPL-SCNC: 124 MMOL/L (ref 136–145)
SODIUM SERPL-SCNC: 132 MMOL/L (ref 136–145)
SPECIMEN SITE: ABNORMAL
SPECIMEN SITE: ABNORMAL
VENTILATION MODE VENT: ABNORMAL
VT SETTING VENT: 340
WBC # BLD AUTO: 25.2 K/UL (ref 3.6–11)
WBC # BLD AUTO: 32.1 K/UL (ref 3.6–11)

## 2025-04-19 PROCEDURE — 85025 COMPLETE CBC W/AUTO DIFF WBC: CPT

## 2025-04-19 PROCEDURE — 87340 HEPATITIS B SURFACE AG IA: CPT

## 2025-04-19 PROCEDURE — 2500000003 HC RX 250 WO HCPCS: Performed by: INTERNAL MEDICINE

## 2025-04-19 PROCEDURE — 83735 ASSAY OF MAGNESIUM: CPT

## 2025-04-19 PROCEDURE — 6360000002 HC RX W HCPCS

## 2025-04-19 PROCEDURE — 36415 COLL VENOUS BLD VENIPUNCTURE: CPT

## 2025-04-19 PROCEDURE — 6360000002 HC RX W HCPCS: Performed by: INTERNAL MEDICINE

## 2025-04-19 PROCEDURE — 82803 BLOOD GASES ANY COMBINATION: CPT

## 2025-04-19 PROCEDURE — 2580000003 HC RX 258: Performed by: INTERNAL MEDICINE

## 2025-04-19 PROCEDURE — 2700000000 HC OXYGEN THERAPY PER DAY

## 2025-04-19 PROCEDURE — 5A1935Z RESPIRATORY VENTILATION, LESS THAN 24 CONSECUTIVE HOURS: ICD-10-PCS | Performed by: INTERNAL MEDICINE

## 2025-04-19 PROCEDURE — 86706 HEP B SURFACE ANTIBODY: CPT

## 2025-04-19 PROCEDURE — 94002 VENT MGMT INPAT INIT DAY: CPT

## 2025-04-19 PROCEDURE — 83605 ASSAY OF LACTIC ACID: CPT

## 2025-04-19 PROCEDURE — 6370000000 HC RX 637 (ALT 250 FOR IP): Performed by: INTERNAL MEDICINE

## 2025-04-19 PROCEDURE — 03HY32Z INSERTION OF MONITORING DEVICE INTO UPPER ARTERY, PERCUTANEOUS APPROACH: ICD-10-PCS | Performed by: INTERNAL MEDICINE

## 2025-04-19 PROCEDURE — 2500000003 HC RX 250 WO HCPCS: Performed by: NURSE PRACTITIONER

## 2025-04-19 PROCEDURE — 4A133B1 MONITORING OF ARTERIAL PRESSURE, PERIPHERAL, PERCUTANEOUS APPROACH: ICD-10-PCS | Performed by: NURSE PRACTITIONER

## 2025-04-19 PROCEDURE — 31500 INSERT EMERGENCY AIRWAY: CPT

## 2025-04-19 PROCEDURE — 85027 COMPLETE CBC AUTOMATED: CPT

## 2025-04-19 PROCEDURE — 80053 COMPREHEN METABOLIC PANEL: CPT

## 2025-04-19 PROCEDURE — 90945 DIALYSIS ONE EVALUATION: CPT

## 2025-04-19 PROCEDURE — 80076 HEPATIC FUNCTION PANEL: CPT

## 2025-04-19 PROCEDURE — 82962 GLUCOSE BLOOD TEST: CPT

## 2025-04-19 PROCEDURE — 36556 INSERT NON-TUNNEL CV CATH: CPT

## 2025-04-19 PROCEDURE — 6370000000 HC RX 637 (ALT 250 FOR IP): Performed by: STUDENT IN AN ORGANIZED HEALTH CARE EDUCATION/TRAINING PROGRAM

## 2025-04-19 PROCEDURE — 02HV33Z INSERTION OF INFUSION DEVICE INTO SUPERIOR VENA CAVA, PERCUTANEOUS APPROACH: ICD-10-PCS | Performed by: INTERNAL MEDICINE

## 2025-04-19 PROCEDURE — 71045 X-RAY EXAM CHEST 1 VIEW: CPT

## 2025-04-19 PROCEDURE — 5A1D90Z PERFORMANCE OF URINARY FILTRATION, CONTINUOUS, GREATER THAN 18 HOURS PER DAY: ICD-10-PCS | Performed by: INTERNAL MEDICINE

## 2025-04-19 PROCEDURE — 2500000003 HC RX 250 WO HCPCS: Performed by: STUDENT IN AN ORGANIZED HEALTH CARE EDUCATION/TRAINING PROGRAM

## 2025-04-19 PROCEDURE — 84100 ASSAY OF PHOSPHORUS: CPT

## 2025-04-19 PROCEDURE — 2000000000 HC ICU R&B

## 2025-04-19 PROCEDURE — 0BH17EZ INSERTION OF ENDOTRACHEAL AIRWAY INTO TRACHEA, VIA NATURAL OR ARTIFICIAL OPENING: ICD-10-PCS | Performed by: INTERNAL MEDICINE

## 2025-04-19 PROCEDURE — 2580000003 HC RX 258: Performed by: PHYSICIAN ASSISTANT

## 2025-04-19 PROCEDURE — 36600 WITHDRAWAL OF ARTERIAL BLOOD: CPT

## 2025-04-19 RX ORDER — FENTANYL CITRATE-0.9 % NACL/PF 20 MCG/2ML
50 SYRINGE (ML) INTRAVENOUS ONCE
Refills: 0 | Status: COMPLETED | OUTPATIENT
Start: 2025-04-19 | End: 2025-04-19

## 2025-04-19 RX ORDER — NOREPINEPHRINE BITARTRATE 0.06 MG/ML
1-100 INJECTION, SOLUTION INTRAVENOUS CONTINUOUS
Status: DISCONTINUED | OUTPATIENT
Start: 2025-04-19 | End: 2025-04-20

## 2025-04-19 RX ORDER — PROPOFOL 10 MG/ML
INJECTION, EMULSION INTRAVENOUS
Status: COMPLETED
Start: 2025-04-19 | End: 2025-04-19

## 2025-04-19 RX ORDER — FENTANYL CITRATE 50 UG/ML
25 INJECTION, SOLUTION INTRAMUSCULAR; INTRAVENOUS EVERY 4 HOURS PRN
Status: DISCONTINUED | OUTPATIENT
Start: 2025-04-19 | End: 2025-04-20 | Stop reason: HOSPADM

## 2025-04-19 RX ORDER — ROCURONIUM BROMIDE 50 MG/5 ML
1.2 SYRINGE (ML) INTRAVENOUS ONCE
Status: COMPLETED | OUTPATIENT
Start: 2025-04-19 | End: 2025-04-19

## 2025-04-19 RX ORDER — INDOMETHACIN 25 MG/1
100 CAPSULE ORAL ONCE
Status: COMPLETED | OUTPATIENT
Start: 2025-04-19 | End: 2025-04-19

## 2025-04-19 RX ORDER — POTASSIUM CHLORIDE 29.8 MG/ML
20 INJECTION INTRAVENOUS PRN
Status: DISCONTINUED | OUTPATIENT
Start: 2025-04-19 | End: 2025-04-20 | Stop reason: HOSPADM

## 2025-04-19 RX ORDER — MAGNESIUM SULFATE 1 G/100ML
1000 INJECTION INTRAVENOUS PRN
Status: DISCONTINUED | OUTPATIENT
Start: 2025-04-19 | End: 2025-04-19

## 2025-04-19 RX ORDER — PROPOFOL 10 MG/ML
5-50 INJECTION, EMULSION INTRAVENOUS CONTINUOUS
Status: DISCONTINUED | OUTPATIENT
Start: 2025-04-19 | End: 2025-04-20

## 2025-04-19 RX ORDER — HEPARIN SODIUM 1000 [USP'U]/ML
INJECTION, SOLUTION INTRAVENOUS; SUBCUTANEOUS PRN
Status: DISCONTINUED | OUTPATIENT
Start: 2025-04-19 | End: 2025-04-19 | Stop reason: SDUPTHER

## 2025-04-19 RX ORDER — FENTANYL CITRATE-0.9 % NACL/PF 10 MCG/ML
PLASTIC BAG, INJECTION (ML) INTRAVENOUS
Status: DISPENSED
Start: 2025-04-19 | End: 2025-04-19

## 2025-04-19 RX ORDER — 0.9 % SODIUM CHLORIDE 0.9 %
1000 INTRAVENOUS SOLUTION INTRAVENOUS PRN
Status: DISCONTINUED | OUTPATIENT
Start: 2025-04-19 | End: 2025-04-20 | Stop reason: HOSPADM

## 2025-04-19 RX ORDER — INDOMETHACIN 25 MG/1
100 CAPSULE ORAL ONCE
Status: DISCONTINUED | OUTPATIENT
Start: 2025-04-19 | End: 2025-04-20

## 2025-04-19 RX ORDER — ETOMIDATE 2 MG/ML
20 INJECTION INTRAVENOUS ONCE
Status: COMPLETED | OUTPATIENT
Start: 2025-04-19 | End: 2025-04-19

## 2025-04-19 RX ORDER — FENTANYL CITRATE-0.9 % NACL/PF 10 MCG/ML
25-300 PLASTIC BAG, INJECTION (ML) INTRAVENOUS CONTINUOUS
Status: DISCONTINUED | OUTPATIENT
Start: 2025-04-19 | End: 2025-04-20

## 2025-04-19 RX ADMIN — METHENAMINE HIPPURATE 1 G: 1 TABLET ORAL at 20:55

## 2025-04-19 RX ADMIN — METRONIDAZOLE 500 MG: 500 INJECTION, SOLUTION INTRAVENOUS at 00:32

## 2025-04-19 RX ADMIN — PROPOFOL 10 MCG/KG/MIN: 10 INJECTION, EMULSION INTRAVENOUS at 17:50

## 2025-04-19 RX ADMIN — PREGABALIN 150 MG: 75 CAPSULE ORAL at 20:55

## 2025-04-19 RX ADMIN — AMIODARONE HYDROCHLORIDE 0.5 MG/MIN: 1.8 INJECTION, SOLUTION INTRAVENOUS at 07:51

## 2025-04-19 RX ADMIN — METRONIDAZOLE 500 MG: 500 INJECTION, SOLUTION INTRAVENOUS at 17:55

## 2025-04-19 RX ADMIN — NOREPINEPHRINE BITARTRATE 50 MCG/MIN: 64 SOLUTION INTRAVENOUS at 23:45

## 2025-04-19 RX ADMIN — Medication: at 11:34

## 2025-04-19 RX ADMIN — SODIUM CHLORIDE, PRESERVATIVE FREE 10 ML: 5 INJECTION INTRAVENOUS at 20:55

## 2025-04-19 RX ADMIN — LEVOTHYROXINE SODIUM 50 MCG: 0.05 TABLET ORAL at 06:34

## 2025-04-19 RX ADMIN — Medication 100 MCG: at 10:23

## 2025-04-19 RX ADMIN — Medication 50 MCG: at 17:08

## 2025-04-19 RX ADMIN — METHENAMINE HIPPURATE 1 G: 1 TABLET ORAL at 10:25

## 2025-04-19 RX ADMIN — SODIUM CHLORIDE, PRESERVATIVE FREE 10 ML: 5 INJECTION INTRAVENOUS at 10:23

## 2025-04-19 RX ADMIN — VANCOMYCIN HYDROCHLORIDE 500 MG: 1 INJECTION, POWDER, LYOPHILIZED, FOR SOLUTION INTRAVENOUS at 20:55

## 2025-04-19 RX ADMIN — Medication 1800 ML/HR: at 13:00

## 2025-04-19 RX ADMIN — Medication 80 MG: at 08:20

## 2025-04-19 RX ADMIN — NOREPINEPHRINE BITARTRATE 19 MCG/MIN: 64 SOLUTION INTRAVENOUS at 12:38

## 2025-04-19 RX ADMIN — Medication 1000 UNITS: at 10:23

## 2025-04-19 RX ADMIN — CETIRIZINE HYDROCHLORIDE 10 MG: 10 TABLET, FILM COATED ORAL at 10:25

## 2025-04-19 RX ADMIN — NOREPINEPHRINE BITARTRATE 49 MCG/MIN: 64 SOLUTION INTRAVENOUS at 17:34

## 2025-04-19 RX ADMIN — VASOPRESSIN 0.04 UNITS/MIN: 20 INJECTION INTRAVENOUS at 21:29

## 2025-04-19 RX ADMIN — AMIODARONE HYDROCHLORIDE 1 MG/MIN: 1.8 INJECTION, SOLUTION INTRAVENOUS at 15:52

## 2025-04-19 RX ADMIN — FENTANYL CITRATE 50 MCG/HR: at 08:43

## 2025-04-19 RX ADMIN — NOREPINEPHRINE BITARTRATE 5 MCG/MIN: 64 SOLUTION INTRAVENOUS at 00:38

## 2025-04-19 RX ADMIN — ETOMIDATE 20 MG: 2 INJECTION, SOLUTION INTRAVENOUS at 08:19

## 2025-04-19 RX ADMIN — Medication 1 CAPSULE: at 12:28

## 2025-04-19 RX ADMIN — PREGABALIN 150 MG: 75 CAPSULE ORAL at 10:23

## 2025-04-19 RX ADMIN — Medication 1 AMPULE: at 20:56

## 2025-04-19 RX ADMIN — Medication 1 AMPULE: at 10:23

## 2025-04-19 RX ADMIN — SODIUM BICARBONATE 100 MEQ: 84 INJECTION, SOLUTION INTRAVENOUS at 08:04

## 2025-04-19 RX ADMIN — PROPOFOL 21.99 MCG/KG/MIN: 10 INJECTION, EMULSION INTRAVENOUS at 08:32

## 2025-04-19 RX ADMIN — Medication 1800 ML/HR: at 12:56

## 2025-04-19 RX ADMIN — ENOXAPARIN SODIUM 40 MG: 100 INJECTION SUBCUTANEOUS at 10:21

## 2025-04-19 RX ADMIN — PANTOPRAZOLE SODIUM 40 MG: 40 INJECTION, POWDER, LYOPHILIZED, FOR SOLUTION INTRAVENOUS at 12:28

## 2025-04-19 RX ADMIN — ASPIRIN 81 MG: 81 TABLET, COATED ORAL at 10:24

## 2025-04-19 RX ADMIN — SODIUM BICARBONATE: 84 INJECTION, SOLUTION INTRAVENOUS at 15:24

## 2025-04-19 RX ADMIN — EZETIMIBE 10 MG: 10 TABLET ORAL at 10:23

## 2025-04-19 RX ADMIN — METRONIDAZOLE 500 MG: 500 INJECTION, SOLUTION INTRAVENOUS at 10:22

## 2025-04-19 RX ADMIN — AMIODARONE HYDROCHLORIDE 150 MG: 1.5 INJECTION, SOLUTION INTRAVENOUS at 11:20

## 2025-04-19 RX ADMIN — Medication 1 TABLET: at 10:24

## 2025-04-19 RX ADMIN — VANCOMYCIN HYDROCHLORIDE 500 MG: 125 CAPSULE ORAL at 14:10

## 2025-04-19 RX ADMIN — Medication 1800 ML/HR: at 13:01

## 2025-04-19 RX ADMIN — Medication: at 20:59

## 2025-04-19 RX ADMIN — MEROPENEM 1000 MG: 1 INJECTION INTRAVENOUS at 15:54

## 2025-04-19 RX ADMIN — NOREPINEPHRINE BITARTRATE 17 MCG/MIN: 64 SOLUTION INTRAVENOUS at 09:24

## 2025-04-19 RX ADMIN — SODIUM BICARBONATE: 84 INJECTION, SOLUTION INTRAVENOUS at 21:40

## 2025-04-19 RX ADMIN — SODIUM BICARBONATE: 84 INJECTION, SOLUTION INTRAVENOUS at 07:51

## 2025-04-19 RX ADMIN — HEPARIN SODIUM 1200 UNITS: 1000 INJECTION INTRAVENOUS; SUBCUTANEOUS at 08:56

## 2025-04-19 RX ADMIN — VANCOMYCIN HYDROCHLORIDE 500 MG: 125 CAPSULE ORAL at 10:24

## 2025-04-19 RX ADMIN — AMIODARONE HYDROCHLORIDE 1 MG/MIN: 1.8 INJECTION, SOLUTION INTRAVENOUS at 21:20

## 2025-04-19 RX ADMIN — VASOPRESSIN 0.04 UNITS/MIN: 20 INJECTION INTRAVENOUS at 15:02

## 2025-04-19 RX ADMIN — HEPARIN SODIUM 1200 UNITS: 1000 INJECTION INTRAVENOUS; SUBCUTANEOUS at 08:58

## 2025-04-19 ASSESSMENT — PULMONARY FUNCTION TESTS
PIF_VALUE: 31
PIF_VALUE: 29
PIF_VALUE: 26
PIF_VALUE: 26
PIF_VALUE: 21

## 2025-04-19 ASSESSMENT — PAIN SCALES - GENERAL
PAINLEVEL_OUTOF10: 0

## 2025-04-19 NOTE — PROGRESS NOTES
ID  Discussed with Dr. Rawls.  Chart reviewed  Continue p.o. vancomycin, IV Flagyl  Add meropenem IV   General surgery evaluation.

## 2025-04-19 NOTE — PROGRESS NOTES
sodium chloride flush 0.9 % injection 5-40 mL  5-40 mL IntraVENous PRN    0.9 % sodium chloride infusion   IntraVENous PRN    ondansetron (ZOFRAN) injection 4 mg  4 mg IntraVENous Q6H PRN    acetaminophen (TYLENOL) tablet 650 mg  650 mg Oral Q6H PRN    Or    acetaminophen (TYLENOL) suppository 650 mg  650 mg Rectal Q6H PRN    lactobacillus (CULTURELLE) capsule 1 capsule  1 capsule Oral Daily with breakfast    ibuprofen (ADVIL;MOTRIN) tablet 400 mg  400 mg Oral Q6H PRN    metroNIDAZOLE (FLAGYL) 500 mg in 0.9% NaCl 100 mL IVPB premix  500 mg IntraVENous Q8H         Recent Results (from the past 24 hours)   POCT Glucose    Collection Time: 04/18/25  8:34 PM   Result Value Ref Range    POC Glucose 130 (H) 65 - 117 mg/dL    Performed by: Dm Ryan RN    Basic Metabolic Panel    Collection Time: 04/18/25  8:52 PM   Result Value Ref Range    Sodium 127 (L) 136 - 145 mmol/L    Potassium 4.3 3.5 - 5.1 mmol/L    Chloride 98 97 - 108 mmol/L    CO2 14 (LL) 21 - 32 mmol/L    Anion Gap 15 (H) 2 - 12 mmol/L    Glucose 289 (H) 65 - 100 mg/dL    BUN 47 (H) 6 - 20 MG/DL    Creatinine 2.69 (H) 0.55 - 1.02 MG/DL    BUN/Creatinine Ratio 17 12 - 20      Est, Glom Filt Rate 17 (L) >60 ml/min/1.73m2    Calcium 7.7 (L) 8.5 - 10.1 MG/DL   Lactic Acid    Collection Time: 04/18/25  8:52 PM   Result Value Ref Range    Lactic Acid, Plasma 2.7 (HH) 0.4 - 2.0 MMOL/L   Magnesium    Collection Time: 04/18/25  8:52 PM   Result Value Ref Range    Magnesium 2.4 1.6 - 2.4 mg/dL   Phosphorus    Collection Time: 04/18/25  8:52 PM   Result Value Ref Range    Phosphorus 5.0 (H) 2.6 - 4.7 MG/DL   CBC    Collection Time: 04/19/25  3:30 AM   Result Value Ref Range    WBC 25.2 (H) 3.6 - 11.0 K/uL    RBC 3.91 3.80 - 5.20 M/uL    Hemoglobin 11.4 (L) 11.5 - 16.0 g/dL    Hematocrit 35.5 35.0 - 47.0 %    MCV 90.8 80.0 - 99.0 FL    MCH 29.2 26.0 - 34.0 PG    MCHC 32.1 30.0 - 36.5 g/dL    RDW 16.3 (H) 11.5 - 14.5 %    Platelets 117 (L) 150 - 400 K/uL    MPV  9.9 8.9 - 12.9 FL    Nucleated RBCs 0.9 (H) 0  WBC    nRBC 0.23 (H) 0.00 - 0.01 K/uL   Lactic Acid    Collection Time: 04/19/25  3:30 AM   Result Value Ref Range    Lactic Acid, Plasma 2.1 (HH) 0.4 - 2.0 MMOL/L   Hepatic Function Panel    Collection Time: 04/19/25  3:30 AM   Result Value Ref Range    Total Protein 3.7 (L) 6.4 - 8.2 g/dL    Albumin 1.5 (L) 3.5 - 5.0 g/dL    Globulin 2.2 2.0 - 4.0 g/dL    Albumin/Globulin Ratio 0.7 (L) 1.1 - 2.2      Total Bilirubin 0.2 0.2 - 1.0 MG/DL    Bilirubin, Direct 0.1 0.0 - 0.2 MG/DL    Alk Phosphatase 116 45 - 117 U/L    AST 44 (H) 15 - 37 U/L    ALT 17 12 - 78 U/L   Basic Metabolic Panel    Collection Time: 04/19/25  5:44 AM   Result Value Ref Range    Sodium 124 (L) 136 - 145 mmol/L    Potassium 4.1 3.5 - 5.1 mmol/L    Chloride 94 (L) 97 - 108 mmol/L    CO2 12 (LL) 21 - 32 mmol/L    Anion Gap 18 (H) 2 - 12 mmol/L    Glucose 432 (H) 65 - 100 mg/dL    BUN 50 (H) 6 - 20 MG/DL    Creatinine 2.82 (H) 0.55 - 1.02 MG/DL    BUN/Creatinine Ratio 18 12 - 20      Est, Glom Filt Rate 16 (L) >60 ml/min/1.73m2    Calcium 7.3 (L) 8.5 - 10.1 MG/DL   Magnesium    Collection Time: 04/19/25  5:44 AM   Result Value Ref Range    Magnesium 2.0 1.6 - 2.4 mg/dL   Phosphorus    Collection Time: 04/19/25  5:44 AM   Result Value Ref Range    Phosphorus 4.7 2.6 - 4.7 MG/DL   POCT Glucose    Collection Time: 04/19/25  6:29 AM   Result Value Ref Range    POC Glucose 122 (H) 65 - 117 mg/dL    Performed by: Dm Ryan RN        Imaging:    CT ABDOMEN PELVIS WO CONTRAST Additional Contrast? None   Final Result   Sigmoid colitis. Colonic distention with colonic ileus   Bilateral pleural exudate and slightly complex ascites   Nonspecific, gallbladder distention.         Electronically signed by Dawna Rawls MD,JODY, FCCM, FCCP, ATSF, FACP, DAABIP  Interventional Pulmonology/Critical Care Medicine  [facility]  [facility]

## 2025-04-19 NOTE — PROCEDURES
PROCEDURE NOTE  Date: 4/19/2025   Name: Sera May  YOB: 1945    CENTRAL LINE    Date/Time: 4/19/2025 5:30 PM    Performed by: Lo Mcqueen MD  Authorized by: Lo Mcqueen MD  Consent: Written consent obtained.  Risks and benefits: risks, benefits and alternatives were discussed  Consent given by: guardian  Required items: required blood products, implants, devices, and special equipment available  Patient identity confirmed: arm band  Indications: vascular access    Anesthesia:  Local Anesthetic: lidocaine 1% without epinephrine  Preparation: skin prepped with 2% chlorhexidine  Skin prep agent dried: skin prep agent completely dried prior to procedure  Sterile barriers: all five maximum sterile barriers used - cap, mask, sterile gown, sterile gloves, and large sterile sheet  Location details: left internal jugular  Patient position: flat  Catheter type: triple lumen  Catheter size: 7.5 Fr  Pre-procedure: landmarks identified  Ultrasound guidance: yes  Sterile ultrasound techniques: sterile gel and sterile probe covers were used  Number of attempts: 1  Successful placement: yes  Post-procedure: line sutured and dressing applied  Assessment: blood return through all ports, free fluid flow, placement verified by x-ray and no pneumothorax on x-ray  Patient tolerance: patient tolerated the procedure well with no immediate complications

## 2025-04-19 NOTE — CONSULTS
Surgery    Contacted by ICU for input regarding a patient with fulminant C. difficile colitis, now on 3 pressors, renal failure, resp failure.  Patient is 79 years old and frail at baseline.  Discussed that patient is at high risk for significant morbidity and mortality with surgery given numerous factors including extreme hemodynamic instability and advanced septic shock.  However, if family is adamant regarding surgery we would discuss further.  At this time, surgery had not been discussed with her primary decision-maker, son Vidal.  Discussed with Dr. Rawls that he would first broach these topics with the son and contact me they were interested in further aggressive measures.    Vimal Celis MD  General Surgery

## 2025-04-19 NOTE — FLOWSHEET NOTE
CRRT / [phone]    Primary RN SBAR: Shea Calixto, RN  Blood Warmer Tubing Lot #: [id number]  Patient Education provided: Unable to educate at the moment due to patient condition, requires follow up  Preferred Education method and Primary language: English/ verbal  Dialysis Consent: yes  Hospital General Consent Verified: yes  Hospital associated wait time; reason: none  No results found for: \"HEPBSAG\", \"HBSAGI\", \"HEPBSAB\", \"HBSABI\"    HEP B Ag and HEP B Ab sent pending result       04/19/25 1420   Observations & Evaluations   Level of Consciousness   (intubated/sedated)   Heart Rhythm   (on ICU monitor in progress)   Respiratory Quality/Effort Unlabored   FiO2  60 %   O2 Device Ventilator   Bilateral Breath Sounds Diminished   Skin Color Pink   Skin Condition/Temp Dry;Warm   Abdomen Inspection Soft   Edema Generalized   Edema Generalized +1   Vital Signs   BP (!) 124/29   Pulse 80   Respirations 26   SpO2 96 %   ICEBOAT   ICEBOAT I;C;E;B;O;A;T   Treatment   $CRRT $Yes   Machine #   [id number])   Cartridge Lot #   [id number]   Therapy Type CVVH   System Used   System Used Cony   Pressures (Cony)   Access (mmHg) (!) -46 mmHg   Return/Venous (mmHg) (!) 39 mmHg   TMP (mmHg) 23 mmHg   Pressure Drop (mmHg) 59 mmHg   Filter (mmHg) 145 mmHg   Effluent (mmHg) 60 mmHg   Deaeration Chamber Check Yes   Flow Rates (Cony)   Blood Flow (mL/min) 200 mL/min   Replacement Fluid Pre-Filter (mL/hr) 450 mL/hr   Replacement Filter Post-Filter (mL/hr) 450 mL/hr   Pre-Blood Pump (mL/hr) 900 mL/hr   CRRT Activities   Intervention Initiated   Hemodialysis Central Access - Temporary Right Neck   Placement Date/Time: 04/19/25 0845   Present on Admission/Arrival: No  Inserted by: Charisse BURRELL  Orientation: Right  Access Location: Neck  Placement Verification: Xray   Continued need for line? Yes   Site Assessment Clean, dry & intact   Blue Lumen Status Brisk blood return;Alcohol cap present;Flushed;Infusing   Red Lumen Status Brisk

## 2025-04-19 NOTE — CONSULTS
[facility]       NEPHROLOGY CONSULT NOTE     Patient: Sera May MRN: [id number]  PCP: Byron Mcdonald MD   :     1945  Age:   79 y.o.  Sex:  female      Referring physician: Sesar Rawls MD  Reason for consultation: 79 y.o. female with Dehydration [E86.0]  MYRIAM (acute kidney injury) [N17.9]  C. 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] complicated by MYRIAM  Admission Date: 2025  8:53 PM  LOS: 4 days      ASSESSMENT and PLAN :   MYRIAM 2/2 ATN from sepsis (hypotension)  - Presenting Creatinine: 0.7, up to 2.82 ()    Baseline creatinine: 0.8 ()  - CT A/P: no calculi or hydroneprhosis  - No UA returned or UPCR  - kim cath in place w/ 145ml of UOP recorded  - +12L fluid     AGMA  - from renal failure and lactic acidosis  - receiving bicarb pushes and bicarb gtt    Hyponatremia  - partial pseudo and likely hypervolemic   - corrected Na 129    C Diff  - on IV flagyl and PO vanc    Afib RVR  - received amiodarone bolus and drip with resolution     Plan:  - dw pt's son Vidal discussed risks and benefits associated w/ RRT, Vidal gave verbal consent over the phone. Consent signed and witnessed. Updated Tanisha and RN.   - plan for CRRT due to BP support w/ pressors  - ICC placing dialysis catheter  - ordered urine analysis and urine lytes  - continue kim cath for accurate I&O's  - Avoid nephrotoxic agents  - strict I&O  - daily labs  - dw attending nephrologist, intensivist, and RN     Thank you for allowing us to participate in the care of this patient. We will follow patient.     Subjective:   HPI: Sera May is a 79 y.o.  female who present to ER for diarrhea.  The patient's PMH is significant for  Arthritis, Breast cancer, CAD (coronary artery disease), Fibromyalgia, GERD (gastroesophageal reflux disease), Hiatal hernia, Hyperlipidemia, Hypertension, Hyponatremia, Hypothyroid, Ill-defined condition,

## 2025-04-19 NOTE — PLAN OF CARE
This RN is at bedside machine alarming  d/t high pressure, noticed resistance when aspirating from line. Tried repositioning and primary RN suctioned patient. No success achieved. Still with resistance when pulling on both lines. Michael Garner PA made aware and she contacted Dr. Hook, who is coming to evaluate pt.. Treatment stopped at the moment waiting for  Nephrology for guidance.

## 2025-04-19 NOTE — PROCEDURES
PROCEDURE NOTE  Date: 4/19/2025   Name: Sera May  YOB: 1945    CENTRAL LINE    Date/Time: 4/19/2025 5:29 PM    Performed by: Lo Mcqueen MD  Authorized by: Lo Mcqueen MD  Consent: Written consent obtained.  Risks and benefits: risks, benefits and alternatives were discussed  Consent given by: guardian  Required items: required blood products, implants, devices, and special equipment available  Patient identity confirmed: arm band  Time out: Immediately prior to procedure a \"time out\" was called to verify the correct patient, procedure, equipment, support staff and site/side marked as required.  Indications: vascular access    Anesthesia:  Local Anesthetic: lidocaine 1% without epinephrine    Sedation:  Patient sedated: yes    Preparation: skin prepped with 2% chlorhexidine  Skin prep agent dried: skin prep agent completely dried prior to procedure  Sterile barriers: all five maximum sterile barriers used - cap, mask, sterile gown, sterile gloves, and large sterile sheet  Hand hygiene: hand hygiene performed prior to central venous catheter insertion  Location details: right internal jugular  Catheter type: triple lumen  Catheter size: 12 Fr  Pre-procedure: landmarks identified  Ultrasound guidance: yes  Sterile ultrasound techniques: sterile gel and sterile probe covers were used  Number of attempts: 1  Successful placement: yes  Post-procedure: line sutured and dressing applied  Assessment: blood return through all ports, free fluid flow, placement verified by x-ray and no pneumothorax on x-ray  Patient tolerance: patient tolerated the procedure well with no immediate complications

## 2025-04-19 NOTE — PROCEDURES
PROCEDURE NOTE  Date: 4/19/2025   Name: Sera May  YOB: 1945    Intubation    Date/Time: 4/19/2025 5:28 PM    Performed by: Lo Mcqueen MD  Authorized by: Lo Mcqueen MD  Consent: Verbal consent obtained.  Risks and benefits: risks, benefits and alternatives were discussed  Consent given by: guardian  Patient identity confirmed: arm band  Indications: respiratory distress, respiratory failure and hypoxemia  Intubation method: video-assisted  Sedatives: etomidate  Paralytic: rocuronium  Laryngoscope size: Mac 3  Tube size: 7.5 mm  Tube type: cuffed  Number of attempts: 1  Cricoid pressure: no  Cords visualized: yes  Post-procedure assessment: chest rise and ETCO2 monitor  Breath sounds: equal  Cuff inflated: yes  Tube secured with: ETT moreno  Chest x-ray interpreted by me.  Chest x-ray findings: endotracheal tube in appropriate position  Patient tolerance: patient tolerated the procedure well with no immediate complications

## 2025-04-19 NOTE — PROGRESS NOTES
1905 - Bedside and Verbal shift change report given to LILLY Ryan (oncoming nurse) by LILLY Valdivia (offgoing nurse). Report included the following information Nurse Handoff Report, Adult Overview, Intake/Output, MAR, Recent Results, Cardiac Rhythm Sinus Rhythm, and Alarm Parameters. Patient wakes up to voice but is lethargic. Patient is A&O x4. Swallow screen will be completed again before giving patient PO meds.    2000 - Shift assessment completed at this time. Patient is generally weak but has equal strength and sensation. [place] stroke scale negative. Swallow screening failed due to coughing while drinking and after drinking water. PO meds held at this time. Charge RN aware, NP notified.    2045 - BMP, Lactic, and ABG ordered by NP at this time. RT notified.    2130 - RT unable to get ABG after several attempted. RT notified NP. NP will try to get ABG later.    2145 - LA 2.7, CO2 14. NP notified at this time. No new orders.    2330 - Reassessment completed at this time. See flowsheets for details.    0330 - Reassessment completed at this time. See flowsheets for details.    0705 - Bedside and Verbal shift change report given to LILLY Valdivia (oncoming nurse) by LILLY Ryan (offgoing nurse). Report included the following information Nurse Handoff Report, Adult Overview, Intake/Output, MAR, Recent Results, Cardiac Rhythm Sinus Rhythm, and Alarm Parameters.    Notes passed to day shift RN:    - Failed swallow screen x2 overnight, 2100 meds held. Swallow screen passed this AM but, I would continue to rescreen before giving anything PO due to lethargy.    - Lactic trended 2.7 to 2.1. Stop trending per NP.

## 2025-04-19 NOTE — ACP (ADVANCE CARE PLANNING)
I spoke to Vidal. Mrs May has been progressively declining. She is now on 3 vasopressors.     She is on CRRT and mechanical ventilation. I spoke to General surgery, Dr. Celis a length and at present patient is not a candidate for colectomy given fulminant sepsis (high mortality and risk of death while operating). I also spoke to ID and there is no other options (from antibiotics standpoint).    I shared these findings with patients son, Vidal.     I also shared with him other options (medical management only, comfort measures) and discussed goals of care and code status.     Vidal decided on DNR with continued medical management. No surgery.     Will place DNR orders.     Additional CCM time - 60 minutes.     Sesar Rawls MD, JODY, FCCP, FCCM, ATSF, FACP, DAABIP  Interventional Pulmonology/Critical Care Medicine  [facility] [place] [facility]  [facility]

## 2025-04-19 NOTE — PROGRESS NOTES
0710 Bedside and Verbal shift change report given to Shea RN (oncoming nurse) by Shelby RN (offgoing nurse). Report included the following information Nurse Handoff Report, MAR, Recent Results, Med Rec Status, and Cardiac Rhythm NSR .    0745 Shift assessment complete, see flowsheet for details.     0830 Patient is lethargic, MD and RT at bedside to intubate. Dialysis catheter placed for plans to start CRRT. OGT placed    0855 X ray at bedside, per MD OGT okay to use.     1110 Heart rate in 150s-180s and sustaining, MD notified, Increase Amio gtt to 1mg/min and give Amio bolus (see MAR)     1200 Vidal (son) at bedside, questions answered during this time. Personal belongings (cell phone and necklace) sent home with son.     1315 CRRT at bedside     1600 CRRT unable to start (see Elvie RN notes) MD notified.     1630 MD at bedside to place dew dialysis catheter and CVC.     1715 Reassessment complete, see flowsheet for details.     1739 [place] [facility] notified of new catheter, plans to start CRRT tonight    1900 Vidal (son) at bedside with family.     1915 Bedside and Verbal shift change report given to Shelby RN (oncoming nurse) by Shea RN (offgoing nurse). Report included the following information Nurse Handoff Report, Intake/Output, MAR, Recent Results, Med Rec Status, and Cardiac Rhythm NSR .

## 2025-04-20 ENCOUNTER — APPOINTMENT (OUTPATIENT)
Facility: HOSPITAL | Age: 80
DRG: 371 | End: 2025-04-20
Payer: MEDICARE

## 2025-04-20 VITALS
DIASTOLIC BLOOD PRESSURE: 47 MMHG | TEMPERATURE: 97.6 F | HEIGHT: 64 IN | SYSTOLIC BLOOD PRESSURE: 110 MMHG | BODY MASS INDEX: 36.77 KG/M2 | WEIGHT: 215.39 LBS | OXYGEN SATURATION: 89 %

## 2025-04-20 PROBLEM — A41.9 SEPTIC SHOCK (HCC): Status: ACTIVE | Noted: 2025-04-20

## 2025-04-20 PROBLEM — D72.825 BANDEMIA: Status: ACTIVE | Noted: 2025-04-20

## 2025-04-20 PROBLEM — R65.21 SEPTIC SHOCK (HCC): Status: ACTIVE | Noted: 2025-04-20

## 2025-04-20 LAB
ABO + RH BLD: NORMAL
ALBUMIN SERPL-MCNC: 1.9 G/DL (ref 3.5–5)
ALBUMIN/GLOB SERPL: 0.7 (ref 1.1–2.2)
ALP SERPL-CCNC: 215 U/L (ref 45–117)
ALT SERPL-CCNC: 36 U/L (ref 12–78)
ANION GAP SERPL CALC-SCNC: 20 MMOL/L (ref 2–12)
ANION GAP SERPL CALC-SCNC: 21 MMOL/L (ref 2–12)
ANION GAP SERPL CALC-SCNC: 9 MMOL/L (ref 2–12)
ARTERIAL PATENCY WRIST A: ABNORMAL
AST SERPL-CCNC: 112 U/L (ref 15–37)
BASE DEFICIT BLDA-SCNC: 8.3 MMOL/L
BDY SITE: ABNORMAL
BILIRUB DIRECT SERPL-MCNC: 0.1 MG/DL (ref 0–0.2)
BILIRUB SERPL-MCNC: 0.4 MG/DL (ref 0.2–1)
BLOOD GROUP ANTIBODIES SERPL: NORMAL
BUN SERPL-MCNC: 33 MG/DL (ref 6–20)
BUN SERPL-MCNC: 42 MG/DL (ref 6–20)
BUN SERPL-MCNC: 50 MG/DL (ref 6–20)
BUN/CREAT SERPL: 14 (ref 12–20)
BUN/CREAT SERPL: 16 (ref 12–20)
BUN/CREAT SERPL: 18 (ref 12–20)
CALCIUM SERPL-MCNC: 7.1 MG/DL (ref 8.5–10.1)
CALCIUM SERPL-MCNC: 7.1 MG/DL (ref 8.5–10.1)
CALCIUM SERPL-MCNC: 7.2 MG/DL (ref 8.5–10.1)
CHLORIDE SERPL-SCNC: 100 MMOL/L (ref 97–108)
CHLORIDE SERPL-SCNC: 100 MMOL/L (ref 97–108)
CHLORIDE SERPL-SCNC: 101 MMOL/L (ref 97–108)
CO2 SERPL-SCNC: 11 MMOL/L (ref 21–32)
CO2 SERPL-SCNC: 14 MMOL/L (ref 21–32)
CO2 SERPL-SCNC: 21 MMOL/L (ref 21–32)
CREAT SERPL-MCNC: 2.31 MG/DL (ref 0.55–1.02)
CREAT SERPL-MCNC: 2.62 MG/DL (ref 0.55–1.02)
CREAT SERPL-MCNC: 2.85 MG/DL (ref 0.55–1.02)
ERYTHROCYTE [DISTWIDTH] IN BLOOD BY AUTOMATED COUNT: 15 % (ref 11.5–14.5)
FIO2 ON VENT: 60 %
GAS FLOW.O2 SETTING OXYMISER: 22
GLOBULIN SER CALC-MCNC: 2.9 G/DL (ref 2–4)
GLUCOSE SERPL-MCNC: 151 MG/DL (ref 65–100)
GLUCOSE SERPL-MCNC: 169 MG/DL (ref 65–100)
GLUCOSE SERPL-MCNC: 97 MG/DL (ref 65–100)
HCO3 BLDA-SCNC: 16 MMOL/L (ref 22–26)
HCT VFR BLD AUTO: 33.2 % (ref 35–47)
HGB BLD-MCNC: 11.7 G/DL (ref 11.5–16)
MAGNESIUM SERPL-MCNC: 2.5 MG/DL (ref 1.6–2.4)
MCH RBC QN AUTO: 29.5 PG (ref 26–34)
MCHC RBC AUTO-ENTMCNC: 35.2 G/DL (ref 30–36.5)
MCV RBC AUTO: 83.8 FL (ref 80–99)
NRBC # BLD: 1.67 K/UL (ref 0–0.01)
NRBC BLD-RTO: 5.7 PER 100 WBC
PCO2 BLDA: 28 MMHG (ref 35–45)
PEEP RESPIRATORY: 5
PH BLDA: 7.36 (ref 7.35–7.45)
PLATELET # BLD AUTO: 110 K/UL (ref 150–400)
PMV BLD AUTO: 11.2 FL (ref 8.9–12.9)
PO2 BLDA: 85 MMHG (ref 80–100)
POTASSIUM SERPL-SCNC: 3.9 MMOL/L (ref 3.5–5.1)
POTASSIUM SERPL-SCNC: 4.8 MMOL/L (ref 3.5–5.1)
POTASSIUM SERPL-SCNC: 5.5 MMOL/L (ref 3.5–5.1)
PROT SERPL-MCNC: 4.8 G/DL (ref 6.4–8.2)
RBC # BLD AUTO: 3.96 M/UL (ref 3.8–5.2)
SAO2 % BLD: 96 % (ref 92–97)
SAO2% DEVICE SAO2% SENSOR NAME: ABNORMAL
SODIUM SERPL-SCNC: 130 MMOL/L (ref 136–145)
SODIUM SERPL-SCNC: 132 MMOL/L (ref 136–145)
SODIUM SERPL-SCNC: 135 MMOL/L (ref 136–145)
SPECIMEN EXP DATE BLD: NORMAL
SPECIMEN SITE: ABNORMAL
VENTILATION MODE VENT: ABNORMAL
VT SETTING VENT: 340
WBC # BLD AUTO: 29.6 K/UL (ref 3.6–11)

## 2025-04-20 PROCEDURE — 94003 VENT MGMT INPAT SUBQ DAY: CPT

## 2025-04-20 PROCEDURE — 80048 BASIC METABOLIC PNL TOTAL CA: CPT

## 2025-04-20 PROCEDURE — 6360000002 HC RX W HCPCS: Performed by: NURSE PRACTITIONER

## 2025-04-20 PROCEDURE — 90945 DIALYSIS ONE EVALUATION: CPT

## 2025-04-20 PROCEDURE — 86850 RBC ANTIBODY SCREEN: CPT

## 2025-04-20 PROCEDURE — 83735 ASSAY OF MAGNESIUM: CPT

## 2025-04-20 PROCEDURE — 36415 COLL VENOUS BLD VENIPUNCTURE: CPT

## 2025-04-20 PROCEDURE — 6370000000 HC RX 637 (ALT 250 FOR IP): Performed by: INTERNAL MEDICINE

## 2025-04-20 PROCEDURE — 80076 HEPATIC FUNCTION PANEL: CPT

## 2025-04-20 PROCEDURE — 74018 RADEX ABDOMEN 1 VIEW: CPT

## 2025-04-20 PROCEDURE — 85027 COMPLETE CBC AUTOMATED: CPT

## 2025-04-20 PROCEDURE — 2580000003 HC RX 258: Performed by: PHYSICIAN ASSISTANT

## 2025-04-20 PROCEDURE — 2580000003 HC RX 258: Performed by: INTERNAL MEDICINE

## 2025-04-20 PROCEDURE — 2500000003 HC RX 250 WO HCPCS: Performed by: INTERNAL MEDICINE

## 2025-04-20 PROCEDURE — 86900 BLOOD TYPING SEROLOGIC ABO: CPT

## 2025-04-20 PROCEDURE — 6370000000 HC RX 637 (ALT 250 FOR IP): Performed by: STUDENT IN AN ORGANIZED HEALTH CARE EDUCATION/TRAINING PROGRAM

## 2025-04-20 PROCEDURE — 82803 BLOOD GASES ANY COMBINATION: CPT

## 2025-04-20 PROCEDURE — 6360000002 HC RX W HCPCS: Performed by: INTERNAL MEDICINE

## 2025-04-20 PROCEDURE — 2500000003 HC RX 250 WO HCPCS: Performed by: STUDENT IN AN ORGANIZED HEALTH CARE EDUCATION/TRAINING PROGRAM

## 2025-04-20 PROCEDURE — 2500000003 HC RX 250 WO HCPCS: Performed by: NURSE PRACTITIONER

## 2025-04-20 PROCEDURE — 6360000002 HC RX W HCPCS

## 2025-04-20 PROCEDURE — 86901 BLOOD TYPING SEROLOGIC RH(D): CPT

## 2025-04-20 RX ORDER — SODIUM BICARBONATE IN D5W 150/1000ML
PLASTIC BAG, INJECTION (ML) INTRAVENOUS CONTINUOUS
Status: DISCONTINUED | OUTPATIENT
Start: 2025-04-20 | End: 2025-04-20 | Stop reason: SDUPTHER

## 2025-04-20 RX ORDER — INDOMETHACIN 25 MG/1
100 CAPSULE ORAL ONCE
Status: COMPLETED | OUTPATIENT
Start: 2025-04-20 | End: 2025-04-20

## 2025-04-20 RX ORDER — LORAZEPAM 2 MG/ML
1 INJECTION INTRAMUSCULAR EVERY 6 HOURS PRN
Status: DISCONTINUED | OUTPATIENT
Start: 2025-04-20 | End: 2025-04-20 | Stop reason: HOSPADM

## 2025-04-20 RX ORDER — HYDROCORTISONE SODIUM SUCCINATE 100 MG/2ML
50 INJECTION INTRAMUSCULAR; INTRAVENOUS EVERY 6 HOURS
Status: DISCONTINUED | OUTPATIENT
Start: 2025-04-20 | End: 2025-04-20

## 2025-04-20 RX ORDER — GLYCOPYRROLATE 0.2 MG/ML
0.2 INJECTION INTRAMUSCULAR; INTRAVENOUS EVERY 4 HOURS PRN
Status: DISCONTINUED | OUTPATIENT
Start: 2025-04-20 | End: 2025-04-20 | Stop reason: HOSPADM

## 2025-04-20 RX ADMIN — METRONIDAZOLE 500 MG: 500 INJECTION, SOLUTION INTRAVENOUS at 09:04

## 2025-04-20 RX ADMIN — Medication: at 08:00

## 2025-04-20 RX ADMIN — SODIUM CHLORIDE, PRESERVATIVE FREE 10 ML: 5 INJECTION INTRAVENOUS at 09:22

## 2025-04-20 RX ADMIN — Medication: at 04:19

## 2025-04-20 RX ADMIN — HYDROCORTISONE SODIUM SUCCINATE 50 MG: 100 INJECTION, POWDER, FOR SOLUTION INTRAMUSCULAR; INTRAVENOUS at 06:14

## 2025-04-20 RX ADMIN — EZETIMIBE 10 MG: 10 TABLET ORAL at 09:05

## 2025-04-20 RX ADMIN — VASOPRESSIN 0.04 UNITS/MIN: 20 INJECTION INTRAVENOUS at 12:04

## 2025-04-20 RX ADMIN — MEROPENEM 1000 MG: 1 INJECTION INTRAVENOUS at 03:20

## 2025-04-20 RX ADMIN — SODIUM BICARBONATE: 84 INJECTION, SOLUTION INTRAVENOUS at 09:02

## 2025-04-20 RX ADMIN — FENTANYL CITRATE 25 MCG/HR: at 03:22

## 2025-04-20 RX ADMIN — VASOPRESSIN 0.04 UNITS/MIN: 20 INJECTION INTRAVENOUS at 04:41

## 2025-04-20 RX ADMIN — ASPIRIN 81 MG: 81 TABLET, COATED ORAL at 09:05

## 2025-04-20 RX ADMIN — NOREPINEPHRINE BITARTRATE 60 MCG/MIN: 64 SOLUTION INTRAVENOUS at 03:23

## 2025-04-20 RX ADMIN — LEVOTHYROXINE SODIUM 50 MCG: 0.05 TABLET ORAL at 06:14

## 2025-04-20 RX ADMIN — NOREPINEPHRINE BITARTRATE 80 MCG/MIN: 64 SOLUTION INTRAVENOUS at 07:07

## 2025-04-20 RX ADMIN — Medication: at 10:50

## 2025-04-20 RX ADMIN — PANTOPRAZOLE SODIUM 40 MG: 40 INJECTION, POWDER, LYOPHILIZED, FOR SOLUTION INTRAVENOUS at 09:05

## 2025-04-20 RX ADMIN — Medication 100 MCG: at 09:05

## 2025-04-20 RX ADMIN — AMIODARONE HYDROCHLORIDE 0.5 MG/MIN: 1.8 INJECTION, SOLUTION INTRAVENOUS at 07:48

## 2025-04-20 RX ADMIN — PHENYLEPHRINE HYDROCHLORIDE 30 MCG/MIN: 10 INJECTION INTRAVENOUS at 07:14

## 2025-04-20 RX ADMIN — PREGABALIN 150 MG: 75 CAPSULE ORAL at 09:05

## 2025-04-20 RX ADMIN — Medication 1 AMPULE: at 09:22

## 2025-04-20 RX ADMIN — METHENAMINE HIPPURATE 1 G: 1 TABLET ORAL at 09:05

## 2025-04-20 RX ADMIN — SODIUM BICARBONATE 100 MEQ: 84 INJECTION, SOLUTION INTRAVENOUS at 07:30

## 2025-04-20 RX ADMIN — VANCOMYCIN HYDROCHLORIDE 500 MG: 1 INJECTION, POWDER, LYOPHILIZED, FOR SOLUTION INTRAVENOUS at 09:04

## 2025-04-20 RX ADMIN — ENOXAPARIN SODIUM 40 MG: 100 INJECTION SUBCUTANEOUS at 09:05

## 2025-04-20 RX ADMIN — VANCOMYCIN HYDROCHLORIDE 500 MG: 1 INJECTION, POWDER, LYOPHILIZED, FOR SOLUTION INTRAVENOUS at 02:03

## 2025-04-20 RX ADMIN — Medication 1000 UNITS: at 09:05

## 2025-04-20 RX ADMIN — HYDROCORTISONE SODIUM SUCCINATE 50 MG: 100 INJECTION, POWDER, FOR SOLUTION INTRAMUSCULAR; INTRAVENOUS at 12:05

## 2025-04-20 RX ADMIN — Medication 1 CAPSULE: at 09:05

## 2025-04-20 RX ADMIN — NOREPINEPHRINE BITARTRATE 95 MCG/MIN: 64 SOLUTION INTRAVENOUS at 12:48

## 2025-04-20 RX ADMIN — Medication 1 TABLET: at 09:05

## 2025-04-20 RX ADMIN — HYDROCORTISONE SODIUM SUCCINATE 50 MG: 100 INJECTION, POWDER, FOR SOLUTION INTRAMUSCULAR; INTRAVENOUS at 01:04

## 2025-04-20 RX ADMIN — NOREPINEPHRINE BITARTRATE 90 MCG/MIN: 64 SOLUTION INTRAVENOUS at 09:59

## 2025-04-20 RX ADMIN — CETIRIZINE HYDROCHLORIDE 10 MG: 10 TABLET, FILM COATED ORAL at 09:05

## 2025-04-20 RX ADMIN — METRONIDAZOLE 500 MG: 500 INJECTION, SOLUTION INTRAVENOUS at 00:09

## 2025-04-20 RX ADMIN — PROPOFOL 15 MCG/KG/MIN: 10 INJECTION, EMULSION INTRAVENOUS at 06:25

## 2025-04-20 RX ADMIN — Medication: at 09:22

## 2025-04-20 RX ADMIN — PHENYLEPHRINE HYDROCHLORIDE 300 MCG/MIN: 10 INJECTION INTRAVENOUS at 12:49

## 2025-04-20 ASSESSMENT — PULMONARY FUNCTION TESTS
PIF_VALUE: 19
PIF_VALUE: 24
PIF_VALUE: 21

## 2025-04-20 ASSESSMENT — PAIN SCALES - GENERAL
PAINLEVEL_OUTOF10: 0
PAINLEVEL_OUTOF10: 0

## 2025-04-20 NOTE — DIALYSIS
CRRT Rounding Note [facility]:  CRRT discontinued per MD orders, pt went comfort care &  today off the machine. All possible blood (186 ml) returned by primary RN. Old cassette & bags discarded in red biohazard bin. Machine externally disinfected per policy & procedure and returned to supply closet. Pre/post report with TONY Calixto RN.   25 1542   Treatment   $CRRT $Yes   Machine #   ([id number])   CRRT Activities   Intervention Discontinued   Primary RN SBAR: TONY Calixto RN  Incapacitated Nurse [facility]. provided: N/A  Patient Education provided: N/A  Preferred Education method and Primary language: verbal, English  Dialysis consent: yes  Hospital General Consent Verified: yes  Hospital associated wait time; reason: N/A  Hepatitis B Surface Ag   Date/Time Value Ref Range Status   2025 02:39 PM 0.40 Index Final     Hep B S Ag Interp   Date/Time Value Ref Range Status   2025 02:39 PM Negative NEG   Final     Hep B S Ab   Date/Time Value Ref Range Status   2025 02:39 PM 4.88 mIU/mL Final     Hep B S Ab Interp   Date/Time Value Ref Range Status   2025 02:39 PM NONREACTIVE NR   Final     Comment:     (NOTE)  The [employer] Centaur Anti-HBs2 assay is traceable to the World Health   Organization (WHO) Hepatitis B Immunoglobulin 1st International   Reference Preparation (). Samples with a calculated value of 10   mIU/mL or greater are considered reactive (protective) in accordance   with the CDC guidelines. The accepted criteria for immunity to HBV is   anti-HBs activity greater than or equal to 10 mIU/mL, as defined by   the WHO International Reference Preparation.  Assay performance has not been established in pregnant women,   patients who are immunosuppressed or immunocompromised, nor have   performance characteristics been established in conjunction with   other 's assays for specific HBV serologic markers. This   assay does not differentiate between vaccine induced immune response

## 2025-04-20 NOTE — DISCHARGE SUMMARY
Death Discharge Summary      Patient ID:  Sera May  79 y.o.  1945  [id number]    Admit Date: 4/14/2025    Attending Physician:  Sesar Rawls MD    Hospital course:      The patient is a 79/F who was admitted to ICU for C.dif colitis and hypotension. She went into fulminant C.diff colitis leading to shock, renal failure, respiratory failure.     Family decided to proceed with comfort measures only.     Chief Complaint   Patient presents with    Diarrhea     BIBEMS for known c-diff infection. A&Ox3. States she was unable to get her meds and feels dehydrated. Reports she did not take meds today and she feels \"out of it.\" EMS started a 20G LAC.       Problem List:    Patient Active Problem List    Diagnosis Date Noted    Moderate protein-calorie malnutrition 04/17/2025    C. difficile diarrhea 04/16/2025    Hypovolemia 04/16/2025    MYRIAM (acute kidney injury) 04/16/2025    Dehydration 04/16/2025    Recurrent infections 04/16/2025    Prediabetes 04/16/2025    Recurrent Clostridioides difficile infection 04/15/2025    Type 2 diabetes mellitus 07/18/2024    Pre-syncope 08/24/2023    Lumbar stenosis with neurogenic claudication 07/17/2023    S/P lumbar fusion 07/17/2023    Kyphoscoliosis 07/17/2023    Encounter for preadmission testing 07/06/2023    Coronary artery disease due to lipid rich plaque 03/27/2023    Postural dizziness with presyncope 01/19/2022    Mixed hyperlipidemia 01/14/2020    Overweight (BMI 25.0-29.9) 11/14/2018    Endometriosis 05/16/2018    Closed fracture of left lower extremity with routine healing 05/16/2018    Gastroesophageal reflux disease without esophagitis 05/16/2018    HX: breast cancer 05/16/2018    Osteopenia of multiple sites 05/16/2018    History of CVA (cerebrovascular accident) 05/16/2018    Hypothyroidism 05/16/2018       Death Diagnosis:  C.diff colitis    Time of death:1410 pm    Date of death:4/20/25      Sesar Rawls MD  [facility]

## 2025-04-20 NOTE — PROGRESS NOTES
Patient seen and evaluated. Family is going comfort. Touched base with son Vidal, no further questions. They do not wish to escalate care or pursue surgery. I think this is reasonable given her condition and prognosis. Will sign off, please re consult as needed.     Vimal Celis MD  General Surgery

## 2025-04-20 NOTE — PROGRESS NOTES
Spiritual Health History and Assessment/Progress Note  [facility]    Grief, Loss, and Adjustments,  , Anticipatory Grief, End of Life,      Name: Sera May MRN: [id number]    Age: 79 y.o.     Sex: female   Language: English   Muslim: Jew   Recurrent Clostridioides difficile infection     Date: 4/20/2025            Total Time Calculated: 10 min              Spiritual Assessment began in [facility] CRITICAL CARE        Referral/Consult From: Nurse   Encounter Overview/Reason: Grief, Loss, and Adjustments  Service Provided For: Family    Yolanda, Belief, Meaning:   Patient unable to assess at this time  Family/Friends Other: unable to assess      Importance and Influence:  Patient unable to assess at this time  Family/Friends have no beliefs influential to healthcare decision-making identified during this visit    Community:  Patient Other: unable to assess  Family/Friends No family/friends present    Assessment and Plan of Care:     Patient Interventions include: Other: patient at end of life  Family/Friends Interventions include: Facilitated expression of thoughts and feelings, Explored spiritual coping/struggle/distress, and Other: assurance of prayer    Patient Plan of Care:   Family/Friends Plan of Care: Spiritual Care available upon further referral    Electronically signed by Chaplain SELMA [facility] on 4/20/2025 at 2:07 PM

## 2025-04-20 NOTE — ACP (ADVANCE CARE PLANNING)
Had a long discussion with Vidal and his wife at bedside earlier and again now (both in person).    Current treatment and prognosis discussed.     Family decided to pursue comfort measures only.     I explained them in detail what comfort measures entails. I answered all the questions.     Sesar Rawls MD, JODY, FCCP, FCCM, ATSF, FACP, DAABIP  Interventional Pulmonology/Critical Care Medicine  [facility]

## 2025-04-20 NOTE — PROCEDURES
PROCEDURE NOTE  Date: 4/19/2025   Name: Sera May  YOB: 1945          Arterial Catheter Insertion Procedure Note    Diagnosis: Circulatory shock    Indications: Continuous BP monitoring    Location: ICU    Performed by: Hernan BRITO    Assistants: RN    Procedure Details:  Performed emergent for critically ill patient on multiple vasopressors and mechanical ventilation    Time-Out Process performed.    Under sterile conditions (hand hygiene prior to donning gloves, gown, mask, sterile drape), the skin above the left brachial artery was prepped with chlorhexidine. Local anesthesia was infiltrated into the skin and subcutaneous tissues. The Radial Art-line kit was used. A introducer needle was inserted into the Left brachial artery. A guide wire was easily inserted through the needle. The 20 G arterial catheter was advanced over the guidewire and the guide wire and needle were both removed. The arterial catheter was connected to the pressure tubing for the system. The catheter was flushed and the tracing was adequate. The catheter was sutured, and a sterile dressing was applied.    US Guidance: Yes    Complications: None    Condition: Critical      Hernan BRITO, [facility] - Critical Care

## 2025-04-20 NOTE — PROGRESS NOTES
ICU Progress Note            Subjective:      - lying on the bed. Appears very weak.    - went into a.fib with RVR. Currently on levophed 5 mcg/min.    - patient is lethargic, kussmaul breathing + levophed 9 mcg/min.   - on 3 vasopressors, intubated and on CRRT.     Assessment and Plan:    The patient is a 79/F who we are seeing in consultation at the request of Dr. Mcdaniel for evaluation of hypotension. Transferred to ICU on .     Septic shock  - Due to C.diff Colitis. Currently on 3 vasopressors (levophed, phenylephrine, vasopressin). Cont. Antibiotics. Cont. Hydrocortisone 50 mg every 6 hours.     MYRIAM/Oliguric renal failure/Metabolic acidosis - ATN, on bicarb gtt. CRRT started on .    A.fib with RVR -  Cont. Amiodarone gtt.      Fulminant C.diff colitis - Cont. Flagyl IV and PO Vancomycin. ID following, discussed with Dr. Carranza and Dr. Peterson. General surgery following. I will obtain KUB,  to see if there is any evidence of toxic megacolon, although this will be largely an academic exercise given she will not survive surgery (but might give additional data which I can share with family).      Critically ill.      Full code.     Poor prognosis.       - Updated son over phone. I answered all the questions. He is planning to be at bedside at 11 am today, will discuss again.        - I spoke to son, Vidal and updated him with patients worsening condition. I discussed intubation, HD catheter and her prior wishes (intubation/CPR). He said he never had such discussions with her in the past and wishes to continue with intubation and dialysis. Consent obtained for both procedures.      CCM time - 40 minutes.     Vital Signs:    BP (!) 110/47   Pulse (!) 103   Temp 98.4 °F (36.9 °C) (Axillary)   Resp 23   Ht 1.626 m (5' 4.02\")   Wt 97.7 kg (215 lb 6.2 oz)   SpO2 (!) 89%   BMI 36.95 kg/m²             Temp (24hrs), Av.6 °F (37 °C), Min:97.4 °F (36.3 °C), Max:101 °F (38.3 °C)

## 2025-04-20 NOTE — PROGRESS NOTES
Spiritual Health History and Assessment/Progress Note  [facility]    Grief, Loss, and Adjustments,  , Death,      Name: Sera May MRN: [id number]    Age: 79 y.o.     Sex: female   Language: English   Jew: Tenriism   Recurrent Clostridioides difficile infection     Date: 2025            Total Time Calculated: 10 min              Spiritual Assessment continued in [facility] CRITICAL CARE        Referral/Consult From: Nurse   Encounter Overview/Reason: Grief, Loss, and Adjustments  Service Provided For: Family    Yolanda, Belief, Meaning:   Patient unable to assess at this time  Family/Friends No family/friends present      Importance and Influence:  Patient unable to assess at this time  Family/Friends No family/friends present    Community:  Patient Other: unable to assess  Family/Friends No family/friends present    Assessment and Plan of Care:     Patient Interventions include: Other: patient is   Family/Friends Interventions include: No family/friends present    Patient Plan of Care:   Family/Friends Plan of Care:     Electronically signed by Chaplain KARTHIKEYAN HAHN on 2025 at 4:32 PM

## 2025-04-20 NOTE — FLOWSHEET NOTE
04/19/25 2330   Observations & Evaluations   Level of Consciousness 2   Respiratory Quality/Effort Unlabored   FiO2  60 %   O2 Device Ventilator   Skin Color Pale   Skin Condition/Temp Warm;Dry;Swollen/edematous   Edema Generalized;Right upper extremity;Left upper extremity;Right lower extremity;Left lower extremity   Edema Generalized +1   RUE Edema +1   LUE Edema +1   RLE Edema +1   LLE Edema +1   Vital Signs   BP (!) 108/46   Temp (!) 101 °F (38.3 °C)   Pulse 81   Respirations 22   SpO2 95 %   ICEBOAT   ICEBOAT I;C;E;B;O;A;T   Treatment   $CRRT $Yes   Machine #   (PM)   Cartridge Lot #   [id number])   System Used   System Used Cony   Pressures (Cony)   Access (mmHg) -53 mmHg   Return/Venous (mmHg) 51 mmHg   TMP (mmHg) 19 mmHg   Pressure Drop (mmHg) 48 mmHg   Filter (mmHg) 148 mmHg   Effluent (mmHg) 70 mmHg   Deaeration Chamber Check Yes   Flow Rates (Cony)   Blood Flow (mL/min) 200 mL/min   Replacement Fluid Pre-Filter (mL/hr) 450 mL/hr   Replacement Filter Post-Filter (mL/hr) 450 mL/hr   Pre-Blood Pump (mL/hr) 900 mL/hr   CRRT Activities   Intervention Initiated   Hemodialysis Central Access - Temporary Right Neck   Placement Date/Time: 04/19/25 1720   Present on Admission/Arrival: No  Inserted by: Dr. Mcqueen  Insertion Practices: Chlorohexadine skin antisepsis;Hand hygiene;Maximal barrier precautions;Optimal catheter site selection;Sterile ultrasound technique  ...   Continued need for line? Yes   Site Assessment Clean, dry & intact   Blue Lumen Status Brisk blood return;Flushed   Red Lumen Status Brisk blood return;Flushed   Line Care Ports disinfected;Chlorhexidine wipes;Connections checked and tightened   Dressing Type Transparent;Bacteriocidal   Date of Last Dressing Change 04/19/25   Dressing Status Clean, dry & intact   Initiation of Therapy   Dialysis Nurse Intiation of CRRT Therapy Yes        04/19/25 2330   Handoff   Communication Given Other  (CRRT initiation.)   Handoff Given To Betsy Tran

## 2025-04-20 NOTE — PROGRESS NOTES
0715 Bedside and Verbal shift change report given to Shea RN (oncoming nurse) by Shelby RN (offgoing nurse). Report included the following information Nurse Handoff Report, Intake/Output, MAR, Recent Results, Med Rec Status, and Cardiac Rhythm NSR .    Patient is requiring higher doses of pressors at this time, Phenylephrine infusion started (see MAR)    0745 Shift assessment completed, see flowsheet for details.     MD notified of increasing pressor requirements. Per MD do not titrate any higher on Levophed and titrate Rahat according to orders (see MAR)    1115 Reassessment complete, see flowsheet for details.     1530 Vidal (son) and NA at bedside to see MD, orders for comfort measures.     1410 MD called TOD. Patient belonging bags sent with Vidal and NA.

## 2025-04-20 NOTE — PROGRESS NOTES
Son contacted and not interested in surgery but further medical measures. Made DNR. Surgery would include total colectomy with end ileostomy.

## 2025-04-20 NOTE — PROGRESS NOTES
04/20/25 0406   B: Both Spontaneous Awakening and Breathing Trials   Was Patient Receiving Mechanical Ventilation Yes   Safety Screening Spontaneous Breathing Trial (SBT) FIO2 is greater than 50%  (Pt also on Vasopressor & Per NP [place] no SBT)

## 2025-04-20 NOTE — PROGRESS NOTES
Name: Sera May  Date: 4/20/2025  Location: ICU    Physical exam findings:   Neuro: Pupils fixed and dilated.  No corneal reflex.  CVS: Carotid, femoral, or radial pulses not palpable. No cardiac sounds by ascultation.  Resp: No visible inspiration. No respiratory sounds on ascultation.     Date of Death: 4/20/2025  Time of Death: 1410  Primary Cause of Death - C.diff colitis    Family at bedside      Sesar Rawls MD  2:12 PM  4/20/2025

## 2025-04-20 NOTE — PROGRESS NOTES
1915 - Bedside and Verbal shift change report given to LILLY Ryan (oncoming nurse) by LILLY Valdivia (offgoing nurse). Report included the following information Nurse Handoff Report, Adult Overview, Intake/Output, MAR, Recent Results, Cardiac Rhythm Sinus Rhythm, and Alarm Parameters.      1951 - Tanisha RN called to confirm room number and that patient was ready to be restarted on CRRT. Tanisha RN stated she will be on site in about 2 hours.    2000 - Shift assessment completed at this time. Bps fluctuating based on cuff pressures. NP notified and was asked about a-line placement. See flowsheets for details.    2053 - A-line place by NP at this time. Go down to 45 on Levo per NP.    2149 - Tanisha called to get a status check on restarting CRRT. LILLY Garay called back and stated that she is coming over from [place] now.    2218 - [facility] called, message left at this time to bed called back.    2226 - Initial intake with [facility] completed at this time. Kiera from [employer] will call back for more intake questions.    2231 - Kiera from [employer] called to complete intake. Patient is not a candidate at this time. Call to notify [employer] if brain death/apnea testing is performed.    2255 - Tanisha RN at bedside to restart CRRT.    2330 - CRRT restarted at this time. Reassessment completed at this time. See flowsheets for details.    0122 - Bicarb 21 on BMP. Turn off Bicarb drip per NP.     0300 - Reassessment completed at this time. See flowsheets for details.    0400 - RT called for a new SPO2 monitor, unable to get SPO2 sat with current nose probe.     0415 - RT unable to get a sat with forehead or nose probe. NP notified and ABG ordered per NP.    0423 - ABG PO2 84.9, Bicarb 15.5. NP notified by RT. No new orders.    0705 - Bedside and Verbal shift change report given to LILLY Valdivia (oncoming nurse) by LILLY Ryan (offgoing nurse). Report included the following information Nurse Handoff Report, Adult Overview,

## 2025-04-20 NOTE — PROGRESS NOTES
[email or web address] RoserologyassRoth[facility] Orth[facility]edic Specialty [email or web address]    Juan Armando [facility]   Nephrology Progress Note  Pt Name : Sera May  Date of Admission : 4/14/2025    CC:  Follow up for MYRIAM       Assessment and Plan   Oliguric MYRIAM : 2/2 ATN from sepsis w/ shock   Sepsis w/ shock : 2/2 c diff colitis, MSOF   C diff colitis   Severe Metabolic acidosis  Lactic acidosis   A fib RVR   Hypocalcemia : corrected ca normal     Plan :  - Family opted for comfort care which seems appropriate given her refractory shock and acidosis   - can d/c CRRT   - signing off     Discussed with : ICU team    For other plans, see orders.    Interval History:  Remains on multiple pressors despite CRRT. Persistent acidosis.   Discussed w/ ICU attending who has been d/w family regarding critical state     Review of Systems: Review of systems not obtained due to patient factors.    Current Facility-Administered Medications   Medication Dose Route Frequency    HYDROmorphone (DILAUDID) injection 0.5 mg  0.5 mg IntraVENous Q3H PRN    Or    HYDROmorphone (DILAUDID) injection 0.5 mg  0.5 mg IntraVENous Q3H PRN    glycopyrrolate (ROBINUL) injection 0.2 mg  0.2 mg IntraVENous Q4H PRN    LORazepam (ATIVAN) injection 1 mg  1 mg IntraVENous Q6H PRN    sodium chloride 0.9 % bolus 1,000 mL  1,000 mL Dialysis PRN    potassium chloride 20 mEq/50 mL IVPB (Central Line)  20 mEq IntraVENous PRN    CRRT MAR Communication Note   Other Q12H    heparin (porcine) 1000 UNIT/ML injection 1,200 Units  1,200 Units IntraCATHeter PRN    And    heparin (porcine) 1000 UNIT/ML injection 1,200 Units  1,200 Units IntraCATHeter PRN    fentaNYL (SUBLIMAZE) injection 25 mcg  25 mcg IntraVENous Q4H PRN    Peppermint Spirit   Does not apply PRN    fluticasone (FLONASE) 50 MCG/ACT nasal spray 1 spray  1 spray Each Nostril Daily PRN    sodium chloride flush 0.9 % injection 5-40 mL  5-40 mL IntraVENous PRN    0.9 % sodium chloride infusion   IntraVENous PRN

## 2025-04-21 LAB
EKG ATRIAL RATE: 101 BPM
EKG ATRIAL RATE: 96 BPM
EKG DIAGNOSIS: NORMAL
EKG DIAGNOSIS: NORMAL
EKG P AXIS: 14 DEGREES
EKG P AXIS: 29 DEGREES
EKG P-R INTERVAL: 140 MS
EKG P-R INTERVAL: 160 MS
EKG Q-T INTERVAL: 316 MS
EKG Q-T INTERVAL: 324 MS
EKG QRS DURATION: 72 MS
EKG QRS DURATION: 74 MS
EKG QTC CALCULATION (BAZETT): 409 MS
EKG QTC CALCULATION (BAZETT): 409 MS
EKG R AXIS: -61 DEGREES
EKG R AXIS: -61 DEGREES
EKG T AXIS: 29 DEGREES
EKG T AXIS: 42 DEGREES
EKG VENTRICULAR RATE: 101 BPM
EKG VENTRICULAR RATE: 96 BPM

## 2025-04-21 NOTE — PROGRESS NOTES
Quality: Chart reviewed for death and restraints. Restraints noted during hospitalization. Restraints not a contributing factor in patient death. Documented for tracking according to CMS guidelines on 4/21/25 at 1455.

## 2025-05-13 NOTE — PROGRESS NOTES
Physician Progress Note      PATIENT:               ROXANE BULLARD  [facility] #:                  [id number]  :                       1945  ADMIT DATE:       2025 8:53 PM  DISCH DATE:        2025 2:10 PM  RESPONDING  PROVIDER #:        Sesar Rawls MD          QUERY TEXT:    Please clarify the patient?s nutritional status:    The clinical indicators include:  per 4/15 HP - 79 year old female, history of Arthritis, Breast cancer, CAD   (coronary artery disease), Fibromyalgia, GERD (gastroesophageal reflux   disease), Hiatal hernia  Patient here with chief complaint of diarrhea    per  Dietician consult - Malnutrition Status:  Moderate malnutrition   (25 [id number])  Context:  Acute Illness  Findings of the 6 clinical characteristics of malnutrition:  Energy Intake:  50% or less of estimated energy requirements for 5 or more   days  Weight Loss:  Unable to assess (pt reports recent 20# wt loss but no wt hx   with stated method to confirm)  Body Fat Loss:  Mild body fat loss Orbital, Buccal region  Muscle Mass Loss:  Mild muscle mass loss Temples (temporalis), Clavicles   (pectoralis & deltoids), Scapula (trapezius)  Fluid Accumulation:  Mild Extremities  Current BMI (kg/m2): 28.7    Albumin : 2.9, 4/15: 2.8, : 2.5, : 3.0, : 1.5, : 1.9  Total Protein : 6.3, 4/15: 6.3, : 5.5, : 3.7, : 4.8    1. Update diet to GI [place]  2. Add Banatrol TID for diarrhea  3. Replete lytes as needed  4. Please record %PO intake in I/O's in flowsheets under intake  Options provided:  -- Protein calorie malnutrition moderate  -- Other - I will add my own diagnosis  -- Disagree - Not applicable / Not valid  -- Disagree - Clinically unable to determine / Unknown  -- Refer to Clinical Documentation Reviewer    PROVIDER RESPONSE TEXT:    This patient has moderate protein calorie malnutrition.    Query created by: Nevaeh Toscano on 2025 1:17 PM      Electronically signed by:  Sesar Rawls MD

## (undated) DEVICE — LAMINECTOMY-SMH: Brand: MEDLINE INDUSTRIES, INC.

## (undated) DEVICE — BONE WAX WHITE: Brand: BONE WAX WHITE

## (undated) DEVICE — CODMAN® SURGICAL PATTIES 3/4" X 3/4" (1.91CM X 1.91CM): Brand: CODMAN®

## (undated) DEVICE — RUNTHROUGH NS EXTRA FLOPPY PTCA GUIDEWIRE: Brand: RUNTHROUGH

## (undated) DEVICE — ANGIOGRAPHY KIT

## (undated) DEVICE — NEEDLE HYPO 25GA L1.5IN BVL ORIENTED ECLIPSE

## (undated) DEVICE — 4-PORT MANIFOLD: Brand: NEPTUNE 2

## (undated) DEVICE — SOLUTION IV 250ML 0.9% SOD CHL CLR INJ FLX BG CONT PRT CLSR

## (undated) DEVICE — SUTURE MCRYL SZ 4 0 L18IN ABSRB VLT PS 1 L24MM 3 8 CIR REV Y682H

## (undated) DEVICE — BNDG ADH FABRIC 2X4IN ST LF --

## (undated) DEVICE — GLOVE SURG SZ 8 L12IN FNGR THK79MIL GRN LTX FREE

## (undated) DEVICE — SUTURE STRATAFIX SPRL MCRYL + SZ 3-0 L24IN ABSRB UD PS-2 SXMP1B108

## (undated) DEVICE — GUIDEWIRE VASC J 3 MM 0.035 INX210 CM FIX COR INQWIRE

## (undated) DEVICE — SYRINGE MED 20ML STD CLR PLAS LUERLOCK TIP N CTRL DISP

## (undated) DEVICE — STRAP POS KNEE BODY VELC

## (undated) DEVICE — BIPOLAR IRRIGATOR INTEGRATED TUBING AND BIPOLAR CORD SET, DISPOSABLE

## (undated) DEVICE — DRAPE XR C ARM 41X74IN LF --

## (undated) DEVICE — HI-TORQUE VERSACORE MODIFIED J GUIDE WIRE SYSTEM 260 CM: Brand: HI-TORQUE VERSACORE

## (undated) DEVICE — COVER LT HNDL PLAS RIG 1 PER PK

## (undated) DEVICE — TIP CLEANER: Brand: VALLEYLAB

## (undated) DEVICE — KIT MED IMAG CNTRST AGNT W/ IOPAMIDOL REUSE

## (undated) DEVICE — SOLUTION IRRIG 3000ML 0.9% SOD CHL USP UROMATIC PLAS CONT

## (undated) DEVICE — CATH 5F 100CM JR40 -- DXTERITY

## (undated) DEVICE — TELFA ADHESIVE ISLAND DRESSING: Brand: TELFA

## (undated) DEVICE — POSITIONER HD REST FOAM CMFRT TCH

## (undated) DEVICE — SUTURE ABS MF 2-0 CT1 27IN STRATAFIX PDS+ SXPP1B412

## (undated) DEVICE — TR BAND RADIAL ARTERY COMPRESSION DEVICE: Brand: TR BAND

## (undated) DEVICE — SOLUTION IV 500ML 0.9% SOD CHL FLX CONT

## (undated) DEVICE — CATHETER DIAG 5FR L100CM LUMN ID0.047IN JL3.5 CRV 0 SIDE H

## (undated) DEVICE — SYSTEN PATIENT SPECIFIC UNID TECHNOLOGY

## (undated) DEVICE — KIT HND CTRL 3 W STPCOCK ROT END 54IN PREM HI PRSS TBNG AT

## (undated) DEVICE — CONTAINER,SPECIMEN,3OZ,OR STRL: Brand: MEDLINE

## (undated) DEVICE — SOLUTION IV 1000ML 0.9% SOD CHL

## (undated) DEVICE — SUTURE STRATAFIX SYMMETRIC SZ 1 L18IN ABSRB VLT CT1 L36CM SXPP1A404

## (undated) DEVICE — SYR 5ML 1/5 GRAD LL NSAF LF --

## (undated) DEVICE — CATH GUID COR JR4.0 6FR 100CM -- LAUNCHER

## (undated) DEVICE — Device

## (undated) DEVICE — KIT ROBOTIC ORTHOPAEDIC X-SCAN PLAN DISP MAZORX

## (undated) DEVICE — CATHETER IV 18GA L1.25IN FEP STR HUB INTROCAN SFTY

## (undated) DEVICE — STERILE-Z SURGICAL PATIENT COVERS CLEAR POLYETHYLENE STERILE UNIVERSAL FIT 10 PER CASE: Brand: STERILE-Z

## (undated) DEVICE — SUTURE VCRL SZ 2-0 L18IN ABSRB UD L26MM CP-2 1/2 CIR REV J762D

## (undated) DEVICE — 1200 GUARD II KIT W/5MM TUBE W/O VAC TUBE: Brand: GUARDIAN

## (undated) DEVICE — SYR 3ML LL TIP 1/10ML GRAD --

## (undated) DEVICE — SUTURE ABSRB L30CM 2-0 VLT SPRL PDS + STRATAFIX SXPP1B410

## (undated) DEVICE — WASTE KIT - ST MARY: Brand: MEDLINE INDUSTRIES, INC.

## (undated) DEVICE — MAGNETIC INSTR DRAPE 20X16: Brand: MEDLINE INDUSTRIES, INC.

## (undated) DEVICE — HANDPIECE SET WITH BONE CLEANING TIP AND SUCTION TUBE: Brand: INTERPULSE

## (undated) DEVICE — GLOVE SURG SZ 75 L12IN FNGR THK94MIL STD WHT LTX FREE

## (undated) DEVICE — STERILE POLYISOPRENE POWDER-FREE SURGICAL GLOVES WITH EMOLLIENT COATING: Brand: PROTEXIS

## (undated) DEVICE — KIT MFLD ISOLATN NACL CNTRST PRT TBNG SPIK W/ PRSS TRNSDUC

## (undated) DEVICE — GLIDESHEATH SLENDER STAINLESS STEEL KIT: Brand: GLIDESHEATH SLENDER

## (undated) DEVICE — C-ARM: Brand: UNBRANDED

## (undated) DEVICE — 1LYRTR 16FR10ML100%SIL UMS SNP: Brand: MEDLINE INDUSTRIES, INC.

## (undated) DEVICE — LAMINECTOMY RICHMOND-LF: Brand: MEDLINE INDUSTRIES, INC.

## (undated) DEVICE — SPECIAL PROCEDURE DRAPE 32" X 34": Brand: SPECIAL PROCEDURE DRAPE

## (undated) DEVICE — PREP SKN PREVAIL 40ML APPL --

## (undated) DEVICE — CORD ELECSURG BPLR 12 FT DISP [810T818750] [ADLER INSTRUMENT CO]

## (undated) DEVICE — DRAPE,REIN 53X77,STERILE: Brand: MEDLINE

## (undated) DEVICE — CONNEXT SURGICAL MATRIX - SMALL SYRINGE: Brand: CONNEXT SURGICAL MATRIX

## (undated) DEVICE — COVER,TABLE,HEAVY DUTY,60"X90",STRL: Brand: MEDLINE

## (undated) DEVICE — APPLICATOR BNDG 1MM ADH PREMIERPRO EXOFIN

## (undated) DEVICE — MEDI-TRACE CADENCE ADULT, DEFIBRILLATION ELECTRODE -RTS  (10 PR/PK) - PHYSIO-CONTROL: Brand: MEDI-TRACE CADENCE

## (undated) DEVICE — CUSTOM KT PTCA INFL DEV K05 00052M

## (undated) DEVICE — DRAPE MICSCP W46XL120IN POLY DRAWSTRAP W STEREO OBS TB AND

## (undated) DEVICE — INFECTION CONTROL KIT SYS

## (undated) DEVICE — INTENDED FOR TISSUE SEPARATION, AND OTHER PROCEDURES THAT REQUIRE A SHARP SURGICAL BLADE TO PUNCTURE OR CUT.: Brand: BARD-PARKER ® CARBON RIB-BACK BLADES

## (undated) DEVICE — MEDI-VAC NON-CONDUCTIVE SUCTION TUBING: Brand: CARDINAL HEALTH

## (undated) DEVICE — TOOL 14MH30 LEGEND 14CM 3MM: Brand: MIDAS REX ™

## (undated) DEVICE — SPHERE STRL PASSIVE MARKER 60

## (undated) DEVICE — AGENT HEMSTAT 10ML MTRX W/ MALL TRIM TIP FLOSEAL

## (undated) DEVICE — PACK PROCEDURE SURG HRT CATH

## (undated) DEVICE — (D)SOL MEDC ALC ISO 70% 16OZ -- CONVERT TO ITEM 364515

## (undated) DEVICE — SPONGE GZ W4XL4IN COT 12 PLY TYP VII WVN C FLD DSGN

## (undated) DEVICE — FLOSEAL MATRIX IS INDICATED IN SURGICAL PROCEDURES (OTHER THAN IN OPHTHALMIC) AS AN ADJUNCT TO HEMOSTASIS WHEN CONTROL OF BLEEDING BY LIGATURE OR CONVENTIONALPROCEDURES IS INEFFECTIVE OR IMPRACTICAL.: Brand: FLOSEAL HEMOSTATIC MATRIX

## (undated) DEVICE — SUTURE VCRL 2-0 L27IN ABSRB UD CP-2 L26MM 1/2 CIR REV CUT J869H

## (undated) DEVICE — COVER,TABLE,60X90,STERILE: Brand: MEDLINE

## (undated) DEVICE — COVER,MAYO STAND,STERILE: Brand: MEDLINE

## (undated) DEVICE — ADHESIVE LIQ 2OZ ADJUNCT FOR DSG MASTISOL